# Patient Record
Sex: FEMALE | Race: BLACK OR AFRICAN AMERICAN | HISPANIC OR LATINO | Employment: OTHER | URBAN - METROPOLITAN AREA
[De-identification: names, ages, dates, MRNs, and addresses within clinical notes are randomized per-mention and may not be internally consistent; named-entity substitution may affect disease eponyms.]

---

## 2017-02-06 ENCOUNTER — ALLSCRIPTS OFFICE VISIT (OUTPATIENT)
Dept: OTHER | Facility: OTHER | Age: 67
End: 2017-02-06

## 2017-02-06 DIAGNOSIS — E78.5 HYPERLIPIDEMIA: ICD-10-CM

## 2017-02-06 DIAGNOSIS — R73.9 HYPERGLYCEMIA: ICD-10-CM

## 2017-02-06 DIAGNOSIS — I10 ESSENTIAL (PRIMARY) HYPERTENSION: ICD-10-CM

## 2017-02-06 DIAGNOSIS — E55.9 VITAMIN D DEFICIENCY: ICD-10-CM

## 2017-02-13 ENCOUNTER — TRANSCRIBE ORDERS (OUTPATIENT)
Dept: LAB | Facility: CLINIC | Age: 67
End: 2017-02-13

## 2017-02-13 ENCOUNTER — APPOINTMENT (OUTPATIENT)
Dept: LAB | Facility: CLINIC | Age: 67
End: 2017-02-13
Payer: MEDICARE

## 2017-02-13 DIAGNOSIS — I10 ESSENTIAL (PRIMARY) HYPERTENSION: ICD-10-CM

## 2017-02-13 DIAGNOSIS — E55.9 VITAMIN D DEFICIENCY: ICD-10-CM

## 2017-02-13 DIAGNOSIS — E78.5 HYPERLIPIDEMIA: ICD-10-CM

## 2017-02-13 DIAGNOSIS — R73.9 HYPERGLYCEMIA: ICD-10-CM

## 2017-02-13 LAB
25(OH)D3 SERPL-MCNC: 30 NG/ML (ref 30–100)
ALBUMIN SERPL BCP-MCNC: 3.8 G/DL (ref 3.5–5)
ALP SERPL-CCNC: 72 U/L (ref 46–116)
ALT SERPL W P-5'-P-CCNC: 46 U/L (ref 12–78)
ANION GAP SERPL CALCULATED.3IONS-SCNC: 8 MMOL/L (ref 4–13)
AST SERPL W P-5'-P-CCNC: 27 U/L (ref 5–45)
BASOPHILS # BLD AUTO: 0.07 THOUSANDS/ΜL (ref 0–0.1)
BASOPHILS NFR BLD AUTO: 1 % (ref 0–1)
BILIRUB SERPL-MCNC: 0.42 MG/DL (ref 0.2–1)
BUN SERPL-MCNC: 14 MG/DL (ref 5–25)
CALCIUM SERPL-MCNC: 9.9 MG/DL (ref 8.3–10.1)
CHLORIDE SERPL-SCNC: 105 MMOL/L (ref 100–108)
CHOLEST SERPL-MCNC: 189 MG/DL (ref 50–200)
CO2 SERPL-SCNC: 31 MMOL/L (ref 21–32)
CREAT SERPL-MCNC: 0.9 MG/DL (ref 0.6–1.3)
CREAT UR-MCNC: 174 MG/DL
EOSINOPHIL # BLD AUTO: 0.13 THOUSAND/ΜL (ref 0–0.61)
EOSINOPHIL NFR BLD AUTO: 2 % (ref 0–6)
ERYTHROCYTE [DISTWIDTH] IN BLOOD BY AUTOMATED COUNT: 13.1 % (ref 11.6–15.1)
EST. AVERAGE GLUCOSE BLD GHB EST-MCNC: 128 MG/DL
GFR SERPL CREATININE-BSD FRML MDRD: >60 ML/MIN/1.73SQ M
GLUCOSE SERPL-MCNC: 98 MG/DL (ref 65–140)
HBA1C MFR BLD: 6.1 % (ref 4.2–6.3)
HCT VFR BLD AUTO: 41.8 % (ref 34.8–46.1)
HDLC SERPL-MCNC: 66 MG/DL (ref 40–60)
HGB BLD-MCNC: 14.3 G/DL (ref 11.5–15.4)
LDLC SERPL CALC-MCNC: 99 MG/DL (ref 0–100)
LYMPHOCYTES # BLD AUTO: 3.67 THOUSANDS/ΜL (ref 0.6–4.47)
LYMPHOCYTES NFR BLD AUTO: 43 % (ref 14–44)
MCH RBC QN AUTO: 31.3 PG (ref 26.8–34.3)
MCHC RBC AUTO-ENTMCNC: 34.2 G/DL (ref 31.4–37.4)
MCV RBC AUTO: 92 FL (ref 82–98)
MICROALBUMIN UR-MCNC: 7.6 MG/L (ref 0–20)
MICROALBUMIN/CREAT 24H UR: 4 MG/G CREATININE (ref 0–30)
MONOCYTES # BLD AUTO: 0.48 THOUSAND/ΜL (ref 0.17–1.22)
MONOCYTES NFR BLD AUTO: 6 % (ref 4–12)
NEUTROPHILS # BLD AUTO: 4.25 THOUSANDS/ΜL (ref 1.85–7.62)
NEUTS SEG NFR BLD AUTO: 48 % (ref 43–75)
NRBC BLD AUTO-RTO: 0 /100 WBCS
PLATELET # BLD AUTO: 265 THOUSANDS/UL (ref 149–390)
PMV BLD AUTO: 10.9 FL (ref 8.9–12.7)
POTASSIUM SERPL-SCNC: 4.2 MMOL/L (ref 3.5–5.3)
PROT SERPL-MCNC: 8.2 G/DL (ref 6.4–8.2)
RBC # BLD AUTO: 4.57 MILLION/UL (ref 3.81–5.12)
SODIUM SERPL-SCNC: 144 MMOL/L (ref 136–145)
TRIGL SERPL-MCNC: 122 MG/DL
TSH SERPL DL<=0.05 MIU/L-ACNC: 2.21 UIU/ML (ref 0.36–3.74)
WBC # BLD AUTO: 8.62 THOUSAND/UL (ref 4.31–10.16)

## 2017-02-13 PROCEDURE — 82306 VITAMIN D 25 HYDROXY: CPT

## 2017-02-13 PROCEDURE — 82570 ASSAY OF URINE CREATININE: CPT

## 2017-02-13 PROCEDURE — 85025 COMPLETE CBC W/AUTO DIFF WBC: CPT

## 2017-02-13 PROCEDURE — 84443 ASSAY THYROID STIM HORMONE: CPT

## 2017-02-13 PROCEDURE — 80053 COMPREHEN METABOLIC PANEL: CPT

## 2017-02-13 PROCEDURE — 83036 HEMOGLOBIN GLYCOSYLATED A1C: CPT

## 2017-02-13 PROCEDURE — 82043 UR ALBUMIN QUANTITATIVE: CPT

## 2017-02-13 PROCEDURE — 36415 COLL VENOUS BLD VENIPUNCTURE: CPT

## 2017-02-13 PROCEDURE — 80061 LIPID PANEL: CPT

## 2017-04-04 ENCOUNTER — ALLSCRIPTS OFFICE VISIT (OUTPATIENT)
Dept: OTHER | Facility: OTHER | Age: 67
End: 2017-04-04

## 2017-05-31 ENCOUNTER — ALLSCRIPTS OFFICE VISIT (OUTPATIENT)
Dept: OTHER | Facility: OTHER | Age: 67
End: 2017-05-31

## 2017-05-31 DIAGNOSIS — M54.50 LOW BACK PAIN: ICD-10-CM

## 2017-06-07 ENCOUNTER — HOSPITAL ENCOUNTER (OUTPATIENT)
Dept: RADIOLOGY | Facility: HOSPITAL | Age: 67
Discharge: HOME | End: 2017-06-07
Payer: MEDICARE

## 2017-06-07 DIAGNOSIS — M54.50 LOW BACK PAIN: ICD-10-CM

## 2017-06-07 PROCEDURE — 72110 X-RAY EXAM L-2 SPINE 4/>VWS: CPT

## 2017-06-15 ENCOUNTER — APPOINTMENT (OUTPATIENT)
Dept: PHYSICAL THERAPY | Facility: CLINIC | Age: 67
End: 2017-06-15
Payer: MEDICARE

## 2017-06-15 PROCEDURE — G8990 OTHER PT/OT CURRENT STATUS: HCPCS

## 2017-06-15 PROCEDURE — 97110 THERAPEUTIC EXERCISES: CPT

## 2017-06-15 PROCEDURE — G8991 OTHER PT/OT GOAL STATUS: HCPCS

## 2017-06-15 PROCEDURE — 97162 PT EVAL MOD COMPLEX 30 MIN: CPT

## 2017-06-15 PROCEDURE — 97010 HOT OR COLD PACKS THERAPY: CPT

## 2017-06-20 ENCOUNTER — APPOINTMENT (OUTPATIENT)
Dept: PHYSICAL THERAPY | Facility: CLINIC | Age: 67
End: 2017-06-20
Payer: MEDICARE

## 2017-06-20 PROCEDURE — 97112 NEUROMUSCULAR REEDUCATION: CPT

## 2017-06-21 ENCOUNTER — APPOINTMENT (OUTPATIENT)
Dept: PHYSICAL THERAPY | Facility: CLINIC | Age: 67
End: 2017-06-21
Payer: MEDICARE

## 2017-06-21 PROCEDURE — 97110 THERAPEUTIC EXERCISES: CPT

## 2017-06-26 ENCOUNTER — APPOINTMENT (OUTPATIENT)
Dept: PHYSICAL THERAPY | Facility: CLINIC | Age: 67
End: 2017-06-26
Payer: MEDICARE

## 2017-06-26 PROCEDURE — 97110 THERAPEUTIC EXERCISES: CPT

## 2017-06-27 ENCOUNTER — APPOINTMENT (OUTPATIENT)
Dept: PHYSICAL THERAPY | Facility: CLINIC | Age: 67
End: 2017-06-27
Payer: MEDICARE

## 2017-06-27 PROCEDURE — 97110 THERAPEUTIC EXERCISES: CPT

## 2017-07-03 ENCOUNTER — APPOINTMENT (OUTPATIENT)
Dept: PHYSICAL THERAPY | Facility: CLINIC | Age: 67
End: 2017-07-03
Payer: MEDICARE

## 2017-07-03 PROCEDURE — 97110 THERAPEUTIC EXERCISES: CPT

## 2017-07-05 ENCOUNTER — APPOINTMENT (OUTPATIENT)
Dept: PHYSICAL THERAPY | Facility: CLINIC | Age: 67
End: 2017-07-05
Payer: MEDICARE

## 2017-07-05 PROCEDURE — 97110 THERAPEUTIC EXERCISES: CPT

## 2017-07-11 ENCOUNTER — APPOINTMENT (OUTPATIENT)
Dept: PHYSICAL THERAPY | Facility: CLINIC | Age: 67
End: 2017-07-11
Payer: MEDICARE

## 2017-07-11 PROCEDURE — 97110 THERAPEUTIC EXERCISES: CPT

## 2017-07-12 ENCOUNTER — APPOINTMENT (OUTPATIENT)
Dept: PHYSICAL THERAPY | Facility: CLINIC | Age: 67
End: 2017-07-12
Payer: MEDICARE

## 2017-07-13 ENCOUNTER — APPOINTMENT (OUTPATIENT)
Dept: PHYSICAL THERAPY | Facility: CLINIC | Age: 67
End: 2017-07-13
Payer: MEDICARE

## 2017-07-13 ENCOUNTER — GENERIC CONVERSION - ENCOUNTER (OUTPATIENT)
Dept: OTHER | Facility: OTHER | Age: 67
End: 2017-07-13

## 2017-07-13 PROCEDURE — 97140 MANUAL THERAPY 1/> REGIONS: CPT

## 2017-07-13 PROCEDURE — G8991 OTHER PT/OT GOAL STATUS: HCPCS

## 2017-07-13 PROCEDURE — 97110 THERAPEUTIC EXERCISES: CPT

## 2017-07-13 PROCEDURE — G8990 OTHER PT/OT CURRENT STATUS: HCPCS

## 2017-07-24 ENCOUNTER — APPOINTMENT (OUTPATIENT)
Dept: PHYSICAL THERAPY | Facility: CLINIC | Age: 67
End: 2017-07-24
Payer: MEDICARE

## 2017-07-24 PROCEDURE — 97110 THERAPEUTIC EXERCISES: CPT

## 2017-07-25 ENCOUNTER — APPOINTMENT (OUTPATIENT)
Dept: PHYSICAL THERAPY | Facility: CLINIC | Age: 67
End: 2017-07-25
Payer: MEDICARE

## 2017-07-25 PROCEDURE — 97140 MANUAL THERAPY 1/> REGIONS: CPT

## 2017-07-25 PROCEDURE — 97110 THERAPEUTIC EXERCISES: CPT

## 2017-08-01 ENCOUNTER — APPOINTMENT (OUTPATIENT)
Dept: PHYSICAL THERAPY | Facility: CLINIC | Age: 67
End: 2017-08-01
Payer: MEDICARE

## 2017-08-01 PROCEDURE — G8991 OTHER PT/OT GOAL STATUS: HCPCS

## 2017-08-01 PROCEDURE — 97140 MANUAL THERAPY 1/> REGIONS: CPT

## 2017-08-01 PROCEDURE — 97110 THERAPEUTIC EXERCISES: CPT

## 2017-08-01 PROCEDURE — G8990 OTHER PT/OT CURRENT STATUS: HCPCS

## 2017-08-02 ENCOUNTER — APPOINTMENT (OUTPATIENT)
Dept: PHYSICAL THERAPY | Facility: CLINIC | Age: 67
End: 2017-08-02
Payer: MEDICARE

## 2017-08-02 PROCEDURE — 97110 THERAPEUTIC EXERCISES: CPT

## 2017-08-08 ENCOUNTER — APPOINTMENT (OUTPATIENT)
Dept: PHYSICAL THERAPY | Facility: CLINIC | Age: 67
End: 2017-08-08
Payer: MEDICARE

## 2017-08-09 ENCOUNTER — APPOINTMENT (OUTPATIENT)
Dept: PHYSICAL THERAPY | Facility: CLINIC | Age: 67
End: 2017-08-09
Payer: MEDICARE

## 2017-08-09 PROCEDURE — 97110 THERAPEUTIC EXERCISES: CPT

## 2017-08-10 ENCOUNTER — APPOINTMENT (OUTPATIENT)
Dept: PHYSICAL THERAPY | Facility: CLINIC | Age: 67
End: 2017-08-10
Payer: MEDICARE

## 2017-08-10 PROCEDURE — 97110 THERAPEUTIC EXERCISES: CPT

## 2017-09-11 ENCOUNTER — ALLSCRIPTS OFFICE VISIT (OUTPATIENT)
Dept: OTHER | Facility: OTHER | Age: 67
End: 2017-09-11

## 2017-09-11 DIAGNOSIS — I10 ESSENTIAL (PRIMARY) HYPERTENSION: ICD-10-CM

## 2017-09-11 DIAGNOSIS — E55.9 VITAMIN D DEFICIENCY: ICD-10-CM

## 2017-09-11 DIAGNOSIS — E78.5 HYPERLIPIDEMIA: ICD-10-CM

## 2017-09-11 DIAGNOSIS — R79.89 OTHER SPECIFIED ABNORMAL FINDINGS OF BLOOD CHEMISTRY: ICD-10-CM

## 2017-09-11 LAB — HBA1C MFR BLD HPLC: 5.5 %

## 2017-10-02 ENCOUNTER — APPOINTMENT (OUTPATIENT)
Dept: LAB | Facility: CLINIC | Age: 67
End: 2017-10-02
Payer: MEDICARE

## 2017-10-02 ENCOUNTER — TRANSCRIBE ORDERS (OUTPATIENT)
Dept: LAB | Facility: CLINIC | Age: 67
End: 2017-10-02

## 2017-10-02 DIAGNOSIS — E78.5 HYPERLIPIDEMIA: ICD-10-CM

## 2017-10-02 DIAGNOSIS — I10 ESSENTIAL (PRIMARY) HYPERTENSION: ICD-10-CM

## 2017-10-02 DIAGNOSIS — E55.9 VITAMIN D DEFICIENCY: ICD-10-CM

## 2017-10-02 DIAGNOSIS — R79.89 OTHER SPECIFIED ABNORMAL FINDINGS OF BLOOD CHEMISTRY: ICD-10-CM

## 2017-10-02 LAB
25(OH)D3 SERPL-MCNC: 36.9 NG/ML (ref 30–100)
ALBUMIN SERPL BCP-MCNC: 3.7 G/DL (ref 3.5–5)
ALP SERPL-CCNC: 67 U/L (ref 46–116)
ALT SERPL W P-5'-P-CCNC: 28 U/L (ref 12–78)
ANION GAP SERPL CALCULATED.3IONS-SCNC: 7 MMOL/L (ref 4–13)
AST SERPL W P-5'-P-CCNC: 22 U/L (ref 5–45)
BASOPHILS # BLD AUTO: 0.06 THOUSANDS/ΜL (ref 0–0.1)
BASOPHILS NFR BLD AUTO: 1 % (ref 0–1)
BILIRUB SERPL-MCNC: 0.36 MG/DL (ref 0.2–1)
BUN SERPL-MCNC: 17 MG/DL (ref 5–25)
CALCIUM SERPL-MCNC: 9.2 MG/DL (ref 8.3–10.1)
CHLORIDE SERPL-SCNC: 103 MMOL/L (ref 100–108)
CHOLEST SERPL-MCNC: 140 MG/DL (ref 50–200)
CO2 SERPL-SCNC: 28 MMOL/L (ref 21–32)
CREAT SERPL-MCNC: 0.75 MG/DL (ref 0.6–1.3)
CREAT UR-MCNC: 17.7 MG/DL
EOSINOPHIL # BLD AUTO: 0.12 THOUSAND/ΜL (ref 0–0.61)
EOSINOPHIL NFR BLD AUTO: 1 % (ref 0–6)
ERYTHROCYTE [DISTWIDTH] IN BLOOD BY AUTOMATED COUNT: 13.2 % (ref 11.6–15.1)
GFR SERPL CREATININE-BSD FRML MDRD: 83 ML/MIN/1.73SQ M
GLUCOSE P FAST SERPL-MCNC: 92 MG/DL (ref 65–99)
HCT VFR BLD AUTO: 41.3 % (ref 34.8–46.1)
HDLC SERPL-MCNC: 58 MG/DL (ref 40–60)
HGB BLD-MCNC: 13.4 G/DL (ref 11.5–15.4)
LDLC SERPL CALC-MCNC: 67 MG/DL (ref 0–100)
LYMPHOCYTES # BLD AUTO: 2.9 THOUSANDS/ΜL (ref 0.6–4.47)
LYMPHOCYTES NFR BLD AUTO: 29 % (ref 14–44)
MCH RBC QN AUTO: 30.4 PG (ref 26.8–34.3)
MCHC RBC AUTO-ENTMCNC: 32.4 G/DL (ref 31.4–37.4)
MCV RBC AUTO: 94 FL (ref 82–98)
MICROALBUMIN UR-MCNC: <5 MG/L (ref 0–20)
MICROALBUMIN/CREAT 24H UR: <28 MG/G CREATININE (ref 0–30)
MONOCYTES # BLD AUTO: 0.61 THOUSAND/ΜL (ref 0.17–1.22)
MONOCYTES NFR BLD AUTO: 6 % (ref 4–12)
NEUTROPHILS # BLD AUTO: 6.48 THOUSANDS/ΜL (ref 1.85–7.62)
NEUTS SEG NFR BLD AUTO: 63 % (ref 43–75)
NRBC BLD AUTO-RTO: 0 /100 WBCS
PLATELET # BLD AUTO: 244 THOUSANDS/UL (ref 149–390)
PMV BLD AUTO: 11 FL (ref 8.9–12.7)
POTASSIUM SERPL-SCNC: 3.8 MMOL/L (ref 3.5–5.3)
PROT SERPL-MCNC: 7.9 G/DL (ref 6.4–8.2)
RBC # BLD AUTO: 4.41 MILLION/UL (ref 3.81–5.12)
SODIUM SERPL-SCNC: 138 MMOL/L (ref 136–145)
TRIGL SERPL-MCNC: 75 MG/DL
TSH SERPL DL<=0.05 MIU/L-ACNC: 0.78 UIU/ML (ref 0.36–3.74)
WBC # BLD AUTO: 10.19 THOUSAND/UL (ref 4.31–10.16)

## 2017-10-02 PROCEDURE — 80061 LIPID PANEL: CPT

## 2017-10-02 PROCEDURE — 82570 ASSAY OF URINE CREATININE: CPT

## 2017-10-02 PROCEDURE — 82043 UR ALBUMIN QUANTITATIVE: CPT

## 2017-10-02 PROCEDURE — 36415 COLL VENOUS BLD VENIPUNCTURE: CPT

## 2017-10-02 PROCEDURE — 85025 COMPLETE CBC W/AUTO DIFF WBC: CPT

## 2017-10-02 PROCEDURE — 84443 ASSAY THYROID STIM HORMONE: CPT

## 2017-10-02 PROCEDURE — 80053 COMPREHEN METABOLIC PANEL: CPT

## 2017-10-02 PROCEDURE — 82306 VITAMIN D 25 HYDROXY: CPT

## 2017-10-18 DIAGNOSIS — Z12.31 ENCOUNTER FOR SCREENING MAMMOGRAM FOR MALIGNANT NEOPLASM OF BREAST: ICD-10-CM

## 2017-10-24 ENCOUNTER — HOSPITAL ENCOUNTER (OUTPATIENT)
Dept: MAMMOGRAPHY | Facility: HOSPITAL | Age: 67
Discharge: HOME | End: 2017-10-24
Payer: MEDICARE

## 2017-10-24 DIAGNOSIS — Z12.31 ENCOUNTER FOR SCREENING MAMMOGRAM FOR MALIGNANT NEOPLASM OF BREAST: ICD-10-CM

## 2017-10-24 PROCEDURE — G0202 SCR MAMMO BI INCL CAD: HCPCS

## 2017-12-04 ENCOUNTER — GENERIC CONVERSION - ENCOUNTER (OUTPATIENT)
Dept: OTHER | Facility: OTHER | Age: 67
End: 2017-12-04

## 2018-01-11 NOTE — PROGRESS NOTES
Assessment    1  Encounter for Medicare annual wellness exam (V70 0) (Z00 00)    Plan  Health Maintenance    · Aspir-Low 81 MG Oral Tablet Delayed Release; take one tablet by mouth daily   · *VB - Fall Risk Assessment  (Dx Z13 89 Screen for Neurologic Disorder);  Status:Complete - Retrospective By Protocol Authorization;   Done: 68GMT2599 09:59AM   · *VB - Urinary Incontinence Screen (Dx Z13 89 Screen for UI); Status:Complete -  Retrospective By Protocol Authorization;   Done: 24JJP1795 10:00AM    Discussion/Summary  Impression: Subsequent Annual Wellness Visit  Cardiovascular screening and counseling: screening is current  Diabetes screening and counseling: screening is current  Colorectal cancer screening and counseling: screening is current  Breast cancer screening and counseling: the risks and benefits of screening were discussed  Possible side effects of new medications were reviewed with the patient/guardian today  The treatment plan was reviewed with the patient/guardian  The patient/guardian understands and agrees with the treatment plan     Self Referrals: No      History of Present Illness  HPI: 79 yo  female with HTN, Dyslipidemia, and Pemphigus vulgaris here today for AWV  Currently has complain of leg pain when she gets up in the morning, improves as she gets going  States is very active, has been going to the gym with her daughter 3 to 4 times a week  Has been out planting flowers, likes to go out for daily walks as well  Has been eating more vegetables and cutting down on her carbohydrates to help control her BG  Welcome to [employer] and Wellness Visits: The patient is being seen for the subsequent annual wellness visit  Medicare Screening and Risk Factors   Hospitalizations: no previous hospitalizations and she has been hospitalized 0 times  Once per lifetime medicare screening tests: ECG has not been done    Medicare Screening Tests Risk Questions   Abdominal aortic aneurysm risk assessment: none indicated  Osteoporosis risk assessment: none indicated  HIV risk assessment: none indicated  Drug and Alcohol Use: The patient has never smoked cigarettes  The patient reports occasional alcohol use  Alcohol concern:   The patient has no concerns about alcohol abuse  She has never used illicit drugs  Diet and Physical Activity: Current diet includes well balanced meals  She exercises 4 times per week  Exercise: walking 1 hour minutes per day  Mood Disorder and Cognitive Impairment Screening: Anxiety screening was done using and score was 0  Depression screening score was   negative for symptoms  She denies feeling down, depressed, or hopeless over the past two weeks  She denies feeling little interest or pleasure in doing things over the past two weeks  Cognitive impairment screening: denies difficulty learning/retaining new information, denies difficulty handling complex tasks, denies difficulty with reasoning, denies difficulty with spatial ability and orientation, denies difficulty with language and denies difficulty with behavior  Functional Ability/Level of Safety: Hearing is normal bilaterally, normal in the right ear, normal in the left ear and a hearing aid is not used  The patient is currently unable to drive, but able to do activities of daily living without limitations  Activities of daily living details: transportation help needed, but does not need help using the phone, does not need help shopping, no meal preparation help needed, does not need help doing housework, does not need help doing laundry, does not need help managing medications and does not need help managing money  Fall risk factors: The patient fell 0 times in the past 12 months  Home safety risk factors:  no unfamiliar surroundings, no loose rugs, no poor household lighting, no uneven floors, no household clutter, grab bars in the bathroom and handrails on the stairs     Advance Directives: Advance directives: no living will and no durable power of  for health care directives  Co-Managers and Medical Equipment/Suppliers: See Patient Care Team   Preventive Quality Program 65 and Older: Falls Risk: The patient fell 0 times in the past 12 months  The patient currently has no urinary incontinence symptoms  Patient Care Team    Care Team Member Role Specialty Office Number   Nel HERNANDEZ  Family Medicine [phone]     Review of Systems    Constitutional: negative  Head and Face: negative  Eyes: negative  ENT: negative  Cardiovascular: negative  Respiratory: negative  Gastrointestinal: negative  Genitourinary: negative  Musculoskeletal: back pain and joint stiffness, but as noted in HPI  Integumentary and Breasts: negative  Neurological: negative  Active Problems    1  Arthralgia of left hip (719 45) (M25 552)   2  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)   3  Bursitis (727 3) (M71 9)   4  Chronic right shoulder pain (719 41,338 29) (M25 511,G89 29)   5  Colon cancer screening (V76 51) (Z12 11)   6  Creatinine elevation (790 99) (R79 89)   7  Dry Skin (782 9)   8  Dyslipidemia (272 4) (E78 5)   9  Elevated blood sugar (790 29) (R73 9)   10  Encounter for routine gynecological examination (V72 31) (Z01 419)   11  Encounter for screening mammogram for malignant neoplasm of breast (V76 12)    (Z12 31)   12  Hypertension (401 9) (I10)   13  Impaired fasting glucose (790 21) (R73 01)   14  Low back pain (724 2) (M54 5)   15  Mammogram abnormal (793 80) (R92 8)   16  Multiple joint pain (719 49) (M25 50)   17  Neck pain (723 1) (M54 2)   18  Need for influenza vaccination (V04 81) (Z23)   19  Need for pneumococcal vaccination (V03 82) (Z23)   20  Need for prophylactic vaccination and inoculation against influenza (V04 81) (Z23)   21  Osteoarthrosis (715 90) (M19 90)   22  Pemphigus Vulgaris (694 4)   23  Screening for neurological condition (V80 09) (Z13 89)   24   Slow transit constipation (564 01) (K59 01)   25  Trigger Finger Of The Right Ring Finger (727 03)   26  Visit for screening mammogram (V76 12) (Z12 31)   27  Visit For: Preoperative ENT Exam (V72 83)   28  Vitamin D deficiency (268 9) (E55 9)    Past Medical History    · Denied: History of drug abuse   · Denied: History of mental disorder    Family History  Mother    · Denied: Family history of Mental health problem  Father    · Denied: Family history of Mental health problem    Social History    · Advance directive declined by patient (V49 89) (Z78 9)   · Lives with adult children   · Never A Smoker   · No illicit drug use   · Non drinker / no alcohol use   · Primary language is Romanian    Current Meds   1  Advil 200 MG Oral Tablet; TAKE 1 TABLET 3-4 TIMES DAILY AS NEEDED; Therapy: 65UXT2400 to (Evaluate:26Nov2013); Last Rx:18Nov2013 Ordered   2  Ammonium Lactate 12 % External Lotion; APPLY  AND RUB  IN A THIN FILM TO   AFFECTED AREAS TWICE DAILY  (AM AND PM); Therapy: 15GKG0887 to (Evaluate:06Jun2017)  Requested for: 64CAT7762; Last   Rx:06Feb2017 Ordered   3  Aspir-Low 81 MG Oral Tablet Delayed Release; take one tablet by mouth daily; Therapy: 77FKH0874 to (Last Rx:06Feb2017)  Requested for: 15QPS3729 Ordered   4  Lovastatin 20 MG Oral Tablet; TAKE 1 TABLET AT BEDTIME; Therapy: 81WGS3485 to (Evaluate:01Feb2018)  Requested for: 83RCF3012; Last   Rx:06Feb2017 Ordered   5  Metoprolol Tartrate 100 MG Oral Tablet; TAKE 1 TABLET TWICE DAILY; Therapy: 73PSL7692 to (Evaluate:01Feb2018)  Requested for: 56POO2934; Last   Rx:06Feb2017 Ordered   6  Naproxen 500 MG Oral Tablet; TAKE 1 TABLET EVERY 12 HOURS AS NEEDED FOR   PAIN;   Therapy: 00AIJ4940 to (Evaluate:34Ump8903)  Requested for: 79OJT2129; Last   Rx:19Nov2014 Ordered   7  Triamterene-HCTZ 37 5-25 MG Oral Tablet; take 1 tablet by mouth once daily;    Therapy: 10KSN4026 to (Evaluate:46Rie5406)  Requested for: 51AUL0074; Last   Rx:74Fpg2328 Ordered    Allergies 1  No Known Drug Allergies    Immunizations   1 2    Influenza  19-Nov-2014 06-Feb-2017    PPSV  10-Oct-2016      Vitals  Signs    Temperature: 97 3 F, Tympanic  Heart Rate: 86  Respiration: 18  Systolic: 755  Diastolic: 80  Height: 4 ft 11 in  Weight: 146 lb 9 oz  BMI Calculated: 29 6  BSA Calculated: 1 62  O2 Saturation: 97    Physical Exam    Constitutional   General appearance: No acute distress, well appearing and well nourished  Head and Face   Head and face: Normal     Palpation of the face and sinuses: No sinus tenderness  Eyes   Conjunctiva and lids: No swelling, erythema or discharge  Pupils and irises: Equal, round, reactive to light  Ophthalmoscopic examination: Normal fundi and optic discs  Ears, Nose, Mouth, and Throat   External inspection of ears and nose: Normal     Otoscopic examination: Tympanic membranes translucent with normal light reflex  Canals patent without erythema  Hearing: Normal     Nasal mucosa, septum, and turbinates: Normal without edema or erythema  Lips, teeth, and gums: Normal, good dentition  Oropharynx: Normal with no erythema, edema, exudate or lesions  Neck   Neck: Supple, symmetric, trachea midline, no masses  Thyroid: Normal, no thyromegaly  Pulmonary   Respiratory effort: No increased work of breathing or signs of respiratory distress  Auscultation of lungs: Clear to auscultation  Cardiovascular   Auscultation of heart: Normal rate and rhythm, normal S1 and S2, no murmurs  Examination of extremities for edema and/or varicosities: Normal     Chest   Chest: Normal     Abdomen   Abdomen: Non-tender, no masses  Liver and spleen: No hepatomegaly or splenomegaly  Examination for hernias: No hernia appreciated  Musculoskeletal   Gait and station: Normal     Skin   Palpation of skin and subcutaneous tissue: Normal turgor      Psychiatric   Judgment and insight: Normal     Orientation to person, place, and time: Normal     Recent and remote memory: Intact  Mood and affect: Normal        Results/Data  *VB - Urinary Incontinence Screen (Dx Z13 89 Screen for UI) 04Apr2017 10:00AM Donna Mess     Test Name Result Flag Reference   Urinary Incontinence Assessment 04Apr2017       *VB - Fall Risk Assessment  (Dx Z13 89 Screen for Neurologic Disorder) 19PPD1490 09:59AM Donna Mess     Test Name Result Flag Reference   Falls Risk      No falls in the past year       Health Management  Colon cancer screening   COLONOSCOPY; every 10 years; Last 68XBZ8571; Next Due: 14LHY8035;  Active    Signatures   Electronically signed by : POOJA Graham ; Apr 4 2017 11:29AM EST                       (Author)

## 2018-01-12 VITALS
TEMPERATURE: 98.2 F | WEIGHT: 149 LBS | SYSTOLIC BLOOD PRESSURE: 126 MMHG | RESPIRATION RATE: 18 BRPM | BODY MASS INDEX: 30.04 KG/M2 | HEIGHT: 59 IN | DIASTOLIC BLOOD PRESSURE: 74 MMHG | HEART RATE: 72 BPM

## 2018-01-13 VITALS
HEIGHT: 59 IN | HEART RATE: 86 BPM | BODY MASS INDEX: 29.55 KG/M2 | OXYGEN SATURATION: 97 % | WEIGHT: 146.56 LBS | RESPIRATION RATE: 18 BRPM | SYSTOLIC BLOOD PRESSURE: 114 MMHG | TEMPERATURE: 97.3 F | DIASTOLIC BLOOD PRESSURE: 80 MMHG

## 2018-01-14 VITALS
TEMPERATURE: 98.3 F | SYSTOLIC BLOOD PRESSURE: 118 MMHG | HEART RATE: 62 BPM | WEIGHT: 138 LBS | HEIGHT: 59 IN | OXYGEN SATURATION: 98 % | RESPIRATION RATE: 18 BRPM | DIASTOLIC BLOOD PRESSURE: 74 MMHG | BODY MASS INDEX: 27.82 KG/M2

## 2018-01-14 VITALS
HEART RATE: 60 BPM | RESPIRATION RATE: 18 BRPM | HEIGHT: 59 IN | OXYGEN SATURATION: 97 % | SYSTOLIC BLOOD PRESSURE: 126 MMHG | BODY MASS INDEX: 29.23 KG/M2 | TEMPERATURE: 97 F | WEIGHT: 145 LBS | DIASTOLIC BLOOD PRESSURE: 70 MMHG

## 2018-01-17 NOTE — RESULT NOTES
Verified Results  (1) CBC/PLT/DIFF 22Mar2016 09:29AM Juanita Costa   TW Order Number: [id number]    TW Order Number: [id number]     Test Name Result Flag Reference   WBC COUNT 8 53 Thousand/uL  4 31-10 16   RBC COUNT 4 80 Million/uL  3 81-5 12   HEMOGLOBIN 14 7 g/dL  11 5-15 4   HEMATOCRIT 43 2 %  34 8-46  1   MCV 90 fL  82-98   MCH 30 6 pg  26 8-34 3   MCHC 34 0 g/dL  31 4-37 4   RDW 12 8 %  11 6-15 1   MPV 10 4 fL  8 9-12 7   PLATELET COUNT 967 Thousands/uL  149-390   NEUTROPHILS RELATIVE PERCENT 44 %  43-75   LYMPHOCYTES RELATIVE PERCENT 48 % H 14-44   MONOCYTES RELATIVE PERCENT 6 %  4-12   EOSINOPHILS RELATIVE PERCENT 1 %  0-6   BASOPHILS RELATIVE PERCENT 1 %  0-1   NEUTROPHILS ABSOLUTE COUNT 3 79 Thousands/µL  1 85-7 62   LYMPHOCYTES ABSOLUTE COUNT 4 07 Thousands/µL  0 60-4 47   MONOCYTES ABSOLUTE COUNT 0 53 Thousand/µL  0 17-1 22   EOSINOPHILS ABSOLUTE COUNT 0 09 Thousand/µL  0 00-0 61   BASOPHILS ABSOLUTE COUNT 0 05 Thousands/µL  0 00-0 10     (1) COMPREHENSIVE METABOLIC PANEL 95AZJ2847 29:93JK Juanita Costa    Order Number: [id number]      National Kidney Disease Education Program recommendations are as follows:  GFR calculation is accurate only with a steady state creatinine  Chronic Kidney disease less than 60 ml/min/1 73 sq  meters  Kidney failure less than 15 ml/min/1 73 sq  meters  Test Name Result Flag Reference   GLUCOSE,RANDM 110 mg/dL     If the patient is fasting, the ADA then defines impaired fasting glucose as > 100 mg/dL and diabetes as > or equal to 123 mg/dL     SODIUM 143 mmol/L  136-145   POTASSIUM 4 5 mmol/L  3 5-5 3   CHLORIDE 104 mmol/L  100-108   CARBON DIOXIDE 32 mmol/L  21-32   ANION GAP (CALC) 7 mmol/L  4-13   BLOOD UREA NITROGEN 11 mg/dL  5-25   CREATININE 0 84 mg/dL  0 60-1 30   Standardized to IDMS reference method   CALCIUM 9 6 mg/dL  8 3-10 1   BILI, TOTAL 0 41 mg/dL  0 20-1 00   ALK PHOSPHATAS 73 U/L     ALT (SGPT) 45 U/L  12-78   AST(SGOT) 29 U/L  5-45 ALBUMIN 4 0 g/dL  3 5-5 0   TOTAL PROTEIN 8 4 g/dL H 6 4-8 2   eGFR Non-African American      >60 0 ml/min/1 73sq m     (1) LIPID PANEL FASTING W DIRECT LDL REFLEX 22Mar2016 09:29AM [facility] Order Number: [id number]      Triglyceride:         Normal              <150 mg/dl       Borderline High    150-199 mg/dl       High               200-499 mg/dl       Very High          >499 mg/dl  Cholesterol:         Desirable        <200 mg/dl      Borderline High  200-239 mg/dl      High             >239 mg/dl  HDL Cholesterol:        High    >59 mg/dL      Low     <41 mg/dL  LDL Cholesterol:        Optimal          <100 mg/dl         Near Optimal     100-129 mg/dl        Above Optimal          Borderline High   130-159 mg/dl          High              160-189 mg/dl          Very High        >189 mg/dl  LDL CALCULATED:    This screening LDL is a calculated result  It does not have the accuracy of the Direct Measured LDL in the monitoring of patients with hyperlipidemia and/or statin therapy  Direct Measure LDL (NKS073) must be ordered separately in these patients  Test Name Result Flag Reference   CHOLESTEROL 172 mg/dL     LDL CHOLESTEROL CALCULATED 77 mg/dL  0-100   TRIGLYCERIDES 153 mg/dL H <=150   HDL,DIRECT 64 mg/dL H 40-60     (1) TSH WITH FT4 REFLEX 22Mar2016 09:29AM [facility]: [id number]    Patients undergoing fluorescein dye angiography may retain small amounts of fluorescein in the body for 48-72 hours post procedure  Samples containing fluorescein can produce falsely depressed TSH values  If the patient had this procedure,a specimen should be resubmitted post fluorescein clearance          The recommended reference ranges for TSH during pregnancy are as follows:  First trimester 0 1 to 2 5 uIU/mL  Second trimester  0 2 to 3 0 uIU/mL  Third trimester 0 3 to 3 0 uIU/m     Test Name Result Flag Reference   TSH 1 469 uIU/mL  0 358-3 740     (1) MICROALBUMIN CREATININE RATIO, RANDOM URINE 22Mar2016 09:29AM Vijaya Fowler   TW Order Number: [id number]     Test Name Result Flag Reference   MICROALBUMIN/ CREAT R 5 mg/g creatinine  0-30   MICROALBUMIN,URINE 7 0 mg/L  0 0-20 0   CREATININE URINE 151 0 mg/dL       (1) HEMOGLOBIN A1C 22Mar2016 09:29AM Vijaya Fowler   TW Order Number: [id number]      5 7-6 4% impaired fasting glucose  >=6 5% diagnosis of diabetes    Falsely low levels are seen in conditions linked to short RBC life span-  hemolytic anemia, and splenomegaly  Falsely elevated levels are seen in situations where there is an increased production of RBC- receipt of erythropoietin or blood transfusions  Adopted from ADA-Clinical Practice Recommendations     Test Name Result Flag Reference   HEMOGLOBIN A1C 6 4 % H 4 0-5 6   EST  AVG  GLUCOSE 137 mg/dl       * XR SHOULDER 2+ VIEW RIGHT 22Mar2016 09:05AM Vijaya Fowler   TW Order Number: [id number]     Test Name Result Flag Reference   XR SHOULDER 2+ VW RIGHT (Report)     RIGHT SHOULDER     INDICATION: Chronic pain  COMPARISON: 2/13/2012     VIEWS: 3; 4 images     FINDINGS:     There is no acute fracture or dislocation  No degenerative changes  No lytic or blastic lesions are seen  Soft tissues are unremarkable  IMPRESSION:     No acute osseous abnormality  Workstation performed: [id number]     Signed by:   Merlin Cornwall, DO   3/22/16       Discussion/Summary   let pt know her BW shows her itamin D is slightly low, take Vitamin D 1000 IU OTC daily, and her BG is slightly high  Her HgA1c is 6 4, to be perfect it should be 6 or less  Watch the diet  No sugars, decrease amount of white rice, bread, pasta, bananas, plaintain  Rest of her BW and her shoulder Xray look good        Signatures   Electronically signed by : POOJA Keith ; Mar 29 2016 10:18AM EST                       (Author)

## 2018-01-24 VITALS
HEIGHT: 59 IN | WEIGHT: 136.5 LBS | TEMPERATURE: 96 F | BODY MASS INDEX: 27.52 KG/M2 | DIASTOLIC BLOOD PRESSURE: 80 MMHG | SYSTOLIC BLOOD PRESSURE: 122 MMHG | OXYGEN SATURATION: 98 % | RESPIRATION RATE: 18 BRPM | HEART RATE: 64 BPM

## 2018-02-08 ENCOUNTER — CONSULT (OUTPATIENT)
Dept: FAMILY MEDICINE CLINIC | Facility: CLINIC | Age: 68
End: 2018-02-08
Payer: MEDICARE

## 2018-02-08 VITALS
SYSTOLIC BLOOD PRESSURE: 128 MMHG | HEART RATE: 58 BPM | RESPIRATION RATE: 18 BRPM | TEMPERATURE: 97.9 F | WEIGHT: 133 LBS | OXYGEN SATURATION: 98 % | DIASTOLIC BLOOD PRESSURE: 76 MMHG | BODY MASS INDEX: 26.81 KG/M2 | HEIGHT: 59 IN

## 2018-02-08 DIAGNOSIS — M25.552 LEFT HIP PAIN: ICD-10-CM

## 2018-02-08 DIAGNOSIS — Z01.818 PRE-OP EVALUATION: ICD-10-CM

## 2018-02-08 DIAGNOSIS — Z12.31 ENCOUNTER FOR SCREENING MAMMOGRAM FOR BREAST CANCER: Primary | ICD-10-CM

## 2018-02-08 PROCEDURE — 93000 ELECTROCARDIOGRAM COMPLETE: CPT | Performed by: FAMILY MEDICINE

## 2018-02-08 PROCEDURE — 99214 OFFICE O/P EST MOD 30 MIN: CPT | Performed by: FAMILY MEDICINE

## 2018-02-08 RX ORDER — MELOXICAM 7.5 MG/1
7.5 TABLET ORAL DAILY
Qty: 30 TABLET | Refills: 0 | Status: SHIPPED | OUTPATIENT
Start: 2018-02-08 | End: 2018-08-23 | Stop reason: SDUPTHER

## 2018-02-08 NOTE — PROGRESS NOTES
Pre-Op Visit (Brief): The patient is being seen for a preoperative visit  The procedure is Right cataract surgery with Dr Kalie García  The indication for surgery is cataract      Surgical Risk Assessment:   Prior Anesthesia: Yes   Prior adverse reaction to general anesthesia:No      Pertinent Past Medical History  Neck osteoarthrosis: No  Seizure disorder:No  Asthma:No  Angina:No  Arrhythmia:No  CAD:No  CAD without prior MI:No  CAD without recent PCI:No  CHF:No  Chronic liver disease:No  Acute hepatitis::No  Coagulation delay:No  Primary hypercoagulable state:No  Secondary hypercoagulable state:No  pulmonary embolism:No  DVT:No  Use anticoagulants:No  Diabetes:No  Insulin use:No  Thyroid disease:No  TMJ osteoarthrosis:No  Wear dentures:Yes   CVA:No  COPD:No  KELBY:No  Renal disease:No  Low serum albumin:No  Obesity:No    Exercise Capacity  Are you able to walk two flights of stairs::Yes   Are you able to walk four blocks without symptoms:Yes     Lifestyle Factors  Alcohol use:No  Tobacco use:No  Illegal drug use:No    Symptoms  Easy bleeding:No  Easy bruising:No  Frequent nosebleeds:No  Chest pain:No  Cough:No  Dyspnea:No  Edema:No  Palpitations:No  Wheezing:No    Pertinent Family History:  Any family members with the following problems:No  Rx to anesthesia:No  Aneurysm:No  Bleeding problems:No  Sudden early deaths:No  Ischemic heart disease:Yes   Stroke:No

## 2018-02-08 NOTE — PROGRESS NOTES
Assessment/Plan:    No problem-specific Assessment & Plan notes found for this encounter  Problem List Items Addressed This Visit        Other    Pre-op evaluation    Relevant Orders    POCT ECG (Completed)    APTT    CBC and differential    Comprehensive metabolic panel    Protime-INR    Pulse oximetry, spot      Other Visit Diagnoses     Encounter for screening mammogram for breast cancer    -  Primary    Relevant Orders    Mammo screening bilateral w cad            Vitals:    02/08/18 1333   BP: 128/76   Pulse: 58   Resp: 18   Temp: 97 9 °F (36 6 °C)   SpO2: 98%       Subjective:      Patient ID: Magan Moreno is a 75 y o  female  Subjective:    Magan Moreno is a 75 y o  female who presents to the office today for a preoperative consultation at the request of surgeon Dr Micaela Bee who plans on performing R cataract surgery on March 1  This consultation is requested for the specific conditions prompting preoperative evaluation (i e  because of potential affect on operative risk): Rudy Dickens Planned anesthesia: general  The patient has the following known anesthesia issues: none  Patients bleeding risk: no recent abnormal bleeding and no remote history of abnormal bleeding  Patient does not have objections to receiving blood products if needed  The following portions of the patient's history were reviewed and updated as appropriate: She  has a past medical history of Dyslipidemia; Hypertension; Neck pain; and Shoulder pain  She  has a past surgical history that includes No past surgeries and pr colonoscopy flx dx w/collj spec when pfrmd (N/A, 11/10/2016)  Her family history is not on file  She  reports that she has never smoked  She has never used smokeless tobacco  She reports that she does not drink alcohol or use drugs    Current Outpatient Prescriptions:  ammonium lactate (LAC-HYDRIN) 12 % lotion, Apply 1 application topically as needed for dry skin, Disp: , Rfl:   ibuprofen (MOTRIN) 200 mg tablet, Take 200 mg by mouth every 6 (six) hours as needed for mild pain, Disp: , Rfl:   losartan (COZAAR) 50 mg tablet, Take 50 mg by mouth daily, Disp: , Rfl:   LOVASTATIN PO, Take 1 tablet by mouth Medrol Dose Pack scheduling ONLY, Disp: , Rfl:   metoprolol tartrate (LOPRESSOR) 100 mg tablet, Take 100 mg by mouth 2 (two) times a day, Disp: , Rfl:   naproxen (NAPROSYN) 500 mg tablet, Take 500 mg by mouth 2 (two) times a day with meals, Disp: , Rfl:   triamterene-hydrochlorothiazide (MAXZIDE-25) 37 5-25 mg per tablet, Take 1 tablet by mouth daily, Disp: , Rfl:     No current facility-administered medications for this visit          Review of Systems  A comprehensive review of systems was negative except for: L hip pain    Objective:    /76 (BP Location: Left arm, Patient Position: Sitting, Cuff Size: Standard)   Pulse 58   Temp 97 9 °F (36 6 °C) (Tympanic)   Resp 18   Ht 4' 10 5" (1 486 m)   Wt 60 3 kg (133 lb)   SpO2 98%   BMI 27 32 kg/m²     General Appearance:    Alert, cooperative, no distress, appears stated age  Head:    Normocephalic, without obvious abnormality, atraumatic  Eyes:    PERRL, conjunctiva/corneas clear, EOM's intact, fundi     benign, both eyes  Ears:    Normal TM's and external ear canals, both ears  Nose:   Nares normal, septum midline, mucosa normal, no drainage    or sinus tenderness  Throat:   Lips, mucosa, and tongue normal; teeth and gums normal  Neck:   Supple, symmetrical, trachea midline, no adenopathy;     thyroid:  no enlargement/tenderness/nodules; no carotid    bruit or JVD  Back:     Symmetric, no curvature, ROM normal, no CVA tenderness  Lungs:     Clear to auscultation bilaterally, respirations unlabored  Chest Wall:    No tenderness or deformity   Heart:    Regular rate and rhythm, S1 and S2 normal, no murmur, rub   or gallop  Breast Exam:    No tenderness, masses, or nipple abnormality  Abdomen:     Soft, non-tender, bowel sounds active all four quadrants,     no masses, no organomegaly  Genitalia:    Normal female without lesion, discharge or tenderness  Rectal:    Normal tone, no masses or tenderness; guaiac negative stool  Extremities:   Extremities normal, atraumatic, no cyanosis or edema  Pulses:   2+ and symmetric all extremities  Skin:   Skin color, texture, turgor normal, no rashes or lesions  Lymph nodes:   Cervical, supraclavicular, and axillary nodes normal  Neurologic:   CNII-XII intact, normal strength, sensation and reflexes     throughoutno    Predictors of intubation difficulty:   Morbid obesity? Anatomically abnormal facies? no   Prominent incisors? no   Receding mandible? no   Short, thick neck? no   Neck range of motion: normal   Mallampati score: II (hard and soft palate, upper portion of tonsils anduvula visible)   Thyromental distance: < 6cm   Mouth openincm   Dentition: No chipped, loose, or missing teeth  Cardiographics  ECG: normal sinus rhythm, no blocks or conduction defects, no ischemic changes  Echocardiogram: not done    Imaging  Chest x-ray: not done     Lab Review   not applicable  Pending ordered today    Assessment:    75 y o  female with planned surgery as above  Known risk factors for perioperative complications: None  hypertension    Difficulty with intubation is not anticipated  Cardiac Risk Estimation:     Current medications which may produce withdrawal symptoms if withheld perioperatively: Aspirin     Plan:    1  Preoperative workup as follows hemoglobin, hematocrit, creatinine, liver function studies, coagulation studies  2  Change in medication regimen before surgery: none, continue medication regimen including morning of surgery, with sip of water  3  Prophylaxis for cardiac events with perioperative beta-blockers: not indicated  4  Invasive hemodynamic monitoring perioperatively: not indicated    5  Deep vein thrombosis prophylaxis postoperatively:regimen to be chosen by surgical team   6  Surveillance for postoperative MI with ECG immediately postoperatively and on postoperative days 1 and 2 AND troponin levels 24 hours postoperatively and on day 4 or hospital discharge (whichever comes first): not indicated  7  Other measures: as per surgeon           The following portions of the patient's history were reviewed and updated as appropriate:   She  does not have any pertinent problems on file  She  has a past surgical history that includes No past surgeries and pr colonoscopy flx dx w/collj spec when pfrmd (N/A, 11/10/2016)  Her family history is not on file  She  reports that she has never smoked  She has never used smokeless tobacco  She reports that she does not drink alcohol or use drugs  Current Outpatient Prescriptions on File Prior to Visit   Medication Sig    ammonium lactate (LAC-HYDRIN) 12 % lotion Apply 1 application topically as needed for dry skin    ibuprofen (MOTRIN) 200 mg tablet Take 200 mg by mouth every 6 (six) hours as needed for mild pain    losartan (COZAAR) 50 mg tablet Take 50 mg by mouth daily    LOVASTATIN PO Take 1 tablet by mouth Medrol Dose Pack scheduling ONLY    metoprolol tartrate (LOPRESSOR) 100 mg tablet Take 100 mg by mouth 2 (two) times a day    naproxen (NAPROSYN) 500 mg tablet Take 500 mg by mouth 2 (two) times a day with meals    triamterene-hydrochlorothiazide (MAXZIDE-25) 37 5-25 mg per tablet Take 1 tablet by mouth daily     No current facility-administered medications on file prior to visit       Review of Systems   All other systems reviewed and are negative  Objective:     Physical Exam   Constitutional: She is oriented to person, place, and time  She appears well-developed  HENT:   Head: Normocephalic  Right Ear: External ear normal    Left Ear: External ear normal    Nose: Nose normal    Mouth/Throat: Oropharynx is clear and moist    Eyes: Conjunctivae and EOM are normal  Pupils are equal, round, and reactive to light     Neck: Normal range of motion  Neck supple  No thyromegaly present  Cardiovascular: Normal rate, regular rhythm and normal heart sounds  Pulmonary/Chest: Effort normal and breath sounds normal    Abdominal: Soft  There is no tenderness  There is no rebound and no guarding  Musculoskeletal: Normal range of motion  Neurological: She is alert and oriented to person, place, and time  She has normal reflexes  Skin: Skin is dry  Psychiatric: She has a normal mood and affect  Nursing note and vitals reviewed

## 2018-02-12 ENCOUNTER — TRANSCRIBE ORDERS (OUTPATIENT)
Dept: LAB | Facility: CLINIC | Age: 68
End: 2018-02-12

## 2018-02-12 ENCOUNTER — APPOINTMENT (OUTPATIENT)
Dept: LAB | Facility: CLINIC | Age: 68
End: 2018-02-12
Payer: MEDICARE

## 2018-02-12 DIAGNOSIS — Z01.818 PRE-OP EVALUATION: ICD-10-CM

## 2018-02-12 LAB
ALBUMIN SERPL BCP-MCNC: 3.8 G/DL (ref 3.5–5)
ALP SERPL-CCNC: 74 U/L (ref 46–116)
ALT SERPL W P-5'-P-CCNC: 32 U/L (ref 12–78)
ANION GAP SERPL CALCULATED.3IONS-SCNC: 4 MMOL/L (ref 4–13)
AST SERPL W P-5'-P-CCNC: 23 U/L (ref 5–45)
BASOPHILS # BLD AUTO: 0.04 THOUSANDS/ΜL (ref 0–0.1)
BASOPHILS NFR BLD AUTO: 1 % (ref 0–1)
BILIRUB SERPL-MCNC: 0.58 MG/DL (ref 0.2–1)
BUN SERPL-MCNC: 17 MG/DL (ref 5–25)
CALCIUM SERPL-MCNC: 9.5 MG/DL (ref 8.3–10.1)
CHLORIDE SERPL-SCNC: 104 MMOL/L (ref 100–108)
CO2 SERPL-SCNC: 32 MMOL/L (ref 21–32)
CREAT SERPL-MCNC: 0.79 MG/DL (ref 0.6–1.3)
EOSINOPHIL # BLD AUTO: 0.16 THOUSAND/ΜL (ref 0–0.61)
EOSINOPHIL NFR BLD AUTO: 2 % (ref 0–6)
ERYTHROCYTE [DISTWIDTH] IN BLOOD BY AUTOMATED COUNT: 13.3 % (ref 11.6–15.1)
GFR SERPL CREATININE-BSD FRML MDRD: 77 ML/MIN/1.73SQ M
GLUCOSE P FAST SERPL-MCNC: 97 MG/DL (ref 65–99)
HCT VFR BLD AUTO: 41.6 % (ref 34.8–46.1)
HGB BLD-MCNC: 14.1 G/DL (ref 11.5–15.4)
INR PPP: 0.92 (ref 0.86–1.16)
LYMPHOCYTES # BLD AUTO: 3.54 THOUSANDS/ΜL (ref 0.6–4.47)
LYMPHOCYTES NFR BLD AUTO: 42 % (ref 14–44)
MCH RBC QN AUTO: 31.1 PG (ref 26.8–34.3)
MCHC RBC AUTO-ENTMCNC: 33.9 G/DL (ref 31.4–37.4)
MCV RBC AUTO: 92 FL (ref 82–98)
MONOCYTES # BLD AUTO: 0.66 THOUSAND/ΜL (ref 0.17–1.22)
MONOCYTES NFR BLD AUTO: 8 % (ref 4–12)
NEUTROPHILS # BLD AUTO: 3.98 THOUSANDS/ΜL (ref 1.85–7.62)
NEUTS SEG NFR BLD AUTO: 47 % (ref 43–75)
NRBC BLD AUTO-RTO: 0 /100 WBCS
PLATELET # BLD AUTO: 249 THOUSANDS/UL (ref 149–390)
PMV BLD AUTO: 10.6 FL (ref 8.9–12.7)
POTASSIUM SERPL-SCNC: 4 MMOL/L (ref 3.5–5.3)
PROT SERPL-MCNC: 7.9 G/DL (ref 6.4–8.2)
PROTHROMBIN TIME: 12.4 SECONDS (ref 12.1–14.4)
RBC # BLD AUTO: 4.53 MILLION/UL (ref 3.81–5.12)
SODIUM SERPL-SCNC: 140 MMOL/L (ref 136–145)
WBC # BLD AUTO: 8.41 THOUSAND/UL (ref 4.31–10.16)

## 2018-02-12 PROCEDURE — 80053 COMPREHEN METABOLIC PANEL: CPT

## 2018-02-12 PROCEDURE — 36415 COLL VENOUS BLD VENIPUNCTURE: CPT

## 2018-02-12 PROCEDURE — 85025 COMPLETE CBC W/AUTO DIFF WBC: CPT

## 2018-02-12 PROCEDURE — 85610 PROTHROMBIN TIME: CPT

## 2018-05-31 DIAGNOSIS — I10 ESSENTIAL HYPERTENSION: Primary | ICD-10-CM

## 2018-06-01 RX ORDER — ACETAMINOPHEN/DIPHENHYDRAMINE 500MG-25MG
TABLET ORAL
Qty: 30 TABLET | Refills: 5 | Status: SHIPPED | OUTPATIENT
Start: 2018-06-01 | End: 2018-08-23 | Stop reason: SDUPTHER

## 2018-06-25 DIAGNOSIS — I10 ESSENTIAL HYPERTENSION: Primary | ICD-10-CM

## 2018-06-26 RX ORDER — METOPROLOL TARTRATE 100 MG/1
TABLET ORAL
Qty: 180 TABLET | Refills: 3 | Status: SHIPPED | OUTPATIENT
Start: 2018-06-26 | End: 2018-08-23 | Stop reason: SDUPTHER

## 2018-08-23 ENCOUNTER — OFFICE VISIT (OUTPATIENT)
Dept: FAMILY MEDICINE CLINIC | Facility: CLINIC | Age: 68
End: 2018-08-23
Payer: COMMERCIAL

## 2018-08-23 VITALS
OXYGEN SATURATION: 99 % | SYSTOLIC BLOOD PRESSURE: 130 MMHG | BODY MASS INDEX: 27.68 KG/M2 | RESPIRATION RATE: 18 BRPM | DIASTOLIC BLOOD PRESSURE: 88 MMHG | WEIGHT: 141 LBS | HEART RATE: 60 BPM | TEMPERATURE: 97.8 F | HEIGHT: 60 IN

## 2018-08-23 DIAGNOSIS — L10.9 PEMPHIGUS: ICD-10-CM

## 2018-08-23 DIAGNOSIS — M25.552 ARTHRALGIA OF LEFT HIP: ICD-10-CM

## 2018-08-23 DIAGNOSIS — I10 ESSENTIAL HYPERTENSION: ICD-10-CM

## 2018-08-23 DIAGNOSIS — M54.50 CHRONIC BILATERAL LOW BACK PAIN WITHOUT SCIATICA: Primary | ICD-10-CM

## 2018-08-23 DIAGNOSIS — M25.512 ACUTE PAIN OF LEFT SHOULDER: ICD-10-CM

## 2018-08-23 DIAGNOSIS — G89.29 CHRONIC BILATERAL LOW BACK PAIN WITHOUT SCIATICA: Primary | ICD-10-CM

## 2018-08-23 DIAGNOSIS — M25.552 LEFT HIP PAIN: ICD-10-CM

## 2018-08-23 PROCEDURE — 1101F PT FALLS ASSESS-DOCD LE1/YR: CPT | Performed by: FAMILY MEDICINE

## 2018-08-23 PROCEDURE — 3008F BODY MASS INDEX DOCD: CPT | Performed by: FAMILY MEDICINE

## 2018-08-23 PROCEDURE — 99214 OFFICE O/P EST MOD 30 MIN: CPT | Performed by: FAMILY MEDICINE

## 2018-08-23 PROCEDURE — 3075F SYST BP GE 130 - 139MM HG: CPT | Performed by: FAMILY MEDICINE

## 2018-08-23 PROCEDURE — 3079F DIAST BP 80-89 MM HG: CPT | Performed by: FAMILY MEDICINE

## 2018-08-23 PROCEDURE — 3725F SCREEN DEPRESSION PERFORMED: CPT | Performed by: FAMILY MEDICINE

## 2018-08-23 PROCEDURE — 1160F RVW MEDS BY RX/DR IN RCRD: CPT | Performed by: FAMILY MEDICINE

## 2018-08-23 RX ORDER — TRIAMTERENE AND HYDROCHLOROTHIAZIDE 37.5; 25 MG/1; MG/1
1 TABLET ORAL DAILY
Qty: 90 TABLET | Refills: 1 | Status: SHIPPED | OUTPATIENT
Start: 2018-08-23 | End: 2018-12-20 | Stop reason: SDUPTHER

## 2018-08-23 RX ORDER — METOPROLOL TARTRATE 100 MG/1
100 TABLET ORAL 2 TIMES DAILY
Qty: 180 TABLET | Refills: 1 | Status: SHIPPED | OUTPATIENT
Start: 2018-08-23 | End: 2019-03-28 | Stop reason: SDUPTHER

## 2018-08-23 RX ORDER — LOVASTATIN 10 MG/1
10 TABLET ORAL
Qty: 90 TABLET | Refills: 1 | Status: SHIPPED | OUTPATIENT
Start: 2018-08-23 | End: 2018-09-26

## 2018-08-23 RX ORDER — ASPIRIN 81 MG/1
81 TABLET ORAL DAILY
Qty: 90 TABLET | Refills: 1 | Status: SHIPPED | OUTPATIENT
Start: 2018-08-23 | End: 2019-06-26 | Stop reason: SDUPTHER

## 2018-08-23 RX ORDER — LIDOCAINE 50 MG/G
OINTMENT TOPICAL AS NEEDED
Qty: 35.44 G | Refills: 0 | Status: SHIPPED | OUTPATIENT
Start: 2018-08-23 | End: 2018-09-04 | Stop reason: SDUPTHER

## 2018-08-23 RX ORDER — AMMONIUM LACTATE 12 G/100G
1 LOTION TOPICAL 2 TIMES DAILY
Qty: 400 G | Refills: 5 | Status: SHIPPED | OUTPATIENT
Start: 2018-08-23 | End: 2019-06-26

## 2018-08-23 RX ORDER — MELOXICAM 7.5 MG/1
7.5 TABLET ORAL DAILY
Qty: 30 TABLET | Refills: 0 | Status: SHIPPED | OUTPATIENT
Start: 2018-08-23 | End: 2018-12-20 | Stop reason: SDUPTHER

## 2018-08-23 NOTE — PROGRESS NOTES
Assessment/Plan:    Hypertension  Refllid medication  Continue Metoprolol 100 mg BID, Losartan 50 and tiamterene-HCTZ  Blood work including renal function ordered today     Low back pain  No improvement with PT or Meloxicam  Has increasing pain and newly started tingling of legs  MRI ordered   Moist heat BID  Lidocaine ointment 5% ordered     Acute pain of left shoulder  Referred to Ortho          Problem List Items Addressed This Visit        Cardiovascular and Mediastinum    Hypertension     Refllid medication  Continue Metoprolol 100 mg BID, Losartan 50 and tiamterene-HCTZ  Blood work including renal function ordered today          Relevant Medications    metoprolol tartrate (LOPRESSOR) 100 mg tablet    aspirin (RA ASPIRIN EC) 81 mg EC tablet    triamterene-hydrochlorothiazide (MAXZIDE-25) 37 5-25 mg per tablet    lovastatin (MEVACOR) 10 MG tablet    Other Relevant Orders    CBC and differential    Comprehensive metabolic panel    Hemoglobin A1C    Lipid panel    Microalbumin / creatinine urine ratio    TSH, 3rd generation with Free T4 reflex       Musculoskeletal and Integument    Pemphigus    Relevant Medications    ammonium lactate (LAC-HYDRIN) 12 % lotion       Other    Arthralgia of left hip    Relevant Orders    Ambulatory referral to Orthopedic Surgery    Low back pain - Primary     No improvement with PT or Meloxicam  Has increasing pain and newly started tingling of legs  MRI ordered   Moist heat BID  Lidocaine ointment 5% ordered          Relevant Orders    MRI lumbar spine wo contrast    Ambulatory referral to Orthopedic Surgery    Acute pain of left shoulder     Referred to Ortho          Relevant Orders    Ambulatory referral to Orthopedic Surgery      Other Visit Diagnoses     Left hip pain        Relevant Medications    meloxicam (MOBIC) 7 5 mg tablet    Other Relevant Orders    MRI lumbar spine wo contrast            Subjective:      Patient ID: Susan Ibarra is a 75 y o  female      76 yo  female with controlled HTN here today complaining of:  1- Increasing LBP radiated to both hips and posterior thighs with numbness and tingling and decreased strength of L hip and thigh  Pain can reach 8/10 at its worse  Worse with movement, improves slightly with Meloxicam  She completed a 6 weeks course of PT with minimal improvement   2- L shoulder pain  States started about 4 weeks ago after digging with a shovel into hard ground  [place]  Has had pain and tingling sensation since  Pain is not radiated  Worse with lifting arm  Has difficulty closing her bra due to decreased ROM  3- Dry skin on both feet   4- Patient planning of L cataract surgery in the near future         The following portions of the patient's history were reviewed and updated as appropriate:   She  has a past medical history of Dyslipidemia; Hypertension; Neck pain; and Shoulder pain  She   Patient Active Problem List    Diagnosis Date Noted    Acute pain of left shoulder 08/23/2018    Pre-op evaluation 02/08/2018    Arthralgia of left hip 07/19/2016    Slow transit constipation 07/19/2016    Creatinine elevation 02/16/2015    Low back pain 08/06/2014    Vitamin D deficiency 07/29/2013    Pemphigus 04/08/2013    Dyslipidemia 06/19/2012    Hypertension 06/19/2012     She  has a past surgical history that includes No past surgeries and pr colonoscopy flx dx w/collj spec when pfrmd (N/A, 11/10/2016)  Her family history is not on file  She  reports that she has never smoked  She has never used smokeless tobacco  She reports that she does not drink alcohol or use drugs    Current Outpatient Prescriptions   Medication Sig Dispense Refill    ammonium lactate (LAC-HYDRIN) 12 % lotion Apply 1 application topically 2 (two) times a day 400 g 5    aspirin (RA ASPIRIN EC) 81 mg EC tablet Take 1 tablet (81 mg total) by mouth daily 90 tablet 1    ibuprofen (MOTRIN) 200 mg tablet Take 200 mg by mouth every 6 (six) hours as needed for mild pain      losartan (COZAAR) 50 mg tablet Take 50 mg by mouth daily      lovastatin (MEVACOR) 10 MG tablet Take 1 tablet (10 mg total) by mouth once at bedtime for 1 dose 90 tablet 1    meloxicam (MOBIC) 7 5 mg tablet Take 1 tablet (7 5 mg total) by mouth daily 30 tablet 0    metoprolol tartrate (LOPRESSOR) 100 mg tablet Take 1 tablet (100 mg total) by mouth 2 (two) times a day 180 tablet 1    naproxen (NAPROSYN) 500 mg tablet Take 500 mg by mouth 2 (two) times a day with meals      triamterene-hydrochlorothiazide (MAXZIDE-25) 37 5-25 mg per tablet Take 1 tablet by mouth daily 90 tablet 1     No current facility-administered medications for this visit  Current Outpatient Prescriptions on File Prior to Visit   Medication Sig    ibuprofen (MOTRIN) 200 mg tablet Take 200 mg by mouth every 6 (six) hours as needed for mild pain    losartan (COZAAR) 50 mg tablet Take 50 mg by mouth daily    naproxen (NAPROSYN) 500 mg tablet Take 500 mg by mouth 2 (two) times a day with meals    [DISCONTINUED] ammonium lactate (LAC-HYDRIN) 12 % lotion Apply 1 application topically as needed for dry skin    [DISCONTINUED] LOVASTATIN PO Take 1 tablet by mouth Medrol Dose Pack scheduling ONLY    [DISCONTINUED] meloxicam (MOBIC) 7 5 mg tablet Take 1 tablet (7 5 mg total) by mouth daily    [DISCONTINUED] metoprolol tartrate (LOPRESSOR) 100 mg tablet take 1 tablet by mouth twice a day    [DISCONTINUED] RA ASPIRIN EC 81 MG EC tablet take 1 tablet by mouth once daily    [DISCONTINUED] triamterene-hydrochlorothiazide (MAXZIDE-25) 37 5-25 mg per tablet Take 1 tablet by mouth daily     No current facility-administered medications on file prior to visit       Review of Systems   Musculoskeletal: Positive for arthralgias and back pain  Skin:        As per HPI   All other systems reviewed and are negative          Objective:      /88   Pulse 60   Temp 97 8 °F (36 6 °C) (Tympanic)   Resp 18   Ht 5' (1 524 m) Wt 64 kg (141 lb)   SpO2 99%   BMI 27 54 kg/m²          Physical Exam   Constitutional: She is oriented to person, place, and time  She appears well-developed  HENT:   Head: Normocephalic  Right Ear: External ear normal    Left Ear: External ear normal    Nose: Nose normal    Mouth/Throat: Oropharynx is clear and moist    Eyes: Conjunctivae and EOM are normal  Pupils are equal, round, and reactive to light  Neck: Normal range of motion  Neck supple  No thyromegaly present  Cardiovascular: Normal rate, regular rhythm and normal heart sounds  Pulmonary/Chest: Effort normal and breath sounds normal    Abdominal: Soft  There is no tenderness  There is no rebound and no guarding  Musculoskeletal: She exhibits tenderness  Left shoulder: She exhibits decreased range of motion, tenderness and spasm  Lumbar back: She exhibits tenderness and spasm  Neurological: She is alert and oriented to person, place, and time  She has normal reflexes  Skin: Skin is dry  Psychiatric: She has a normal mood and affect  Nursing note and vitals reviewed

## 2018-08-23 NOTE — ASSESSMENT & PLAN NOTE
No improvement with PT or Meloxicam  Has increasing pain and newly started tingling of legs  MRI ordered   Moist heat BID  Lidocaine ointment 5% ordered

## 2018-08-23 NOTE — ASSESSMENT & PLAN NOTE
Refllid medication  Continue Metoprolol 100 mg BID, Losartan 50 and tiamterene-HCTZ  Blood work including renal function ordered today

## 2018-08-27 ENCOUNTER — TRANSCRIBE ORDERS (OUTPATIENT)
Dept: LAB | Facility: CLINIC | Age: 68
End: 2018-08-27

## 2018-08-27 ENCOUNTER — APPOINTMENT (OUTPATIENT)
Dept: LAB | Facility: CLINIC | Age: 68
End: 2018-08-27
Payer: COMMERCIAL

## 2018-08-27 LAB
ALBUMIN SERPL BCP-MCNC: 3.6 G/DL (ref 3.5–5)
ALP SERPL-CCNC: 59 U/L (ref 46–116)
ALT SERPL W P-5'-P-CCNC: 24 U/L (ref 12–78)
ANION GAP SERPL CALCULATED.3IONS-SCNC: 7 MMOL/L (ref 4–13)
AST SERPL W P-5'-P-CCNC: 18 U/L (ref 5–45)
BASOPHILS # BLD AUTO: 0.1 THOUSANDS/ΜL (ref 0–0.1)
BASOPHILS NFR BLD AUTO: 1 % (ref 0–1)
BILIRUB SERPL-MCNC: 0.56 MG/DL (ref 0.2–1)
BUN SERPL-MCNC: 22 MG/DL (ref 5–25)
CALCIUM SERPL-MCNC: 9.4 MG/DL (ref 8.3–10.1)
CHLORIDE SERPL-SCNC: 101 MMOL/L (ref 100–108)
CHOLEST SERPL-MCNC: 195 MG/DL (ref 50–200)
CO2 SERPL-SCNC: 28 MMOL/L (ref 21–32)
CREAT SERPL-MCNC: 0.85 MG/DL (ref 0.6–1.3)
CREAT UR-MCNC: 184 MG/DL
EOSINOPHIL # BLD AUTO: 0.23 THOUSAND/ΜL (ref 0–0.61)
EOSINOPHIL NFR BLD AUTO: 2 % (ref 0–6)
ERYTHROCYTE [DISTWIDTH] IN BLOOD BY AUTOMATED COUNT: 12.9 % (ref 11.6–15.1)
EST. AVERAGE GLUCOSE BLD GHB EST-MCNC: 117 MG/DL
GFR SERPL CREATININE-BSD FRML MDRD: 71 ML/MIN/1.73SQ M
GLUCOSE P FAST SERPL-MCNC: 101 MG/DL (ref 65–99)
HBA1C MFR BLD: 5.7 % (ref 4.2–6.3)
HCT VFR BLD AUTO: 41.6 % (ref 34.8–46.1)
HDLC SERPL-MCNC: 61 MG/DL (ref 40–60)
HGB BLD-MCNC: 13.3 G/DL (ref 11.5–15.4)
IMM GRANULOCYTES # BLD AUTO: 0.05 THOUSAND/UL (ref 0–0.2)
IMM GRANULOCYTES NFR BLD AUTO: 1 % (ref 0–2)
LDLC SERPL CALC-MCNC: 115 MG/DL (ref 0–100)
LYMPHOCYTES # BLD AUTO: 3.98 THOUSANDS/ΜL (ref 0.6–4.47)
LYMPHOCYTES NFR BLD AUTO: 37 % (ref 14–44)
MCH RBC QN AUTO: 30.1 PG (ref 26.8–34.3)
MCHC RBC AUTO-ENTMCNC: 32 G/DL (ref 31.4–37.4)
MCV RBC AUTO: 94 FL (ref 82–98)
MICROALBUMIN UR-MCNC: 27.3 MG/L (ref 0–20)
MICROALBUMIN/CREAT 24H UR: 15 MG/G CREATININE (ref 0–30)
MONOCYTES # BLD AUTO: 0.79 THOUSAND/ΜL (ref 0.17–1.22)
MONOCYTES NFR BLD AUTO: 7 % (ref 4–12)
NEUTROPHILS # BLD AUTO: 5.73 THOUSANDS/ΜL (ref 1.85–7.62)
NEUTS SEG NFR BLD AUTO: 52 % (ref 43–75)
NONHDLC SERPL-MCNC: 134 MG/DL
NRBC BLD AUTO-RTO: 0 /100 WBCS
PLATELET # BLD AUTO: 246 THOUSANDS/UL (ref 149–390)
PMV BLD AUTO: 10.6 FL (ref 8.9–12.7)
POTASSIUM SERPL-SCNC: 3.9 MMOL/L (ref 3.5–5.3)
PROT SERPL-MCNC: 7.7 G/DL (ref 6.4–8.2)
RBC # BLD AUTO: 4.42 MILLION/UL (ref 3.81–5.12)
SODIUM SERPL-SCNC: 136 MMOL/L (ref 136–145)
TRIGL SERPL-MCNC: 94 MG/DL
TSH SERPL DL<=0.05 MIU/L-ACNC: 1.41 UIU/ML (ref 0.36–3.74)
WBC # BLD AUTO: 10.88 THOUSAND/UL (ref 4.31–10.16)

## 2018-08-27 PROCEDURE — 82043 UR ALBUMIN QUANTITATIVE: CPT | Performed by: FAMILY MEDICINE

## 2018-08-27 PROCEDURE — 82570 ASSAY OF URINE CREATININE: CPT | Performed by: FAMILY MEDICINE

## 2018-08-27 PROCEDURE — 84443 ASSAY THYROID STIM HORMONE: CPT | Performed by: FAMILY MEDICINE

## 2018-08-27 PROCEDURE — 80053 COMPREHEN METABOLIC PANEL: CPT | Performed by: FAMILY MEDICINE

## 2018-08-27 PROCEDURE — 80061 LIPID PANEL: CPT | Performed by: FAMILY MEDICINE

## 2018-08-27 PROCEDURE — 36415 COLL VENOUS BLD VENIPUNCTURE: CPT | Performed by: FAMILY MEDICINE

## 2018-08-27 PROCEDURE — 83036 HEMOGLOBIN GLYCOSYLATED A1C: CPT | Performed by: FAMILY MEDICINE

## 2018-08-27 PROCEDURE — 85025 COMPLETE CBC W/AUTO DIFF WBC: CPT | Performed by: FAMILY MEDICINE

## 2018-09-04 ENCOUNTER — CONSULT (OUTPATIENT)
Dept: FAMILY MEDICINE CLINIC | Facility: CLINIC | Age: 68
End: 2018-09-04
Payer: COMMERCIAL

## 2018-09-04 VITALS
BODY MASS INDEX: 27.88 KG/M2 | SYSTOLIC BLOOD PRESSURE: 118 MMHG | RESPIRATION RATE: 18 BRPM | OXYGEN SATURATION: 99 % | HEIGHT: 60 IN | WEIGHT: 142 LBS | HEART RATE: 68 BPM | DIASTOLIC BLOOD PRESSURE: 72 MMHG | TEMPERATURE: 97.5 F

## 2018-09-04 DIAGNOSIS — Z01.818 PRE-OP EVALUATION: ICD-10-CM

## 2018-09-04 DIAGNOSIS — Z01.818 PRE-OPERATIVE CLEARANCE: Primary | ICD-10-CM

## 2018-09-04 DIAGNOSIS — G89.29 CHRONIC BILATERAL LOW BACK PAIN WITHOUT SCIATICA: ICD-10-CM

## 2018-09-04 DIAGNOSIS — M25.552 ARTHRALGIA OF LEFT HIP: ICD-10-CM

## 2018-09-04 DIAGNOSIS — M54.50 CHRONIC BILATERAL LOW BACK PAIN WITHOUT SCIATICA: ICD-10-CM

## 2018-09-04 PROCEDURE — 93000 ELECTROCARDIOGRAM COMPLETE: CPT | Performed by: FAMILY MEDICINE

## 2018-09-04 PROCEDURE — 99215 OFFICE O/P EST HI 40 MIN: CPT | Performed by: FAMILY MEDICINE

## 2018-09-04 RX ORDER — LIDOCAINE 50 MG/G
OINTMENT TOPICAL AS NEEDED
Qty: 35.44 G | Refills: 2 | Status: SHIPPED | OUTPATIENT
Start: 2018-09-04 | End: 2019-06-26 | Stop reason: SDUPTHER

## 2018-09-04 NOTE — PROGRESS NOTES
Subjective:     Monica Rodriguez is a 75 y o  female who presents to the office today for a preoperative consultation at the request of surgeon Eduard Marsh who plans on performing left eye cataract surgery on September 27  This consultation is requested for the specific conditions prompting preoperative evaluation (i e  because of potential affect on operative risk): age, HTN  Planned anesthesia: not specified on preop request  The patient has the following known anesthesia issues: none  Patients bleeding risk: no recent abnormal bleeding  Patient does not have objections to receiving blood products if needed  The following portions of the patient's history were reviewed and updated as appropriate:   She  has a past medical history of Dyslipidemia; Hypertension; Neck pain; and Shoulder pain  She   Patient Active Problem List    Diagnosis Date Noted    Acute pain of left shoulder 08/23/2018    Pre-op evaluation 02/08/2018    Arthralgia of left hip 07/19/2016    Slow transit constipation 07/19/2016    Creatinine elevation 02/16/2015    Low back pain 08/06/2014    Vitamin D deficiency 07/29/2013    Pemphigus 04/08/2013    Dyslipidemia 06/19/2012    Hypertension 06/19/2012     She  has a past surgical history that includes No past surgeries and pr colonoscopy flx dx w/collj spec when pfrmd (N/A, 11/10/2016)  Her family history is not on file  She  reports that she has never smoked  She has never used smokeless tobacco  She reports that she does not drink alcohol or use drugs    Current Outpatient Prescriptions   Medication Sig Dispense Refill    ammonium lactate (LAC-HYDRIN) 12 % lotion Apply 1 application topically 2 (two) times a day 400 g 5    aspirin (RA ASPIRIN EC) 81 mg EC tablet Take 1 tablet (81 mg total) by mouth daily 90 tablet 1    ibuprofen (MOTRIN) 200 mg tablet Take 200 mg by mouth every 6 (six) hours as needed for mild pain      lidocaine (XYLOCAINE) 5 % ointment Apply topically as needed for mild pain 35 44 g 0    losartan (COZAAR) 50 mg tablet Take 50 mg by mouth daily      lovastatin (MEVACOR) 10 MG tablet Take 1 tablet (10 mg total) by mouth once at bedtime for 1 dose 90 tablet 1    meloxicam (MOBIC) 7 5 mg tablet Take 1 tablet (7 5 mg total) by mouth daily 30 tablet 0    metoprolol tartrate (LOPRESSOR) 100 mg tablet Take 1 tablet (100 mg total) by mouth 2 (two) times a day 180 tablet 1    naproxen (NAPROSYN) 500 mg tablet Take 500 mg by mouth 2 (two) times a day with meals      triamterene-hydrochlorothiazide (MAXZIDE-25) 37 5-25 mg per tablet Take 1 tablet by mouth daily 90 tablet 1     No current facility-administered medications for this visit  Current Outpatient Prescriptions on File Prior to Visit   Medication Sig    ammonium lactate (LAC-HYDRIN) 12 % lotion Apply 1 application topically 2 (two) times a day    aspirin (RA ASPIRIN EC) 81 mg EC tablet Take 1 tablet (81 mg total) by mouth daily    ibuprofen (MOTRIN) 200 mg tablet Take 200 mg by mouth every 6 (six) hours as needed for mild pain    lidocaine (XYLOCAINE) 5 % ointment Apply topically as needed for mild pain    losartan (COZAAR) 50 mg tablet Take 50 mg by mouth daily    lovastatin (MEVACOR) 10 MG tablet Take 1 tablet (10 mg total) by mouth once at bedtime for 1 dose    meloxicam (MOBIC) 7 5 mg tablet Take 1 tablet (7 5 mg total) by mouth daily    metoprolol tartrate (LOPRESSOR) 100 mg tablet Take 1 tablet (100 mg total) by mouth 2 (two) times a day    naproxen (NAPROSYN) 500 mg tablet Take 500 mg by mouth 2 (two) times a day with meals    triamterene-hydrochlorothiazide (MAXZIDE-25) 37 5-25 mg per tablet Take 1 tablet by mouth daily     No current facility-administered medications on file prior to visit       Review of Systems  Pertinent items are noted in HPI       Objective:     /72 (BP Location: Right arm, Patient Position: Sitting, Cuff Size: Standard)   Pulse 68   Temp 97 5 °F (36 4 °C) (Tympanic)   Resp 18   Ht 5' (1 524 m)   Wt 64 4 kg (142 lb)   SpO2 99%   BMI 27 73 kg/m²     General Appearance:    Alert, cooperative, no distress, appears stated age   Head:    Normocephalic, without obvious abnormality, atraumatic   Eyes:    PERRL, conjunctiva/corneas clear, EOM's intact, fundi     benign, both eyes   Ears:    Normal TM's and external ear canals, both ears   Nose:   Nares normal, septum midline, mucosa normal, no drainage    or sinus tenderness   Throat:   Lips, mucosa, and tongue normal; teeth and gums normal   Neck:   Supple, symmetrical, trachea midline, no adenopathy;     thyroid:  no enlargement/tenderness/nodules; no carotid    bruit or JVD   Back:     Symmetric, no curvature, ROM normal, no CVA tenderness   Lungs:     Clear to auscultation bilaterally, respirations unlabored   Chest Wall:    No tenderness or deformity    Heart:    Regular rate and rhythm, S1 and S2 normal, no murmur, rub   or gallop   Breast Exam:    No tenderness, masses, or nipple abnormality   Abdomen:     Soft, non-tender, bowel sounds active all four quadrants,     no masses, no organomegaly   Genitalia:    Normal female without lesion, discharge or tenderness   Rectal:    Normal tone, no masses or tenderness; guaiac negative stool   Extremities:   Extremities normal, atraumatic, no cyanosis or edema   Pulses:   2+ and symmetric all extremities   Skin:   Skin color, texture, turgor normal, no rashes or lesions   Lymph nodes:   Cervical, supraclavicular, and axillary nodes normal   Neurologic:   CNII-XII intact, normal strength, sensation and reflexes     throughout       Predictors of intubation difficulty:   Morbid obesity? no   Anatomically abnormal facies?  no   Prominent incisors? no   Receding mandible? no   Short, thick neck? no   Neck range of motion: normal   Mallampati score: II (hard and soft palate, upper portion of tonsils anduvula visible)   Thyromental distance: < 6cm   Dentition: No chipped, loose, or missing teeth  Cardiographics  ECG: normal sinus rhythm, no blocks or conduction defects, no ischemic changes  Echocardiogram: not done    Imaging  Chest x-ray: not indicated     Lab Review   Office Visit on 08/23/2018   Component Date Value    WBC 08/27/2018 10 88*    RBC 08/27/2018 4 42     Hemoglobin 08/27/2018 13 3     Hematocrit 08/27/2018 41 6     MCV 08/27/2018 94     MCH 08/27/2018 30 1     MCHC 08/27/2018 32 0     RDW 08/27/2018 12 9     MPV 08/27/2018 10 6     Platelets 97/49/8950 246     nRBC 08/27/2018 0     Neutrophils Relative 08/27/2018 52     Immat GRANS % 08/27/2018 1     Lymphocytes Relative 08/27/2018 37     Monocytes Relative 08/27/2018 7     Eosinophils Relative 08/27/2018 2     Basophils Relative 08/27/2018 1     Neutrophils Absolute 08/27/2018 5 73     Immature Grans Absolute 08/27/2018 0 05     Lymphocytes Absolute 08/27/2018 3 98     Monocytes Absolute 08/27/2018 0 79     Eosinophils Absolute 08/27/2018 0 23     Basophils Absolute 08/27/2018 0 10     Sodium 08/27/2018 136     Potassium 08/27/2018 3 9     Chloride 08/27/2018 101     CO2 08/27/2018 28     ANION GAP 08/27/2018 7     BUN 08/27/2018 22     Creatinine 08/27/2018 0 85     Glucose, Fasting 08/27/2018 101*    Calcium 08/27/2018 9 4     AST 08/27/2018 18     ALT 08/27/2018 24     Alkaline Phosphatase 08/27/2018 59     Total Protein 08/27/2018 7 7     Albumin 08/27/2018 3 6     Total Bilirubin 08/27/2018 0 56     eGFR 08/27/2018 71     Hemoglobin A1C 08/27/2018 5 7     EAG 08/27/2018 117     Cholesterol 08/27/2018 195     Triglycerides 08/27/2018 94     HDL, Direct 08/27/2018 61*    LDL Calculated 08/27/2018 115*    Non-HDL-Chol (CHOL-HDL) 08/27/2018 134     Creatinine, Ur 08/27/2018 184 0     Microalbum  ,U,Random 08/27/2018 27 3*    Microalb Creat Ratio 08/27/2018 15     TSH 3RD GENERATON 08/27/2018 1 410         Assessment:     75 y o  female with planned surgery as above  Known risk factors for perioperative complications: None    Difficulty with intubation is not anticipated  Plan:     1  Preoperative workup as follows none  2  Change in medication regimen before surgery: discontinue ASA 14 days before surgery  3  Prophylaxis for cardiac events with perioperative beta-blockers: should be considered, specific regimen per anesthesia  4  Invasive hemodynamic monitoring perioperatively: not indicated  5  Deep vein thrombosis prophylaxis postoperatively:regimen to be chosen by surgical team   6  Surveillance for postoperative MI with ECG immediately postoperatively and on postoperative days 1 and 2 AND troponin levels 24 hours postoperatively and on day 4 or hospital discharge (whichever comes first): not indicated

## 2018-09-06 ENCOUNTER — HOSPITAL ENCOUNTER (OUTPATIENT)
Dept: MRI IMAGING | Facility: HOSPITAL | Age: 68
Discharge: HOME | End: 2018-09-06
Payer: COMMERCIAL

## 2018-09-06 DIAGNOSIS — M25.552 LEFT HIP PAIN: ICD-10-CM

## 2018-09-06 DIAGNOSIS — M54.50 CHRONIC BILATERAL LOW BACK PAIN WITHOUT SCIATICA: ICD-10-CM

## 2018-09-06 DIAGNOSIS — G89.29 CHRONIC BILATERAL LOW BACK PAIN WITHOUT SCIATICA: ICD-10-CM

## 2018-09-06 PROCEDURE — 72148 MRI LUMBAR SPINE W/O DYE: CPT

## 2018-09-07 DIAGNOSIS — M54.50 CHRONIC BILATERAL LOW BACK PAIN WITHOUT SCIATICA: Primary | ICD-10-CM

## 2018-09-07 DIAGNOSIS — G89.29 CHRONIC BILATERAL LOW BACK PAIN WITHOUT SCIATICA: Primary | ICD-10-CM

## 2018-09-07 NOTE — PROGRESS NOTES
Please let pt know MRI shows she has narrowing of her spinal canal and 2 hernias   Would recommend she see pain management   Order is in

## 2018-09-26 ENCOUNTER — ANESTHESIA EVENT (OUTPATIENT)
Dept: PERIOP | Facility: HOSPITAL | Age: 68
End: 2018-09-26
Payer: COMMERCIAL

## 2018-09-26 NOTE — PRE-PROCEDURE INSTRUCTIONS
Pre-Surgery Instructions:   Medication Instructions    losartan (COZAAR) 50 mg tablet Instructed patient per Anesthesia Guidelines   metoprolol tartrate (LOPRESSOR) 100 mg tablet Instructed patient per Anesthesia Guidelines   triamterene-hydrochlorothiazide (MAXZIDE-25) 37 5-25 mg per tablet Instructed patient per Anesthesia Guidelines

## 2018-09-27 ENCOUNTER — ANESTHESIA (OUTPATIENT)
Dept: PERIOP | Facility: HOSPITAL | Age: 68
End: 2018-09-27
Payer: COMMERCIAL

## 2018-09-27 ENCOUNTER — HOSPITAL ENCOUNTER (OUTPATIENT)
Facility: HOSPITAL | Age: 68
Setting detail: OUTPATIENT SURGERY
Discharge: HOME | End: 2018-09-27
Attending: OPHTHALMOLOGY | Admitting: OPHTHALMOLOGY
Payer: COMMERCIAL

## 2018-09-27 VITALS
HEIGHT: 62 IN | RESPIRATION RATE: 12 BRPM | TEMPERATURE: 98.2 F | DIASTOLIC BLOOD PRESSURE: 65 MMHG | HEART RATE: 47 BPM | BODY MASS INDEX: 26.13 KG/M2 | WEIGHT: 142 LBS | SYSTOLIC BLOOD PRESSURE: 113 MMHG | OXYGEN SATURATION: 98 %

## 2018-09-27 PROCEDURE — V2632 POST CHMBR INTRAOCULAR LENS: HCPCS | Performed by: OPHTHALMOLOGY

## 2018-09-27 DEVICE — IOL SN60WF 22.5: Type: IMPLANTABLE DEVICE | Site: POSTERIOR CHAMBER | Status: FUNCTIONAL

## 2018-09-27 RX ORDER — MIDAZOLAM HYDROCHLORIDE 1 MG/ML
INJECTION INTRAMUSCULAR; INTRAVENOUS AS NEEDED
Status: DISCONTINUED | OUTPATIENT
Start: 2018-09-27 | End: 2018-09-27 | Stop reason: SURG

## 2018-09-27 RX ORDER — NEOMYCIN SULFATE, POLYMYXIN B SULFATE, AND DEXAMETHASONE 3.5; 10000; 1 MG/G; [USP'U]/G; MG/G
OINTMENT OPHTHALMIC AS NEEDED
Status: DISCONTINUED | OUTPATIENT
Start: 2018-09-27 | End: 2018-09-27 | Stop reason: HOSPADM

## 2018-09-27 RX ORDER — PROPARACAINE HYDROCHLORIDE 5 MG/ML
1 SOLUTION/ DROPS OPHTHALMIC ONCE
Status: COMPLETED | OUTPATIENT
Start: 2018-09-27 | End: 2018-09-27

## 2018-09-27 RX ORDER — LIDOCAINE HYDROCHLORIDE 10 MG/ML
INJECTION, SOLUTION EPIDURAL; INFILTRATION; INTRACAUDAL; PERINEURAL AS NEEDED
Status: DISCONTINUED | OUTPATIENT
Start: 2018-09-27 | End: 2018-09-27 | Stop reason: HOSPADM

## 2018-09-27 RX ORDER — PROPARACAINE HYDROCHLORIDE 5 MG/ML
1 SOLUTION/ DROPS OPHTHALMIC
Status: ACTIVE | OUTPATIENT
Start: 2018-09-27 | End: 2018-09-27

## 2018-09-27 RX ORDER — TROPICAMIDE 10 MG/ML
1 SOLUTION/ DROPS OPHTHALMIC
Status: ACTIVE | OUTPATIENT
Start: 2018-09-27 | End: 2018-09-27

## 2018-09-27 RX ORDER — PROPARACAINE HYDROCHLORIDE 5 MG/ML
SOLUTION/ DROPS OPHTHALMIC
Status: COMPLETED
Start: 2018-09-27 | End: 2018-09-27

## 2018-09-27 RX ORDER — ACETAMINOPHEN 325 MG/1
650 TABLET ORAL EVERY 4 HOURS PRN
Status: DISCONTINUED | OUTPATIENT
Start: 2018-09-27 | End: 2018-09-27 | Stop reason: HOSPADM

## 2018-09-27 RX ORDER — PHENYLEPHRINE HCL 2.5 %
1 DROPS OPHTHALMIC (EYE)
Status: COMPLETED | OUTPATIENT
Start: 2018-09-27 | End: 2018-09-27

## 2018-09-27 RX ORDER — SODIUM CHLORIDE 9 MG/ML
50 INJECTION, SOLUTION INTRAVENOUS CONTINUOUS
Status: DISCONTINUED | OUTPATIENT
Start: 2018-09-28 | End: 2018-09-27 | Stop reason: HOSPADM

## 2018-09-27 RX ORDER — TROPICAMIDE 10 MG/ML
SOLUTION/ DROPS OPHTHALMIC
Status: COMPLETED
Start: 2018-09-27 | End: 2018-09-27

## 2018-09-27 RX ORDER — MAGNESIUM HYDROXIDE 1200 MG/15ML
LIQUID ORAL AS NEEDED
Status: DISCONTINUED | OUTPATIENT
Start: 2018-09-27 | End: 2018-09-27 | Stop reason: HOSPADM

## 2018-09-27 RX ORDER — PHENYLEPHRINE HCL 2.5 %
DROPS OPHTHALMIC (EYE)
Status: COMPLETED
Start: 2018-09-27 | End: 2018-09-27

## 2018-09-27 RX ORDER — TROPICAMIDE 10 MG/ML
1 SOLUTION/ DROPS OPHTHALMIC
Status: COMPLETED | OUTPATIENT
Start: 2018-09-27 | End: 2018-09-27

## 2018-09-27 RX ORDER — LIDOCAINE HYDROCHLORIDE 20 MG/ML
JELLY TOPICAL AS NEEDED
Status: DISCONTINUED | OUTPATIENT
Start: 2018-09-27 | End: 2018-09-27 | Stop reason: HOSPADM

## 2018-09-27 RX ORDER — PHENYLEPHRINE HCL 2.5 %
1 DROPS OPHTHALMIC (EYE)
Status: ACTIVE | OUTPATIENT
Start: 2018-09-27 | End: 2018-09-27

## 2018-09-27 RX ORDER — PROPARACAINE HYDROCHLORIDE 5 MG/ML
SOLUTION/ DROPS OPHTHALMIC AS NEEDED
Status: DISCONTINUED | OUTPATIENT
Start: 2018-09-27 | End: 2018-09-27 | Stop reason: HOSPADM

## 2018-09-27 RX ORDER — FENTANYL CITRATE 50 UG/ML
INJECTION, SOLUTION INTRAMUSCULAR; INTRAVENOUS AS NEEDED
Status: DISCONTINUED | OUTPATIENT
Start: 2018-09-27 | End: 2018-09-27 | Stop reason: SURG

## 2018-09-27 RX ADMIN — MIDAZOLAM HYDROCHLORIDE 2 MG: 1 INJECTION, SOLUTION INTRAMUSCULAR; INTRAVENOUS at 08:09

## 2018-09-27 RX ADMIN — TROPICAMIDE 1 DROP: 10 SOLUTION/ DROPS OPHTHALMIC at 06:21

## 2018-09-27 RX ADMIN — PROPARACAINE HYDROCHLORIDE 1 DROP: 5 SOLUTION/ DROPS OPHTHALMIC at 06:21

## 2018-09-27 RX ADMIN — SODIUM CHLORIDE: 9 INJECTION, SOLUTION INTRAVENOUS at 08:05

## 2018-09-27 RX ADMIN — SODIUM CHLORIDE 50 ML/HR: 9 INJECTION, SOLUTION INTRAVENOUS at 07:45

## 2018-09-27 RX ADMIN — TROPICAMIDE 1 DROP: 10 SOLUTION/ DROPS OPHTHALMIC at 06:29

## 2018-09-27 RX ADMIN — FENTANYL CITRATE 50 MCG: 50 INJECTION INTRAMUSCULAR; INTRAVENOUS at 08:09

## 2018-09-27 RX ADMIN — PHENYLEPHRINE HYDROCHLORIDE 1 DROP: 25 SOLUTION/ DROPS OPHTHALMIC at 06:20

## 2018-09-27 RX ADMIN — Medication 1 DROP: at 06:20

## 2018-09-27 RX ADMIN — Medication 1 DROP: at 06:28

## 2018-09-27 RX ADMIN — Medication 1 DROP: at 06:34

## 2018-09-27 RX ADMIN — TROPICAMIDE 1 DROP: 10 SOLUTION/ DROPS OPHTHALMIC at 06:34

## 2018-09-27 NOTE — DISCHARGE INSTRUCTIONS
Extracción de cataratas   LO QUE NECESITA SABER:   La extracción de cataratas es un procedimiento para remover el cristalino opaco de garcia jayleen  Un lente artificial determinado lente intraocular artificial (RODY) se coloca en vez de wanda  [place] va a mejorar la agudeza visual    INSTRUCCIONES SOBRE EL BONITA HOSPITALARIA:   Medicamentos:   · Las gotas oftálmicas  contienen medicamentos para prevenir to infección y disminuir la inflamación  Lávese las melissa antes de Christian Segovia  No toque la punta del aplicador con garcia jayleen  Solicite a garcia médico que le proporcione más información sobre cómo aplicarse las gotas en los ojos  · [place] lidia medicamentos ryan se le haya indicado  Consulte con garcia médico si usted evangelista que garcia medicamento no le está ayudando o si presenta efectos secundarios  Infórmele si es alérgico a cualquier medicamento  Mantenga to lista actualizada de los [employer], las vitaminas y los productos herbales que ashley  Incluya los siguientes datos de los medicamentos: cantidad, frecuencia y motivo de administración  Traiga con usted la lista o los envases de la píldoras a lidia citas de seguimiento  Lleve la lista de los medicamentos con usted en lamont de to emergencia  Acuda dentro de las siguientes 24 horas a garcia fidel de control con garcia oftalmólogo:  Usted necesitará que le revisen lidia ojos  Anote lidia preguntas para que se acuerde de hacerlas minor lidia visitas  Cuidados del jayleen:   · Utilice un parche para proteger y evitar daños a garcia jayleen mientras duerme  · No se frote el jayleen  · Evite el polvo, la suciedad y el agua para prevenir to infección en el jayleen  · No se agache ni levante objetos pesados  Ambas acciones pueden aumentar la presión en el jayleen  Consulte con garcia médico cuánto peso puede levantar sin correr riesgo  Es posible que le indiquen que no levante nada que pese más de 25 libras por 3 semanas después de garcia procedimiento       · Lleve gafas del sol con protección contra los delvis UVB y un sombrero de ala ancha al aire belem para ayudar a evitar daños a lidia ojos por el sol  · Si usted fuma, nunca es demasiado tarde para dejar de hacerlo  El tabaquismo puede perjudicar garcia jayleen y prevenir garcia recuperación después de garcia procedimiento  No fume o permita que nadie fume a garcia alrededor  Solicite información a garcia médico si usted necesita ayuda para dejar de fumar  Comuníquese con garcia médico u oftalmólogo si:   · Usted presenta cambios en garcia visión  · El dolor en el jayleen Tate  · Garcia jayleen está matta, inflamado o supurando líquido o pus  · Usted tiene dolor de allie o náuseas, o está vomitando  · Usted tiene preguntas o inquietudes acerca de garcia condición o cuidado  Busque atención médica de inmediato o llame al 911 si:   · Usted de repente ve destellos de nirmala o puntos oscuros (objetos flotantes), seguido de to pérdida parcial de la visión, ryan si un telón alexandria le cubriera el jayleen  · Garcia jayleen de repente presenta ceguera  · Usted tiene un intenso dolor de jayleen con enrojecimiento, inflamación, puntos en la visión y aumento de visión markrosrodriguez  © 2017 [facility] Information is for End User's use only and may not be sold, redistributed or otherwise used for commercial purposes  All illustrations and images included in CareNotes® are the copyrighted property of A D A M , Inc  or Arnel Bourgeois  Esta información es sólo para uso en educación  Garcia intención no es darle un consejo médico sobre enfermedades o tratamientos  Colsulte con garcia Dewain Racer farmacéutico antes de seguir cualquier régimen médico para saber si es seguro y efectivo para usted

## 2018-09-27 NOTE — NURSING NOTE
Returned from PACU at 7112  Alert  Denies pain  HOB elevated  Shield intact left eye  No drainage  Respirations easy and non labored  Family in room  Call bell in reach

## 2018-09-27 NOTE — ANESTHESIA PREPROCEDURE EVALUATION
Review of Systems/Medical History      No history of anesthetic complications     Cardiovascular  Exercise tolerance (METS): >4,  Hyperlipidemia, Hypertension controlled,    Pulmonary  Negative pulmonary ROS        GI/Hepatic            Endo/Other     GYN       Hematology   Musculoskeletal  Back pain , lumbar pain,   Arthritis     Neurology   Psychology     Chronic pain,            Physical Exam    Airway    Mallampati score: II  TM Distance: >3 FB  Neck ROM: full     Dental   upper dentures,     Cardiovascular  Cardiovascular exam normal    Pulmonary  Pulmonary exam normal     Other Findings        Anesthesia Plan  ASA Score- 2     Anesthesia Type- IV sedation with anesthesia with ASA Monitors  Additional Monitors:   Airway Plan:         Plan Factors-Patient not instructed to abstain from smoking on day of procedure  Patient did not smoke on day of surgery  Induction- intravenous  Postoperative Plan-     Informed Consent- Anesthetic plan and risks discussed with patient                Lab Results   Component Value Date    HGBA1C 5 7 08/27/2018       Lab Results   Component Value Date     08/27/2018    K 3 9 08/27/2018     08/27/2018    CO2 28 08/27/2018    ANIONGAP 6 06/29/2015    BUN 22 08/27/2018    CREATININE 0 85 08/27/2018    GLUCOSE 106 06/29/2015    GLUF 101 (H) 08/27/2018    CALCIUM 9 4 08/27/2018    AST 18 08/27/2018    ALT 24 08/27/2018    ALKPHOS 59 08/27/2018    PROT 8 0 06/29/2015    BILITOT 0 43 06/29/2015    EGFR 71 08/27/2018       Lab Results   Component Value Date    WBC 10 88 (H) 08/27/2018    HGB 13 3 08/27/2018    HCT 41 6 08/27/2018    MCV 94 08/27/2018     08/27/2018   EKG NSR AS PER PCP

## 2018-09-27 NOTE — ANESTHESIA POSTPROCEDURE EVALUATION
Post-Op Assessment Note      CV Status:  Stable    Mental Status:  Alert    Hydration Status:  Stable    PONV Controlled:  Controlled    Airway Patency:  Patent    Post Op Vitals Reviewed: Yes          Staff: CRNA           BP   135/77   Temp   98 2   Pulse  48   Resp   20   SpO2  97

## 2018-11-13 ENCOUNTER — CONSULT (OUTPATIENT)
Dept: PAIN MEDICINE | Facility: MEDICAL CENTER | Age: 68
End: 2018-11-13
Payer: COMMERCIAL

## 2018-11-13 VITALS
RESPIRATION RATE: 16 BRPM | WEIGHT: 143.8 LBS | DIASTOLIC BLOOD PRESSURE: 83 MMHG | HEIGHT: 60 IN | HEART RATE: 58 BPM | BODY MASS INDEX: 28.23 KG/M2 | SYSTOLIC BLOOD PRESSURE: 158 MMHG

## 2018-11-13 DIAGNOSIS — M48.062 SPINAL STENOSIS, LUMBAR REGION WITH NEUROGENIC CLAUDICATION: ICD-10-CM

## 2018-11-13 PROCEDURE — 99204 OFFICE O/P NEW MOD 45 MIN: CPT | Performed by: PHYSICAL MEDICINE & REHABILITATION

## 2018-11-13 PROCEDURE — 4040F PNEUMOC VAC/ADMIN/RCVD: CPT | Performed by: PHYSICAL MEDICINE & REHABILITATION

## 2018-11-13 NOTE — LETTER
November 13, 2018     Rafael Muñoz MD  [address] [facility]  [address]    Patient: Micky Gilford   YOB: 1950   Date of Visit: 11/13/2018       Dear Dr Jomar Keene:    Thank you for referring Micky Gilford to me for evaluation  Below are my notes for this consultation  If you have questions, please do not hesitate to call me  I look forward to following your patient along with you  Sincerely,        Yung Nolan DO        CC: No Recipients  Yung Nolan DO  11/13/2018  3:42 PM  Sign at close encounter  Assessment:  1  Spinal stenosis, lumbar region with neurogenic claudication        Plan:  1  At this time we will schedule the patient for a lumbar epidural steroid injection for her back and radiating leg pain  This is likely result of spinal canal stenosis secondary to multiple disc herniations  This will require holding of her aspirin  We will obtain clearance prior to scheduling  2   If no relief would recommend surgical consultation  My impressions and treatment recommendations were discussed in detail with the patient who verbalized understanding and had no further questions  Discharge instructions were provided  I personally saw and examined the patient and I agree with the above discussed plan of care  Orders Placed This Encounter   Procedures    FL spine and pain procedure     Standing Status:   Future     Standing Expiration Date:   11/13/2022     Order Specific Question:   Reason for Exam:     Answer:   LESI     Order Specific Question:   Anticoagulant hold needed? Answer:   yes, ASA hold needed     No orders of the defined types were placed in this encounter  History of Present Illness:    Micky Gilford is a 75 y o  female   Sent from Dr Jomar Keene  For further evaluation and management of her back and radiating leg pain  A  was utilized for this visit        The patient is describing back and radiating leg pain which has been going on for number of years and is described as moderate to severe in intensity rated as a 7/10 which is constant and worse in the morning characterized as a pins and needle sensation in her back and down both legs  Aggravating factors include standing and bending  She is unable to determine any significant alleviating factors  Diagnostic studies include MRI of the lumbar spine  This does reveal multiple lumbar disc herniation centrally with central canal stenosis  She has noted some moderate an excellent relief with physical therapy  Currently using meloxicam and lidocaine topically and these do provide some relief as well  I have personally reviewed and/or updated the patient's past medical history, past surgical history, family history, social history, current medications, allergies, and vital signs today  Review of Systems:    Review of Systems   Constitutional: Negative for fever and unexpected weight change  HENT: Negative for trouble swallowing  Eyes: Negative for visual disturbance  Respiratory: Negative for shortness of breath and wheezing  Cardiovascular: Negative for chest pain and palpitations  Gastrointestinal: Negative for constipation, diarrhea, nausea and vomiting  Endocrine: Negative for cold intolerance, heat intolerance and polydipsia  Genitourinary: Negative for difficulty urinating and frequency  Musculoskeletal: Negative for arthralgias, gait problem, joint swelling and myalgias  Skin: Negative for rash  Neurological: Negative for dizziness, seizures, syncope, weakness and headaches  Hematological: Does not bruise/bleed easily  Psychiatric/Behavioral: Negative for dysphoric mood  All other systems reviewed and are negative        Patient Active Problem List   Diagnosis    Pre-op evaluation    Arthralgia of left hip    Creatinine elevation    Dyslipidemia    Hypertension    Low back pain    Pemphigus    Slow transit constipation    Vitamin D deficiency    Acute pain of left shoulder       Past Medical History:   Diagnosis Date    Arthralgia of hip     left    Arthritis     Chronic pain disorder     low back and shoulder    Dyslipidemia     Hyperlipidemia     Hypertension     Neck pain     Pemphigus     Shoulder pain     Vitamin D deficiency        Past Surgical History:   Procedure Laterality Date    CATARACT EXTRACTION Right     COLONOSCOPY      [place] COLONOSCOPY FLX DX W/COLLJ SPEC WHEN PFRMD N/A 11/10/2016    Procedure: COLONOSCOPY;  Surgeon: Carlos Dan MD;  Location[facility];   Service: Gastroenterology     [address] CATARACT EXTRACAP,INSERT LENS Left 9/27/2018    Procedure: EXTRACAPSULAR CATARACT REMOVAL/INSERTION OF INTRAOCULAR LENS;  Surgeon: Matthew Cervantes MD;  Location: [address] [place] [address] OR;  Service: Ophthalmology       Family History   Problem Relation Age of Onset    Hyperlipidemia Mother     Hypertension Mother     Heart disease Father        Social History     Occupational History    homemaker      Social History Main Topics    Smoking status: Never Smoker    Smokeless tobacco: Never Used    Alcohol use No    Drug use: No    Sexual activity: Not Currently       Current Outpatient Prescriptions on File Prior to Visit   Medication Sig    ammonium lactate (LAC-HYDRIN) 12 % lotion Apply 1 application topically 2 (two) times a day    aspirin (RA ASPIRIN EC) 81 mg EC tablet Take 1 tablet (81 mg total) by mouth daily    ibuprofen (MOTRIN) 200 mg tablet Take 200 mg by mouth every 8 (eight) hours as needed for mild pain      lidocaine (XYLOCAINE) 5 % ointment Apply topically as needed for mild pain    meloxicam (MOBIC) 7 5 mg tablet Take 1 tablet (7 5 mg total) by mouth daily    metoprolol tartrate (LOPRESSOR) 100 mg tablet Take 1 tablet (100 mg total) by mouth 2 (two) times a day    naproxen (NAPROSYN) 500 mg tablet Take 500 mg by mouth 2 (two) times a day with meals    triamterene-hydrochlorothiazide (MAXZIDE-25) 37 5-25 mg per tablet Take 1 tablet by mouth daily     No current facility-administered medications on file prior to visit  No Known Allergies    Physical Exam:    /83   Pulse 58   Resp 16   Ht 5' (1 524 m)   Wt 65 2 kg (143 lb 12 8 oz)   BMI 28 08 kg/m²        LUMBAR  General: Well-developed, well-nourished individual in no acute distress  Mental: Appropriate mood and affect  Grossly oriented with coherent speech and thought processing   Neuro:  Cranial nerves: Cranial nerve function is grossly intact bilaterally   Strength: Bilateral lower extremity strength is normal and symmetric   No atrophy or tone abnormalities noted   Reflexes: Bilateral lower extremity muscle stretch reflexes are physiologic and symmetric   No ankle clonus is noted   Sensation: No loss of sensation is noted   SLR/Foraminal Compression Maneuvers: Straight leg raising in the sitting position is negative for radicular pain   Gait:  Gait/gross motor: Gait is normal  Station is normal  Toe walking, heel walking  are not tested  Musculoskeletal:  Palpation: Inspection and palpation of the spine and extremities are unremarkable   Spine: Normal pain-free range of motion   No gross axial skeletal deformities   Skin: Skin inspection grossly negative for erythema, breakdown, or concerning lesions in affected area   Lymph: No lymphadenopathy is appreciated in the involved extremity   Vessels: No lower extremity edema   Lungs: Breathing is comfortable and regular  No dyspnea noted during examination   Eyes: Visual field grossly intact to confrontation  No redness appreciated  ENT: No craniofacial deformities or asymmetry  No neck masses appreciated  Imaging  Low back pain, >6wks conservative tx, persistent-progressive sx, surgical candidate   Dx: Left hip pain [M25 552 (ICD-10-CM)];  Chronic bilateral low back pain without sciatica [M54 5, G89 29 (ICD-10-CM)]   Study Result     MRI LUMBAR SPINE WITHOUT CONTRAST     INDICATION: M25 552: Pain in left hip  M54 5: Low back pain  G89 29: Other chronic pain low back pain for greater than 6 weeks following conservative treatment  Patient has persistent progressive symptoms and is a surgical candidate  Additional comments provided by the patient at the time of imaging: bilateral   leg and foot pain for 4 months      COMPARISON:  Radiographs of the lumbar spine from June 7, 2017      TECHNIQUE:  Sagittal T1, sagittal T2, sagittal inversion recovery, axial T1 and axial T2, coronal T2        IMAGE QUALITY:  Diagnostic     FINDINGS:     ALIGNMENT:  Redemonstrated mild levoscoliosis of lumbar spine centered at the L4 level  There is no significant lateral subluxation  There is no evidence of spondylolisthesis  The vertebral bodies are maintained in stature      MARROW SIGNAL:  There is type I and II Modic degenerative signal, most notably at L4-L5 on the right more than left side  There is additional Modic degenerative signal about the Schmorl's node deformities at L3 and L4  Type II Modic degenerative signal   is noted at L5-S1  There is no suspicious focal marrow edema or signal abnormality      DISTAL CORD AND CONUS:  Normal size and signal within the distal cord and conus  The conus ends at the T12 level      PARASPINAL SOFT TISSUES:  Paraspinal soft tissues are unremarkable      SACRUM:  Normal signal within the sacrum  No evidence of insufficiency or stress fracture      LOWER THORACIC DISC SPACES: There is anterior osteophytosis and discogenic disease with disc space narrowing in the lower thoracic spine  Mild disc bulging in the lower thoracic spine resulting in ventral indentation on the thecal sac  There is no   signal abnormality in the visualized distal thoracic cord and conus      LUMBAR DISC SPACES:  There is multilevel disc desiccation and disc height loss from L3-L4 to L5-S1    This is most pronounced at the L4-L5 level  Minimal intravertebral vacuum gas is also noted at L4-L5 and L5-S1      L1-L2:  Normal      L2-L3:  Disc bulge and osteophytic ridging, slightly eccentric to the left, mild spinal stenosis  Mild right and moderate left foraminal stenosis  The eccentric left disc bulge abuts the exiting left L2 nerve root, correlate for ipsilateral   radiculopathy      L3-L4:  Circumferential disc bulge with right greater than left facet arthropathy  Moderate spinal and bilateral foraminal encroachment      L4-L5:  Circumferential disc bulge with mild to moderate spinal stenosis  The disc bulges eccentric to the right and results in moderate to severe right and moderate left foraminal stenosis  There is minimal fluid signal inferior to the right L4-5   facet joint  This may represent a small synovial cyst   This is best appreciated on image 12 of series 601 and axial series 801, image 6      L5-S1:  Posterior disc osteophyte complex with a central to right central annular fissure and disc protrusion encroaching on the bilateral traversing S1 nerve roots, right more than left  There is moderate to severe spinal and bilateral foraminal   stenosis      IMPRESSION:  1  Mild scoliosis and multilevel spondylosis, worst in the lower lumbar spine where there is moderate spinal stenosis at L3-L4 and L4-L5  2  Variable degrees of foraminal stenosis, worst at L4-L5 and L5-S1 as described    3   Additional finding of left eccentric disc bulge at L1-L2 abutting the exiting left L2 nerve root, correlate for ipsilateral radiculopathy      IMPRESSION:     Normal MRI of the lumbar spine         Workstation performed: [id number]

## 2018-11-13 NOTE — PROGRESS NOTES
Assessment:  1  Spinal stenosis, lumbar region with neurogenic claudication        Plan:  1  At this time we will schedule the patient for a lumbar epidural steroid injection for her back and radiating leg pain  This is likely result of spinal canal stenosis secondary to multiple disc herniations  This will require holding of her aspirin  We will obtain clearance prior to scheduling  2   If no relief would recommend surgical consultation  My impressions and treatment recommendations were discussed in detail with the patient who verbalized understanding and had no further questions  Discharge instructions were provided  I personally saw and examined the patient and I agree with the above discussed plan of care  Orders Placed This Encounter   Procedures    FL spine and pain procedure     Standing Status:   Future     Standing Expiration Date:   11/13/2022     Order Specific Question:   Reason for Exam:     Answer:   LESI     Order Specific Question:   Anticoagulant hold needed? Answer:   yes, ASA hold needed     No orders of the defined types were placed in this encounter  History of Present Illness:    Stefano Mon is a 75 y o  female   Sent from Dr Román Johnson  For further evaluation and management of her back and radiating leg pain  A  was utilized for this visit  The patient is describing back and radiating leg pain which has been going on for number of years and is described as moderate to severe in intensity rated as a 7/10 which is constant and worse in the morning characterized as a pins and needle sensation in her back and down both legs  Aggravating factors include standing and bending  She is unable to determine any significant alleviating factors  Diagnostic studies include MRI of the lumbar spine  This does reveal multiple lumbar disc herniation centrally with central canal stenosis        She has noted some moderate an excellent relief with physical therapy  Currently using meloxicam and lidocaine topically and these do provide some relief as well  I have personally reviewed and/or updated the patient's past medical history, past surgical history, family history, social history, current medications, allergies, and vital signs today  Review of Systems:    Review of Systems   Constitutional: Negative for fever and unexpected weight change  HENT: Negative for trouble swallowing  Eyes: Negative for visual disturbance  Respiratory: Negative for shortness of breath and wheezing  Cardiovascular: Negative for chest pain and palpitations  Gastrointestinal: Negative for constipation, diarrhea, nausea and vomiting  Endocrine: Negative for cold intolerance, heat intolerance and polydipsia  Genitourinary: Negative for difficulty urinating and frequency  Musculoskeletal: Negative for arthralgias, gait problem, joint swelling and myalgias  Skin: Negative for rash  Neurological: Negative for dizziness, seizures, syncope, weakness and headaches  Hematological: Does not bruise/bleed easily  Psychiatric/Behavioral: Negative for dysphoric mood  All other systems reviewed and are negative        Patient Active Problem List   Diagnosis    Pre-op evaluation    Arthralgia of left hip    Creatinine elevation    Dyslipidemia    Hypertension    Low back pain    Pemphigus    Slow transit constipation    Vitamin D deficiency    Acute pain of left shoulder       Past Medical History:   Diagnosis Date    Arthralgia of hip     left    Arthritis     Chronic pain disorder     low back and shoulder    Dyslipidemia     Hyperlipidemia     Hypertension     Neck pain     Pemphigus     Shoulder pain     Vitamin D deficiency        Past Surgical History:   Procedure Laterality Date    CATARACT EXTRACTION Right     COLONOSCOPY      WI COLONOSCOPY FLX DX W/COLLJ SPEC WHEN PFRMD N/A 11/10/2016    Procedure: COLONOSCOPY;  Surgeon: Hoa Nicholson Glenna Chavira MD;  Location: [facility]; Service: Gastroenterology     [address] CATARACT EXTRACAP,INSERT LENS Left 9/27/2018    Procedure: EXTRACAPSULAR CATARACT REMOVAL/INSERTION OF INTRAOCULAR LENS;  Surgeon: Kurt Michelle MD;  Location: [facility];  Service: Ophthalmology       Family History   Problem Relation Age of Onset    Hyperlipidemia Mother     Hypertension Mother     Heart disease Father        Social History     Occupational History    homemaker      Social History Main Topics    Smoking status: Never Smoker    Smokeless tobacco: Never Used    Alcohol use No    Drug use: No    Sexual activity: Not Currently       Current Outpatient Prescriptions on File Prior to Visit   Medication Sig    ammonium lactate (LAC-HYDRIN) 12 % lotion Apply 1 application topically 2 (two) times a day    aspirin (RA ASPIRIN EC) 81 mg EC tablet Take 1 tablet (81 mg total) by mouth daily    ibuprofen (MOTRIN) 200 mg tablet Take 200 mg by mouth every 8 (eight) hours as needed for mild pain      lidocaine (XYLOCAINE) 5 % ointment Apply topically as needed for mild pain    meloxicam (MOBIC) 7 5 mg tablet Take 1 tablet (7 5 mg total) by mouth daily    metoprolol tartrate (LOPRESSOR) 100 mg tablet Take 1 tablet (100 mg total) by mouth 2 (two) times a day    naproxen (NAPROSYN) 500 mg tablet Take 500 mg by mouth 2 (two) times a day with meals    triamterene-hydrochlorothiazide (MAXZIDE-25) 37 5-25 mg per tablet Take 1 tablet by mouth daily     No current facility-administered medications on file prior to visit  No Known Allergies    Physical Exam:    /83   Pulse 58   Resp 16   Ht 5' (1 524 m)   Wt 65 2 kg (143 lb 12 8 oz)   BMI 28 08 kg/m²       LUMBAR  General: Well-developed, well-nourished individual in no acute distress  Mental: Appropriate mood and affect  Grossly oriented with coherent speech and thought processing       Neuro:  Cranial nerves: Cranial nerve function is grossly intact bilaterally    Strength: Bilateral lower extremity strength is normal and symmetric   No atrophy or tone abnormalities noted   Reflexes: Bilateral lower extremity muscle stretch reflexes are physiologic and symmetric   No ankle clonus is noted   Sensation: No loss of sensation is noted   SLR/Foraminal Compression Maneuvers: Straight leg raising in the sitting position is negative for radicular pain   Gait:  Gait/gross motor: Gait is normal  Station is normal  Toe walking, heel walking  are not tested  Musculoskeletal:  Palpation: Inspection and palpation of the spine and extremities are unremarkable   Spine: Normal pain-free range of motion   No gross axial skeletal deformities   Skin: Skin inspection grossly negative for erythema, breakdown, or concerning lesions in affected area   Lymph: No lymphadenopathy is appreciated in the involved extremity   Vessels: No lower extremity edema   Lungs: Breathing is comfortable and regular  No dyspnea noted during examination   Eyes: Visual field grossly intact to confrontation  No redness appreciated  ENT: No craniofacial deformities or asymmetry  No neck masses appreciated  Imaging  Low back pain, >6wks conservative tx, persistent-progressive sx, surgical candidate   Dx: Left hip pain [M25 552 (ICD-10-CM)]; Chronic bilateral low back pain without sciatica [M54 5, G89 29 (ICD-10-CM)]   Study Result     MRI LUMBAR SPINE WITHOUT CONTRAST     INDICATION: M25 552: Pain in left hip  M54 5: Low back pain  G89 29: Other chronic pain low back pain for greater than 6 weeks following conservative treatment  Patient has persistent progressive symptoms and is a surgical candidate    Additional comments provided by the patient at the time of imaging: bilateral   leg and foot pain for 4 months      COMPARISON:  Radiographs of the lumbar spine from June 7, 2017      TECHNIQUE:  Sagittal T1, sagittal T2, sagittal inversion recovery, axial T1 and axial T2, coronal T2        IMAGE QUALITY:  Diagnostic     FINDINGS:     ALIGNMENT:  Redemonstrated mild levoscoliosis of lumbar spine centered at the L4 level  There is no significant lateral subluxation  There is no evidence of spondylolisthesis  The vertebral bodies are maintained in stature      MARROW SIGNAL:  There is type I and II Modic degenerative signal, most notably at L4-L5 on the right more than left side  There is additional Modic degenerative signal about the Schmorl's node deformities at L3 and L4  Type II Modic degenerative signal   is noted at L5-S1  There is no suspicious focal marrow edema or signal abnormality      DISTAL CORD AND CONUS:  Normal size and signal within the distal cord and conus  The conus ends at the T12 level      PARASPINAL SOFT TISSUES:  Paraspinal soft tissues are unremarkable      SACRUM:  Normal signal within the sacrum  No evidence of insufficiency or stress fracture      LOWER THORACIC DISC SPACES: There is anterior osteophytosis and discogenic disease with disc space narrowing in the lower thoracic spine  Mild disc bulging in the lower thoracic spine resulting in ventral indentation on the thecal sac  There is no   signal abnormality in the visualized distal thoracic cord and conus      LUMBAR DISC SPACES:  There is multilevel disc desiccation and disc height loss from L3-L4 to L5-S1  This is most pronounced at the L4-L5 level  Minimal intravertebral vacuum gas is also noted at L4-L5 and L5-S1      L1-L2:  Normal      L2-L3:  Disc bulge and osteophytic ridging, slightly eccentric to the left, mild spinal stenosis  Mild right and moderate left foraminal stenosis  The eccentric left disc bulge abuts the exiting left L2 nerve root, correlate for ipsilateral   radiculopathy      L3-L4:  Circumferential disc bulge with right greater than left facet arthropathy    Moderate spinal and bilateral foraminal encroachment      L4-L5:  Circumferential disc bulge with mild to moderate spinal stenosis  The disc bulges eccentric to the right and results in moderate to severe right and moderate left foraminal stenosis  There is minimal fluid signal inferior to the right L4-5   facet joint  This may represent a small synovial cyst   This is best appreciated on image 12 of series 601 and axial series 801, image 6      L5-S1:  Posterior disc osteophyte complex with a central to right central annular fissure and disc protrusion encroaching on the bilateral traversing S1 nerve roots, right more than left  There is moderate to severe spinal and bilateral foraminal   stenosis      IMPRESSION:  1  Mild scoliosis and multilevel spondylosis, worst in the lower lumbar spine where there is moderate spinal stenosis at L3-L4 and L4-L5  2  Variable degrees of foraminal stenosis, worst at L4-L5 and L5-S1 as described    3   Additional finding of left eccentric disc bulge at L1-L2 abutting the exiting left L2 nerve root, correlate for ipsilateral radiculopathy      IMPRESSION:     Normal MRI of the lumbar spine         Workstation performed: [id number]

## 2018-11-14 ENCOUNTER — TELEPHONE (OUTPATIENT)
Dept: RADIOLOGY | Facility: MEDICAL CENTER | Age: 68
End: 2018-11-14

## 2018-11-21 NOTE — TELEPHONE ENCOUNTER
RN called pt with the help of Azael Regalado # [id number], [place]   RN s/w pt's daughter Manjinder Holbrook listed as emergency contact in chart  Pt scheduled for 12/12 at 13:30 arrival time for an LESI  RN attempted to have pt take last dose of ASA on 12/5, however Tee Santos with pt heard in background, stated that the pt does not take the ASA any longer  Through translation RN learned that the doctor did not prescribe for pt for any heart or health reason, she had her take it only after the pt stated she wanted to take it  RN confirmed that pt does not take the ASA any longer  Pt does take meloxicam as per med chart and denied naproxyn and motrin  Tee Santos is aware that the pt must take her last dose of meloxicam on 12/7 but may take tylenol if needed for pain up to and including the day of procedure  Tee Santos aware that the pt does need a , wear loose comfortable elastic waist pants, may eat a light breakfast and lunch to be npo one hour prior to procedure, call to reschedule if become ill, have a fever or start on antibiotics  Pt then scheduled for a 4 week f/u with AO on 1/9 at 10:15 arrival time      Appreciative of call and aware of ph# if has to cx     [phone] [facility] surgery scheduler to place appt in EPIC-

## 2018-12-12 ENCOUNTER — HOSPITAL ENCOUNTER (OUTPATIENT)
Dept: RADIOLOGY | Facility: MEDICAL CENTER | Age: 68
Discharge: HOME | End: 2018-12-12
Admitting: PHYSICAL MEDICINE & REHABILITATION
Payer: COMMERCIAL

## 2018-12-12 VITALS
RESPIRATION RATE: 20 BRPM | DIASTOLIC BLOOD PRESSURE: 93 MMHG | HEART RATE: 56 BPM | TEMPERATURE: 98.3 F | SYSTOLIC BLOOD PRESSURE: 156 MMHG | OXYGEN SATURATION: 98 %

## 2018-12-12 DIAGNOSIS — M48.062 SPINAL STENOSIS, LUMBAR REGION WITH NEUROGENIC CLAUDICATION: ICD-10-CM

## 2018-12-12 PROCEDURE — 62323 NJX INTERLAMINAR LMBR/SAC: CPT | Performed by: PHYSICAL MEDICINE & REHABILITATION

## 2018-12-12 RX ORDER — METHYLPREDNISOLONE ACETATE 80 MG/ML
80 INJECTION, SUSPENSION INTRA-ARTICULAR; INTRALESIONAL; INTRAMUSCULAR; PARENTERAL; SOFT TISSUE ONCE
Status: COMPLETED | OUTPATIENT
Start: 2018-12-12 | End: 2018-12-12

## 2018-12-12 RX ORDER — LIDOCAINE HYDROCHLORIDE 10 MG/ML
5 INJECTION, SOLUTION EPIDURAL; INFILTRATION; INTRACAUDAL; PERINEURAL ONCE
Status: COMPLETED | OUTPATIENT
Start: 2018-12-12 | End: 2018-12-12

## 2018-12-12 RX ADMIN — METHYLPREDNISOLONE ACETATE 80 MG: 80 INJECTION, SUSPENSION INTRA-ARTICULAR; INTRALESIONAL; INTRAMUSCULAR; PARENTERAL; SOFT TISSUE at 14:00

## 2018-12-12 RX ADMIN — IOHEXOL 1 ML: 300 INJECTION, SOLUTION INTRAVENOUS at 14:00

## 2018-12-12 RX ADMIN — LIDOCAINE HYDROCHLORIDE 1.5 ML: 10 INJECTION, SOLUTION EPIDURAL; INFILTRATION; INTRACAUDAL; PERINEURAL at 13:58

## 2018-12-12 NOTE — DISCHARGE INSTRUCTIONS
Epidural Steroid Injection   WHAT YOU NEED TO KNOW:   An epidural steroid injection (MISHEL) is a procedure to inject steroid medicine into the epidural space  The epidural space is between your spinal cord and vertebrae  Steroids reduce inflammation and fluid buildup in your spine that may be causing pain  You may be given pain medicine along with the steroids  ACTIVITY  · Do not drive or operate machinery today  · No strenuous activity today - bending, lifting, etc   · You may resume normal activites starting tomorrow - start slowly and as tolerated  · You may shower today, but no tub baths or hot tubs  · You may have numbness for several hours from the local anesthetic  Please use caution and common sense, especially with weight-bearing activities  CARE OF THE INJECTION SITE  · If you have soreness or pain, apply ice to the area today (20 minutes on/20 minutes off)  · Starting tomorrow, you may use warm, moist heat or ice if needed  · You may have an increase or change in your discomfort for 36-48 hours after your treatment  · Apply ice and continue with any pain medication you have been prescribed  · Notify [facility] if you have any of the following: redness, drainage, swelling, headache, stiff neck or fever above 100°F     SPECIAL INSTRUCTIONS  · Our office will contact you in approximately 7 days for a progress report  MEDICATIONS  · Continue to take all routine medications  start meloxicam aspirin or ibuprofen tomorrow, may take tylenol today  · Our office may have instructed you to hold some medications  If you have a problem specifically related to your procedure, please call our office at [phone]  Problems not related to your procedure should be directed to your primary care physician

## 2018-12-12 NOTE — H&P
History of Present Illness: The patient is a 70 y o  female who presents with complaints of   Back and radiating leg pain    Patient Active Problem List   Diagnosis    Pre-op evaluation    Arthralgia of left hip    Creatinine elevation    Dyslipidemia    Hypertension    Low back pain    Pemphigus    Slow transit constipation    Vitamin D deficiency    Acute pain of left shoulder       Past Medical History:   Diagnosis Date    Arthralgia of hip     left    Arthritis     Chronic pain disorder     low back and shoulder    Dyslipidemia     Hyperlipidemia     Hypertension     Neck pain     Pemphigus     Shoulder pain     Vitamin D deficiency        Past Surgical History:   Procedure Laterality Date    CATARACT EXTRACTION Right     COLONOSCOPY      [place] COLONOSCOPY FLX DX W/COLLJ SPEC WHEN PFRMD N/A 11/10/2016    Procedure: COLONOSCOPY;  Surgeon: Ilda Delgado MD;  Location: [facility];   Service: Gastroenterology     [facility] CATARACT EXTRACAP,INSERT LENS Left 9/27/2018    Procedure: EXTRACAPSULAR CATARACT REMOVAL/INSERTION OF INTRAOCULAR LENS;  Surgeon: Harleen Ellington MD;  Location: [address] [place] [address];  Service: Ophthalmology         Current Outpatient Prescriptions:     ammonium lactate (LAC-HYDRIN) 12 % lotion, Apply 1 application topically 2 (two) times a day, Disp: 400 g, Rfl: 5    aspirin (RA ASPIRIN EC) 81 mg EC tablet, Take 1 tablet (81 mg total) by mouth daily, Disp: 90 tablet, Rfl: 1    ibuprofen (MOTRIN) 200 mg tablet, Take 200 mg by mouth every 8 (eight) hours as needed for mild pain  , Disp: , Rfl:     lidocaine (XYLOCAINE) 5 % ointment, Apply topically as needed for mild pain, Disp: 35 44 g, Rfl: 2    meloxicam (MOBIC) 7 5 mg tablet, Take 1 tablet (7 5 mg total) by mouth daily, Disp: 30 tablet, Rfl: 0    metoprolol tartrate (LOPRESSOR) 100 mg tablet, Take 1 tablet (100 mg total) by mouth 2 (two) times a day, Disp: 180 tablet, Rfl: 1    naproxen (NAPROSYN) 500 mg tablet, Take 500 mg by mouth 2 (two) times a day with meals, Disp: , Rfl:     triamterene-hydrochlorothiazide (MAXZIDE-25) 37 5-25 mg per tablet, Take 1 tablet by mouth daily, Disp: 90 tablet, Rfl: 1    Current Facility-Administered Medications:     iohexol (OMNIPAQUE) 300 mg/mL injection 50 mL, 50 mL, Epidural, Once, Tempie Kalia, DO    lidocaine (PF) (XYLOCAINE-MPF) 1 % injection 5 mL, 5 mL, Infiltration, Once, Tempie Kalia, DO    methylPREDNISolone acetate (DEPO-MEDROL) injection 80 mg, 80 mg, Epidural, Once, Tempie Kalia, DO    No Known Allergies    Physical Exam:   Vitals:    12/12/18 1337   BP: 170/81   Pulse: 55   Resp: 20   Temp: 98 3 °F (36 8 °C)   SpO2: 100%     General: Awake, Alert, Oriented x 3, Mood and affect appropriate  Respiratory: Respirations even and unlabored  Cardiovascular: Peripheral pulses intact; no edema  Musculoskeletal Exam:  Back and radiating leg pain    ASA Score:  3  Patient/Chart Verification  Patient ID Verified: Verbal  ID Band Applied: No  Consents Confirmed: Procedural, To be obtained in the Pre-Procedure area  H&P( within 30 days) Verified: To be obtained in the Pre-Procedure area  Interval H&P(within 24 hr) Complete (required for Outpatients and Surgery Admit only): To be obtained in the Pre-Procedure area  Allergies Reviewed: Yes (denies use of aspirin for more than 6 days )  Anticoag/NSAID held?: Yes (12/7 held meloxicam and held ASA for more than 6 days )  Currently on antibiotics?: No    Assessment:   1   Spinal stenosis, lumbar region with neurogenic claudication        Plan: SURYA

## 2018-12-19 ENCOUNTER — TELEPHONE (OUTPATIENT)
Dept: PAIN MEDICINE | Facility: CLINIC | Age: 68
End: 2018-12-19

## 2018-12-20 ENCOUNTER — OFFICE VISIT (OUTPATIENT)
Dept: FAMILY MEDICINE CLINIC | Facility: CLINIC | Age: 68
End: 2018-12-20
Payer: COMMERCIAL

## 2018-12-20 VITALS
SYSTOLIC BLOOD PRESSURE: 118 MMHG | OXYGEN SATURATION: 98 % | TEMPERATURE: 96.2 F | HEART RATE: 52 BPM | HEIGHT: 62 IN | BODY MASS INDEX: 25.58 KG/M2 | WEIGHT: 139 LBS | DIASTOLIC BLOOD PRESSURE: 62 MMHG | RESPIRATION RATE: 18 BRPM

## 2018-12-20 DIAGNOSIS — M25.552 LEFT HIP PAIN: ICD-10-CM

## 2018-12-20 DIAGNOSIS — Z12.39 SCREENING BREAST EXAMINATION: ICD-10-CM

## 2018-12-20 DIAGNOSIS — Z23 NEED FOR INFLUENZA VACCINATION: Primary | ICD-10-CM

## 2018-12-20 DIAGNOSIS — E55.9 VITAMIN D DEFICIENCY: ICD-10-CM

## 2018-12-20 DIAGNOSIS — I10 ESSENTIAL HYPERTENSION: ICD-10-CM

## 2018-12-20 DIAGNOSIS — E78.5 DYSLIPIDEMIA: ICD-10-CM

## 2018-12-20 PROCEDURE — 90662 IIV NO PRSV INCREASED AG IM: CPT

## 2018-12-20 PROCEDURE — 1036F TOBACCO NON-USER: CPT | Performed by: FAMILY MEDICINE

## 2018-12-20 PROCEDURE — G0008 ADMIN INFLUENZA VIRUS VAC: HCPCS

## 2018-12-20 PROCEDURE — 3008F BODY MASS INDEX DOCD: CPT | Performed by: FAMILY MEDICINE

## 2018-12-20 PROCEDURE — 3078F DIAST BP <80 MM HG: CPT | Performed by: FAMILY MEDICINE

## 2018-12-20 PROCEDURE — 1160F RVW MEDS BY RX/DR IN RCRD: CPT | Performed by: FAMILY MEDICINE

## 2018-12-20 PROCEDURE — 3074F SYST BP LT 130 MM HG: CPT | Performed by: FAMILY MEDICINE

## 2018-12-20 PROCEDURE — 1160F RVW MEDS BY RX/DR IN RCRD: CPT

## 2018-12-20 PROCEDURE — 99214 OFFICE O/P EST MOD 30 MIN: CPT | Performed by: FAMILY MEDICINE

## 2018-12-20 RX ORDER — TRIAMTERENE AND HYDROCHLOROTHIAZIDE 37.5; 25 MG/1; MG/1
1 TABLET ORAL DAILY
Qty: 90 TABLET | Refills: 0 | Status: SHIPPED | OUTPATIENT
Start: 2018-12-20 | End: 2019-03-28 | Stop reason: SDUPTHER

## 2018-12-20 RX ORDER — MELOXICAM 7.5 MG/1
7.5 TABLET ORAL DAILY
Qty: 30 TABLET | Refills: 0 | Status: SHIPPED | OUTPATIENT
Start: 2018-12-20 | End: 2019-03-28

## 2018-12-20 NOTE — ASSESSMENT & PLAN NOTE
Currently well controlled  Continue current medications  Has low heart rate but no symptoms will continue to monitor HR and adjust beta blocker as needed

## 2018-12-20 NOTE — PROGRESS NOTES
Assessment/Plan:    Hypertension  Currently well controlled  Continue current medications  Has low heart rate but no symptoms will continue to monitor HR and adjust beta blocker as needed          Problem List Items Addressed This Visit        Cardiovascular and Mediastinum    Hypertension     Currently well controlled  Continue current medications  Has low heart rate but no symptoms will continue to monitor HR and adjust beta blocker as needed          Relevant Medications    triamterene-hydrochlorothiazide (MAXZIDE-25) 37 5-25 mg per tablet    Other Relevant Orders    CBC and differential    Comprehensive metabolic panel    Lipid panel    Vitamin D 25 hydroxy    Microalbumin / creatinine urine ratio    TSH, 3rd generation with Free T4 reflex       Other    Dyslipidemia    Relevant Orders    CBC and differential    Comprehensive metabolic panel    Lipid panel    Vitamin D 25 hydroxy    Microalbumin / creatinine urine ratio    TSH, 3rd generation with Free T4 reflex    Vitamin D deficiency    Relevant Orders    Vitamin D 25 hydroxy      Other Visit Diagnoses     Need for influenza vaccination    -  Primary    Relevant Orders    PREFERRED: influenza vaccine, [id number], high-dose, PF 0 5 mL, for patients 65 yr+ (FLUZONE HIGH-DOSE) (Completed)    Left hip pain        Relevant Medications    meloxicam (MOBIC) 7 5 mg tablet    Screening breast examination        Relevant Orders    Mammo screening bilateral w cad            Subjective:      Patient ID: Mercy Pollard is a 70 y o  female  71 yo  female with HTN and chronic LBP recently had spinal injection with noticeable improvement of symptoms, here today for follow up of chronic conditions        Hypertension   This is a chronic problem  The current episode started more than 1 year ago  The problem has been resolved since onset  The problem is controlled  There are no associated agents to hypertension   Risk factors for coronary artery disease include dyslipidemia, sedentary lifestyle and post-menopausal state  Past treatments include ACE inhibitors, beta blockers, diuretics and lifestyle changes  The current treatment provides significant improvement  There are no compliance problems  The following portions of the patient's history were reviewed and updated as appropriate:   She  has a past medical history of Arthralgia of hip; Arthritis; Chronic pain disorder; Dyslipidemia; Hyperlipidemia; Hypertension; Neck pain; Pemphigus; Shoulder pain; and Vitamin D deficiency  She   Patient Active Problem List    Diagnosis Date Noted    Spinal stenosis, lumbar region with neurogenic claudication     Acute pain of left shoulder 08/23/2018    Pre-op evaluation 02/08/2018    Arthralgia of left hip 07/19/2016    Slow transit constipation 07/19/2016    Creatinine elevation 02/16/2015    Low back pain 08/06/2014    Vitamin D deficiency 07/29/2013    Pemphigus 04/08/2013    Dyslipidemia 06/19/2012    Hypertension 06/19/2012     She  has a past surgical history that includes pr colonoscopy flx dx w/collj spec when pfrmd (N/A, 11/10/2016); Colonoscopy; Cataract extraction (Right); and pr remv cataract extracap,insert lens (Left, 9/27/2018)  Her family history includes Heart disease in her father; Hyperlipidemia in her mother; Hypertension in her mother  She  reports that she has never smoked  She has never used smokeless tobacco  She reports that she does not drink alcohol or use drugs    Current Outpatient Prescriptions   Medication Sig Dispense Refill    ammonium lactate (LAC-HYDRIN) 12 % lotion Apply 1 application topically 2 (two) times a day 400 g 5    aspirin (RA ASPIRIN EC) 81 mg EC tablet Take 1 tablet (81 mg total) by mouth daily 90 tablet 1    ibuprofen (MOTRIN) 200 mg tablet Take 200 mg by mouth every 8 (eight) hours as needed for mild pain        lidocaine (XYLOCAINE) 5 % ointment Apply topically as needed for mild pain 35 44 g 2    meloxicam (MOBIC) 7 5 mg tablet Take 1 tablet (7 5 mg total) by mouth daily 30 tablet 0    metoprolol tartrate (LOPRESSOR) 100 mg tablet Take 1 tablet (100 mg total) by mouth 2 (two) times a day 180 tablet 1    naproxen (NAPROSYN) 500 mg tablet Take 500 mg by mouth 2 (two) times a day with meals      triamterene-hydrochlorothiazide (MAXZIDE-25) 37 5-25 mg per tablet Take 1 tablet by mouth daily 90 tablet 0     No current facility-administered medications for this visit  Current Outpatient Prescriptions on File Prior to Visit   Medication Sig    ammonium lactate (LAC-HYDRIN) 12 % lotion Apply 1 application topically 2 (two) times a day    aspirin (RA ASPIRIN EC) 81 mg EC tablet Take 1 tablet (81 mg total) by mouth daily    ibuprofen (MOTRIN) 200 mg tablet Take 200 mg by mouth every 8 (eight) hours as needed for mild pain      lidocaine (XYLOCAINE) 5 % ointment Apply topically as needed for mild pain    metoprolol tartrate (LOPRESSOR) 100 mg tablet Take 1 tablet (100 mg total) by mouth 2 (two) times a day    naproxen (NAPROSYN) 500 mg tablet Take 500 mg by mouth 2 (two) times a day with meals    [DISCONTINUED] meloxicam (MOBIC) 7 5 mg tablet Take 1 tablet (7 5 mg total) by mouth daily    [DISCONTINUED] triamterene-hydrochlorothiazide (MAXZIDE-25) 37 5-25 mg per tablet Take 1 tablet by mouth daily     No current facility-administered medications on file prior to visit       Review of Systems   All other systems reviewed and are negative  Objective:      /62 (BP Location: Right arm, Patient Position: Sitting, Cuff Size: Standard)   Pulse (!) 52   Temp (!) 96 2 °F (35 7 °C) (Tympanic)   Resp 18   Ht 5' 2" (1 575 m)   Wt 63 kg (139 lb)   SpO2 98%   BMI 25 42 kg/m²          Physical Exam   Constitutional: She is oriented to person, place, and time  She appears well-developed  HENT:   Head: Normocephalic     Right Ear: External ear normal    Left Ear: External ear normal    Nose: Nose normal  Mouth/Throat: Oropharynx is clear and moist    Eyes: Pupils are equal, round, and reactive to light  Conjunctivae and EOM are normal    Neck: Normal range of motion  Neck supple  No thyromegaly present  Cardiovascular: Normal rate, regular rhythm and normal heart sounds  Pulmonary/Chest: Effort normal and breath sounds normal    Abdominal: Soft  There is no tenderness  There is no rebound and no guarding  Musculoskeletal: Normal range of motion  Neurological: She is alert and oriented to person, place, and time  She has normal reflexes  Skin: Skin is dry  Psychiatric: She has a normal mood and affect  Nursing note and vitals reviewed

## 2019-01-02 NOTE — TELEPHONE ENCOUNTER
Patient's daughter called, I had to use the interpretor due to only speaking Sinhala  She states the patient is doing well and has no pain

## 2019-01-09 ENCOUNTER — OFFICE VISIT (OUTPATIENT)
Dept: PAIN MEDICINE | Facility: MEDICAL CENTER | Age: 69
End: 2019-01-09
Payer: COMMERCIAL

## 2019-01-09 VITALS
HEIGHT: 60 IN | WEIGHT: 141.8 LBS | DIASTOLIC BLOOD PRESSURE: 70 MMHG | HEART RATE: 56 BPM | SYSTOLIC BLOOD PRESSURE: 138 MMHG | RESPIRATION RATE: 16 BRPM | BODY MASS INDEX: 27.84 KG/M2

## 2019-01-09 DIAGNOSIS — M48.062 SPINAL STENOSIS, LUMBAR REGION WITH NEUROGENIC CLAUDICATION: Primary | ICD-10-CM

## 2019-01-09 PROCEDURE — 99213 OFFICE O/P EST LOW 20 MIN: CPT | Performed by: NURSE PRACTITIONER

## 2019-01-09 NOTE — PROGRESS NOTES
Assessment:  1  Spinal stenosis, lumbar region with neurogenic claudication        Plan:  I discussed with the patient that since there has been significant improvement in the pain symptoms that we will hold off on any repeat injections at this point in time  However, I reviewed with the patient that if her symptoms should return, worsen, and/or experience new pain symptoms, she should call our office to schedule an office visit to discuss repeating the injection  Follow up on an as needed basis        [place] Prescription Drug Monitoring Program report was reviewed and was appropriate         My impressions and treatment recommendations were discussed in detail with the patient who verbalized understanding and had no further questions  Discharge instructions were provided  I personally saw and examined the patient and I agree with the above discussed plan of care  No orders of the defined types were placed in this encounter  No orders of the defined types were placed in this encounter  History of Present Illness:    Marina Hagen is a 70 y o  female presents for a followup for back and radiating right leg pain  Patient is status post a right L5-S1 LESI with Dr Mark Chen on 12-12-18  Patient reports complete relief from the procedure  She is no longer experiencing any pain symptoms  Patient is not taking any medication for pain  I have personally reviewed and/or updated the patient's past medical history, past surgical history, family history, social history, current medications, allergies, and vital signs today  Review of Systems:    Review of Systems   Respiratory: Negative for shortness of breath  Cardiovascular: Negative for chest pain  Gastrointestinal: Negative for constipation, diarrhea, nausea and vomiting  Musculoskeletal: Negative for arthralgias, gait problem, joint swelling and myalgias  Skin: Negative for rash     Neurological: Negative for dizziness, seizures and weakness  All other systems reviewed and are negative  Patient Active Problem List   Diagnosis    Pre-op evaluation    Arthralgia of left hip    Creatinine elevation    Dyslipidemia    Hypertension    Low back pain    Pemphigus    Slow transit constipation    Vitamin D deficiency    Acute pain of left shoulder    Spinal stenosis, lumbar region with neurogenic claudication       Past Medical History:   Diagnosis Date    Arthralgia of hip     left    Arthritis     Chronic pain disorder     low back and shoulder    Dyslipidemia     Hyperlipidemia     Hypertension     Neck pain     Pemphigus     Shoulder pain     Vitamin D deficiency        Past Surgical History:   Procedure Laterality Date    CATARACT EXTRACTION Right     COLONOSCOPY      [place] COLONOSCOPY FLX DX W/COLLJ SPEC WHEN PFRMD N/A 11/10/2016    Procedure: COLONOSCOPY;  Surgeon: Nisha Bolivar MD;  Location: [facility];   Service: Gastroenterology     [facility] CATARACT EXTRACAP,INSERT LENS Left 9/27/2018    Procedure: EXTRACAPSULAR CATARACT REMOVAL/INSERTION OF INTRAOCULAR LENS;  Surgeon: Arti Parry MD;  Location: [address] [place] [address];  Service: Ophthalmology       Family History   Problem Relation Age of Onset    Hyperlipidemia Mother     Hypertension Mother     Heart disease Father        Social History     Occupational History    homemaker      Social History Main Topics    Smoking status: Never Smoker    Smokeless tobacco: Never Used    Alcohol use No    Drug use: No    Sexual activity: Not Currently       Current Outpatient Prescriptions on File Prior to Visit   Medication Sig    ammonium lactate (LAC-HYDRIN) 12 % lotion Apply 1 application topically 2 (two) times a day    aspirin (RA ASPIRIN EC) 81 mg EC tablet Take 1 tablet (81 mg total) by mouth daily    ibuprofen (MOTRIN) 200 mg tablet Take 200 mg by mouth every 8 (eight) hours as needed for mild pain      lidocaine (XYLOCAINE) 5 % ointment Apply topically as needed for mild pain    metoprolol tartrate (LOPRESSOR) 100 mg tablet Take 1 tablet (100 mg total) by mouth 2 (two) times a day    triamterene-hydrochlorothiazide (MAXZIDE-25) 37 5-25 mg per tablet Take 1 tablet by mouth daily    meloxicam (MOBIC) 7 5 mg tablet Take 1 tablet (7 5 mg total) by mouth daily (Patient not taking: Reported on 1/9/2019 )    naproxen (NAPROSYN) 500 mg tablet Take 500 mg by mouth 2 (two) times a day with meals     No current facility-administered medications on file prior to visit  No Known Allergies    Physical Exam:    /70   Pulse 56   Resp 16   Ht 5' (1 524 m)   Wt 64 3 kg (141 lb 12 8 oz)   BMI 27 69 kg/m²     Constitutional: normal, well developed, well nourished, alert, in no distress and non-toxic and no overt pain behavior  Eyes: anicteric  HEENT: grossly intact  Neck: supple, symmetric, trachea midline and no masses   Pulmonary:even and unlabored  Cardiovascular:No edema or pitting edema present  Skin:Normal without rashes or lesions and well hydrated  Psychiatric:Mood and affect appropriate  Neurologic:Cranial Nerves II-XII grossly intact  Musculoskeletal:normal   Lumbar Spine Exam    Appearance:  Normal lordosis  Palpation/Tenderness:  no tenderness or spasm      Range of Motion:  Full range of motion with no pain or limitations in flexion, extension, lateral flexion and rotation  Motor Strength:  Left hip flexion:  5/5  Left hip extension:  5/5  Right hip flexion:  5/5  Right hip extension:  5/5  Left knee flexion:  5/5  Left knee extension:  5/5  Right knee flexion:  5/5  Right knee extension:  5/5  Left foot dorsiflexion:  5/5  Left foot plantar flexion:  5/5  Right foot dorsiflexion:  5/5  Right foot plantar flexion:  5/5    Special Tests:  Negative slump test bilaterally      Imaging

## 2019-01-10 ENCOUNTER — HOSPITAL ENCOUNTER (OUTPATIENT)
Dept: MAMMOGRAPHY | Facility: HOSPITAL | Age: 69
Discharge: HOME | End: 2019-01-10
Payer: COMMERCIAL

## 2019-01-10 VITALS — WEIGHT: 141 LBS | HEIGHT: 60 IN | BODY MASS INDEX: 27.68 KG/M2

## 2019-01-10 DIAGNOSIS — Z12.39 SCREENING BREAST EXAMINATION: ICD-10-CM

## 2019-01-10 PROCEDURE — 77067 SCR MAMMO BI INCL CAD: CPT

## 2019-02-04 ENCOUNTER — APPOINTMENT (OUTPATIENT)
Dept: LAB | Age: 69
End: 2019-02-04
Payer: COMMERCIAL

## 2019-02-04 LAB
25(OH)D3 SERPL-MCNC: 32.3 NG/ML (ref 30–100)
ALBUMIN SERPL BCP-MCNC: 3.5 G/DL (ref 3.5–5)
ALP SERPL-CCNC: 64 U/L (ref 46–116)
ALT SERPL W P-5'-P-CCNC: 25 U/L (ref 12–78)
ANION GAP SERPL CALCULATED.3IONS-SCNC: 5 MMOL/L (ref 4–13)
AST SERPL W P-5'-P-CCNC: 19 U/L (ref 5–45)
BASOPHILS # BLD AUTO: 0.08 THOUSANDS/ΜL (ref 0–0.1)
BASOPHILS NFR BLD AUTO: 1 % (ref 0–1)
BILIRUB SERPL-MCNC: 0.43 MG/DL (ref 0.2–1)
BUN SERPL-MCNC: 13 MG/DL (ref 5–25)
CALCIUM SERPL-MCNC: 9.1 MG/DL (ref 8.3–10.1)
CHLORIDE SERPL-SCNC: 107 MMOL/L (ref 100–108)
CHOLEST SERPL-MCNC: 182 MG/DL (ref 50–200)
CO2 SERPL-SCNC: 30 MMOL/L (ref 21–32)
CREAT SERPL-MCNC: 0.83 MG/DL (ref 0.6–1.3)
CREAT UR-MCNC: 173 MG/DL
EOSINOPHIL # BLD AUTO: 0.15 THOUSAND/ΜL (ref 0–0.61)
EOSINOPHIL NFR BLD AUTO: 2 % (ref 0–6)
ERYTHROCYTE [DISTWIDTH] IN BLOOD BY AUTOMATED COUNT: 13 % (ref 11.6–15.1)
GFR SERPL CREATININE-BSD FRML MDRD: 72 ML/MIN/1.73SQ M
GLUCOSE P FAST SERPL-MCNC: 106 MG/DL (ref 65–99)
HCT VFR BLD AUTO: 40.2 % (ref 34.8–46.1)
HDLC SERPL-MCNC: 54 MG/DL (ref 40–60)
HGB BLD-MCNC: 13.1 G/DL (ref 11.5–15.4)
IMM GRANULOCYTES # BLD AUTO: 0.03 THOUSAND/UL (ref 0–0.2)
IMM GRANULOCYTES NFR BLD AUTO: 0 % (ref 0–2)
LDLC SERPL CALC-MCNC: 92 MG/DL (ref 0–100)
LYMPHOCYTES # BLD AUTO: 3.2 THOUSANDS/ΜL (ref 0.6–4.47)
LYMPHOCYTES NFR BLD AUTO: 39 % (ref 14–44)
MCH RBC QN AUTO: 30.9 PG (ref 26.8–34.3)
MCHC RBC AUTO-ENTMCNC: 32.6 G/DL (ref 31.4–37.4)
MCV RBC AUTO: 95 FL (ref 82–98)
MICROALBUMIN UR-MCNC: 8.5 MG/L (ref 0–20)
MICROALBUMIN/CREAT 24H UR: 5 MG/G CREATININE (ref 0–30)
MONOCYTES # BLD AUTO: 0.68 THOUSAND/ΜL (ref 0.17–1.22)
MONOCYTES NFR BLD AUTO: 8 % (ref 4–12)
NEUTROPHILS # BLD AUTO: 3.99 THOUSANDS/ΜL (ref 1.85–7.62)
NEUTS SEG NFR BLD AUTO: 50 % (ref 43–75)
NONHDLC SERPL-MCNC: 128 MG/DL
NRBC BLD AUTO-RTO: 0 /100 WBCS
PLATELET # BLD AUTO: 243 THOUSANDS/UL (ref 149–390)
PMV BLD AUTO: 10.8 FL (ref 8.9–12.7)
POTASSIUM SERPL-SCNC: 4.3 MMOL/L (ref 3.5–5.3)
PROT SERPL-MCNC: 7.4 G/DL (ref 6.4–8.2)
RBC # BLD AUTO: 4.24 MILLION/UL (ref 3.81–5.12)
SODIUM SERPL-SCNC: 142 MMOL/L (ref 136–145)
TRIGL SERPL-MCNC: 178 MG/DL
TSH SERPL DL<=0.05 MIU/L-ACNC: 2.8 UIU/ML (ref 0.36–3.74)
WBC # BLD AUTO: 8.13 THOUSAND/UL (ref 4.31–10.16)

## 2019-02-04 PROCEDURE — 82570 ASSAY OF URINE CREATININE: CPT | Performed by: FAMILY MEDICINE

## 2019-02-04 PROCEDURE — 84443 ASSAY THYROID STIM HORMONE: CPT | Performed by: FAMILY MEDICINE

## 2019-02-04 PROCEDURE — 36415 COLL VENOUS BLD VENIPUNCTURE: CPT | Performed by: FAMILY MEDICINE

## 2019-02-04 PROCEDURE — 80061 LIPID PANEL: CPT | Performed by: FAMILY MEDICINE

## 2019-02-04 PROCEDURE — 85025 COMPLETE CBC W/AUTO DIFF WBC: CPT | Performed by: FAMILY MEDICINE

## 2019-02-04 PROCEDURE — 80053 COMPREHEN METABOLIC PANEL: CPT | Performed by: FAMILY MEDICINE

## 2019-02-04 PROCEDURE — 82306 VITAMIN D 25 HYDROXY: CPT | Performed by: FAMILY MEDICINE

## 2019-02-04 PROCEDURE — 82043 UR ALBUMIN QUANTITATIVE: CPT | Performed by: FAMILY MEDICINE

## 2019-03-13 ENCOUNTER — OFFICE VISIT (OUTPATIENT)
Dept: PAIN MEDICINE | Facility: MEDICAL CENTER | Age: 69
End: 2019-03-13
Payer: COMMERCIAL

## 2019-03-13 ENCOUNTER — TELEPHONE (OUTPATIENT)
Dept: RADIOLOGY | Facility: MEDICAL CENTER | Age: 69
End: 2019-03-13

## 2019-03-13 VITALS
HEIGHT: 60 IN | DIASTOLIC BLOOD PRESSURE: 80 MMHG | SYSTOLIC BLOOD PRESSURE: 112 MMHG | WEIGHT: 142.2 LBS | HEART RATE: 72 BPM | BODY MASS INDEX: 27.92 KG/M2 | RESPIRATION RATE: 12 BRPM

## 2019-03-13 DIAGNOSIS — M48.062 SPINAL STENOSIS, LUMBAR REGION WITH NEUROGENIC CLAUDICATION: Primary | ICD-10-CM

## 2019-03-13 PROCEDURE — 99214 OFFICE O/P EST MOD 30 MIN: CPT | Performed by: NURSE PRACTITIONER

## 2019-03-13 NOTE — PROGRESS NOTES
Assessment  1  Spinal stenosis, lumbar region with neurogenic claudication        Plan  We will schedule the patient for repeat right L5-S1 lumbar epidural steroid injection since her same pain symptoms have returned  Follow up after procedure        [place] Prescription Drug Monitoring Program report was reviewed and was appropriate     Complete risks and benefits including bleeding, infection, tissue reaction, nerve injury and allergic reaction were discussed  The approach was demonstrated using models and literature was provided  Verbal and written consent was obtained  My impressions and treatment recommendations were discussed in detail with the patient who verbalized understanding and had no further questions  Discharge instructions were provided  I personally saw and examined the patient and I agree with the above discussed plan of care  Orders Placed This Encounter   Procedures    FL spine and pain procedure     Standing Status:   Future     Standing Expiration Date:   3/13/2023     Order Specific Question:   Reason for Exam:     Answer:   right L5-S1 LESI     Order Specific Question:   Anticoagulant hold needed? Answer:   meloxicam     No orders of the defined types were placed in this encounter  History of Present Illness    Tim Emory Dennie Boron is a 70 y o  female  Presents to the office for follow-up regarding her right-sided low back pain  Today she rates her pain 8/10 this is intermittent in nature most bothersome in the morning   And at night  Her pain is described as cramping  Patient is here to discuss scheduling a no other injection  She last had a right L5-S1 lumbar epidural steroid injection on December 12, 2018 and this provided her 3 months relief  The patient tells me that her same pain symptoms began to return during the last week of February 2019      I have personally reviewed and/or updated the patient's past medical history, past surgical history, family history, social history, current medications, allergies, and vital signs today  Review of Systems   Respiratory: Negative for shortness of breath  Cardiovascular: Negative for chest pain  Gastrointestinal: Negative for constipation, diarrhea, nausea and vomiting  Musculoskeletal: Positive for back pain (low back radiating to thighs)  Negative for arthralgias, gait problem, joint swelling and myalgias  Skin: Negative for rash  Neurological: Positive for dizziness  Negative for seizures and weakness  All other systems reviewed and are negative  Patient Active Problem List   Diagnosis    Pre-op evaluation    Arthralgia of left hip    Creatinine elevation    Dyslipidemia    Hypertension    Low back pain    Pemphigus    Slow transit constipation    Vitamin D deficiency    Acute pain of left shoulder    Spinal stenosis, lumbar region with neurogenic claudication       Past Medical History:   Diagnosis Date    Arthralgia of hip     left    Arthritis     Chronic pain disorder     low back and shoulder    Dyslipidemia     Hyperlipidemia     Hypertension     Neck pain     Pemphigus     Shoulder pain     Vitamin D deficiency        Past Surgical History:   Procedure Laterality Date    CATARACT EXTRACTION Right     COLONOSCOPY      [place] COLONOSCOPY FLX DX W/COLLJ SPEC WHEN PFRMD N/A 11/10/2016    Procedure: COLONOSCOPY;  Surgeon: Herminia Mcleod MD;  Location: [facility];   Service: Gastroenterology     [facility] CATARACT EXTRACAP,INSERT LENS Left 9/27/2018    Procedure: EXTRACAPSULAR CATARACT REMOVAL/INSERTION OF INTRAOCULAR LENS;  Surgeon: Missy Romano MD;  Location: [address] [place] [address];  Service: Ophthalmology       Family History   Problem Relation Age of Onset    Hyperlipidemia Mother     Hypertension Mother     Heart disease Father        Social History     Occupational History    Occupation: homemaker   Tobacco Use    Smoking status: Never Smoker    Smokeless tobacco: Never Used Substance and Sexual Activity    Alcohol use: No    Drug use: No    Sexual activity: Not Currently       Current Outpatient Medications on File Prior to Visit   Medication Sig    ammonium lactate (LAC-HYDRIN) 12 % lotion Apply 1 application topically 2 (two) times a day    aspirin (RA ASPIRIN EC) 81 mg EC tablet Take 1 tablet (81 mg total) by mouth daily    meloxicam (MOBIC) 7 5 mg tablet Take 1 tablet (7 5 mg total) by mouth daily    metoprolol tartrate (LOPRESSOR) 100 mg tablet Take 1 tablet (100 mg total) by mouth 2 (two) times a day    triamterene-hydrochlorothiazide (MAXZIDE-25) 37 5-25 mg per tablet Take 1 tablet by mouth daily    lidocaine (XYLOCAINE) 5 % ointment Apply topically as needed for mild pain    [DISCONTINUED] ibuprofen (MOTRIN) 200 mg tablet Take 200 mg by mouth every 8 (eight) hours as needed for mild pain      [DISCONTINUED] naproxen (NAPROSYN) 500 mg tablet Take 500 mg by mouth 2 (two) times a day with meals     No current facility-administered medications on file prior to visit  No Known Allergies    Physical Exam    /80   Pulse 72   Resp 12   Ht 5' (1 524 m)   Wt 64 5 kg (142 lb 3 2 oz)   BMI 27 77 kg/m²     Constitutional: normal, well developed, well nourished, alert, in no distress and non-toxic and no overt pain behavior    Eyes: anicteric  HEENT: grossly intact  Neck: supple, symmetric, trachea midline and no masses   Pulmonary:even and unlabored  Cardiovascular:No edema or pitting edema present  Skin:Normal without rashes or lesions and well hydrated  Psychiatric:Mood and affect appropriate  Neurologic:Cranial Nerves II-XII grossly intact  Musculoskeletal:normal   Lumbar Spine Exam    Appearance:  Normal lordosis  Palpation/Tenderness:  right lumbar paraspinal tenderness    Range of Motion:  Flexion:  No limitation  with pain  Extension:  No limitation  with pain  Lateral Flexion - Left:  No limitation  with pain  Lateral Flexion - Right:  No limitation with pain  Rotation - Left:  No limitation  with pain  Rotation - Right:  No limitation  with pain  Motor Strength:  Left hip flexion:  5/5  Left hip extension:  5/5  Right hip flexion:  5/5  Right hip extension:  5/5  Left knee flexion:  5/5  Left knee extension:  5/5  Right knee flexion:  5/5  Right knee extension:  5/5  Left foot dorsiflexion:  5/5  Left foot plantar flexion:  5/5  Right foot dorsiflexion:  5/5  Right foot plantar flexion:  5/5      Imaging

## 2019-03-13 NOTE — TELEPHONE ENCOUNTER
Aspirin hold faxed to Dr Guero Salgado, [phone]       Right L5-S1 LESI  4w f/u with NP    Also takes meloxicam

## 2019-03-15 NOTE — TELEPHONE ENCOUNTER
Asprin hold approval rec'd; signed 3/15/19 (exp 5/14/19)      Right L5-S1 LESI  4w f/u with NP    Also takes meloxicam

## 2019-03-18 NOTE — TELEPHONE ENCOUNTER
SW patient via [address] [facility]  #[id number] Karan Francis)  Patient is scheduled for Right L5-S1 LESI on 4/8/19 to arrive at 8:05 for 8:20 appointment  Patient instructed she needs to hold her ASA for 6 days  Her last dose with be on 4/1/19  Patient instructed she needs to hold her Meloxicam 4 days prior to the procedure  Her last dose with be on 4/3/19  Patient given pre-op instructions, patient will need a , npo for one hour before the procedure, wear loose fitting clothing, if sick or on antibiotics patient needs to call our office and reschedule the appointment  Patient is scheduled with AO on 5/8/19 to arrive at 8:45 for 9:00 appt

## 2019-03-18 NOTE — TELEPHONE ENCOUNTER
Patient's daughter Zeinab Keene called returning your call to schedule procedure   Please advise, clary    Call back# [phone]

## 2019-03-28 ENCOUNTER — OFFICE VISIT (OUTPATIENT)
Dept: FAMILY MEDICINE CLINIC | Facility: CLINIC | Age: 69
End: 2019-03-28

## 2019-03-28 VITALS
TEMPERATURE: 98.3 F | HEIGHT: 60 IN | DIASTOLIC BLOOD PRESSURE: 86 MMHG | RESPIRATION RATE: 18 BRPM | SYSTOLIC BLOOD PRESSURE: 144 MMHG | BODY MASS INDEX: 28.66 KG/M2 | WEIGHT: 146 LBS | HEART RATE: 76 BPM

## 2019-03-28 DIAGNOSIS — I10 ESSENTIAL HYPERTENSION: ICD-10-CM

## 2019-03-28 DIAGNOSIS — E78.5 DYSLIPIDEMIA: ICD-10-CM

## 2019-03-28 DIAGNOSIS — M48.062 SPINAL STENOSIS, LUMBAR REGION WITH NEUROGENIC CLAUDICATION: ICD-10-CM

## 2019-03-28 DIAGNOSIS — E55.9 VITAMIN D DEFICIENCY: Primary | ICD-10-CM

## 2019-03-28 PROCEDURE — 99215 OFFICE O/P EST HI 40 MIN: CPT | Performed by: FAMILY MEDICINE

## 2019-03-28 RX ORDER — OMEGA-3-ACID ETHYL ESTERS 1 G/1
2 CAPSULE, LIQUID FILLED ORAL 2 TIMES DAILY
Qty: 180 CAPSULE | Refills: 1 | Status: SHIPPED | OUTPATIENT
Start: 2019-03-28 | End: 2019-06-26 | Stop reason: SDUPTHER

## 2019-03-28 RX ORDER — TRIAMTERENE AND HYDROCHLOROTHIAZIDE 37.5; 25 MG/1; MG/1
1 TABLET ORAL DAILY
Qty: 90 TABLET | Refills: 0 | Status: SHIPPED | OUTPATIENT
Start: 2019-03-28 | End: 2019-06-26 | Stop reason: SDUPTHER

## 2019-03-28 RX ORDER — DICLOFENAC POTASSIUM 50 MG/1
50 TABLET, FILM COATED ORAL DAILY
Qty: 90 TABLET | Refills: 0 | Status: SHIPPED | OUTPATIENT
Start: 2019-03-28 | End: 2019-06-26

## 2019-03-28 RX ORDER — METOPROLOL TARTRATE 100 MG/1
100 TABLET ORAL 2 TIMES DAILY
Qty: 180 TABLET | Refills: 1 | Status: SHIPPED | OUTPATIENT
Start: 2019-03-28 | End: 2019-06-26 | Stop reason: SDUPTHER

## 2019-03-28 NOTE — PROGRESS NOTES
Assessment/Plan:    Dyslipidemia  Reviewed BW results with patient  Counseled on low fat diet  Avoid fried foods, decrease red meats, increase whole grains  Start Omega III fish oil    Spinal stenosis, lumbar region with neurogenic claudication  Patient is schedule for a repeat injection on 4/8/19  Discontinue Meloxicam  May start Diclofenac if needed after injection  Counseled Diclofenac should not be long term given her history of elevated creatinine and her high blood pressure  Do NOT take Diclofenac 1 week prior to lumbar injection  Will need to monitor kidney function      Hypertension  Counseled on importance of taking medications on a regular basis  Counseled on low salt diet   OK to hold ASA for 1 week prior to lumbar injection     Greater than 50% of 40 minute encounter was spent counseling/coordinating care of the patient regarding the diagnosis and understanding pathogenesis of disease process, discussion of differential diagnoses, review of laboratory data and imaging, discussion of medications to include side effect profile, precautions, and plan of care           Problem List Items Addressed This Visit        Cardiovascular and Mediastinum    Hypertension     Counseled on importance of taking medications on a regular basis  Counseled on low salt diet          Relevant Medications    triamterene-hydrochlorothiazide (MAXZIDE-25) 37 5-25 mg per tablet    metoprolol tartrate (LOPRESSOR) 100 mg tablet       Other    Vitamin D deficiency - Primary    Dyslipidemia     Reviewed BW results with patient  Counseled on low fat diet  Avoid fried foods, decrease red meats, increase whole grains  Start Omega III fish oil         Relevant Medications    omega-3-acid ethyl esters (LOVAZA) 1 g capsule    Spinal stenosis, lumbar region with neurogenic claudication     Patient is schedule for a repeat injection on 4/8/19  Discontinue Meloxicam  May start Diclofenac if needed after injection  Counseled Diclofenac should not be long term given her history of elevated creatinine and her high blood pressure  Do NOT take Diclofenac 1 week prior to lumbar injection  Will need to monitor kidney function           Relevant Medications    diclofenac potassium (CATAFLAM) 50 mg tablet            Subjective:      Patient ID: Shashank Burnette is a 70 y o  female  70 y o   female with HTN, history of elevated creatinine and right-sided low back pain  Seen by Pain Management, last had a right L5-S1 lumbar epidural steroid injection on December 12, 2018 and this provided her 3 months relief  She currently complains of pain 8/10, intermittent in nature most bothersome in the morning  When she first gets up, and at night while lying in bed to sleep  Patient states her pain improves when she is active such as walking  She describes her pain  as cramping  Patient also complains of dizziness  Dizziness is described as feeling off balance, like everything is moving around her  This happens if she gets up quickly, or after she has been squatting down or bend down  Sensation lasts for several minutes and usually subsides on its own  Patient did not take BP medications this am        The following portions of the patient's history were reviewed and updated as appropriate: She  has a past medical history of Arthralgia of hip, Arthritis, Chronic pain disorder, Dyslipidemia, Hyperlipidemia, Hypertension, Neck pain, Pemphigus, Shoulder pain, and Vitamin D deficiency    She   Patient Active Problem List    Diagnosis Date Noted    Spinal stenosis, lumbar region with neurogenic claudication     Acute pain of left shoulder 08/23/2018    Pre-op evaluation 02/08/2018    Arthralgia of left hip 07/19/2016    Slow transit constipation 07/19/2016    Creatinine elevation 02/16/2015    Low back pain 08/06/2014    Vitamin D deficiency 07/29/2013    Pemphigus 04/08/2013    Dyslipidemia 06/19/2012    Hypertension 06/19/2012     She  has a past surgical history that includes pr colonoscopy flx dx w/collj spec when pfrmd (N/A, 11/10/2016); Colonoscopy; Cataract extraction (Right); and pr remv cataract extracap,insert lens (Left, 9/27/2018)  Her family history includes Heart disease in her father; Hyperlipidemia in her mother; Hypertension in her mother  She  reports that she has never smoked  She has never used smokeless tobacco  She reports that she does not drink alcohol or use drugs  Current Outpatient Medications   Medication Sig Dispense Refill    ammonium lactate (LAC-HYDRIN) 12 % lotion Apply 1 application topically 2 (two) times a day 400 g 5    aspirin (RA ASPIRIN EC) 81 mg EC tablet Take 1 tablet (81 mg total) by mouth daily 90 tablet 1    diclofenac potassium (CATAFLAM) 50 mg tablet Take 1 tablet (50 mg total) by mouth daily 90 tablet 0    lidocaine (XYLOCAINE) 5 % ointment Apply topically as needed for mild pain 35 44 g 2    metoprolol tartrate (LOPRESSOR) 100 mg tablet Take 1 tablet (100 mg total) by mouth 2 (two) times a day 180 tablet 1    omega-3-acid ethyl esters (LOVAZA) 1 g capsule Take 2 capsules (2 g total) by mouth 2 (two) times a day 180 capsule 1    triamterene-hydrochlorothiazide (MAXZIDE-25) 37 5-25 mg per tablet Take 1 tablet by mouth daily 90 tablet 0     No current facility-administered medications for this visit        Current Outpatient Medications on File Prior to Visit   Medication Sig    ammonium lactate (LAC-HYDRIN) 12 % lotion Apply 1 application topically 2 (two) times a day    aspirin (RA ASPIRIN EC) 81 mg EC tablet Take 1 tablet (81 mg total) by mouth daily    lidocaine (XYLOCAINE) 5 % ointment Apply topically as needed for mild pain    [DISCONTINUED] meloxicam (MOBIC) 7 5 mg tablet Take 1 tablet (7 5 mg total) by mouth daily    [DISCONTINUED] metoprolol tartrate (LOPRESSOR) 100 mg tablet Take 1 tablet (100 mg total) by mouth 2 (two) times a day    [DISCONTINUED] triamterene-hydrochlorothiazide (MAXZIDE-25) 37 5-25 mg per tablet Take 1 tablet by mouth daily     No current facility-administered medications on file prior to visit       Review of Systems   Musculoskeletal: Positive for back pain  Neurological: Positive for dizziness  All other systems reviewed and are negative  Objective:      /86 (BP Location: Left arm, Patient Position: Sitting, Cuff Size: Standard)   Pulse 76   Temp 98 3 °F (36 8 °C) (Temporal)   Resp 18   Ht 5' (1 524 m)   Wt 66 2 kg (146 lb)   BMI 28 51 kg/m²          Physical Exam   Constitutional: She is oriented to person, place, and time  She appears well-developed  HENT:   Head: Normocephalic  Right Ear: External ear normal    Left Ear: External ear normal    Nose: Nose normal    Mouth/Throat: Oropharynx is clear and moist    Eyes: Pupils are equal, round, and reactive to light  Conjunctivae and EOM are normal    Neck: Normal range of motion  Neck supple  No thyromegaly present  Cardiovascular: Normal rate, regular rhythm and normal heart sounds  Pulmonary/Chest: Effort normal and breath sounds normal    Abdominal: Soft  There is no tenderness  There is no rebound and no guarding  Musculoskeletal: Normal range of motion  She exhibits tenderness  Lumbar back: She exhibits tenderness  Neurological: She is alert and oriented to person, place, and time  She has normal reflexes  Skin: Skin is dry  Psychiatric: She has a normal mood and affect  Nursing note and vitals reviewed

## 2019-03-28 NOTE — ASSESSMENT & PLAN NOTE
Reviewed BW results with patient  Counseled on low fat diet  Avoid fried foods, decrease red meats, increase whole grains  Start Omega III fish oil

## 2019-03-28 NOTE — ASSESSMENT & PLAN NOTE
Patient is schedule for a repeat injection on 4/8/19  Discontinue Meloxicam  May start Diclofenac if needed after injection  Counseled Diclofenac should not be long term given her history of elevated creatinine and her high blood pressure  Do NOT take Diclofenac 1 week prior to lumbar injection  Will need to monitor kidney function

## 2019-04-08 ENCOUNTER — HOSPITAL ENCOUNTER (OUTPATIENT)
Dept: RADIOLOGY | Facility: MEDICAL CENTER | Age: 69
Discharge: HOME | End: 2019-04-08
Attending: PHYSICAL MEDICINE & REHABILITATION | Admitting: PHYSICAL MEDICINE & REHABILITATION
Payer: COMMERCIAL

## 2019-04-08 VITALS
DIASTOLIC BLOOD PRESSURE: 81 MMHG | OXYGEN SATURATION: 100 % | RESPIRATION RATE: 20 BRPM | SYSTOLIC BLOOD PRESSURE: 168 MMHG | HEART RATE: 56 BPM | TEMPERATURE: 97.7 F

## 2019-04-08 DIAGNOSIS — M48.062 SPINAL STENOSIS, LUMBAR REGION WITH NEUROGENIC CLAUDICATION: ICD-10-CM

## 2019-04-08 PROCEDURE — 62323 NJX INTERLAMINAR LMBR/SAC: CPT | Performed by: PHYSICAL MEDICINE & REHABILITATION

## 2019-04-08 RX ORDER — METHYLPREDNISOLONE ACETATE 80 MG/ML
80 INJECTION, SUSPENSION INTRA-ARTICULAR; INTRALESIONAL; INTRAMUSCULAR; PARENTERAL; SOFT TISSUE ONCE
Status: COMPLETED | OUTPATIENT
Start: 2019-04-08 | End: 2019-04-08

## 2019-04-08 RX ORDER — LIDOCAINE HYDROCHLORIDE 10 MG/ML
5 INJECTION, SOLUTION EPIDURAL; INFILTRATION; INTRACAUDAL; PERINEURAL ONCE
Status: COMPLETED | OUTPATIENT
Start: 2019-04-08 | End: 2019-04-08

## 2019-04-08 RX ADMIN — METHYLPREDNISOLONE ACETATE 80 MG: 80 INJECTION, SUSPENSION INTRA-ARTICULAR; INTRALESIONAL; INTRAMUSCULAR; PARENTERAL; SOFT TISSUE at 08:31

## 2019-04-08 RX ADMIN — LIDOCAINE HYDROCHLORIDE 2 ML: 10 INJECTION, SOLUTION EPIDURAL; INFILTRATION; INTRACAUDAL; PERINEURAL at 08:30

## 2019-04-08 RX ADMIN — IOHEXOL 1 ML: 300 INJECTION, SOLUTION INTRAVENOUS at 08:31

## 2019-04-09 ENCOUNTER — TELEPHONE (OUTPATIENT)
Dept: FAMILY MEDICINE CLINIC | Facility: CLINIC | Age: 69
End: 2019-04-09

## 2019-05-08 ENCOUNTER — OFFICE VISIT (OUTPATIENT)
Dept: PAIN MEDICINE | Facility: MEDICAL CENTER | Age: 69
End: 2019-05-08
Payer: COMMERCIAL

## 2019-05-08 VITALS
BODY MASS INDEX: 28.78 KG/M2 | HEIGHT: 60 IN | DIASTOLIC BLOOD PRESSURE: 74 MMHG | WEIGHT: 146.6 LBS | SYSTOLIC BLOOD PRESSURE: 126 MMHG | HEART RATE: 68 BPM | RESPIRATION RATE: 14 BRPM

## 2019-05-08 DIAGNOSIS — M48.062 SPINAL STENOSIS, LUMBAR REGION WITH NEUROGENIC CLAUDICATION: Primary | ICD-10-CM

## 2019-05-08 PROCEDURE — 99213 OFFICE O/P EST LOW 20 MIN: CPT | Performed by: NURSE PRACTITIONER

## 2019-06-24 ENCOUNTER — TELEPHONE (OUTPATIENT)
Dept: FAMILY MEDICINE CLINIC | Facility: CLINIC | Age: 69
End: 2019-06-24

## 2019-06-25 DIAGNOSIS — E78.5 DYSLIPIDEMIA: ICD-10-CM

## 2019-06-25 DIAGNOSIS — I10 ESSENTIAL HYPERTENSION: Primary | ICD-10-CM

## 2019-06-25 DIAGNOSIS — E55.9 VITAMIN D DEFICIENCY: ICD-10-CM

## 2019-06-26 ENCOUNTER — OFFICE VISIT (OUTPATIENT)
Dept: FAMILY MEDICINE CLINIC | Facility: CLINIC | Age: 69
End: 2019-06-26

## 2019-06-26 VITALS
RESPIRATION RATE: 18 BRPM | HEART RATE: 55 BPM | SYSTOLIC BLOOD PRESSURE: 120 MMHG | TEMPERATURE: 97.5 F | OXYGEN SATURATION: 98 % | WEIGHT: 145.56 LBS | DIASTOLIC BLOOD PRESSURE: 70 MMHG | HEIGHT: 60 IN | BODY MASS INDEX: 28.58 KG/M2

## 2019-06-26 DIAGNOSIS — M54.50 CHRONIC BILATERAL LOW BACK PAIN WITHOUT SCIATICA: ICD-10-CM

## 2019-06-26 DIAGNOSIS — I10 ESSENTIAL HYPERTENSION: ICD-10-CM

## 2019-06-26 DIAGNOSIS — E78.5 DYSLIPIDEMIA: ICD-10-CM

## 2019-06-26 DIAGNOSIS — M48.062 SPINAL STENOSIS, LUMBAR REGION WITH NEUROGENIC CLAUDICATION: Primary | ICD-10-CM

## 2019-06-26 DIAGNOSIS — M25.552 ARTHRALGIA OF LEFT HIP: ICD-10-CM

## 2019-06-26 DIAGNOSIS — G89.29 CHRONIC BILATERAL LOW BACK PAIN WITHOUT SCIATICA: ICD-10-CM

## 2019-06-26 PROCEDURE — 99214 OFFICE O/P EST MOD 30 MIN: CPT | Performed by: FAMILY MEDICINE

## 2019-06-26 RX ORDER — LIDOCAINE 50 MG/G
OINTMENT TOPICAL AS NEEDED
Qty: 35.44 G | Refills: 2 | Status: SHIPPED | OUTPATIENT
Start: 2019-06-26 | End: 2019-12-09 | Stop reason: HOSPADM

## 2019-06-26 RX ORDER — TRIAMTERENE AND HYDROCHLOROTHIAZIDE 37.5; 25 MG/1; MG/1
1 TABLET ORAL DAILY
Qty: 90 TABLET | Refills: 1 | Status: SHIPPED | OUTPATIENT
Start: 2019-06-26 | End: 2019-09-26 | Stop reason: SDUPTHER

## 2019-06-26 RX ORDER — ASPIRIN 81 MG/1
81 TABLET ORAL DAILY
Qty: 90 TABLET | Refills: 1 | Status: SHIPPED | OUTPATIENT
Start: 2019-06-26 | End: 2019-09-24 | Stop reason: SDUPTHER

## 2019-06-26 RX ORDER — GABAPENTIN 100 MG/1
CAPSULE ORAL
Qty: 90 CAPSULE | Refills: 0 | Status: SHIPPED | OUTPATIENT
Start: 2019-06-26 | End: 2019-07-30 | Stop reason: SDUPTHER

## 2019-06-26 RX ORDER — CYCLOBENZAPRINE HCL 5 MG
5 TABLET ORAL 2 TIMES DAILY
Qty: 60 TABLET | Refills: 2 | Status: SHIPPED | OUTPATIENT
Start: 2019-06-26 | End: 2019-12-09 | Stop reason: HOSPADM

## 2019-06-26 RX ORDER — MELOXICAM 7.5 MG/1
7.5 TABLET ORAL DAILY
Qty: 90 TABLET | Refills: 1 | Status: SHIPPED | OUTPATIENT
Start: 2019-06-26 | End: 2019-12-09 | Stop reason: HOSPADM

## 2019-06-26 RX ORDER — OMEGA-3-ACID ETHYL ESTERS 1 G/1
2 CAPSULE, LIQUID FILLED ORAL 2 TIMES DAILY
Qty: 180 CAPSULE | Refills: 1 | Status: SHIPPED | OUTPATIENT
Start: 2019-06-26 | End: 2019-09-26 | Stop reason: SDUPTHER

## 2019-06-26 RX ORDER — METOPROLOL TARTRATE 100 MG/1
100 TABLET ORAL 2 TIMES DAILY
Qty: 180 TABLET | Refills: 1 | Status: SHIPPED | OUTPATIENT
Start: 2019-06-26 | End: 2019-09-26 | Stop reason: SDUPTHER

## 2019-07-29 ENCOUNTER — TELEPHONE (OUTPATIENT)
Dept: PAIN MEDICINE | Facility: MEDICAL CENTER | Age: 69
End: 2019-07-29

## 2019-07-29 NOTE — TELEPHONE ENCOUNTER
Our guidelines are for any [facility] provider [facility] requires referral  Thank you

## 2019-07-29 NOTE — TELEPHONE ENCOUNTER
That is true with the [address] [place] [address] it is no longer [facility] to [employer], but [facility] has never needed one even when we were [employer]  Is this something that pain mangement is requesting only?

## 2019-07-29 NOTE — TELEPHONE ENCOUNTER
Please place insurance referral and fax to [phone], patient is being seen [employer] provider: [id number]    Thank you

## 2019-07-30 DIAGNOSIS — M48.062 SPINAL STENOSIS, LUMBAR REGION WITH NEUROGENIC CLAUDICATION: ICD-10-CM

## 2019-07-30 RX ORDER — GABAPENTIN 100 MG/1
CAPSULE ORAL
Qty: 90 CAPSULE | Refills: 0 | Status: SHIPPED | OUTPATIENT
Start: 2019-07-30 | End: 2019-09-26

## 2019-08-08 ENCOUNTER — APPOINTMENT (OUTPATIENT)
Dept: LAB | Age: 69
End: 2019-08-08
Payer: COMMERCIAL

## 2019-08-08 DIAGNOSIS — E78.5 DYSLIPIDEMIA: ICD-10-CM

## 2019-08-08 DIAGNOSIS — M48.062 SPINAL STENOSIS, LUMBAR REGION WITH NEUROGENIC CLAUDICATION: ICD-10-CM

## 2019-08-08 DIAGNOSIS — M25.552 ARTHRALGIA OF LEFT HIP: ICD-10-CM

## 2019-08-08 DIAGNOSIS — E55.9 VITAMIN D DEFICIENCY: ICD-10-CM

## 2019-08-08 DIAGNOSIS — I10 ESSENTIAL HYPERTENSION: ICD-10-CM

## 2019-08-08 LAB
25(OH)D3 SERPL-MCNC: 30.8 NG/ML (ref 30–100)
ALBUMIN SERPL BCP-MCNC: 3.7 G/DL (ref 3.5–5)
ALP SERPL-CCNC: 61 U/L (ref 46–116)
ALT SERPL W P-5'-P-CCNC: 30 U/L (ref 12–78)
ANION GAP SERPL CALCULATED.3IONS-SCNC: 5 MMOL/L (ref 4–13)
AST SERPL W P-5'-P-CCNC: 20 U/L (ref 5–45)
BASOPHILS # BLD AUTO: 0.09 THOUSANDS/ΜL (ref 0–0.1)
BASOPHILS NFR BLD AUTO: 1 % (ref 0–1)
BILIRUB SERPL-MCNC: 0.24 MG/DL (ref 0.2–1)
BUN SERPL-MCNC: 24 MG/DL (ref 5–25)
CALCIUM SERPL-MCNC: 9.3 MG/DL (ref 8.3–10.1)
CHLORIDE SERPL-SCNC: 106 MMOL/L (ref 100–108)
CHOLEST SERPL-MCNC: 213 MG/DL (ref 50–200)
CO2 SERPL-SCNC: 28 MMOL/L (ref 21–32)
CREAT SERPL-MCNC: 0.85 MG/DL (ref 0.6–1.3)
CREAT UR-MCNC: 81.2 MG/DL
CRP SERPL QL: <3 MG/L
EOSINOPHIL # BLD AUTO: 0.22 THOUSAND/ΜL (ref 0–0.61)
EOSINOPHIL NFR BLD AUTO: 2 % (ref 0–6)
ERYTHROCYTE [DISTWIDTH] IN BLOOD BY AUTOMATED COUNT: 12.8 % (ref 11.6–15.1)
GFR SERPL CREATININE-BSD FRML MDRD: 70 ML/MIN/1.73SQ M
GLUCOSE P FAST SERPL-MCNC: 112 MG/DL (ref 65–99)
HCT VFR BLD AUTO: 41.3 % (ref 34.8–46.1)
HDLC SERPL-MCNC: 63 MG/DL (ref 40–60)
HGB BLD-MCNC: 13.3 G/DL (ref 11.5–15.4)
IMM GRANULOCYTES # BLD AUTO: 0.04 THOUSAND/UL (ref 0–0.2)
IMM GRANULOCYTES NFR BLD AUTO: 0 % (ref 0–2)
LDLC SERPL CALC-MCNC: 131 MG/DL (ref 0–100)
LYMPHOCYTES # BLD AUTO: 4.31 THOUSANDS/ΜL (ref 0.6–4.47)
LYMPHOCYTES NFR BLD AUTO: 44 % (ref 14–44)
MCH RBC QN AUTO: 30.8 PG (ref 26.8–34.3)
MCHC RBC AUTO-ENTMCNC: 32.2 G/DL (ref 31.4–37.4)
MCV RBC AUTO: 96 FL (ref 82–98)
MICROALBUMIN UR-MCNC: 5.9 MG/L (ref 0–20)
MICROALBUMIN/CREAT 24H UR: 7 MG/G CREATININE (ref 0–30)
MONOCYTES # BLD AUTO: 0.62 THOUSAND/ΜL (ref 0.17–1.22)
MONOCYTES NFR BLD AUTO: 6 % (ref 4–12)
NEUTROPHILS # BLD AUTO: 4.62 THOUSANDS/ΜL (ref 1.85–7.62)
NEUTS SEG NFR BLD AUTO: 47 % (ref 43–75)
NONHDLC SERPL-MCNC: 150 MG/DL
NRBC BLD AUTO-RTO: 0 /100 WBCS
PLATELET # BLD AUTO: 239 THOUSANDS/UL (ref 149–390)
PMV BLD AUTO: 10.9 FL (ref 8.9–12.7)
POTASSIUM SERPL-SCNC: 4.3 MMOL/L (ref 3.5–5.3)
PROT SERPL-MCNC: 7.6 G/DL (ref 6.4–8.2)
RBC # BLD AUTO: 4.32 MILLION/UL (ref 3.81–5.12)
SODIUM SERPL-SCNC: 139 MMOL/L (ref 136–145)
TRIGL SERPL-MCNC: 96 MG/DL
TSH SERPL DL<=0.05 MIU/L-ACNC: 1.88 UIU/ML (ref 0.36–3.74)
WBC # BLD AUTO: 9.9 THOUSAND/UL (ref 4.31–10.16)

## 2019-08-08 PROCEDURE — 86430 RHEUMATOID FACTOR TEST QUAL: CPT

## 2019-08-08 PROCEDURE — 36415 COLL VENOUS BLD VENIPUNCTURE: CPT

## 2019-08-08 PROCEDURE — 80053 COMPREHEN METABOLIC PANEL: CPT

## 2019-08-08 PROCEDURE — 82306 VITAMIN D 25 HYDROXY: CPT

## 2019-08-08 PROCEDURE — 82043 UR ALBUMIN QUANTITATIVE: CPT

## 2019-08-08 PROCEDURE — 82570 ASSAY OF URINE CREATININE: CPT

## 2019-08-08 PROCEDURE — 84443 ASSAY THYROID STIM HORMONE: CPT

## 2019-08-08 PROCEDURE — 86140 C-REACTIVE PROTEIN: CPT

## 2019-08-08 PROCEDURE — 80061 LIPID PANEL: CPT

## 2019-08-08 PROCEDURE — 85025 COMPLETE CBC W/AUTO DIFF WBC: CPT

## 2019-08-09 LAB — RHEUMATOID FACT SER QL LA: NEGATIVE

## 2019-09-24 DIAGNOSIS — I10 ESSENTIAL HYPERTENSION: ICD-10-CM

## 2019-09-24 RX ORDER — ACETAMINOPHEN/DIPHENHYDRAMINE 500MG-25MG
TABLET ORAL
Qty: 90 TABLET | Refills: 1 | Status: SHIPPED | OUTPATIENT
Start: 2019-09-24 | End: 2020-01-02

## 2019-09-26 ENCOUNTER — OFFICE VISIT (OUTPATIENT)
Dept: FAMILY MEDICINE CLINIC | Facility: CLINIC | Age: 69
End: 2019-09-26

## 2019-09-26 VITALS
TEMPERATURE: 96.7 F | WEIGHT: 141 LBS | DIASTOLIC BLOOD PRESSURE: 80 MMHG | BODY MASS INDEX: 27.68 KG/M2 | RESPIRATION RATE: 20 BRPM | OXYGEN SATURATION: 95 % | HEIGHT: 60 IN | HEART RATE: 88 BPM | SYSTOLIC BLOOD PRESSURE: 110 MMHG

## 2019-09-26 DIAGNOSIS — I10 ESSENTIAL HYPERTENSION: ICD-10-CM

## 2019-09-26 DIAGNOSIS — M48.062 SPINAL STENOSIS, LUMBAR REGION WITH NEUROGENIC CLAUDICATION: Primary | ICD-10-CM

## 2019-09-26 DIAGNOSIS — K59.01 SLOW TRANSIT CONSTIPATION: ICD-10-CM

## 2019-09-26 DIAGNOSIS — Z23 NEED FOR IMMUNIZATION AGAINST INFLUENZA: ICD-10-CM

## 2019-09-26 DIAGNOSIS — E78.5 DYSLIPIDEMIA: ICD-10-CM

## 2019-09-26 PROCEDURE — G0008 ADMIN INFLUENZA VIRUS VAC: HCPCS | Performed by: FAMILY MEDICINE

## 2019-09-26 PROCEDURE — 3079F DIAST BP 80-89 MM HG: CPT | Performed by: FAMILY MEDICINE

## 2019-09-26 PROCEDURE — 3074F SYST BP LT 130 MM HG: CPT | Performed by: FAMILY MEDICINE

## 2019-09-26 PROCEDURE — 99214 OFFICE O/P EST MOD 30 MIN: CPT | Performed by: FAMILY MEDICINE

## 2019-09-26 PROCEDURE — 90662 IIV NO PRSV INCREASED AG IM: CPT | Performed by: FAMILY MEDICINE

## 2019-09-26 PROCEDURE — 3008F BODY MASS INDEX DOCD: CPT | Performed by: FAMILY MEDICINE

## 2019-09-26 RX ORDER — OMEGA-3-ACID ETHYL ESTERS 1 G/1
2 CAPSULE, LIQUID FILLED ORAL 2 TIMES DAILY
Qty: 180 CAPSULE | Refills: 1 | Status: SHIPPED | OUTPATIENT
Start: 2019-09-26 | End: 2019-12-16 | Stop reason: SDUPTHER

## 2019-09-26 RX ORDER — METOPROLOL TARTRATE 100 MG/1
100 TABLET ORAL 2 TIMES DAILY
Qty: 180 TABLET | Refills: 1 | Status: SHIPPED | OUTPATIENT
Start: 2019-09-26 | End: 2019-12-09 | Stop reason: HOSPADM

## 2019-09-26 RX ORDER — DULOXETIN HYDROCHLORIDE 30 MG/1
30 CAPSULE, DELAYED RELEASE ORAL DAILY
Qty: 90 CAPSULE | Refills: 1 | Status: SHIPPED | OUTPATIENT
Start: 2019-09-26 | End: 2019-12-09 | Stop reason: HOSPADM

## 2019-09-26 RX ORDER — TRIAMTERENE AND HYDROCHLOROTHIAZIDE 37.5; 25 MG/1; MG/1
1 TABLET ORAL DAILY
Qty: 90 TABLET | Refills: 1 | Status: SHIPPED | OUTPATIENT
Start: 2019-09-26 | End: 2019-12-09 | Stop reason: HOSPADM

## 2019-09-26 RX ORDER — POLYETHYLENE GLYCOL 3350 17 G/17G
17 POWDER, FOR SOLUTION ORAL DAILY
Qty: 578 G | Refills: 1 | Status: SHIPPED | OUTPATIENT
Start: 2019-09-26 | End: 2019-12-09 | Stop reason: HOSPADM

## 2019-09-26 NOTE — ASSESSMENT & PLAN NOTE
Restart Meloxicam  Physical therapy as she had great improvement with it in the past  Discontinue Gabapentin  Start Duloxetine

## 2019-09-26 NOTE — PROGRESS NOTES
Assessment/Plan:    Spinal stenosis, lumbar region with neurogenic claudication  Restart Meloxicam  Physical therapy as she had great improvement with it in the past  Discontinue Gabapentin  Start Duloxetine    Slow transit constipation  Increase fiber in the diet  Increase fluids in your diet  Miralax once a day     Hypertension  Controlled  Continue Metoprolol 100 mg BID and triamterene-HCTZ 37 5-25    Reviewed BW done in August and counseled on low carb diet  Problem List Items Addressed This Visit        Digestive    Slow transit constipation     Increase fiber in the diet  Increase fluids in your diet  Miralax once a day          Relevant Medications    polyethylene glycol (GLYCOLAX) powder       Cardiovascular and Mediastinum    Hypertension     Controlled  Continue Metoprolol 100 mg BID and triamterene-HCTZ 37 5-25         Relevant Medications    triamterene-hydrochlorothiazide (MAXZIDE-25) 37 5-25 mg per tablet    metoprolol tartrate (LOPRESSOR) 100 mg tablet       Other    Dyslipidemia    Relevant Medications    omega-3-acid ethyl esters (LOVAZA) 1 g capsule    Spinal stenosis, lumbar region with neurogenic claudication - Primary     Restart Meloxicam  Physical therapy as she had great improvement with it in the past  Discontinue Gabapentin  Start Duloxetine         Relevant Medications    DULoxetine (CYMBALTA) 30 mg delayed release capsule    Other Relevant Orders    Ambulatory referral to Physical Therapy      Other Visit Diagnoses     Need for immunization against influenza        Relevant Orders    influenza vaccine, [id number], high-dose, PF 0 5 mL (FLUZONE HIGH-DOSE)            Subjective:      Patient ID: Karen Quiñonez is a 70 y o  female  71 yo  female with chronic LBP   She was seen and evaluated by Pain Management, had a lumbar injection in April and states pain only improved for a few days and than had worse pain, she reached out to Pain Management to discuss further treatment options  Patient states she ws instructed by Pain Management to have another lumbar injection, she does not feel comfortable repeating the injection as she feels her pain worsened with the first one  She is here today to discuss medications to help with the pain  She stopped taking the Meloxicam and Gabapentin because she did not feel it was helping either  LBP is worse with bending or lying flat at night  Radiated down her L leg down to her foot  Accompanied by numbness  Fernandez weakness, incontinence, weight loss  The following portions of the patient's history were reviewed and updated as appropriate: She  has a past medical history of Arthralgia of hip, Arthritis, Chronic pain disorder, Dyslipidemia, Hyperlipidemia, Hypertension, Neck pain, Pemphigus, Shoulder pain, and Vitamin D deficiency  She   Patient Active Problem List    Diagnosis Date Noted    Spinal stenosis, lumbar region with neurogenic claudication     Acute pain of left shoulder 08/23/2018    Pre-op evaluation 02/08/2018    Arthralgia of left hip 07/19/2016    Slow transit constipation 07/19/2016    Creatinine elevation 02/16/2015    Low back pain 08/06/2014    Vitamin D deficiency 07/29/2013    Pemphigus 04/08/2013    Dyslipidemia 06/19/2012    Hypertension 06/19/2012     She  has a past surgical history that includes pr colonoscopy flx dx w/collj spec when pfrmd (N/A, 11/10/2016); Colonoscopy; Cataract extraction (Right); and pr remv cataract extracap,insert lens (Left, 9/27/2018)  Her family history includes Heart disease in her father; Hyperlipidemia in her mother; Hypertension in her mother  She  reports that she has never smoked  She has never used smokeless tobacco  She reports that she does not drink alcohol or use drugs    Current Outpatient Medications   Medication Sig Dispense Refill    metoprolol tartrate (LOPRESSOR) 100 mg tablet Take 1 tablet (100 mg total) by mouth 2 (two) times a day 180 tablet 1    omega-3-acid ethyl esters (LOVAZA) 1 g capsule Take 2 capsules (2 g total) by mouth 2 (two) times a day 180 capsule 1    RA ASPIRIN EC 81 MG EC tablet take 1 tablet by mouth once daily 90 tablet 1    triamterene-hydrochlorothiazide (MAXZIDE-25) 37 5-25 mg per tablet Take 1 tablet by mouth daily 90 tablet 1    cyclobenzaprine (FLEXERIL) 5 mg tablet Take 1 tablet (5 mg total) by mouth 2 (two) times a day 60 tablet 2    DULoxetine (CYMBALTA) 30 mg delayed release capsule Take 1 capsule (30 mg total) by mouth daily 90 capsule 1    lidocaine (XYLOCAINE) 5 % ointment Apply topically as needed for mild pain (Patient not taking: Reported on 9/26/2019) 35 44 g 2    meloxicam (MOBIC) 7 5 mg tablet Take 1 tablet (7 5 mg total) by mouth daily (Patient not taking: Reported on 9/26/2019) 90 tablet 1    polyethylene glycol (GLYCOLAX) powder Take 17 g by mouth daily 578 g 1     No current facility-administered medications for this visit        Current Outpatient Medications on File Prior to Visit   Medication Sig    RA ASPIRIN EC 81 MG EC tablet take 1 tablet by mouth once daily    [DISCONTINUED] metoprolol tartrate (LOPRESSOR) 100 mg tablet Take 1 tablet (100 mg total) by mouth 2 (two) times a day    [DISCONTINUED] omega-3-acid ethyl esters (LOVAZA) 1 g capsule Take 2 capsules (2 g total) by mouth 2 (two) times a day    [DISCONTINUED] triamterene-hydrochlorothiazide (MAXZIDE-25) 37 5-25 mg per tablet Take 1 tablet by mouth daily    cyclobenzaprine (FLEXERIL) 5 mg tablet Take 1 tablet (5 mg total) by mouth 2 (two) times a day    lidocaine (XYLOCAINE) 5 % ointment Apply topically as needed for mild pain (Patient not taking: Reported on 9/26/2019)    meloxicam (MOBIC) 7 5 mg tablet Take 1 tablet (7 5 mg total) by mouth daily (Patient not taking: Reported on 9/26/2019)    [DISCONTINUED] gabapentin (NEURONTIN) 100 mg capsule Take one capsule at night for 1 week, than increase to 2 capsules qHS for 1 week than 3 capsules qHs (Patient not taking: Reported on 9/26/2019)     No current facility-administered medications on file prior to visit       Review of Systems   Gastrointestinal: Positive for constipation  Musculoskeletal: Positive for back pain  All other systems reviewed and are negative  Objective:      /80   Pulse 88   Temp (!) 96 7 °F (35 9 °C) (Skin)   Resp 20   Ht 5' (1 524 m)   Wt 64 kg (141 lb)   SpO2 95%   BMI 27 54 kg/m²          Physical Exam   Constitutional: She is oriented to person, place, and time  She appears well-developed  HENT:   Head: Normocephalic  Right Ear: External ear normal    Left Ear: External ear normal    Nose: Nose normal    Mouth/Throat: Oropharynx is clear and moist    Eyes: Pupils are equal, round, and reactive to light  Conjunctivae and EOM are normal    Neck: Normal range of motion  Neck supple  No thyromegaly present  Cardiovascular: Normal rate, regular rhythm and normal heart sounds  Pulmonary/Chest: Effort normal and breath sounds normal    Abdominal: Soft  There is no tenderness  There is no rebound and no guarding  Musculoskeletal: Normal range of motion  She exhibits tenderness  Left foot: There is tenderness  Neurological: She is alert and oriented to person, place, and time  She has normal reflexes  Skin: Skin is dry  Psychiatric: She has a normal mood and affect  Nursing note and vitals reviewed

## 2019-10-08 ENCOUNTER — EVALUATION (OUTPATIENT)
Dept: PHYSICAL THERAPY | Facility: CLINIC | Age: 69
End: 2019-10-08
Payer: COMMERCIAL

## 2019-10-08 DIAGNOSIS — M48.062 SPINAL STENOSIS, LUMBAR REGION WITH NEUROGENIC CLAUDICATION: ICD-10-CM

## 2019-10-08 PROCEDURE — 97162 PT EVAL MOD COMPLEX 30 MIN: CPT | Performed by: PHYSICAL MEDICINE & REHABILITATION

## 2019-10-08 NOTE — PROGRESS NOTES
PT Evaluation     Today's date: 10/8/2019  Patient name: Maria Teresa Taylor  :   MRN: [id number]  Referring provider: Amaury Martin MD  Dx:   Encounter Diagnosis     ICD-10-CM    1  Spinal stenosis, lumbar region with neurogenic claudication M48 062 Ambulatory referral to Physical Therapy       Start Time: 1520  Stop Time: 1600  Total time in clinic (min): 40 minutes    Assessment  Assessment details: Pt is a 70 y o  female presenting to outpatient physical therapy with Spinal stenosis, lumbar region with neurogenic claudication   Pt presents with pain, decreased range of motion, decreased strength, and decreased tolerance to activity  Pt would benefit from skilled physical therapy to address limitations and to achieve goals  Thank you for this referral    Impairments: abnormal gait, abnormal or restricted ROM, impaired balance, impaired physical strength, lacks appropriate home exercise program and poor posture     Symptom irritability: highBarriers to therapy: Language barrier  Understanding of Dx/Px/POC: fair   Prognosis: fair    Goals  ST  Patient will report 25% decrease in pain in 4 weeks  2  Patient will demonstrate 25% improvement in ROM in 4 weeks  3  Patient will demonstrate 1/2 grade improvement in strength in 4 weeks  LT  Patient will be able to perform IADLS without restriction or pain by discharge  2  Patient will be independent in HEP by discharge  3  Patient will be able to return to recreational/work duties without restriction or pain by discharge        Plan  Patient would benefit from: PT eval and skilled physical therapy  Planned modality interventions: biofeedback, TENS, thermotherapy: hydrocollator packs and cryotherapy  Planned therapy interventions: joint mobilization, abdominal trunk stabilization, manual therapy, neuromuscular re-education, patient education, postural training, strengthening, stretching, therapeutic activities, therapeutic exercise, balance, home exercise program and gait training  Frequency: 2x week  Duration in weeks: 4  Treatment plan discussed with: patient        Subjective Evaluation    History of Present Illness  Mechanism of injury: Pt reports experiencing increased sxs typically in the morning  The first steps out of bed are difficult, and she begins to feel better with movement  She has previously been treated in [facility] with significant improvement  Sxs reported in the gluteal region B, L>R   Chief complaint is posterior calf pain and achilles pain       MRI (18)    IMPRESSION:  1  Mild scoliosis and multilevel spondylosis, worst in the lower lumbar spine where there is moderate spinal stenosis at L3-L4 and L4-L5  2  Variable degrees of foraminal stenosis, worst at L4-L5 and L5-S1 as described  3   Additional finding of left eccentric disc bulge at L1-L2 abutting the exiting left L2 nerve root, correlate for ipsilateral radiculopathy      Pain  Current pain ratin  At best pain ratin  At worst pain ratin  Progression: worsening      Diagnostic Tests  MRI studies: abnormal  Patient Goals  Patient goals for therapy: decreased pain          Objective     Passive Range of Motion   Left Hip   Flexion: 95 degrees     Right Hip   Flexion: 95 degrees with pain    Tests     Lumbar     Left   Negative crossed SLR and passive SLR  Right   Negative passive SLR  Left Hip   Positive piriformis  Negative KEL  Right Hip   Negative KEL and piriformis                Precautions: HTN, OA      Manual                                                                                   Exercise Diary              Bike             Hamstring stretch B             Piriformis stretch B             Gastroc stretch B             SKTC             Sciatic nerve glides B             Pball rollouts             LTR             HR/TR             Standing hip 3 way             NBOS on foam Modalities

## 2019-10-14 ENCOUNTER — OFFICE VISIT (OUTPATIENT)
Dept: PHYSICAL THERAPY | Facility: CLINIC | Age: 69
End: 2019-10-14
Payer: COMMERCIAL

## 2019-10-14 DIAGNOSIS — M48.062 SPINAL STENOSIS, LUMBAR REGION WITH NEUROGENIC CLAUDICATION: Primary | ICD-10-CM

## 2019-10-14 PROCEDURE — 97110 THERAPEUTIC EXERCISES: CPT

## 2019-10-14 NOTE — PROGRESS NOTES
Daily Note     Today's date: 10/14/2019  Patient name: Laine Beal  :   MRN: [id number]  Referring provider: Lionel Amaya MD  Dx:   Encounter Diagnosis     ICD-10-CM    1  Spinal stenosis, lumbar region with neurogenic claudication M48 062      Start Time: 1445  Stop Time: 1530  Total time in clinic (min): 45 minutes  Subjective: Pt arrives to today's Tx /c c/o of pain along posterior (L) thigh and leg  Pt reports compliance /c HEP and reports it helps with pain reduction (L)LE  Objective: See treatment diary below  Pt's daughter was present for translation assistance  Assessment: Pt had poor-fair tolerance to today's treatment, specifically with LE stretching  Pt has fear of pain which prevents effective self-stretch - progress as tolerated  Slight upper trunk sway present intermittently during NBOS on foam - able to self correct  Pt would benefit from continued PT to improve tolerance to activity, mobility, and decrease (L)LE pain Sx  Plan: Cont /c PT POC  Progress as tolerated       Precautions: HTN, OA  Manual                                                                                   Exercise Diary  10/14            Bike L1 7'              Hamstring stretch B strap  30"x3ea            Piriformis stretch B /c towel Pain             Gastroc stretch B Pro 30"x3ea            SKTC 30"x3ea            Sciatic nerve glides B             Pball rollouts             LTR 10"x10ea            HR/TR 20xea            Standing hip 3 way             NBOS on foam 2x30"                                                                                                                                     Modalities                                                         Ct Hyatt PTA

## 2019-10-17 ENCOUNTER — OFFICE VISIT (OUTPATIENT)
Dept: PHYSICAL THERAPY | Facility: CLINIC | Age: 69
End: 2019-10-17
Payer: COMMERCIAL

## 2019-10-17 DIAGNOSIS — M48.062 SPINAL STENOSIS, LUMBAR REGION WITH NEUROGENIC CLAUDICATION: Primary | ICD-10-CM

## 2019-10-17 PROCEDURE — 97112 NEUROMUSCULAR REEDUCATION: CPT

## 2019-10-17 PROCEDURE — 97110 THERAPEUTIC EXERCISES: CPT

## 2019-10-17 NOTE — PROGRESS NOTES
Daily Note     Today's date: 10/17/2019  Patient name: Maria Teresa Taylor  :   MRN: [id number]  Referring provider: Amaury Martin MD  Dx:   Encounter Diagnosis     ICD-10-CM    1  Spinal stenosis, lumbar region with neurogenic claudication M48 062                   Subjective: Pt reports through granddaughter translating that she has been feeling better but continues to note tightness in back and BLE (L>R)  Objective: See treatment diary below     Precautions: HTN, OA    Exercise Diary  10/14 10/17           Bike L1 7' L1 7'             Hamstring stretch B strap  30"x3 ea Seated 30"x3 ea           Piriformis stretch B /c towel Pain  Seated 30"x3 ea           Gastroc stretch B Pro 30"x3 ea Pro 30"x3 ea           SKTC 30"x3 ea 30"x3 ea           Sciatic nerve glides B             Pball rollouts  10"x10           LTR 10"x10 ea 10"x10 ea           HR/TR 20xea 30x ea           Standing hip 3 way  2x10 ea           NBOS on foam 2x30"                                                                                                                                     Assessment: Tolerated treatment well  Patient demonstrated fatigue post treatment, exhibited good technique with therapeutic exercises and would benefit from continued PT to address remaining mobility and strength deficits  Plan: Continue per plan of care  Progress treatment as tolerated      Kirk Nation PTA

## 2019-10-21 ENCOUNTER — APPOINTMENT (OUTPATIENT)
Dept: PHYSICAL THERAPY | Facility: CLINIC | Age: 69
End: 2019-10-21
Payer: COMMERCIAL

## 2019-10-22 ENCOUNTER — OFFICE VISIT (OUTPATIENT)
Dept: PHYSICAL THERAPY | Facility: CLINIC | Age: 69
End: 2019-10-22
Payer: COMMERCIAL

## 2019-10-22 DIAGNOSIS — M48.062 SPINAL STENOSIS, LUMBAR REGION WITH NEUROGENIC CLAUDICATION: Primary | ICD-10-CM

## 2019-10-22 PROCEDURE — 97112 NEUROMUSCULAR REEDUCATION: CPT

## 2019-10-22 PROCEDURE — 97110 THERAPEUTIC EXERCISES: CPT

## 2019-10-22 NOTE — PROGRESS NOTES
Daily Note     Today's date: 10/22/2019  Patient name: Veena Hoyos  :   MRN: [id number]  Referring provider: Aria Dominguez MD  Dx:   Encounter Diagnosis     ICD-10-CM    1  Spinal stenosis, lumbar region with neurogenic claudication M48 062               Subjective: Pt reports only "a little" pain in LBP upon arrival to today's treatment  Objective: See treatment diary below  Time borrowed from, LEILA,  1389 [id number]  Precautions: HTN, OA  Exercise Diary  10/14 10/17 10/22          Bike L1 7' L1 7' L1 8'            Hamstring stretch B strap  30"x3 ea Seated 30"x3 ea Seated 30"x3ea          Piriformis stretch B /c towel Pain  Seated 30"x3 ea Seated 30"x3ea          Gastroc stretch B Pro 30"x3 ea Pro 30"x3 ea Pro 30"x3 ea          SKTC 30"x3 ea 30"x3 ea 30"x3ea          Sciatic nerve glides B   20xea          Pball rollouts  10"x10 10x10"          LTR 10"x10 ea 10"x10 ea 10"x10ea          HR/TR 20xea 30x ea           Standing hip 3 way  2x10 ea           NBOS on foam 2x30"                                                                                                                                     Assessment: Increased time required for translation of treatment - used [facility] ben; pt demonstrates fair understanding of instructions and requires prompting for correct technique and duration of exercises  Pt demonstrates improved tolerance to self  and would benefit from continued Pt to address remaining strength, mobility, and functional deficits re: LBP Sx     Plan: Continue per plan of care  Progress treatment as tolerated      Melanie Santana PTA

## 2019-10-24 ENCOUNTER — OFFICE VISIT (OUTPATIENT)
Dept: PHYSICAL THERAPY | Facility: CLINIC | Age: 69
End: 2019-10-24
Payer: COMMERCIAL

## 2019-10-24 DIAGNOSIS — M48.062 SPINAL STENOSIS, LUMBAR REGION WITH NEUROGENIC CLAUDICATION: Primary | ICD-10-CM

## 2019-10-24 PROCEDURE — 97112 NEUROMUSCULAR REEDUCATION: CPT

## 2019-10-24 PROCEDURE — 97110 THERAPEUTIC EXERCISES: CPT

## 2019-10-28 ENCOUNTER — APPOINTMENT (OUTPATIENT)
Dept: PHYSICAL THERAPY | Facility: CLINIC | Age: 69
End: 2019-10-28
Payer: COMMERCIAL

## 2019-10-29 ENCOUNTER — OFFICE VISIT (OUTPATIENT)
Dept: PHYSICAL THERAPY | Facility: CLINIC | Age: 69
End: 2019-10-29
Payer: COMMERCIAL

## 2019-10-29 DIAGNOSIS — M48.062 SPINAL STENOSIS, LUMBAR REGION WITH NEUROGENIC CLAUDICATION: Primary | ICD-10-CM

## 2019-10-29 PROCEDURE — 97110 THERAPEUTIC EXERCISES: CPT

## 2019-10-29 PROCEDURE — 97112 NEUROMUSCULAR REEDUCATION: CPT

## 2019-10-29 NOTE — PROGRESS NOTES
Daily Note     Today's date: 10/29/2019  Patient name: Jignesh Hill  : [birth year]  MRN: [id number]  Referring provider: Estevan Tellez MD  Dx:   Encounter Diagnosis     ICD-10-CM    1  Spinal stenosis, lumbar region with neurogenic claudication M48 062                   Subjective: Pt reports minimal L lumbar pain upon arrival but notes overall improvement in mobility and pain via translation through her granddaughter  Throughout session, she reports pain during SLR's and then walking to the railing that is reduced post-session following pball rollouts  Objective: See treatment diary below     Precautions: HTN, OA  Exercise Diary  10/14 10/17 10/22 10/24 10/29        Bike L1 7' L1 7' L1 8' L2 8'  L2 8'          Hamstring stretch B strap  30"x3 ea Seated 30"x3 ea Seated 30"x3ea -- -        Piriformis stretch B /c towel Pain  Seated 30"x3 ea Seated 30"x3ea -- -        Gastroc stretch B Pro 30"x3 ea Pro 30"x3 ea Pro 30"x3 ea -- -        SKTC 30"x3 ea 30"x3 ea 30"x3ea 30"x3ea 30"x3 ea        Sciatic nerve glides B   20xea 2x20ea 20 pumps 3x ea        Pball rollouts  10"x10 10x10" 10x10" 10"x10        LTR 10"x10 ea 10"x10 ea 10"x10 ea 10"x10 ea 10"x10 ea        HR/TR 20xea 30x ea  30xea 2x20 ea        Standing hip 3 way  2x10 ea  pain -        NBOS on foam 2x30"    -        Multifidus press    [place]  5"x5ea         Bridges    3x5 3x5        SLR    [id number] 4x5 ea                                                                                          Assessment: Tolerated treatment well  Patient demonstrated fatigue post treatment, exhibited good technique with therapeutic exercises and would benefit from continued PT to address remaining pain  Plan: Continue per plan of care  Progress treatment as tolerated      Marval Fillers, PTA

## 2019-10-31 ENCOUNTER — EVALUATION (OUTPATIENT)
Dept: PHYSICAL THERAPY | Facility: CLINIC | Age: 69
End: 2019-10-31
Payer: COMMERCIAL

## 2019-10-31 DIAGNOSIS — M48.062 SPINAL STENOSIS, LUMBAR REGION WITH NEUROGENIC CLAUDICATION: Primary | ICD-10-CM

## 2019-10-31 PROCEDURE — 97110 THERAPEUTIC EXERCISES: CPT | Performed by: PHYSICAL THERAPIST

## 2019-10-31 PROCEDURE — 97112 NEUROMUSCULAR REEDUCATION: CPT | Performed by: PHYSICAL THERAPIST

## 2019-10-31 NOTE — PROGRESS NOTES
PT Re-Evaluation  and PT Discharge    Today's date: 10/31/2019  Patient name: Erik Mcnulty  :   MRN: [id number]  Referring provider: Donna Davis MD  Dx:   Encounter Diagnosis     ICD-10-CM    1  Spinal stenosis, lumbar region with neurogenic claudication M48 062                   Assessment  Assessment details: Erik Mcnulty has been treated in outpatient physical therapy over the past 3 5 weeks for diagnosis/complaints of Spinal stenosis, lumbar region with neurogenic claudication  (primary encounter diagnosis)  Pt demonstrates increased range of motion, improved strength, decreased pain, and increased activity tolerance  Pt has achieved goals and maximal benefit from skilled physical therapy care at present time  Pt appropriate to be discharged at present time to [facility]  Thank you for the opportunity to share in this patient's care  Impairments: abnormal gait, abnormal or restricted ROM, impaired balance, impaired physical strength, lacks appropriate home exercise program and poor posture     Symptom irritability: highBarriers to therapy: Language barrier  Understanding of Dx/Px/POC: fair   Prognosis: good    Goals  ST  Patient will report 25% decrease in pain in 4 weeks  2  Patient will demonstrate 25% improvement in ROM in 4 weeks  3  Patient will demonstrate 1/2 grade improvement in strength in 4 weeks  LT  Patient will be able to perform IADLS without restriction or pain by discharge  2  Patient will be independent in HEP by discharge  3  Patient will be able to return to recreational/work duties without restriction or pain by discharge  Plan  Plan details: Discharge to [facility]    Patient would benefit from: PT eval and skilled physical therapy  Planned modality interventions: biofeedback, TENS, thermotherapy: hydrocollator packs and cryotherapy  Planned therapy interventions: joint mobilization, abdominal trunk stabilization, manual therapy, neuromuscular re-education, patient education, postural training, strengthening, stretching, therapeutic activities, therapeutic exercise, balance, home exercise program and gait training  Treatment plan discussed with: patient        Subjective Evaluation    History of Present Illness  Mechanism of injury: Pt reports experiencing increased sxs typically in the morning  The first steps out of bed are difficult, and she begins to feel better with movement  She has previously been treated in [facility] with significant improvement  Sxs reported in the gluteal region B, L>R   Chief complaint is posterior calf pain and achilles pain       MRI (18)    IMPRESSION:  1  Mild scoliosis and multilevel spondylosis, worst in the lower lumbar spine where there is moderate spinal stenosis at L3-L4 and L4-L5  2  Variable degrees of foraminal stenosis, worst at L4-L5 and L5-S1 as described  3   Additional finding of left eccentric disc bulge at L1-L2 abutting the exiting left L2 nerve root, correlate for ipsilateral radiculopathy      10/31: Pt reports she continues to have pain in LLE, however, notices improvements since starting thearpy  Reports she has pain in morning upon waking up, however, denies much pain during the day and denies pain with sitting, standing, or walking  Pain  Current pain ratin  At best pain ratin  At worst pain ratin  Progression: worsening      Diagnostic Tests  MRI studies: abnormal  Patient Goals  Patient goals for therapy: decreased pain          Objective     Passive Range of Motion   Left Hip   Flexion: 95 degrees     Right Hip   Flexion: 95 degrees with pain    Tests     Lumbar     Left   Negative crossed SLR and passive SLR  Right   Negative passive SLR  Left Hip   Positive piriformis  Negative KEL  Right Hip   Negative KEL and piriformis                Precautions: HTN, OA  Exercise Diary  10/14 10/17 10/22 10/24 10/29 10/31       Bike L1 7' L1 7' L1 8' L2 8'  L2 8' L2 6' Hamstring stretch B strap  30"x3 ea Seated 30"x3 ea Seated 30"x3ea -- -        Piriformis stretch B /c towel Pain  Seated 30"x3 ea Seated 30"x3ea -- -        Gastroc stretch B Pro 30"x3 ea Pro 30"x3 ea Pro 30"x3 ea -- -        SKTC 30"x3 ea 30"x3 ea 30"x3ea 30"x3ea 30"x3 ea 15"x5 ea       Sciatic nerve glides B   20xea 2x20ea 20 pumps 3x ea 20 pumps 3x ea       Pball rollouts  10"x10 10x10" 10x10" 10"x10 10"x10       LTR 10"x10 ea 10"x10 ea 10"x10 ea 10"x10 ea 10"x10 ea 15"x5 ea       HR/TR 20xea 30x ea  30xea 2x20 ea 30x ea         Standing hip 3 way  2x10 ea  pain -        NBOS on foam 2x30"    -        Multifidus press    [place]ach  5"x5ea         Bridges    3x5 3x5 pain       SLR    [id number] 4x5 ea x10 ea

## 2019-12-08 ENCOUNTER — APPOINTMENT (EMERGENCY)
Dept: CT IMAGING | Facility: HOSPITAL | Age: 69
DRG: 305 | End: 2019-12-08
Payer: COMMERCIAL

## 2019-12-08 ENCOUNTER — HOSPITAL ENCOUNTER (INPATIENT)
Facility: HOSPITAL | Age: 69
LOS: 1 days | Discharge: HOME | DRG: 305 | End: 2019-12-09
Attending: EMERGENCY MEDICINE | Admitting: FAMILY MEDICINE
Payer: COMMERCIAL

## 2019-12-08 ENCOUNTER — APPOINTMENT (EMERGENCY)
Dept: RADIOLOGY | Facility: HOSPITAL | Age: 69
DRG: 305 | End: 2019-12-08
Payer: COMMERCIAL

## 2019-12-08 ENCOUNTER — OFFICE VISIT (OUTPATIENT)
Dept: URGENT CARE | Age: 69
End: 2019-12-08
Payer: COMMERCIAL

## 2019-12-08 VITALS
BODY MASS INDEX: 25.21 KG/M2 | HEIGHT: 62 IN | SYSTOLIC BLOOD PRESSURE: 197 MMHG | HEART RATE: 57 BPM | DIASTOLIC BLOOD PRESSURE: 87 MMHG | OXYGEN SATURATION: 97 % | RESPIRATION RATE: 18 BRPM | WEIGHT: 137 LBS | TEMPERATURE: 98.1 F

## 2019-12-08 DIAGNOSIS — R42 DIZZINESS: Primary | ICD-10-CM

## 2019-12-08 DIAGNOSIS — I63.9 CVA (CEREBRAL VASCULAR ACCIDENT) (HCC): ICD-10-CM

## 2019-12-08 DIAGNOSIS — I10 HYPERTENSION: ICD-10-CM

## 2019-12-08 DIAGNOSIS — I10 HYPERTENSION, UNSPECIFIED TYPE: ICD-10-CM

## 2019-12-08 DIAGNOSIS — I63.9 CEREBELLAR INFARCT (HCC): ICD-10-CM

## 2019-12-08 DIAGNOSIS — E78.5 DYSLIPIDEMIA: ICD-10-CM

## 2019-12-08 PROBLEM — D72.829 LEUKOCYTOSIS: Status: ACTIVE | Noted: 2019-12-08

## 2019-12-08 PROBLEM — M62.838 MUSCLE SPASMS OF NECK: Status: ACTIVE | Noted: 2017-12-04

## 2019-12-08 LAB
ALBUMIN SERPL BCP-MCNC: 4.2 G/DL (ref 3–5.2)
ALP SERPL-CCNC: 55 U/L (ref 43–122)
ALT SERPL W P-5'-P-CCNC: 25 U/L (ref 9–52)
ANION GAP SERPL CALCULATED.3IONS-SCNC: 8 MMOL/L (ref 5–14)
AST SERPL W P-5'-P-CCNC: 34 U/L (ref 14–36)
BACTERIA UR QL AUTO: ABNORMAL /HPF
BASOPHILS # BLD AUTO: 0.16 THOUSAND/UL (ref 0–0.1)
BASOPHILS NFR MAR MANUAL: 1 % (ref 0–1)
BILIRUB SERPL-MCNC: 0.6 MG/DL
BILIRUB UR QL STRIP: NEGATIVE
BUN SERPL-MCNC: 19 MG/DL (ref 5–25)
CALCIUM SERPL-MCNC: 9.7 MG/DL (ref 8.4–10.2)
CHLORIDE SERPL-SCNC: 102 MMOL/L (ref 97–108)
CLARITY UR: CLEAR
CO2 SERPL-SCNC: 28 MMOL/L (ref 22–30)
COLOR UR: ABNORMAL
CREAT SERPL-MCNC: 0.74 MG/DL (ref 0.6–1.2)
EOSINOPHIL # BLD AUTO: 0.62 THOUSAND/UL (ref 0–0.4)
EOSINOPHIL NFR BLD MANUAL: 4 % (ref 0–6)
ERYTHROCYTE [DISTWIDTH] IN BLOOD BY AUTOMATED COUNT: 13.9 %
GFR SERPL CREATININE-BSD FRML MDRD: 82 ML/MIN/1.73SQ M
GLUCOSE SERPL-MCNC: 115 MG/DL (ref 70–99)
GLUCOSE UR STRIP-MCNC: NEGATIVE MG/DL
HCT VFR BLD AUTO: 44.9 % (ref 36–46)
HGB BLD-MCNC: 15.2 G/DL (ref 12–16)
HGB UR QL STRIP.AUTO: NEGATIVE
KETONES UR STRIP-MCNC: NEGATIVE MG/DL
LEUKOCYTE ESTERASE UR QL STRIP: 25
LYMPHOCYTES # BLD AUTO: 29 % (ref 25–45)
LYMPHOCYTES # BLD AUTO: 4.5 THOUSAND/UL (ref 0.5–4)
MCH RBC QN AUTO: 30.8 PG (ref 26–34)
MCHC RBC AUTO-ENTMCNC: 33.9 G/DL (ref 31–36)
MCV RBC AUTO: 91 FL (ref 80–100)
MONOCYTES # BLD AUTO: 0.47 THOUSAND/UL (ref 0.2–0.9)
MONOCYTES NFR BLD AUTO: 3 % (ref 1–10)
NEUTS SEG # BLD: 7.75 THOUSAND/UL (ref 1.8–7.8)
NEUTS SEG NFR BLD AUTO: 50 %
NITRITE UR QL STRIP: NEGATIVE
NON-SQ EPI CELLS URNS QL MICRO: ABNORMAL /HPF
PH UR STRIP.AUTO: 7 [PH]
PLATELET # BLD AUTO: 304 THOUSANDS/UL (ref 150–450)
PLATELET BLD QL SMEAR: ADEQUATE
PMV BLD AUTO: 8.8 FL (ref 8.9–12.7)
POTASSIUM SERPL-SCNC: 3.9 MMOL/L (ref 3.6–5)
PROT SERPL-MCNC: 7.7 G/DL (ref 5.9–8.4)
PROT UR STRIP-MCNC: NEGATIVE MG/DL
RBC # BLD AUTO: 4.94 MILLION/UL (ref 4–5.2)
RBC #/AREA URNS AUTO: ABNORMAL /HPF
RBC MORPH BLD: NORMAL
SODIUM SERPL-SCNC: 138 MMOL/L (ref 137–147)
SP GR UR STRIP.AUTO: 1.01 (ref 1–1.04)
TOTAL CELLS COUNTED SPEC: 100
TROPONIN I SERPL-MCNC: <0.01 NG/ML (ref 0–0.03)
UROBILINOGEN UA: NEGATIVE MG/DL
VARIANT LYMPHS # BLD AUTO: 13 % (ref 0–0)
WBC # BLD AUTO: 15.5 THOUSAND/UL (ref 4.5–11)
WBC #/AREA URNS AUTO: ABNORMAL /HPF

## 2019-12-08 PROCEDURE — 36415 COLL VENOUS BLD VENIPUNCTURE: CPT | Performed by: EMERGENCY MEDICINE

## 2019-12-08 PROCEDURE — 85007 BL SMEAR W/DIFF WBC COUNT: CPT | Performed by: EMERGENCY MEDICINE

## 2019-12-08 PROCEDURE — 71046 X-RAY EXAM CHEST 2 VIEWS: CPT

## 2019-12-08 PROCEDURE — 70496 CT ANGIOGRAPHY HEAD: CPT

## 2019-12-08 PROCEDURE — 93005 ELECTROCARDIOGRAM TRACING: CPT

## 2019-12-08 PROCEDURE — 70498 CT ANGIOGRAPHY NECK: CPT

## 2019-12-08 PROCEDURE — 70450 CT HEAD/BRAIN W/O DYE: CPT

## 2019-12-08 PROCEDURE — 99285 EMERGENCY DEPT VISIT HI MDM: CPT

## 2019-12-08 PROCEDURE — 99285 EMERGENCY DEPT VISIT HI MDM: CPT | Performed by: EMERGENCY MEDICINE

## 2019-12-08 PROCEDURE — 80053 COMPREHEN METABOLIC PANEL: CPT | Performed by: EMERGENCY MEDICINE

## 2019-12-08 PROCEDURE — 99213 OFFICE O/P EST LOW 20 MIN: CPT | Performed by: PHYSICIAN ASSISTANT

## 2019-12-08 PROCEDURE — 84484 ASSAY OF TROPONIN QUANT: CPT | Performed by: EMERGENCY MEDICINE

## 2019-12-08 PROCEDURE — 85027 COMPLETE CBC AUTOMATED: CPT | Performed by: EMERGENCY MEDICINE

## 2019-12-08 PROCEDURE — 81001 URINALYSIS AUTO W/SCOPE: CPT | Performed by: EMERGENCY MEDICINE

## 2019-12-08 PROCEDURE — NC001 PR NO CHARGE: Performed by: FAMILY MEDICINE

## 2019-12-08 RX ORDER — CHLORAL HYDRATE 500 MG
1000 CAPSULE ORAL 2 TIMES DAILY
Status: DISCONTINUED | OUTPATIENT
Start: 2019-12-09 | End: 2019-12-09 | Stop reason: HOSPADM

## 2019-12-08 RX ORDER — ATORVASTATIN CALCIUM 40 MG/1
40 TABLET, FILM COATED ORAL EVERY EVENING
Status: DISCONTINUED | OUTPATIENT
Start: 2019-12-09 | End: 2019-12-09 | Stop reason: HOSPADM

## 2019-12-08 RX ORDER — ASPIRIN 81 MG/1
81 TABLET ORAL DAILY
Status: DISCONTINUED | OUTPATIENT
Start: 2019-12-09 | End: 2019-12-09 | Stop reason: HOSPADM

## 2019-12-08 RX ORDER — METOPROLOL TARTRATE 5 MG/5ML
5 INJECTION INTRAVENOUS EVERY 8 HOURS PRN
Status: DISCONTINUED | OUTPATIENT
Start: 2019-12-08 | End: 2019-12-09

## 2019-12-08 RX ADMIN — IOHEXOL 100 ML: 350 INJECTION, SOLUTION INTRAVENOUS at 21:05

## 2019-12-08 NOTE — ED PROVIDER NOTES
History  Chief Complaint   Patient presents with    Dizziness     staets she was dizzy with a headache this morning but does not have either at this time  also denies any numbness, tingling, or weakness     59-year-old female presents for evaluation  She reports that this morning she woke and had a mild headache  Throughout the course the afternoon she developed mild dizziness described as feeling off balance like the room was moving  She reported the symptoms to her daughter whenever she returned home from work and now presents for evaluation  At this point time she denies any acute symptoms but is noted to have elevated blood pressure  Patient has a history of hypertension reports taking her medication this morning  At no time throughout the course the day has she experience symptoms such as chest pain, shortness of breath, GI symptoms, or focal neurological deficits  Dizziness   Quality:  Imbalance and room spinning  Severity:  Mild  Onset quality:  Gradual  Timing:  Constant  Progression:  Resolved  Chronicity:  New  Relieved by:  Being still  Worsened by: Movement, standing up, turning head and sitting upright  Ineffective treatments:  None tried  Associated symptoms: headaches (Resolved)    Associated symptoms: no blood in stool, no chest pain, no diarrhea, no hearing loss, no nausea, no palpitations, no shortness of breath, no syncope, no tinnitus, no vision changes, no vomiting and no weakness        Prior to Admission Medications   Prescriptions Last Dose Informant Patient Reported? Taking?    DULoxetine (CYMBALTA) 30 mg delayed release capsule Not Taking at Unknown time  No No   Sig: Take 1 capsule (30 mg total) by mouth daily   Patient not taking: Reported on 12/8/2019   RA ASPIRIN EC 81 MG EC tablet 12/8/2019 at 10:00  No Yes   Sig: take 1 tablet by mouth once daily   cyclobenzaprine (FLEXERIL) 5 mg tablet Not Taking at Unknown time  No No   Sig: Take 1 tablet (5 mg total) by mouth 2 (two) times a day   Patient not taking: Reported on 12/8/2019   lidocaine (XYLOCAINE) 5 % ointment Not Taking at Unknown time  No No   Sig: Apply topically as needed for mild pain   Patient not taking: Reported on 9/26/2019   meloxicam (MOBIC) 7 5 mg tablet 12/8/2019 at 10:00  No Yes   Sig: Take 1 tablet (7 5 mg total) by mouth daily   metoprolol tartrate (LOPRESSOR) 100 mg tablet 12/8/2019 at 10:00  No Yes   Sig: Take 1 tablet (100 mg total) by mouth 2 (two) times a day   omega-3-acid ethyl esters (LOVAZA) 1 g capsule 12/8/2019 at 10:00  No Yes   Sig: Take 2 capsules (2 g total) by mouth 2 (two) times a day   polyethylene glycol (GLYCOLAX) powder Not Taking at Unknown time  No No   Sig: Take 17 g by mouth daily   Patient not taking: Reported on 12/8/2019   triamterene-hydrochlorothiazide (MAXZIDE-25) 37 5-25 mg per tablet 12/8/2019 at 10:00  No Yes   Sig: Take 1 tablet by mouth daily      Facility-Administered Medications: None       Past Medical History:   Diagnosis Date    Arthralgia of hip     left    Arthritis     Chronic pain disorder     low back and shoulder    Dyslipidemia     Hyperlipidemia     Hypertension     Neck pain     Pemphigus     Pre-diabetes     Shoulder pain     Vitamin D deficiency        Past Surgical History:   Procedure Laterality Date    CATARACT EXTRACTION Right     COLONOSCOPY      AK COLONOSCOPY FLX DX W/COLLJ SPEC WHEN PFRMD N/A 11/10/2016    Procedure: COLONOSCOPY;  Surgeon: Silvia Delong MD;  Location: [facility]; Service: Gastroenterology     [facility] CATARACT EXTRACAP,INSERT LENS Left 9/27/2018    Procedure: EXTRACAPSULAR CATARACT REMOVAL/INSERTION OF INTRAOCULAR LENS;  Surgeon: Reji Bryan MD;  Location: [address] [place] [address];  Service: Ophthalmology       Family History   Problem Relation Age of Onset    Hyperlipidemia Mother     Hypertension Mother     Heart disease Father      I have reviewed and agree with the history as documented      Social History     Tobacco Use    Smoking status: Never Smoker    Smokeless tobacco: Never Used   Substance Use Topics    Alcohol use: No    Drug use: No        Review of Systems   Constitutional: Negative for appetite change, chills, fatigue and fever  HENT: Negative for hearing loss, postnasal drip, sinus pain, tinnitus and trouble swallowing  Eyes: Negative for redness and itching  Respiratory: Negative for chest tightness, shortness of breath and wheezing  Cardiovascular: Negative for chest pain, palpitations, leg swelling and syncope  Gastrointestinal: Negative for abdominal pain, blood in stool, constipation, diarrhea, nausea and vomiting  Endocrine: Negative  Genitourinary: Negative for difficulty urinating and dysuria  Musculoskeletal: Negative for back pain and myalgias  Skin: Negative for rash  Allergic/Immunologic: Negative  Neurological: Positive for dizziness and headaches (Resolved)  Negative for weakness and numbness  Hematological: Negative  Psychiatric/Behavioral: Negative  Physical Exam  Physical Exam   Constitutional: She is oriented to person, place, and time  She appears well-developed and well-nourished  HENT:   Head: Normocephalic and atraumatic  Nose: Nose normal    Mouth/Throat: Oropharynx is clear and moist  No oropharyngeal exudate  Eyes: Pupils are equal, round, and reactive to light  Conjunctivae and EOM are normal    Neck: Normal range of motion  Neck supple  Cardiovascular: Normal rate, regular rhythm and normal heart sounds  Pulmonary/Chest: Effort normal and breath sounds normal  No respiratory distress  She has no wheezes  Abdominal: Soft  Bowel sounds are normal  There is no tenderness  There is no guarding  Musculoskeletal: She exhibits no edema, tenderness or deformity  Neurological: She is alert and oriented to person, place, and time  No cranial nerve deficit or sensory deficit  She exhibits normal muscle tone  Coordination normal    Skin: Skin is warm and dry  Capillary refill takes less than 2 seconds  No rash noted  Psychiatric: She has a normal mood and affect  Her behavior is normal    Nursing note and vitals reviewed        Vital Signs  ED Triage Vitals [12/08/19 1840]   Temperature Pulse Respirations Blood Pressure SpO2   97 9 °F (36 6 °C) 67 18 (!) 198/108 99 %      Temp Source Heart Rate Source Patient Position - Orthostatic VS BP Location FiO2 (%)   Tympanic Monitor Sitting Left arm --      Pain Score       No Pain           Vitals:    12/08/19 1840 12/08/19 1955 12/08/19 2149   BP: (!) 198/108 152/80 (!) 196/101   Pulse: 67 80 61   Patient Position - Orthostatic VS: Sitting  Lying         Visual Acuity      ED Medications  Medications   iohexol (OMNIPAQUE) 350 MG/ML injection (MULTI-DOSE) 100 mL (100 mL Intravenous Given 12/8/19 2105)       Diagnostic Studies  Results Reviewed     Procedure Component Value Units Date/Time    Comprehensive metabolic panel [[id number]]  (Abnormal) Collected:  12/08/19 2010    Lab Status:  Final result Specimen:  Blood from Arm, Right Updated:  12/08/19 2026     Sodium 138 mmol/L      Potassium 3 9 mmol/L      Chloride 102 mmol/L      CO2 28 mmol/L      ANION GAP 8 mmol/L      BUN 19 mg/dL      Creatinine 0 74 mg/dL      Glucose 115 mg/dL      Calcium 9 7 mg/dL      AST 34 U/L      ALT 25 U/L      Alkaline Phosphatase 55 U/L      Total Protein 7 7 g/dL      Albumin 4 2 g/dL      Total Bilirubin 0 60 mg/dL      eGFR 82 ml/min/1 73sq m     Narrative:       [place] guidelines for Chronic Kidney Disease (CKD):     Stage 1 with normal or high GFR (GFR > 90 mL/min/1 73 square meters)    Stage 2 Mild CKD (GFR = 60-89 mL/min/1 73 square meters)    Stage 3A Moderate CKD (GFR = 45-59 mL/min/1 73 square meters)    Stage 3B Moderate CKD (GFR = 30-44 mL/min/1 73 square meters)    Stage 4 Severe CKD (GFR = 15-29 mL/min/1 73 square meters)    Stage 5 End Stage CKD (GFR <15 mL/min/1 73 square meters)  Note: GFR calculation is accurate only with a steady state creatinine    Troponin I [[id number]]  (Normal) Collected:  12/08/19 1857    Lab Status:  Final result Specimen:  Blood from Arm, Right Updated:  12/08/19 1935     Troponin I <0 01 ng/mL     Narrative:       Hemolysis    Urine Microscopic [[id number]]  (Abnormal) Collected:  12/08/19 1902    Lab Status:  Final result Specimen:  Urine, Clean Catch Updated:  12/08/19 1926     RBC, UA 0-1 /hpf      WBC, UA 0-1 /hpf      Epithelial Cells Moderate /hpf      Bacteria, UA None Seen /hpf     UA (URINE) with reflex to Scope [[id number]]  (Abnormal) Collected:  12/08/19 1902    Lab Status:  Final result Specimen:  Urine, Clean Catch Updated:  12/08/19 1917     Color, UA Straw     Clarity, UA Clear     Specific Gravity, UA 1 010     pH, UA 7 0     Leukocytes, UA 25 0     Nitrite, UA Negative     Protein, UA Negative mg/dl      Glucose, UA Negative mg/dl      Ketones, UA Negative mg/dl      Bilirubin, UA Negative     Blood, UA Negative     UROBILINOGEN UA Negative mg/dL     CBC and differential [[id number]]  (Abnormal) Collected:  12/08/19 1857    Lab Status:  Final result Specimen:  Blood from Arm, Right Updated:  12/08/19 1911     WBC 15 50 Thousand/uL      RBC 4 94 Million/uL      Hemoglobin 15 2 g/dL      Hematocrit 44 9 %      MCV 91 fL      MCH 30 8 pg      MCHC 33 9 g/dL      RDW 13 9 %      MPV 8 8 fL      Platelets 530 Thousands/uL                  CTA head and neck with and without contrast   Final Result by Timo Jacques MD (12/08 2139)      No evidence of basilar thrombus  No evidence of hemodynamic significant stenosis, aneurysm or dissection  Faintly visualized posterior inferior cerebral arteries  Left maxillary sinusitis  Workstation performed: [id number]         CT head without contrast   Final Result by Timo Jaqcues MD (12/08 1946)      Bilateral cerebellar age-indeterminate infarcts/ischemic events        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  Bilateral basal ganglia calcifications  No evidence of acute intracranial hemorrhage  No midline shift  No mass effect  Left maxillary sinusitis  Mildly hyperdense basilar artery could relate to thrombus or dehydration, I have no priors for comparison  Consider follow-up CTA head  Workstation performed: [id number]         XR chest 2 views    (Results Pending)              Procedures  ECG 12 Lead Documentation Only  Date/Time: 12/8/2019 9:17 PM  Performed by: Delores Mccloud DO  Authorized by: Delores Mccloud DO     ECG reviewed by me, the ED Provider: yes    Patient location:  ED  Rate:     ECG rate:  62    ECG rate assessment: normal    Rhythm:     Rhythm: sinus rhythm    Ectopy:     Ectopy: none    QRS:     QRS axis:  Left  Conduction:     Conduction: normal    ST segments:     ST segments:  Normal  T waves:     T waves: non-specific               ED Course                               MDM  Number of Diagnoses or Management Options  CVA (cerebral vascular accident) [facility]):   Dizziness:   Hypertension:   Diagnosis management comments: 8year-old female presents with fluctuating symptoms of headache and dizziness  On arrival to the emergency department, her symptoms had fully resolved although she still was hypertensive  In the ER she had an NIH stroke scale of 0 and had a completely nonfocal exam   CT showed findings of an age indeterminate CVA with recommendation to follow up with a CT a  This was performed  I discussed the case with the radiologist who reports that this could still indicate a subacute infarction in the cerebellum  As there is no prior history of any known strokes, we will admit for further workup and evaluation         Amount and/or Complexity of Data Reviewed  Clinical lab tests: ordered and reviewed  Tests in the radiology section of CPT®: ordered and reviewed  Tests in the medicine section of CPT®: ordered and reviewed  Decide to obtain previous medical records or to obtain history from someone other than the patient: yes  Obtain history from someone other than the patient: yes  Discuss the patient with other providers: yes  Independent visualization of images, tracings, or specimens: yes          Disposition  Final diagnoses:   Dizziness   Hypertension   CVA (cerebral vascular accident) ([facility] [address] )     Time reflects when diagnosis was documented in both MDM as applicable and the Disposition within this note     Time User Action Codes Description Comment    12/8/2019  9:49 PM Osman Breech Add [R42] Dizziness     12/8/2019  9:49 PM Osman Breech Add [I10] Hypertension     12/8/2019  9:49 PM Osman Breech Add [I63 9] CVA (cerebral vascular accident) [facility])       ED Disposition     ED Disposition Condition Date/Time Comment    Admit Stable Sun Dec 8, 2019  9:49 PM Case was discussed with Dr Mariana Shelton and the patient's admission status was agreed to be Admission Status: inpatient status to the service of Dr Linda Spears   Follow-up Information    None         Patient's Medications   Discharge Prescriptions    No medications on file     No discharge procedures on file      ED Provider  Electronically Signed by           Ajith Desai DO  12/08/19 3863

## 2019-12-08 NOTE — PROGRESS NOTES
[address]        NAME: Wong Borja is a 78 y o  female  : 1950    MRN: [id number]  DATE: 2019  TIME: 6:19 PM    Assessment and Plan   Dizziness [R42]  1  Dizziness  Transfer to other facility   2  Hypertension, unspecified type  Transfer to other facility       50-year-old female with 3 hours of new onset dizziness  In exam room, found to be acutely hypertensive  Manual exam 192/98  Patient sent to [facility] for further evaluation  Adult daughter present in exam room and agrees to take her immediately  I explained to them that we do not have ability to evaluate brain or do blood test to determine cause of dizziness, they need to go there to rule out any potentially life-threatening causes  They state they understand and agree  Patient Instructions       Go immediately to ER    Chief Complaint     Chief Complaint   Patient presents with    Dizziness     pt reports dizziness that started this sfternoon  Pt denies all other symptoms  +headache earlier today          History of Present Illness       Dizziness   This is a new problem  Episode onset: today >3 hours ago  The problem occurs constantly  The problem has been waxing and waning  Associated symptoms include headaches  Pertinent negatives include no abdominal pain, chest pain, chills, congestion, coughing, diaphoresis, fatigue, fever, myalgias, nausea, neck pain, rash, vomiting or weakness  Associated symptoms comments: Pt had 3 week hx URI sx which have resolved  No chest pain, SOB, weakness, blurred vision, change of mental status  She has tried nothing for the symptoms  The treatment provided no relief  Review of Systems   Review of Systems   Constitutional: Negative for chills, diaphoresis, fatigue and fever  HENT: Positive for sinus pressure  Negative for congestion, ear pain, rhinorrhea and voice change  Eyes: Negative  Respiratory: Positive for wheezing   Negative for cough, chest tightness and shortness of breath  Cardiovascular: Negative for chest pain and palpitations  Gastrointestinal: Negative for abdominal pain, constipation, diarrhea, nausea and vomiting  Endocrine: Negative  Genitourinary: Negative for dysuria  Musculoskeletal: Negative for back pain, myalgias and neck pain  Skin: Negative for pallor and rash  Allergic/Immunologic: Negative  Neurological: Positive for dizziness, light-headedness and headaches  Negative for syncope and weakness  Hematological: Negative  Psychiatric/Behavioral: Negative            Current Medications       Current Outpatient Medications:     meloxicam (MOBIC) 7 5 mg tablet, Take 1 tablet (7 5 mg total) by mouth daily, Disp: 90 tablet, Rfl: 1    metoprolol tartrate (LOPRESSOR) 100 mg tablet, Take 1 tablet (100 mg total) by mouth 2 (two) times a day, Disp: 180 tablet, Rfl: 1    omega-3-acid ethyl esters (LOVAZA) 1 g capsule, Take 2 capsules (2 g total) by mouth 2 (two) times a day, Disp: 180 capsule, Rfl: 1    RA ASPIRIN EC 81 MG EC tablet, take 1 tablet by mouth once daily, Disp: 90 tablet, Rfl: 1    triamterene-hydrochlorothiazide (MAXZIDE-25) 37 5-25 mg per tablet, Take 1 tablet by mouth daily, Disp: 90 tablet, Rfl: 1    cyclobenzaprine (FLEXERIL) 5 mg tablet, Take 1 tablet (5 mg total) by mouth 2 (two) times a day (Patient not taking: Reported on 12/8/2019), Disp: 60 tablet, Rfl: 2    DULoxetine (CYMBALTA) 30 mg delayed release capsule, Take 1 capsule (30 mg total) by mouth daily (Patient not taking: Reported on 12/8/2019), Disp: 90 capsule, Rfl: 1    lidocaine (XYLOCAINE) 5 % ointment, Apply topically as needed for mild pain (Patient not taking: Reported on 9/26/2019), Disp: 35 44 g, Rfl: 2    polyethylene glycol (GLYCOLAX) powder, Take 17 g by mouth daily (Patient not taking: Reported on 12/8/2019), Disp: 578 g, Rfl: 1    Current Allergies     Allergies as of 12/08/2019    (No Known Allergies)            The following portions of the patient's history were reviewed and updated as appropriate: allergies, current medications, past family history, past medical history, past social history, past surgical history and problem list      Past Medical History:   Diagnosis Date    Arthralgia of hip     left    Arthritis     Chronic pain disorder     low back and shoulder    Dyslipidemia     Hyperlipidemia     Hypertension     Neck pain     Pemphigus     Shoulder pain     Vitamin D deficiency        Past Surgical History:   Procedure Laterality Date    CATARACT EXTRACTION Right     COLONOSCOPY      [place] COLONOSCOPY FLX DX W/COLLJ SPEC WHEN PFRMD N/A 11/10/2016    Procedure: COLONOSCOPY;  Surgeon: Suma Salguero MD;  Location: [facility]; Service: Gastroenterology     [facility],INSERT LENS Left 9/27/2018    Procedure: EXTRACAPSULAR CATARACT REMOVAL/INSERTION OF INTRAOCULAR LENS;  Surgeon: Denise Hurtado MD;  Location: [address] [place] [address];  Service: Ophthalmology       Family History   Problem Relation Age of Onset    Hyperlipidemia Mother     Hypertension Mother     Heart disease Father          Medications have been verified  Objective   BP (!) 197/87   Pulse 57   Temp 98 1 °F (36 7 °C)   Resp 18   Ht 5' 2" (1 575 m)   Wt 62 1 kg (137 lb)   SpO2 97%   BMI 25 06 kg/m²        Physical Exam     Physical Exam   Constitutional: She is oriented to person, place, and time  She appears well-developed and well-nourished  No distress  HENT:   Head: Normocephalic and atraumatic  Eyes: Pupils are equal, round, and reactive to light  Conjunctivae and EOM are normal  Right eye exhibits no discharge  Left eye exhibits no discharge  Neck: Normal range of motion  Neck supple  Cardiovascular: Normal rate, regular rhythm, normal heart sounds and intact distal pulses  Pulmonary/Chest: Effort normal and breath sounds normal  No respiratory distress  She has no wheezes  She has no rales     Lymphadenopathy: She has no cervical adenopathy  Neurological: She is alert and oriented to person, place, and time  She has normal strength  She is not disoriented  She displays no atrophy and no tremor  No cranial nerve deficit or sensory deficit  She exhibits normal muscle tone  She displays a negative Romberg sign  She displays no seizure activity  Coordination and gait normal  GCS eye subscore is 4  GCS verbal subscore is 5  GCS motor subscore is 6  Skin: Skin is warm  Capillary refill takes less than 2 seconds  No rash noted  She is not diaphoretic  Nursing note and vitals reviewed

## 2019-12-09 ENCOUNTER — APPOINTMENT (INPATIENT)
Dept: MRI IMAGING | Facility: HOSPITAL | Age: 69
DRG: 305 | End: 2019-12-09
Payer: COMMERCIAL

## 2019-12-09 ENCOUNTER — APPOINTMENT (INPATIENT)
Dept: NON INVASIVE DIAGNOSTICS | Facility: HOSPITAL | Age: 69
DRG: 305 | End: 2019-12-09
Payer: COMMERCIAL

## 2019-12-09 VITALS
OXYGEN SATURATION: 96 % | TEMPERATURE: 98.3 F | RESPIRATION RATE: 18 BRPM | WEIGHT: 136.91 LBS | HEART RATE: 80 BPM | SYSTOLIC BLOOD PRESSURE: 120 MMHG | BODY MASS INDEX: 25.19 KG/M2 | HEIGHT: 62 IN | DIASTOLIC BLOOD PRESSURE: 71 MMHG

## 2019-12-09 PROBLEM — D72.829 LEUKOCYTOSIS: Status: RESOLVED | Noted: 2019-12-08 | Resolved: 2019-12-09

## 2019-12-09 LAB
ANION GAP SERPL CALCULATED.3IONS-SCNC: 8 MMOL/L (ref 5–14)
BASOPHILS # BLD AUTO: 0.1 THOUSANDS/ΜL (ref 0–0.1)
BASOPHILS NFR BLD AUTO: 1 % (ref 0–1)
BUN SERPL-MCNC: 17 MG/DL (ref 5–25)
CALCIUM SERPL-MCNC: 10 MG/DL (ref 8.4–10.2)
CHLORIDE SERPL-SCNC: 102 MMOL/L (ref 97–108)
CHOLEST SERPL-MCNC: 195 MG/DL
CO2 SERPL-SCNC: 29 MMOL/L (ref 22–30)
CREAT SERPL-MCNC: 0.78 MG/DL (ref 0.6–1.2)
EOSINOPHIL # BLD AUTO: 0.2 THOUSAND/ΜL (ref 0–0.4)
EOSINOPHIL NFR BLD AUTO: 2 % (ref 0–6)
ERYTHROCYTE [DISTWIDTH] IN BLOOD BY AUTOMATED COUNT: 13.3 %
EST. AVERAGE GLUCOSE BLD GHB EST-MCNC: 126 MG/DL
GFR SERPL CREATININE-BSD FRML MDRD: 77 ML/MIN/1.73SQ M
GLUCOSE SERPL-MCNC: 89 MG/DL (ref 70–99)
HBA1C MFR BLD: 6 % (ref 4.2–6.3)
HCT VFR BLD AUTO: 40.8 % (ref 36–46)
HDLC SERPL-MCNC: 48 MG/DL
HGB BLD-MCNC: 13.8 G/DL (ref 12–16)
LDLC SERPL CALC-MCNC: 122 MG/DL
LYMPHOCYTES # BLD AUTO: 4.7 THOUSANDS/ΜL (ref 0.5–4)
LYMPHOCYTES NFR BLD AUTO: 45 % (ref 25–45)
MCH RBC QN AUTO: 31.2 PG (ref 26–34)
MCHC RBC AUTO-ENTMCNC: 33.8 G/DL (ref 31–36)
MCV RBC AUTO: 92 FL (ref 80–100)
MONOCYTES # BLD AUTO: 0.8 THOUSAND/ΜL (ref 0.2–0.9)
MONOCYTES NFR BLD AUTO: 8 % (ref 1–10)
NEUTROPHILS # BLD AUTO: 4.7 THOUSANDS/ΜL (ref 1.8–7.8)
NEUTS SEG NFR BLD AUTO: 45 % (ref 45–65)
PLATELET # BLD AUTO: 258 THOUSANDS/UL (ref 150–450)
PMV BLD AUTO: 8.6 FL (ref 8.9–12.7)
POTASSIUM SERPL-SCNC: 3.6 MMOL/L (ref 3.6–5)
RBC # BLD AUTO: 4.41 MILLION/UL (ref 4–5.2)
SODIUM SERPL-SCNC: 139 MMOL/L (ref 137–147)
TRIGL SERPL-MCNC: 126 MG/DL
WBC # BLD AUTO: 10.5 THOUSAND/UL (ref 4.5–11)

## 2019-12-09 PROCEDURE — 93306 TTE W/DOPPLER COMPLETE: CPT | Performed by: INTERNAL MEDICINE

## 2019-12-09 PROCEDURE — 97166 OT EVAL MOD COMPLEX 45 MIN: CPT

## 2019-12-09 PROCEDURE — G9163 LANG EXPRESS GOAL STATUS: HCPCS

## 2019-12-09 PROCEDURE — 97161 PT EVAL LOW COMPLEX 20 MIN: CPT

## 2019-12-09 PROCEDURE — 80061 LIPID PANEL: CPT | Performed by: FAMILY MEDICINE

## 2019-12-09 PROCEDURE — G9162 LANG EXPRESS CURRENT STATUS: HCPCS

## 2019-12-09 PROCEDURE — 80048 BASIC METABOLIC PNL TOTAL CA: CPT | Performed by: FAMILY MEDICINE

## 2019-12-09 PROCEDURE — G8978 MOBILITY CURRENT STATUS: HCPCS

## 2019-12-09 PROCEDURE — G8980 MOBILITY D/C STATUS: HCPCS

## 2019-12-09 PROCEDURE — 93799 UNLISTED CV SVC/PROCEDURE: CPT

## 2019-12-09 PROCEDURE — 83036 HEMOGLOBIN GLYCOSYLATED A1C: CPT | Performed by: FAMILY MEDICINE

## 2019-12-09 PROCEDURE — 92523 SPEECH SOUND LANG COMPREHEN: CPT

## 2019-12-09 PROCEDURE — 99222 1ST HOSP IP/OBS MODERATE 55: CPT | Performed by: PSYCHIATRY & NEUROLOGY

## 2019-12-09 PROCEDURE — 85025 COMPLETE CBC W/AUTO DIFF WBC: CPT | Performed by: FAMILY MEDICINE

## 2019-12-09 PROCEDURE — G9164 LANG EXPRESS D/C STATUS: HCPCS

## 2019-12-09 PROCEDURE — 93306 TTE W/DOPPLER COMPLETE: CPT

## 2019-12-09 PROCEDURE — 93005 ELECTROCARDIOGRAM TRACING: CPT

## 2019-12-09 PROCEDURE — 99223 1ST HOSP IP/OBS HIGH 75: CPT | Performed by: FAMILY MEDICINE

## 2019-12-09 PROCEDURE — 70551 MRI BRAIN STEM W/O DYE: CPT

## 2019-12-09 PROCEDURE — NC001 PR NO CHARGE: Performed by: PSYCHIATRY & NEUROLOGY

## 2019-12-09 PROCEDURE — G8979 MOBILITY GOAL STATUS: HCPCS

## 2019-12-09 RX ORDER — ATORVASTATIN CALCIUM 40 MG/1
40 TABLET, FILM COATED ORAL EVERY EVENING
Qty: 30 TABLET | Refills: 0 | Status: SHIPPED | OUTPATIENT
Start: 2019-12-09 | End: 2019-12-16 | Stop reason: SDUPTHER

## 2019-12-09 RX ORDER — HYDRALAZINE HYDROCHLORIDE 20 MG/ML
5 INJECTION INTRAMUSCULAR; INTRAVENOUS EVERY 6 HOURS PRN
Status: DISCONTINUED | OUTPATIENT
Start: 2019-12-09 | End: 2019-12-09 | Stop reason: HOSPADM

## 2019-12-09 RX ORDER — LISINOPRIL 2.5 MG/1
5 TABLET ORAL DAILY
Qty: 30 TABLET | Refills: 0 | Status: SHIPPED | OUTPATIENT
Start: 2019-12-09 | End: 2019-12-16

## 2019-12-09 RX ADMIN — ASPIRIN 81 MG: 81 TABLET ORAL at 09:22

## 2019-12-09 RX ADMIN — Medication 1000 MG: at 09:22

## 2019-12-09 RX ADMIN — ATORVASTATIN CALCIUM 40 MG: 40 TABLET, FILM COATED ORAL at 17:52

## 2019-12-09 RX ADMIN — ENOXAPARIN SODIUM 40 MG: 40 INJECTION SUBCUTANEOUS at 09:22

## 2019-12-09 RX ADMIN — Medication 1000 MG: at 17:52

## 2019-12-09 NOTE — PROGRESS NOTES
12/09/19 1200   Spiritual Beliefs/Perceptions   Support Systems Children   Plan of Care   Comments Pt  visited daughter presence, provided pastoral presence

## 2019-12-09 NOTE — ASSESSMENT & PLAN NOTE
WBC 15 50  Asymptomatic, afebrile, no signs of infection   Likely reactive 2/2 to HTN or TIA    - Repeat CBC in AM

## 2019-12-09 NOTE — UTILIZATION REVIEW
Initial Clinical Review    Admission: Date/Time/Statement: Inpatient Admission Orders (From admission, onward)     Ordered        12/08/19 2150  Inpatient Admission (expected length of stay for this patient Order details is greater than two midnights)  Once                   Orders Placed This Encounter   Procedures    Inpatient Admission (expected length of stay for this patient Order details is greater than two midnights)     Standing Status:   Standing     Number of Occurrences:   1     Order Specific Question:   Admitting Physician     Answer:   Lorena Aguilera [[id number]]     Order Specific Question:   Level of Care     Answer:   Med Surg [16]     Order Specific Question:   Estimated length of stay     Answer:   More than 2 Midnights     Order Specific Question:   Certification     Answer:   I certify that inpatient services are medically necessary for this patient for a duration of greater than two midnights  See H&P and MD Progress Notes for additional information about the patient's course of treatment  ED Arrival Information     Expected Arrival Acuity Means of Arrival Escorted By Service Admission Type    12/8/2019 12/8/2019 18:30 Emergent Walk-In Family Member General Medicine Emergency    Arrival Complaint    dizziness        Chief Complaint   Patient presents with    Dizziness     staets she was dizzy with a headache this morning but does not have either at this time  also denies any numbness, tingling, or weakness     Assessment/Plan:  77 yo female presents to ED  for eval - woke with mild HA this am; this afternoon developed  mild dizziness described as feeling off balance like the room was moving  Was referred to ED from [address] due to elevated BP  BP elevated on arrival to ED  Symptoms resolved  CT showed findings of an age indeterminate CVA with recommendation to follow up with a CT a  Admitted to IP for ischemic stroke/TIA r/o  NIH 1 on admission   Consult Neurology, Neuro checks, MRI/MRA, Permissive HTN, prn Lopressor, Continue asa, start statin    12/9 Neuro Eval pending    ECHO  pending  GCS = 15    ED Triage Vitals [12/08/19 1840]   Temperature Pulse Respirations Blood Pressure SpO2   97 9 °F (36 6 °C) 67 18 (!) 198/108 99 %      Temp Source Heart Rate Source Patient Position - Orthostatic VS BP Location FiO2 (%)   Tympanic Monitor Sitting Left arm --      Pain Score       No Pain        Wt Readings from Last 1 Encounters:   12/08/19 62 1 kg (136 lb 14 5 oz)     Additional Vital Signs:   12/09/19 0934  97 1 °F (36 2 °C) 67 18 141/88 96 % None (Room air) Sitting   12/09/19 0734  97 °F (36 1 °C) 62 18 120/79 98 % None (Room air) Sitting   12/09/19 0545  97 7 °F (36 5 °C) 59 18 138/76   Lying   12/09/19 0345  97 5 °F (36 4 °C) 58 19 134/78 96 % None (Room air) Lying   12/09/19 0135  97 4 °F (36 3 °C) 56 19 152/78 98 % None (Room air) Lying   12/09/19 0035  97 8 °F (36 6 °C) 58 18 168/90 99 % None (Room air) Lying   12/08/19 2335  97 6 °F (36 4 °C) 57 18 164/82 98 % None (Room air) Lying   12/08/19 2238  97 4 °F (36 3 °C)  59 20 197/98Abnormal  100 % None (Room air) Sitting   12/08/19 2149   61 16 196/101Abnormal  99 % None (Room air) Lying   12/08/19 1955   80 16 152/80 98 %           Pertinent Labs/Diagnostic Test Results:   Results from last 7 days   Lab Units 12/09/19  0439 12/08/19  1857   WBC Thousand/uL 10 50 15 50*   HEMOGLOBIN g/dL 13 8 15 2   HEMATOCRIT % 40 8 44 9   PLATELETS Thousands/uL 258 304   NEUTROS ABS Thousands/µL 4 70  --    TOTAL NEUT ABS Thousand/uL  --  7 75     Results from last 7 days   Lab Units 12/09/19  0439 12/08/19 2010   SODIUM mmol/L 139 138   POTASSIUM mmol/L 3 6 3 9   CHLORIDE mmol/L 102 102   CO2 mmol/L 29 28   ANION GAP mmol/L 8 8   BUN mg/dL 17 19   CREATININE mg/dL 0 78 0 74   EGFR ml/min/1 73sq m 77 82   CALCIUM mg/dL 10 0 9 7     Results from last 7 days   Lab Units 12/08/19 2010   AST U/L 34   ALT U/L 25   ALK PHOS U/L 55   TOTAL PROTEIN g/dL 7 7 ALBUMIN g/dL 4 2   TOTAL BILIRUBIN mg/dL 0 60     Results from last 7 days   Lab Units 12/09/19  0439 12/08/19 2010   GLUCOSE RANDOM mg/dL 89 115*     Results from last 7 days   Lab Units 12/08/19  1857   TROPONIN I ng/mL <0 01     Results from last 7 days   Lab Units 12/08/19  1902   CLARITY UA  Clear   COLOR UA  Straw   SPEC GRAV UA  1 010   PH UA  7 0   GLUCOSE UA mg/dl Negative   KETONES UA mg/dl Negative   BLOOD UA  Negative   PROTEIN UA mg/dl Negative   NITRITE UA  Negative   BILIRUBIN UA  Negative   UROBILINOGEN UA mg/dL Negative   LEUKOCYTES UA  25 0*   WBC UA /hpf 0-1*   RBC UA /hpf 0-1*   BACTERIA UA /hpf None Seen   EPITHELIAL CELLS WET PREP /hpf Moderate*     12/8 CT Head: Bilateral cerebellar age-indeterminate infarcts/ischemic events  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  Bilateral basal ganglia calcifications  No evidence of acute intracranial hemorrhage  No midline shift  No mass effect  Left maxillary sinusitis  Mildly hyperdense basilar artery could relate to thrombus or dehydration, I have no priors for comparison  Consider follow-up CTA head  12/8 CTA Head & Neck: No evidence of basilar thrombus  No evidence of hemodynamic significant stenosis, aneurysm or dissection  Faintly visualized posterior inferior cerebral arteries  Left maxillary sinusitis  12/8 CXR: No acute cardiopulmonary disease  23/9 MRI: Bilaterally symmetric bilateral cerebellar encephalomalacia  There is primarily white matter volume loss with cortical thinning  Resulting ex vacuo dilatation of the 4th ventricle  No signs of previous hemorrhage  Findings suggest sequela of previous ischemia  Mild white matter change within the cerebral hemispheres suggestive of chronic microangiopathy        ED Treatment:   Medication Administration from 12/08/2019 1821 to 12/08/2019 2233       Date/Time Order Dose Route Action Action by Comments     12/08/2019 2105 iohexol (OMNIPAQUE) 350 MG/ML injection (MULTI-DOSE) 100 mL 100 mL Intravenous Given Martha Munoz         Past Medical History:   Diagnosis Date    Arthralgia of hip     left    Arthritis     Chronic pain disorder     low back and shoulder    Dyslipidemia     Hyperlipidemia     Hypertension     Neck pain     Pemphigus     Pre-diabetes     Shoulder pain     Vitamin D deficiency      Present on Admission:   Cerebellar infarct ([facility] )   Hypertension   Dyslipidemia      Admitting Diagnosis: Dizziness [R42]  Hypertension [I10]  CVA (cerebral vascular accident) ([facility] ) [I63 9]  Age/Sex: 78 y o  female  Admission Orders:  Scheduled Medications:    Medications:  aspirin 81 mg Oral Daily   atorvastatin 40 mg Oral QPM   enoxaparin 40 mg Subcutaneous Daily   fish oil 1,000 mg Oral BID     Continuous IV Infusions:     PRN Meds:  hydrALAZINE 5 mg Intravenous Q6H PRN     TELE  Neuro checks q1h x 4, q2h x 8, q4h  IP CONSULT TO NEUROLOGY  IP CONSULT TO CASE MANAGEMENT  IP CONSULT TO NUTRITION SERVICES  SCD's  ECHO    Network Utilization Review Department  [email or web address]  ATTENTION: Please call with any questions or concerns to [phone] and carefully listen to the prompts so that you are directed to the right person  All voicemails are confidential   Jessica Luong all requests for admission clinical reviews, approved or denied determinations and any other requests to dedicated fax number below belonging to the campus where the patient is receiving treatment   List of dedicated fax numbers for the Facilities:  FACILITY NAME UR FAX NUMBER   ADMISSION DENIALS (Administrative/Medical Necessity) [phone]   [address] (Maternity/NICU/Pediatrics) [phone]   [place] Nava [phone]   Joana [facility] [phone]   [address] [facility] [phone]   [facility] [place] [address] [facility] [phone]   [address] [place] [address] [phone]

## 2019-12-09 NOTE — H&P
History and Physical - Edwina Phillips    Patient Information: Donnetta Schaumann 78 y o  female MRN: [id number]  Unit/Bed#: 7T [facility] 703-02 Encounter: [id number]  Admitting Physician: MARY Small O   PCP: Latesha Issa MD  Date of Admission:  12/08/19    Assessment and Plan    * Cerebellar infarct [facility])  Assessment & Plan  C/o dizziness and headache since mid morning, went to urgent care late afternoon, was found to have a BP of 197/87 and told to go to ER  CT in ER revealed bilateral cerebellar age-indeterminate infarcts/ischemic events, radiologist recommended CTA  No intracranial hemorrhage  CTA: both posterior cerebral arteries arises from the basilar tip  Both arteries demonstrate normal enhancement  Normal posterior communicating arteries  No thrombus seen  Patient states symptoms resolved in the late afternoon, NIH stroke scale 1 on admission, most likely TIA, cannot rule out ischemic stroke  Admit for ischemic stroke/TIA r/o  Consult neurology  MRI/MRA in am  Permissive hypertension, will only treat for SBP > 220   Lopressor 5 mg q8 prn  Permissive hyperglycemia, will only treat for glucose > 180  Head of bed to 30 degrees  Bedside swallow eval, then low cholesterol/sodium diet  Hold BP medications  Continue ASA and Omega 3s  Will start atorvastatin 40 mg daily     Hypertension  Assessment & Plan  BP Readings from Last 1 Encounters:   12/08/19 (!) 197/98     Currently admitted for r/o ischemic stroke  Will hold BP medications for permissive HTN  Will treat with Lopressor 5 mg q8 prn for SBP > 220  Will restart BP medications in 24-48 hours if BP remains > 140/90    Leukocytosis  Assessment & Plan  WBC 15 50  Asymptomatic, afebrile, no signs of infection   Likely reactive 2/2 to HTN or TIA    - Repeat CBC in AM     Dyslipidemia  Assessment & Plan  TChol elevated to 213 as of August  Patient not on statin, no documented history of statin allergy or adverse reaction  Will start Lipitor 40 mg  Will continue Omega 3s      VTE Prophylaxis: Enoxaparin (Lovenox)  Code Status: Level 1 - Full Code  Anticipated Length of Stay:  Patient will be admitted on an Inpatient basis with an anticipated length of stay of  less than 2 midnights  Justification for Hospital Stay: Stroke protocol  Total Time for Visit, including Counseling / Coordination of Care: 45 mins  Greater than 50% of this total time spent on direct patient counseling and coordination of care  Chief Complaint:     Chief Complaint   Patient presents with    Dizziness     staets she was dizzy with a headache this morning but does not have either at this time  also denies any numbness, tingling, or weakness     History of Present Illness:  Patient is Greek speaking only, daughter and MA in the ED were present to facilitate interpretation  Aneta Sosa is a 78 y o  female with past medical history of HTN and dyslipidemia presents with acute onset of dizziness this AM  She also reported a headache this morning  Her daughter brought her to urgent care around 5 pm where she had an elevated BP and told to go to the ED  Reported a recent URTI for the past 3 weeks that has improved  Patient denied any fever, chills, chest pain, palpitations, light headedness, headaches, vision change, abdominal pain, N/V/D, urinary symptoms  In the ED patient still had an elevated BP of 198/108 however all symptoms resolved  NIH scale was 1 for not remembering the month  CT was significant for bilateral cerebellar age-indeterminate infarcts/ischemic event and CTA ruled out any hemorrhage, aneursym or embolus  Neurology on call was contacted via [employer] and agreed with admission plan for MRI/MRA in am and stated it is most likely chronic with no additional recommendations at this time  Patient will be admitted on telemetry overnight and seen by neurology tomorrow       Review of Systems:  Review of Systems   Constitutional: Negative for activity change, appetite change, chills and fever  HENT: Negative for congestion, dental problem, hearing loss, rhinorrhea, sinus pain and sore throat  Eyes: Negative for photophobia, pain and visual disturbance  Respiratory: Negative for cough, chest tightness, shortness of breath and wheezing  Cardiovascular: Negative for chest pain, palpitations and leg swelling  Gastrointestinal: Negative for abdominal pain, blood in stool, constipation, diarrhea, nausea and vomiting  Genitourinary: Negative for difficulty urinating, dysuria, frequency, hematuria and urgency  Musculoskeletal: Negative for arthralgias, back pain, myalgias and neck pain  Skin: Negative for rash  Neurological: Negative for dizziness, weakness, light-headedness, numbness and headaches  Hematological: Negative for adenopathy  Psychiatric/Behavioral: Negative for agitation, behavioral problems, sleep disturbance and suicidal ideas  Past Medical and Surgical History:   Past Medical History:   Diagnosis Date    Arthralgia of hip     left    Arthritis     Chronic pain disorder     low back and shoulder    Dyslipidemia     Hyperlipidemia     Hypertension     Neck pain     Pemphigus     Pre-diabetes     Shoulder pain     Vitamin D deficiency      Past Surgical History:   Procedure Laterality Date    CATARACT EXTRACTION Right     COLONOSCOPY      AR COLONOSCOPY FLX DX W/COLLJ SPEC WHEN PFRMD N/A 11/10/2016    Procedure: COLONOSCOPY;  Surgeon: Eboni Macario MD;  Location: [facility]; Service: Gastroenterology     [facility] CATARACT EXTRACAP,INSERT LENS Left 9/27/2018    Procedure: EXTRACAPSULAR CATARACT REMOVAL/INSERTION OF INTRAOCULAR LENS;  Surgeon: Jeffrey Christianson MD;  Location: [address] [place] [address];  Service: Ophthalmology     Meds/Allergies: Allergies: No Known Allergies  Prior to Admission Medications   Prescriptions Last Dose Informant Patient Reported? Taking?    DULoxetine (CYMBALTA) 30 mg delayed release capsule Not Taking at Unknown time  No No   Sig: Take 1 capsule (30 mg total) by mouth daily   Patient not taking: Reported on 12/8/2019   RA ASPIRIN EC 81 MG EC tablet 12/8/2019 at 10:00  No Yes   Sig: take 1 tablet by mouth once daily   cyclobenzaprine (FLEXERIL) 5 mg tablet Not Taking at Unknown time  No No   Sig: Take 1 tablet (5 mg total) by mouth 2 (two) times a day   Patient not taking: Reported on 12/8/2019   lidocaine (XYLOCAINE) 5 % ointment Not Taking at Unknown time  No No   Sig: Apply topically as needed for mild pain   Patient not taking: Reported on 9/26/2019   meloxicam (MOBIC) 7 5 mg tablet 12/8/2019 at 10:00  No Yes   Sig: Take 1 tablet (7 5 mg total) by mouth daily   metoprolol tartrate (LOPRESSOR) 100 mg tablet 12/8/2019 at 10:00  No Yes   Sig: Take 1 tablet (100 mg total) by mouth 2 (two) times a day   omega-3-acid ethyl esters (LOVAZA) 1 g capsule 12/8/2019 at 10:00  No Yes   Sig: Take 2 capsules (2 g total) by mouth 2 (two) times a day   polyethylene glycol (GLYCOLAX) powder Not Taking at Unknown time  No No   Sig: Take 17 g by mouth daily   Patient not taking: Reported on 12/8/2019   triamterene-hydrochlorothiazide (MAXZIDE-25) 37 5-25 mg per tablet 12/8/2019 at 10:00  No Yes   Sig: Take 1 tablet by mouth daily      Facility-Administered Medications: None     Social History:     Social History     Socioeconomic History    Marital status: Single     Spouse name: Not on file    Number of children: 1    Years of education: Not on file    Highest education level: Not on file   Occupational History    Occupation: homemaker   Social Needs    Financial resource strain: Not on file    Food insecurity:     Worry: Not on file     Inability: Not on file    Transportation needs:     Medical: Not on file     Non-medical: Not on file   Tobacco Use    Smoking status: Never Smoker    Smokeless tobacco: Never Used   Substance and Sexual Activity    Alcohol use: No    Drug use: No    Sexual activity: Not Currently   Lifestyle    Physical activity:     Days per week: Not on file     Minutes per session: Not on file    Stress: Not on file   Relationships    Social connections:     Talks on phone: Not on file     Gets together: Not on file     Attends [employer] service: Not on file     Active member of club or organization: Not on file     Attends meetings of clubs or organizations: Not on file     Relationship status: Not on file    Intimate partner violence:     Fear of current or ex partner: Not on file     Emotionally abused: Not on file     Physically abused: Not on file     Forced sexual activity: Not on file   Other Topics Concern    Not on file   Social History Narrative    Advance directive declined by patient     Lives with adult children     Patient Pre-hospital Living Situation: Independent  Patient Pre-hospital Level of Mobility: Independent  Patient Pre-hospital Diet Restrictions: None    Family History:  Family History   Problem Relation Age of Onset    Hyperlipidemia Mother     Hypertension Mother     Heart disease Father      Physical Exam:   Vitals:   Blood Pressure: (!) 197/98 (12/08/19 2238)  Pulse: 59 (12/08/19 2238)  Temperature: (!) 97 4 °F (36 3 °C) (12/08/19 2238)  Temp Source: Temporal (12/08/19 2238)  Respirations: 20 (12/08/19 2238)  Height: 5' 2 4" (158 5 cm) (12/08/19 2238)  Weight - Scale: 62 1 kg (136 lb 14 5 oz) (12/08/19 2238)  SpO2: 100 % (12/08/19 2238)    Physical Exam   Constitutional: She appears well-developed and well-nourished  No distress  HENT:   Head: Normocephalic and atraumatic  Eyes: Pupils are equal, round, and reactive to light  EOM are normal    Neck: Normal range of motion  Neck supple  Cardiovascular: Normal rate, regular rhythm, normal heart sounds and intact distal pulses  No murmur heard  Pulmonary/Chest: Effort normal and breath sounds normal  No respiratory distress  She has no wheezes  She exhibits no tenderness  Abdominal: Soft   Bowel sounds are normal  She exhibits no distension  There is no tenderness  Musculoskeletal: Normal range of motion  She exhibits no edema or tenderness  Neurological: She is alert  She has normal strength  No sensory deficit  Coordination normal    Oriented to person and place, not time     Skin: Skin is warm and dry  She is not diaphoretic  No erythema  Psychiatric: She has a normal mood and affect  Her behavior is normal    Nursing note and vitals reviewed  NIH Stroke Scale      Interval: Baseline  Time: 11:40 PM  Person Administering Scale: Jomar Remy MD    Administer stroke scale items in the order listed  Record performance in each category after each subscale exam  Do not go back and change scores  Follow directions provided for each exam technique  Scores should reflect what the patient does, not what the clinician thinks the patient can do  The clinician should record answers while administering the exam and work quickly  Except where indicated, the patient should not be coached (i e , repeated requests to patient to make a special effort)  1a  Level of consciousness: 0=alert; keenly responsive   1b  LOC questions:  1=Performs one task correctly   1c  LOC commands: 0=Performs both task correctly   2  Best Gaze: 0=normal   3  Visual: 0=No visual loss   4  Facial Palsy: 0=Normal symmetric movement   5a  Motor left arm: 0=No drift, limb holds 90 (or 45) degrees for full 10 seconds   5b  Motor right arm: 0=No drift, limb holds 90 (or 45) degrees for full 10 seconds   6a  motor left le=No drift, limb holds 90 (or 45) degrees for full 10 seconds   6b  Motor right le=No drift, limb holds 90 (or 45) degrees for full 10 seconds   7  Limb Ataxia: 0=Absent   8  Sensory: 0=Normal; no sensory loss   9  Best Language:  0=No aphasia, normal   10  Dysarthria: 0=Normal articulation   11  Extinction and Inattention: 0=No abnormality   12  Distal motor function: 0=Normal    Total:   1     Lab Results:  I have personally reviewed pertinent reports  Results from last 7 days   Lab Units 12/08/19  1857   WBC Thousand/uL 15 50*   HEMOGLOBIN g/dL 15 2   HEMATOCRIT % 44 9   PLATELETS Thousands/uL 304   LYMPHO PCT % 29   MONO PCT % 3   EOS PCT % 4     Results from last 7 days   Lab Units 12/08/19 2010   POTASSIUM mmol/L 3 9   CHLORIDE mmol/L 102   CO2 mmol/L 28   BUN mg/dL 19   CREATININE mg/dL 0 74   CALCIUM mg/dL 9 7   ALK PHOS U/L 55   ALT U/L 25   AST U/L 34   EGFR ml/min/1 73sq m 82         Results from last 7 days   Lab Units 12/08/19  1857   TROPONIN I ng/mL <0 01                  Results from last 7 days   Lab Units 12/08/19  1902   COLOR UA  Straw   CLARITY UA  Clear   SPEC GRAV UA  1 010   PH UA  7 0   LEUKOCYTES UA  25 0*   NITRITE UA  Negative   GLUCOSE UA mg/dl Negative   KETONES UA mg/dl Negative   BILIRUBIN UA  Negative   BLOOD UA  Negative      Results from last 7 days   Lab Units 12/08/19  1902   RBC UA /hpf 0-1*   WBC UA /hpf 0-1*   EPITHELIAL CELLS WET PREP /hpf Moderate*   BACTERIA UA /hpf None Seen        Imaging: I have personally reviewed pertinent reports  Cta Head And Neck With And Without Contrast    Result Date: 12/8/2019  Narrative: CTA NECK AND BRAIN WITH AND WITHOUT CONTRAST INDICATION: Headache COMPARISON:   None  TECHNIQUE:  Routine CT imaging of the Brain without contrast   Post contrast imaging was performed after administration of iodinated contrast through the neck and brain  Post contrast axial 0 625 mm images timed to opacify the arterial system  3D rendering was performed on an independent workstation  MIP reconstructions performed  Coronal reconstructions were performed of the noncontrast portion of the brain  Radiation dose length product (DLP) for this visit:  353 mGy-cm     This examination, like all CT scans performed in the [facility], was performed utilizing techniques to minimize radiation dose exposure, including the use of iterative reconstruction and automated exposure control  IV Contrast:  100 mL of iohexol (OMNIPAQUE)  IMAGE QUALITY:   Diagnostic FINDINGS: NONCONTRAST BRAIN PARENCHYMA:  Bilateral cerebellar age-indeterminate infarcts/ischemic events  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  Bilateral basal ganglia calcifications  No evidence of acute intracranial hemorrhage  No midline shift  No mass effect  VENTRICLES AND EXTRA-AXIAL SPACES:  Normal for the patient's age  VISUALIZED ORBITS AND PARANASAL SINUSES:  Left maxillary sinus air-fluid level with foci of air suggesting left maxillary sinusitis  CERVICAL VASCULATURE AORTIC ARCH AND GREAT VESSELS:  Normal aortic arch and great vessel origins  Normal visualized subclavian vessels  RIGHT VERTEBRAL ARTERY CERVICAL SEGMENT:  Normal origin  The vessel is normal in caliber throughout the neck  LEFT VERTEBRAL ARTERY CERVICAL SEGMENT:  Normal origin  The vessel is normal in caliber throughout the neck  RIGHT EXTRACRANIAL CAROTID SEGMENT:  Normal caliber common carotid artery  Normal bifurcation and cervical internal carotid artery  No stenosis or dissection  LEFT EXTRACRANIAL CAROTID SEGMENT:  Mild atherosclerotic disease of the distal common carotid artery and proximal cervical internal carotid artery without significant stenosis compared to the more distal ICA  NASCET criteria was used to determine the degree of internal carotid artery diameter stenosis  INTRACRANIAL VASCULATURE INTERNAL CAROTID ARTERIES:  Normal enhancement of the intracranial portions of the internal carotid arteries  Normal ophthalmic artery origins  Normal ICA terminus  ANTERIOR CIRCULATION:  Symmetric A1 segments and anterior cerebral arteries with normal enhancement  Normal anterior communicating artery  MIDDLE CEREBRAL ARTERY CIRCULATION:  M1 segment and middle cerebral artery branches demonstrate normal enhancement bilaterally   DISTAL VERTEBRAL ARTERIES:  Normal distal vertebral arteries  Posterior inferior cerebellar arteries are limited in evaluation/faintly visualized  Normal vertebral basilar junction  BASILAR ARTERY:  Basilar artery is normal in caliber  Normal superior cerebellar arteries  POSTERIOR CEREBRAL ARTERIES: Both posterior cerebral arteries arises from the basilar tip  Both arteries demonstrate normal enhancement  Normal posterior communicating arteries  DURAL VENOUS SINUSES:  Normal  NON VASCULAR ANATOMY BONY STRUCTURES:  No acute osseous abnormality  SOFT TISSUES OF THE NECK:  Normal  THORACIC INLET:  Unremarkable  Impression: No evidence of basilar thrombus  No evidence of hemodynamic significant stenosis, aneurysm or dissection  Faintly visualized posterior inferior cerebral arteries  Left maxillary sinusitis  Workstation performed: [id number]     Ct Head Without Contrast    Result Date: 12/8/2019  Narrative: CT BRAIN - WITHOUT CONTRAST INDICATION:   Dizziness  COMPARISON:  None  TECHNIQUE:  CT examination of the brain was performed  In addition to axial images, coronal 2D reformatted images were created and submitted for interpretation  Radiation dose length product (DLP) for this visit:  702 mGy-cm   This examination, like all CT scans performed in the [facility], was performed utilizing techniques to minimize radiation dose exposure, including the use of iterative reconstruction and automated exposure control  IMAGE QUALITY:  Diagnostic  FINDINGS: PARENCHYMA:  Bilateral cerebellar age-indeterminate infarcts/ischemic events  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  Bilateral basal ganglia calcifications  No evidence of acute intracranial hemorrhage  No midline shift  No mass effect  Mildly hyperdense basilar artery could relate to thrombus  Consider follow-up CTA head  VENTRICLES AND EXTRA-AXIAL SPACES:  Normal for the patient's age   VISUALIZED ORBITS AND PARANASAL SINUSES:  Air-fluid level in the left maxillary sinus suggesting left maxillary sinusitis  CALVARIUM AND EXTRACRANIAL SOFT TISSUES:  Normal      Impression: Bilateral cerebellar age-indeterminate infarcts/ischemic events  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  Bilateral basal ganglia calcifications  No evidence of acute intracranial hemorrhage  No midline shift  No mass effect  Left maxillary sinusitis  Mildly hyperdense basilar artery could relate to thrombus or dehydration, I have no priors for comparison  Consider follow-up CTA head  Workstation performed: [id number]       EKG, Pathology, and Other Studies Reviewed on Admission:   EKG  Result Date: 12/08/19  Impression:  Normal sinus rhythm, 62 bpm  Normal axis, normal MS interval and QRS duration  QTc WNL  No obvious ST segment changes  Non specific T wave abnormalities  When compared to previous EKG on 2/08/18 bradycardia improved and T wave abnormalities are the same  [employer] Records Reviewed: Yes    Entire H&P was discussed with MARY Handley  who agreed to what is noted above    Vane Luevano MD  12/08/19  11:24 PM

## 2019-12-09 NOTE — CONSULTS
Consultation - Neurology   Anna Gordon 78 y o  female MRN: [id number]  Unit/Bed#: 7T [facility] 703-02 Encounter: [id number]    Assessment/Plan   76-year-old female who presented to [facility] on 12/08 with acute onset of dizziness as well as headache (as described below), it was reported at the time of admission all symptoms resolved  CT of the head was completed - showed bilateral cerebral age-indeterminate infarcts/ischemic events, microangiopathy most prominent around the left periventricular frontal parietal lobe, bilateral basal ganglia calcifications, no evidence of acute intracranial hemorrhage  CTA of the head and neck - showed no evidence of basilar thrombus,, no evidence of hemodynamic significant stenosis, aneurysm or dissection, faintly visualized posterior inferior cerebral arteries  MRI of the brain - was completed which showed bilateral symmetric cerebellar encephalomalacia, there is primary white matter volume loss and cortical thinning, resulting in ex vacuo dilatation of the 4th ventricle, no previous hemorrhage, findings suggestive of sequela of previous ischemia  DDX  - TIA - no evidence of new ischemic stroke seen on MRI of the brain, however - evidence of bilateral cerebellar encephalomalacia, HTN encephalopathy, toxic metabolic - no evidence at this time, no evidence to suspect seizure or CNS infectious / inflammatory process  -  Stroke protocol, initiated  -  MRI of brain with out contrast - completed as above  -  CTA of head and neck with and with out contrast - no lvo, as above  -  Echocardiogram - completed please see report    -  Telemetry  -  Check Lipid panel and hemoglobin A1c  -  Secondary stroke / TIA  risk factor modification  -- High dose statin medication - lipitor  -  Continue Antiplatelet - will review with attending plan of care  -  Blood pressure control  -  Keep euvolemic, normothermic  -  Therapies to include PT/OT/ST  -  PMR consultation  -  DVT prevention  -  Frequent neurological check and notify neurology with any changes in neurological condition  -  Continue to monitor for infectious and metabolic derangements and treat as arises  -  No need for EEG at this time    History of Present Illness     Reason for Consult / Principal Problem:  Stroke    HPI: Ashley Mota is a 78 y  o female with past medical history as listed below, patient has a history of arthritis, dyslipidemia, hypertension, pre-diabetes, neck pain, vitamin-D deficiency whom presented to the ED at [facility] - with acute onset of dizziness on 12/08 - day of presentation, as well as headache  His reported the daughter brought her to urgent care, where she had an elevated blood pressure and was told to go to the ED  Blood pressures were elevated in the /87 mmhg, it was reported that all symptoms resolved except for the patient scored an NIH stroke scale for not remembering the month  The patient was admitted on the stroke pathway, she was placed on aspirin and statin  Testing completed as below  CT of the head - showed bilateral cerebral age-indeterminate infarcts/ischemic events, microangiopathy most prominent around the left periventricular frontal parietal lobe, bilateral basal ganglia calcifications, no evidence of acute intracranial hemorrhage  CTA of the head and neck - showed no evidence of basilar thrombus,, no evidence of hemodynamic significant stenosis, aneurysm or dissection, faintly visualized posterior inferior cerebral arteries  MRI of the brain - was completed which showed bilateral symmetric cerebellar encephalomalacia, there is primary white matter volume loss and cortical thinning, resulting in ex vacuo dilatation of the 4th ventricle, no previous hemorrhage, findings suggestive of sequela of previous ischemia      Echo was completed - which revealed a EF 69%,, there is no evidence of PFO/ASD, left atrium size was normal, right atrial size was normal (see report for detail)  Lipid panel showed LDL of 122  Hemogobin A1c pending  Per record review - CRP protein was completed on 8/8, which was less than 3 0  Interpreting the room at time examination - as well as family members  They report no prior neurological history, they deny history of prior stroke, denies history of meningitis or encephalitis, seizure or demyelinating disease process  They report a she is taking aspirin at home  They report yesterday she developed on acute onset of dizziness as if the room was spinning, as well as headache  It was noted in the ED that her blood pressure was elevated, her symptoms improved since admission  They report that she awoke in with a headache - in the morning yesterday, and in the afternoon she developed dizziness  Which resolved upon arrival to the ED  They deny any other neurological symptoms associated with this - in particular, dysphagia, dysarthria, change in mental status, problems with vision, one-sided weakness or ataxia, one-sided paresthesias  Denies any chest pain palpitations shortness of breath nausea or vomiting  Currently she feels fines and is back to her baseline no headache noted  She did have a leukocytosis on arrival, which has resolved  No fever reported, reviewed admission labs in detail as below       Consults    Review of Systems   12 point ros are negative, except for HPI    Historical Information   Past Medical History:   Diagnosis Date    Arthralgia of hip     left    Arthritis     Chronic pain disorder     low back and shoulder    Dyslipidemia     Hyperlipidemia     Hypertension     Neck pain     Pemphigus     Pre-diabetes     Shoulder pain     Vitamin D deficiency      Past Surgical History:   Procedure Laterality Date    CATARACT EXTRACTION Right     COLONOSCOPY      NC COLONOSCOPY FLX DX W/COLLJ SPEC WHEN PFRMD N/A 11/10/2016    Procedure: COLONOSCOPY;  Surgeon: Erin Benítez MD; Location: [facility]; Service: Gastroenterology     [address] CATARACT EXTRACAP,INSERT LENS Left 9/27/2018    Procedure: EXTRACAPSULAR CATARACT REMOVAL/INSERTION OF INTRAOCULAR LENS;  Surgeon: Jimy Pollock MD;  Location: [facility] MAIN OR;  Service: Ophthalmology     Social History   Social History     Substance and Sexual Activity   Alcohol Use Never    Frequency: Never    Binge frequency: Never     Social History     Substance and Sexual Activity   Drug Use No     Social History     Tobacco Use   Smoking Status Never Smoker   Smokeless Tobacco Never Used     Family History:   Family History   Problem Relation Age of Onset    Hyperlipidemia Mother     Hypertension Mother     Heart disease Father      Review of previous medical records was completed - no prior neurological hx noted  Meds/Allergies   all current active meds have been reviewed, current meds:   Current Facility-Administered Medications   Medication Dose Route Frequency    aspirin (ECOTRIN LOW STRENGTH) EC tablet 81 mg  81 mg Oral Daily    atorvastatin (LIPITOR) tablet 40 mg  40 mg Oral QPM    enoxaparin (LOVENOX) subcutaneous injection 40 mg  40 mg Subcutaneous Daily    fish oil capsule 1,000 mg  1,000 mg Oral BID    hydrALAZINE (APRESOLINE) injection 5 mg  5 mg Intravenous Q6H PRN    and PTA meds:   Prior to Admission Medications   Prescriptions Last Dose Informant Patient Reported? Taking?    DULoxetine (CYMBALTA) 30 mg delayed release capsule Not Taking at Unknown time  No No   Sig: Take 1 capsule (30 mg total) by mouth daily   Patient not taking: Reported on 12/8/2019   RA ASPIRIN EC 81 MG EC tablet 12/8/2019 at 10:00  No Yes   Sig: take 1 tablet by mouth once daily   cyclobenzaprine (FLEXERIL) 5 mg tablet Not Taking at Unknown time  No No   Sig: Take 1 tablet (5 mg total) by mouth 2 (two) times a day   Patient not taking: Reported on 12/8/2019   lidocaine (XYLOCAINE) 5 % ointment Not Taking at Unknown time  No No   Sig: Apply topically as needed for mild pain   Patient not taking: Reported on 9/26/2019   meloxicam (MOBIC) 7 5 mg tablet 12/8/2019 at 10:00  No Yes   Sig: Take 1 tablet (7 5 mg total) by mouth daily   metoprolol tartrate (LOPRESSOR) 100 mg tablet 12/8/2019 at 10:00  No Yes   Sig: Take 1 tablet (100 mg total) by mouth 2 (two) times a day   omega-3-acid ethyl esters (LOVAZA) 1 g capsule 12/8/2019 at 10:00  No Yes   Sig: Take 2 capsules (2 g total) by mouth 2 (two) times a day   polyethylene glycol (GLYCOLAX) powder Not Taking at Unknown time  No No   Sig: Take 17 g by mouth daily   Patient not taking: Reported on 12/8/2019   triamterene-hydrochlorothiazide (MAXZIDE-25) 37 5-25 mg per tablet 12/8/2019 at 10:00  No Yes   Sig: Take 1 tablet by mouth daily      Facility-Administered Medications: None     No Known Allergies    Objective   Vitals:Blood pressure 141/88, pulse 67, temperature (!) 97 1 °F (36 2 °C), temperature source Temporal, resp  rate 18, height 5' 2 4" (1 585 m), weight 62 1 kg (136 lb 14 5 oz), SpO2 96 %  ,Body mass index is 24 72 kg/m²  Intake/Output Summary (Last 24 hours) at 12/9/2019 1304  Last data filed at 12/9/2019 0934  Gross per 24 hour   Intake 420 ml   Output    Net 420 ml       Invasive Devices: Invasive Devices     Peripheral Intravenous Line            Peripheral IV 12/08/19 Left Antecubital less than 1 day    Peripheral IV 12/08/19 Right Wrist less than 1 day              Physical Exam  Neurologic Exam    (French interpretor at bedside)-    Vital signs intact  Constitutional - in NAD, cooperative and pleasant, non ill appearing  HEENT - NC/AT, no tongue bite  Cardiac - No murmur noted, rate regular  Lungs - Clear to auscultation bilaterally, no wheezing noted    Abdomen - Soft and non tender, non distended  Extremities - No edema noted  Skin - no rashes noted, no ecchymosis    Neurological    Mental status - the patient is awake alert oriented x 3, with no evidence of aphasia or dysarthria, patient is able to follow simple commands, is able to follow complex commands  No paraphasic errors noted  She is able to recognize objects, she is able to repeat, no reported evidence of dysarthria and aphasia, the patient had nl attn and concentration -    Cranial nerves 2 through 12 are intact - pupils are equal reactive extra movements intact without any evidence of nystagmus gaze preference or palsy, V1 through V3 intact to touch, no obvious facial droop, tongue was midline, visual fields were intact to confrontation, sternocleidomastoid intact bilaterally    Motor - 5/5 upper extremities and lower extremities, without drift, normal tone and bulk    No dense lateralizing motor weakness noted, or sensory, no drift with outstretched hands  No tremor or fixed deficit noted - noted to testing  Rapid movements are equal bilaterally    Sensation - nonfocal to touch, temperature, and vibratory sense, no dense focal neglect noted to testing  Coordination -  no ataxia noted on finger-to-nose or heel-to-shin    Reflexes are equal - 1-2 in uppers and lowers    Toes are equivocal bilaterally  No evidence of tremor  No evidence of seizure activity - observed  No aphasia or dysarthria, no dense lateralizing motor or sensory symptoms, no evidence of ataxia  Lab Results:     Per radiology interpretation -    "  Procedure: Cta Head And Neck With And Without Contrast    Result Date: 12/8/2019  Narrative: CTA NECK AND BRAIN WITH AND WITHOUT CONTRAST INDICATION: Headache COMPARISON:   None  TECHNIQUE:  Routine CT imaging of the Brain without contrast   Post contrast imaging was performed after administration of iodinated contrast through the neck and brain  Post contrast axial 0 625 mm images timed to opacify the arterial system  3D rendering was performed on an independent workstation  MIP reconstructions performed  Coronal reconstructions were performed of the noncontrast portion of the brain    Radiation dose length product (DLP) for this visit:  353 mGy-cm   This examination, like all CT scans performed in the [facility], was performed utilizing techniques to minimize radiation dose exposure, including the use of iterative reconstruction and automated exposure control  IV Contrast:  100 mL of iohexol (OMNIPAQUE)  IMAGE QUALITY:   Diagnostic FINDINGS: NONCONTRAST BRAIN PARENCHYMA:  Bilateral cerebellar age-indeterminate infarcts/ischemic events  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  Bilateral basal ganglia calcifications  No evidence of acute intracranial hemorrhage  No midline shift  No mass effect  VENTRICLES AND EXTRA-AXIAL SPACES:  Normal for the patient's age  VISUALIZED ORBITS AND PARANASAL SINUSES:  Left maxillary sinus air-fluid level with foci of air suggesting left maxillary sinusitis  CERVICAL VASCULATURE AORTIC ARCH AND GREAT VESSELS:  Normal aortic arch and great vessel origins  Normal visualized subclavian vessels  RIGHT VERTEBRAL ARTERY CERVICAL SEGMENT:  Normal origin  The vessel is normal in caliber throughout the neck  LEFT VERTEBRAL ARTERY CERVICAL SEGMENT:  Normal origin  The vessel is normal in caliber throughout the neck  RIGHT EXTRACRANIAL CAROTID SEGMENT:  Normal caliber common carotid artery  Normal bifurcation and cervical internal carotid artery  No stenosis or dissection  LEFT EXTRACRANIAL CAROTID SEGMENT:  Mild atherosclerotic disease of the distal common carotid artery and proximal cervical internal carotid artery without significant stenosis compared to the more distal ICA  NASCET criteria was used to determine the degree of internal carotid artery diameter stenosis  INTRACRANIAL VASCULATURE INTERNAL CAROTID ARTERIES:  Normal enhancement of the intracranial portions of the internal carotid arteries  Normal ophthalmic artery origins  Normal ICA terminus   ANTERIOR CIRCULATION:  Symmetric A1 segments and anterior cerebral arteries with normal enhancement  Normal anterior communicating artery  MIDDLE CEREBRAL ARTERY CIRCULATION:  M1 segment and middle cerebral artery branches demonstrate normal enhancement bilaterally  DISTAL VERTEBRAL ARTERIES:  Normal distal vertebral arteries  Posterior inferior cerebellar arteries are limited in evaluation/faintly visualized  Normal vertebral basilar junction  BASILAR ARTERY:  Basilar artery is normal in caliber  Normal superior cerebellar arteries  POSTERIOR CEREBRAL ARTERIES: Both posterior cerebral arteries arises from the basilar tip  Both arteries demonstrate normal enhancement  Normal posterior communicating arteries  DURAL VENOUS SINUSES:  Normal  NON VASCULAR ANATOMY BONY STRUCTURES:  No acute osseous abnormality  SOFT TISSUES OF THE NECK:  Normal  THORACIC INLET:  Unremarkable  Impression: No evidence of basilar thrombus  No evidence of hemodynamic significant stenosis, aneurysm or dissection  Faintly visualized posterior inferior cerebral arteries  Left maxillary sinusitis  Workstation performed: [id number]     Procedure: Xr Chest 2 Views    Result Date: 12/9/2019  Narrative: CHEST INDICATION:   dizzy  COMPARISON:  None EXAM PERFORMED/VIEWS:  XR CHEST PA & LATERAL FINDINGS: Cardiomediastinal silhouette appears unremarkable  The lungs are clear  No pneumothorax or pleural effusion  Osseous structures appear within normal limits for patient age  Impression: No acute cardiopulmonary disease  Workstation performed: [id number]     Procedure: Ct Head Without Contrast    Result Date: 12/8/2019  Narrative: CT BRAIN - WITHOUT CONTRAST INDICATION:   Dizziness  COMPARISON:  None  TECHNIQUE:  CT examination of the brain was performed  In addition to axial images, coronal 2D reformatted images were created and submitted for interpretation  Radiation dose length product (DLP) for this visit:  702 mGy-cm     This examination, like all CT scans performed in the [facility], was performed utilizing techniques to minimize radiation dose exposure, including the use of iterative reconstruction and automated exposure control  IMAGE QUALITY:  Diagnostic  FINDINGS: PARENCHYMA:  Bilateral cerebellar age-indeterminate infarcts/ischemic events  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  Bilateral basal ganglia calcifications  No evidence of acute intracranial hemorrhage  No midline shift  No mass effect  Mildly hyperdense basilar artery could relate to thrombus  Consider follow-up CTA head  VENTRICLES AND EXTRA-AXIAL SPACES:  Normal for the patient's age  VISUALIZED ORBITS AND PARANASAL SINUSES:  Air-fluid level in the left maxillary sinus suggesting left maxillary sinusitis  CALVARIUM AND EXTRACRANIAL SOFT TISSUES:  Normal      Impression: Bilateral cerebellar age-indeterminate infarcts/ischemic events  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  Bilateral basal ganglia calcifications  No evidence of acute intracranial hemorrhage  No midline shift  No mass effect  Left maxillary sinusitis  Mildly hyperdense basilar artery could relate to thrombus or dehydration, I have no priors for comparison  Consider follow-up CTA head  Workstation performed: [id number]     Procedure: Mri Brain Wo Contrast    Result Date: 12/9/2019  Narrative: MRI BRAIN WITHOUT CONTRAST INDICATION: CVA  COMPARISON:   CT and CT angiography dated 12/8/2019  TECHNIQUE:  Sagittal T1, axial T2, axial FLAIR, axial T1, axial Gradient and axial diffusion imaging  IMAGE QUALITY:  Diagnostic  FINDINGS: BRAIN PARENCHYMA:  There is no discrete mass, mass effect or midline shift  There is no intracranial hemorrhage  There is no evidence of acute infarction and diffusion imaging is unremarkable   Small scattered hyperintensities on T2/FLAIR imaging are noted in the periventricular and subcortical white matter demonstrating an appearance that is statistically most likely to represent mild to moderate microangiopathic change  Normal signal within the brainstem  Cerebellum demonstrates rather extensive encephalomalacia within both cerebellar hemispheres, sparing the vermis  Findings are nonspecific and may represent sequela of previous cerebellar infarcts  VENTRICLES:  No obstructive hydrocephalus  Moderate ex vacuo dilatation of the 4th ventricle  SELLA AND PITUITARY GLAND:  Normal  ORBITS:  Unremarkable orbits  Mucosal thickening of the paranasal sinuses  PARANASAL SINUSES:  Normal  VASCULATURE:  Evaluation of the major intracranial vasculature demonstrates appropriate flow voids  CALVARIUM AND SKULL BASE:  Normal  EXTRACRANIAL SOFT TISSUES:  Normal      Impression: Bilaterally symmetric bilateral cerebellar encephalomalacia  There is primarily white matter volume loss with cortical thinning  Resulting ex vacuo dilatation of the 4th ventricle  No signs of previous hemorrhage  Findings suggest sequela of previous  ischemia  Mild white matter change within the cerebral hemispheres suggestive of chronic microangiopathy  Workstation performed: [id number]   "  Reviewed with attending in detail       Recent Results (from the past 24 hour(s))   CBC and differential    Collection Time: 12/08/19  6:57 PM   Result Value Ref Range    WBC 15 50 (H) 4 50 - 11 00 Thousand/uL    RBC 4 94 4 00 - 5 20 Million/uL    Hemoglobin 15 2 12 0 - 16 0 g/dL    Hematocrit 44 9 36 0 - 46 0 %    MCV 91 80 - 100 fL    MCH 30 8 26 0 - 34 0 pg    MCHC 33 9 31 0 - 36 0 g/dL    RDW 13 9 <15 3 %    MPV 8 8 (L) 8 9 - 12 7 fL    Platelets 998 936 - 211 Thousands/uL   Troponin I    Collection Time: 12/08/19  6:57 PM   Result Value Ref Range    Troponin I <0 01 0 00 - 0 03 ng/mL   Manual Differential (Non Wam)    Collection Time: 12/08/19  6:57 PM   Result Value Ref Range    Segmented % 50 45 - 65 %    Lymphocytes % 29 25 - 45 %    Monocytes % 3 1 - 10 %    Eosinophils, % 4 0 - 6 %    Basophils % 1 0 - 1 %    Atypical Lymphocytes % 13 (H) 0 - 0 %    Neutrophils Absolute 7 75 1 80 - 7 80 Thousand/uL    Lymphocytes Absolute 4 50 (H) 0 50 - 4 00 Thousand/uL    Monocytes Absolute 0 47 0 20 - 0 90 Thousand/uL    Eosinophils Absolute 0 62 (H) 0 00 - 0 40 Thousand/uL    Basophils Absolute 0 16 (H) 0 00 - 0 10 Thousand/uL    Total Counted 100     RBC Morphology Normal     Platelet Estimate Adequate Adequate   UA (URINE) with reflex to Scope    Collection Time: 12/08/19  7:02 PM   Result Value Ref Range    Color, UA Straw Straw, Yellow, Pale Yellow    Clarity, UA Clear Clear, Other    Specific [place], UA 1 010 1 003 - 1 040    pH, UA 7 0 4 5, 5 0, 5 5, 6 0, 6 5, 7 0, 7 5, 8 0    Leukocytes, UA 25 0 (A) Negative    Nitrite, UA Negative Negative    Protein, UA Negative Negative mg/dl    Glucose, UA Negative Negative mg/dl    Ketones, UA Negative Negative mg/dl    Bilirubin, UA Negative Negative    Blood, UA Negative Negative    UROBILINOGEN UA Negative 1 0, Negative mg/dL   Urine Microscopic    Collection Time: 12/08/19  7:02 PM   Result Value Ref Range    RBC, UA 0-1 (A) None Seen, 0-5 /hpf    WBC, UA 0-1 (A) None Seen, 0-5, 5-55, 5-65 /hpf    Epithelial Cells Moderate (A) None Seen, Occasional /hpf    Bacteria, UA None Seen None Seen, Occasional /hpf   Comprehensive metabolic panel    Collection Time: 12/08/19  8:10 PM   Result Value Ref Range    Sodium 138 137 - 147 mmol/L    Potassium 3 9 3 6 - 5 0 mmol/L    Chloride 102 97 - 108 mmol/L    CO2 28 22 - 30 mmol/L    ANION GAP 8 5 - 14 mmol/L    BUN 19 5 - 25 mg/dL    Creatinine 0 74 0 60 - 1 20 mg/dL    Glucose 115 (H) 70 - 99 mg/dL    Calcium 9 7 8 4 - 10 2 mg/dL    AST 34 14 - 36 U/L    ALT 25 9 - 52 U/L    Alkaline Phosphatase 55 43 - 122 U/L    Total Protein 7 7 5 9 - 8 4 g/dL    Albumin 4 2 3 0 - 5 2 g/dL    Total Bilirubin 0 60 <1 30 mg/dL    eGFR 82 >60 ml/min/1 73sq m   Lipid Panel with Direct LDL reflex    Collection Time: 12/09/19  4:39 AM Result Value Ref Range    Cholesterol 195 <200 mg/dL    Triglycerides 126 <150 mg/dL    HDL, Direct 48 >=40 mg/dL    LDL Calculated 122 <130 mg/dL   CBC and differential    Collection Time: 12/09/19  4:39 AM   Result Value Ref Range    WBC 10 50 4 50 - 11 00 Thousand/uL    RBC 4 41 4 00 - 5 20 Million/uL    Hemoglobin 13 8 12 0 - 16 0 g/dL    Hematocrit 40 8 36 0 - 46 0 %    MCV 92 80 - 100 fL    MCH 31 2 26 0 - 34 0 pg    MCHC 33 8 31 0 - 36 0 g/dL    RDW 13 3 <15 3 %    MPV 8 6 (L) 8 9 - 12 7 fL    Platelets 296 796 - 175 Thousands/uL    Neutrophils Relative 45 45 - 65 %    Lymphocytes Relative 45 25 - 45 %    Monocytes Relative 8 1 - 10 %    Eosinophils Relative 2 0 - 6 %    Basophils Relative 1 0 - 1 %    Neutrophils Absolute 4 70 1 80 - 7 80 Thousands/µL    Lymphocytes Absolute 4 70 (H) 0 50 - 4 00 Thousands/µL    Monocytes Absolute 0 80 0 20 - 0 90 Thousand/µL    Eosinophils Absolute 0 20 0 00 - 0 40 Thousand/µL    Basophils Absolute 0 10 0 00 - 0 10 Thousands/µL   Basic metabolic panel    Collection Time: 12/09/19  4:39 AM   Result Value Ref Range    Sodium 139 137 - 147 mmol/L    Potassium 3 6 3 6 - 5 0 mmol/L    Chloride 102 97 - 108 mmol/L    CO2 29 22 - 30 mmol/L    ANION GAP 8 5 - 14 mmol/L    BUN 17 5 - 25 mg/dL    Creatinine 0 78 0 60 - 1 20 mg/dL    Glucose 89 70 - 99 mg/dL    Calcium 10 0 8 4 - 10 2 mg/dL    eGFR 77 >60 ml/min/1 73sq m       Imaging Studies: I have personally reviewed pertinent reports  EKG, Pathology, and Other Studies: I have personally reviewed pertinent reports        Code Status: Level 1 - Full Code

## 2019-12-09 NOTE — PHYSICAL THERAPY NOTE
Physical Therapy Evaluation      Patient's Name: Mason Haro    Admitting Diagnosis  Dizziness [R42]  Hypertension [I10]  CVA (cerebral vascular accident) [facility]) [I63 9]    Problem List  Patient Active Problem List   Diagnosis    Pre-op evaluation    Arthralgia of left hip    Creatinine elevation    Dyslipidemia    Hypertension    Low back pain    Pemphigus    Slow transit constipation    Vitamin D deficiency    Acute pain of left shoulder    Spinal stenosis, lumbar region with neurogenic claudication    Acquired trigger finger    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    Bursitis    Chronic right shoulder pain    Hyperlipidemia    Impaired fasting glucose    Mammogram abnormal    Multiple joint pain    Muscle spasms of neck    Neck pain    Osteoarthrosis    Other symptoms involving skin and integumentary tissues    Cerebellar infarct (HCC)    Leukocytosis       Past Medical History  Past Medical History:   Diagnosis Date    Arthralgia of hip     left    Arthritis     Chronic pain disorder     low back and shoulder    Dyslipidemia     Hyperlipidemia     Hypertension     Neck pain     Pemphigus     Pre-diabetes     Shoulder pain     Vitamin D deficiency        Past Surgical History  Past Surgical History:   Procedure Laterality Date    CATARACT EXTRACTION Right     COLONOSCOPY      VT COLONOSCOPY FLX DX W/COLLJ SPEC WHEN PFRMD N/A 11/10/2016    Procedure: COLONOSCOPY;  Surgeon: Mik Mckay MD;  Location: [facility];   Service: Gastroenterology     [facility] CATARACT EXTRACAP,INSERT LENS Left 9/27/2018    Procedure: EXTRACAPSULAR CATARACT REMOVAL/INSERTION OF INTRAOCULAR LENS;  Surgeon: Leonardo Palmer MD;  Location: [address] [place] [address];  Service: Ophthalmology        12/09/19 1015   Note Type   Note type Eval only   Pain Assessment   Pain Assessment No/denies pain   Home Living   Type of Home House  (5 TONNY with rail)   Home Layout Two level;Bed/bath upstairs   Bathroom Shower/Tub Tub/shower unit  (stands to shower)   Home Equipment   (none)   Additional Comments Dtr present and assists with some translation  Prior Function   Level of [place] Independent with ADLs and functional mobility   Lives With Daughter  (3 grandchildren)   Yessi Leahy Help From   [facility] [place] Johanna  in the last 6 months 0   Comments Dtr works Friday, Saturday, SUnday and sometimes picks of extra work Monday through Thursday  Restrictions/Precautions   Weight Bearing Precautions Per Order No   Braces or Orthoses   (none per patient)   Other Precautions Telemetry; Fall Risk  (language barrier)   General   Family/Caregiver Present Yes  (dtr)   Cognition   Overall Cognitive Status   (pt unable to read or write)   Arousal/Participation Cooperative   Orientation Level Oriented to person;Oriented to place   Following Commands   (language barrier-iinterpretor used)   RUE Assessment   RUE Assessment WFL   LUE Assessment   LUE Assessment WFL   RLE Assessment   RLE Assessment WFL   LLE Assessment   LLE Assessment WFL   Light Touch   RLE Light Touch Grossly intact   LLE Light Touch Grossly intact   Bed Mobility   Rolling R 7  Independent   Rolling L 7  Independent   Supine to Sit 7  Independent   Sit to Supine 7  Independent   Additional Comments Pt did report some dizziness (?lightheadedness) with supine to sitting), no postural sway or LOB noted   Transfers   Sit to Stand 5  Supervision   Additional items Assist x 1   Stand to Sit 5  Supervision   Additional items Assist x 1   Stand pivot 5  Supervision   Additional items Assist x 1   Ambulation/Elevation   Gait pattern   (decreased shari)   Gait Assistance 5  Supervision   Additional items Assist x 1   Assistive Device None   Distance 150 feet   Stair Management Assistance 5  Supervision   Additional items Assist x 1   Stair Management Technique One rail R;Reciprocal   Number of Stairs 12   Balance   Static Sitting Normal   Dynamic Sitting Good   Static Standing Good   Dynamic Standing Good   Ambulatory Good   Higher level balance   (360* turn <4 sec, retrieve obj from floor easily)   Endurance Deficit   Endurance Deficit No   Activity Tolerance   Nurse Made Aware RN cleared patient for evaluation  Assessment   Assessment Pt is 78 y o  female seen for PT evaluation s/p admit to [facility] on 12/8/2019 w/ Cerebellar infarct ([address] )  Per medical chart patient was admitted with new onset dizziness and was found to be acutely hypertensive at urgent care and advised to take her to [facility]  PT consulted to assess pt's functional mobility and d/c needs  Order placed for PT eval and tx, w/ up as tolerated order  Comorbidities affecting pt's physical performance at time of assessment include: HTN, LBP, OA, Spinal stenosis with neurogenic claudication  MRI reveals: Bilaterally symmetric bilateral cerebellar encephalomalacia  There is primarily white matter volume loss with cortical thinning  Resulting ex vacuo dilatation of the 4th ventricle  No signs of previous hemorrhage  Findings suggest sequela of previous ischemia  Mild white matter change within the cerebral hemispheres suggestive of chronic microangiopathy  CT brain reveals Bilateral cerebellar age-indeterminate infarcts/ischemic evenT  On evaluation [employer] interpretor Julieta # [id number] was used to assist with translation  Pt presents with stable BP with position change  She demonstrate stable static and dynamic sitting and standing balance  Her gait was stable without LOB for straight path ambulation, 90, 180 degree turns, and when performing steps with HR  She was able to perform head turns without LOB during ambulation, turn 360 degrees without LOB or c/o dizziness and retrieve object from floor without UE support without difficulty using good body mechanics  No skilled PT appears needed at this time         Barriers to Discharge None   Goals   Patient Goals to go home   Recommendation   Recommendation D/C PT   PT - OK to Discharge Yes   Barthel Index   Feeding 10   Bathing 5   Grooming Score 5   Dressing Score 10   Bladder Score 10   Bowels Score 10   Toilet Use Score 5   Transfers (Bed/Chair) Score 10   Mobility (Level Surface) Score 10   Stairs Score 5   Barthel Index Score 80       Melanie Sahu, PT

## 2019-12-09 NOTE — NURSING NOTE
Patient received into room 503 brought by transportation staff via wheelchair  Admission assessment completed and documented, see flow sheet for details  Pt's daughter at bedside and helped with translation  Patient currently denies CP, Pain, or SOB when asked, no dizziness  Patient oriented to room  Instructed on use of call bell, return demonstration completed, stated understanding  Patient placed on monitor and is currently in sinus rhythm  Patient left with all needs within reach, bed in lowest position and locked, side rails adjusted as appropriate  Will continue to monitor

## 2019-12-09 NOTE — PROGRESS NOTES
12/09/19 1200   Clinical Encounter Type   Visited With Patient and family together   Routine Visit Introduction

## 2019-12-09 NOTE — PLAN OF CARE
Problem: PAIN - ADULT  Goal: Verbalizes/displays adequate comfort level or baseline comfort level  Description  Interventions:  - Encourage patient to monitor pain and request assistance  - Assess pain using appropriate pain scale  - Administer analgesics based on type and severity of pain and evaluate response  - Implement non-pharmacological measures as appropriate and evaluate response  - Consider cultural and social influences on pain and pain management  - Notify physician/advanced practitioner if interventions unsuccessful or patient reports new pain  Outcome: Progressing     Problem: INFECTION - ADULT  Goal: Absence or prevention of progression during hospitalization  Description  INTERVENTIONS:  - Assess and monitor for signs and symptoms of infection  - Monitor lab/diagnostic results  - Monitor all insertion sites, i e  indwelling lines, tubes, and drains  - Monitor endotracheal if appropriate and nasal secretions for changes in amount and color  - [place] appropriate cooling/warming therapies per order  - Administer medications as ordered  - Instruct and encourage patient and family to use good hand hygiene technique  - Identify and instruct in appropriate isolation precautions for identified infection/condition  Outcome: Progressing  Goal: Absence of fever/infection during neutropenic period  Description  INTERVENTIONS:  - Monitor WBC    Outcome: Progressing     Problem: SAFETY ADULT  Goal: Patient will remain free of falls  Description  INTERVENTIONS:  - Assess patient frequently for physical needs  -  Identify cognitive and physical deficits and behaviors that affect risk of falls    -  [place] fall precautions as indicated by assessment   - Educate patient/family on patient safety including physical limitations  - Instruct patient to call for assistance with activity based on assessment  - Modify environment to reduce risk of injury  - Consider OT/PT consult to assist with strengthening/mobility  Outcome: Progressing  Goal: Maintain or return to baseline ADL function  Description  INTERVENTIONS:  -  Assess patient's ability to carry out ADLs; assess patient's baseline for ADL function and identify physical deficits which impact ability to perform ADLs (bathing, care of mouth/teeth, toileting, grooming, dressing, etc )  - Assess/evaluate cause of self-care deficits   - Assess range of motion  - Assess patient's mobility; develop plan if impaired  - Assess patient's need for assistive devices and provide as appropriate  - Encourage maximum independence but intervene and supervise when necessary  - Involve family in performance of ADLs  - Assess for home care needs following discharge   - Consider OT consult to assist with ADL evaluation and planning for discharge  - Provide patient education as appropriate  Outcome: Progressing  Goal: Maintain or return mobility status to optimal level  Description  INTERVENTIONS:  - Assess patient's baseline mobility status (ambulation, transfers, stairs, etc )    - Identify cognitive and physical deficits and behaviors that affect mobility  - Identify mobility aids required to assist with transfers and/or ambulation (gait belt, sit-to-stand, lift, walker, cane, etc )  - [place] fall precautions as indicated by assessment  - Record patient progress and toleration of activity level on Mobility SBAR; progress patient to next Phase/Stage  - Instruct patient to call for assistance with activity based on assessment  - Consider rehabilitation consult to assist with strengthening/weightbearing, etc   Outcome: Progressing     Problem: DISCHARGE PLANNING  Goal: Discharge to home or other facility with appropriate resources  Description  INTERVENTIONS:  - Identify barriers to discharge w/patient and caregiver  - Arrange for needed discharge resources and transportation as appropriate  - Identify discharge learning needs (meds, wound care, etc )  - Arrange for interpretive services to assist at discharge as needed  - Refer to Case Management Department for coordinating discharge planning if the patient needs post-hospital services based on physician/advanced practitioner order or complex needs related to functional status, cognitive ability, or social support system  Outcome: Progressing     Problem: Knowledge Deficit  Goal: Patient/family/caregiver demonstrates understanding of disease process, treatment plan, medications, and discharge instructions  Description  Complete learning assessment and assess knowledge base    Interventions:  - Provide teaching at level of understanding  - Provide teaching via preferred learning methods  Outcome: Progressing

## 2019-12-09 NOTE — DISCHARGE INSTRUCTIONS
Lisinopril (Por la boca)   Trata la hipertensión y la insuficiencia cardíaca  También se administra para reducir el riesgo de muerte después de un ataque cardíaco  Ibis medicamento es un inhibidor de la enzima convertidora de la angiotensina (ECA)  Jorge Luis(s) : Prinivil, Qbrelis, Zestril   Existen muchas otras marcas de [place]  Ibis medicamento no debe ser usado cuando:   Ibis medicamento no es adecuado para todas las personas  No lo use si usted ha tenido to reacción alérgica al lisinopril o a otro medicamento inhibidor ECA, o si usted Jalaine Cosier  Forma de usar ibis medicamento:   Líquido, Tableta  · Amidon lidia medicamentos ryan se le haya indicado  Es probable que sea necesario cambiar garcia dosis varias veces hasta encontrar la que funciona mejor para usted  · Suspensión oral: Mida el líquido oral con Kelsey Kitchen, [place] para uso oral o taza especialmente marcadas para medir medicamentos  · Si olvida to dosis: Si olvida to dosis de garcia medicamento, tómelo lo más pronto posible  Si es david la hora para garcia próxima dosis, espere hasta entonces para soham garcia dosis regular  No use medicamento adicional para reponer la dosis olvidada  · Guarde el medicamento en un recipiente cerrado a temperatura ambiente y alejado del calor, la humedad y la nirmala directa  Medicamentos y [employer] que debe evitar:   Consulte con garcia médico o farmacéutico antes de usar cualquier medicamento, incluyendo los que compra sin receta médica, las vitaminas y los productos herbales  · No use ibis medicamento junto con aliskiren si usted tiene diabetes  · Algunos alimentos y medicamentos pueden afectar el funcionamiento de lisinopril   Informe a garcia médico si está usando cualquiera de los siguientes:   ¨ [place]iskiren, everolimus, litio, sirolimus, temsirolimus  ¨ Otro medicamento para la presión arterial, incluyendo un bloqueador del receptor de la angiotensina (BRA)  ¨ Diurético (incluyendo amilorida, espironolactona, triamtereno)  ¨ Insulina o un medicamento para la diabetes  ¨ Medicamentos analgésicos antiinflamatorios no esteroides, o HAIM, para el dolor o la artritis (incluye aspirina, celecoxib, diclofenac, ibuprofeno, naproxeno)  · Consulte con garcia médico antes de usar cualquier medicamento, suplemento o sustituto de la sal que contenga potasio  Precauciones minor el uso de ibis medicamento:   · No es seguro soham ibis medicamento minor el [facility]  Podría dañar al feto  Dígale a garcia médico de inmediato si usted [place]  · Infórmele a garcia médico si usted está dando de lactar, tiene problemas renales o hepáticos, diabetes, o enfermedad del corazón o de los vasos sanguíneos  · Iibs medicamento puede provocarle los siguientes problemas:  ¨ Angioedema (inflamación severa)  ¨ Problemas renales  ¨ Problemas graves del hígado  · Ibis medicamento puede bajarle demasiado garcia presión arterial, especialmente cuando lo use por primera vez o si usted sufre to deshidratación  Si se siente desvanecer o mareado póngase de pie o siéntese lentamente  · Aunque se sienta mejor, no suspenda el uso de ibis medicamento sin antes consultar con garcia médico  Ibis medicamento no curará garcia presión arterial melly, reddy sí le ayudará a mantenerla en un rango normal  Es probable que necesite soham medicamento para la presión arterial por el prince de garcia carrie  · Dígale a todo médico o dentista encargado de atenderle que usted está usando [employer]  · El médico solicitará exámenes de laboratorio minor las citas de rutina para revisar los efectos de [employer]  Asista a todas lidia citas  · Guarde todos los medicamentos fuera del alcance de los niños  Nunca comparta lidia medicamentos con [employer]    Efectos secundarios que pueden presentarse minor el uso de ibis medicamento:   Consulte inmediatamente con el médico si nota cualquiera de estos efectos secundarios:  · Reacción alérgica: Comezón o ronchas, hinchazón del prema o las melissa, hinchazón u hormigueo en la boca o garganta, opresión en el pecho, dificultad para respirar  · Ampollas, despelleje, o sarpullido matta en la piel  · Cambio en la cantidad y frecuencia con la que orina  · Confusión, debilidad, ritmo cardíaco irregular, dificultad para respirar, entumecimiento u hormigueo en lidia melissa, pies o labios  · Orina oscura o heces pálidas, náuseas, vómitos, falta de apetito, dolor estomacal, coloración amarillenta en la piel u ojos  · Ranjit, escalofríos, dolor de garganta, mariely de cuerpo  · Desvanecimientos, mareos, desmayos  · Dolor de estómago intenso (con o sin náuseas o vómito)  Consulte con el médico si nota los siguientes efectos secundarios menos graves:   · Tos seca  Consulte con el médico si nota otros efectos secundarios que evangelista son causados por wanda medicamento  Llame a wade médico para consultarle [place] puede notificar lidia efectos secundarios al FDA al [phone]  © 2017 [address] [employer] Information is for End User's use only and may not be sold, redistributed or otherwise used for commercial purposes  Esta información es sólo para uso en educación  Wade intención no es darle un consejo médico sobre enfermedades o tratamientos  Colsulte con wade [place] farmacéutico antes de seguir cualquier régimen médico para saber si es seguro y efectivo para usted  Atorvastatina (Por la boca)   Trata los niveles altos de colesterol y triglicéridos en la melissa  Disminuye el riesgo de angina, derrame cerebral, ataque cardíaco o ciertas enfermedades de corazón y problemas con los vasos sanguíneos  Wanda medicamento es to estatina  Jorge Luis(s) : Lipitor   Existen muchas otras marcas de wanda medicamento  Wanda medicamento no debe ser usado cuando:   Wanda medicamento no es adecuado para todas las personas   No lo utilice si usted tiene to reacción alérgica a la atorvastatina, si tiene to enfermedad [place] de lactar  Forma de usar wanda medicamento:   Tableta  · Ponder lidia medicamentos ryan se le haya indicado  Es probable que sea necesario cambiar garcia dosis varias veces hasta encontrar la que funciona mejor para usted  · [place] medicamento a la misma hora todos los días  · [place]ague la tableta entera  No la [place]  · Si olvida to dosis: Ponder la dosis tan pronto ryan lo recuerde  Si faltan menos de 12 horas hasta garcia próxima dosis, omita la dosis [place] y tome la siguiente dosis a la hora habitual  No tome 2 dosis de [employer] de 12 horas  · Priyanka y siga las instrucciones para el paciente que vienen con el medicamento  Hable con garcia médico o farmacéutico si tiene alguna pregunta  · Guarde el medicamento en un recipiente cerrado a temperatura ambiente y alejado del calor, la humedad y la nirmala directa  Medicamentos y [employer] que debe evitar:   Consulte con garcia médico o farmacéutico antes de usar cualquier medicamento, incluyendo los que compra sin receta médica, las vitaminas y los productos herbales  · Algunos medicamentos pueden afectar la eficacia de la atorvastatina  Informe a garcia médico si también está usando píldoras anticonceptivas, boceprevir, cimetidina, colchicina, ciclosporina, digoxina, niacina, rifampicina, espironolactona, telaprevir, medicamentos para tratar Rik Rice, o medicamentos para tratar Alyssa Fujisawa Courtney Hidden  Precauciones minor el uso de wanda medicamento:   · No es seguro soham wanda medicamento minor el [facility]  Podría dañar al feto  Dígale a garcia médico de inmediato si usted [place]  · Informe a garcia médico si usted tiene enfermedad en los riñones, diabetes, dolor o debilidad muscular, problemas con la tiroides, si recientemente ha tenido un derrame cerebral o ha sufrido un ATI (ataque transitorio de isquemia), o si tiene un historial de enfermedad hepática  Informe a garcia médico si usted emi jugo de toronja o si ashley alcohol regularmente    · René Palomino medicamento puede causar problemas musculares lo cual podría llevar a problemas en los riñones  · Dígales a todos los médicos o dentistas que lo atienden que usted está usando wanda medicamento  Es posible que necesite dejar de usarlo si tiene [facility], to [place] problemas médicos graves  · El médico solicitará exámenes de laboratorio minor las citas de rutina para revisar los efectos de Ce[place]  Asista a todas lidia citas  · Guarde todos los medicamentos fuera del alcance de los niños  Nunca comparta lidia medicamentos con [facility]  Efectos secundarios que pueden presentarse minor el uso de wanda medicamento:   Consulte inmediatamente con el médico si nota cualquiera de estos efectos secundarios:  · Reacción alérgica: Comezón o ronchas, hinchazón del prema o las melissa, hinchazón u hormigueo en la boca o garganta, opresión en el pecho, dificultad para respirar  · Ampollas, despellejamiento, sarpullido matta en la piel  · Cambio en la cantidad y frecuencia con la que [facility]  · [place] oscura o heces pálidas, náuseas, vómitos, falta de apetito, dolor estomacal, coloración amarillenta en la piel u ojos  · Ranjit o escalofríos  · Dolor, sensibilidad o debilidad muscular  · Cansancio inusual  Consulte con el médico si nota los siguientes efectos secundarios menos graves:   · Diarrea  · Dolor en las articulaciones  Consulte con el médico si nota otros efectos secundarios que evangelista son causados por wanda medicamento  Llame a wade médico para consultarle [place] puede notificar lidia efectos secundarios al [facility] al [phone]  © 2017 [employer] Information is for End User's use only and may not be sold, redistributed or otherwise used for commercial purposes  Esta información es sólo para uso en educación  Wade intención no es darle un consejo médico sobre enfermedades o tratamientos   Colsulte con wade Vania Teixeira farmacéutico antes de seguir cualquier régimen médico para saber si es seguro y efectivo para usted

## 2019-12-09 NOTE — SPEECH THERAPY NOTE
Speech-Language Evaluation    Impression:  Patient presents with adequate speech and language skills  Daughter assists with translation  She reports that memory appears to be declining at home  She is also concerned re: mothers hearing and is advised to see an audiologist as an OP  Patient passed RN dysphagia screen and has no concerns re: swallow function  Recommendation:  No ST warranted  Eval only, No f/u tx indicated  From H&P: Patient is Kazakh speaking only, daughter and MA in the ED were present to facilitate interpretation     Isabelle Dowd is a 78 y o  female with past medical history of HTN and dyslipidemia presents with acute onset of dizziness this AM  She also reported a headache this morning  Her daughter brought her to urgent care around 5 pm where she had an elevated BP and told to go to the ED  Reported a recent URTI for the past 3 weeks that has improved  Patient denied any fever, chills, chest pain, palpitations, light headedness, headaches, vision change, abdominal pain, N/V/D, urinary symptoms  In the ED patient still had an elevated BP of 198/108 however all symptoms resolved  NIH scale was 1 for not remembering the month  CT was significant for bilateral cerebellar age-indeterminate infarcts/ischemic event and CTA ruled out any hemorrhage, aneursym or embolus  Neurology on call was contacted via [employer] and agreed with admission plan for MRI/MRA in am and stated it is most likely chronic with no additional recommendations at this time  Patient will be admitted on telemetry overnight and seen by neurology tomorrow  Social:  Lives with daugther  Orientation:  [facility]    Auditory Comprehension:  R/L discrim: [facility]  Body part ID: [facility]  One step commands: [facility]  Two step commands: [facility]  Personal y/n ?'s: [facility]    Reading Comprehension:  Not assessed    Oral Expression:  Auto Sequences:   Counting to 10:  [facility]  Conversation: [facility]  Able to make basic needs known: Yes    Written Expression:  Not assessed     Math:  Not assessed     Motor Speech:  No dysarthria or apraxia noted    Cognitive -linguistic skills:  Grossly intact    Short term memory: Daughter with concerns at baseline

## 2019-12-09 NOTE — NURSING NOTE
On initial rounds patient in no apparent distress  Skin warm and dry to touch  Pleasant and cooperative  Denied pain but verbalized understanding of pain scale 0-10 daughter in room translated  Tolerating diet  A &OX4  Neuro assessment within normal limits  All safety maintained  Will continue to monitor

## 2019-12-09 NOTE — PROGRESS NOTES
History and Physical - Edwina Phillips    Patient Information: Dada Bernstein 78 y o  female MRN: [id number]  Unit/Bed#: 7T [facility] 703-02 Encounter: [id number]  Admitting Physician: Mira Velazquez MD  PCP: Toshia Novoa MD  Date of Admission:  12/08/19    Assessment and Plan    * Cerebellar infarct [facility])  Assessment & Plan  C/o dizziness and headache since mid morning, went to urgent care late afternoon, was found to have a BP of 197/87 and told to go to ER  CT in ER revealed bilateral cerebellar age-indeterminate infarcts/ischemic events, radiologist recommended CTA  No intracranial hemorrhage  CTA: both posterior cerebral arteries arises from the basilar tip  Both arteries demonstrate normal enhancement  Normal posterior communicating arteries  No thrombus seen  Patient states symptoms resolved in the late afternoon, NIH stroke scale 1 on admission, most likely TIA, cannot rule out ischemic stroke      Admit for ischemic stroke/TIA r/o  Consult neurology  MRI/MRA in am  Permissive hypertension, will only treat for SBP > 220  Permissive hyperglycemia, will only treat for glucose > 180  Head of bed to 30 degrees  Bedside swallow eval, then low cholesterol diet  Hold BP medications  Continue ASA and Omega 3s  Will start atorvastatin 40 mg daily     Hypertension  Assessment & Plan  BP Readings from Last 1 Encounters:   12/08/19 (!) 197/98     Currently admitted for r/o ischemic stroke  Will hold BP medications for permissive HTN  Will treat with Lopressor 5 mg for SBP > 220  Will restart BP medications in 24-48 hours if BP remains > 140/90    Dyslipidemia  Assessment & Plan  TChol elevated to 213 as of August  Patient not on statin, no documented history of statin allergy or adverse reaction  Will start Lipitor 40 mg  Will continue Omega 3s          VTE Prophylaxis: {VTE Prophylaxis Meds:07838}  Code Status: Level 1 - Full Code  Anticipated Length of Stay:  Patient will be admitted on an Inpatient basis with an anticipated length of stay of  *** than 2 midnights  Justification for Hospital Stay: ***  Total Time for Visit, including Counseling / Coordination of Care: *** mins  Greater than 50% of this total time spent on direct patient counseling and coordination of care  Chief Complaint:     Chief Complaint   Patient presents with    Dizziness     staets she was dizzy with a headache this morning but does not have either at this time  also denies any numbness, tingling, or weakness     History of Present Illness:    Laine Beal is a 78 y o  female who presents with ***    Review of Systems:  Review of Systems    Past Medical and Surgical History:   Past Medical History:   Diagnosis Date    Arthralgia of hip     left    Arthritis     Chronic pain disorder     low back and shoulder    Dyslipidemia     Hyperlipidemia     Hypertension     Neck pain     Pemphigus     Pre-diabetes     Shoulder pain     Vitamin D deficiency      Past Surgical History:   Procedure Laterality Date    CATARACT EXTRACTION Right     COLONOSCOPY      CT COLONOSCOPY FLX DX W/COLLJ SPEC WHEN PFRMD N/A 11/10/2016    Procedure: COLONOSCOPY;  Surgeon: Yang Ybarra MD;  Location: [facility]; Service: Gastroenterology     [facility] CATARACT EXTRACAP,INSERT LENS Left 9/27/2018    Procedure: EXTRACAPSULAR CATARACT REMOVAL/INSERTION OF INTRAOCULAR LENS;  Surgeon: Alberta Harden MD;  Location: [address] [place] [address];  Service: Ophthalmology     Meds/Allergies: Allergies: No Known Allergies  Prior to Admission Medications   Prescriptions Last Dose Informant Patient Reported? Taking?    DULoxetine (CYMBALTA) 30 mg delayed release capsule Not Taking at Unknown time  No No   Sig: Take 1 capsule (30 mg total) by mouth daily   Patient not taking: Reported on 12/8/2019   RA ASPIRIN EC 81 MG EC tablet 12/8/2019 at 10:00  No Yes   Sig: take 1 tablet by mouth once daily   cyclobenzaprine (FLEXERIL) 5 mg tablet Not Taking at Unknown time  No No   Sig: Take 1 tablet (5 mg total) by mouth 2 (two) times a day   Patient not taking: Reported on 12/8/2019   lidocaine (XYLOCAINE) 5 % ointment Not Taking at Unknown time  No No   Sig: Apply topically as needed for mild pain   Patient not taking: Reported on 9/26/2019   meloxicam (MOBIC) 7 5 mg tablet 12/8/2019 at 10:00  No Yes   Sig: Take 1 tablet (7 5 mg total) by mouth daily   metoprolol tartrate (LOPRESSOR) 100 mg tablet 12/8/2019 at 10:00  No Yes   Sig: Take 1 tablet (100 mg total) by mouth 2 (two) times a day   omega-3-acid ethyl esters (LOVAZA) 1 g capsule 12/8/2019 at 10:00  No Yes   Sig: Take 2 capsules (2 g total) by mouth 2 (two) times a day   polyethylene glycol (GLYCOLAX) powder Not Taking at Unknown time  No No   Sig: Take 17 g by mouth daily   Patient not taking: Reported on 12/8/2019   triamterene-hydrochlorothiazide (MAXZIDE-25) 37 5-25 mg per tablet 12/8/2019 at 10:00  No Yes   Sig: Take 1 tablet by mouth daily      Facility-Administered Medications: None     Social History:     Social History     Socioeconomic History    Marital status: Single     Spouse name: Not on file    Number of children: 1    Years of education: Not on file    Highest education level: Not on file   Occupational History    Occupation: homemaker   Social Needs    Financial resource strain: Not on file    Food insecurity:     Worry: Not on file     Inability: Not on file    Transportation needs:     Medical: Not on file     Non-medical: Not on file   Tobacco Use    Smoking status: Never Smoker    Smokeless tobacco: Never Used   Substance and Sexual Activity    Alcohol use: No    Drug use: No    Sexual activity: Not Currently   Lifestyle    Physical activity:     Days per week: Not on file     Minutes per session: Not on file    Stress: Not on file   Relationships    Social connections:     Talks on phone: Not on file     Gets together: Not on file     Attends Yazidi service: Not on file Active member of club or organization: Not on file     Attends meetings of clubs or organizations: Not on file     Relationship status: Not on file    Intimate partner violence:     Fear of current or ex partner: Not on file     Emotionally abused: Not on file     Physically abused: Not on file     Forced sexual activity: Not on file   Other Topics Concern    Not on file   Social History Narrative    Advance directive declined by patient     Lives with adult children     Patient Pre-hospital Living Situation: ***  Patient Pre-hospital Level of Mobility: ***  Patient Pre-hospital Diet Restrictions: ***    Family History:  Family History   Problem Relation Age of Onset    Hyperlipidemia Mother     Hypertension Mother     Heart disease Father      Physical Exam:   Vitals:   Blood Pressure: (!) 197/98 (12/08/19 2238)  Pulse: 59 (12/08/19 2238)  Temperature: (!) 97 4 °F (36 3 °C) (12/08/19 2238)  Temp Source: Temporal (12/08/19 2238)  Respirations: 20 (12/08/19 2238)  Height: 5' 2 4" (158 5 cm) (12/08/19 2238)  Weight - Scale: 62 1 kg (136 lb 14 5 oz) (12/08/19 2238)  SpO2: 100 % (12/08/19 2238)    Physical Exam    Lab Results: {Results Review Statement:57788}  Results from last 7 days   Lab Units 12/08/19  1857   WBC Thousand/uL 15 50*   HEMOGLOBIN g/dL 15 2   HEMATOCRIT % 44 9   PLATELETS Thousands/uL 304   LYMPHO PCT % 29   MONO PCT % 3   EOS PCT % 4     Results from last 7 days   Lab Units 12/08/19 2010   POTASSIUM mmol/L 3 9   CHLORIDE mmol/L 102   CO2 mmol/L 28   BUN mg/dL 19   CREATININE mg/dL 0 74   CALCIUM mg/dL 9 7   ALK PHOS U/L 55   ALT U/L 25   AST U/L 34   EGFR ml/min/1 73sq m 82         Results from last 7 days   Lab Units 12/08/19  1857   TROPONIN I ng/mL <0 01                  Results from last 7 days   Lab Units 12/08/19  1902   COLOR UA  Straw   CLARITY UA  Clear   SPEC GRAV UA  1 010   PH UA  7 0   LEUKOCYTES UA  25 0*   NITRITE UA  Negative   GLUCOSE UA mg/dl Negative   KETONES UA mg/dl Negative   BILIRUBIN UA  Negative   BLOOD UA  Negative      Results from last 7 days   Lab Units 12/08/19  1902   RBC UA /hpf 0-1*   WBC UA /hpf 0-1*   EPITHELIAL CELLS WET PREP /hpf Moderate*   BACTERIA UA /hpf None Seen        Imaging: {Results Review Statement:70443}  Cta Head And Neck With And Without Contrast    Result Date: 12/8/2019  Narrative: CTA NECK AND BRAIN WITH AND WITHOUT CONTRAST INDICATION: Headache COMPARISON:   None  TECHNIQUE:  Routine CT imaging of the Brain without contrast   Post contrast imaging was performed after administration of iodinated contrast through the neck and brain  Post contrast axial 0 625 mm images timed to opacify the arterial system  3D rendering was performed on an independent workstation  MIP reconstructions performed  Coronal reconstructions were performed of the noncontrast portion of the brain  Radiation dose length product (DLP) for this visit:  353 mGy-cm   This examination, like all CT scans performed in the [facility], was performed utilizing techniques to minimize radiation dose exposure, including the use of iterative reconstruction and automated exposure control  IV Contrast:  100 mL of iohexol (OMNIPAQUE)  IMAGE QUALITY:   Diagnostic FINDINGS: NONCONTRAST BRAIN PARENCHYMA:  Bilateral cerebellar age-indeterminate infarcts/ischemic events  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  Bilateral basal ganglia calcifications  No evidence of acute intracranial hemorrhage  No midline shift  No mass effect  VENTRICLES AND EXTRA-AXIAL SPACES:  Normal for the patient's age  VISUALIZED ORBITS AND PARANASAL SINUSES:  Left maxillary sinus air-fluid level with foci of air suggesting left maxillary sinusitis  CERVICAL VASCULATURE AORTIC ARCH AND GREAT VESSELS:  Normal aortic arch and great vessel origins  Normal visualized subclavian vessels  RIGHT VERTEBRAL ARTERY CERVICAL SEGMENT:  Normal origin   The vessel is normal in caliber throughout the neck  LEFT VERTEBRAL ARTERY CERVICAL SEGMENT:  Normal origin  The vessel is normal in caliber throughout the neck  RIGHT EXTRACRANIAL CAROTID SEGMENT:  Normal caliber common carotid artery  Normal bifurcation and cervical internal carotid artery  No stenosis or dissection  LEFT EXTRACRANIAL CAROTID SEGMENT:  Mild atherosclerotic disease of the distal common carotid artery and proximal cervical internal carotid artery without significant stenosis compared to the more distal ICA  NASCET criteria was used to determine the degree of internal carotid artery diameter stenosis  INTRACRANIAL VASCULATURE INTERNAL CAROTID ARTERIES:  Normal enhancement of the intracranial portions of the internal carotid arteries  Normal ophthalmic artery origins  Normal ICA terminus  ANTERIOR CIRCULATION:  Symmetric A1 segments and anterior cerebral arteries with normal enhancement  Normal anterior communicating artery  MIDDLE CEREBRAL ARTERY CIRCULATION:  M1 segment and middle cerebral artery branches demonstrate normal enhancement bilaterally  DISTAL VERTEBRAL ARTERIES:  Normal distal vertebral arteries  Posterior inferior cerebellar arteries are limited in evaluation/faintly visualized  Normal vertebral basilar junction  BASILAR ARTERY:  Basilar artery is normal in caliber  Normal superior cerebellar arteries  POSTERIOR CEREBRAL ARTERIES: Both posterior cerebral arteries arises from the basilar tip  Both arteries demonstrate normal enhancement  Normal posterior communicating arteries  DURAL VENOUS SINUSES:  Normal  NON VASCULAR ANATOMY BONY STRUCTURES:  No acute osseous abnormality  SOFT TISSUES OF THE NECK:  Normal  THORACIC INLET:  Unremarkable  Impression: No evidence of basilar thrombus  No evidence of hemodynamic significant stenosis, aneurysm or dissection  Faintly visualized posterior inferior cerebral arteries  Left maxillary sinusitis   Workstation performed: [id number]     Ct Head Without Contrast    Result Date: 12/8/2019  Narrative: CT BRAIN - WITHOUT CONTRAST INDICATION:   Dizziness  COMPARISON:  None  TECHNIQUE:  CT examination of the brain was performed  In addition to axial images, coronal 2D reformatted images were created and submitted for interpretation  Radiation dose length product (DLP) for this visit:  702 mGy-cm   This examination, like all CT scans performed in the [facility], was performed utilizing techniques to minimize radiation dose exposure, including the use of iterative reconstruction and automated exposure control  IMAGE QUALITY:  Diagnostic  FINDINGS: PARENCHYMA:  Bilateral cerebellar age-indeterminate infarcts/ischemic events  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  Bilateral basal ganglia calcifications  No evidence of acute intracranial hemorrhage  No midline shift  No mass effect  Mildly hyperdense basilar artery could relate to thrombus  Consider follow-up CTA head  VENTRICLES AND EXTRA-AXIAL SPACES:  Normal for the patient's age  VISUALIZED ORBITS AND PARANASAL SINUSES:  Air-fluid level in the left maxillary sinus suggesting left maxillary sinusitis  CALVARIUM AND EXTRACRANIAL SOFT TISSUES:  Normal      Impression: Bilateral cerebellar age-indeterminate infarcts/ischemic events  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  Bilateral basal ganglia calcifications  No evidence of acute intracranial hemorrhage  No midline shift  No mass effect  Left maxillary sinusitis  Mildly hyperdense basilar artery could relate to thrombus or dehydration, I have no priors for comparison  Consider follow-up CTA head   Workstation performed: [id number]       EKG, Pathology, and Other Studies Reviewed on Admission:   EKG  Result Date: 12/08/19  Impression:  ***    Allscripts Records Reviewed: Yes    Entire H&P was discussed with Dr Aarti Willingham who agreed to what is noted above    Alfred Leos Sherif Cruz MD  12/08/19  11:13 PM

## 2019-12-09 NOTE — CONSULTS
Consulted for stroke  PT reported good appetite, no issues with chewing or swallowing, no wt  change concerns  PT tolerating current diet, completing meals with no concerns  Used , reviewed heart  healthy diet information  Reviewed foods high in saturated fat and sodium to avoid, discussed healthy fats, provided tips for flavoring food vs salt  Overall PT reported eats healthy, avoids, grease and salt  Written material provided in Belgian  Continue current diet of low cholesterol, 2gm Na  Will continue to monitor po intake and for any diet questions

## 2019-12-09 NOTE — DISCHARGE SUMMARY
Discharge Summary - Edwina Phillips    Patient Information: Lilliam Razo 78 y o  female MRN: [id number]  Unit/Bed#: 7T [facility] 703-02 Encounter: [id number]    Discharging Physician / Practitioner: Aby Walsh  PCP: Merary Gunderson MD  Admission Date:   Admission Orders (From admission, onward)     Ordered        12/08/19 2150  Inpatient Admission (expected length of stay for this patient Order details is greater than two midnights)  Once                   Discharge Date: 12/09/19    Reason for Admission: CVA Stroke Protocol    Discharge Diagnoses:     Principal Problem:    Cerebellar infarct [facility])  Active Problems:    Dyslipidemia    Hypertension  Resolved Problems:    Leukocytosis      Consultations During Hospital Stay:  · Neurology  · Physical therapy-occupational therapy-speech therapy    Procedures Performed:   · None    Significant Findings / Test Results:   MRI : Bilaterally symmetric bilateral cerebellar encephalomalacia  There is primarily white matter volume loss with cortical thinning  Resulting ex vacuo dilatation of the 4th ventricle  No signs of previous hemorrhage  Findings suggest sequela of previous   ischemia  Mild white matter change within the cerebral hemispheres suggestive of chronic microangiopathy  Incidental Findings:   · Sinusitis    Test Results Pending at Discharge (will require follow up): · None     Outpatient Tests Requested:  · None    Outpatient follow-up Requested:   F/U with PCP   F/U with Neurology     Complications:  None    Hospital Course: Lilliam Razo is a 78 y o  female patient with past medical history of HTN and dyslipidemia who originally presented to the hospital on 12/8/2019 due to acute onset of dizziness and headache    Her daughter brought her to urgent care around 5 pm where she had an elevated BP and told to go to the ED  Reported a recent URTI for the past 3 weeks that has improved   Patient denied any fever, chills, chest pain, palpitations, light headedness, headaches, vision change, abdominal pain, N/V/D, urinary symptoms  In the ED patient still had an elevated BP of 198/108 however all symptoms resolved  NIH scale was 1 for not remembering the month  CT was significant for bilateral cerebellar age-indeterminate infarcts/ischemic event and CTA ruled out any hemorrhage, aneursym or embolus  MRI was completed  Patient was evaluated by Neurology who recommended blood pressure management, statin therapy, aspirin therapy  Patient was also evaluated by PT/OT/ST who recommended patient to be discharged home  Echo was done and was unremarkable  Patient's blood pressure was within normal limits off all of her blood pressure medication, will discontinue beta-blocker as patient is borderline bradycardic  Will start patient on low-dose lisinopril and titrate patient up as needed on outpatient setting  Condition at Discharge: good     Discharge Day Visit / Exam:     Vitals: Blood Pressure: 135/73 (12/09/19 1508)  Pulse: 67 (12/09/19 1508)  Temperature: 97 7 °F (36 5 °C) (12/09/19 1508)  Temp Source: Temporal (12/09/19 1508)  Respirations: 18 (12/09/19 1508)  Height: 5' 2 4" (158 5 cm) (12/08/19 2238)  Weight - Scale: 62 1 kg (136 lb 14 5 oz) (12/08/19 2238)  SpO2: 97 % (12/09/19 1508)     Exam:   Physical Exam   Constitutional: She is oriented to person, place, and time  She appears well-developed and well-nourished  No distress  HENT:   Head: Normocephalic and atraumatic  Right Ear: External ear normal    Left Ear: External ear normal    Nose: Nose normal    Mouth/Throat: Oropharynx is clear and moist    Eyes: Pupils are equal, round, and reactive to light  Conjunctivae and EOM are normal  Right eye exhibits no discharge  Left eye exhibits no discharge  Neck: Normal range of motion  Neck supple  No JVD present  No tracheal deviation present  No thyromegaly present     Cardiovascular: Normal rate, regular rhythm, normal heart sounds and intact distal pulses  Exam reveals no gallop and no friction rub  No murmur heard  Pulmonary/Chest: Effort normal and breath sounds normal  No respiratory distress  She has no wheezes  She exhibits no tenderness  Abdominal: Soft  Bowel sounds are normal  She exhibits no distension  There is no tenderness  There is no rebound  Musculoskeletal: Normal range of motion  She exhibits no edema or tenderness  Neurological: She is alert and oriented to person, place, and time  She has normal reflexes  Coordination normal    Bilateral upper and lower extremity normal strength and sensory  CN 2-12 grossly intact  Skin: Skin is warm and dry  No rash noted  She is not diaphoretic  No erythema  Psychiatric: She has a normal mood and affect  Her behavior is normal  Thought content normal    Nursing note and vitals reviewed  Discussion with Family: Spoke to patient and her daughter    Discharge instructions/Information to patient and family:   See after visit summary for information provided to patient and family  Discharge Medications:  Atorvastatin 40 mg q h s  Asprin 81 mg daily  Lisinopril 5 mg daily  Fish oil 1000 mg b i d  Omega 3 2 capsules b i d  Provisions for Follow-Up Care:  See after visit summary for information related to follow-up care and any pertinent home health orders  Disposition:     Home    For Discharges to [facility]:   · Not Applicable to this Patient - Not Applicable to this Patient    Planned Readmission:  No planned readmission     Discharge Statement:  I spent 45 minutes discharging the patient  This time was spent on the day of discharge  I had direct contact with the patient on the day of discharge  Greater than 50% of the total time was spent examining patient, answering all patient questions, arranging and discussing plan of care with patient as well as directly providing post-discharge instructions    Additional time then spent on discharge activities      ** Please Note: This note has been constructed using a voice recognition system **    Caden Adams MD  12/09/19  4:49 PM

## 2019-12-09 NOTE — OCCUPATIONAL THERAPY NOTE
Occupational Therapy Evaluation  10:18-10:40     Patient Name: Lilliam Razo  ANMCS'Y Date: 12/9/2019  Problem List  Principal Problem:    Cerebellar infarct [facility])  Active Problems:    Dyslipidemia    Hypertension    Leukocytosis    Past Medical History  Past Medical History:   Diagnosis Date    Arthralgia of hip     left    Arthritis     Chronic pain disorder     low back and shoulder    Dyslipidemia     Hyperlipidemia     Hypertension     Neck pain     Pemphigus     Pre-diabetes     Shoulder pain     Vitamin D deficiency      Past Surgical History  Past Surgical History:   Procedure Laterality Date    CATARACT EXTRACTION Right     COLONOSCOPY      [place] COLONOSCOPY FLX DX W/COLLJ SPEC WHEN PFRMD N/A 11/10/2016    Procedure: COLONOSCOPY;  Surgeon: Yandy Strong MD;  [facility]; Service: Gastroenterology     [facility] CAT[facility]TRAC[facility],INSERT LENS Left 9/27/2018    Procedure: EXTRACAPSULAR CATARACT REMOVAL/INSERTION OF INTRAOCULAR LENS;  Surgeon: Vane Maurer MD;  Location: [address] [place] [address];  Service: Ophthalmology           12/09/19 1018   Note Type   Note type Eval only   Restrictions/Precautions   Weight Bearing Precautions Per Order No   Other Precautions Telemetry; Fall Risk   Pain Assessment   Pain Assessment No/denies pain   Pain Score No Pain   Home Living   Type of [address] [place] [address] Two level;Stairs to enter with rails;Bed/bath upstairs  (5 TONNY)   Bathroom Shower/Tub Tub/shower unit   Bathroom Toilet Standard   Prior Function   Level of [place] Independent with ADLs and functional mobility   Lives With Daughter  (and grandchildren (3))   Receives Help From Family   ADL Assistance Independent   IADLs Needs assistance   Falls in the last 6 months 0   Comments Daughter works part-time (Friday-Sunday), otherwise is primarily home  Pt is very independent, does not use AD      Psychosocial   Psychosocial (WDL) WDL   Subjective   Subjective "the movement of the bed made me dizzy" ADL   Eating Assistance 7  Independent   Grooming Assistance 5  Supervision/Setup   Grooming Deficit Wash/dry hands; Wash/dry face  (while standing )   UB Bathing Assistance 7  Independent   LB [address] 6  Modified independent   LB Dressing Deficit Don/doff R sock; Don/doff L sock   Toileting Assistance  6  Modified independent   Bed Mobility   Rolling R 7  Independent   Rolling L 7  Independent   Supine to Sit 7  Independent   Sit to Supine 7  Independent   Transfers   Sit to Stand 5  Supervision   Stand to Sit 5  Supervision   Stand pivot 5  Supervision   Functional Mobility   Functional Mobility 5  Supervision   Additional Comments household distances    Additional items   (no AD)   Balance   Static Sitting Normal   Dynamic Sitting Good   Static Standing Good   Dynamic Standing Good   Ambulatory Good   Activity Tolerance   Activity Tolerance Patient tolerated treatment well   RUE Assessment   RUE Assessment WFL   LUE Assessment   LUE Assessment WFL   Hand Function   Gross Motor Coordination Functional   Fine Motor Coordination Functional   Sensation   Light Touch No apparent deficits   Sharp/Dull No apparent deficits   Stereognosis No apparent deficits   Proprioception   Proprioception No apparent deficits   Vision-Basic Assessment   Visual History Cataracts   Perception   Inattention/Neglect Appears intact   Cognition   Arousal/Participation Alert; Cooperative   Attention Within functional limits   Orientation Level Oriented to person;Oriented to situation;Oriented to place   Memory Within functional limits   Following Commands Follows one step commands without difficulty   Comments Pt primarily Slovenian-speaking,  used however limiting at times  Assessment   Assessment   Pt is a 78 y o  female seen for OT evaluation s/p admit to [facility] on 12/8/2019 w/ Cerebellar infarct ([facility] [address] )    Comorbidities affecting Pt's functional performance at time of assessment include: leukocytosis, hypertension, chronic back pain    Personal factors affecting Pt at time of IE include:steps to enter environment, difficulty performing IADLS , health management  and language barrier  At time of evaluation, Pt able to complete all mobility-related tasks and ADLs at [place], supervision for dynamic standing tasks in bathroom secondary to intermittent dizziness (vitals stable throughout)  Pt demonstrated BUE strength, proprioception, and sensation WNL  Pt reports having recent cataract surgery and still awaiting new glasses, not here today thus with baseline visual impairment  At this time,  Pt appears to be at or near her functional baseline, no further acute OT needs at this time  Educated Pt and daughter re: safety while in the hospital   Recommend discharge home with family support once medically stable          Plan   OT Frequency Eval only   Recommendation   OT Discharge Recommendation Home with family support   Barthel Index   Feeding 10   Bathing 5   Grooming Score 5   Dressing Score 10   Bladder Score 10   Bowels Score 10   Toilet Use Score 5   Transfers (Bed/Chair) Score 10   Mobility (Level Surface) Score 10   Stairs Score 5   Barthel Index Score 80

## 2019-12-09 NOTE — ASSESSMENT & PLAN NOTE
C/o dizziness and headache since mid morning, went to urgent care late afternoon, was found to have a BP of 197/87 and told to go to ER  CT in ER revealed bilateral cerebellar age-indeterminate infarcts/ischemic events, radiologist recommended CTA  No intracranial hemorrhage  CTA: both posterior cerebral arteries arises from the basilar tip  Both arteries demonstrate normal enhancement  Normal posterior communicating arteries  No thrombus seen  Patient states symptoms resolved in the late afternoon, NIH stroke scale 1 on admission, most likely TIA, cannot rule out ischemic stroke  Admit for ischemic stroke/TIA r/o  Consult neurology  MRI/MRA in am  Permissive hypertension, will only treat for SBP > 220   Lopressor 5 mg q8 prn  Permissive hyperglycemia, will only treat for glucose > 180  Head of bed to 30 degrees  Bedside swallow eval, then low cholesterol/sodium diet  Hold BP medications  Continue ASA and Omega 3s  Will start atorvastatin 40 mg daily

## 2019-12-09 NOTE — ASSESSMENT & PLAN NOTE
TChol elevated to 213 as of August  Patient not on statin, no documented history of statin allergy or adverse reaction  Will start Lipitor 40 mg  Will continue Omega 3s

## 2019-12-09 NOTE — NURSING NOTE
Discharge Note  Patient leaves for home in stable condition, afebrile with all personal items after patient and daughter verbalized understanding of discharge instructions  Is accompanied off unit by staff

## 2019-12-09 NOTE — ASSESSMENT & PLAN NOTE
BP Readings from Last 1 Encounters:   12/08/19 (!) 197/98     Currently admitted for r/o ischemic stroke  Will hold BP medications for permissive HTN  Will treat with Lopressor 5 mg q8 prn for SBP > 220  Will restart BP medications in 24-48 hours if BP remains > 140/90

## 2019-12-10 ENCOUNTER — TELEPHONE (OUTPATIENT)
Dept: FAMILY MEDICINE CLINIC | Facility: CLINIC | Age: 69
End: 2019-12-10

## 2019-12-10 ENCOUNTER — TRANSITIONAL CARE MANAGEMENT (OUTPATIENT)
Dept: FAMILY MEDICINE CLINIC | Facility: CLINIC | Age: 69
End: 2019-12-10

## 2019-12-10 LAB
ATRIAL RATE: 55 BPM
ATRIAL RATE: 62 BPM
P AXIS: 50 DEGREES
P AXIS: 58 DEGREES
PR INTERVAL: 162 MS
PR INTERVAL: 170 MS
QRS AXIS: -14 DEGREES
QRS AXIS: -6 DEGREES
QRSD INTERVAL: 72 MS
QRSD INTERVAL: 74 MS
QT INTERVAL: 406 MS
QT INTERVAL: 452 MS
QTC INTERVAL: 412 MS
QTC INTERVAL: 432 MS
T WAVE AXIS: 0 DEGREES
T WAVE AXIS: 11 DEGREES
VENTRICULAR RATE: 55 BPM
VENTRICULAR RATE: 62 BPM

## 2019-12-10 PROCEDURE — 93010 ELECTROCARDIOGRAM REPORT: CPT | Performed by: INTERNAL MEDICINE

## 2019-12-10 NOTE — TELEPHONE ENCOUNTER
As per her discharge instruction she needs to take 5 mg of Lisinopril, so take 2 of the 2 5 mg tablets

## 2019-12-10 NOTE — UTILIZATION REVIEW
Notification of Discharge  This is a Notification of Discharge from our facility [address]  Please be advised that this patient has been discharge from our facility  Below you will find the admission and discharge date and time including the patients disposition  PRESENTATION DATE: 12/8/2019  6:34 PM  OBS ADMISSION DATE:   IP ADMISSION DATE: 12/8/19 2150   DISCHARGE DATE: 12/9/2019  6:43 PM  DISPOSITION: Home/Self Care Home/Self Care   Admission Orders listed below:  Admission Orders (From admission, onward)     Ordered        12/08/19 2150  Inpatient Admission (expected length of stay for this patient Order details is greater than two midnights)  Once                   Please contact the UR Department if additional information is required to close this patient's authorization/case  [address] [facility] Utilization Review Department  Main: [phone] x carefully listen to the prompts  All voicemails are confidential   [email or web address]  Send all requests for admission clinical reviews, approved or denied determinations and any other requests to dedicated fax number below belonging to the campus where the patient is receiving treatment   List of dedicated fax numbers:  [address] DENIALS (Administrative/Medical Necessity) [phone]   [address]  (Maternity/NICU/Pediatrics) [phone]   [facility] [phone]   Francheska Peek [phone]   [facility] [phone]   145 [facility] [phone]   Martha Butter [phone]   [facility] [phone]   Jayjay Cooley [address] 1000 [facility] [phone]

## 2019-12-11 NOTE — UTILIZATION REVIEW
Notification of Discharge  This is a Notification of Discharge from our facility [address]  Please be advised that this patient has been discharge from our facility  Below you will find the admission and discharge date and time including the patients disposition  PRESENTATION DATE: 12/8/2019  6:34 PM    IP ADMISSION DATE: 12/8/19 2150   DISCHARGE DATE: 12/9/2019  6:43 PM  DISPOSITION: Home/Self Care Home/Self Care   Admission Orders listed below:  Admission Orders (From admission, onward)     Ordered        12/08/19 2150  Inpatient Admission (expected length of stay for this patient Order details is greater than two midnights)  Once                   Please contact the UR Department if additional information is required to close this patient's authorization/case  Cori Bee  Network Utilization Review Department  Main: [phone] x carefully listen to the prompts  All voicemails are confidential   [email or web address]  Send all requests for admission clinical reviews, approved or denied determinations and any other requests to dedicated fax number below belonging to the campus where the patient is receiving treatment   List of dedicated fax numbers:  [address] DENIALS (Administrative/Medical Necessity) [phone]   [address]  (Maternity/NICU/Pediatrics) [phone]   [facility] [phone]   Faisal Nicole [phone]   Edil Ingram [phone]   [address] [place] [address] [phone]   [facility] [phone]   Jamee White [phone]   Leonor Tavera [address] [phone]

## 2019-12-16 ENCOUNTER — OFFICE VISIT (OUTPATIENT)
Dept: FAMILY MEDICINE CLINIC | Facility: CLINIC | Age: 69
End: 2019-12-16

## 2019-12-16 VITALS
TEMPERATURE: 98.4 F | BODY MASS INDEX: 25.4 KG/M2 | SYSTOLIC BLOOD PRESSURE: 138 MMHG | OXYGEN SATURATION: 96 % | DIASTOLIC BLOOD PRESSURE: 80 MMHG | HEIGHT: 62 IN | HEART RATE: 76 BPM | RESPIRATION RATE: 18 BRPM | WEIGHT: 138 LBS

## 2019-12-16 DIAGNOSIS — E78.5 DYSLIPIDEMIA: ICD-10-CM

## 2019-12-16 DIAGNOSIS — R60.0 LOWER EXTREMITY EDEMA: ICD-10-CM

## 2019-12-16 DIAGNOSIS — I63.9 CVA (CEREBRAL VASCULAR ACCIDENT) (HCC): ICD-10-CM

## 2019-12-16 DIAGNOSIS — I63.9 CEREBELLAR INFARCT (HCC): ICD-10-CM

## 2019-12-16 DIAGNOSIS — I10 ESSENTIAL HYPERTENSION: ICD-10-CM

## 2019-12-16 DIAGNOSIS — R73.01 IMPAIRED FASTING GLUCOSE: Primary | ICD-10-CM

## 2019-12-16 PROCEDURE — 99496 TRANSJ CARE MGMT HIGH F2F 7D: CPT | Performed by: FAMILY MEDICINE

## 2019-12-16 PROCEDURE — 1160F RVW MEDS BY RX/DR IN RCRD: CPT | Performed by: FAMILY MEDICINE

## 2019-12-16 RX ORDER — LISINOPRIL 5 MG/1
5 TABLET ORAL DAILY
Qty: 90 TABLET | Refills: 1 | Status: SHIPPED | OUTPATIENT
Start: 2019-12-16 | End: 2020-04-16 | Stop reason: SDUPTHER

## 2019-12-16 RX ORDER — FUROSEMIDE 20 MG/1
10 TABLET ORAL DAILY
Qty: 5 TABLET | Refills: 0 | Status: SHIPPED | OUTPATIENT
Start: 2019-12-16

## 2019-12-16 RX ORDER — OMEGA-3-ACID ETHYL ESTERS 1 G/1
2 CAPSULE, LIQUID FILLED ORAL 2 TIMES DAILY
Qty: 180 CAPSULE | Refills: 1 | Status: SHIPPED | OUTPATIENT
Start: 2019-12-16 | End: 2020-06-16 | Stop reason: SDUPTHER

## 2019-12-16 RX ORDER — ATORVASTATIN CALCIUM 40 MG/1
40 TABLET, FILM COATED ORAL EVERY EVENING
Qty: 90 TABLET | Refills: 1 | Status: SHIPPED | OUTPATIENT
Start: 2019-12-16 | End: 2020-04-16 | Stop reason: SDUPTHER

## 2019-12-16 NOTE — ASSESSMENT & PLAN NOTE
Given BP's at home, will not increase Lisinopril at this time  Continue to keep track of BP   Given mild LE edema Furosemide 10 mg daily for 5 days

## 2019-12-16 NOTE — ASSESSMENT & PLAN NOTE
Patient's daughter reports home BP in the 120 to 130 over 70 range  Continue Lisinopril 5 mg daily, Atorvastatin 40 mg Lovaza and Aspirin

## 2019-12-19 ENCOUNTER — OFFICE VISIT (OUTPATIENT)
Dept: FAMILY MEDICINE CLINIC | Facility: CLINIC | Age: 69
End: 2019-12-19

## 2019-12-19 VITALS
BODY MASS INDEX: 24.58 KG/M2 | DIASTOLIC BLOOD PRESSURE: 70 MMHG | WEIGHT: 133.56 LBS | RESPIRATION RATE: 18 BRPM | TEMPERATURE: 97.8 F | OXYGEN SATURATION: 97 % | SYSTOLIC BLOOD PRESSURE: 140 MMHG | HEART RATE: 87 BPM | HEIGHT: 62 IN

## 2019-12-19 DIAGNOSIS — L85.3 DRY SKIN: ICD-10-CM

## 2019-12-19 DIAGNOSIS — Z00.00 ENCOUNTER FOR MEDICARE ANNUAL WELLNESS EXAM: Primary | ICD-10-CM

## 2019-12-19 DIAGNOSIS — Z11.59 ENCOUNTER FOR HEPATITIS C SCREENING TEST FOR LOW RISK PATIENT: ICD-10-CM

## 2019-12-19 PROCEDURE — G0439 PPPS, SUBSEQ VISIT: HCPCS | Performed by: FAMILY MEDICINE

## 2019-12-19 PROCEDURE — 1170F FXNL STATUS ASSESSED: CPT | Performed by: FAMILY MEDICINE

## 2019-12-19 PROCEDURE — 3725F SCREEN DEPRESSION PERFORMED: CPT | Performed by: FAMILY MEDICINE

## 2019-12-19 PROCEDURE — 1125F AMNT PAIN NOTED PAIN PRSNT: CPT | Performed by: FAMILY MEDICINE

## 2019-12-19 PROCEDURE — 1036F TOBACCO NON-USER: CPT | Performed by: FAMILY MEDICINE

## 2019-12-19 RX ORDER — AMMONIUM LACTATE 12 G/100G
LOTION TOPICAL 2 TIMES DAILY PRN
Qty: 400 G | Refills: 0 | Status: SHIPPED | OUTPATIENT
Start: 2019-12-19 | End: 2020-03-16

## 2019-12-19 NOTE — PROGRESS NOTES
Assessment and Plan:     Problem List Items Addressed This Visit     None           Preventive health issues were discussed with patient, and age appropriate screening tests were ordered as noted in patient's After Visit Summary  Personalized health advice and appropriate referrals for health education or preventive services given if needed, as noted in patient's After Visit Summary  History of Present Illness:     Patient presents for Welcome to Medicare visit  Patient Care Team:  Liang Anderosn MD as PCP - MD Suma Deng MD as Endoscopist     Review of Systems:     Review of Systems   Skin:        Dry skin   All other systems reviewed and are negative       Problem List:     Patient Active Problem List   Diagnosis    Pre-op evaluation    Arthralgia of left hip    Creatinine elevation    Dyslipidemia    Hypertension    Low back pain    Pemphigus    Slow transit constipation    Vitamin D deficiency    Acute pain of left shoulder    Spinal stenosis, lumbar region with neurogenic claudication    Acquired trigger finger    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    Bursitis    Chronic right shoulder pain    Hyperlipidemia    Impaired fasting glucose    Mammogram abnormal    Multiple joint pain    Muscle spasms of neck    Neck pain    Osteoarthrosis    Other symptoms involving skin and integumentary tissues    Cerebellar infarct [facility])      Past Medical and Surgical History:     Past Medical History:   Diagnosis Date    Arthralgia of hip     left    Arthritis     Chronic pain disorder     low back and shoulder    Dyslipidemia     Hyperlipidemia     Hypertension     Neck pain     Pemphigus     Pre-diabetes     Shoulder pain     Vitamin D deficiency      Past Surgical History:   Procedure Laterality Date    CATARACT EXTRACTION Right     COLONOSCOPY      [place] COLONOSCOPY FLX DX W/COLLJ SPEC WHEN PFRMD N/A 11/10/2016    Procedure: COLONOSCOPY;  Surgeon: Shine Harp MD;  [facility];   Service: Gastroenterology     [address] CATARACT EXTRACAP,INSERT LENS Left 9/27/2018    Procedure: EXTRACAPSULAR CATARACT REMOVAL/INSERTION OF INTRAOCULAR LENS;  Surgeon: Jimy Pollock MD;  [facility] MA[facility];  Service: Ophthalmology      Family History:     Family History   Problem Relation Age of Onset    Hyperlipidemia Mother     Hypertension Mother     Heart disease Father       Social History:     Social History     Socioeconomic History    Marital status: Single     Spouse name: None    Number of children: 1    Years of education: None    Highest education level: None   Occupational History    Occupation: homemaker   Social Needs    Financial resource strain: None    Food insecurity:     Worry: None     Inability: None    Transportation needs:     Medical: None     Non-medical: None   Tobacco Use    Smoking status: Never Smoker    Smokeless tobacco: Never Used   Substance and Sexual Activity    Alcohol use: Never     Frequency: Never     Binge frequency: Never    Drug use: No    Sexual activity: Not Currently   Lifestyle    Physical activity:     Days per week: None     Minutes per session: None    Stress: None   Relationships    Social connections:     Talks on phone: None     Gets together: None     Attends Islam service: None     Active member of club or organization: None     Attends meetings of clubs or organizations: None     Relationship status: None    Intimate partner violence:     Fear of current or ex partner: None     Emotionally abused: None     Physically abused: None     Forced sexual activity: None   Other Topics Concern    None   Social History Narrative    Advance directive declined by patient     Lives with adult children      Medications and Allergies:     Current Outpatient Medications   Medication Sig Dispense Refill    atorvastatin (LIPITOR) 40 mg tablet Take 1 tablet (40 mg total) by mouth every evening 90 tablet 1    furosemide (LASIX) 20 mg tablet Take 0 5 tablets (10 mg total) by mouth daily 5 tablet 0    lisinopril (ZESTRIL) 5 mg tablet Take 1 tablet (5 mg total) by mouth daily 90 tablet 1    metFORMIN (GLUCOPHAGE) 500 mg tablet Take 1 tablet (500 mg total) by mouth daily with breakfast 90 tablet 1    omega-3-acid ethyl esters (LOVAZA) 1 g capsule Take 2 capsules (2 g total) by mouth 2 (two) times a day 180 capsule 1    RA ASPIRIN EC 81 MG EC tablet take 1 tablet by mouth once daily 90 tablet 1     No current facility-administered medications for this visit  No Known Allergies   Immunizations:     Immunization History   Administered Date(s) Administered    INFLUENZA 10/17/2006    Influenza Split High Dose Preservative Free IM 02/06/2017, 12/04/2017    Influenza TIV (IM) 11/19/2014    Influenza, high dose seasonal 0 5 mL 12/20/2018, 09/26/2019    Pneumococcal Polysaccharide PPV23 10/10/2016      Health Maintenance:         Topic Date Due    Hepatitis C Screening  1950    MAMMOGRAM  01/10/2021    CRC Screening: Colonoscopy  11/10/2026         Topic Date Due    DTaP,Tdap,and Td Vaccines (1 - Tdap) 11/18/1960    Pneumococcal Vaccine: 65+ Years (2 of 2 - PCV13) 10/10/2017      Medicare Screening Tests and Risk Assessments:         Health Risk Assessment:   Patient rates overall health as fair  Patient feels that their physical health rating is slightly better  Eyesight was rated as same  Hearing was rated as same  Patient feels that their emotional and mental health rating is same  Pain experienced in the last 7 days has been some  Patient's pain rating has been 5/10  Patient states that she has experienced no weight loss or gain in last 6 months  Depression Screening:   PHQ-2 Score: 0      Fall Risk Screening:    In the past year, patient has experienced: no history of falling in past year      Urinary Incontinence Screening:   Patient has not leaked urine accidently in the last six months  Home Safety:  Patient has trouble with stairs inside or outside of their home  Patient has working smoke alarms and has working carbon monoxide detector  Home safety hazards include: none  Nutrition:   Current diet is Regular  Medications:   Patient is not currently taking any over-the-counter supplements  Patient is able to manage medications  Activities of Daily Living (ADLs)/Instrumental Activities of Daily Living (IADLs):   Walk and transfer into and out of bed and chair?: Yes  Dress and groom yourself?: Yes    Bathe or shower yourself?: Yes    Feed yourself?  Yes  Do your laundry/housekeeping?: Yes  Manage your money, pay your bills and track your expenses?: Yes  Make your own meals?: Yes    Do your own shopping?: Yes    Previous Hospitalizations:   Any hospitalizations or ED visits within the last 12 months?: Yes    How many hospitalizations have you had in the last year?: 1-2    Advance Care Planning:   Living will: Yes    Durable POA for healthcare: No    Advanced directive: Yes    Advanced directive counseling given: No    Five wishes given: No    Patient declined ACP directive: No    End of Life Decisions reviewed with patient: No    Provider agrees with end of life decisions: Yes      PREVENTIVE SCREENINGS      Cardiovascular Screening:    General: Screening Not Indicated and History Lipid Disorder      Diabetes Screening:     General: Screening Current      Colorectal Cancer Screening:     General: Screening Current      Breast Cancer Screening:     General: Screening Current      Cervical Cancer Screening:    General: Screening Not Indicated      Abdominal Aortic Aneurysm (AAA) Screening:        General: Screening Not Indicated      Lung Cancer Screening:     General: Screening Not Indicated      Hepatitis C Screening:    General: Risks and Benefits Discussed    Hep C Screening Accepted: Yes      Other Counseling Topics:   Skin self-exam, sunscreen and calcium and vitamin D intake and regular weightbearing exercise  No exam data present     Physical Exam:     /70 (BP Location: Left arm, Patient Position: Sitting, Cuff Size: Standard)   Pulse 87   Temp 97 8 °F (36 6 °C) (Temporal)   Resp 18   Ht 5' 2" (1 575 m)   Wt 60 6 kg (133 lb 9 oz)   SpO2 97%   BMI 24 43 kg/m²     Physical Exam   Constitutional: She is oriented to person, place, and time  She appears well-developed  HENT:   Head: Normocephalic  Right Ear: External ear normal    Left Ear: External ear normal    Nose: Nose normal    Mouth/Throat: Oropharynx is clear and moist    Eyes: Pupils are equal, round, and reactive to light  Conjunctivae and EOM are normal    Neck: Normal range of motion  Neck supple  No thyromegaly present  Cardiovascular: Normal rate, regular rhythm and normal heart sounds  Pulmonary/Chest: Effort normal and breath sounds normal    Abdominal: Soft  There is no tenderness  There is no rebound and no guarding  Musculoskeletal: Normal range of motion  Neurological: She is alert and oriented to person, place, and time  She has normal reflexes  Skin: Skin is dry  Psychiatric: She has a normal mood and affect  Nursing note and vitals reviewed        Sean Griffin MD

## 2019-12-19 NOTE — PATIENT INSTRUCTIONS
Medicare Preventive Visit Patient Instructions  Thank you for completing your Welcome to Medicare Visit or Medicare Annual Wellness Visit today  Your next wellness visit will be due in one year (12/19/2020)  The screening/preventive services that you may require over the next 5-10 years are detailed below  Some tests may not apply to you based off risk factors and/or age  Screening tests ordered at today's visit but not completed yet may show as past due  Also, please note that scanned in results may not display below  Preventive Screenings:  Service Recommendations Previous Testing/Comments   Colorectal Cancer Screening  * Colonoscopy    * Fecal Occult Blood Test (FOBT)/Fecal Immunochemical Test (FIT)  * Fecal DNA/Cologuard Test  * Flexible Sigmoidoscopy Age: 54-65 years old   Colonoscopy: every 10 years (may be performed more frequently if at higher risk)  OR  FOBT/FIT: every 1 year  OR  Cologuard: every 3 years  OR  Sigmoidoscopy: every 5 years  Screening may be recommended earlier than age 48 if at higher risk for colorectal cancer  Also, an individualized decision between you and your healthcare provider will decide whether screening between the ages of 74-80 would be appropriate  Colonoscopy: 11/10/2016  FOBT/FIT: Not on file  Cologuard: Not on file  Sigmoidoscopy: Not on file    Screening Current     Breast Cancer Screening Age: 36 years old  Frequency: every 1-2 years  Not required if history of left and right mastectomy Mammogram: 01/10/2019    Screening Current   Cervical Cancer Screening Between the ages of 21-29, pap smear recommended once every 3 years  Between the ages of 33-67, can perform pap smear with HPV co-testing every 5 years     Recommendations may differ for women with a history of total hysterectomy, cervical cancer, or abnormal pap smears in past  Pap Smear: Not on file    Screening Not Indicated   Hepatitis C Screening Once for adults born between 1945 and 1965  More frequently in patients at high risk for Hepatitis C Hep C Antibody: Not on file       Diabetes Screening 1-2 times per year if you're at risk for diabetes or have pre-diabetes Fasting glucose: 112 mg/dL   A1C: 6 0 %    Screening Current   Cholesterol Screening Once every 5 years if you don't have a lipid disorder  May order more often based on risk factors  Lipid panel: 12/09/2019    Screening Not Indicated  History Lipid Disorder     Other Preventive Screenings Covered by Medicare:  1  Abdominal Aortic Aneurysm (AAA) Screening: covered once if your at risk  You're considered to be at risk if you have a family history of AAA  2  Lung Cancer Screening: covers low dose CT scan once per year if you meet all of the following conditions: (1) Age 50-69; (2) No signs or symptoms of lung cancer; (3) Current smoker or have quit smoking within the last 15 years; (4) You have a tobacco smoking history of at least 30 pack years (packs per day multiplied by number of years you smoked); (5) You get a written order from a healthcare provider  3  Glaucoma Screening: covered annually if you're considered high risk: (1) You have diabetes OR (2) Family history of glaucoma OR (3)  aged 48 and older OR (3)  American aged 72 and older  3  Osteoporosis Screening: covered every 2 years if you meet one of the following conditions: (1) You're estrogen deficient and at risk for osteoporosis based off medical history and other findings; (2) Have a vertebral abnormality; (3) On glucocorticoid therapy for more than 3 months; (4) Have primary hyperparathyroidism; (5) On osteoporosis medications and need to assess response to drug therapy  · Last bone density test (DXA Scan): Not on file  5  HIV Screening: covered annually if you're between the age of 12-76  Also covered annually if you are younger than 13 and older than 72 with risk factors for HIV infection   For pregnant patients, it is covered up to 3 times per pregnancy  Immunizations:  Immunization Recommendations   Influenza Vaccine Annual influenza vaccination during flu season is recommended for all persons aged >= 6 months who do not have contraindications   Pneumococcal Vaccine (Prevnar and Pneumovax)  * Prevnar = PCV13  * Pneumovax = PPSV23   Adults 25-60 years old: 1-3 doses may be recommended based on certain risk factors  Adults 72 years old: Prevnar (PCV13) vaccine recommended followed by Pneumovax (PPSV23) vaccine  If already received PPSV23 since turning 65, then PCV13 recommended at least one year after PPSV23 dose  Hepatitis B Vaccine 3 dose series if at intermediate or high risk (ex: diabetes, end stage renal disease, liver disease)   Tetanus (Td) Vaccine - COST NOT COVERED BY MEDICARE PART B Following completion of primary series, a booster dose should be given every 10 years to maintain immunity against tetanus  Td may also be given as tetanus wound prophylaxis  Tdap Vaccine - COST NOT COVERED BY MEDICARE PART B Recommended at least once for all adults  For pregnant patients, recommended with each pregnancy  Shingles Vaccine (Shingrix) - COST NOT COVERED BY MEDICARE PART B  2 shot series recommended in those aged 48 and above     Health Maintenance Due:      Topic Date Due    Hepatitis C Screening  1950    MAMMOGRAM  01/10/2021    CRC Screening: Colonoscopy  11/10/2026     Immunizations Due:      Topic Date Due    DTaP,Tdap,and Td Vaccines (1 - Tdap) 11/18/1960    Pneumococcal Vaccine: 65+ Years (2 of 2 - PCV13) 10/10/2017     Advance Directives   What are advance directives? Advance directives are legal documents that state your wishes and plans for medical care  These plans are made ahead of time in case you lose your ability to make decisions for yourself  Advance directives can apply to any medical decision, such as the treatments you want, and if you want to donate organs  What are the types of advance directives?   There are many types of advance directives, and each state has rules about how to use them  You may choose a combination of any of the following:  · Living will: This is a written record of the treatment you want  You can also choose which treatments you do not want, which to limit, and which to stop at a certain time  This includes surgery, medicine, IV fluid, and tube feedings  · Durable power of  for healthcare [facility]): This is a written record that states who you want to make healthcare choices for you when you are unable to make them for yourself  This person, called a proxy, is usually a family member or a friend  You may choose more than 1 proxy  · Do not resuscitate (DNR) order:  A DNR order is used in case your heart stops beating or you stop breathing  It is a request not to have certain forms of treatment, such as CPR  A DNR order may be included in other types of advance directives  · Medical directive: This covers the care that you want if you are in a coma, near death, or unable to make decisions for yourself  You can list the treatments you want for each condition  Treatment may include pain medicine, surgery, blood transfusions, dialysis, IV or tube feedings, and a ventilator (breathing machine)  · Values history: This document has questions about your views, beliefs, and how you feel and think about life  This information can help others choose the care that you would choose  Why are advance directives important? An advance directive helps you control your care  Although spoken wishes may be used, it is better to have your wishes written down  Spoken wishes can be misunderstood, or not followed  Treatments may be given even if you do not want them  An advance directive may make it easier for your family to make difficult choices about your care        © Copyright [employer] 2018 Information is for End User's use only and may not be sold, redistributed or otherwise used for commercial purposes   All illustrations and images included in CareNotes® are the copyrighted property of A D A M , Inc  or Leta Foss

## 2020-01-02 ENCOUNTER — TELEPHONE (OUTPATIENT)
Dept: FAMILY MEDICINE CLINIC | Facility: CLINIC | Age: 70
End: 2020-01-02

## 2020-01-02 DIAGNOSIS — I10 ESSENTIAL HYPERTENSION: ICD-10-CM

## 2020-01-02 RX ORDER — ACETAMINOPHEN/DIPHENHYDRAMINE 500MG-25MG
TABLET ORAL
Qty: 90 TABLET | Refills: 0 | Status: SHIPPED | OUTPATIENT
Start: 2020-01-02 | End: 2020-03-16

## 2020-01-02 NOTE — TELEPHONE ENCOUNTER
Form was dropped by William Bo pts daughter, will be forwarded to provider at assigned delivery time  Pt daughter needs FMLA forms filled out for her employer   (front staff)  please fax to [phone]  when forms are faxed please notify patients daughter @[phone]

## 2020-01-06 NOTE — TELEPHONE ENCOUNTER
Patient daughter is aware FMLA forms have been faxed with ok delivery to   fax: [phone]    She is also aware copy is here form   Forms are placed in  accordion file under patients daughters name

## 2020-03-16 DIAGNOSIS — I10 ESSENTIAL HYPERTENSION: ICD-10-CM

## 2020-03-16 DIAGNOSIS — L85.3 DRY SKIN: ICD-10-CM

## 2020-03-16 RX ORDER — ACETAMINOPHEN/DIPHENHYDRAMINE 500MG-25MG
TABLET ORAL
Qty: 90 TABLET | Refills: 0 | Status: SHIPPED | OUTPATIENT
Start: 2020-03-16 | End: 2020-06-16 | Stop reason: SDUPTHER

## 2020-03-16 RX ORDER — AMMONIUM LACTATE 12 G/100G
LOTION TOPICAL
Qty: 400 G | Refills: 0 | Status: SHIPPED | OUTPATIENT
Start: 2020-03-16 | End: 2020-04-16 | Stop reason: SDUPTHER

## 2020-03-17 ENCOUNTER — TELEPHONE (OUTPATIENT)
Dept: FAMILY MEDICINE CLINIC | Facility: CLINIC | Age: 70
End: 2020-03-17

## 2020-03-17 NOTE — TELEPHONE ENCOUNTER
Lm on pt's daughters cell phone to let her know we re cancelling appointment for tomorrow due to Judy  Please r/s appt for may

## 2020-04-02 ENCOUNTER — TELEPHONE (OUTPATIENT)
Dept: FAMILY MEDICINE CLINIC | Facility: CLINIC | Age: 70
End: 2020-04-02

## 2020-04-16 ENCOUNTER — TELEMEDICINE (OUTPATIENT)
Dept: FAMILY MEDICINE CLINIC | Facility: CLINIC | Age: 70
End: 2020-04-16

## 2020-04-16 VITALS
DIASTOLIC BLOOD PRESSURE: 76 MMHG | WEIGHT: 125 LBS | SYSTOLIC BLOOD PRESSURE: 132 MMHG | HEART RATE: 61 BPM | BODY MASS INDEX: 22.86 KG/M2

## 2020-04-16 DIAGNOSIS — L85.3 DRY SKIN: ICD-10-CM

## 2020-04-16 DIAGNOSIS — E78.5 DYSLIPIDEMIA: ICD-10-CM

## 2020-04-16 DIAGNOSIS — I63.9 CVA (CEREBRAL VASCULAR ACCIDENT) (HCC): ICD-10-CM

## 2020-04-16 DIAGNOSIS — R73.01 IMPAIRED FASTING GLUCOSE: ICD-10-CM

## 2020-04-16 DIAGNOSIS — I10 ESSENTIAL HYPERTENSION: ICD-10-CM

## 2020-04-16 PROCEDURE — G2012 BRIEF CHECK IN BY MD/QHP: HCPCS | Performed by: FAMILY MEDICINE

## 2020-04-16 RX ORDER — LISINOPRIL 5 MG/1
5 TABLET ORAL DAILY
Qty: 90 TABLET | Refills: 1 | Status: SHIPPED | OUTPATIENT
Start: 2020-04-16 | End: 2020-06-16 | Stop reason: SDUPTHER

## 2020-04-16 RX ORDER — ATORVASTATIN CALCIUM 40 MG/1
40 TABLET, FILM COATED ORAL EVERY EVENING
Qty: 90 TABLET | Refills: 1 | Status: SHIPPED | OUTPATIENT
Start: 2020-04-16 | End: 2020-06-16 | Stop reason: SDUPTHER

## 2020-04-16 RX ORDER — AMMONIUM LACTATE 12 G/100G
LOTION TOPICAL DAILY
Qty: 400 G | Refills: 0 | Status: SHIPPED | OUTPATIENT
Start: 2020-04-16 | End: 2020-08-31

## 2020-04-20 ENCOUNTER — TELEPHONE (OUTPATIENT)
Dept: FAMILY MEDICINE CLINIC | Facility: CLINIC | Age: 70
End: 2020-04-20

## 2020-04-20 ENCOUNTER — APPOINTMENT (OUTPATIENT)
Dept: LAB | Age: 70
End: 2020-04-20
Payer: COMMERCIAL

## 2020-04-20 DIAGNOSIS — R73.01 IMPAIRED FASTING GLUCOSE: ICD-10-CM

## 2020-04-20 DIAGNOSIS — E78.5 DYSLIPIDEMIA: ICD-10-CM

## 2020-04-20 DIAGNOSIS — I10 ESSENTIAL HYPERTENSION: ICD-10-CM

## 2020-04-20 DIAGNOSIS — Z11.59 ENCOUNTER FOR HEPATITIS C SCREENING TEST FOR LOW RISK PATIENT: ICD-10-CM

## 2020-04-20 LAB
ALBUMIN SERPL BCP-MCNC: 3.9 G/DL (ref 3.5–5)
ALP SERPL-CCNC: 101 U/L (ref 46–116)
ALT SERPL W P-5'-P-CCNC: 58 U/L (ref 12–78)
ANION GAP SERPL CALCULATED.3IONS-SCNC: 4 MMOL/L (ref 4–13)
AST SERPL W P-5'-P-CCNC: 30 U/L (ref 5–45)
BASOPHILS # BLD AUTO: 0.06 THOUSANDS/ΜL (ref 0–0.1)
BASOPHILS NFR BLD AUTO: 1 % (ref 0–1)
BILIRUB SERPL-MCNC: 0.52 MG/DL (ref 0.2–1)
BUN SERPL-MCNC: 15 MG/DL (ref 5–25)
CALCIUM SERPL-MCNC: 9.8 MG/DL (ref 8.3–10.1)
CHLORIDE SERPL-SCNC: 110 MMOL/L (ref 100–108)
CHOLEST SERPL-MCNC: 127 MG/DL (ref 50–200)
CO2 SERPL-SCNC: 30 MMOL/L (ref 21–32)
CREAT SERPL-MCNC: 0.71 MG/DL (ref 0.6–1.3)
CREAT UR-MCNC: 102 MG/DL
EOSINOPHIL # BLD AUTO: 0.26 THOUSAND/ΜL (ref 0–0.61)
EOSINOPHIL NFR BLD AUTO: 3 % (ref 0–6)
ERYTHROCYTE [DISTWIDTH] IN BLOOD BY AUTOMATED COUNT: 12.9 % (ref 11.6–15.1)
EST. AVERAGE GLUCOSE BLD GHB EST-MCNC: 117 MG/DL
GFR SERPL CREATININE-BSD FRML MDRD: 87 ML/MIN/1.73SQ M
GLUCOSE P FAST SERPL-MCNC: 107 MG/DL (ref 65–99)
HBA1C MFR BLD: 5.7 %
HCT VFR BLD AUTO: 42.3 % (ref 34.8–46.1)
HCV AB SER QL: NORMAL
HDLC SERPL-MCNC: 69 MG/DL
HGB BLD-MCNC: 13.8 G/DL (ref 11.5–15.4)
IMM GRANULOCYTES # BLD AUTO: 0.02 THOUSAND/UL (ref 0–0.2)
IMM GRANULOCYTES NFR BLD AUTO: 0 % (ref 0–2)
LDLC SERPL CALC-MCNC: 45 MG/DL (ref 0–100)
LYMPHOCYTES # BLD AUTO: 4.37 THOUSANDS/ΜL (ref 0.6–4.47)
LYMPHOCYTES NFR BLD AUTO: 48 % (ref 14–44)
MCH RBC QN AUTO: 30.6 PG (ref 26.8–34.3)
MCHC RBC AUTO-ENTMCNC: 32.6 G/DL (ref 31.4–37.4)
MCV RBC AUTO: 94 FL (ref 82–98)
MICROALBUMIN UR-MCNC: <5 MG/L (ref 0–20)
MICROALBUMIN/CREAT 24H UR: <5 MG/G CREATININE (ref 0–30)
MONOCYTES # BLD AUTO: 0.57 THOUSAND/ΜL (ref 0.17–1.22)
MONOCYTES NFR BLD AUTO: 6 % (ref 4–12)
NEUTROPHILS # BLD AUTO: 3.88 THOUSANDS/ΜL (ref 1.85–7.62)
NEUTS SEG NFR BLD AUTO: 42 % (ref 43–75)
NONHDLC SERPL-MCNC: 58 MG/DL
NRBC BLD AUTO-RTO: 0 /100 WBCS
PLATELET # BLD AUTO: 264 THOUSANDS/UL (ref 149–390)
PMV BLD AUTO: 10.2 FL (ref 8.9–12.7)
POTASSIUM SERPL-SCNC: 4.5 MMOL/L (ref 3.5–5.3)
PROT SERPL-MCNC: 8 G/DL (ref 6.4–8.2)
RBC # BLD AUTO: 4.51 MILLION/UL (ref 3.81–5.12)
SODIUM SERPL-SCNC: 144 MMOL/L (ref 136–145)
TRIGL SERPL-MCNC: 65 MG/DL
TSH SERPL DL<=0.05 MIU/L-ACNC: 1.74 UIU/ML (ref 0.36–3.74)
WBC # BLD AUTO: 9.16 THOUSAND/UL (ref 4.31–10.16)

## 2020-04-20 PROCEDURE — 84443 ASSAY THYROID STIM HORMONE: CPT

## 2020-04-20 PROCEDURE — 82043 UR ALBUMIN QUANTITATIVE: CPT

## 2020-04-20 PROCEDURE — 83036 HEMOGLOBIN GLYCOSYLATED A1C: CPT

## 2020-04-20 PROCEDURE — 80053 COMPREHEN METABOLIC PANEL: CPT

## 2020-04-20 PROCEDURE — 85025 COMPLETE CBC W/AUTO DIFF WBC: CPT

## 2020-04-20 PROCEDURE — 82570 ASSAY OF URINE CREATININE: CPT

## 2020-04-20 PROCEDURE — 86803 HEPATITIS C AB TEST: CPT

## 2020-04-20 PROCEDURE — 80061 LIPID PANEL: CPT

## 2020-04-20 PROCEDURE — 36415 COLL VENOUS BLD VENIPUNCTURE: CPT

## 2020-06-16 ENCOUNTER — TELEMEDICINE (OUTPATIENT)
Dept: FAMILY MEDICINE CLINIC | Facility: CLINIC | Age: 70
End: 2020-06-16

## 2020-06-16 VITALS
HEART RATE: 71 BPM | DIASTOLIC BLOOD PRESSURE: 79 MMHG | HEIGHT: 62 IN | BODY MASS INDEX: 22.86 KG/M2 | SYSTOLIC BLOOD PRESSURE: 138 MMHG

## 2020-06-16 DIAGNOSIS — I63.9 CVA (CEREBRAL VASCULAR ACCIDENT) (HCC): ICD-10-CM

## 2020-06-16 DIAGNOSIS — R42 VERTIGO: Primary | ICD-10-CM

## 2020-06-16 DIAGNOSIS — E78.5 DYSLIPIDEMIA: ICD-10-CM

## 2020-06-16 DIAGNOSIS — I10 ESSENTIAL HYPERTENSION: ICD-10-CM

## 2020-06-16 DIAGNOSIS — R73.01 IMPAIRED FASTING GLUCOSE: ICD-10-CM

## 2020-06-16 PROCEDURE — G2012 BRIEF CHECK IN BY MD/QHP: HCPCS | Performed by: FAMILY MEDICINE

## 2020-06-16 RX ORDER — ASPIRIN 81 MG/1
81 TABLET ORAL DAILY
Qty: 90 TABLET | Refills: 1 | Status: SHIPPED | OUTPATIENT
Start: 2020-06-16 | End: 2021-03-22

## 2020-06-16 RX ORDER — LISINOPRIL 5 MG/1
5 TABLET ORAL DAILY
Qty: 90 TABLET | Refills: 1 | Status: SHIPPED | OUTPATIENT
Start: 2020-06-16 | End: 2020-10-05 | Stop reason: SDUPTHER

## 2020-06-16 RX ORDER — MECLIZINE HYDROCHLORIDE 25 MG/1
25 TABLET ORAL EVERY 8 HOURS SCHEDULED
Qty: 30 TABLET | Refills: 0 | Status: SHIPPED | OUTPATIENT
Start: 2020-06-16

## 2020-06-16 RX ORDER — ATORVASTATIN CALCIUM 40 MG/1
40 TABLET, FILM COATED ORAL EVERY EVENING
Qty: 90 TABLET | Refills: 1 | Status: SHIPPED | OUTPATIENT
Start: 2020-06-16 | End: 2021-06-28

## 2020-06-16 RX ORDER — OMEGA-3-ACID ETHYL ESTERS 1 G/1
2 CAPSULE, LIQUID FILLED ORAL 2 TIMES DAILY
Qty: 180 CAPSULE | Refills: 1 | Status: SHIPPED | OUTPATIENT
Start: 2020-06-16 | End: 2020-10-05 | Stop reason: SDUPTHER

## 2020-07-20 ENCOUNTER — OFFICE VISIT (OUTPATIENT)
Dept: FAMILY MEDICINE CLINIC | Facility: CLINIC | Age: 70
End: 2020-07-20

## 2020-07-20 VITALS
TEMPERATURE: 97.2 F | RESPIRATION RATE: 18 BRPM | OXYGEN SATURATION: 96 % | WEIGHT: 124.56 LBS | HEART RATE: 88 BPM | SYSTOLIC BLOOD PRESSURE: 140 MMHG | BODY MASS INDEX: 22.92 KG/M2 | HEIGHT: 62 IN | DIASTOLIC BLOOD PRESSURE: 75 MMHG

## 2020-07-20 DIAGNOSIS — H69.83 DYSFUNCTION OF BOTH EUSTACHIAN TUBES: ICD-10-CM

## 2020-07-20 DIAGNOSIS — R42 VERTIGO: ICD-10-CM

## 2020-07-20 DIAGNOSIS — Z12.31 ENCOUNTER FOR SCREENING MAMMOGRAM FOR MALIGNANT NEOPLASM OF BREAST: Primary | ICD-10-CM

## 2020-07-20 DIAGNOSIS — I10 ESSENTIAL HYPERTENSION: Primary | ICD-10-CM

## 2020-07-20 PROCEDURE — 1036F TOBACCO NON-USER: CPT | Performed by: FAMILY MEDICINE

## 2020-07-20 PROCEDURE — 3077F SYST BP >= 140 MM HG: CPT | Performed by: FAMILY MEDICINE

## 2020-07-20 PROCEDURE — 1160F RVW MEDS BY RX/DR IN RCRD: CPT | Performed by: FAMILY MEDICINE

## 2020-07-20 PROCEDURE — 99213 OFFICE O/P EST LOW 20 MIN: CPT | Performed by: FAMILY MEDICINE

## 2020-07-20 PROCEDURE — 3008F BODY MASS INDEX DOCD: CPT | Performed by: FAMILY MEDICINE

## 2020-07-20 PROCEDURE — 4040F PNEUMOC VAC/ADMIN/RCVD: CPT | Performed by: FAMILY MEDICINE

## 2020-07-20 PROCEDURE — 3078F DIAST BP <80 MM HG: CPT | Performed by: FAMILY MEDICINE

## 2020-07-20 RX ORDER — TRIAMTERENE AND HYDROCHLOROTHIAZIDE 37.5; 25 MG/1; MG/1
TABLET ORAL
Qty: 90 TABLET | Refills: 1 | Status: SHIPPED | OUTPATIENT
Start: 2020-07-20 | End: 2020-10-05 | Stop reason: SDUPTHER

## 2020-07-20 RX ORDER — METOPROLOL TARTRATE 100 MG/1
TABLET ORAL
Qty: 180 TABLET | Refills: 1 | Status: SHIPPED | OUTPATIENT
Start: 2020-07-20 | End: 2020-10-05 | Stop reason: SDUPTHER

## 2020-07-20 RX ORDER — FLUTICASONE PROPIONATE 50 MCG
1 SPRAY, SUSPENSION (ML) NASAL DAILY
Qty: 16 G | Refills: 1 | Status: SHIPPED | OUTPATIENT
Start: 2020-07-20 | End: 2021-01-26

## 2020-07-20 NOTE — PROGRESS NOTES
Assessment/Plan:    Cerebellar infarct [facility])  Reviewed with patient and her daughter the importance of taking medications regularly and checking blood pressure at home and keeping record        Diagnoses and all orders for this visit:    Encounter for screening mammogram for malignant neoplasm of breast  -     Mammo screening bilateral w cad; Future    Vertigo  -     VAS carotid complete study; Future    Dysfunction of both eustachian tubes  -     fluticasone (FLONASE) 50 mcg/act nasal spray; 1 spray into each nostril daily          Subjective:      Patient ID: Jordon Gaytan is a 78 y o  female  77 yo  female with glucose intolerance on Metformin, hyperlipidemia, HTN, has not take BP medications this am, s/p CVA 12/19 reports dizzy spells have improved when compared to last visit on 6/16/2020  Still has ocasional dizzy spells when she bends down or turns her head fast  Currently reports buzzing in her ears which is worse at night  The following portions of the patient's history were reviewed and updated as appropriate: She  has a past medical history of Arthralgia of hip, Arthritis, Chronic pain disorder, Dyslipidemia, Hyperlipidemia, Hypertension, Neck pain, Pemphigus, Pre-diabetes, Shoulder pain, and Vitamin D deficiency    She   Patient Active Problem List    Diagnosis Date Noted    Cerebellar infarct ([address] ) 12/08/2019    Spinal stenosis, lumbar region with neurogenic claudication     Acute pain of left shoulder 08/23/2018    Pre-op evaluation 02/08/2018    Muscle spasms of neck 12/04/2017    Arthralgia of left hip 07/19/2016    Slow transit constipation 07/19/2016    Impaired fasting glucose 07/19/2016    Chronic right shoulder pain 03/15/2016    Creatinine elevation 02/16/2015    Neck pain 11/19/2014    Low back pain 08/06/2014    Acquired trigger finger 10/09/2013    Vitamin D deficiency 07/29/2013    Mammogram abnormal 07/29/2013    Multiple joint pain 07/29/2013    Other symptoms involving skin and integumentary tissues 06/03/2013    Pemphigus 04/08/2013    Bursitis 12/03/2012    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/2012    Osteoarthrosis 09/24/2012    Dyslipidemia 06/19/2012    Hypertension 06/19/2012    Hyperlipidemia 06/03/2008     She  has a past surgical history that includes pr colonoscopy flx dx w/collj spec when pfrmd (N/A, 11/10/2016); Colonoscopy; Cataract extraction (Right); and pr xcapsl ctrc rmvl insj io lens prosth w/o ecp (Left, 9/27/2018)  Her family history includes Heart disease in her father; Hyperlipidemia in her mother; Hypertension in her mother  She  reports that she has never smoked  She has never used smokeless tobacco  She reports that she does not drink alcohol or use drugs    Current Outpatient Medications   Medication Sig Dispense Refill    ammonium lactate (LAC-HYDRIN) 12 % lotion Apply topically daily 400 g 0    aspirin (RA Aspirin EC) 81 mg EC tablet Take 1 tablet (81 mg total) by mouth daily 90 tablet 1    atorvastatin (LIPITOR) 40 mg tablet Take 1 tablet (40 mg total) by mouth every evening 90 tablet 1    lisinopril (ZESTRIL) 5 mg tablet Take 1 tablet (5 mg total) by mouth daily 90 tablet 1    meclizine (ANTIVERT) 25 mg tablet Take 1 tablet (25 mg total) by mouth every 8 (eight) hours (Patient taking differently: Take 25 mg by mouth as needed ) 30 tablet 0    metFORMIN (GLUCOPHAGE) 500 mg tablet Take 1 tablet (500 mg total) by mouth daily with breakfast 90 tablet 1    omega-3-acid ethyl esters (LOVAZA) 1 g capsule Take 2 capsules (2 g total) by mouth 2 (two) times a day 180 capsule 1    fluticasone (FLONASE) 50 mcg/act nasal spray 1 spray into each nostril daily 16 g 1    furosemide (LASIX) 20 mg tablet Take 0 5 tablets (10 mg total) by mouth daily (Patient not taking: Reported on 4/16/2020) 5 tablet 0    metoprolol tartrate (LOPRESSOR) 100 mg tablet take 1 tablet by mouth twice a day 180 tablet 1    triamterene-hydrochlorothiazide (MAXZIDE-25) 37 5-25 mg per tablet take 1 tablet by mouth once daily 90 tablet 1     No current facility-administered medications for this visit  Current Outpatient Medications on File Prior to Visit   Medication Sig    ammonium lactate (LAC-HYDRIN) 12 % lotion Apply topically daily    aspirin (RA Aspirin EC) 81 mg EC tablet Take 1 tablet (81 mg total) by mouth daily    atorvastatin (LIPITOR) 40 mg tablet Take 1 tablet (40 mg total) by mouth every evening    lisinopril (ZESTRIL) 5 mg tablet Take 1 tablet (5 mg total) by mouth daily    meclizine (ANTIVERT) 25 mg tablet Take 1 tablet (25 mg total) by mouth every 8 (eight) hours (Patient taking differently: Take 25 mg by mouth as needed )    metFORMIN (GLUCOPHAGE) 500 mg tablet Take 1 tablet (500 mg total) by mouth daily with breakfast    omega-3-acid ethyl esters (LOVAZA) 1 g capsule Take 2 capsules (2 g total) by mouth 2 (two) times a day    furosemide (LASIX) 20 mg tablet Take 0 5 tablets (10 mg total) by mouth daily (Patient not taking: Reported on 4/16/2020)     No current facility-administered medications on file prior to visit       Review of Systems   HENT: Positive for tinnitus  Musculoskeletal: Positive for arthralgias  Neurological: Positive for dizziness  All other systems reviewed and are negative  Objective:      /75   Pulse 88   Temp (!) 97 2 °F (36 2 °C) (Temporal)   Resp 18   Ht 5' 2" (1 575 m)   Wt 56 5 kg (124 lb 9 oz)   SpO2 96%   BMI 22 78 kg/m²          Physical Exam   Constitutional: She is oriented to person, place, and time  She appears well-developed  HENT:   Head: Normocephalic  Right Ear: External ear normal    Left Ear: External ear normal    Nose: Nose normal    Mouth/Throat: Oropharynx is clear and moist    Eyes: Pupils are equal, round, and reactive to light  Conjunctivae and EOM are normal    Neck: Normal range of motion  Neck supple  No thyromegaly present  Cardiovascular: Normal rate, regular rhythm and normal heart sounds  Pulmonary/Chest: Effort normal and breath sounds normal    Abdominal: Soft  There is no tenderness  There is no rebound and no guarding  Musculoskeletal: Normal range of motion  Neurological: She is alert and oriented to person, place, and time  She has normal reflexes  Skin: Skin is dry  Psychiatric: She has a normal mood and affect  Nursing note and vitals reviewed

## 2020-07-22 NOTE — ASSESSMENT & PLAN NOTE
Reviewed with patient and her daughter the importance of taking medications regularly and checking blood pressure at home and keeping record

## 2020-08-17 ENCOUNTER — HOSPITAL ENCOUNTER (OUTPATIENT)
Dept: NON INVASIVE DIAGNOSTICS | Facility: HOSPITAL | Age: 70
Discharge: HOME | End: 2020-08-17
Payer: COMMERCIAL

## 2020-08-17 DIAGNOSIS — R42 VERTIGO: ICD-10-CM

## 2020-08-17 PROCEDURE — 93880 EXTRACRANIAL BILAT STUDY: CPT | Performed by: SURGERY

## 2020-08-17 PROCEDURE — 93880 EXTRACRANIAL BILAT STUDY: CPT

## 2020-08-30 DIAGNOSIS — L85.3 DRY SKIN: ICD-10-CM

## 2020-08-31 RX ORDER — AMMONIUM LACTATE 12 G/100G
LOTION TOPICAL
Qty: 400 G | Refills: 0 | Status: SHIPPED | OUTPATIENT
Start: 2020-08-31 | End: 2021-01-26

## 2020-09-14 ENCOUNTER — HOSPITAL ENCOUNTER (OUTPATIENT)
Dept: MAMMOGRAPHY | Facility: CLINIC | Age: 70
Discharge: HOME | End: 2020-09-14
Payer: COMMERCIAL

## 2020-09-14 VITALS — HEIGHT: 62 IN | WEIGHT: 124 LBS | BODY MASS INDEX: 22.82 KG/M2

## 2020-09-14 DIAGNOSIS — Z12.31 ENCOUNTER FOR SCREENING MAMMOGRAM FOR MALIGNANT NEOPLASM OF BREAST: ICD-10-CM

## 2020-09-14 PROCEDURE — 77067 SCR MAMMO BI INCL CAD: CPT

## 2020-09-14 PROCEDURE — 77063 BREAST TOMOSYNTHESIS BI: CPT

## 2020-10-05 ENCOUNTER — TELEPHONE (OUTPATIENT)
Dept: FAMILY MEDICINE CLINIC | Facility: CLINIC | Age: 70
End: 2020-10-05

## 2020-10-05 ENCOUNTER — OFFICE VISIT (OUTPATIENT)
Dept: FAMILY MEDICINE CLINIC | Facility: CLINIC | Age: 70
End: 2020-10-05

## 2020-10-05 VITALS
RESPIRATION RATE: 18 BRPM | DIASTOLIC BLOOD PRESSURE: 90 MMHG | BODY MASS INDEX: 23 KG/M2 | HEIGHT: 62 IN | TEMPERATURE: 98 F | HEART RATE: 78 BPM | SYSTOLIC BLOOD PRESSURE: 150 MMHG | OXYGEN SATURATION: 98 % | WEIGHT: 125 LBS

## 2020-10-05 DIAGNOSIS — M65.4 TENOSYNOVITIS, DE QUERVAIN: Primary | ICD-10-CM

## 2020-10-05 DIAGNOSIS — R41.89 COGNITIVE DECLINE: ICD-10-CM

## 2020-10-05 DIAGNOSIS — Z23 FLU VACCINE NEED: ICD-10-CM

## 2020-10-05 DIAGNOSIS — I10 ESSENTIAL HYPERTENSION: ICD-10-CM

## 2020-10-05 DIAGNOSIS — E78.5 DYSLIPIDEMIA: ICD-10-CM

## 2020-10-05 PROCEDURE — 1160F RVW MEDS BY RX/DR IN RCRD: CPT | Performed by: FAMILY MEDICINE

## 2020-10-05 PROCEDURE — 90662 IIV NO PRSV INCREASED AG IM: CPT

## 2020-10-05 PROCEDURE — G0008 ADMIN INFLUENZA VIRUS VAC: HCPCS

## 2020-10-05 PROCEDURE — 99214 OFFICE O/P EST MOD 30 MIN: CPT | Performed by: FAMILY MEDICINE

## 2020-10-05 RX ORDER — OMEGA-3-ACID ETHYL ESTERS 1 G/1
2 CAPSULE, LIQUID FILLED ORAL 2 TIMES DAILY
Qty: 180 CAPSULE | Refills: 1 | Status: SHIPPED | OUTPATIENT
Start: 2020-10-05 | End: 2021-06-21

## 2020-10-05 RX ORDER — TRIAMTERENE AND HYDROCHLOROTHIAZIDE 37.5; 25 MG/1; MG/1
1 TABLET ORAL DAILY
Qty: 90 TABLET | Refills: 1 | Status: SHIPPED | OUTPATIENT
Start: 2020-10-05 | End: 2021-01-26 | Stop reason: SDUPTHER

## 2020-10-05 RX ORDER — METOPROLOL TARTRATE 100 MG/1
100 TABLET ORAL 2 TIMES DAILY
Qty: 180 TABLET | Refills: 1 | Status: SHIPPED | OUTPATIENT
Start: 2020-10-05 | End: 2020-10-06

## 2020-10-05 RX ORDER — MELOXICAM 7.5 MG/1
7.5 TABLET ORAL DAILY
Qty: 90 TABLET | Refills: 0 | Status: SHIPPED | OUTPATIENT
Start: 2020-10-05 | End: 2022-06-14

## 2020-10-05 RX ORDER — MEMANTINE HYDROCHLORIDE 5 MG/1
5 TABLET ORAL DAILY
Qty: 90 TABLET | Refills: 0 | Status: SHIPPED | OUTPATIENT
Start: 2020-10-05 | End: 2021-01-26 | Stop reason: SDUPTHER

## 2020-10-05 RX ORDER — LISINOPRIL 5 MG/1
5 TABLET ORAL DAILY
Qty: 90 TABLET | Refills: 1 | Status: SHIPPED | OUTPATIENT
Start: 2020-10-05 | End: 2020-10-06

## 2020-10-06 DIAGNOSIS — I10 ESSENTIAL HYPERTENSION: Primary | ICD-10-CM

## 2020-10-06 RX ORDER — LISINOPRIL 10 MG/1
10 TABLET ORAL DAILY
Qty: 90 TABLET | Refills: 3 | Status: SHIPPED | OUTPATIENT
Start: 2020-10-06 | End: 2021-03-09

## 2020-10-14 ENCOUNTER — TELEPHONE (OUTPATIENT)
Dept: NEUROLOGY | Facility: CLINIC | Age: 70
End: 2020-10-14

## 2020-10-19 ENCOUNTER — OFFICE VISIT (OUTPATIENT)
Dept: FAMILY MEDICINE CLINIC | Facility: CLINIC | Age: 70
End: 2020-10-19

## 2020-10-19 VITALS
TEMPERATURE: 98 F | SYSTOLIC BLOOD PRESSURE: 150 MMHG | OXYGEN SATURATION: 98 % | WEIGHT: 124 LBS | DIASTOLIC BLOOD PRESSURE: 90 MMHG | BODY MASS INDEX: 22.82 KG/M2 | HEIGHT: 62 IN | RESPIRATION RATE: 16 BRPM | HEART RATE: 98 BPM

## 2020-10-19 DIAGNOSIS — L02.91 ABSCESS: Primary | ICD-10-CM

## 2020-10-19 PROCEDURE — 99213 OFFICE O/P EST LOW 20 MIN: CPT | Performed by: FAMILY MEDICINE

## 2020-10-19 PROCEDURE — 1160F RVW MEDS BY RX/DR IN RCRD: CPT | Performed by: FAMILY MEDICINE

## 2020-10-19 RX ORDER — SULFAMETHOXAZOLE AND TRIMETHOPRIM 800; 160 MG/1; MG/1
1 TABLET ORAL EVERY 12 HOURS SCHEDULED
Qty: 14 TABLET | Refills: 0 | Status: SHIPPED | OUTPATIENT
Start: 2020-10-19 | End: 2020-10-26

## 2020-10-21 ENCOUNTER — OFFICE VISIT (OUTPATIENT)
Dept: FAMILY MEDICINE CLINIC | Facility: CLINIC | Age: 70
End: 2020-10-21

## 2020-10-21 VITALS
BODY MASS INDEX: 24.32 KG/M2 | TEMPERATURE: 97.6 F | OXYGEN SATURATION: 97 % | HEIGHT: 60 IN | RESPIRATION RATE: 18 BRPM | SYSTOLIC BLOOD PRESSURE: 142 MMHG | DIASTOLIC BLOOD PRESSURE: 78 MMHG | HEART RATE: 103 BPM | WEIGHT: 123.9 LBS

## 2020-10-21 DIAGNOSIS — L02.91 ABSCESS: Primary | ICD-10-CM

## 2020-10-21 PROCEDURE — 10060 I&D ABSCESS SIMPLE/SINGLE: CPT | Performed by: FAMILY MEDICINE

## 2020-10-21 PROCEDURE — 1036F TOBACCO NON-USER: CPT | Performed by: FAMILY MEDICINE

## 2020-10-21 PROCEDURE — 99213 OFFICE O/P EST LOW 20 MIN: CPT | Performed by: FAMILY MEDICINE

## 2020-10-21 PROCEDURE — 1160F RVW MEDS BY RX/DR IN RCRD: CPT | Performed by: FAMILY MEDICINE

## 2020-10-21 PROCEDURE — 87147 CULTURE TYPE IMMUNOLOGIC: CPT | Performed by: FAMILY MEDICINE

## 2020-10-21 PROCEDURE — 87070 CULTURE OTHR SPECIMN AEROBIC: CPT | Performed by: FAMILY MEDICINE

## 2020-10-21 PROCEDURE — 87205 SMEAR GRAM STAIN: CPT | Performed by: FAMILY MEDICINE

## 2020-10-23 ENCOUNTER — OFFICE VISIT (OUTPATIENT)
Dept: FAMILY MEDICINE CLINIC | Facility: CLINIC | Age: 70
End: 2020-10-23

## 2020-10-23 VITALS
BODY MASS INDEX: 24.35 KG/M2 | SYSTOLIC BLOOD PRESSURE: 138 MMHG | HEART RATE: 78 BPM | TEMPERATURE: 97.8 F | WEIGHT: 124 LBS | HEIGHT: 60 IN | OXYGEN SATURATION: 97 % | DIASTOLIC BLOOD PRESSURE: 82 MMHG | RESPIRATION RATE: 18 BRPM

## 2020-10-23 DIAGNOSIS — L02.91 ABSCESS: Primary | ICD-10-CM

## 2020-10-23 LAB
BACTERIA WND AEROBE CULT: ABNORMAL
GRAM STN SPEC: ABNORMAL
GRAM STN SPEC: ABNORMAL

## 2020-10-23 PROCEDURE — 99024 POSTOP FOLLOW-UP VISIT: CPT | Performed by: FAMILY MEDICINE

## 2020-10-23 PROCEDURE — 3008F BODY MASS INDEX DOCD: CPT | Performed by: FAMILY MEDICINE

## 2020-11-03 ENCOUNTER — OFFICE VISIT (OUTPATIENT)
Dept: OBGYN CLINIC | Facility: MEDICAL CENTER | Age: 70
End: 2020-11-03
Payer: COMMERCIAL

## 2020-11-03 VITALS
WEIGHT: 130 LBS | SYSTOLIC BLOOD PRESSURE: 130 MMHG | HEART RATE: 74 BPM | HEIGHT: 62 IN | BODY MASS INDEX: 23.92 KG/M2 | DIASTOLIC BLOOD PRESSURE: 82 MMHG | TEMPERATURE: 98.1 F

## 2020-11-03 DIAGNOSIS — M65.4 TENOSYNOVITIS, DE QUERVAIN: ICD-10-CM

## 2020-11-03 PROCEDURE — 99204 OFFICE O/P NEW MOD 45 MIN: CPT | Performed by: ORTHOPAEDIC SURGERY

## 2020-11-03 PROCEDURE — 3075F SYST BP GE 130 - 139MM HG: CPT | Performed by: ORTHOPAEDIC SURGERY

## 2020-11-03 PROCEDURE — 3008F BODY MASS INDEX DOCD: CPT | Performed by: FAMILY MEDICINE

## 2020-11-03 PROCEDURE — 1160F RVW MEDS BY RX/DR IN RCRD: CPT | Performed by: ORTHOPAEDIC SURGERY

## 2020-11-03 PROCEDURE — 3008F BODY MASS INDEX DOCD: CPT | Performed by: ORTHOPAEDIC SURGERY

## 2020-11-03 PROCEDURE — 3079F DIAST BP 80-89 MM HG: CPT | Performed by: ORTHOPAEDIC SURGERY

## 2020-11-03 PROCEDURE — 1036F TOBACCO NON-USER: CPT | Performed by: ORTHOPAEDIC SURGERY

## 2020-11-03 PROCEDURE — 20550 NJX 1 TENDON SHEATH/LIGAMENT: CPT | Performed by: ORTHOPAEDIC SURGERY

## 2020-11-03 RX ORDER — LIDOCAINE HYDROCHLORIDE 5 MG/ML
0.5 INJECTION, SOLUTION INFILTRATION; PERINEURAL
Status: COMPLETED | OUTPATIENT
Start: 2020-11-03 | End: 2020-11-03

## 2020-11-03 RX ORDER — BETAMETHASONE SODIUM PHOSPHATE AND BETAMETHASONE ACETATE 3; 3 MG/ML; MG/ML
3 INJECTION, SUSPENSION INTRA-ARTICULAR; INTRALESIONAL; INTRAMUSCULAR; SOFT TISSUE
Status: COMPLETED | OUTPATIENT
Start: 2020-11-03 | End: 2020-11-03

## 2020-11-03 RX ADMIN — BETAMETHASONE SODIUM PHOSPHATE AND BETAMETHASONE ACETATE 3 MG: 3; 3 INJECTION, SUSPENSION INTRA-ARTICULAR; INTRALESIONAL; INTRAMUSCULAR; SOFT TISSUE at 09:57

## 2020-11-03 RX ADMIN — LIDOCAINE HYDROCHLORIDE 0.5 ML: 5 INJECTION, SOLUTION INFILTRATION; PERINEURAL at 09:57

## 2020-12-01 ENCOUNTER — OFFICE VISIT (OUTPATIENT)
Dept: OBGYN CLINIC | Facility: MEDICAL CENTER | Age: 70
End: 2020-12-01
Payer: COMMERCIAL

## 2020-12-01 VITALS
BODY MASS INDEX: 25.52 KG/M2 | HEIGHT: 60 IN | HEART RATE: 93 BPM | TEMPERATURE: 99.7 F | SYSTOLIC BLOOD PRESSURE: 171 MMHG | DIASTOLIC BLOOD PRESSURE: 82 MMHG | WEIGHT: 130 LBS

## 2020-12-01 DIAGNOSIS — M65.4 TENOSYNOVITIS, DE QUERVAIN: Primary | ICD-10-CM

## 2020-12-01 PROCEDURE — 3079F DIAST BP 80-89 MM HG: CPT | Performed by: ORTHOPAEDIC SURGERY

## 2020-12-01 PROCEDURE — 1036F TOBACCO NON-USER: CPT | Performed by: ORTHOPAEDIC SURGERY

## 2020-12-01 PROCEDURE — 99213 OFFICE O/P EST LOW 20 MIN: CPT | Performed by: ORTHOPAEDIC SURGERY

## 2020-12-01 PROCEDURE — 3077F SYST BP >= 140 MM HG: CPT | Performed by: ORTHOPAEDIC SURGERY

## 2020-12-01 PROCEDURE — 3008F BODY MASS INDEX DOCD: CPT | Performed by: ORTHOPAEDIC SURGERY

## 2020-12-01 PROCEDURE — 1160F RVW MEDS BY RX/DR IN RCRD: CPT | Performed by: ORTHOPAEDIC SURGERY

## 2020-12-01 PROCEDURE — 3008F BODY MASS INDEX DOCD: CPT | Performed by: FAMILY MEDICINE

## 2020-12-01 PROCEDURE — 20550 NJX 1 TENDON SHEATH/LIGAMENT: CPT | Performed by: ORTHOPAEDIC SURGERY

## 2020-12-01 RX ORDER — LIDOCAINE HYDROCHLORIDE 20 MG/ML
0.5 INJECTION, SOLUTION EPIDURAL; INFILTRATION; INTRACAUDAL; PERINEURAL
Status: COMPLETED | OUTPATIENT
Start: 2020-12-01 | End: 2020-12-01

## 2020-12-01 RX ORDER — BETAMETHASONE SODIUM PHOSPHATE AND BETAMETHASONE ACETATE 3; 3 MG/ML; MG/ML
3 INJECTION, SUSPENSION INTRA-ARTICULAR; INTRALESIONAL; INTRAMUSCULAR; SOFT TISSUE
Status: COMPLETED | OUTPATIENT
Start: 2020-12-01 | End: 2020-12-01

## 2020-12-01 RX ADMIN — BETAMETHASONE SODIUM PHOSPHATE AND BETAMETHASONE ACETATE 3 MG: 3; 3 INJECTION, SUSPENSION INTRA-ARTICULAR; INTRALESIONAL; INTRAMUSCULAR; SOFT TISSUE at 10:07

## 2020-12-01 RX ADMIN — LIDOCAINE HYDROCHLORIDE 0.5 ML: 20 INJECTION, SOLUTION EPIDURAL; INFILTRATION; INTRACAUDAL; PERINEURAL at 10:07

## 2021-01-05 ENCOUNTER — OFFICE VISIT (OUTPATIENT)
Dept: OBGYN CLINIC | Facility: MEDICAL CENTER | Age: 71
End: 2021-01-05
Payer: COMMERCIAL

## 2021-01-05 VITALS
SYSTOLIC BLOOD PRESSURE: 169 MMHG | DIASTOLIC BLOOD PRESSURE: 80 MMHG | HEART RATE: 80 BPM | HEIGHT: 60 IN | WEIGHT: 130 LBS | BODY MASS INDEX: 25.52 KG/M2

## 2021-01-05 DIAGNOSIS — M65.4 DE QUERVAIN'S TENOSYNOVITIS, RIGHT: Primary | ICD-10-CM

## 2021-01-05 PROCEDURE — 99213 OFFICE O/P EST LOW 20 MIN: CPT | Performed by: ORTHOPAEDIC SURGERY

## 2021-01-05 PROCEDURE — 1160F RVW MEDS BY RX/DR IN RCRD: CPT | Performed by: ORTHOPAEDIC SURGERY

## 2021-01-05 PROCEDURE — 20550 NJX 1 TENDON SHEATH/LIGAMENT: CPT | Performed by: ORTHOPAEDIC SURGERY

## 2021-01-05 PROCEDURE — 1036F TOBACCO NON-USER: CPT | Performed by: ORTHOPAEDIC SURGERY

## 2021-01-05 RX ORDER — LIDOCAINE HYDROCHLORIDE 10 MG/ML
0.5 INJECTION, SOLUTION INFILTRATION; PERINEURAL
Status: COMPLETED | OUTPATIENT
Start: 2021-01-05 | End: 2021-01-05

## 2021-01-05 RX ORDER — BETAMETHASONE SODIUM PHOSPHATE AND BETAMETHASONE ACETATE 3; 3 MG/ML; MG/ML
3 INJECTION, SUSPENSION INTRA-ARTICULAR; INTRALESIONAL; INTRAMUSCULAR; SOFT TISSUE
Status: COMPLETED | OUTPATIENT
Start: 2021-01-05 | End: 2021-01-05

## 2021-01-05 RX ADMIN — LIDOCAINE HYDROCHLORIDE 0.5 ML: 10 INJECTION, SOLUTION INFILTRATION; PERINEURAL at 10:35

## 2021-01-05 RX ADMIN — BETAMETHASONE SODIUM PHOSPHATE AND BETAMETHASONE ACETATE 3 MG: 3; 3 INJECTION, SUSPENSION INTRA-ARTICULAR; INTRALESIONAL; INTRAMUSCULAR; SOFT TISSUE at 10:35

## 2021-01-05 NOTE — PROGRESS NOTES
Chief Complaint     Right wrist pain     History of Present Illness     Aniya Parra is a 69 y o  female who presents to the office today for a follow up evalaution of her bilateral de Quervain tenosynovitis  At the last visit she received her 2nd corticosteroid injection to the left wrist in the 1st dorsal compartment  Patient states that that fully relieved her symptoms on the left side and she has no pain today about the left wrist   In the past she has had 1 corticosteroid injection to the right 1st dorsal compartment which did give her relief up until a few weeks ago  Today she does report some soreness and pain in the right 1st dorsal compartment  This is worse when she is tying her shoes or with increased activity  She denies any new incident of injury or trauma  She denies any numbness and tingling today  She does note 1 episode numbness at night about 2 days ago  This did resolve with use of her thumb spica brace  Past Medical History:   Diagnosis Date    Arthralgia of hip     left    Arthritis     Chronic pain disorder     low back and shoulder    Dyslipidemia     Hyperlipidemia     Hypertension     Neck pain     Pemphigus     Pre-diabetes     Shoulder pain     Vitamin D deficiency        Past Surgical History:   Procedure Laterality Date    CATARACT EXTRACTION Right     COLONOSCOPY      NE COLONOSCOPY FLX DX W/COLLJ SPEC WHEN PFRMD N/A 11/10/2016    Procedure: COLONOSCOPY;  Surgeon: Chelsi Black MD;  Location: [facility];   Service: Gastroenterology    NE XCAPSL CTRC RMVL INSJ IO LENS PROSTH W/O ECP Left 9/27/2018    Procedure: EXTRACAPSULAR CATARACT REMOVAL/INSERTION OF INTRAOCULAR LENS;  Surgeon: Isaac Thibodeaux MD;  [facility];  Service: Ophthalmology       No Known Allergies    Current Outpatient Medications on File Prior to Visit   Medication Sig Dispense Refill    ammonium lactate (LAC-HYDRIN) 12 % lotion apply to affected area twice a day if needed for DRY SKIN 400 g 0    aspirin (RA Aspirin EC) 81 mg EC tablet Take 1 tablet (81 mg total) by mouth daily 90 tablet 1    atorvastatin (LIPITOR) 40 mg tablet Take 1 tablet (40 mg total) by mouth every evening 90 tablet 1    Elastic Bandages & Supports (Thumb Splint/Neoprene Medium) MISC by Does not apply route daily 2 each 0    fluticasone (FLONASE) 50 mcg/act nasal spray 1 spray into each nostril daily 16 g 1    lisinopril (ZESTRIL) 10 mg tablet Take 1 tablet (10 mg total) by mouth daily 90 tablet 3    meclizine (ANTIVERT) 25 mg tablet Take 1 tablet (25 mg total) by mouth every 8 (eight) hours (Patient taking differently: Take 25 mg by mouth as needed ) 30 tablet 0    meloxicam (MOBIC) 7 5 mg tablet Take 1 tablet (7 5 mg total) by mouth daily 90 tablet 0    memantine (NAMENDA) 5 mg tablet Take 1 tablet (5 mg total) by mouth daily 90 tablet 0    metFORMIN (GLUCOPHAGE) 500 mg tablet Take 1 tablet (500 mg total) by mouth daily with breakfast 90 tablet 1    omega-3-acid ethyl esters (LOVAZA) 1 g capsule Take 2 capsules (2 g total) by mouth 2 (two) times a day 180 capsule 1    triamterene-hydrochlorothiazide (MAXZIDE-25) 37 5-25 mg per tablet Take 1 tablet by mouth daily 90 tablet 1    furosemide (LASIX) 20 mg tablet Take 0 5 tablets (10 mg total) by mouth daily (Patient not taking: Reported on 4/16/2020) 5 tablet 0     No current facility-administered medications on file prior to visit          Social History     Tobacco Use    Smoking status: Never Smoker    Smokeless tobacco: Never Used   Substance Use Topics    Alcohol use: Never     Frequency: Never     Binge frequency: Never    Drug use: No       Family History   Problem Relation Age of Onset    Hyperlipidemia Mother     Hypertension Mother     Heart disease Father     No Known Problems Sister     No Known Problems Sister     No Known Problems Sister     No Known Problems Maternal Aunt     No Known Problems Paternal Aunt     No Known Problems Paternal Aunt        Review of Systems     As stated in the HPI  All other systems were reviewed and are negative  Physical Exam     /80   Pulse 80   Ht 5' (1 524 m)   Wt 59 kg (130 lb)   BMI 25 39 kg/m²     GENERAL: This is a well-developed, well-nourished, age-appropriate patient in no acute distress  The patient is alert and oriented x3  Pleasant and cooperative  Eyes: Anicteric sclerae  Extraocular movements appear intact  HENT: Nares are patent with no drainage  Lungs: There is equal chest rise on inspection  Breathing is non-labored with no audible wheezing  Cardiovascular: No cyanosis  No upper extremity lymphadema  Skin: Skin is warm to touch  No obvious skin lesions or rashes other than described below  Neurologic: No ataxia  Psychiatric: Mood and affect are appropriate  Right wrist  Skin intact  No erythema or ecchymosis noted  No swelling noted  Full range of motion of wrist flexion and extension  DPC 0  Nontender at thumb CMC  Tender to palpate 1st dorsal compartment  Positive Finkelstein  5/5 Motor to the APB, FDI, FDP2, FDP5, EDC  Sensation intact to light touch in the median, radial, and ulnar nerve distribution  Brisk capillary refill noted to all digits      Data Review     Results Reviewed     None             Imaging:  None today     Assessment and Plan      Diagnoses and all orders for this visit:    De Quervain's tenosynovitis, right    Other orders  -     Hand/upper extremity injection: R extensor compartment 1           74-year-old female with resolved left de Quervain tenosynovitis and symptomatic right de Quervain tenosynovitis  Patient and I discussed nonsurgical management corticosteroid injection along with bracing like a cast for 2 weeks of her thumb spica brace  Patient was amenable to this plan   Risks of the injection including pain, infection, tendon rupture, progression of arthritis, fat atrophy, depigmentation, and worsening of symptoms were all discussed with the patient  There was good understanding by the patient and they wish to proceed  Injection was tolerated well  This was her 2nd injection to the right 1st dorsal compartment  We discussed if she continues to have symptoms refractory to nonsurgical management that surgical intervention would be warranted  She may continue activities as tolerated without her brace in 2 weeks time  I will see her back on an as-needed basis  Follow Up:  P r n  To Do Next Visit:     PROCEDURES PERFORMED:  Hand/upper extremity injection: R extensor compartment 1  Universal Protocol:  Consent: Verbal consent obtained    Consent given by: patient  Timeout called at: 1/5/2021 10:34 AM   Procedure Details  Condition:de Quervain's tenosynovitis Site: R extensor compartment 1   Needle size: 25 G  Ultrasound guidance: no  Medications administered: 0 5 mL lidocaine 1 %; 3 mg betamethasone acetate-betamethasone sodium phosphate 6 (3-3) mg/mL    Patient tolerance: patient tolerated the procedure well with no immediate complications  Dressing:  Sterile dressing applied              Scribe Attestation    I,:  Lucia Luong MA am acting as a scribe while in the presence of the attending physician :       I,:  Carroll Milligan MD personally performed the services described in this documentation    as scribed in my presence :

## 2021-01-26 ENCOUNTER — OFFICE VISIT (OUTPATIENT)
Dept: FAMILY MEDICINE CLINIC | Facility: CLINIC | Age: 71
End: 2021-01-26

## 2021-01-26 VITALS
OXYGEN SATURATION: 99 % | TEMPERATURE: 97.3 F | RESPIRATION RATE: 20 BRPM | HEART RATE: 85 BPM | DIASTOLIC BLOOD PRESSURE: 60 MMHG | SYSTOLIC BLOOD PRESSURE: 120 MMHG | HEIGHT: 60 IN | BODY MASS INDEX: 23.64 KG/M2 | WEIGHT: 120.4 LBS

## 2021-01-26 DIAGNOSIS — R41.89 COGNITIVE DECLINE: ICD-10-CM

## 2021-01-26 DIAGNOSIS — R73.01 IMPAIRED FASTING GLUCOSE: ICD-10-CM

## 2021-01-26 DIAGNOSIS — H69.83 DYSFUNCTION OF BOTH EUSTACHIAN TUBES: ICD-10-CM

## 2021-01-26 DIAGNOSIS — L85.3 DRY SKIN: ICD-10-CM

## 2021-01-26 DIAGNOSIS — I10 ESSENTIAL HYPERTENSION: ICD-10-CM

## 2021-01-26 PROCEDURE — 3725F SCREEN DEPRESSION PERFORMED: CPT | Performed by: FAMILY MEDICINE

## 2021-01-26 PROCEDURE — 99214 OFFICE O/P EST MOD 30 MIN: CPT | Performed by: FAMILY MEDICINE

## 2021-01-26 PROCEDURE — 3288F FALL RISK ASSESSMENT DOCD: CPT | Performed by: FAMILY MEDICINE

## 2021-01-26 PROCEDURE — 3074F SYST BP LT 130 MM HG: CPT | Performed by: FAMILY MEDICINE

## 2021-01-26 PROCEDURE — 1100F PTFALLS ASSESS-DOCD GE2>/YR: CPT | Performed by: FAMILY MEDICINE

## 2021-01-26 PROCEDURE — 3008F BODY MASS INDEX DOCD: CPT | Performed by: FAMILY MEDICINE

## 2021-01-26 PROCEDURE — 3008F BODY MASS INDEX DOCD: CPT | Performed by: ORTHOPAEDIC SURGERY

## 2021-01-26 PROCEDURE — 1036F TOBACCO NON-USER: CPT | Performed by: FAMILY MEDICINE

## 2021-01-26 PROCEDURE — 1160F RVW MEDS BY RX/DR IN RCRD: CPT | Performed by: FAMILY MEDICINE

## 2021-01-26 PROCEDURE — 3078F DIAST BP <80 MM HG: CPT | Performed by: FAMILY MEDICINE

## 2021-01-26 RX ORDER — MEMANTINE HYDROCHLORIDE 5 MG/1
5 TABLET ORAL DAILY
Qty: 90 TABLET | Refills: 3 | Status: SHIPPED | OUTPATIENT
Start: 2021-01-26 | End: 2022-01-03 | Stop reason: SDUPTHER

## 2021-01-26 RX ORDER — FLUTICASONE PROPIONATE 50 MCG
SPRAY, SUSPENSION (ML) NASAL
Qty: 16 G | Refills: 1 | Status: SHIPPED | OUTPATIENT
Start: 2021-01-26 | End: 2021-09-07 | Stop reason: SDUPTHER

## 2021-01-26 RX ORDER — AMMONIUM LACTATE 12 G/100G
LOTION TOPICAL
Qty: 400 G | Refills: 0 | Status: SHIPPED | OUTPATIENT
Start: 2021-01-26 | End: 2021-03-08

## 2021-01-26 RX ORDER — TRIAMTERENE AND HYDROCHLOROTHIAZIDE 37.5; 25 MG/1; MG/1
1 TABLET ORAL DAILY
Qty: 90 TABLET | Refills: 1 | Status: SHIPPED | OUTPATIENT
Start: 2021-01-26 | End: 2021-07-20 | Stop reason: SDUPTHER

## 2021-01-26 NOTE — ASSESSMENT & PLAN NOTE
Discontinue Lisinopril  Keep track of BP at home if BP at home over 140/90 on more than 4 days a week please call   Follow up in 6 weeks to assess BP

## 2021-01-26 NOTE — PROGRESS NOTES
Assessment/Plan:    Hypertension  Discontinue Lisinopril  Keep track of BP at home if BP at home over 140/90 on more than 4 days a week please call   Follow up in 6 weeks to assess BP       Diagnoses and all orders for this visit:    Cognitive decline  -     memantine (NAMENDA) 5 mg tablet; Take 1 tablet (5 mg total) by mouth daily    Essential hypertension  -     triamterene-hydrochlorothiazide (MAXZIDE-25) 37 5-25 mg per tablet; Take 1 tablet by mouth daily    Impaired fasting glucose  -     metFORMIN (GLUCOPHAGE) 500 mg tablet; Take 1 tablet (500 mg total) by mouth daily with breakfast          Subjective:      Patient ID: Silvia Andrade is a 69 y o  female  79 yo female with known history of HTN, hyperlipidemia, s/p CVA here today for follow up of chronic conditions  At the last visit in October 2020, patient's daughter ws concerned about patient's attitude, her forgetfulness her labile mood  Patient was started on Namenda 5 mg  Currently patient states patient seems less aloof, is more involved in activities of daily living as well as shows more interest in her surroundings  Patient states is feels like her mood depends on what is going on around her  Patient states she does not have any social life and often sees her medical appointments as part of her social; life, if a provider does not mention she looks nice or gives her a compliment she feels sad and upset  Wrist pain has greatly improved  No episodes of dizziness over the last 3 months       The following portions of the patient's history were reviewed and updated as appropriate: She  has a past medical history of Arthralgia of hip, Arthritis, Chronic pain disorder, Dyslipidemia, Hyperlipidemia, Hypertension, Neck pain, Pemphigus, Pre-diabetes, Shoulder pain, and Vitamin D deficiency    She   Patient Active Problem List    Diagnosis Date Noted    Cerebellar infarct ([facility] [address] ) 12/08/2019    Spinal stenosis, lumbar region with neurogenic claudication  Acute pain of left shoulder 08/23/2018    Pre-op evaluation 02/08/2018    Muscle spasms of neck 12/04/2017    Arthralgia of left hip 07/19/2016    Slow transit constipation 07/19/2016    Impaired fasting glucose 07/19/2016    Chronic right shoulder pain 03/15/2016    Creatinine elevation 02/16/2015    Neck pain 11/19/2014    Low back pain 08/06/2014    Acquired trigger finger 10/09/2013    Vitamin D deficiency 07/29/2013    Mammogram abnormal 07/29/2013    Multiple joint pain 07/29/2013    Other symptoms involving skin and integumentary tissues 06/03/2013    Pemphigus 04/08/2013    Bursitis 12/03/2012    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/2012    Osteoarthrosis 09/24/2012    Dyslipidemia 06/19/2012    Hypertension 06/19/2012    Hyperlipidemia 06/03/2008     She  has a past surgical history that includes pr colonoscopy flx dx w/collj spec when pfrmd (N/A, 11/10/2016); Colonoscopy; Cataract extraction (Right); and pr xcapsl ctrc rmvl insj io lens prosth w/o ecp (Left, 9/27/2018)  Her family history includes Heart disease in her father; Hyperlipidemia in her mother; Hypertension in her mother; No Known Problems in her maternal aunt, paternal aunt, paternal aunt, sister, sister, and sister  She  reports that she has never smoked  She has never used smokeless tobacco  She reports that she does not drink alcohol or use drugs    Current Outpatient Medications   Medication Sig Dispense Refill    aspirin (RA Aspirin EC) 81 mg EC tablet Take 1 tablet (81 mg total) by mouth daily 90 tablet 1    atorvastatin (LIPITOR) 40 mg tablet Take 1 tablet (40 mg total) by mouth every evening 90 tablet 1    lisinopril (ZESTRIL) 10 mg tablet Take 1 tablet (10 mg total) by mouth daily 90 tablet 3    meloxicam (MOBIC) 7 5 mg tablet Take 1 tablet (7 5 mg total) by mouth daily 90 tablet 0    memantine (NAMENDA) 5 mg tablet Take 1 tablet (5 mg total) by mouth daily 90 tablet 3    metFORMIN (GLUCOPHAGE) 500 mg tablet Take 1 tablet (500 mg total) by mouth daily with breakfast 90 tablet 1    omega-3-acid ethyl esters (LOVAZA) 1 g capsule Take 2 capsules (2 g total) by mouth 2 (two) times a day 180 capsule 1    ammonium lactate (LAC-HYDRIN) 12 % lotion apply to affected area twice a day if needed for DRY SKIN 400 g 0    Elastic Bandages & Supports (Thumb Splint/Neoprene Medium) MISC by Does not apply route daily 2 each 0    fluticasone (FLONASE) 50 mcg/act nasal spray instill 1 spray into each nostril once daily 16 g 1    furosemide (LASIX) 20 mg tablet Take 0 5 tablets (10 mg total) by mouth daily (Patient not taking: Reported on 4/16/2020) 5 tablet 0    meclizine (ANTIVERT) 25 mg tablet Take 1 tablet (25 mg total) by mouth every 8 (eight) hours (Patient not taking: Reported on 1/26/2021) 30 tablet 0    triamterene-hydrochlorothiazide (MAXZIDE-25) 37 5-25 mg per tablet Take 1 tablet by mouth daily 90 tablet 1     No current facility-administered medications for this visit        Current Outpatient Medications on File Prior to Visit   Medication Sig    aspirin (RA Aspirin EC) 81 mg EC tablet Take 1 tablet (81 mg total) by mouth daily    atorvastatin (LIPITOR) 40 mg tablet Take 1 tablet (40 mg total) by mouth every evening    lisinopril (ZESTRIL) 10 mg tablet Take 1 tablet (10 mg total) by mouth daily    meloxicam (MOBIC) 7 5 mg tablet Take 1 tablet (7 5 mg total) by mouth daily    omega-3-acid ethyl esters (LOVAZA) 1 g capsule Take 2 capsules (2 g total) by mouth 2 (two) times a day    [DISCONTINUED] ammonium lactate (LAC-HYDRIN) 12 % lotion apply to affected area twice a day if needed for DRY SKIN    [DISCONTINUED] fluticasone (FLONASE) 50 mcg/act nasal spray 1 spray into each nostril daily    [DISCONTINUED] memantine (NAMENDA) 5 mg tablet Take 1 tablet (5 mg total) by mouth daily    [DISCONTINUED] metFORMIN (GLUCOPHAGE) 500 mg tablet Take 1 tablet (500 mg total) by mouth daily with breakfast    Elastic Bandages & Supports (Thumb Splint/Neoprene Medium) MISC by Does not apply route daily    furosemide (LASIX) 20 mg tablet Take 0 5 tablets (10 mg total) by mouth daily (Patient not taking: Reported on 4/16/2020)    meclizine (ANTIVERT) 25 mg tablet Take 1 tablet (25 mg total) by mouth every 8 (eight) hours (Patient not taking: Reported on 1/26/2021)    [DISCONTINUED] triamterene-hydrochlorothiazide (MAXZIDE-25) 37 5-25 mg per tablet Take 1 tablet by mouth daily     No current facility-administered medications on file prior to visit       Review of Systems   All other systems reviewed and are negative  Objective:      /60 (BP Location: Right arm, Patient Position: Sitting, Cuff Size: Standard)   Pulse 85   Temp (!) 97 3 °F (36 3 °C) (Temporal)   Resp 20   Ht 5' (1 524 m)   Wt 54 6 kg (120 lb 6 4 oz)   SpO2 99%   BMI 23 51 kg/m²          Physical Exam  Vitals signs and nursing note reviewed  Constitutional:       Appearance: She is well-developed  HENT:      Head: Normocephalic  Right Ear: External ear normal       Left Ear: External ear normal       Nose: Nose normal    Eyes:      Conjunctiva/sclera: Conjunctivae normal       Pupils: Pupils are equal, round, and reactive to light  Neck:      Musculoskeletal: Normal range of motion and neck supple  Thyroid: No thyromegaly  Cardiovascular:      Rate and Rhythm: Normal rate and regular rhythm  Heart sounds: Normal heart sounds  Pulmonary:      Effort: Pulmonary effort is normal       Breath sounds: Normal breath sounds  Abdominal:      Palpations: Abdomen is soft  Tenderness: There is no abdominal tenderness  There is no guarding or rebound  Musculoskeletal: Normal range of motion  Skin:     General: Skin is dry  Neurological:      Mental Status: She is alert and oriented to person, place, and time  Deep Tendon Reflexes: Reflexes are normal and symmetric

## 2021-02-08 ENCOUNTER — TELEPHONE (OUTPATIENT)
Dept: FAMILY MEDICINE CLINIC | Facility: CLINIC | Age: 71
End: 2021-02-08

## 2021-02-08 ENCOUNTER — LAB (OUTPATIENT)
Dept: LAB | Age: 71
End: 2021-02-08
Payer: COMMERCIAL

## 2021-02-08 DIAGNOSIS — E78.5 DYSLIPIDEMIA: ICD-10-CM

## 2021-02-08 DIAGNOSIS — I10 ESSENTIAL HYPERTENSION: ICD-10-CM

## 2021-02-08 DIAGNOSIS — R41.89 COGNITIVE DECLINE: ICD-10-CM

## 2021-02-08 LAB
ALBUMIN SERPL BCP-MCNC: 3.7 G/DL (ref 3.5–5)
ALP SERPL-CCNC: 75 U/L (ref 46–116)
ALT SERPL W P-5'-P-CCNC: 59 U/L (ref 12–78)
ANION GAP SERPL CALCULATED.3IONS-SCNC: 5 MMOL/L (ref 4–13)
AST SERPL W P-5'-P-CCNC: 51 U/L (ref 5–45)
BASOPHILS # BLD AUTO: 0.06 THOUSANDS/ΜL (ref 0–0.1)
BASOPHILS NFR BLD AUTO: 1 % (ref 0–1)
BILIRUB SERPL-MCNC: 0.26 MG/DL (ref 0.2–1)
BUN SERPL-MCNC: 23 MG/DL (ref 5–25)
CALCIUM SERPL-MCNC: 9.4 MG/DL (ref 8.3–10.1)
CHLORIDE SERPL-SCNC: 111 MMOL/L (ref 100–108)
CHOLEST SERPL-MCNC: 125 MG/DL (ref 50–200)
CO2 SERPL-SCNC: 30 MMOL/L (ref 21–32)
CREAT SERPL-MCNC: 0.76 MG/DL (ref 0.6–1.3)
CREAT UR-MCNC: 101 MG/DL
EOSINOPHIL # BLD AUTO: 0.15 THOUSAND/ΜL (ref 0–0.61)
EOSINOPHIL NFR BLD AUTO: 2 % (ref 0–6)
ERYTHROCYTE [DISTWIDTH] IN BLOOD BY AUTOMATED COUNT: 12.7 % (ref 11.6–15.1)
FOLATE SERPL-MCNC: >20 NG/ML (ref 3.1–17.5)
GFR SERPL CREATININE-BSD FRML MDRD: 79 ML/MIN/1.73SQ M
GLUCOSE P FAST SERPL-MCNC: 95 MG/DL (ref 65–99)
HCT VFR BLD AUTO: 40.3 % (ref 34.8–46.1)
HDLC SERPL-MCNC: 69 MG/DL
HGB BLD-MCNC: 12.9 G/DL (ref 11.5–15.4)
IMM GRANULOCYTES # BLD AUTO: 0.02 THOUSAND/UL (ref 0–0.2)
IMM GRANULOCYTES NFR BLD AUTO: 0 % (ref 0–2)
LDLC SERPL CALC-MCNC: 48 MG/DL (ref 0–100)
LYMPHOCYTES # BLD AUTO: 4.04 THOUSANDS/ΜL (ref 0.6–4.47)
LYMPHOCYTES NFR BLD AUTO: 46 % (ref 14–44)
MCH RBC QN AUTO: 30.7 PG (ref 26.8–34.3)
MCHC RBC AUTO-ENTMCNC: 32 G/DL (ref 31.4–37.4)
MCV RBC AUTO: 96 FL (ref 82–98)
MICROALBUMIN UR-MCNC: 5.2 MG/L (ref 0–20)
MICROALBUMIN/CREAT 24H UR: 5 MG/G CREATININE (ref 0–30)
MONOCYTES # BLD AUTO: 0.65 THOUSAND/ΜL (ref 0.17–1.22)
MONOCYTES NFR BLD AUTO: 8 % (ref 4–12)
NEUTROPHILS # BLD AUTO: 3.77 THOUSANDS/ΜL (ref 1.85–7.62)
NEUTS SEG NFR BLD AUTO: 43 % (ref 43–75)
NONHDLC SERPL-MCNC: 56 MG/DL
NRBC BLD AUTO-RTO: 0 /100 WBCS
PLATELET # BLD AUTO: 218 THOUSANDS/UL (ref 149–390)
PMV BLD AUTO: 10.3 FL (ref 8.9–12.7)
POTASSIUM SERPL-SCNC: 3.9 MMOL/L (ref 3.5–5.3)
PROT SERPL-MCNC: 7.3 G/DL (ref 6.4–8.2)
RBC # BLD AUTO: 4.2 MILLION/UL (ref 3.81–5.12)
RPR SER QL: NORMAL
SODIUM SERPL-SCNC: 146 MMOL/L (ref 136–145)
TRIGL SERPL-MCNC: 39 MG/DL
TSH SERPL DL<=0.05 MIU/L-ACNC: 0.86 UIU/ML (ref 0.36–3.74)
VIT B12 SERPL-MCNC: 706 PG/ML (ref 100–900)
WBC # BLD AUTO: 8.69 THOUSAND/UL (ref 4.31–10.16)

## 2021-02-08 PROCEDURE — 82607 VITAMIN B-12: CPT

## 2021-02-08 PROCEDURE — 82570 ASSAY OF URINE CREATININE: CPT

## 2021-02-08 PROCEDURE — 84443 ASSAY THYROID STIM HORMONE: CPT

## 2021-02-08 PROCEDURE — 80053 COMPREHEN METABOLIC PANEL: CPT

## 2021-02-08 PROCEDURE — 82746 ASSAY OF FOLIC ACID SERUM: CPT

## 2021-02-08 PROCEDURE — 85025 COMPLETE CBC W/AUTO DIFF WBC: CPT

## 2021-02-08 PROCEDURE — 80061 LIPID PANEL: CPT

## 2021-02-08 PROCEDURE — 36415 COLL VENOUS BLD VENIPUNCTURE: CPT

## 2021-02-08 PROCEDURE — 86592 SYPHILIS TEST NON-TREP QUAL: CPT

## 2021-02-08 PROCEDURE — 86618 LYME DISEASE ANTIBODY: CPT

## 2021-02-08 PROCEDURE — 82043 UR ALBUMIN QUANTITATIVE: CPT

## 2021-02-08 NOTE — TELEPHONE ENCOUNTER
Pt's daughter Amanda Guajardo) stated she wants PCP to call her in regards to her insurance benefits  They mailed her a letter stating they might get taken away  Pt's daughter states her mom is elderly and has underlying conditions

## 2021-02-08 NOTE — TELEPHONE ENCOUNTER
Spoke to pt daughter who states pt may loose her social security benefits and also her health insurance  Daughter is worry about pt not having insurance  I advise to the daughter that pt still can be seen in office without insurance that pt will just have to talk to financial counselor to see how they can help

## 2021-02-09 LAB — B BURGDOR IGG+IGM SER-ACNC: 15

## 2021-03-08 DIAGNOSIS — L85.3 DRY SKIN: ICD-10-CM

## 2021-03-08 RX ORDER — AMMONIUM LACTATE 12 G/100G
LOTION TOPICAL
Qty: 400 G | Refills: 0 | Status: SHIPPED | OUTPATIENT
Start: 2021-03-08 | End: 2021-06-21

## 2021-03-09 ENCOUNTER — OFFICE VISIT (OUTPATIENT)
Dept: FAMILY MEDICINE CLINIC | Facility: CLINIC | Age: 71
End: 2021-03-09

## 2021-03-09 VITALS
TEMPERATURE: 97.6 F | WEIGHT: 121.7 LBS | RESPIRATION RATE: 18 BRPM | BODY MASS INDEX: 23.89 KG/M2 | DIASTOLIC BLOOD PRESSURE: 72 MMHG | HEIGHT: 60 IN | HEART RATE: 65 BPM | SYSTOLIC BLOOD PRESSURE: 128 MMHG | OXYGEN SATURATION: 98 %

## 2021-03-09 DIAGNOSIS — R73.01 IMPAIRED FASTING GLUCOSE: ICD-10-CM

## 2021-03-09 DIAGNOSIS — Z01.818 PRE-OP EVALUATION: ICD-10-CM

## 2021-03-09 DIAGNOSIS — E78.2 MIXED HYPERLIPIDEMIA: ICD-10-CM

## 2021-03-09 DIAGNOSIS — I10 ESSENTIAL HYPERTENSION: Primary | ICD-10-CM

## 2021-03-09 DIAGNOSIS — Z01.818 PREOP EXAMINATION: ICD-10-CM

## 2021-03-09 PROCEDURE — 1036F TOBACCO NON-USER: CPT | Performed by: FAMILY MEDICINE

## 2021-03-09 PROCEDURE — 3078F DIAST BP <80 MM HG: CPT | Performed by: FAMILY MEDICINE

## 2021-03-09 PROCEDURE — 1160F RVW MEDS BY RX/DR IN RCRD: CPT | Performed by: FAMILY MEDICINE

## 2021-03-09 PROCEDURE — 99214 OFFICE O/P EST MOD 30 MIN: CPT | Performed by: FAMILY MEDICINE

## 2021-03-09 PROCEDURE — 3074F SYST BP LT 130 MM HG: CPT | Performed by: FAMILY MEDICINE

## 2021-03-09 PROCEDURE — 3008F BODY MASS INDEX DOCD: CPT | Performed by: FAMILY MEDICINE

## 2021-03-09 PROCEDURE — 3008F BODY MASS INDEX DOCD: CPT | Performed by: ORTHOPAEDIC SURGERY

## 2021-03-09 NOTE — ASSESSMENT & PLAN NOTE
FLP completed 2/8/2021 reviewed with patient and her radha  Lipids at goal  Total cholestrol at goal  Triglycerides well with goal   Cholesterol 50 - 200 mg/dL 125    Comment: Cholesterol:       Desirable         <200 mg/dl       Borderline         200-239 mg/dl       High              >239          Triglycerides <=150 mg/dL 39    Comment: Triglyceride:      Normal          <150 mg/dl      Borderline High 150-199 mg/dl      High            200-499 mg/dl        Very High       >499 mg/dl     Specimen collection should occur prior to N-Acetylcysteine or Metamizole administration due to the potential for falsely depressed results     HDL, Direct >=40 mg/dL 69

## 2021-03-09 NOTE — LETTER
Re: Erik Mcnulty         1950    To whom it may concern,     Erik Mcnulty is a long standing patient of mine, she has multiple medical co morbid conditions, including Hypertension, Hyperlipidemia, Chronic Kidney disease Stage II, s/p cerebellar infarct  Due to patient's medical conditions she requires regular medical follow up and daily life sustaining medication and thus needs medical insurance      Please let me know if I can be of any further assistance    Sincerely,     Darell Liriano MD

## 2021-03-09 NOTE — PROGRESS NOTES
Assessment/Plan:    Hypertension  At goal  Continue Maxzide  BP journal shows BP at home in the 110 to 122 over 70 range      Impaired fasting glucose  Glucose, Fasting 65 - 99 mg/dL 95      BW from     Hyperlipidemia  FLP completed 2/8/2021 reviewed with patient and her kasieer  Lipids at goal  Total cholestrol at goal  Triglycerides well with goal   Cholesterol 50 - 200 mg/dL 125    Comment: Cholesterol:       Desirable         <200 mg/dl       Borderline         200-239 mg/dl       High              >239          Triglycerides <=150 mg/dL 39    Comment: Triglyceride:      Normal          <150 mg/dl      Borderline High 150-199 mg/dl      High            200-499 mg/dl        Very High       >499 mg/dl     Specimen collection should occur prior to N-Acetylcysteine or Metamizole administration due to the potential for falsely depressed results  HDL, Direct >=40 mg/dL 69          Pre-op evaluation  OK for dental procedure on 3/29/2021  Stop Aspirin 6 days prior  Take Maxzide with a sip of water       Diagnoses and all orders for this visit:    Essential hypertension    Preop examination    Impaired fasting glucose    Pre-op evaluation    Mixed hyperlipidemia          Subjective:      Patient ID: Kori Eisenberg is a 69 y o  female  Hypertension  This is a chronic problem  The current episode started more than 1 month ago  The problem is unchanged  The problem is controlled  There are no associated agents to hypertension  Risk factors for coronary artery disease include dyslipidemia and sedentary lifestyle  The current treatment provides significant improvement  Compliance problems include diet  Hypertensive end-organ damage includes kidney disease (mild CKD)     Medication Refill        The following portions of the patient's history were reviewed and updated as appropriate:   She  has a past medical history of Arthralgia of hip, Arthritis, Chronic pain disorder, Dyslipidemia, Hyperlipidemia, Hypertension, Neck pain, Pemphigus, Pre-diabetes, Shoulder pain, and Vitamin D deficiency  She   Patient Active Problem List    Diagnosis Date Noted    Cerebellar infarct ([facility] [address] ) 12/08/2019    Spinal stenosis, lumbar region with neurogenic claudication     Acute pain of left shoulder 08/23/2018    Pre-op evaluation 02/08/2018    Muscle spasms of neck 12/04/2017    Arthralgia of left hip 07/19/2016    Slow transit constipation 07/19/2016    Impaired fasting glucose 07/19/2016    Chronic right shoulder pain 03/15/2016    Creatinine elevation 02/16/2015    Neck pain 11/19/2014    Low back pain 08/06/2014    Acquired trigger finger 10/09/2013    Vitamin D deficiency 07/29/2013    Mammogram abnormal 07/29/2013    Multiple joint pain 07/29/2013    Other symptoms involving skin and integumentary tissues 06/03/2013    Pemphigus 04/08/2013    Bursitis 12/03/2012    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/2012    Osteoarthrosis 09/24/2012    Dyslipidemia 06/19/2012    Hypertension 06/19/2012    Hyperlipidemia 06/03/2008     She  has a past surgical history that includes pr colonoscopy flx dx w/collj spec when pfrmd (N/A, 11/10/2016); Colonoscopy; Cataract extraction (Right); and pr xcapsl ctrc rmvl insj io lens prosth w/o ecp (Left, 9/27/2018)  Her family history includes Heart disease in her father; Hyperlipidemia in her mother; Hypertension in her mother; No Known Problems in her maternal aunt, paternal aunt, paternal aunt, sister, sister, and sister  She  reports that she has never smoked  She has never used smokeless tobacco  She reports that she does not drink alcohol or use drugs    Current Outpatient Medications   Medication Sig Dispense Refill    ammonium lactate (LAC-HYDRIN) 12 % lotion apply to affected area twice a day if needed for DRY SKIN 400 g 0    aspirin (RA Aspirin EC) 81 mg EC tablet Take 1 tablet (81 mg total) by mouth daily 90 tablet 1    atorvastatin (LIPITOR) 40 mg tablet Take 1 tablet (40 mg total) by mouth every evening 90 tablet 1    fluticasone (FLONASE) 50 mcg/act nasal spray instill 1 spray into each nostril once daily 16 g 1    memantine (NAMENDA) 5 mg tablet Take 1 tablet (5 mg total) by mouth daily 90 tablet 3    metFORMIN (GLUCOPHAGE) 500 mg tablet Take 1 tablet (500 mg total) by mouth daily with breakfast 90 tablet 1    omega-3-acid ethyl esters (LOVAZA) 1 g capsule Take 2 capsules (2 g total) by mouth 2 (two) times a day 180 capsule 1    triamterene-hydrochlorothiazide (MAXZIDE-25) 37 5-25 mg per tablet Take 1 tablet by mouth daily 90 tablet 1    Elastic Bandages & Supports (Thumb Splint/Neoprene Medium) MISC by Does not apply route daily (Patient not taking: Reported on 3/9/2021) 2 each 0    furosemide (LASIX) 20 mg tablet Take 0 5 tablets (10 mg total) by mouth daily (Patient not taking: Reported on 4/16/2020) 5 tablet 0    meclizine (ANTIVERT) 25 mg tablet Take 1 tablet (25 mg total) by mouth every 8 (eight) hours (Patient not taking: Reported on 1/26/2021) 30 tablet 0    meloxicam (MOBIC) 7 5 mg tablet Take 1 tablet (7 5 mg total) by mouth daily (Patient not taking: Reported on 3/9/2021) 90 tablet 0     No current facility-administered medications for this visit        Current Outpatient Medications on File Prior to Visit   Medication Sig    ammonium lactate (LAC-HYDRIN) 12 % lotion apply to affected area twice a day if needed for DRY SKIN    aspirin (RA Aspirin EC) 81 mg EC tablet Take 1 tablet (81 mg total) by mouth daily    atorvastatin (LIPITOR) 40 mg tablet Take 1 tablet (40 mg total) by mouth every evening    fluticasone (FLONASE) 50 mcg/act nasal spray instill 1 spray into each nostril once daily    memantine (NAMENDA) 5 mg tablet Take 1 tablet (5 mg total) by mouth daily    metFORMIN (GLUCOPHAGE) 500 mg tablet Take 1 tablet (500 mg total) by mouth daily with breakfast    omega-3-acid ethyl esters (LOVAZA) 1 g capsule Take 2 capsules (2 g total) by mouth 2 (two) times a day    triamterene-hydrochlorothiazide (MAXZIDE-25) 37 5-25 mg per tablet Take 1 tablet by mouth daily    Elastic Bandages & Supports (Thumb Splint/Neoprene Medium) MISC by Does not apply route daily (Patient not taking: Reported on 3/9/2021)    furosemide (LASIX) 20 mg tablet Take 0 5 tablets (10 mg total) by mouth daily (Patient not taking: Reported on 4/16/2020)    meclizine (ANTIVERT) 25 mg tablet Take 1 tablet (25 mg total) by mouth every 8 (eight) hours (Patient not taking: Reported on 1/26/2021)    meloxicam (MOBIC) 7 5 mg tablet Take 1 tablet (7 5 mg total) by mouth daily (Patient not taking: Reported on 3/9/2021)    [DISCONTINUED] lisinopril (ZESTRIL) 10 mg tablet Take 1 tablet (10 mg total) by mouth daily (Patient not taking: Reported on 3/9/2021)     No current facility-administered medications on file prior to visit       Review of Systems      Objective:      /72   Pulse 65   Temp 97 6 °F (36 4 °C) (Temporal)   Resp 18   Ht 5' (1 524 m)   Wt 55 2 kg (121 lb 11 2 oz)   SpO2 98%   BMI 23 77 kg/m²          Physical Exam  Vitals signs and nursing note reviewed  Constitutional:       Appearance: She is well-developed  HENT:      Head: Normocephalic  Right Ear: External ear normal       Left Ear: External ear normal       Nose: Nose normal    Eyes:      Conjunctiva/sclera: Conjunctivae normal       Pupils: Pupils are equal, round, and reactive to light  Neck:      Musculoskeletal: Normal range of motion and neck supple  Thyroid: No thyromegaly  Cardiovascular:      Rate and Rhythm: Normal rate and regular rhythm  Heart sounds: Normal heart sounds  Pulmonary:      Effort: Pulmonary effort is normal       Breath sounds: Normal breath sounds  Abdominal:      Palpations: Abdomen is soft  Tenderness: There is no abdominal tenderness  There is no guarding or rebound  Musculoskeletal: Normal range of motion     Skin:     General: Skin is dry  Neurological:      Mental Status: She is alert and oriented to person, place, and time  Deep Tendon Reflexes: Reflexes are normal and symmetric

## 2021-03-10 DIAGNOSIS — Z23 ENCOUNTER FOR IMMUNIZATION: ICD-10-CM

## 2021-03-11 ENCOUNTER — TELEPHONE (OUTPATIENT)
Dept: NEUROLOGY | Facility: CLINIC | Age: 71
End: 2021-03-11

## 2021-03-11 NOTE — TELEPHONE ENCOUNTER
LMOM to r/s  Monday 5/17 [address] moving away  Pt coming in for memory - looking to r/s karly Rahman on Thursday morning

## 2021-03-16 ENCOUNTER — IMMUNIZATIONS (OUTPATIENT)
Dept: FAMILY MEDICINE CLINIC | Facility: HOSPITAL | Age: 71
End: 2021-03-16

## 2021-03-16 DIAGNOSIS — Z23 ENCOUNTER FOR IMMUNIZATION: Primary | ICD-10-CM

## 2021-03-16 PROCEDURE — 91300 SARS-COV-2 / COVID-19 MRNA VACCINE (PFIZER-BIONTECH) 30 MCG: CPT

## 2021-03-16 PROCEDURE — 0001A SARS-COV-2 / COVID-19 MRNA VACCINE (PFIZER-BIONTECH) 30 MCG: CPT

## 2021-03-18 ENCOUNTER — TELEPHONE (OUTPATIENT)
Dept: FAMILY MEDICINE CLINIC | Facility: CLINIC | Age: 71
End: 2021-03-18

## 2021-03-21 DIAGNOSIS — I10 ESSENTIAL HYPERTENSION: ICD-10-CM

## 2021-03-22 RX ORDER — ACETAMINOPHEN/DIPHENHYDRAMINE 500MG-25MG
TABLET ORAL
Qty: 90 TABLET | Refills: 1 | Status: SHIPPED | OUTPATIENT
Start: 2021-03-22 | End: 2021-07-13

## 2021-04-06 ENCOUNTER — IMMUNIZATIONS (OUTPATIENT)
Dept: FAMILY MEDICINE CLINIC | Facility: HOSPITAL | Age: 71
End: 2021-04-06

## 2021-04-06 DIAGNOSIS — Z23 ENCOUNTER FOR IMMUNIZATION: Primary | ICD-10-CM

## 2021-04-06 PROCEDURE — 0002A SARS-COV-2 / COVID-19 MRNA VACCINE (PFIZER-BIONTECH) 30 MCG: CPT

## 2021-04-06 PROCEDURE — 91300 SARS-COV-2 / COVID-19 MRNA VACCINE (PFIZER-BIONTECH) 30 MCG: CPT

## 2021-04-20 ENCOUNTER — OFFICE VISIT (OUTPATIENT)
Dept: OBGYN CLINIC | Facility: MEDICAL CENTER | Age: 71
End: 2021-04-20
Payer: COMMERCIAL

## 2021-04-20 VITALS
WEIGHT: 121 LBS | BODY MASS INDEX: 23.75 KG/M2 | HEIGHT: 60 IN | HEART RATE: 73 BPM | SYSTOLIC BLOOD PRESSURE: 175 MMHG | DIASTOLIC BLOOD PRESSURE: 82 MMHG

## 2021-04-20 DIAGNOSIS — M65.4 TENOSYNOVITIS, DE QUERVAIN: Primary | ICD-10-CM

## 2021-04-20 PROCEDURE — 99213 OFFICE O/P EST LOW 20 MIN: CPT | Performed by: ORTHOPAEDIC SURGERY

## 2021-04-20 PROCEDURE — 3008F BODY MASS INDEX DOCD: CPT | Performed by: FAMILY MEDICINE

## 2021-04-20 NOTE — PROGRESS NOTES
Chief Complaint     Bilateral wrist pain     History of Present Illness     Cuong Longoria is a 69 y o   female who presents to the office today for a follow-up evaluation of her bilateral de Quervain tenosynovitis  Patient has received 2 corticosteroid injections to the bilateral wrists for her de Quervain tenosynovitis  She states she did get about 3 months of relief these injections  She continues to have significant pain today about the radial side of her wrist   She notes this is worse at night  She does not report any numbness and tingling and states that she does not have numbness and tingling that wakes sleep  She has not had any new injuries  She did not have any clicking or popping of the wrist   She has been wearing her braces at night only  Past Medical History:   Diagnosis Date    Arthralgia of hip     left    Arthritis     Chronic pain disorder     low back and shoulder    Dyslipidemia     Hyperlipidemia     Hypertension     Neck pain     Pemphigus     Pre-diabetes     Shoulder pain     Vitamin D deficiency        Past Surgical History:   Procedure Laterality Date    CATARACT EXTRACTION Right     COLONOSCOPY      IA COLONOSCOPY FLX DX W/COLLJ SPEC WHEN PFRMD N/A 11/10/2016    Procedure: COLONOSCOPY;  Surgeon: Rhina Spear MD;  [facility];   Service: Gastroenterology    IA XCAPSL CTRC RMVL INSJ IO LENS PROSTH W/O ECP Left 9/27/2018    Procedure: EXTRACAPSULAR CATARACT REMOVAL/INSERTION OF INTRAOCULAR LENS;  Surgeon: Johnna Ganser, MD;  Location: [address] [place] [address];  Service: Ophthalmology       No Known Allergies    Current Outpatient Medications on File Prior to Visit   Medication Sig Dispense Refill    ammonium lactate (LAC-HYDRIN) 12 % lotion apply to affected area twice a day if needed for DRY SKIN 400 g 0    atorvastatin (LIPITOR) 40 mg tablet Take 1 tablet (40 mg total) by mouth every evening 90 tablet 1    Elastic Bandages & Supports (Thumb Splint/Neoprene Medium) MISC by Does not apply route daily 2 each 0    fluticasone (FLONASE) 50 mcg/act nasal spray instill 1 spray into each nostril once daily 16 g 1    furosemide (LASIX) 20 mg tablet Take 0 5 tablets (10 mg total) by mouth daily 5 tablet 0    meclizine (ANTIVERT) 25 mg tablet Take 1 tablet (25 mg total) by mouth every 8 (eight) hours 30 tablet 0    meloxicam (MOBIC) 7 5 mg tablet Take 1 tablet (7 5 mg total) by mouth daily 90 tablet 0    memantine (NAMENDA) 5 mg tablet Take 1 tablet (5 mg total) by mouth daily 90 tablet 3    metFORMIN (GLUCOPHAGE) 500 mg tablet Take 1 tablet (500 mg total) by mouth daily with breakfast 90 tablet 1    omega-3-acid ethyl esters (LOVAZA) 1 g capsule Take 2 capsules (2 g total) by mouth 2 (two) times a day 180 capsule 1    RA Aspirin EC Adult Low St 81 MG EC tablet take 1 tablet by mouth once daily 90 tablet 1    triamterene-hydrochlorothiazide (MAXZIDE-25) 37 5-25 mg per tablet Take 1 tablet by mouth daily 90 tablet 1     No current facility-administered medications on file prior to visit  Social History     Tobacco Use    Smoking status: Never Smoker    Smokeless tobacco: Never Used   Substance Use Topics    Alcohol use: Never     Frequency: Never     Binge frequency: Never    Drug use: No       Family History   Problem Relation Age of Onset    Hyperlipidemia Mother     Hypertension Mother     Heart disease Father     No Known Problems Sister     No Known Problems Sister     No Known Problems Sister     No Known Problems Maternal Aunt     No Known Problems Paternal Aunt     No Known Problems Paternal Aunt        Review of Systems     As stated in the HPI  All other systems were reviewed and are negative  Physical Exam     BP (!) 175/82   Pulse 73   Ht 5' (1 524 m)   Wt 54 9 kg (121 lb)   BMI 23 63 kg/m²     GENERAL: This is a well-developed, well-nourished, age-appropriate patient in no acute distress  The patient is alert and oriented x3  Pleasant and cooperative  Eyes: Anicteric sclerae  Extraocular movements appear intact  HENT: Nares are patent with no drainage  Lungs: There is equal chest rise on inspection  Breathing is non-labored with no audible wheezing  Cardiovascular: No cyanosis  No upper extremity lymphadema  Skin: Skin is warm to touch  No obvious skin lesions or rashes other than described below  Neurologic: No ataxia  Psychiatric: Mood and affect are appropriate  Right wrist   Skin intact   No erythema or ecchymosis noted   Swelling 1st dorsal compartment  Full wrist motion   DPC 0   Minimal tenderness at thumb CMC  Tender at 1st dorsal compartment   Positive Finkelstein test   5/5 Motor to the APB, FDI, FDP2, FDP5, EDC  Sensation intact to light touch in the median, radial, and ulnar nerve distribution  Brisk capillary refill noted to all digits     Left wrist   Skin intact   No erythema or ecchymosis noted   Swelling 1st dorsal compartment, with palpable nodule   Full wrist motion   DPC 0   Minimal tenderness at thumb CMC  Tender at 1st dorsal compartment   Positive Finkelstein test   5/5 Motor to the APB, FDI, FDP2, FDP5, EDC  Sensation intact to light touch in the median, radial, and ulnar nerve distribution  Brisk capillary refill noted to all digits     Data Review     Labs:  None     Electrodiagnostic Testing:  None     Imaging:  None     Assessment and Plan      Diagnoses and all orders for this visit:    Tenosynovitis, de Quervain-Bilateral             59-year-old female with bilateral de Quervain tenosynovitis  Patient and I discussed because she continues to have symptoms refractory to nonsurgical management of bracing and injections that I  do believe she would benefit from surgical intervention of a 1st dorsal compartment releases, bilaterally  Patient, her daughter, and I discussed that this is not an emergent situation and that surgery can be performed at any time    We did discuss that about 95% of people are very happy right after surgery and that people generally do well with this surgery  I do believe that she would have improvement with her symptoms from surgical intervention  Patient wishes to think about this further prior to committing to any surgical intervention  She may continue to wear her braces with activities  I will see her back on an as-needed basis which she would like to further discuss surgical intervention  Follow Up:   P r n      To Do Next Visit:     PROCEDURES PERFORMED:  Procedures  No Procedures performed today      Scribe Attestation    I,:  Hernandez Ballesteros MA am acting as a scribe while in the presence of the attending physician :       I,:  Ry Moctezuma MD personally performed the services described in this documentation    as scribed in my presence :

## 2021-05-07 ENCOUNTER — TELEPHONE (OUTPATIENT)
Dept: NEUROLOGY | Facility: CLINIC | Age: 71
End: 2021-05-07

## 2021-05-10 ENCOUNTER — TELEPHONE (OUTPATIENT)
Dept: NEUROLOGY | Facility: CLINIC | Age: 71
End: 2021-05-10

## 2021-05-13 ENCOUNTER — CONSULT (OUTPATIENT)
Dept: NEUROLOGY | Facility: CLINIC | Age: 71
End: 2021-05-13
Payer: COMMERCIAL

## 2021-05-13 ENCOUNTER — TELEPHONE (OUTPATIENT)
Dept: NEUROLOGY | Facility: CLINIC | Age: 71
End: 2021-05-13

## 2021-05-13 VITALS
DIASTOLIC BLOOD PRESSURE: 80 MMHG | BODY MASS INDEX: 24.35 KG/M2 | SYSTOLIC BLOOD PRESSURE: 120 MMHG | HEART RATE: 76 BPM | WEIGHT: 124 LBS | HEIGHT: 60 IN

## 2021-05-13 DIAGNOSIS — R41.89 COGNITIVE DECLINE: ICD-10-CM

## 2021-05-13 PROCEDURE — 99214 OFFICE O/P EST MOD 30 MIN: CPT | Performed by: PSYCHIATRY & NEUROLOGY

## 2021-05-13 NOTE — TELEPHONE ENCOUNTER
MSW was asked by Dr Noa Kearns to look into Uzbek-speaking adult day programs for this patient with vascular/cognitive decline   Dr Noa Kearns stated that the best contact would be patient's daughter as she is bilingual

## 2021-05-13 NOTE — PROGRESS NOTES
Patient ID: Chloe Myrick is a 69 y o  female  Assessment/Plan:    Mild cognitive impairment, very episodic compounded by sadness and depression  By the current tempo and description of changes by the patient's daughter and principal caretaker in addition to her prior MRI I strongly feel that this may represent a vascular process and/or dementia rather than Alzheimer's disease although continued observation over time will be most needed in differentiation  Recommendations at this time are to try to urged the patient through the family to become more involved in socialization, appropriate Mediterranean type diet was discussed, physical activity and sleep hygiene were all closely discussed as both distinct treatment for potential Alzheimer's disease or more likely vascular dementia  In addition very close future blood pressure monitoring and control is necessary  I discussed with the patient's daughter that there is no treatment for Alzheimer's disease and that drug such as Namenda can be nonspecific and are symptomatic only  Therefore I urged lifestyle modification principally  We are going to ask social Work to contact family to aid in out of house activities for [place] speaking  The  for this visit was a medical assistant registered   They are really no other neurologic interventions at this time and the most stress I could place is hypertension control blood sugar control and lifestyle adjustment    Time with patient 45 minutes more than 50% of this was spent in counseling, education and care coordination    Subjective:    HPI     the patient is accompanied by her daughter  The history is of several years may be dating back as long as 4 years of episodic and I stress episodic forgetfulness  This is compounded by frustration, sadness, probable depression and frustration in day-to-day living    There have been no distinct harmful events, getting lost, speech or language derangement  The daughter presents a history of outbursts in for stressful circumstances but not occurring spontaneously  Most day-to-day activities are well recalled by the patient however there are episodic lapses particularly around some events and names  No autonomic dysfunction, hallucinations psychosis noted  There is a family history of cognitive decline in the patient's relatives however none have been firmly diagnosed as to their etiology  Nine months ago the patient was started on Namenda and the patient's daughter notes that some of her emotional outbursts have modified  Patient has had prior posterior circulation strokes and has a substantive burden of subcortical white matter changes consistent with chronic hypertension     Objective:    Blood pressure 120/80, pulse 76, height 5' (1 524 m), weight 56 2 kg (124 lb), not currently breastfeeding  Physical Exam    Neurological Exam  Patient does not have overly productive speech but through  was of normal content and normal awareness  Motor strength was full station and gait normal no unsteadiness reflexes were equal      ROS:    Review of Systems   Constitutional: Negative  Negative for appetite change and fever  HENT: Negative  Negative for hearing loss, tinnitus, trouble swallowing and voice change  Eyes: Negative  Negative for photophobia and pain  Respiratory: Negative  Negative for shortness of breath  Cardiovascular: Negative  Negative for palpitations  Gastrointestinal: Negative  Negative for nausea and vomiting  Endocrine: Negative  Negative for cold intolerance  Genitourinary: Negative  Negative for dysuria, frequency and urgency  Musculoskeletal: Negative  Negative for myalgias and neck pain  Skin: Negative  Negative for rash  Neurological: Negative    Negative for dizziness, tremors, seizures, syncope, facial asymmetry, speech difficulty, weakness, light-headedness, numbness and headaches  Hematological: Negative  Does not bruise/bleed easily  Psychiatric/Behavioral: Positive for confusion  Negative for hallucinations and sleep disturbance          Memory problem

## 2021-05-13 NOTE — TELEPHONE ENCOUNTER
MSW located the following [facility] which offer programming for the Romansh-speaking population:    [address] [facility] @ the [address]    MSW will contact this agencies to see if they are operating  MSW will also contact the Adult Day Programs to see if they offer programing for the Romansh-speaking population

## 2021-05-14 NOTE — TELEPHONE ENCOUNTER
MSW attempted to reach patient's emergency contact, Marcy Cornell, to determine if patient requires a senior center or adult day program  No answer at [phone]  MSW left message requesting callback  Awaiting same  In the meantime, MSW phoned the following [facility] centers/adult day programs with the following results:    [address]  [phone], ext [phone]  *MSW left message for Berna Bai  Awaiting callback  [facility]  [phone], ext [phone]  *Voicemail full, unable to leave a message  Attempted to reach  but the phone just rang and rang  [facility]  [address]  *MSW spoke with Juventino Mendoza     - most of their clients are Lao speaking   - minimum of 4 hours per day, 2 days per week  - open Monday-Friday 6AM-5PM, Saturday 6AM-130PM  - Accept payment via private pay $75-85 per day or waiver coverage  - provide breakfast, lunch, 2 snacks  - RN on site can give medications, but patient needs to provide meds  - PT/OT on site, if needed  - Transportation can be arranged for an additional cost (varies by distance from center)  - patients/families can call to request tour  - immediate availability for new clients  - patients must have at least one COVID vaccine, TB Test, and physical within 3 months of start of program    [address]  [address]  [address]  *spoke with Severa Muff - they do not have Lao-speaking clients, nor do they have accommodations for Lao-speaking patients    [address]  [phone]  *MSW left message for Tracee Reyes, requesting callback  Awaiting same

## 2021-05-17 NOTE — TELEPHONE ENCOUNTER
MSW had yet to receive a callback from Narda Jain at the [facility], so MSW placed call again to [phone], ext [phone]  No answer  MSW left another message requesting callback  Awaiting same  MSW had yet to receive a callback from the [address] [place] [address] [address] at [facility], so MSW placed call there again at [phone]  MSW could not connect with Derrick Duran as there was no answer and the voicemail box was still full  MSW was able to speak with the  who stated that the [facility] center is operating and are accepting new clients  The  stated that they have the group split up into 2 sessions, but that she did not know the times  The  took down this writer's name and number, and will have the Jaylen Cordova, call this writer back  MSW will await callback  MSW had yet to receive a callback from Savannah at [facility], so MSW placed another call to her at [phone]  No answer  MSW left message requesting callback  Awaiting same

## 2021-05-18 NOTE — TELEPHONE ENCOUNTER
MSW phoned patient's daughter, Carlos Horner, to review adult day care versus senior center options  Carlos Horner requested to be called back later this afternoon with a   MSW later called patient's daughter, Carlos Horner, via the JASWANT Pedraza,  # [phone] to explain adult day care versus senior centers  MSW explained that Adult Day Cares are Non-residential facilities that specialize in providing activities for older adults with cognitive/physical disabilities in a safe, supervised setting  These centers are typically open weekdays from 7AM to 5PM and provide snacks, lunch, medication administration, socialization, therapeutic activities, and respite services  Payment for these types of facilities can be through private pay/long term care insurance policies  Supplemental funding may be available through your local [facility] Agency on Aging, if financial criteria are met  MSW explained that [facility] are Non-residential facilities that offer a hot lunch (nominal donation requested), socialization, and activities to people age 61 and older  Centers are typically open Monday through Friday from 10AM to 2PM  MSW explained that senior center attendees must be able to handle their own eating, ambulation, and toileting  Patient's daughter was surprised to hear that a cost was involved and stated that she thought that programs like this would be free  Patient's daughter stated that patient can handle her own eating, walking, and getting to the bathroom, so she feels that a [employer] would be appropriate  Patient's daughter stated that she had previously discussed these services with her mother and the response was "I am not that old"  Patient's daughter stated that she is considering these services 1-3 times per week but wants to ensure that there are others there who can speak Malay   MSW advised that this writer did locate 2 senior center that have Malay-speaking clientele, but that this writer has been trying to reach them to get specifics about their programs  MSW will continue to try to get this information and will update patient's daughter as able  While on the phone patient's daughter did inquire about getting in home aide services  MSW explained that process of applying for the Waiver - that patient would need to meet criteria based on her level of care needed and her finances  MSW offered to make a referral to the [address] [place] [address] for Waiver services, but patient's daughter declined at this time, stated that she will just take info on [facility] centers at this time  MSW sent to the following email to [employer] [place] to get info about the [facility]:    "Vivian Michael,     My name is Sandra and I am a  with [facility]  I left a message or two at the [facility] looking to get information about the [facility] center and confirm that it is operational/accepting new patients (given COVID)  My co-worker mentioned that you would likely have this type of info  If so, can you provide the address, hours of operation, explain what is offered at the program (meal/activities, etc), and what the cost would be? Also, is the center requiring that attendees be vaccinated? If easier to discuss via phone, I can be reached at [phone] Monday-Friday from 8AM-430PM      Thanks and Best Regards,    Kelly Seth, MSW   [facility]  [address] [facility] [address] [facility]  [phone] - phone  [phone] - fax   [email or web address]  [email or web address]"    MSW tried to call the [facility] again but they were closed

## 2021-05-18 NOTE — TELEPHONE ENCOUNTER
MSW received callback from Savannah at [facility] - she advised that at this time, they do not have bilingual staff or Kuwaiti-speaking clients  Savannah stated that they hope to hire bilingual staff within the next few months  MSW is still awaiting calls back from the [facility] and [facility]

## 2021-05-19 NOTE — TELEPHONE ENCOUNTER
JELANI did not hear back from [employer] [place] yet, so MSW attempted to reach the [employer] again at [phone], ext [phone]  No answer  MSW left a message again requesting callback  Awaiting same  MSW also attempted to reach the [address] at [facility] at [phone], option 3, but the voicemail box was still full  MSW pressed 0 for the   JELANI was told that the [address] was not in this date, but that her assistant was  JELANI briefly spoke with the assistant coordinator who stated that they have two sessions - one from 830-1130 and then one from 1130-230 each day  The assistant coordinator stated that for any other details this writer would need to speak with the Center Coordinator, Jo Gil, who will be back in the office on Thursday 5/20  MSW will call back then

## 2021-05-20 ENCOUNTER — VBI (OUTPATIENT)
Dept: ADMINISTRATIVE | Facility: OTHER | Age: 71
End: 2021-05-20

## 2021-05-20 NOTE — TELEPHONE ENCOUNTER
MSW received a callback from PEDRO at the [facility] on [address] - she advised that [facility] is open and that it is the only [facility] that offers services in [place]  PEDRO provided the email addresses for her two contacts at [facility] as:   [email or web address]  And   [email or web address]    MSW sent the following email to both of the above email addresses:    "Hello,     My name is Deep Ramírez and I am a  with [facility]  I have been trying to get a hold of the coordinator for the [facility], but the voicemail box is full, so I reached out to PEDRO at [facility] on Aging who provided me with your email addresses  I have a patient (who is Maltese speaking)/daughter who are interested in getting more information about the [facility] such as:    - Is the center operational given COVID?  - Are you accepting new attendees? - Days of the week, hours that you are open  - Is there a cost to attend the program?  - What activities are offered? - Is transportation available? - Do you require attendees to be vaccinated against COVID?  - Do attendees need to wear masks? - This patient does have dementia, but can feed/toilet herself  Do you have any other requirements for a dementia patient? Please advise at your earliest convenience  Thanks & Best Regards,    Elbert Conway MSW   [facility]  [address] [facility], [address] [facility]  [phone] - phone  [phone] - fax   [facility]"    MSW will await response

## 2021-05-20 NOTE — TELEPHONE ENCOUNTER
MSW did not hear back from [employer] [place] [employer] yet, so MSW attempted to reach the [employer] again at [phone], ext [phone]  No answer  MSW left a message again for [facility] requesting callback  MSW also left a message on the general voicemail box line for the [place] [address] where the [employer] is located  Awaiting calls back  MSW also attempted to reach the [facility] at [phone], option 3, but the voicemail box was still full so this writer was not able to leave a message  MSW pressed 0 for the , but the phone just rang and rang x three tries

## 2021-05-20 NOTE — TELEPHONE ENCOUNTER
MSMARY received a message back from Aracely at [facility]  Aracely stated that they currently have two sessions - a  morning and an afternoon session  Aracely stated that there only are a few people attending in the afternoon so in the near future they will be going back to their previous model of having only one session that runs from 9 AM until 12 or 12:30 PM      Aracely stated that they provide breakfast and then offer games/speakers/activities/socialization  Aracely stated that they end with a lunch  Aracely stated that the center is open Monday through Friday  Aracely stated that they advise that patients and families come in to tour the center and complete an application  Aracely stated it would be best if patient will come in prior to 9 AM to complete the application and that shes welcome to stay for the remainder of the day, if she would like  Aracely stated that if patient's daughter would rather have an application emailed to her that she can call [phone] to request one  Aracely stated that there is no charge for their services as many of the patients are low income  Aracely stated that transportation is available via Melanie Hero, if needed  In regard to Jostu Crewhitnye stated that all participants in the staff are currently vaccinated  Aracely stated for that reason [facility] some attendees do not wear masks but that the staff always do  Address for [facility] is [address] [place] [address]  MSW will reach out to patients daughter with an  on 5/21 to review above information about senior services available at [facility]

## 2021-05-20 NOTE — TELEPHONE ENCOUNTER
MSW received a callback from Teri Wick at the [facility] - she advised that the [address] is currently closed (since 3/16/20), but they are hoping that they get the approval from the [facility] on Aging to reopen in July  Teri Wick stated that there are already 48 people registered and 30 people on a waiting list  It was then realized that patient would not be able to attend this program as they only accept [facility] residents and patient lives in [place]  MSW then phoned [address] and Referral to inquire if they were aware of any operating senior centers that can provide services in [address] [place] [address] within [place]  MSW's call was transferred to [facility], Aging Care Manager Supervisor's line  No answer  MSW left a message requesting callback  Awaiting same

## 2021-05-21 NOTE — TELEPHONE ENCOUNTER
MSW attempted patient to reach patient's daughter to relay information about the [facility]  No answer at [phone]  MSW left a message via the E  I  roseann Duran,  # [phone]

## 2021-05-24 NOTE — TELEPHONE ENCOUNTER
MSW phoned patient's daughter via the Savannah Quintana,  # [phone], to make her aware of details of [facility]  All information relayed and patient's daughter stated that she does not have any additional questions  MSW did provide the address to the center as well as the phone number  MSW encouraged patient/daughter to tour the facility to ensure that it would be a good fit for patient  MSW will be available to patient/daughter as needed

## 2021-06-08 ENCOUNTER — OFFICE VISIT (OUTPATIENT)
Dept: FAMILY MEDICINE CLINIC | Facility: CLINIC | Age: 71
End: 2021-06-08

## 2021-06-08 ENCOUNTER — OFFICE VISIT (OUTPATIENT)
Dept: OBGYN CLINIC | Facility: MEDICAL CENTER | Age: 71
End: 2021-06-08
Payer: COMMERCIAL

## 2021-06-08 VITALS
HEART RATE: 73 BPM | WEIGHT: 126 LBS | HEIGHT: 60 IN | SYSTOLIC BLOOD PRESSURE: 155 MMHG | DIASTOLIC BLOOD PRESSURE: 87 MMHG | BODY MASS INDEX: 24.74 KG/M2

## 2021-06-08 VITALS
TEMPERATURE: 97.1 F | RESPIRATION RATE: 18 BRPM | HEART RATE: 70 BPM | BODY MASS INDEX: 24.61 KG/M2 | WEIGHT: 126 LBS | OXYGEN SATURATION: 97 % | SYSTOLIC BLOOD PRESSURE: 138 MMHG | DIASTOLIC BLOOD PRESSURE: 82 MMHG

## 2021-06-08 DIAGNOSIS — M65.4 TENOSYNOVITIS, DE QUERVAIN: Primary | ICD-10-CM

## 2021-06-08 DIAGNOSIS — Z01.818 PREOP EXAMINATION: Primary | ICD-10-CM

## 2021-06-08 DIAGNOSIS — E78.2 MIXED HYPERLIPIDEMIA: ICD-10-CM

## 2021-06-08 PROCEDURE — 3008F BODY MASS INDEX DOCD: CPT | Performed by: ORTHOPAEDIC SURGERY

## 2021-06-08 PROCEDURE — 3077F SYST BP >= 140 MM HG: CPT | Performed by: ORTHOPAEDIC SURGERY

## 2021-06-08 PROCEDURE — 99214 OFFICE O/P EST MOD 30 MIN: CPT | Performed by: FAMILY MEDICINE

## 2021-06-08 PROCEDURE — 1036F TOBACCO NON-USER: CPT | Performed by: FAMILY MEDICINE

## 2021-06-08 PROCEDURE — 3008F BODY MASS INDEX DOCD: CPT | Performed by: FAMILY MEDICINE

## 2021-06-08 PROCEDURE — 1160F RVW MEDS BY RX/DR IN RCRD: CPT | Performed by: FAMILY MEDICINE

## 2021-06-08 PROCEDURE — 99214 OFFICE O/P EST MOD 30 MIN: CPT | Performed by: ORTHOPAEDIC SURGERY

## 2021-06-08 PROCEDURE — 3079F DIAST BP 80-89 MM HG: CPT | Performed by: ORTHOPAEDIC SURGERY

## 2021-06-08 PROCEDURE — 1160F RVW MEDS BY RX/DR IN RCRD: CPT | Performed by: ORTHOPAEDIC SURGERY

## 2021-06-08 PROCEDURE — 1036F TOBACCO NON-USER: CPT | Performed by: ORTHOPAEDIC SURGERY

## 2021-06-08 NOTE — PROGRESS NOTES
Assessment/Plan:    No problem-specific Assessment & Plan notes found for this encounter  Diagnoses and all orders for this visit:    Preop examination  -     CBC and differential; Future  -     Comprehensive metabolic panel; Future    Mixed hyperlipidemia        EKG done today in office: NSR no ST-T changes   Subjective:      Patient ID: Matias Olivera is a 69 y o  female  70 yo  female with known history of HTN, hyperlipidemia, s/p CVA here today for follow up of chronic conditions  Patient's daughter ws concerned with cognitive decline so patient was seen and evaluted by Neurology  Patient was encouraged to be more active and have more social interaction as appropriate  Patient's daughter was given the information for [facility]  Patient states she has not yet looked into it, but has started going back to the gym and takes a daily walk weather permitting  Currently main concern is bilateral wrist pain, diagnosed with DeQuervain, has had to steroid injections, is scheduled with Orhtopedics later today to discuss possible surgical intervention  The following portions of the patient's history were reviewed and updated as appropriate:   She  has a past medical history of Arthralgia of hip, Arthritis, Chronic pain disorder, Dyslipidemia, Hyperlipidemia, Hypertension, Neck pain, Pemphigus, Pre-diabetes, Shoulder pain, and Vitamin D deficiency    She   Patient Active Problem List    Diagnosis Date Noted    Cerebellar infarct ([facility] [address] ) 12/08/2019    Spinal stenosis, lumbar region with neurogenic claudication     Acute pain of left shoulder 08/23/2018    Pre-op evaluation 02/08/2018    Muscle spasms of neck 12/04/2017    Arthralgia of left hip 07/19/2016    Slow transit constipation 07/19/2016    Impaired fasting glucose 07/19/2016    Chronic right shoulder pain 03/15/2016    Creatinine elevation 02/16/2015    Neck pain 11/19/2014    Low back pain 08/06/2014    Acquired trigger finger 10/09/2013    Vitamin D deficiency 07/29/2013    Mammogram abnormal 07/29/2013    Multiple joint pain 07/29/2013    Other symptoms involving skin and integumentary tissues 06/03/2013    Pemphigus 04/08/2013    Bursitis 12/03/2012    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/2012    Osteoarthrosis 09/24/2012    Dyslipidemia 06/19/2012    Hypertension 06/19/2012    Hyperlipidemia 06/03/2008     She  has a past surgical history that includes pr colonoscopy flx dx w/collj spec when pfrmd (N/A, 11/10/2016); Colonoscopy; Cataract extraction (Right); and pr xcapsl ctrc rmvl insj io lens prosth w/o ecp (Left, 9/27/2018)  Her family history includes Heart disease in her father; Hyperlipidemia in her mother; Hypertension in her mother; No Known Problems in her maternal aunt, paternal aunt, paternal aunt, sister, sister, and sister  She  reports that she has never smoked  She has never used smokeless tobacco  She reports that she does not drink alcohol or use drugs    Current Outpatient Medications   Medication Sig Dispense Refill    ammonium lactate (LAC-HYDRIN) 12 % lotion apply to affected area twice a day if needed for DRY SKIN 400 g 0    atorvastatin (LIPITOR) 40 mg tablet Take 1 tablet (40 mg total) by mouth every evening 90 tablet 1    Elastic Bandages & Supports (Thumb Splint/Neoprene Medium) MISC by Does not apply route daily 2 each 0    fluticasone (FLONASE) 50 mcg/act nasal spray instill 1 spray into each nostril once daily 16 g 1    memantine (NAMENDA) 5 mg tablet Take 1 tablet (5 mg total) by mouth daily 90 tablet 3    metFORMIN (GLUCOPHAGE) 500 mg tablet Take 1 tablet (500 mg total) by mouth daily with breakfast 90 tablet 1    omega-3-acid ethyl esters (LOVAZA) 1 g capsule Take 2 capsules (2 g total) by mouth 2 (two) times a day 180 capsule 1    RA Aspirin EC Adult Low St 81 MG EC tablet take 1 tablet by mouth once daily 90 tablet 1    VITAMIN D PO Take by mouth daily      furosemide (LASIX) 20 mg tablet Take 0 5 tablets (10 mg total) by mouth daily (Patient not taking: Reported on 5/13/2021) 5 tablet 0    meclizine (ANTIVERT) 25 mg tablet Take 1 tablet (25 mg total) by mouth every 8 (eight) hours (Patient not taking: Reported on 5/13/2021) 30 tablet 0    meloxicam (MOBIC) 7 5 mg tablet Take 1 tablet (7 5 mg total) by mouth daily (Patient not taking: Reported on 5/13/2021) 90 tablet 0    triamterene-hydrochlorothiazide (MAXZIDE-25) 37 5-25 mg per tablet Take 1 tablet by mouth daily (Patient not taking: Reported on 5/13/2021) 90 tablet 1     No current facility-administered medications for this visit        Current Outpatient Medications on File Prior to Visit   Medication Sig    ammonium lactate (LAC-HYDRIN) 12 % lotion apply to affected area twice a day if needed for DRY SKIN    atorvastatin (LIPITOR) 40 mg tablet Take 1 tablet (40 mg total) by mouth every evening    Elastic Bandages & Supports (Thumb Splint/Neoprene Medium) MISC by Does not apply route daily    fluticasone (FLONASE) 50 mcg/act nasal spray instill 1 spray into each nostril once daily    memantine (NAMENDA) 5 mg tablet Take 1 tablet (5 mg total) by mouth daily    metFORMIN (GLUCOPHAGE) 500 mg tablet Take 1 tablet (500 mg total) by mouth daily with breakfast    omega-3-acid ethyl esters (LOVAZA) 1 g capsule Take 2 capsules (2 g total) by mouth 2 (two) times a day    RA Aspirin EC Adult Low St 81 MG EC tablet take 1 tablet by mouth once daily    VITAMIN D PO Take by mouth daily    furosemide (LASIX) 20 mg tablet Take 0 5 tablets (10 mg total) by mouth daily (Patient not taking: Reported on 5/13/2021)    meclizine (ANTIVERT) 25 mg tablet Take 1 tablet (25 mg total) by mouth every 8 (eight) hours (Patient not taking: Reported on 5/13/2021)    meloxicam (MOBIC) 7 5 mg tablet Take 1 tablet (7 5 mg total) by mouth daily (Patient not taking: Reported on 5/13/2021)    triamterene-hydrochlorothiazide (MAXZIDE-25) 37 5-25 mg per tablet Take 1 tablet by mouth daily (Patient not taking: Reported on 5/13/2021)     No current facility-administered medications on file prior to visit       Review of Systems   Musculoskeletal: Positive for arthralgias  All other systems reviewed and are negative  Objective:      /82   Pulse 70   Temp (!) 97 1 °F (36 2 °C) (Temporal)   Resp 18   Wt 57 2 kg (126 lb)   SpO2 97%   BMI 24 61 kg/m²          Physical Exam  Vitals signs and nursing note reviewed  Constitutional:       Appearance: She is well-developed  HENT:      Head: Normocephalic  Right Ear: External ear normal       Left Ear: External ear normal       Nose: Nose normal    Eyes:      Conjunctiva/sclera: Conjunctivae normal       Pupils: Pupils are equal, round, and reactive to light  Neck:      Musculoskeletal: Normal range of motion and neck supple  Thyroid: No thyromegaly  Cardiovascular:      Rate and Rhythm: Normal rate and regular rhythm  Heart sounds: Normal heart sounds  Pulmonary:      Effort: Pulmonary effort is normal       Breath sounds: Normal breath sounds  Abdominal:      Palpations: Abdomen is soft  Tenderness: There is no abdominal tenderness  There is no guarding or rebound  Musculoskeletal:         General: Swelling and tenderness present  Right wrist: She exhibits decreased range of motion and tenderness  Left wrist: She exhibits decreased range of motion and tenderness  Skin:     General: Skin is dry  Neurological:      Mental Status: She is alert and oriented to person, place, and time  Deep Tendon Reflexes: Reflexes are normal and symmetric

## 2021-06-08 NOTE — PROGRESS NOTES
Chief Complaint     Bilateral wrist pain      History of Present Illness     Lamarr Emory Dennie Boron is a 69 y o  female who presents to the office today for follow up evaluation of bilateral DeQuervain's tenosynovitis  Patient has received 2 sets of injections for both sides in the past  Unfortunately, both times the relief was only temporary  She continues to wear her thumb spica splints, which do help, but she continues to have pain any time she's not wearing them  She denies numbness/tingling  States the pain is worst when she tries to extend her thumbs  Past Medical History:   Diagnosis Date    Arthralgia of hip     left    Arthritis     Chronic pain disorder     low back and shoulder    Dyslipidemia     Hyperlipidemia     Hypertension     Neck pain     Pemphigus     Pre-diabetes     Shoulder pain     Vitamin D deficiency        Past Surgical History:   Procedure Laterality Date    CATARACT EXTRACTION Right     COLONOSCOPY      [place] COLONOSCOPY FLX DX W/COLLJ SPEC WHEN PFRMD N/A 11/10/2016    Procedure: COLONOSCOPY;  Surgeon: Amos Ann MD;  Location: [facility];   Service: Gastroenterology    [place] XCAPSL CTRC RMVL INSJ IO LENS PROSTH W/O ECP Left 9/27/2018    Procedure: EXTRACAPSULAR CATARACT REMOVAL/INSERTION OF INTRAOCULAR LENS;  Surgeon: Gabo Valverde MD;  Location: [address] [place] [address];  Service: Ophthalmology       No Known Allergies    Current Outpatient Medications on File Prior to Visit   Medication Sig Dispense Refill    ammonium lactate (LAC-HYDRIN) 12 % lotion apply to affected area twice a day if needed for DRY SKIN 400 g 0    atorvastatin (LIPITOR) 40 mg tablet Take 1 tablet (40 mg total) by mouth every evening 90 tablet 1    Elastic Bandages & Supports (Thumb Splint/Neoprene Medium) MISC by Does not apply route daily 2 each 0    fluticasone (FLONASE) 50 mcg/act nasal spray instill 1 spray into each nostril once daily 16 g 1    memantine (NAMENDA) 5 mg tablet Take 1 tablet (5 mg total) by mouth daily 90 tablet 3    metFORMIN (GLUCOPHAGE) 500 mg tablet Take 1 tablet (500 mg total) by mouth daily with breakfast 90 tablet 1    omega-3-acid ethyl esters (LOVAZA) 1 g capsule Take 2 capsules (2 g total) by mouth 2 (two) times a day 180 capsule 1    RA Aspirin EC Adult Low St 81 MG EC tablet take 1 tablet by mouth once daily 90 tablet 1    VITAMIN D PO Take by mouth daily      furosemide (LASIX) 20 mg tablet Take 0 5 tablets (10 mg total) by mouth daily (Patient not taking: Reported on 5/13/2021) 5 tablet 0    meclizine (ANTIVERT) 25 mg tablet Take 1 tablet (25 mg total) by mouth every 8 (eight) hours (Patient not taking: Reported on 5/13/2021) 30 tablet 0    meloxicam (MOBIC) 7 5 mg tablet Take 1 tablet (7 5 mg total) by mouth daily (Patient not taking: Reported on 5/13/2021) 90 tablet 0    triamterene-hydrochlorothiazide (MAXZIDE-25) 37 5-25 mg per tablet Take 1 tablet by mouth daily (Patient not taking: Reported on 5/13/2021) 90 tablet 1     No current facility-administered medications on file prior to visit  Social History     Tobacco Use    Smoking status: Never Smoker    Smokeless tobacco: Never Used   Substance Use Topics    Alcohol use: Never     Frequency: Never     Binge frequency: Never    Drug use: No       Family History   Problem Relation Age of Onset    Hyperlipidemia Mother     Hypertension Mother     Heart disease Father     No Known Problems Sister     No Known Problems Sister     No Known Problems Sister     No Known Problems Maternal Aunt     No Known Problems Paternal Aunt     No Known Problems Paternal Aunt        Review of Systems     As stated in the HPI  All other systems were reviewed and are negative  Physical Exam     /87   Pulse 73   Ht 5' (1 524 m)   Wt 57 2 kg (126 lb)   BMI 24 61 kg/m²     GENERAL: This is a well-developed, well-nourished, age-appropriate patient in no acute distress  The patient is alert and oriented x3   Pleasant and cooperative  Eyes: Anicteric sclerae  Extraocular movements appear intact  HENT: Nares are patent with no drainage  Lungs: There is equal chest rise on inspection  Breathing is non-labored with no audible wheezing  Cardiovascular: No cyanosis  No upper extremity lymphadema  Skin: Skin is warm to touch  No obvious skin lesions or rashes other than described below  Neurologic: No ataxia  Psychiatric: Mood and affect are appropriate  Right Upper Extremity:  Patient with edema of the 1st dorsal compartment  She has severe tenderness to palpation to the 1st dorsal compartment  Mild discomfort of the thumb CMC joint  Negative thumb adduction test    Positive Eichoff's test   5/5 Motor to the APB, FDI, FDP2, FDP5, EDC  Sensation intact to light touch in the median, radial, and ulnar nerve distribution, including the superficial sensory branch of the radial nerve  Brisk capillary refill  Left Upper Extremity:  Patient with edema of the 1st dorsal compartment  She has severe tenderness to palpation to the 1st dorsal compartment  No tenderness of the thumb CMC joint  Negative thumb adduction test    Positive Eichoff's test   5/5 Motor to the APB, FDI, FDP2, FDP5, EDC  Sensation intact to light touch in the median, radial, and ulnar nerve distribution, including the superficial sensory branch of the radial nerve  Brisk capillary refill  Data Review     Labs:  None    Electrodiagnostic Testing:  None    Imaging:  None    Assessment and Plan      Diagnoses and all orders for this visit:    Tenosynovitis, de Quervain-Bilateral       69year old female with bilateral DeQuervain's tenosynovitis that is persistent despite nonoperative management with use of steroid injections as well as bracing  I discussed that at this time, I would not recommend any further steroid injections  The next step would be surgical release   Risks, benefits and alternatives of the surgery were discussed with the patient today  Details of postoperative recovery and protocol were also discussed  Patient states understanding and agrees to proceed with bilateral DeQuervain's release surgeries to be performed in a staged fashion under IV sedation  I did discuss that patient will be placed into a splint after surgery that she must keep clean and dry at all times until her postoperative appointment  Follow Up:  After Surgery    To Do Next Visit: Splint off, sutures out    PROCEDURES PERFORMED:  Procedures  No Procedures performed today

## 2021-06-21 DIAGNOSIS — L85.3 DRY SKIN: ICD-10-CM

## 2021-06-21 DIAGNOSIS — E78.5 DYSLIPIDEMIA: ICD-10-CM

## 2021-06-21 RX ORDER — AMMONIUM LACTATE 12 G/100G
LOTION TOPICAL
Qty: 400 G | Refills: 0 | Status: SHIPPED | OUTPATIENT
Start: 2021-06-21 | End: 2021-09-07 | Stop reason: SDUPTHER

## 2021-06-21 RX ORDER — OMEGA-3-ACID ETHYL ESTERS 1 G/1
CAPSULE, LIQUID FILLED ORAL
Qty: 180 CAPSULE | Refills: 1 | Status: SHIPPED | OUTPATIENT
Start: 2021-06-21 | End: 2022-01-03 | Stop reason: SDUPTHER

## 2021-06-26 DIAGNOSIS — I63.9 CVA (CEREBRAL VASCULAR ACCIDENT) (HCC): ICD-10-CM

## 2021-06-26 DIAGNOSIS — E78.5 DYSLIPIDEMIA: ICD-10-CM

## 2021-06-28 ENCOUNTER — TELEPHONE (OUTPATIENT)
Dept: OBGYN CLINIC | Facility: HOSPITAL | Age: 71
End: 2021-06-28

## 2021-06-28 RX ORDER — ATORVASTATIN CALCIUM 40 MG/1
TABLET, FILM COATED ORAL
Qty: 90 TABLET | Refills: 1 | Status: SHIPPED | OUTPATIENT
Start: 2021-06-28 | End: 2021-09-07 | Stop reason: SDUPTHER

## 2021-06-28 NOTE — TELEPHONE ENCOUNTER
Dr Rebekah Peterson will update any H&Ps that are necessary day of surgery  We also typically update the H&P for the second surgery at her postop appointment for the 1st one as well

## 2021-06-28 NOTE — TELEPHONE ENCOUNTER
Patient sees Maryana Lopez @ [facility] pre-admit called and wants to know if the H/P will be ok for both surgeries?     CB - Ext  [phone]

## 2021-06-29 ENCOUNTER — APPOINTMENT (OUTPATIENT)
Dept: LAB | Age: 71
End: 2021-06-29
Payer: COMMERCIAL

## 2021-06-29 DIAGNOSIS — Z01.818 PREOP EXAMINATION: ICD-10-CM

## 2021-06-29 LAB
ALBUMIN SERPL BCP-MCNC: 3.7 G/DL (ref 3.5–5)
ALP SERPL-CCNC: 73 U/L (ref 46–116)
ALT SERPL W P-5'-P-CCNC: 34 U/L (ref 12–78)
ANION GAP SERPL CALCULATED.3IONS-SCNC: 7 MMOL/L (ref 4–13)
AST SERPL W P-5'-P-CCNC: 27 U/L (ref 5–45)
BASOPHILS # BLD AUTO: 0.07 THOUSANDS/ΜL (ref 0–0.1)
BASOPHILS NFR BLD AUTO: 1 % (ref 0–1)
BILIRUB SERPL-MCNC: 0.72 MG/DL (ref 0.2–1)
BUN SERPL-MCNC: 19 MG/DL (ref 5–25)
CALCIUM SERPL-MCNC: 9.8 MG/DL (ref 8.3–10.1)
CHLORIDE SERPL-SCNC: 107 MMOL/L (ref 100–108)
CO2 SERPL-SCNC: 25 MMOL/L (ref 21–32)
CREAT SERPL-MCNC: 0.69 MG/DL (ref 0.6–1.3)
EOSINOPHIL # BLD AUTO: 0.14 THOUSAND/ΜL (ref 0–0.61)
EOSINOPHIL NFR BLD AUTO: 2 % (ref 0–6)
ERYTHROCYTE [DISTWIDTH] IN BLOOD BY AUTOMATED COUNT: 12.6 % (ref 11.6–15.1)
GFR SERPL CREATININE-BSD FRML MDRD: 88 ML/MIN/1.73SQ M
GLUCOSE SERPL-MCNC: 95 MG/DL (ref 65–140)
HCT VFR BLD AUTO: 41.3 % (ref 34.8–46.1)
HGB BLD-MCNC: 13.6 G/DL (ref 11.5–15.4)
IMM GRANULOCYTES # BLD AUTO: 0.02 THOUSAND/UL (ref 0–0.2)
IMM GRANULOCYTES NFR BLD AUTO: 0 % (ref 0–2)
LYMPHOCYTES # BLD AUTO: 3.36 THOUSANDS/ΜL (ref 0.6–4.47)
LYMPHOCYTES NFR BLD AUTO: 40 % (ref 14–44)
MCH RBC QN AUTO: 30.9 PG (ref 26.8–34.3)
MCHC RBC AUTO-ENTMCNC: 32.9 G/DL (ref 31.4–37.4)
MCV RBC AUTO: 94 FL (ref 82–98)
MONOCYTES # BLD AUTO: 0.54 THOUSAND/ΜL (ref 0.17–1.22)
MONOCYTES NFR BLD AUTO: 6 % (ref 4–12)
NEUTROPHILS # BLD AUTO: 4.34 THOUSANDS/ΜL (ref 1.85–7.62)
NEUTS SEG NFR BLD AUTO: 51 % (ref 43–75)
NRBC BLD AUTO-RTO: 0 /100 WBCS
PLATELET # BLD AUTO: 227 THOUSANDS/UL (ref 149–390)
PMV BLD AUTO: 10 FL (ref 8.9–12.7)
POTASSIUM SERPL-SCNC: 3.7 MMOL/L (ref 3.5–5.3)
PROT SERPL-MCNC: 7.8 G/DL (ref 6.4–8.2)
RBC # BLD AUTO: 4.4 MILLION/UL (ref 3.81–5.12)
SODIUM SERPL-SCNC: 139 MMOL/L (ref 136–145)
WBC # BLD AUTO: 8.47 THOUSAND/UL (ref 4.31–10.16)

## 2021-06-29 PROCEDURE — 85025 COMPLETE CBC W/AUTO DIFF WBC: CPT

## 2021-06-29 PROCEDURE — 80053 COMPREHEN METABOLIC PANEL: CPT

## 2021-06-29 PROCEDURE — 36415 COLL VENOUS BLD VENIPUNCTURE: CPT

## 2021-06-30 ENCOUNTER — TELEPHONE (OUTPATIENT)
Dept: FAMILY MEDICINE CLINIC | Facility: CLINIC | Age: 71
End: 2021-06-30

## 2021-07-08 ENCOUNTER — TELEPHONE (OUTPATIENT)
Dept: FAMILY MEDICINE CLINIC | Facility: CLINIC | Age: 71
End: 2021-07-08

## 2021-07-08 ENCOUNTER — TELEPHONE (OUTPATIENT)
Dept: OBGYN CLINIC | Facility: MEDICAL CENTER | Age: 71
End: 2021-07-08

## 2021-07-08 NOTE — TELEPHONE ENCOUNTER
lvm for pt to return phone call  Pt needs updated pre op clerance before 7/12, day of surgery  If pt returns phone call please pass phone call to me since I have a space on hold  Thank you

## 2021-07-08 NOTE — TELEPHONE ENCOUNTER
PT WAS SUPPOSED TO BE SCHEDULED WITH YOU ON 7/6 FOR UPDATED CLEARANCE  NOT SURE WHAT HAPPENED, BUT I LVM FOR PT TO RETURN MY PHONE CALL AND HAVE HER SEE ONE OF THE FIRST YEAR RESIDENTS FOR TOMORROW 7/9

## 2021-07-08 NOTE — TELEPHONE ENCOUNTER
Patient is scheduled for surgery on 7/12 and does not require medical clearance from an ortho perspective, Dr Prosper Campbell has reviewed the chart  He will update the HP at time of surgery

## 2021-07-08 NOTE — TELEPHONE ENCOUNTER
I spoke with the orthopedic surgeon Dr Franchesca Loera and we agreed patient does not need any further preop as she does not require medical optimization

## 2021-07-09 ENCOUNTER — ANESTHESIA EVENT (OUTPATIENT)
Dept: PERIOP | Facility: HOSPITAL | Age: 71
End: 2021-07-09
Payer: COMMERCIAL

## 2021-07-09 RX ORDER — SODIUM CHLORIDE, SODIUM LACTATE, POTASSIUM CHLORIDE, CALCIUM CHLORIDE 600; 310; 30; 20 MG/100ML; MG/100ML; MG/100ML; MG/100ML
75 INJECTION, SOLUTION INTRAVENOUS CONTINUOUS
Status: CANCELLED | OUTPATIENT
Start: 2021-07-12

## 2021-07-12 ENCOUNTER — ANESTHESIA (OUTPATIENT)
Dept: PERIOP | Facility: HOSPITAL | Age: 71
End: 2021-07-12
Payer: COMMERCIAL

## 2021-07-12 ENCOUNTER — HOSPITAL ENCOUNTER (OUTPATIENT)
Facility: HOSPITAL | Age: 71
Setting detail: OUTPATIENT SURGERY
Discharge: HOME | End: 2021-07-12
Attending: ORTHOPAEDIC SURGERY | Admitting: ORTHOPAEDIC SURGERY
Payer: COMMERCIAL

## 2021-07-12 VITALS
SYSTOLIC BLOOD PRESSURE: 141 MMHG | TEMPERATURE: 98 F | OXYGEN SATURATION: 100 % | HEART RATE: 65 BPM | DIASTOLIC BLOOD PRESSURE: 76 MMHG | WEIGHT: 126 LBS | HEIGHT: 62 IN | RESPIRATION RATE: 16 BRPM | BODY MASS INDEX: 23.19 KG/M2

## 2021-07-12 DIAGNOSIS — I10 ESSENTIAL HYPERTENSION: ICD-10-CM

## 2021-07-12 DIAGNOSIS — M65.4 DE QUERVAIN'S TENOSYNOVITIS: Primary | ICD-10-CM

## 2021-07-12 LAB — GLUCOSE SERPL-MCNC: 114 MG/DL (ref 65–140)

## 2021-07-12 PROCEDURE — 25000 INCISION OF TENDON SHEATH: CPT | Performed by: PHYSICIAN ASSISTANT

## 2021-07-12 PROCEDURE — NC001 PR NO CHARGE: Performed by: ORTHOPAEDIC SURGERY

## 2021-07-12 PROCEDURE — 82948 REAGENT STRIP/BLOOD GLUCOSE: CPT

## 2021-07-12 PROCEDURE — 25000 INCISION OF TENDON SHEATH: CPT | Performed by: ORTHOPAEDIC SURGERY

## 2021-07-12 RX ORDER — DIPHENHYDRAMINE HYDROCHLORIDE 50 MG/ML
12.5 INJECTION INTRAMUSCULAR; INTRAVENOUS ONCE AS NEEDED
Status: DISCONTINUED | OUTPATIENT
Start: 2021-07-12 | End: 2021-07-12 | Stop reason: HOSPADM

## 2021-07-12 RX ORDER — SODIUM CHLORIDE, SODIUM LACTATE, POTASSIUM CHLORIDE, CALCIUM CHLORIDE 600; 310; 30; 20 MG/100ML; MG/100ML; MG/100ML; MG/100ML
INJECTION, SOLUTION INTRAVENOUS CONTINUOUS PRN
Status: DISCONTINUED | OUTPATIENT
Start: 2021-07-12 | End: 2021-07-12

## 2021-07-12 RX ORDER — FENTANYL CITRATE 50 UG/ML
INJECTION, SOLUTION INTRAMUSCULAR; INTRAVENOUS AS NEEDED
Status: DISCONTINUED | OUTPATIENT
Start: 2021-07-12 | End: 2021-07-12

## 2021-07-12 RX ORDER — PROMETHAZINE HYDROCHLORIDE 25 MG/ML
12.5 INJECTION, SOLUTION INTRAMUSCULAR; INTRAVENOUS ONCE AS NEEDED
Status: DISCONTINUED | OUTPATIENT
Start: 2021-07-12 | End: 2021-07-12 | Stop reason: HOSPADM

## 2021-07-12 RX ORDER — PROPOFOL 10 MG/ML
INJECTION, EMULSION INTRAVENOUS CONTINUOUS PRN
Status: DISCONTINUED | OUTPATIENT
Start: 2021-07-12 | End: 2021-07-12

## 2021-07-12 RX ORDER — FENTANYL CITRATE/PF 50 MCG/ML
12.5 SYRINGE (ML) INJECTION
Status: DISCONTINUED | OUTPATIENT
Start: 2021-07-12 | End: 2021-07-12 | Stop reason: HOSPADM

## 2021-07-12 RX ORDER — MAGNESIUM HYDROXIDE 1200 MG/15ML
LIQUID ORAL AS NEEDED
Status: DISCONTINUED | OUTPATIENT
Start: 2021-07-12 | End: 2021-07-12 | Stop reason: HOSPADM

## 2021-07-12 RX ORDER — MIDAZOLAM HYDROCHLORIDE 2 MG/2ML
INJECTION, SOLUTION INTRAMUSCULAR; INTRAVENOUS AS NEEDED
Status: DISCONTINUED | OUTPATIENT
Start: 2021-07-12 | End: 2021-07-12

## 2021-07-12 RX ORDER — DEXAMETHASONE SODIUM PHOSPHATE 4 MG/ML
4 INJECTION, SOLUTION INTRA-ARTICULAR; INTRALESIONAL; INTRAMUSCULAR; INTRAVENOUS; SOFT TISSUE ONCE AS NEEDED
Status: DISCONTINUED | OUTPATIENT
Start: 2021-07-12 | End: 2021-07-12 | Stop reason: HOSPADM

## 2021-07-12 RX ORDER — MEPERIDINE HYDROCHLORIDE 25 MG/ML
12.5 INJECTION INTRAMUSCULAR; INTRAVENOUS; SUBCUTANEOUS
Status: DISCONTINUED | OUTPATIENT
Start: 2021-07-12 | End: 2021-07-12 | Stop reason: HOSPADM

## 2021-07-12 RX ORDER — FENTANYL CITRATE/PF 50 MCG/ML
25 SYRINGE (ML) INJECTION
Status: DISCONTINUED | OUTPATIENT
Start: 2021-07-12 | End: 2021-07-12 | Stop reason: HOSPADM

## 2021-07-12 RX ORDER — OXYCODONE HYDROCHLORIDE 5 MG/1
5 TABLET ORAL EVERY 6 HOURS PRN
Qty: 5 TABLET | Refills: 0 | Status: ON HOLD | OUTPATIENT
Start: 2021-07-12 | End: 2021-07-26 | Stop reason: SDUPTHER

## 2021-07-12 RX ORDER — OXYCODONE HYDROCHLORIDE 5 MG/1
5 TABLET ORAL EVERY 6 HOURS PRN
Status: DISCONTINUED | OUTPATIENT
Start: 2021-07-12 | End: 2021-07-12 | Stop reason: HOSPADM

## 2021-07-12 RX ADMIN — MIDAZOLAM 1 MG: 1 INJECTION INTRAMUSCULAR; INTRAVENOUS at 08:06

## 2021-07-12 RX ADMIN — MIDAZOLAM 1 MG: 1 INJECTION INTRAMUSCULAR; INTRAVENOUS at 08:19

## 2021-07-12 RX ADMIN — FENTANYL CITRATE 50 MCG: 50 INJECTION, SOLUTION INTRAMUSCULAR; INTRAVENOUS at 08:19

## 2021-07-12 RX ADMIN — FENTANYL CITRATE 50 MCG: 50 INJECTION, SOLUTION INTRAMUSCULAR; INTRAVENOUS at 08:10

## 2021-07-12 RX ADMIN — SODIUM CHLORIDE, SODIUM LACTATE, POTASSIUM CHLORIDE, AND CALCIUM CHLORIDE: .6; .31; .03; .02 INJECTION, SOLUTION INTRAVENOUS at 08:06

## 2021-07-12 RX ADMIN — PROPOFOL 100 MCG/KG/MIN: 10 INJECTION, EMULSION INTRAVENOUS at 08:10

## 2021-07-12 NOTE — H&P
Chief Complaint      Bilateral wrist pain        History of Present Illness      Demetrice Soler is a 69 y o  female who presents to the office today for follow up evaluation of bilateral DeQuervain's tenosynovitis  Patient has received 2 sets of injections for both sides in the past  Unfortunately, both times the relief was only temporary  She continues to wear her thumb spica splints, which do help, but she continues to have pain any time she's not wearing them  She denies numbness/tingling  States the pain is worst when she tries to extend her thumbs          Medical History        Past Medical History:   Diagnosis Date    Arthralgia of hip       left    Arthritis      Chronic pain disorder       low back and shoulder    Dyslipidemia      Hyperlipidemia      Hypertension      Neck pain      Pemphigus      Pre-diabetes      Shoulder pain      Vitamin D deficiency              Surgical History         Past Surgical History:   Procedure Laterality Date    CATARACT EXTRACTION Right      COLONOSCOPY        [place] COLONOSCOPY FLX DX W/COLLJ SPEC WHEN PFRMD N/A 11/10/2016     Procedure: COLONOSCOPY;  Surgeon: Екатерина Mendes MD;  Location: [facility];   Service: Gastroenterology    [place] XCAPSL CTRC RMVL INSJ IO LENS PROSTH W/O ECP Left 9/27/2018     Procedure: EXTRACAPSULAR CATARACT REMOVAL/INSERTION OF INTRAOCULAR LENS;  Surgeon: Juan Olivo MD;  Location: [facility];  Service: Ophthalmology            No Known Allergies     Current Outpatient Medications on File Prior to Visit   Medication Sig Dispense Refill    ammonium lactate (LAC-HYDRIN) 12 % lotion apply to affected area twice a day if needed for DRY SKIN 400 g 0    atorvastatin (LIPITOR) 40 mg tablet Take 1 tablet (40 mg total) by mouth every evening 90 tablet 1    Elastic Bandages & Supports (Thumb Splint/Neoprene Medium) MISC by Does not apply route daily 2 each 0    fluticasone (FLONASE) 50 mcg/act nasal spray instill 1 spray into each nostril once daily 16 g 1    memantine (NAMENDA) 5 mg tablet Take 1 tablet (5 mg total) by mouth daily 90 tablet 3    metFORMIN (GLUCOPHAGE) 500 mg tablet Take 1 tablet (500 mg total) by mouth daily with breakfast 90 tablet 1    omega-3-acid ethyl esters (LOVAZA) 1 g capsule Take 2 capsules (2 g total) by mouth 2 (two) times a day 180 capsule 1    RA Aspirin EC Adult Low St 81 MG EC tablet take 1 tablet by mouth once daily 90 tablet 1    VITAMIN D PO Take by mouth daily        furosemide (LASIX) 20 mg tablet Take 0 5 tablets (10 mg total) by mouth daily (Patient not taking: Reported on 5/13/2021) 5 tablet 0    meclizine (ANTIVERT) 25 mg tablet Take 1 tablet (25 mg total) by mouth every 8 (eight) hours (Patient not taking: Reported on 5/13/2021) 30 tablet 0    meloxicam (MOBIC) 7 5 mg tablet Take 1 tablet (7 5 mg total) by mouth daily (Patient not taking: Reported on 5/13/2021) 90 tablet 0    triamterene-hydrochlorothiazide (MAXZIDE-25) 37 5-25 mg per tablet Take 1 tablet by mouth daily (Patient not taking: Reported on 5/13/2021) 90 tablet 1      No current facility-administered medications on file prior to visit           Social History            Tobacco Use    Smoking status: Never Smoker    Smokeless tobacco: Never Used   Substance Use Topics    Alcohol use: Never       Frequency: Never       Binge frequency: Never    Drug use: No               Family History   Problem Relation Age of Onset    Hyperlipidemia Mother      Hypertension Mother      Heart disease Father      No Known Problems Sister      No Known Problems Sister      No Known Problems Sister      No Known Problems Maternal Aunt      No Known Problems Paternal Aunt      No Known Problems Paternal Aunt           Review of Systems      As stated in the HPI   All other systems were reviewed and are negative         Physical Exam      /87   Pulse 73   Ht 5' (1 524 m)   Wt 57 2 kg (126 lb)   BMI 24 61 kg/m²      GENERAL: This is a well-developed, well-nourished, age-appropriate patient in no acute distress  The patient is alert and oriented x3  Pleasant and cooperative  Eyes: Anicteric sclerae  Extraocular movements appear intact  HENT: Nares are patent with no drainage  Lungs: There is equal chest rise on inspection  Breathing is non-labored with no audible wheezing  Cardiovascular: No cyanosis  No upper extremity lymphadema  Skin: Skin is warm to touch  No obvious skin lesions or rashes other than described below  Neurologic: No ataxia  Psychiatric: Mood and affect are appropriate      Right Upper Extremity:  Patient with edema of the 1st dorsal compartment  She has severe tenderness to palpation to the 1st dorsal compartment  Mild discomfort of the thumb CMC joint  Negative thumb adduction test    Positive Eichoff's test   5/5 Motor to the APB, FDI, FDP2, FDP5, EDC  Sensation intact to light touch in the median, radial, and ulnar nerve distribution, including the superficial sensory branch of the radial nerve  Brisk capillary refill      Left Upper Extremity:  Patient with edema of the 1st dorsal compartment  She has severe tenderness to palpation to the 1st dorsal compartment  No tenderness of the thumb CMC joint  Negative thumb adduction test    Positive Eichoff's test   5/5 Motor to the APB, FDI, FDP2, FDP5, EDC  Sensation intact to light touch in the median, radial, and ulnar nerve distribution, including the superficial sensory branch of the radial nerve  Brisk capillary refill         Data Review      Labs:  None     Electrodiagnostic Testing:  None     Imaging:  None     Assessment and Plan   Diagnoses and all orders for this visit:     Tenosynovitis, de Quervain-Bilateral     69year old female with bilateral DeQuervain's tenosynovitis that is persistent despite nonoperative management with use of steroid injections as well as bracing   I discussed that at this time, I would not recommend any further steroid injections  The next step would be surgical release  Risks, benefits and alternatives of the surgery were discussed with the patient today  Details of postoperative recovery and protocol were also discussed  Patient states understanding and agrees to proceed with bilateral DeQuervain's release surgeries to be performed in a staged fashion under IV sedation  I did discuss that patient will be placed into a splint after surgery that she must keep clean and dry at all times until her postoperative appointment       Follow Up:  After Surgery     To Do Next Visit: Splint off, sutures out     PROCEDURES PERFORMED:  Procedures  No Procedures performed today

## 2021-07-12 NOTE — DISCHARGE INSTRUCTIONS
[facility] - Dr Cee Ritter (Orthopedic Surgery)    Follow-up Appointments   Please call to set up/confirm your first postoperative visit with Dr Rashid Willingham in 10-14 days  Dressing and Wound Care   A dressing has been placed on your hand/arm to keep the incisions clean  Keep your dressing clean and dry  o Do not remove the surgical dressing/splint  It will be removed at your first postoperative office visit with the doctor or therapist     Jamison Johnson a plastic bag over your dressing/splint whenever you take a shower or bath until you are allowed to remove it   Swelling is normal after surgery  Elevate your hand/arm so the surgical site is above your heart to decrease the swelling  Swelling is like water, it runs downhill  This is especially important for the first 72 hours after surgery  o The best way to elevate your hand/arm is with your fingers pointing towards the ceiling and your hand/arm above the level of the heart   o You can use pillows to help prop your hand/arm up when sitting or lying down   If you are experiencing pain, be sure you are elevating your hand/arm as often as possible   Apply an ice pack over your dressing/splint for 20 minutes of every hour for the first 3 days when you are awake  This can help to reduce swelling and inflammation  Be sure the ice pack is waterproof so it does not leak on the dressing/splint  A simple ice pack can be made by adding ten cubes and a small amount of water in a small zip-lock bag  Seal this small bag tightly  Place this small bag in a larger zip lock bag  Apply to the area in pain   If the dressing feels too tight in spite of elevation, loosen the outer wrap but do not remove the entire dressing     If you have exposed pins/wires, take clean gauze or a cotton tip applicator (like a Q-tip), get it wet in clean warm water mixed with non-scented hand soap (like [place]ill, [place], Dial, etc ), and gently wipe around the base of the pin where it comes through the skin once a day  Do not use water from a well to clean as this has bacteria in it and can contribute to infections  ACTIVITIES:  [facility] and straighten the parts of your hand, wrist, elbow, and shoulder that are not included in your surgical dressing or splint  Do this at least 6 times a day, as this will help decrease swelling and speed up your recovery  This includes your fingers  See the instructions listed below for finger motion  THIS IS VERY IMPORTANT FOR YOUR RECOVERY! POSTOPERATIVE CARE/CONCERNS:  [facility] You may experience some temporary numbness in your fingers   You should have very little to no bleeding on your dressing   Notify the office (see contact info at bottom of page) for any of the following:  o Excessive pain not relieved by rest, elevation, and pain medications  o Feeling that the dressing is too tight in spite of adequately elevating hand/arm  o Active bleeding through the dressing  o Drainage from the wound site or pin sites  o Foul odor from the dressing/wound  o Temperature greater that 101? F or chills  o Blue or excessively cold fingertips  o Numbness of the fingertips that does not improve in spite of adequately elevating hand/arm    PAIN MEDICATION:   Pain is a normal part of the recovery after surgery  The pain medication provided to you will help to decrease the discomfort but will not completely eliminate the pain   A prescription for a narcotic pain medicine (oxycodone or hydrocodone) and anti-inflammatory (naproxen) were called in to your pharmacy  Please take the anti-inflammatory medication AND over-the-counter acetaminophen (Tylenol) regularly  The instructions will be listed on the bottles  The narcotic pain medicine should be used for pain that is not controlled by these other medications and only for the first few days after surgery   The narcotic is HIGHLY ADDICTIVE and has many side effects such as causing constipation, dizziness, confusion, decreased breathing and more  It is safe to take for a short period of time after surgery  It is almost never prescribed for longer than a few weeks and never after one month for elective surgeries   Do not take narcotic or anti-inflammatories on an empty stomach   It is illegal to drive while taking narcotic pain medication   Your pain should decrease over the first few days after surgery which will allow you to take less pain medicine, increase the time between doses of medication, or stop taking all pain medicine        OFFICE CONTACT NUMBERS   Please call [phone] with any questions about appointments or any medical concerns

## 2021-07-12 NOTE — ANESTHESIA POSTPROCEDURE EVALUATION
Post-Op Assessment Note    CV Status:  Stable  Pain Score: 0    Pain management: adequate     Mental Status:  Alert and awake   Hydration Status:  Euvolemic   PONV Controlled:  Controlled   Airway Patency:  Patent      Post Op Vitals Reviewed: Yes      Staff: CRNA         No complications documented      BP (P) 118/68 (07/12/21 0850)    Temp (P) 98 °F (36 7 °C) (07/12/21 0850)    Pulse     Resp (P) 20 (07/12/21 0850)    SpO2

## 2021-07-12 NOTE — OP NOTE
DATE OF SURGERY: 7/12/2021    SURGEON: Toni Allen MD    PREOPERATIVE DIAGNOSIS: Left de Quervain tenosynovitis    POSTOPERATIVE DIAGNOSIS:  Same    PROCEDURE:  Release left 1st dorsal compartment    IMPLANTS: * No implants in log *     ASSISTANTS:      * Rober Montoya PA-C - Assisting       ANESTHESIA: IV Sedation with Anesthesia    ESTIMATED BLOOD LOSS: Minimal     INTRAVENOUS FLUIDS: Per anesthesia    URINE OUTPUT:  No Hernandez    TOURNIQUET TIME:  About 20 minutes      COMPLICATIONS:  None    ANTIBIOTICS:  None    SPECIMENS: * No specimens in log *         INDICATIONS: Ravi Boss is a 69y o  year old female who sustained the above conditions  The indications for operative intervention were de Quervain tenosynovitis refractory to nonoperative modalities  The alternatives to operative intervention included nonoperative care  The risks of the operative procedure were discussed in detail with the patient including but not limited to the risks of anesthesia, infection, injury to blood vessel/nerve, pain, stiffness, changes in sensation, and the need for repeat surgery  The patient understood these risks and alternatives and elected to proceed with surgery  DESCRIPTION OF PROCEDURE: The patient was seen in the preoperative holding area and identification was performed as per the institution's protocol  The patient was then brought to the operating room, a briefing was held and prophylactic antibiotics were given  Anesthesia was induced  A tourniquet cuff was applied to the operative extremity, set at 250 mmHg The site was  prepped with ChloraPrep and draped in the usual sterile fashion  Following a timeout procedure, a 3 cm incision was made directly over the radial aspect of radial styloid    Dissection was carried down through skin and subcutaneous tissue sharply and then once the subcutaneous fat was identified, gentle spreading and retraction of branches of the superficial radial nerve, if identified, was performed  Ready identification of the 1st dorsal compartment was made  An incision was made on the dorsal aspect of the 1st dorsal compartment  The extensor pollicis brevis was identified in a separate compartment and this was completely released  We then entered through the intracompartmental septa into the compartment with the abductor pollicis longus more volarly  The septa was excised  There were multiple slips of the abductor pollicis longus and there was significant tenosynovium and inflammatory tissue covering all slips  This was sharply excised  Forearm was taken through pro supination and there was no volar dislocation of the tendons    The wounds were copiously irrigated and closed in layers including 4-0 nylon  Sterile dressing was applied as well as a thumb spica splint    All sharps and sponge counts were correct and there were no complications  I attest that Natalia MARTE, was present and scrubbed for the entirety of the procedure  No qualified resident was available to assist with this case  and A physician assistant was required during the procedure for retraction, tissue handling, dissection and suturing  The patient was awoken from anesthesia in good condition and taken to the PACU         PLAN: The patient will follow up in 10-14 days

## 2021-07-12 NOTE — ANESTHESIA POSTPROCEDURE EVALUATION
Post-Op Assessment Note    CV Status:  Stable  Pain Score: 1    Pain management: adequate     Mental Status:  Alert and awake   Hydration Status:  Euvolemic   PONV Controlled:  Controlled   Airway Patency:  Patent      Post Op Vitals Reviewed: Yes      Staff: Anesthesiologist, CRNA         No complications documented      BP (P) 118/68 (07/12/21 0850)    Temp (P) 98 °F (36 7 °C) (07/12/21 0850)    Pulse     Resp (P) 20 (07/12/21 0850)    SpO2

## 2021-07-13 RX ORDER — ACETAMINOPHEN/DIPHENHYDRAMINE 500MG-25MG
TABLET ORAL
Qty: 90 TABLET | Refills: 1 | Status: SHIPPED | OUTPATIENT
Start: 2021-07-13 | End: 2021-12-06

## 2021-07-20 ENCOUNTER — OFFICE VISIT (OUTPATIENT)
Dept: OBGYN CLINIC | Facility: MEDICAL CENTER | Age: 71
End: 2021-07-20

## 2021-07-20 ENCOUNTER — OFFICE VISIT (OUTPATIENT)
Dept: FAMILY MEDICINE CLINIC | Facility: CLINIC | Age: 71
End: 2021-07-20

## 2021-07-20 VITALS
WEIGHT: 124 LBS | RESPIRATION RATE: 15 BRPM | SYSTOLIC BLOOD PRESSURE: 188 MMHG | BODY MASS INDEX: 22.82 KG/M2 | DIASTOLIC BLOOD PRESSURE: 79 MMHG | HEIGHT: 62 IN | HEART RATE: 75 BPM

## 2021-07-20 VITALS
SYSTOLIC BLOOD PRESSURE: 152 MMHG | BODY MASS INDEX: 23.19 KG/M2 | DIASTOLIC BLOOD PRESSURE: 102 MMHG | HEIGHT: 62 IN | OXYGEN SATURATION: 98 % | RESPIRATION RATE: 16 BRPM | HEART RATE: 87 BPM | WEIGHT: 126 LBS | TEMPERATURE: 98 F

## 2021-07-20 DIAGNOSIS — I10 ESSENTIAL HYPERTENSION: ICD-10-CM

## 2021-07-20 DIAGNOSIS — M65.4 TENOSYNOVITIS, DE QUERVAIN: Primary | ICD-10-CM

## 2021-07-20 PROCEDURE — 3080F DIAST BP >= 90 MM HG: CPT | Performed by: PHYSICIAN ASSISTANT

## 2021-07-20 PROCEDURE — 1036F TOBACCO NON-USER: CPT | Performed by: PHYSICIAN ASSISTANT

## 2021-07-20 PROCEDURE — 99213 OFFICE O/P EST LOW 20 MIN: CPT | Performed by: PHYSICIAN ASSISTANT

## 2021-07-20 PROCEDURE — 99024 POSTOP FOLLOW-UP VISIT: CPT | Performed by: ORTHOPAEDIC SURGERY

## 2021-07-20 PROCEDURE — 3725F SCREEN DEPRESSION PERFORMED: CPT | Performed by: PHYSICIAN ASSISTANT

## 2021-07-20 PROCEDURE — 3077F SYST BP >= 140 MM HG: CPT | Performed by: PHYSICIAN ASSISTANT

## 2021-07-20 PROCEDURE — 1160F RVW MEDS BY RX/DR IN RCRD: CPT | Performed by: PHYSICIAN ASSISTANT

## 2021-07-20 RX ORDER — TRIAMTERENE AND HYDROCHLOROTHIAZIDE 37.5; 25 MG/1; MG/1
1 TABLET ORAL DAILY
Qty: 90 TABLET | Refills: 0 | Status: SHIPPED | OUTPATIENT
Start: 2021-07-20 | End: 2021-09-07 | Stop reason: SDUPTHER

## 2021-07-20 NOTE — PROGRESS NOTES
Assessment/Plan:    Hypertension  Restart triamterene-hctz  She is scheduled 7/26 for surgery and will have BP check at that time         Problem List Items Addressed This Visit        Cardiovascular and Mediastinum    Hypertension     Restart triamterene-hctz  She is scheduled 7/26 for surgery and will have BP check at that time         Relevant Medications    triamterene-hydrochlorothiazide (MAXZIDE-25) 37 5-25 mg per tablet            Subjective:      Patient ID: Flor De Souza is a 69 y o  female  HPI  69year old F here for elevated BP  She had been on lisinopril and then it was changed to triamterene-hctz  Family thought since May she was supposed to d/c triamterene-hctz  She has not been on anything for BP  She was at ortho today and told BP was high  She has no headache, dizziness, chest pain, SOB  leg swelling  It was 148/83 before and often systolic BP of 796J at home  The following portions of the patient's history were reviewed and updated as appropriate:   She  has a past medical history of Arthralgia of hip, Arthritis, Chronic pain disorder, Dyslipidemia, Hyperlipidemia, Hypertension, Neck pain, Pemphigus, Pre-diabetes, Shoulder pain, Stroke ([address] ), and Vitamin D deficiency    She   Patient Active Problem List    Diagnosis Date Noted    CVA (cerebral vascular accident) ([facility] [address] ) 12/08/2019    Spinal stenosis, lumbar region with neurogenic claudication     Acute pain of left shoulder 08/23/2018    Pre-op evaluation 02/08/2018    Muscle spasms of neck 12/04/2017    Arthralgia of left hip 07/19/2016    Slow transit constipation 07/19/2016    Impaired fasting glucose 07/19/2016    Chronic right shoulder pain 03/15/2016    Creatinine elevation 02/16/2015    Neck pain 11/19/2014    Low back pain 08/06/2014    Acquired trigger finger 10/09/2013    Vitamin D deficiency 07/29/2013    Mammogram abnormal 07/29/2013    Multiple joint pain 07/29/2013    Other symptoms involving skin and integumentary tissues 06/03/2013    Pemphigus 04/08/2013    Bursitis 12/03/2012    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/2012    Osteoarthrosis 09/24/2012    Dyslipidemia 06/19/2012    Hypertension 06/19/2012    Hyperlipidemia 06/03/2008     She  has a past surgical history that includes pr colonoscopy flx dx w/collj spec when pfrmd (N/A, 11/10/2016); Colonoscopy; pr xcapsl ctrc rmvl insj io lens prosth w/o ecp (Left, 9/27/2018); Cataract extraction (Bilateral); and pr incis tendon sheath,radial styloid (Left, 7/12/2021)  Her family history includes Heart disease in her father; Hyperlipidemia in her mother; Hypertension in her mother; No Known Problems in her maternal aunt, paternal aunt, paternal aunt, sister, sister, and sister  She  reports that she has never smoked  She has never used smokeless tobacco  She reports that she does not drink alcohol and does not use drugs    Current Outpatient Medications   Medication Sig Dispense Refill    ammonium lactate (LAC-HYDRIN) 12 % lotion apply to affected area twice a day if needed for DRY SKIN 400 g 0    atorvastatin (LIPITOR) 40 mg tablet take 1 tablet by mouth every evening 90 tablet 1    Elastic Bandages & Supports (Thumb Splint/Neoprene Medium) MISC by Does not apply route daily 2 each 0    fluticasone (FLONASE) 50 mcg/act nasal spray instill 1 spray into each nostril once daily 16 g 1    memantine (NAMENDA) 5 mg tablet Take 1 tablet (5 mg total) by mouth daily 90 tablet 3    metFORMIN (GLUCOPHAGE) 500 mg tablet Take 1 tablet (500 mg total) by mouth daily with breakfast 90 tablet 1    omega-3-acid ethyl esters (LOVAZA) 1 g capsule take 2 capsules by mouth twice a day 180 capsule 1    RA Aspirin EC 81 MG EC tablet take 1 tablet by mouth once daily 90 tablet 1    VITAMIN D PO Take by mouth daily      furosemide (LASIX) 20 mg tablet Take 0 5 tablets (10 mg total) by mouth daily (Patient not taking: Reported on 5/13/2021) 5 tablet 0    meclizine (ANTIVERT) 25 mg tablet Take 1 tablet (25 mg total) by mouth every 8 (eight) hours (Patient not taking: Reported on 5/13/2021) 30 tablet 0    meloxicam (MOBIC) 7 5 mg tablet Take 1 tablet (7 5 mg total) by mouth daily (Patient not taking: Reported on 5/13/2021) 90 tablet 0    oxyCODONE (ROXICODONE) 5 mg immediate release tablet Take 1 tablet (5 mg total) by mouth every 6 (six) hours as needed for severe pain for up to 5 dosesMax Daily Amount: 20 mg (Patient not taking: Reported on 7/20/2021) 5 tablet 0    triamterene-hydrochlorothiazide (MAXZIDE-25) 37 5-25 mg per tablet Take 1 tablet by mouth daily 90 tablet 0     No current facility-administered medications for this visit       Current Outpatient Medications on File Prior to Visit   Medication Sig    ammonium lactate (LAC-HYDRIN) 12 % lotion apply to affected area twice a day if needed for DRY SKIN    atorvastatin (LIPITOR) 40 mg tablet take 1 tablet by mouth every evening    Elastic Bandages & Supports (Thumb Splint/Neoprene Medium) MISC by Does not apply route daily    fluticasone (FLONASE) 50 mcg/act nasal spray instill 1 spray into each nostril once daily    memantine (NAMENDA) 5 mg tablet Take 1 tablet (5 mg total) by mouth daily    metFORMIN (GLUCOPHAGE) 500 mg tablet Take 1 tablet (500 mg total) by mouth daily with breakfast    omega-3-acid ethyl esters (LOVAZA) 1 g capsule take 2 capsules by mouth twice a day    RA Aspirin EC 81 MG EC tablet take 1 tablet by mouth once daily    VITAMIN D PO Take by mouth daily    furosemide (LASIX) 20 mg tablet Take 0 5 tablets (10 mg total) by mouth daily (Patient not taking: Reported on 5/13/2021)    meclizine (ANTIVERT) 25 mg tablet Take 1 tablet (25 mg total) by mouth every 8 (eight) hours (Patient not taking: Reported on 5/13/2021)    meloxicam (MOBIC) 7 5 mg tablet Take 1 tablet (7 5 mg total) by mouth daily (Patient not taking: Reported on 5/13/2021)    oxyCODONE (ROXICODONE) 5 mg immediate release tablet Take 1 tablet (5 mg total) by mouth every 6 (six) hours as needed for severe pain for up to 5 dosesMax Daily Amount: 20 mg (Patient not taking: Reported on 7/20/2021)    [DISCONTINUED] triamterene-hydrochlorothiazide (MAXZIDE-25) 37 5-25 mg per tablet Take 1 tablet by mouth daily (Patient not taking: Reported on 5/13/2021)     No current facility-administered medications on file prior to visit  She has No Known Allergies       Review of Systems   Constitutional: Negative for chills and fever  Respiratory: Negative for shortness of breath  Cardiovascular: Negative for chest pain and leg swelling  Musculoskeletal:        Wrist pain     Neurological: Negative for dizziness and headaches  Objective:      BP (!) 152/102 (BP Location: Right arm, Patient Position: Standing, Cuff Size: Standard)   Pulse 87   Temp 98 °F (36 7 °C) (Temporal)   Resp 16   Ht 5' 2" (1 575 m)   Wt 57 2 kg (126 lb)   SpO2 98%   BMI 23 05 kg/m²          Physical Exam  Vitals and nursing note reviewed  Constitutional:       General: She is not in acute distress  Appearance: She is well-developed  HENT:      Head: Normocephalic and atraumatic  Right Ear: External ear normal       Left Ear: External ear normal    Eyes:      Conjunctiva/sclera: Conjunctivae normal    Neck:      Thyroid: No thyromegaly  Cardiovascular:      Rate and Rhythm: Normal rate and regular rhythm  Heart sounds: Normal heart sounds  No murmur heard  Pulmonary:      Effort: Pulmonary effort is normal  No respiratory distress  Breath sounds: Normal breath sounds  No wheezing  Musculoskeletal:      Cervical back: Normal range of motion and neck supple  Lymphadenopathy:      Cervical: No cervical adenopathy  Neurological:      Mental Status: She is alert     Psychiatric:         Behavior: Behavior normal

## 2021-07-20 NOTE — PATIENT INSTRUCTIONS
Hipertensión crónica   LO QUE NECESITA SABER:   La hipertensión es la presión arterial bonita  La presión arterial es la fuerza que ejerce la melissa al [employer] contra las royal de las arterias  La hipertensión causa que wade presión arterial se eleve tanto que wade corazón se ve forzado a trabajar mucho más lenin que lo normal  [place] puede dañar wade corazón  Incluso si tiene hipertensión [address], los cambios de [facility] de carrie, medicamentos o ambos pueden ayudar a bajar la presión arterial a niveles normales  INSTRUCCIONES SOBRE EL BONITA HOSPITALARIA:   Llame al [employer] de emergencias ([phone] en [phone]) o pídale a alguien que llame si:  · Usted tiene dolor en el pecho  · Tiene alguno de los siguientes signos de un ataque cardíaco:      ? Estrujamiento, presión o tensión en wade pecho    ? Usted también podría presentar alguno de los siguientes:     § Malestar o dolor en wade espalda, carrie, mandíbula, abdomen, o brazo    § Falta de [employer] o vómitos    § Desvanecimiento o sudor frío repentino    · Usted se siente confundido o tiene dificultad para hablar  · Repentinamente se siente aturdido o con dificultad para respirar  Regrese a la franco de emergencias si:  · Usted tiene un lenin dolor de allie o pérdida de la visión  · Usted tiene debilidad en un brazo o en to pierna  Llame a wade médico si:  · Usted se siente mareado, confundido, somnoliento o ryan si se fuera a desmayar  · Usted ha estado tomando medicamento para la presión arterial reddy todavía está en un nivel más elevado del que wade médico le indicó que debería estar  · Usted tiene preguntas o inquietudes acerca de wade condición o cuidado  Medicamentos: Es posible que usted necesite alguno de los siguientes:  · Los antihipertensivos podrían usarse para ayudar a disminuir la presión arterial  Varios tipos de medicamentos están disponibles  Wade médico podría modificar los Hector-Miryam ashley   [place] [address] ser necesario si garcia presión arterial suele ser melly cuando usted la controla en garcia casa o tiene otros problemas con el control de la presión arterial     · Los diuréticos ayudan a eliminar el exceso de líquido que se acumula en el organismo  [place] contribuirá a bajar garcia presión arterial  Es posible que orine más seguido mientras ashley wanda medicamento  · Los medicamentos para el colesterol ayudan a bajar los niveles de [place]  Un nivel bajo de colesterol ayuda a prevenir enfermedades cardíacas y facilita el control de la presión arterial     · [place] lidia medicamentos ryan se le haya indicado  Consulte con garcia médico si usted evangelista que garcia medicamento no le está ayudando o si presenta efectos secundarios  Infórmele si es alérgico a cualquier medicamento  Mantenga to lista actualizada de los [place], las vitaminas y los productos herbales que ashley  Incluya los siguientes datos de los medicamentos: cantidad, frecuencia y motivo de administración  Traiga con usted la lista o los envases de las píldoras a lidia citas de seguimiento  Lleve la lista de los medicamentos con usted en [place] de to emergencia  Acuda a la consulta de control con garcia médico según las indicaciones: Usted necesitará regresar para medir garcia presión arterial y realizar otros exámenes de laboratorio  Anote lidia preguntas para que se acuerde de hacerlas minor lidia visitas  [place] de la hipertensión:      · [place] presión arterial normal es 119/79 o inferior   Garcia médico podría comprobar garcia presión arterial solamente cada año si se mantiene en un nivel normal     · To presión arterial elevada es de 120/79 a 129/79   A veces esto se llama prehipertensión  Garcia médico puede sugerir cambios de estilo de carrie para ayudar a bajar la presión arterial a un nivel normal  Entonces él podría comprobar garcia presión otra vez en 3 a 6 meses  · La etapa 1 de la hipertensión es de 130/80  a 139/89    Garcia médico podría recomendar cambios de estilo de carrie, medicamentos y controles cada 3 a 6 meses hasta que garcia presión arterial esté controlada  · La etapa 2 de la hipertensión es de 140/90 o mayor   Garcia médico le recomendará cambios en el estilo de carrie y le indicará 2 clases de medicamentos para la hipertensión  También necesitará controlar garcia presión arterial cada mes hasta que esté controlada  Controle la hipertensión crónica:  · Controle garcia presión arterial en casa  Evite fumar, consumir cafeína y hacer ejercicio al menos 30 minutos antes de controlar garcia presión arterial  Siéntese y descanse por 5 minutos antes de tomarse la presión arterial  Extienda garcia brazo y apóyelo en to superficie plana  Garcia brazo debe estar a la misma altura que garcia corazón  Siga las instrucciones que vienen con el monitor para la presión arterial  Controle garcia presión arterial 2 veces, con diferencia de 1 minuto, antes de soham garcia medicamento por la mañana  También controle garcia presión arterial antes de la dasia  Mantenga un registro de garcia peso y llévelo con usted a las citas de control  Pregúntele a garcia médico cuál debería ser garcia presión arterial          · Controle cualquier otra condición médica que usted tenga  Algunas condiciones médicas ryan la diabetes pueden aumentar garcia riesgo de hipertensión  [address] garcia médico y tómese lidia medicamentos según dichas instrucciones  Hable con garcia médico sobre cualquier nueva afección médica que haya desarrollado recientemente  · Pregunte [employer]  Ciertos medicamentos pueden aumentar garcia presión arterial  Los ejemplos incluyen las píldoras anticonceptivas orales, los descongestivos, los suplementos herbales y los [place], ryan el ibuprofeno  Garcia médico puede indicarle qué medicamentos son seguros para usted  Estos medicamentos incluyen los recetados y de [place]      Cambios de estilo de carrie que usted puede hacer para reducir garcia presión arterial: Garcia médico yoel vez quiera que jennifer más cambios de estilo de carrie si usted tiene problemas para controlar garcia presión arterial  [place] puede parecer difícil con [employer], especialmente si usted piensa que usted está haciendo buenos cambios reddy garcia presión sigue siendo melly  Andrea vez lo ayude centrarse en un nuevo cambio a la vez  Por ejemplo, intente agregar 1 día más de ejercicio o ejercite minor 10 minutos adicionales en 2 días  Pequeños cambios pueden hacer to gran diferencia  Garcia médico también puede derivarlo a los especialistas, ryan un dietista que pueda ayudarlo a hacer pequeños cambios  Los [employer] de garcia chantal pueden ayudarlo a aprender a crear cambios en el estilo de carrie, ryan los siguientes:  · Limite el sodio (la sal) ryan se le haya indicado  Demasiado sodio puede afectar el equilibrio de líquidos  Revise las etiquetas para buscar alimentos bajos en sodio o sin sal agregada  Algunos alimentos bajos en sodio utilizan sales de potasio para añadir sabor  Demasiado potasio también puede causar problemas de [place]  Garcia médico le dirá qué cantidad de sodio y potasio es monzon para el consumo en un día  Él puede recomendarle que limite el sodio a 2,300 mg al día  · Siga el plan de comidas recomendado por garcia médico  Un dietista o médico puede darle más información sobre planes de bajo contenido de sodio o el plan de alimentación DASH (enfoques dietéticos para detener la hipertensión)  El plan DASH es bajo en sodio, azúcar procesada, grasas dañinas y grasas totales  Es alto en potasio, calcio y [place]  Estos se encuentran en las verduras, las frutas y los alimentos integrales  · Manténgase físicamente activo minor todo el día  La actividad física, ryan el ejercicio, puede ayudar a controlar garcia presión arterial y garcia peso  Tia actividad física por lo menos 30 minutos al día, la mayoría de los días de la [place]  Incluya to actividad aeróbica, ryan caminar o montar en bicicleta  Incluya también entrenamiento de fuerza al menos 2 veces por semana   Sonia médicos pueden ayudarle a crear un plan de Brendalyn Ogren  · [address] [facility] estr[address]  Es posible que esto contribuya a bajar garcia presión arterial  Aprenda sobre formas de [place], ryan respiración profunda o escuchar música  · Limite el consumo de alcohol según le indicaron  El alcohol puede aumentar la presión arterial  Un trago equivale a 12 onzas de cerveza, 5 onzas de vino o 1 onza y ½ de licor  · No fume  La nicotina y otros químicos en los cigarrillos y cigarros pueden aumentar garcia presión arterial y también provocar daño al pulmón  Pida información a garcia médico si usted actualmente fuma y necesita ayuda para dejar de fumar  Los cigarrillos electrónicos o el tabaco sin humo igualmente contienen nicotina  Consulte con garcia médico antes de [employer]  © Copyright [employer]21 2021 Information is for End User's use only and may not be sold, redistributed or otherwise used for commercial purposes  All illustrations and images included in CareNotes® are the copyrighted property of [employer] "Shahab P. Tabatabai, Broker"  or [address] [place] [address] es sólo para uso en educación  Garcia intención no es darle un consejo médico sobre enfermedades o tratamientos  Colsulte con garcia Karen Cabot farmacéutico antes de seguir cualquier régimen médico para saber si es seguro y efectivo para usted

## 2021-07-20 NOTE — PROGRESS NOTES
Chief Complaint     Bilateral wrist pain      History of Present Illness     Jamee Dennis Primer is a 69 y o  who presents with continued right wrist pain and improved left wrist pain since surgery  She notes that she has had really no wrist pain in her left wrist since the surgery  Maybe 1 day  Denies any numbness or tingling  Has maintained her splint  Has also tried to use our splint on the right side  No catching clicking locking popping there  Past Medical History:   Diagnosis Date    Arthralgia of hip     left    Arthritis     Chronic pain disorder     low back and shoulder    Dyslipidemia     Hyperlipidemia     Hypertension     Neck pain     Pemphigus     Pre-diabetes     Shoulder pain     Stroke ([facility] [address] )     Vitamin D deficiency        Past Surgical History:   Procedure Laterality Date    CATARACT EXTRACTION Bilateral     COLONOSCOPY      AR COLONOSCOPY FLX DX W/COLLJ SPEC WHEN PFRMD N/A 11/10/2016    Procedure: COLONOSCOPY;  Surgeon: Emily Bullock MD;  [facility]; Service: Gastroenterology    AR INCIS TENDON SHEATH,RADIAL STYLOID Left 7/12/2021    Procedure: RELEASE LEFT [facility];  Surgeon:  Penelope Parra MD;  Location: [facility] MAIN OR;  Service: Orthopedics    AR XCAPSL CTRC RMVL INSJ IO LENS PROSTH W/O ECP Left 9/27/2018    Procedure: EXTRACAPSULAR CATARACT REMOVAL/INSERTION OF INTRAOCULAR LENS;  Surgeon: Eelna Seth MD;  Location: [facility] MAIN OR;  Service: Ophthalmology       No Known Allergies    Current Outpatient Medications on File Prior to Visit   Medication Sig Dispense Refill    ammonium lactate (LAC-HYDRIN) 12 % lotion apply to affected area twice a day if needed for DRY SKIN 400 g 0    atorvastatin (LIPITOR) 40 mg tablet take 1 tablet by mouth every evening 90 tablet 1    Elastic Bandages & Supports (Thumb Splint/Neoprene Medium) MISC by Does not apply route daily 2 each 0    fluticasone (FLONASE) 50 mcg/act nasal spray instill 1 spray into each nostril once daily 16 g 1    memantine (NAMENDA) 5 mg tablet Take 1 tablet (5 mg total) by mouth daily 90 tablet 3    metFORMIN (GLUCOPHAGE) 500 mg tablet Take 1 tablet (500 mg total) by mouth daily with breakfast 90 tablet 1    omega-3-acid ethyl esters (LOVAZA) 1 g capsule take 2 capsules by mouth twice a day 180 capsule 1    RA Aspirin EC 81 MG EC tablet take 1 tablet by mouth once daily 90 tablet 1    VITAMIN D PO Take by mouth daily      furosemide (LASIX) 20 mg tablet Take 0 5 tablets (10 mg total) by mouth daily (Patient not taking: Reported on 5/13/2021) 5 tablet 0    meclizine (ANTIVERT) 25 mg tablet Take 1 tablet (25 mg total) by mouth every 8 (eight) hours (Patient not taking: Reported on 5/13/2021) 30 tablet 0    meloxicam (MOBIC) 7 5 mg tablet Take 1 tablet (7 5 mg total) by mouth daily (Patient not taking: Reported on 5/13/2021) 90 tablet 0    oxyCODONE (ROXICODONE) 5 mg immediate release tablet Take 1 tablet (5 mg total) by mouth every 6 (six) hours as needed for severe pain for up to 5 dosesMax Daily Amount: 20 mg (Patient not taking: Reported on 7/20/2021) 5 tablet 0    triamterene-hydrochlorothiazide (MAXZIDE-25) 37 5-25 mg per tablet Take 1 tablet by mouth daily (Patient not taking: Reported on 5/13/2021) 90 tablet 1     No current facility-administered medications on file prior to visit  Social History     Tobacco Use    Smoking status: Never Smoker    Smokeless tobacco: Never Used   Vaping Use    Vaping Use: Never used   Substance Use Topics    Alcohol use: Never    Drug use: No       Family History   Problem Relation Age of Onset    Hyperlipidemia Mother     Hypertension Mother     Heart disease Father     No Known Problems Sister     No Known Problems Sister     No Known Problems Sister     No Known Problems Maternal Aunt     No Known Problems Paternal Aunt     No Known Problems Paternal Aunt        Review of Systems     As stated in the HPI   All other systems were reviewed and are negative  Physical Exam     BP (!) 188/79   Pulse 75   Resp 15   Ht 5' 2" (1 575 m)   Wt 56 2 kg (124 lb)   BMI 22 68 kg/m²     GENERAL: This is a well-developed, well-nourished, age-appropriate patient in no acute distress  The patient is alert and oriented x3  Pleasant and cooperative  Eyes: Anicteric sclerae  Extraocular movements appear intact  HENT: Nares are patent with no drainage  Lungs: There is equal chest rise on inspection  Breathing is non-labored with no audible wheezing  Cardiovascular: No cyanosis  No upper extremity lymphadema  Skin: Skin is warm to touch  No obvious skin lesions or rashes other than described below  Neurologic: No ataxia  Psychiatric: Mood and affect are appropriate  Examination left upper extremity reveals incision is clean dry and intact  She has minimal tenderness  No sensation deficit in the superficial radial nerve distribution  Full wrist range of motion    Examination the right upper extremity reveals moderate swelling about the 1st dorsal compartment  Tenderness 1st dorsal compartment  Positive Finkelstein test   Sensation intact in the radial sensory nerve distribution  5/5 motor in APB and FDI  Data Review     Labs:  None today    Electrodiagnostic Testing:  None today    Imaging:  None today    Assessment and Plan      There are no diagnoses linked to this encounter  74-year-old female status post 1st dorsal compartment release on the left with improved symptomatology  Patient would like to continue with the plan for surgical release on the right side  She thinks that she will be active enough on the left side and be able to tolerate having had 2 surgeries  Discussed the risks and benefits of surgery once again the patient is in good understanding  We will proceed next Monday      Follow Up:      To Do Next Visit:     PROCEDURES PERFORMED:  Procedures  No Procedures performed today

## 2021-07-20 NOTE — H&P (VIEW-ONLY)
Chief Complaint     Bilateral wrist pain      History of Present Illness     Bruce Burk is a 69 y o  who presents with continued right wrist pain and improved left wrist pain since surgery  She notes that she has had really no wrist pain in her left wrist since the surgery  Maybe 1 day  Denies any numbness or tingling  Has maintained her splint  Has also tried to use our splint on the right side  No catching clicking locking popping there  Past Medical History:   Diagnosis Date    Arthralgia of hip     left    Arthritis     Chronic pain disorder     low back and shoulder    Dyslipidemia     Hyperlipidemia     Hypertension     Neck pain     Pemphigus     Pre-diabetes     Shoulder pain     Stroke ([facility] [address] )     Vitamin D deficiency        Past Surgical History:   Procedure Laterality Date    CATARACT EXTRACTION Bilateral     COLONOSCOPY      OR COLONOSCOPY FLX DX W/COLLJ SPEC WHEN PFRMD N/A 11/10/2016    Procedure: COLONOSCOPY;  Surgeon: Patricio Ferrera MD;  [facility]; Service: Gastroenterology    OR INCIS TENDON SHEATH,RADIAL STYLOID Left 7/12/2021    Procedure: RELEASE LEFT Arna Mar[facility];  Surgeon:  Vita Cline MD;  Location: [address] [place] [address];  Service: Orthopedics    OR XCAPSL CTRC RMVL INSJ IO LENS PROSTH W/O ECP Left 9/27/2018    Procedure: EXTRACAPSULAR CATARACT REMOVAL/INSERTION OF INTRAOCULAR LENS;  Surgeon: Elva Schuster MD;  Location: [address] [place] [address];  Service: Ophthalmology       No Known Allergies    Current Outpatient Medications on File Prior to Visit   Medication Sig Dispense Refill    ammonium lactate (LAC-HYDRIN) 12 % lotion apply to affected area twice a day if needed for DRY SKIN 400 g 0    atorvastatin (LIPITOR) 40 mg tablet take 1 tablet by mouth every evening 90 tablet 1    Elastic Bandages & Supports (Thumb Splint/Neoprene Medium) MISC by Does not apply route daily 2 each 0    fluticasone (FLONASE) 50 mcg/act nasal spray instill 1 spray into each nostril once daily 16 g 1    memantine (NAMENDA) 5 mg tablet Take 1 tablet (5 mg total) by mouth daily 90 tablet 3    metFORMIN (GLUCOPHAGE) 500 mg tablet Take 1 tablet (500 mg total) by mouth daily with breakfast 90 tablet 1    omega-3-acid ethyl esters (LOVAZA) 1 g capsule take 2 capsules by mouth twice a day 180 capsule 1    RA Aspirin EC 81 MG EC tablet take 1 tablet by mouth once daily 90 tablet 1    VITAMIN D PO Take by mouth daily      furosemide (LASIX) 20 mg tablet Take 0 5 tablets (10 mg total) by mouth daily (Patient not taking: Reported on 5/13/2021) 5 tablet 0    meclizine (ANTIVERT) 25 mg tablet Take 1 tablet (25 mg total) by mouth every 8 (eight) hours (Patient not taking: Reported on 5/13/2021) 30 tablet 0    meloxicam (MOBIC) 7 5 mg tablet Take 1 tablet (7 5 mg total) by mouth daily (Patient not taking: Reported on 5/13/2021) 90 tablet 0    oxyCODONE (ROXICODONE) 5 mg immediate release tablet Take 1 tablet (5 mg total) by mouth every 6 (six) hours as needed for severe pain for up to 5 dosesMax Daily Amount: 20 mg (Patient not taking: Reported on 7/20/2021) 5 tablet 0    triamterene-hydrochlorothiazide (MAXZIDE-25) 37 5-25 mg per tablet Take 1 tablet by mouth daily (Patient not taking: Reported on 5/13/2021) 90 tablet 1     No current facility-administered medications on file prior to visit  Social History     Tobacco Use    Smoking status: Never Smoker    Smokeless tobacco: Never Used   Vaping Use    Vaping Use: Never used   Substance Use Topics    Alcohol use: Never    Drug use: No       Family History   Problem Relation Age of Onset    Hyperlipidemia Mother     Hypertension Mother     Heart disease Father     No Known Problems Sister     No Known Problems Sister     No Known Problems Sister     No Known Problems Maternal Aunt     No Known Problems Paternal Aunt     No Known Problems Paternal Aunt        Review of Systems     As stated in the HPI   All other systems were reviewed and are negative  Physical Exam     BP (!) 188/79   Pulse 75   Resp 15   Ht 5' 2" (1 575 m)   Wt 56 2 kg (124 lb)   BMI 22 68 kg/m²     GENERAL: This is a well-developed, well-nourished, age-appropriate patient in no acute distress  The patient is alert and oriented x3  Pleasant and cooperative  Eyes: Anicteric sclerae  Extraocular movements appear intact  HENT: Nares are patent with no drainage  Lungs: There is equal chest rise on inspection  Breathing is non-labored with no audible wheezing  Cardiovascular: No cyanosis  No upper extremity lymphadema  Skin: Skin is warm to touch  No obvious skin lesions or rashes other than described below  Neurologic: No ataxia  Psychiatric: Mood and affect are appropriate  Examination left upper extremity reveals incision is clean dry and intact  She has minimal tenderness  No sensation deficit in the superficial radial nerve distribution  Full wrist range of motion    Examination the right upper extremity reveals moderate swelling about the 1st dorsal compartment  Tenderness 1st dorsal compartment  Positive Finkelstein test   Sensation intact in the radial sensory nerve distribution  5/5 motor in APB and FDI  Data Review     Labs:  None today    Electrodiagnostic Testing:  None today    Imaging:  None today    Assessment and Plan      There are no diagnoses linked to this encounter  65-year-old female status post 1st dorsal compartment release on the left with improved symptomatology  Patient would like to continue with the plan for surgical release on the right side  She thinks that she will be active enough on the left side and be able to tolerate having had 2 surgeries  Discussed the risks and benefits of surgery once again the patient is in good understanding  We will proceed next Monday      Follow Up:      To Do Next Visit:     PROCEDURES PERFORMED:  Procedures  No Procedures performed today

## 2021-07-25 ENCOUNTER — ANESTHESIA EVENT (OUTPATIENT)
Dept: PERIOP | Facility: HOSPITAL | Age: 71
End: 2021-07-25
Payer: COMMERCIAL

## 2021-07-26 ENCOUNTER — ANESTHESIA (OUTPATIENT)
Dept: PERIOP | Facility: HOSPITAL | Age: 71
End: 2021-07-26
Payer: COMMERCIAL

## 2021-07-26 ENCOUNTER — HOSPITAL ENCOUNTER (OUTPATIENT)
Facility: HOSPITAL | Age: 71
Setting detail: OUTPATIENT SURGERY
Discharge: HOME | End: 2021-07-26
Attending: ORTHOPAEDIC SURGERY | Admitting: ORTHOPAEDIC SURGERY
Payer: COMMERCIAL

## 2021-07-26 VITALS
BODY MASS INDEX: 23.19 KG/M2 | SYSTOLIC BLOOD PRESSURE: 132 MMHG | OXYGEN SATURATION: 100 % | HEART RATE: 64 BPM | RESPIRATION RATE: 18 BRPM | TEMPERATURE: 97.4 F | WEIGHT: 126 LBS | DIASTOLIC BLOOD PRESSURE: 80 MMHG | HEIGHT: 62 IN

## 2021-07-26 DIAGNOSIS — M65.4 DE QUERVAIN'S TENOSYNOVITIS: ICD-10-CM

## 2021-07-26 LAB — GLUCOSE SERPL-MCNC: 105 MG/DL (ref 65–140)

## 2021-07-26 PROCEDURE — 82948 REAGENT STRIP/BLOOD GLUCOSE: CPT

## 2021-07-26 PROCEDURE — 25000 INCISION OF TENDON SHEATH: CPT | Performed by: ORTHOPAEDIC SURGERY

## 2021-07-26 RX ORDER — METOCLOPRAMIDE HYDROCHLORIDE 5 MG/ML
10 INJECTION INTRAMUSCULAR; INTRAVENOUS ONCE AS NEEDED
Status: DISCONTINUED | OUTPATIENT
Start: 2021-07-26 | End: 2021-07-26 | Stop reason: HOSPADM

## 2021-07-26 RX ORDER — OXYCODONE HYDROCHLORIDE 5 MG/1
5 TABLET ORAL EVERY 6 HOURS PRN
Status: DISCONTINUED | OUTPATIENT
Start: 2021-07-26 | End: 2021-07-26 | Stop reason: HOSPADM

## 2021-07-26 RX ORDER — ACETAMINOPHEN 500 MG
500 TABLET ORAL EVERY 6 HOURS PRN
COMMUNITY

## 2021-07-26 RX ORDER — HYDROMORPHONE HCL/PF 1 MG/ML
0.25 SYRINGE (ML) INJECTION
Status: DISCONTINUED | OUTPATIENT
Start: 2021-07-26 | End: 2021-07-26 | Stop reason: HOSPADM

## 2021-07-26 RX ORDER — PROPOFOL 10 MG/ML
INJECTION, EMULSION INTRAVENOUS CONTINUOUS PRN
Status: DISCONTINUED | OUTPATIENT
Start: 2021-07-26 | End: 2021-07-26

## 2021-07-26 RX ORDER — OXYCODONE HYDROCHLORIDE 5 MG/1
5 TABLET ORAL EVERY 6 HOURS PRN
Qty: 5 TABLET | Refills: 0 | Status: CANCELLED | OUTPATIENT
Start: 2021-07-26

## 2021-07-26 RX ORDER — FENTANYL CITRATE/PF 50 MCG/ML
25 SYRINGE (ML) INJECTION
Status: DISCONTINUED | OUTPATIENT
Start: 2021-07-26 | End: 2021-07-26 | Stop reason: HOSPADM

## 2021-07-26 RX ORDER — FENTANYL CITRATE 50 UG/ML
INJECTION, SOLUTION INTRAMUSCULAR; INTRAVENOUS AS NEEDED
Status: DISCONTINUED | OUTPATIENT
Start: 2021-07-26 | End: 2021-07-26

## 2021-07-26 RX ORDER — SODIUM CHLORIDE, SODIUM LACTATE, POTASSIUM CHLORIDE, CALCIUM CHLORIDE 600; 310; 30; 20 MG/100ML; MG/100ML; MG/100ML; MG/100ML
100 INJECTION, SOLUTION INTRAVENOUS CONTINUOUS
Status: DISCONTINUED | OUTPATIENT
Start: 2021-07-26 | End: 2021-07-26 | Stop reason: HOSPADM

## 2021-07-26 RX ORDER — ONDANSETRON 2 MG/ML
4 INJECTION INTRAMUSCULAR; INTRAVENOUS ONCE AS NEEDED
Status: DISCONTINUED | OUTPATIENT
Start: 2021-07-26 | End: 2021-07-26 | Stop reason: HOSPADM

## 2021-07-26 RX ORDER — LIDOCAINE HYDROCHLORIDE 10 MG/ML
INJECTION, SOLUTION EPIDURAL; INFILTRATION; INTRACAUDAL; PERINEURAL AS NEEDED
Status: DISCONTINUED | OUTPATIENT
Start: 2021-07-26 | End: 2021-07-26

## 2021-07-26 RX ORDER — OXYCODONE HYDROCHLORIDE 5 MG/1
5 TABLET ORAL EVERY 6 HOURS PRN
Qty: 5 TABLET | Refills: 0 | Status: SHIPPED | OUTPATIENT
Start: 2021-07-26

## 2021-07-26 RX ORDER — ONDANSETRON 2 MG/ML
INJECTION INTRAMUSCULAR; INTRAVENOUS AS NEEDED
Status: DISCONTINUED | OUTPATIENT
Start: 2021-07-26 | End: 2021-07-26

## 2021-07-26 RX ORDER — PROPOFOL 10 MG/ML
INJECTION, EMULSION INTRAVENOUS AS NEEDED
Status: DISCONTINUED | OUTPATIENT
Start: 2021-07-26 | End: 2021-07-26

## 2021-07-26 RX ORDER — MAGNESIUM HYDROXIDE 1200 MG/15ML
LIQUID ORAL AS NEEDED
Status: DISCONTINUED | OUTPATIENT
Start: 2021-07-26 | End: 2021-07-26 | Stop reason: HOSPADM

## 2021-07-26 RX ORDER — DEXAMETHASONE SODIUM PHOSPHATE 4 MG/ML
INJECTION, SOLUTION INTRA-ARTICULAR; INTRALESIONAL; INTRAMUSCULAR; INTRAVENOUS; SOFT TISSUE AS NEEDED
Status: DISCONTINUED | OUTPATIENT
Start: 2021-07-26 | End: 2021-07-26

## 2021-07-26 RX ADMIN — ONDANSETRON 4 MG: 2 INJECTION INTRAMUSCULAR; INTRAVENOUS at 09:28

## 2021-07-26 RX ADMIN — SODIUM CHLORIDE, SODIUM LACTATE, POTASSIUM CHLORIDE, AND CALCIUM CHLORIDE: .6; .31; .03; .02 INJECTION, SOLUTION INTRAVENOUS at 09:04

## 2021-07-26 RX ADMIN — FENTANYL CITRATE 50 MCG: 50 INJECTION, SOLUTION INTRAMUSCULAR; INTRAVENOUS at 09:20

## 2021-07-26 RX ADMIN — OXYCODONE HYDROCHLORIDE 5 MG: 5 TABLET ORAL at 11:06

## 2021-07-26 RX ADMIN — DEXAMETHASONE SODIUM PHOSPHATE 4 MG: 4 INJECTION, SOLUTION INTRAMUSCULAR; INTRAVENOUS at 09:28

## 2021-07-26 RX ADMIN — LIDOCAINE HYDROCHLORIDE 50 MG: 10 INJECTION, SOLUTION EPIDURAL; INFILTRATION; INTRACAUDAL; PERINEURAL at 09:20

## 2021-07-26 RX ADMIN — PROPOFOL 80 MG: 10 INJECTION, EMULSION INTRAVENOUS at 09:20

## 2021-07-26 RX ADMIN — PROPOFOL 20 MCG/KG/MIN: 10 INJECTION, EMULSION INTRAVENOUS at 09:24

## 2021-07-26 NOTE — DISCHARGE INSTRUCTIONS
Tavia Ruiz - Dr Eugene Lyle (Orthopedic Surgery)    Follow-up Appointments   Please call to set up/confirm your first postoperative visit with Dr Merna Sullivan in 10-14 days  Dressing and Wound Care   A dressing has been placed on your hand/arm to keep the incisions clean  Keep your dressing clean and dry  o Do not remove the surgical dressing/splint  It will be removed at your first postoperative office visit with the doctor or therapist     Janusz Zabala a plastic bag over your dressing/splint whenever you take a shower or bath until you are allowed to remove it   Swelling is normal after surgery  Elevate your hand/arm so the surgical site is above your heart to decrease the swelling  Swelling is like water, it runs downhill  This is especially important for the first 72 hours after surgery  o The best way to elevate your hand/arm is with your fingers pointing towards the ceiling and your hand/arm above the level of the heart   o You can use pillows to help prop your hand/arm up when sitting or lying down   If you are experiencing pain, be sure you are elevating your hand/arm as often as possible   Apply an ice pack over your dressing/splint for 20 minutes of every hour for the first 3 days when you are awake  This can help to reduce swelling and inflammation  Be sure the ice pack is waterproof so it does not leak on the dressing/splint  A simple ice pack can be made by adding ten cubes and a small amount of water in a small zip-lock bag  Seal this small bag tightly  Place this small bag in a larger zip lock bag  Apply to the area in pain   If the dressing feels too tight in spite of elevation, loosen the outer wrap but do not remove the entire dressing     If you have exposed pins/wires, take clean gauze or a cotton tip applicator (like a Q-tip), get it wet in clean warm water mixed with non-scented hand soap (like Kirill, [place], Dial, etc ), and gently wipe around the base of the pin where it comes through the skin once a day  Do not use water from a well to clean as this has bacteria in it and can contribute to infections  ACTIVITIES:  [facility] and straighten the parts of your hand, wrist, elbow, and shoulder that are not included in your surgical dressing or splint  Do this at least 6 times a day, as this will help decrease swelling and speed up your recovery  This includes your fingers  See the instructions listed below for finger motion  THIS IS VERY IMPORTANT FOR YOUR RECOVERY! POSTOPERATIVE CARE/CONCERNS:  Ye Leonardo You may experience some temporary numbness in your fingers   You should have very little to no bleeding on your dressing   Notify the office (see contact info at bottom of page) for any of the following:  o Excessive pain not relieved by rest, elevation, and pain medications  o Feeling that the dressing is too tight in spite of adequately elevating hand/arm  o Active bleeding through the dressing  o Drainage from the wound site or pin sites  o Foul odor from the dressing/wound  o Temperature greater that 101? F or chills  o Blue or excessively cold fingertips  o Numbness of the fingertips that does not improve in spite of adequately elevating hand/arm    PAIN MEDICATION:   Pain is a normal part of the recovery after surgery  The pain medication provided to you will help to decrease the discomfort but will not completely eliminate the pain   A prescription for a narcotic pain medicine (oxycodone or hydrocodone) and anti-inflammatory (naproxen) were called in to your pharmacy  Please take the anti-inflammatory medication AND over-the-counter acetaminophen (Tylenol) regularly  The instructions will be listed on the bottles  The narcotic pain medicine should be used for pain that is not controlled by these other medications and only for the first few days after surgery   The narcotic is HIGHLY ADDICTIVE and has many side effects such as causing constipation, dizziness, confusion, decreased breathing and more  It is safe to take for a short period of time after surgery  It is almost never prescribed for longer than a few weeks and never after one month for elective surgeries   Do not take narcotic or anti-inflammatories on an empty stomach   It is illegal to drive while taking narcotic pain medication   Your pain should decrease over the first few days after surgery which will allow you to take less pain medicine, increase the time between doses of medication, or stop taking all pain medicine        OFFICE CONTACT NUMBERS   Please call [phone] with any questions about appointments or any medical concerns

## 2021-07-26 NOTE — NURSING NOTE
Patient returned to [facility] via litter  Awake/alert  Skin warm/dry  Respirations easy/unlabored  Right arm/hand dressing/splint clean/dry/intact  Right fingers:  (+) movement, (+) sensation, warm to touch with brisk capillary refill  Patient states pain 2/10, requesting pain medication

## 2021-07-26 NOTE — OP NOTE
DATE OF SURGERY: 7/26/2021    SURGEON: Keren Posadas MD    PREOPERATIVE DIAGNOSIS:  Right de Quervain tenosynovitis    POSTOPERATIVE DIAGNOSIS:  Same    PROCEDURE:  Right 1st dorsal compartment release    IMPLANTS: * No implants in log *     ASSISTANTS:      * Laura Harrell PA-C - Assisting     * Rosalia Martin MD - Assisting       ANESTHESIA: IV Sedation with Anesthesia    ESTIMATED BLOOD LOSS: 0 mL     INTRAVENOUS FLUIDS: Per anesthesia    URINE OUTPUT:  No Hernandez    TOURNIQUET TIME:  Less than 20 minutes    COMPLICATIONS:  None    ANTIBIOTICS:  None    SPECIMENS: * No specimens in log *         INDICATIONS: Ollie Ann is a 69y o  year old female who sustained the above conditions  The indications for operative intervention were painful tenosynovitis of the 1st dorsal compartment refractory to nonoperative modalities  The alternatives to operative intervention included continue nonoperative care  The risks of the operative procedure were discussed in detail with the patient and daughter including but not limited to the risks of anesthesia, infection, injury to blood vessel/nerve, pain, stiffness, changes in sensation, and the need for repeat surgery  The patient understood these risks and alternatives and elected to proceed with surgery  DESCRIPTION OF PROCEDURE: The patient was seen in the preoperative holding area and identification was performed as per the institution's protocol  The patient was then brought to the operating room, a briefing was held and prophylactic antibiotics were given  Anesthesia was induced  A tourniquet cuff was applied to the operative extremity, set at 250 mmHg The site was pre-scrubbed with chlorhexidine and prepped with ChloraPrep and draped in the usual sterile fashion  Following a timeout procedure, a 3 cm incision was made directly over the radial aspect of radial styloid    Dissection was carried down through skin and subcutaneous tissue sharply and then once the subcutaneous fat was identified, gentle spreading and retraction of branches of the superficial radial nerve, was performed  Ready identification of the 1st dorsal compartment was made  An incision was made on the dorsal aspect of the 1st dorsal compartment  The extensor pollicis brevis was identified in the same compartment as the abductor pollicis longus  There was significant tenosynovium and inflammatory tissue covering all slips  This was sharply excised  Full release was performed both proximally and distally  The forearm was then taken through pro supination with wrist flexion and extension there was no dislocation of the tendons in the compartment  The wounds were copiously irrigated and closed in layers including 4-0 nylon  Sterile dressing and a thumb spica splint was applied  All sharps and sponge counts were correct and there were no complications  I attest that I, Toni Allen, was present and scrubbed for the entirety of the procedure  A physician assistant was required during the procedure for retraction, tissue handling, dissection and suturing  The patient was awoken from anesthesia in good condition and taken to the PACU         PLAN: The patient will follow up in 10-14 days

## 2021-07-26 NOTE — ANESTHESIA PREPROCEDURE EVALUATION
Medical History  History Comments   Hypertension    Shoulder pain    Dyslipidemia    Neck pain    Arthralgia of hip left   Chronic pain disorder low back and shoulder   Vitamin D deficiency    Pemphigus    Hyperlipidemia    Arthritis    Pre-diabetes    Stroke ([facility] [address] )      Procedure:  RELEASE RIGHT DEQUERVAINS (Right Hand)    Relevant Problems   ANESTHESIA (within normal limits)      CARDIO   (+) Hyperlipidemia   (+) Hypertension      MUSCULOSKELETAL   (+) Low back pain   (+) Muscle spasms of neck   (+) Osteoarthrosis      NEURO/PSYCH   (+) CVA (cerebral vascular accident) ([facility] [address] )   (+) Chronic right shoulder pain        Physical Exam    Airway    Mallampati score: III  TM Distance: >3 FB  Neck ROM: full     Dental   upper dentures,     Cardiovascular  Rate: normal,     Pulmonary  Pulmonary exam normal     Other Findings  Multiple missing teeth, per pt denies anything loose or removeable        Anesthesia Plan  ASA Score- 3     Anesthesia Type- IV sedation with anesthesia with ASA Monitors  Additional Monitors:   Airway Plan:     Comment: Per patient, appropriately NPO, denies active CP/SOB/wheezing/symptoms related to heartburn/nausea/vomiting  Plan Factors-Exercise tolerance (METS): <4 METS  Chart reviewed  Patient summary reviewed  Patient is not a current smoker  Induction- intravenous  Postoperative Plan- Plan for postoperative opioid use  Informed Consent- Anesthetic plan and risks discussed with patient  I personally reviewed this patient with the CRNA  Discussed and agreed on the Anesthesia Plan with the CRNA  Latanya Prather

## 2021-07-26 NOTE — ANESTHESIA POSTPROCEDURE EVALUATION
Post-Op Assessment Note    CV Status:  Stable  Pain Score: 0    Pain management: adequate     Mental Status:  Alert and awake   Hydration Status:  Euvolemic   PONV Controlled:  Controlled   Airway Patency:  Patent      Post Op Vitals Reviewed: Yes      Staff: CRNA         No complications documented      /80 (07/26/21 1003)    Temp 98 9 °F (37 2 °C) (07/26/21 1003)    Pulse 70 (07/26/21 1003)   Resp 20 (07/26/21 1003)    SpO2 95 % (07/26/21 1003)

## 2021-08-10 ENCOUNTER — OFFICE VISIT (OUTPATIENT)
Dept: OBGYN CLINIC | Facility: MEDICAL CENTER | Age: 71
End: 2021-08-10

## 2021-08-10 VITALS
HEIGHT: 62 IN | HEART RATE: 92 BPM | WEIGHT: 126 LBS | DIASTOLIC BLOOD PRESSURE: 83 MMHG | RESPIRATION RATE: 18 BRPM | SYSTOLIC BLOOD PRESSURE: 166 MMHG | BODY MASS INDEX: 23.19 KG/M2

## 2021-08-10 DIAGNOSIS — Z98.890 STATUS POST DE QUERVAIN'S RELEASE SURGERY: Primary | ICD-10-CM

## 2021-08-10 PROCEDURE — 99024 POSTOP FOLLOW-UP VISIT: CPT | Performed by: ORTHOPAEDIC SURGERY

## 2021-08-10 PROCEDURE — 3008F BODY MASS INDEX DOCD: CPT | Performed by: ORTHOPAEDIC SURGERY

## 2021-08-10 PROCEDURE — 3008F BODY MASS INDEX DOCD: CPT | Performed by: PHYSICIAN ASSISTANT

## 2021-08-10 NOTE — PROGRESS NOTES
History of the Present Illness     Elmira Lora is a 69 y o  female 2 weeks s/p right 1st dorsal compartment release, DOS 7/26/21  She also had a left 1st dorsal compartment release on 7/12/21  Elmira Gallagher notes pain to her right wrist with suture removal  She notes mild pain with wrist ROM  Elmira Gallagher has been wearing a wrist brace on the left  Physical Exam     /83   Pulse 92   Resp 18   Ht 5' 2" (1 575 m)   Wt 57 2 kg (126 lb)   BMI 23 05 kg/m²     Bilateral wrist:    No erythema or ecchymosis   Mild edema 1st dorsal compartment on the right, no swelling on the left   Well healed 1st dorsal compartment release incision on the left   Right 1st dorsal compartment incision is clean, dry with sutures intact  Full wrist ROM on the left, mildly restricted on the right secondary to postoperative pain  DPC 0   5/5 Motor to the APB, FDI, FDP2, FDP5, EDC  Sensation intact to light touch in the median, radial, and ulnar nerve distribution  Data Review       Imaging:  None    Assessment and Plan       69 y o  female s/p left than right 1st dorsal compartment releases     Sutures were removed today  She was advised to perform scar massage  Pain and swelling should continue to improve with time  Advised to d/c the use of the wrist brace on the right as this will cause stiffness  She may use the brace on the right for the next 2 weeks, then may d/c this brace as well  Activities to tolerance, avoid heavy lifting and strenuous activities on the right side for the next 2 weeks or so          Follow Up: PRN    To Do Next Visit:     PROCEDURES PERFORMED:  Procedures  No Procedures performed today      Scribe Attestation    I,:  Katherine Rodriguez am acting as a scribe while in the presence of the attending physician :       I,:  Jeromy Mcdermott MD personally performed the services described in this documentation    as scribed in my presence :

## 2021-08-26 ENCOUNTER — VBI (OUTPATIENT)
Dept: ADMINISTRATIVE | Facility: OTHER | Age: 71
End: 2021-08-26

## 2021-09-07 ENCOUNTER — OFFICE VISIT (OUTPATIENT)
Dept: FAMILY MEDICINE CLINIC | Facility: CLINIC | Age: 71
End: 2021-09-07

## 2021-09-07 VITALS
TEMPERATURE: 97.2 F | WEIGHT: 123.7 LBS | SYSTOLIC BLOOD PRESSURE: 138 MMHG | HEART RATE: 68 BPM | HEIGHT: 62 IN | OXYGEN SATURATION: 99 % | DIASTOLIC BLOOD PRESSURE: 76 MMHG | BODY MASS INDEX: 22.76 KG/M2 | RESPIRATION RATE: 18 BRPM

## 2021-09-07 DIAGNOSIS — L85.3 DRY SKIN: ICD-10-CM

## 2021-09-07 DIAGNOSIS — I63.9 CVA (CEREBRAL VASCULAR ACCIDENT) (HCC): ICD-10-CM

## 2021-09-07 DIAGNOSIS — H69.83 DYSFUNCTION OF BOTH EUSTACHIAN TUBES: ICD-10-CM

## 2021-09-07 DIAGNOSIS — I10 ESSENTIAL HYPERTENSION: ICD-10-CM

## 2021-09-07 DIAGNOSIS — Z12.31 ENCOUNTER FOR SCREENING MAMMOGRAM FOR MALIGNANT NEOPLASM OF BREAST: ICD-10-CM

## 2021-09-07 DIAGNOSIS — R73.01 IMPAIRED FASTING GLUCOSE: Primary | ICD-10-CM

## 2021-09-07 DIAGNOSIS — E78.5 DYSLIPIDEMIA: ICD-10-CM

## 2021-09-07 DIAGNOSIS — Z23 ENCOUNTER FOR IMMUNIZATION: ICD-10-CM

## 2021-09-07 LAB — SL AMB POCT HEMOGLOBIN AIC: 5.4 (ref ?–6.5)

## 2021-09-07 PROCEDURE — 1036F TOBACCO NON-USER: CPT | Performed by: FAMILY MEDICINE

## 2021-09-07 PROCEDURE — 99213 OFFICE O/P EST LOW 20 MIN: CPT | Performed by: FAMILY MEDICINE

## 2021-09-07 PROCEDURE — 3078F DIAST BP <80 MM HG: CPT | Performed by: FAMILY MEDICINE

## 2021-09-07 PROCEDURE — 3075F SYST BP GE 130 - 139MM HG: CPT | Performed by: FAMILY MEDICINE

## 2021-09-07 PROCEDURE — 3008F BODY MASS INDEX DOCD: CPT | Performed by: FAMILY MEDICINE

## 2021-09-07 PROCEDURE — 83036 HEMOGLOBIN GLYCOSYLATED A1C: CPT | Performed by: FAMILY MEDICINE

## 2021-09-07 RX ORDER — FLUTICASONE PROPIONATE 50 MCG
1 SPRAY, SUSPENSION (ML) NASAL DAILY
Qty: 16 G | Refills: 1 | Status: SHIPPED | OUTPATIENT
Start: 2021-09-07 | End: 2022-01-03 | Stop reason: SDUPTHER

## 2021-09-07 RX ORDER — AMMONIUM LACTATE 12 G/100G
LOTION TOPICAL 2 TIMES DAILY
Qty: 400 G | Refills: 1 | Status: SHIPPED | OUTPATIENT
Start: 2021-09-07 | End: 2022-03-08 | Stop reason: SDUPTHER

## 2021-09-07 RX ORDER — ATORVASTATIN CALCIUM 40 MG/1
40 TABLET, FILM COATED ORAL EVERY EVENING
Qty: 90 TABLET | Refills: 1 | Status: SHIPPED | OUTPATIENT
Start: 2021-09-07 | End: 2022-03-08 | Stop reason: SDUPTHER

## 2021-09-07 RX ORDER — TRIAMTERENE AND HYDROCHLOROTHIAZIDE 37.5; 25 MG/1; MG/1
1 TABLET ORAL DAILY
Qty: 90 TABLET | Refills: 0 | Status: SHIPPED | OUTPATIENT
Start: 2021-09-07 | End: 2021-12-07

## 2021-09-07 NOTE — PROGRESS NOTES
Assessment/Plan:    No problem-specific Assessment & Plan notes found for this encounter  Diagnoses and all orders for this visit:    Impaired fasting glucose  -     POCT hemoglobin A1c  -     metFORMIN (GLUCOPHAGE) 500 mg tablet; Take 1 tablet (500 mg total) by mouth daily with breakfast    CVA (cerebral vascular accident) ([facility] [address] )  -     atorvastatin (LIPITOR) 40 mg tablet; Take 1 tablet (40 mg total) by mouth every evening    Dyslipidemia  -     atorvastatin (LIPITOR) 40 mg tablet; Take 1 tablet (40 mg total) by mouth every evening    Dry skin  -     ammonium lactate (LAC-HYDRIN) 12 % lotion; Apply topically 2 (two) times a day    Dysfunction of both eustachian tubes  -     fluticasone (FLONASE) 50 mcg/act nasal spray; 1 spray into each nostril daily    Essential hypertension  -     triamterene-hydrochlorothiazide (MAXZIDE-25) 37 5-25 mg per tablet; Take 1 tablet by mouth daily    Encounter for screening mammogram for malignant neoplasm of breast  -     Mammo screening bilateral w 3d & cad; Future    Encounter for immunization          Subjective:      Patient ID: Leonardo Portillo is a 69 y o  female  68 yo  female with well controlled DM, currently without complains  Doing well, denies symptoms of hypo or hyperglycemia       The following portions of the patient's history were reviewed and updated as appropriate: She  has a past medical history of Arthralgia of hip, Arthritis, Chronic pain disorder, Dyslipidemia, Hyperlipidemia, Hypertension, Neck pain, Pemphigus, Pre-diabetes, Shoulder pain, Stroke ([facility] [address] ), and Vitamin D deficiency    She   Patient Active Problem List    Diagnosis Date Noted    CVA (cerebral vascular accident) ([facility] [address] ) 12/08/2019    Spinal stenosis, lumbar region with neurogenic claudication     Acute pain of left shoulder 08/23/2018    Pre-op evaluation 02/08/2018    Muscle spasms of neck 12/04/2017    Arthralgia of left hip 07/19/2016    Slow transit constipation 07/19/2016  Impaired fasting glucose 07/19/2016    Chronic right shoulder pain 03/15/2016    Creatinine elevation 02/16/2015    Neck pain 11/19/2014    Low back pain 08/06/2014    Acquired trigger finger 10/09/2013    Vitamin D deficiency 07/29/2013    Mammogram abnormal 07/29/2013    Multiple joint pain 07/29/2013    Other symptoms involving skin and integumentary tissues 06/03/2013    Pemphigus 04/08/2013    Bursitis 12/03/2012    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/2012    Osteoarthrosis 09/24/2012    Dyslipidemia 06/19/2012    Hypertension 06/19/2012    Hyperlipidemia 06/03/2008     She  has a past surgical history that includes pr colonoscopy flx dx w/collj spec when pfrmd (N/A, 11/10/2016); Colonoscopy; pr xcapsl ctrc rmvl insj io lens prosth w/o ecp (Left, 9/27/2018); Cataract extraction (Bilateral); pr incis tendon sheath,radial styloid (Left, 7/12/2021); and pr incis tendon sheath,radial styloid (Right, 7/26/2021)  Her family history includes Heart disease in her father; Hyperlipidemia in her mother; Hypertension in her mother; No Known Problems in her maternal aunt, paternal aunt, paternal aunt, sister, sister, and sister  She  reports that she has never smoked  She has never used smokeless tobacco  She reports that she does not drink alcohol and does not use drugs    Current Outpatient Medications   Medication Sig Dispense Refill    atorvastatin (LIPITOR) 40 mg tablet Take 1 tablet (40 mg total) by mouth every evening 90 tablet 1    fluticasone (FLONASE) 50 mcg/act nasal spray 1 spray into each nostril daily 16 g 1    memantine (NAMENDA) 5 mg tablet Take 1 tablet (5 mg total) by mouth daily 90 tablet 3    metFORMIN (GLUCOPHAGE) 500 mg tablet Take 1 tablet (500 mg total) by mouth daily with breakfast 90 tablet 1    omega-3-acid ethyl esters (LOVAZA) 1 g capsule take 2 capsules by mouth twice a day 180 capsule 1    RA Aspirin EC 81 MG EC tablet take 1 tablet by mouth once daily 90 tablet 1    triamterene-hydrochlorothiazide (MAXZIDE-25) 37 5-25 mg per tablet Take 1 tablet by mouth daily 90 tablet 0    VITAMIN D PO Take by mouth daily      acetaminophen (TYLENOL) 500 mg tablet Take 500 mg by mouth every 6 (six) hours as needed for mild pain      ammonium lactate (LAC-HYDRIN) 12 % lotion Apply topically 2 (two) times a day 400 g 1    Elastic Bandages & Supports (Thumb Splint/Neoprene Medium) MISC by Does not apply route daily 2 each 0    furosemide (LASIX) 20 mg tablet Take 0 5 tablets (10 mg total) by mouth daily (Patient not taking: Reported on 5/13/2021) 5 tablet 0    meclizine (ANTIVERT) 25 mg tablet Take 1 tablet (25 mg total) by mouth every 8 (eight) hours (Patient not taking: Reported on 5/13/2021) 30 tablet 0    meloxicam (MOBIC) 7 5 mg tablet Take 1 tablet (7 5 mg total) by mouth daily (Patient not taking: Reported on 5/13/2021) 90 tablet 0    oxyCODONE (ROXICODONE) 5 mg immediate release tablet Take 1 tablet (5 mg total) by mouth every 6 (six) hours as needed for severe pain for up to 5 dosesMax Daily Amount: 20 mg (Patient not taking: Reported on 8/10/2021) 5 tablet 0     No current facility-administered medications for this visit       Current Outpatient Medications on File Prior to Visit   Medication Sig    memantine (NAMENDA) 5 mg tablet Take 1 tablet (5 mg total) by mouth daily    omega-3-acid ethyl esters (LOVAZA) 1 g capsule take 2 capsules by mouth twice a day    RA Aspirin EC 81 MG EC tablet take 1 tablet by mouth once daily    VITAMIN D PO Take by mouth daily    [DISCONTINUED] atorvastatin (LIPITOR) 40 mg tablet take 1 tablet by mouth every evening    [DISCONTINUED] fluticasone (FLONASE) 50 mcg/act nasal spray instill 1 spray into each nostril once daily    [DISCONTINUED] metFORMIN (GLUCOPHAGE) 500 mg tablet Take 1 tablet (500 mg total) by mouth daily with breakfast    [DISCONTINUED] triamterene-hydrochlorothiazide (MAXZIDE-25) 37 5-25 mg per tablet Take 1 tablet by mouth daily    acetaminophen (TYLENOL) 500 mg tablet Take 500 mg by mouth every 6 (six) hours as needed for mild pain    Elastic Bandages & Supports (Thumb Splint/Neoprene Medium) MISC by Does not apply route daily    furosemide (LASIX) 20 mg tablet Take 0 5 tablets (10 mg total) by mouth daily (Patient not taking: Reported on 5/13/2021)    meclizine (ANTIVERT) 25 mg tablet Take 1 tablet (25 mg total) by mouth every 8 (eight) hours (Patient not taking: Reported on 5/13/2021)    meloxicam (MOBIC) 7 5 mg tablet Take 1 tablet (7 5 mg total) by mouth daily (Patient not taking: Reported on 5/13/2021)    oxyCODONE (ROXICODONE) 5 mg immediate release tablet Take 1 tablet (5 mg total) by mouth every 6 (six) hours as needed for severe pain for up to 5 dosesMax Daily Amount: 20 mg (Patient not taking: Reported on 8/10/2021)    [DISCONTINUED] ammonium lactate (LAC-HYDRIN) 12 % lotion apply to affected area twice a day if needed for DRY SKIN     No current facility-administered medications on file prior to visit       Review of Systems   All other systems reviewed and are negative  Objective:      /76 (BP Location: Right arm, Patient Position: Sitting, Cuff Size: Standard)   Pulse 68   Temp (!) 97 2 °F (36 2 °C) (Temporal)   Resp 18   Ht 5' 2" (1 575 m)   Wt 56 1 kg (123 lb 11 2 oz)   SpO2 99%   BMI 22 63 kg/m²          Physical Exam  Vitals and nursing note reviewed  Constitutional:       Appearance: She is well-developed  HENT:      Head: Normocephalic  Right Ear: External ear normal       Left Ear: External ear normal       Nose: Nose normal    Eyes:      Conjunctiva/sclera: Conjunctivae normal       Pupils: Pupils are equal, round, and reactive to light  Neck:      Thyroid: No thyromegaly  Cardiovascular:      Rate and Rhythm: Normal rate and regular rhythm  Heart sounds: Normal heart sounds     Pulmonary:      Effort: Pulmonary effort is normal  Breath sounds: Normal breath sounds  Abdominal:      Palpations: Abdomen is soft  Tenderness: There is no abdominal tenderness  There is no guarding or rebound  Musculoskeletal:         General: Normal range of motion  Cervical back: Normal range of motion and neck supple  Skin:     General: Skin is dry  Neurological:      Mental Status: She is alert and oriented to person, place, and time  Deep Tendon Reflexes: Reflexes are normal and symmetric

## 2021-11-10 ENCOUNTER — VBI (OUTPATIENT)
Dept: ADMINISTRATIVE | Facility: OTHER | Age: 71
End: 2021-11-10

## 2021-11-13 ENCOUNTER — HOSPITAL ENCOUNTER (OUTPATIENT)
Dept: MAMMOGRAPHY | Facility: CLINIC | Age: 71
Discharge: HOME | End: 2021-11-13
Payer: COMMERCIAL

## 2021-11-13 VITALS — BODY MASS INDEX: 22.63 KG/M2 | HEIGHT: 62 IN | WEIGHT: 123 LBS

## 2021-11-13 DIAGNOSIS — Z12.31 ENCOUNTER FOR SCREENING MAMMOGRAM FOR MALIGNANT NEOPLASM OF BREAST: ICD-10-CM

## 2021-11-13 PROCEDURE — 77067 SCR MAMMO BI INCL CAD: CPT

## 2021-11-13 PROCEDURE — 77063 BREAST TOMOSYNTHESIS BI: CPT

## 2021-12-03 ENCOUNTER — TELEPHONE (OUTPATIENT)
Dept: FAMILY MEDICINE CLINIC | Facility: CLINIC | Age: 71
End: 2021-12-03

## 2021-12-03 ENCOUNTER — OFFICE VISIT (OUTPATIENT)
Dept: FAMILY MEDICINE CLINIC | Facility: CLINIC | Age: 71
End: 2021-12-03

## 2021-12-03 VITALS
WEIGHT: 125 LBS | RESPIRATION RATE: 16 BRPM | HEIGHT: 62 IN | OXYGEN SATURATION: 96 % | TEMPERATURE: 98.2 F | BODY MASS INDEX: 23 KG/M2 | SYSTOLIC BLOOD PRESSURE: 130 MMHG | DIASTOLIC BLOOD PRESSURE: 70 MMHG | HEART RATE: 78 BPM

## 2021-12-03 DIAGNOSIS — R42 LIGHTHEADEDNESS: Primary | ICD-10-CM

## 2021-12-03 PROCEDURE — 99213 OFFICE O/P EST LOW 20 MIN: CPT | Performed by: FAMILY MEDICINE

## 2021-12-03 PROCEDURE — 3075F SYST BP GE 130 - 139MM HG: CPT | Performed by: FAMILY MEDICINE

## 2021-12-03 PROCEDURE — 3078F DIAST BP <80 MM HG: CPT | Performed by: FAMILY MEDICINE

## 2021-12-03 NOTE — TELEPHONE ENCOUNTER
Pts daughter left a voicemail stating her mother has been with the blood pressure low, the pts daughter is worry, the bp today was 88/55, pts daughter is asking what she can do  I called back and lvm

## 2021-12-03 NOTE — TELEPHONE ENCOUNTER
Does she have any symptoms  Is she more dizzy than usual, is she off balance, any visual symptoms such as blurry vision?   What is her heart rate  She can increase her salt intake (eat salty foods) and increase fluid intake

## 2021-12-03 NOTE — TELEPHONE ENCOUNTER
Pts daughter states, the symptoms was dizzy, but was early today  I scheduled for today with Dr Barbie Rodriguez

## 2021-12-06 DIAGNOSIS — I10 ESSENTIAL HYPERTENSION: ICD-10-CM

## 2021-12-06 RX ORDER — ACETAMINOPHEN/DIPHENHYDRAMINE 500MG-25MG
TABLET ORAL
Qty: 90 TABLET | Refills: 1 | Status: SHIPPED | OUTPATIENT
Start: 2021-12-06 | End: 2022-02-21

## 2021-12-07 ENCOUNTER — ANNUAL EXAM (OUTPATIENT)
Dept: FAMILY MEDICINE CLINIC | Facility: CLINIC | Age: 71
End: 2021-12-07

## 2021-12-07 VITALS
OXYGEN SATURATION: 96 % | BODY MASS INDEX: 23.08 KG/M2 | WEIGHT: 125.4 LBS | HEART RATE: 63 BPM | DIASTOLIC BLOOD PRESSURE: 82 MMHG | HEIGHT: 62 IN | SYSTOLIC BLOOD PRESSURE: 144 MMHG | RESPIRATION RATE: 18 BRPM | TEMPERATURE: 97.4 F

## 2021-12-07 DIAGNOSIS — Z23 ENCOUNTER FOR IMMUNIZATION: Primary | ICD-10-CM

## 2021-12-07 DIAGNOSIS — I10 ESSENTIAL HYPERTENSION: ICD-10-CM

## 2021-12-07 DIAGNOSIS — Z01.419 WELL WOMAN EXAM WITH ROUTINE GYNECOLOGICAL EXAM: ICD-10-CM

## 2021-12-07 PROCEDURE — 90662 IIV NO PRSV INCREASED AG IM: CPT | Performed by: FAMILY MEDICINE

## 2021-12-07 PROCEDURE — 3079F DIAST BP 80-89 MM HG: CPT | Performed by: FAMILY MEDICINE

## 2021-12-07 PROCEDURE — G0476 HPV COMBO ASSAY CA SCREEN: HCPCS | Performed by: FAMILY MEDICINE

## 2021-12-07 PROCEDURE — 1036F TOBACCO NON-USER: CPT | Performed by: FAMILY MEDICINE

## 2021-12-07 PROCEDURE — 1160F RVW MEDS BY RX/DR IN RCRD: CPT | Performed by: FAMILY MEDICINE

## 2021-12-07 PROCEDURE — G0008 ADMIN INFLUENZA VIRUS VAC: HCPCS | Performed by: FAMILY MEDICINE

## 2021-12-07 PROCEDURE — 99213 OFFICE O/P EST LOW 20 MIN: CPT | Performed by: FAMILY MEDICINE

## 2021-12-07 PROCEDURE — 0503F POSTPARTUM CARE VISIT: CPT | Performed by: FAMILY MEDICINE

## 2021-12-07 PROCEDURE — 3077F SYST BP >= 140 MM HG: CPT | Performed by: FAMILY MEDICINE

## 2021-12-07 PROCEDURE — G0145 SCR C/V CYTO,THINLAYER,RESCR: HCPCS | Performed by: FAMILY MEDICINE

## 2021-12-07 PROCEDURE — 3008F BODY MASS INDEX DOCD: CPT | Performed by: FAMILY MEDICINE

## 2021-12-07 RX ORDER — LISINOPRIL AND HYDROCHLOROTHIAZIDE 12.5; 1 MG/1; MG/1
1 TABLET ORAL DAILY
Qty: 90 TABLET | Refills: 3 | Status: SHIPPED | OUTPATIENT
Start: 2021-12-07

## 2021-12-09 LAB
HPV HR 12 DNA CVX QL NAA+PROBE: NEGATIVE
HPV16 DNA CVX QL NAA+PROBE: NEGATIVE
HPV18 DNA CVX QL NAA+PROBE: NEGATIVE

## 2021-12-14 LAB
LAB AP GYN PRIMARY INTERPRETATION: NORMAL
Lab: NORMAL

## 2022-01-02 DIAGNOSIS — E78.5 DYSLIPIDEMIA: ICD-10-CM

## 2022-01-03 DIAGNOSIS — E78.5 DYSLIPIDEMIA: ICD-10-CM

## 2022-01-03 DIAGNOSIS — R41.89 COGNITIVE DECLINE: ICD-10-CM

## 2022-01-03 DIAGNOSIS — H69.83 DYSFUNCTION OF BOTH EUSTACHIAN TUBES: ICD-10-CM

## 2022-01-03 RX ORDER — FLUTICASONE PROPIONATE 50 MCG
2 SPRAY, SUSPENSION (ML) NASAL DAILY
Qty: 16 G | Refills: 0 | Status: SHIPPED | OUTPATIENT
Start: 2022-01-03

## 2022-01-03 RX ORDER — OMEGA-3-ACID ETHYL ESTERS 1 G/1
CAPSULE, LIQUID FILLED ORAL
Qty: 180 CAPSULE | Refills: 1 | Status: SHIPPED | OUTPATIENT
Start: 2022-01-03

## 2022-01-03 RX ORDER — MEMANTINE HYDROCHLORIDE 5 MG/1
5 TABLET ORAL DAILY
Qty: 90 TABLET | Refills: 0 | Status: SHIPPED | OUTPATIENT
Start: 2022-01-03 | End: 2022-03-08 | Stop reason: SDUPTHER

## 2022-01-03 RX ORDER — OMEGA-3-ACID ETHYL ESTERS 1 G/1
2 CAPSULE, LIQUID FILLED ORAL 2 TIMES DAILY
Qty: 180 CAPSULE | Refills: 0 | Status: SHIPPED | OUTPATIENT
Start: 2022-01-03

## 2022-01-31 ENCOUNTER — IMMUNIZATIONS (OUTPATIENT)
Dept: FAMILY MEDICINE CLINIC | Facility: HOSPITAL | Age: 72
End: 2022-01-31

## 2022-01-31 DIAGNOSIS — Z23 ENCOUNTER FOR IMMUNIZATION: Primary | ICD-10-CM

## 2022-01-31 PROCEDURE — 0001A COVID-19 PFIZER VACC 0.3 ML: CPT

## 2022-01-31 PROCEDURE — 91300 COVID-19 PFIZER VACC 0.3 ML: CPT

## 2022-02-19 DIAGNOSIS — I10 ESSENTIAL HYPERTENSION: ICD-10-CM

## 2022-02-19 DIAGNOSIS — R73.01 IMPAIRED FASTING GLUCOSE: ICD-10-CM

## 2022-02-21 RX ORDER — ACETAMINOPHEN/DIPHENHYDRAMINE 500MG-25MG
TABLET ORAL
Qty: 90 TABLET | Refills: 1 | Status: SHIPPED | OUTPATIENT
Start: 2022-02-21 | End: 2022-03-08 | Stop reason: SDUPTHER

## 2022-03-08 ENCOUNTER — OFFICE VISIT (OUTPATIENT)
Dept: FAMILY MEDICINE CLINIC | Facility: CLINIC | Age: 72
End: 2022-03-08

## 2022-03-08 VITALS
DIASTOLIC BLOOD PRESSURE: 72 MMHG | BODY MASS INDEX: 21.71 KG/M2 | RESPIRATION RATE: 16 BRPM | HEIGHT: 62 IN | WEIGHT: 118 LBS | HEART RATE: 71 BPM | SYSTOLIC BLOOD PRESSURE: 116 MMHG | TEMPERATURE: 97.8 F | OXYGEN SATURATION: 99 %

## 2022-03-08 DIAGNOSIS — I10 ESSENTIAL HYPERTENSION: ICD-10-CM

## 2022-03-08 DIAGNOSIS — Z78.0 ENCOUNTER FOR OSTEOPOROSIS SCREENING IN ASYMPTOMATIC POSTMENOPAUSAL PATIENT: Primary | ICD-10-CM

## 2022-03-08 DIAGNOSIS — L85.3 DRY SKIN: ICD-10-CM

## 2022-03-08 DIAGNOSIS — K59.01 SLOW TRANSIT CONSTIPATION: ICD-10-CM

## 2022-03-08 DIAGNOSIS — R41.89 COGNITIVE DECLINE: ICD-10-CM

## 2022-03-08 DIAGNOSIS — E78.5 DYSLIPIDEMIA: ICD-10-CM

## 2022-03-08 DIAGNOSIS — I63.9 CVA (CEREBRAL VASCULAR ACCIDENT) (HCC): ICD-10-CM

## 2022-03-08 DIAGNOSIS — R73.01 IMPAIRED FASTING GLUCOSE: ICD-10-CM

## 2022-03-08 DIAGNOSIS — Z13.820 ENCOUNTER FOR OSTEOPOROSIS SCREENING IN ASYMPTOMATIC POSTMENOPAUSAL PATIENT: Primary | ICD-10-CM

## 2022-03-08 PROCEDURE — 3074F SYST BP LT 130 MM HG: CPT | Performed by: FAMILY MEDICINE

## 2022-03-08 PROCEDURE — 3078F DIAST BP <80 MM HG: CPT | Performed by: FAMILY MEDICINE

## 2022-03-08 PROCEDURE — G0439 PPPS, SUBSEQ VISIT: HCPCS | Performed by: FAMILY MEDICINE

## 2022-03-08 RX ORDER — AMMONIUM LACTATE 12 G/100G
LOTION TOPICAL 2 TIMES DAILY
Qty: 400 G | Refills: 1 | Status: SHIPPED | OUTPATIENT
Start: 2022-03-08 | End: 2022-06-14 | Stop reason: SDUPTHER

## 2022-03-08 RX ORDER — ASPIRIN 81 MG/1
81 TABLET ORAL DAILY
Qty: 90 TABLET | Refills: 1 | Status: SHIPPED | OUTPATIENT
Start: 2022-03-08

## 2022-03-08 RX ORDER — MEMANTINE HYDROCHLORIDE 5 MG/1
5 TABLET ORAL 2 TIMES DAILY
Qty: 180 TABLET | Refills: 1 | Status: SHIPPED | OUTPATIENT
Start: 2022-03-08

## 2022-03-08 RX ORDER — DOCUSATE SODIUM 100 MG/1
100 CAPSULE, LIQUID FILLED ORAL 2 TIMES DAILY
Qty: 180 CAPSULE | Refills: 1 | Status: SHIPPED | OUTPATIENT
Start: 2022-03-08

## 2022-03-08 RX ORDER — ATORVASTATIN CALCIUM 40 MG/1
40 TABLET, FILM COATED ORAL EVERY EVENING
Qty: 90 TABLET | Refills: 1 | Status: SHIPPED | OUTPATIENT
Start: 2022-03-08

## 2022-03-08 NOTE — PROGRESS NOTES
Assessment and Plan:     Problem List Items Addressed This Visit        Digestive    Slow transit constipation    Relevant Medications    docusate sodium (COLACE) 100 mg capsule       Endocrine    Impaired fasting glucose    Relevant Medications    metFORMIN (GLUCOPHAGE) 500 mg tablet       Cardiovascular and Mediastinum    CVA (cerebral vascular accident) ([address] )    Relevant Medications    atorvastatin (LIPITOR) 40 mg tablet    Essential hypertension    Relevant Medications    aspirin (RA Aspirin EC) 81 mg EC tablet    Other Relevant Orders    CBC and differential    Comprehensive metabolic panel    Lipid panel    Microalbumin / creatinine urine ratio    TSH, 3rd generation with Free T4 reflex       Other    Dyslipidemia    Relevant Medications    atorvastatin (LIPITOR) 40 mg tablet    Other Relevant Orders    CBC and differential    Comprehensive metabolic panel    Lipid panel    Microalbumin / creatinine urine ratio    TSH, 3rd generation with Free T4 reflex      Other Visit Diagnoses     Encounter for osteoporosis screening in asymptomatic postmenopausal patient    -  Primary    Relevant Orders    DXA bone density spine hip and pelvis    Dry skin        Relevant Medications    ammonium lactate (LAC-HYDRIN) 12 % lotion    Cognitive decline        Relevant Medications    memantine (NAMENDA) 5 mg tablet           Preventive health issues were discussed with patient, and age appropriate screening tests were ordered as noted in patient's After Visit Summary  Personalized health advice and appropriate referrals for health education or preventive services given if needed, as noted in patient's After Visit Summary  History of Present Illness:     Patient presents for Welcome to Medicare visit       Patient Care Team:  Analia Wylie MD as PCP - MD Quinton Birch MD as Endoscopist     Review of Systems:     Review of Systems   Problem List:     Patient Active Problem List   Diagnosis    Pre-op evaluation    Arthralgia of left hip    Creatinine elevation    Dyslipidemia    Essential hypertension    Low back pain    Pemphigus    Slow transit constipation    Vitamin D deficiency    Acute pain of left shoulder    Spinal stenosis, lumbar region with neurogenic claudication    Acquired trigger finger    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    Bursitis    Chronic right shoulder pain    Hyperlipidemia    Impaired fasting glucose    Mammogram abnormal    Multiple joint pain    Muscle spasms of neck    Neck pain    Osteoarthrosis    Other symptoms involving skin and integumentary tissues    CVA (cerebral vascular accident) ([address] )    Lightheadedness      Past Medical and Surgical History:     Past Medical History:   Diagnosis Date    Arthralgia of hip     left    Arthritis     Chronic pain disorder     low back and shoulder    Dyslipidemia     Hyperlipidemia     Hypertension     Neck pain     Pemphigus     Pre-diabetes     Shoulder pain     Stroke ([address] )     Vitamin D deficiency      Past Surgical History:   Procedure Laterality Date    CATARACT EXTRACTION Bilateral     COLONOSCOPY      CT COLONOSCOPY FLX DX W/COLLJ SPEC WHEN PFRMD N/A 11/10/2016    Procedure: COLONOSCOPY;  Surgeon: Amos Ann MD;  Location: [facility]; Service: Gastroenterology    CT INCIS TENDON SHEATH,RADIAL STYLOID Left 7/12/2021    Procedure: RELEASE LEFT Yumiko Carolus;  Surgeon: Katerin Luther MD;  Location: [facility] MAIN OR;  Service: Orthopedics    CT INCIS TENDON SHEATH,RADIAL STYLOID Right 7/26/2021    Procedure: RELEASE RIGHT Yumiko Carolus;  Surgeon:  Katerin Luther MD;  Location: [facility] MAIN OR;  Service: Orthopedics    CT XCAPSL CTRC RMVL INSJ IO LENS PROSTH W/O ECP Left 9/27/2018    Procedure: EXTRACAPSULAR CATARACT REMOVAL/INSERTION OF INTRAOCULAR LENS;  Surgeon: Gabo Valverde MD;  Location: [facility] MAIN OR;  Service: Ophthalmology      Family History:     Family History   Problem Relation Age of Onset    Hyperlipidemia Mother     Hypertension Mother     Heart disease Father     No Known Problems Sister     No Known Problems Sister     No Known Problems Sister     No Known Problems Maternal Aunt     No Known Problems Paternal Aunt     No Known Problems Paternal Aunt       Social History:     Social History     Socioeconomic History    Marital status: Single     Spouse name: None    Number of children: 1    Years of education: None    Highest education level: None   Occupational History    Occupation: homemaker   Tobacco Use    Smoking status: Never Smoker    Smokeless tobacco: Never Used   Vaping Use    Vaping Use: Never used   Substance and Sexual Activity    Alcohol use: Never    Drug use: No    Sexual activity: Not Currently   Other Topics Concern    None   Social History Narrative    Advance directive declined by patient     Lives with adult children     Social Determinants of Health     Financial Resource Strain: Low Risk     Difficulty of Paying Living Expenses: Not hard at all   Food Insecurity: No Food Insecurity    Worried About Running Out of Food in the Last Year: Never true    Kathy of Food in the Last Year: Never true   Transportation Needs: No Transportation Needs    Lack of Transportation (Medical): No    Lack of Transportation (Non-Medical):  No   Physical Activity: Not on file   Stress: Not on file   Social Connections: Not on file   Intimate Partner Violence: Not on file   Housing Stability: Not on file      Medications and Allergies:     Current Outpatient Medications   Medication Sig Dispense Refill    acetaminophen (TYLENOL) 500 mg tablet Take 500 mg by mouth every 6 (six) hours as needed for mild pain      ammonium lactate (LAC-HYDRIN) 12 % lotion Apply topically 2 (two) times a day 400 g 1    aspirin (RA Aspirin EC) 81 mg EC tablet Take 1 tablet (81 mg total) by mouth daily 90 tablet 1    atorvastatin (LIPITOR) 40 mg tablet Take 1 tablet (40 mg total) by mouth every evening 90 tablet 1    docusate sodium (COLACE) 100 mg capsule Take 1 capsule (100 mg total) by mouth 2 (two) times a day 180 capsule 1    Elastic Bandages & Supports (Thumb Splint/Neoprene Medium) MISC by Does not apply route daily 2 each 0    fluticasone (FLONASE) 50 mcg/act nasal spray 2 sprays into each nostril daily 16 g 0    furosemide (LASIX) 20 mg tablet Take 0 5 tablets (10 mg total) by mouth daily (Patient not taking: Reported on 5/13/2021) 5 tablet 0    lisinopril-hydrochlorothiazide (PRINZIDE,ZESTORETIC) 10-12 5 MG per tablet Take 1 tablet by mouth daily 90 tablet 3    meclizine (ANTIVERT) 25 mg tablet Take 1 tablet (25 mg total) by mouth every 8 (eight) hours (Patient not taking: Reported on 5/13/2021) 30 tablet 0    meloxicam (MOBIC) 7 5 mg tablet Take 1 tablet (7 5 mg total) by mouth daily (Patient not taking: Reported on 5/13/2021) 90 tablet 0    memantine (NAMENDA) 5 mg tablet Take 1 tablet (5 mg total) by mouth 2 (two) times a day 180 tablet 1    metFORMIN (GLUCOPHAGE) 500 mg tablet Take 1 tablet (500 mg total) by mouth daily with breakfast 90 tablet 1    omega-3-acid ethyl esters (LOVAZA) 1 g capsule take 2 capsules by mouth twice a day 180 capsule 1    omega-3-acid ethyl esters (LOVAZA) 1 g capsule Take 2 capsules (2 g total) by mouth 2 (two) times a day 180 capsule 0    oxyCODONE (ROXICODONE) 5 mg immediate release tablet Take 1 tablet (5 mg total) by mouth every 6 (six) hours as needed for severe pain for up to 5 dosesMax Daily Amount: 20 mg (Patient not taking: Reported on 8/10/2021) 5 tablet 0    VITAMIN D PO Take by mouth daily       No current facility-administered medications for this visit       No Known Allergies   Immunizations:     Immunization History   Administered Date(s) Administered    COVID-19 PFIZER VACCINE 0 3 ML IM 03/16/2021, 04/06/2021, 01/31/2022    INFLUENZA 10/17/2006    Influenza Split High Dose Preservative Free IM 02/06/2017, 12/04/2017    Influenza, high dose seasonal 0 7 mL 12/20/2018, 09/26/2019, 10/05/2020, 12/07/2021    Influenza, seasonal, injectable 11/19/2014    Pneumococcal Polysaccharide PPV23 10/10/2016      Health Maintenance:         Topic Date Due    Breast Cancer Screening: Mammogram  11/13/2023    Colorectal Cancer Screening  11/10/2026    Hepatitis C Screening  Completed     There are no preventive care reminders to display for this patient  Medicare Screening Tests and Risk Assessments:     Ana Grimes is here for her Initial Wellness visit  Health Risk Assessment:   Patient rates overall health as fair  Patient feels that their physical health rating is same  Patient is satisfied with their life  Eyesight was rated as slightly worse  Hearing was rated as same  Patient feels that their emotional and mental health rating is same  Patients states they are never, rarely angry  Patient states they are often unusually tired/fatigued  Pain experienced in the last 7 days has been some  Patient's pain rating has been 4/10  Patient states that she has experienced weight loss or gain in last 6 months  Fall Risk Screening: In the past year, patient has experienced: no history of falling in past year      Urinary Incontinence Screening:   Patient has not leaked urine accidently in the last six months  Home Safety:  Patient does not have trouble with stairs inside or outside of their home  Patient has working smoke alarms Home safety hazards include: none  Nutrition:   Current diet is Regular  Medications:   Patient is currently taking over-the-counter supplements  OTC medications include: see medication list  Patient is not able to manage medications  Activities of Daily Living (ADLs)/Instrumental Activities of Daily Living (IADLs):   Walk and transfer into and out of bed and chair?: Yes  Dress and groom yourself?: Yes    Bathe or shower yourself?: Yes    Feed yourself?  Yes  Do your laundry/housekeeping?: Yes  Manage your money, pay your bills and track your expenses?: Yes  Make your own meals?: Yes    Do your own shopping?: Yes    Previous Hospitalizations:   Any hospitalizations or ED visits within the last 12 months?: No      Advance Care Planning:   Living will: No    Durable POA for healthcare: No    Advanced directive: No    Advanced directive counseling given: No    Five wishes given: Yes    Patient declined ACP directive: Yes    End of Life Decisions reviewed with patient: No      PREVENTIVE SCREENINGS      Cardiovascular Screening:    General: Screening Not Indicated and History Lipid Disorder      Diabetes Screening:     General: Screening Current      Colorectal Cancer Screening:     General: Screening Current      Breast Cancer Screening:     General: Screening Current      Cervical Cancer Screening:    General: Screening Not Indicated      Osteoporosis Screening:    General: Risks and Benefits Discussed      Lung Cancer Screening:     General: Screening Not Indicated      Hepatitis C Screening:    General: Screening Current    Screening, Brief Intervention, and Referral to Treatment (SBIRT)    Screening  Typical number of drinks in a day: 0    Single Item Drug Screening:  How often have you used an illegal drug (including marijuana) or a prescription medication for non-medical reasons in the past year? never    Single Item Drug Screen Score: 0  Interpretation: Negative screen for possible drug use disorder    Other Counseling Topics:   Sunscreen and calcium and vitamin D intake and regular weightbearing exercise  No exam data present     Physical Exam:     /72 (BP Location: Left arm, Patient Position: Sitting, Cuff Size: Standard)   Pulse 71   Temp 97 8 °F (36 6 °C) (Temporal)   Resp 16   Ht 5' 2" (1 575 m)   Wt 53 5 kg (118 lb)   SpO2 99%   Breastfeeding No   BMI 21 58 kg/m²     Physical Exam  Vitals and nursing note reviewed     Constitutional:       General: She is not in acute distress  Appearance: She is well-developed  HENT:      Head: Normocephalic and atraumatic  Eyes:      Conjunctiva/sclera: Conjunctivae normal    Cardiovascular:      Rate and Rhythm: Normal rate and regular rhythm  Heart sounds: No murmur heard  Pulmonary:      Effort: Pulmonary effort is normal  No respiratory distress  Breath sounds: Normal breath sounds  Abdominal:      Palpations: Abdomen is soft  Tenderness: There is no abdominal tenderness  Musculoskeletal:      Cervical back: Neck supple  Skin:     General: Skin is warm and dry  Neurological:      Mental Status: She is alert            Marcos Yepez MD

## 2022-03-08 NOTE — PATIENT INSTRUCTIONS
Medicare Preventive Visit Patient Instructions  Thank you for completing your Welcome to Medicare Visit or Medicare Annual Wellness Visit today  Your next wellness visit will be due in one year (3/9/2023)  The screening/preventive services that you may require over the next 5-10 years are detailed below  Some tests may not apply to you based off risk factors and/or age  Screening tests ordered at today's visit but not completed yet may show as past due  Also, please note that scanned in results may not display below  Preventive Screenings:  Service Recommendations Previous Testing/Comments   Colorectal Cancer Screening  * Colonoscopy    * Fecal Occult Blood Test (FOBT)/Fecal Immunochemical Test (FIT)  * Fecal DNA/Cologuard Test  * Flexible Sigmoidoscopy Age: 54-65 years old   Colonoscopy: every 10 years (may be performed more frequently if at higher risk)  OR  FOBT/FIT: every 1 year  OR  Cologuard: every 3 years  OR  Sigmoidoscopy: every 5 years  Screening may be recommended earlier than age 48 if at higher risk for colorectal cancer  Also, an individualized decision between you and your healthcare provider will decide whether screening between the ages of 74-80 would be appropriate  Colonoscopy: 11/10/2016  FOBT/FIT: 09/12/2021  Cologuard: Not on file  Sigmoidoscopy: Not on file          Breast Cancer Screening Age: 36 years old  Frequency: every 1-2 years  Not required if history of left and right mastectomy Mammogram: 11/13/2021        Cervical Cancer Screening Between the ages of 21-29, pap smear recommended once every 3 years  Between the ages of 33-67, can perform pap smear with HPV co-testing every 5 years     Recommendations may differ for women with a history of total hysterectomy, cervical cancer, or abnormal pap smears in past  Pap Smear: 12/07/2021        Hepatitis C Screening Once for adults born between 1945 and 1965  More frequently in patients at high risk for Hepatitis C Hep C Antibody: 04/20/2020        Diabetes Screening 1-2 times per year if you're at risk for diabetes or have pre-diabetes Fasting glucose: 95 mg/dL   A1C: 5 4        Cholesterol Screening Once every 5 years if you don't have a lipid disorder  May order more often based on risk factors  Lipid panel: 02/08/2021          Other Preventive Screenings Covered by Medicare:  1  Abdominal Aortic Aneurysm (AAA) Screening: covered once if your at risk  You're considered to be at risk if you have a family history of AAA  2  Lung Cancer Screening: covers low dose CT scan once per year if you meet all of the following conditions: (1) Age 50-69; (2) No signs or symptoms of lung cancer; (3) Current smoker or have quit smoking within the last 15 years; (4) You have a tobacco smoking history of at least 30 pack years (packs per day multiplied by number of years you smoked); (5) You get a written order from a healthcare provider  3  Glaucoma Screening: covered annually if you're considered high risk: (1) You have diabetes OR (2) Family history of glaucoma OR (3)  aged 48 and older OR (3)  American aged 72 and older  3  Osteoporosis Screening: covered every 2 years if you meet one of the following conditions: (1) You're estrogen deficient and at risk for osteoporosis based off medical history and other findings; (2) Have a vertebral abnormality; (3) On glucocorticoid therapy for more than 3 months; (4) Have primary hyperparathyroidism; (5) On osteoporosis medications and need to assess response to drug therapy  · Last bone density test (DXA Scan): Not on file  5  HIV Screening: covered annually if you're between the age of 12-76  Also covered annually if you are younger than 13 and older than 72 with risk factors for HIV infection  For pregnant patients, it is covered up to 3 times per pregnancy      Immunizations:  Immunization Recommendations   Influenza Vaccine Annual influenza vaccination during flu season is recommended for all persons aged >= 6 months who do not have contraindications   Pneumococcal Vaccine (Prevnar and Pneumovax)  * Prevnar = PCV13  * Pneumovax = PPSV23   Adults 25-60 years old: 1-3 doses may be recommended based on certain risk factors  Adults 72 years old: Prevnar (PCV13) vaccine recommended followed by Pneumovax (PPSV23) vaccine  If already received PPSV23 since turning 65, then PCV13 recommended at least one year after PPSV23 dose  Hepatitis B Vaccine 3 dose series if at intermediate or high risk (ex: diabetes, end stage renal disease, liver disease)   Tetanus (Td) Vaccine - COST NOT COVERED BY MEDICARE PART B Following completion of primary series, a booster dose should be given every 10 years to maintain immunity against tetanus  Td may also be given as tetanus wound prophylaxis  Tdap Vaccine - COST NOT COVERED BY MEDICARE PART B Recommended at least once for all adults  For pregnant patients, recommended with each pregnancy  Shingles Vaccine (Shingrix) - COST NOT COVERED BY MEDICARE PART B  2 shot series recommended in those aged 48 and above     Health Maintenance Due:      Topic Date Due    Breast Cancer Screening: Mammogram  11/13/2023    Colorectal Cancer Screening  11/10/2026    Hepatitis C Screening  Completed     Immunizations Due:  There are no preventive care reminders to display for this patient  Advance Directives   What are advance directives? Advance directives are legal documents that state your wishes and plans for medical care  These plans are made ahead of time in case you lose your ability to make decisions for yourself  Advance directives can apply to any medical decision, such as the treatments you want, and if you want to donate organs  What are the types of advance directives? There are many types of advance directives, and each state has rules about how to use them  You may choose a combination of any of the following:  · Living will:   This is a written record of the treatment you want  You can also choose which treatments you do not want, which to limit, and which to stop at a certain time  This includes surgery, medicine, IV fluid, and tube feedings  · Durable power of  for healthcare [facility]): This is a written record that states who you want to make healthcare choices for you when you are unable to make them for yourself  This person, called a proxy, is usually a family member or a friend  You may choose more than 1 proxy  · Do not resuscitate (DNR) order:  A DNR order is used in case your heart stops beating or you stop breathing  It is a request not to have certain forms of treatment, such as CPR  A DNR order may be included in other types of advance directives  · Medical directive: This covers the care that you want if you are in a coma, near death, or unable to make decisions for yourself  You can list the treatments you want for each condition  Treatment may include pain medicine, surgery, blood transfusions, dialysis, IV or tube feedings, and a ventilator (breathing machine)  · Values history: This document has questions about your views, beliefs, and how you feel and think about life  This information can help others choose the care that you would choose  Why are advance directives important? An advance directive helps you control your care  Although spoken wishes may be used, it is better to have your wishes written down  Spoken wishes can be misunderstood, or not followed  Treatments may be given even if you do not want them  An advance directive may make it easier for your family to make difficult choices about your care  © Copyright [address] 2018 Information is for End User's use only and may not be sold, redistributed or otherwise used for commercial purposes   All illustrations and images included in CareNotes® are the copyrighted property of [employer]  or [address] [place] [address] [email or web address]

## 2022-04-11 ENCOUNTER — HOSPITAL ENCOUNTER (OUTPATIENT)
Dept: BONE DENSITY | Facility: MEDICAL CENTER | Age: 72
Discharge: HOME | End: 2022-04-11
Payer: MEDICARE

## 2022-04-11 DIAGNOSIS — Z13.820 ENCOUNTER FOR OSTEOPOROSIS SCREENING IN ASYMPTOMATIC POSTMENOPAUSAL PATIENT: ICD-10-CM

## 2022-04-11 DIAGNOSIS — Z78.0 ENCOUNTER FOR OSTEOPOROSIS SCREENING IN ASYMPTOMATIC POSTMENOPAUSAL PATIENT: ICD-10-CM

## 2022-04-11 PROCEDURE — 77080 DXA BONE DENSITY AXIAL: CPT

## 2022-06-14 ENCOUNTER — OFFICE VISIT (OUTPATIENT)
Dept: FAMILY MEDICINE CLINIC | Facility: CLINIC | Age: 72
End: 2022-06-14

## 2022-06-14 VITALS
RESPIRATION RATE: 18 BRPM | TEMPERATURE: 97.2 F | DIASTOLIC BLOOD PRESSURE: 60 MMHG | HEART RATE: 62 BPM | BODY MASS INDEX: 22.31 KG/M2 | SYSTOLIC BLOOD PRESSURE: 124 MMHG | OXYGEN SATURATION: 96 % | WEIGHT: 122 LBS

## 2022-06-14 DIAGNOSIS — I10 ESSENTIAL HYPERTENSION: ICD-10-CM

## 2022-06-14 DIAGNOSIS — M25.511 CHRONIC RIGHT SHOULDER PAIN: Primary | ICD-10-CM

## 2022-06-14 DIAGNOSIS — G89.29 CHRONIC RIGHT SHOULDER PAIN: Primary | ICD-10-CM

## 2022-06-14 DIAGNOSIS — L85.3 DRY SKIN: ICD-10-CM

## 2022-06-14 DIAGNOSIS — R73.01 IMPAIRED FASTING GLUCOSE: ICD-10-CM

## 2022-06-14 PROCEDURE — 3078F DIAST BP <80 MM HG: CPT | Performed by: FAMILY MEDICINE

## 2022-06-14 PROCEDURE — 3074F SYST BP LT 130 MM HG: CPT | Performed by: FAMILY MEDICINE

## 2022-06-14 PROCEDURE — 99214 OFFICE O/P EST MOD 30 MIN: CPT | Performed by: FAMILY MEDICINE

## 2022-06-14 RX ORDER — AMMONIUM LACTATE 12 G/100G
LOTION TOPICAL 2 TIMES DAILY
Qty: 400 G | Refills: 1 | Status: SHIPPED | OUTPATIENT
Start: 2022-06-14

## 2022-06-15 ENCOUNTER — APPOINTMENT (OUTPATIENT)
Dept: LAB | Age: 72
End: 2022-06-15
Payer: MEDICARE

## 2022-06-15 DIAGNOSIS — E78.5 DYSLIPIDEMIA: ICD-10-CM

## 2022-06-15 DIAGNOSIS — I10 ESSENTIAL HYPERTENSION: ICD-10-CM

## 2022-06-15 LAB
ALBUMIN SERPL BCP-MCNC: 3.5 G/DL (ref 3.5–5)
ALP SERPL-CCNC: 57 U/L (ref 46–116)
ALT SERPL W P-5'-P-CCNC: 29 U/L (ref 12–78)
ANION GAP SERPL CALCULATED.3IONS-SCNC: 3 MMOL/L (ref 4–13)
AST SERPL W P-5'-P-CCNC: 22 U/L (ref 5–45)
BASOPHILS # BLD AUTO: 0.06 THOUSANDS/ΜL (ref 0–0.1)
BASOPHILS NFR BLD AUTO: 1 % (ref 0–1)
BILIRUB SERPL-MCNC: 0.67 MG/DL (ref 0.2–1)
BUN SERPL-MCNC: 17 MG/DL (ref 5–25)
CALCIUM SERPL-MCNC: 9.6 MG/DL (ref 8.3–10.1)
CHLORIDE SERPL-SCNC: 110 MMOL/L (ref 100–108)
CHOLEST SERPL-MCNC: 118 MG/DL
CO2 SERPL-SCNC: 29 MMOL/L (ref 21–32)
CREAT SERPL-MCNC: 0.71 MG/DL (ref 0.6–1.3)
CREAT UR-MCNC: 52.6 MG/DL
EOSINOPHIL # BLD AUTO: 0.1 THOUSAND/ΜL (ref 0–0.61)
EOSINOPHIL NFR BLD AUTO: 1 % (ref 0–6)
ERYTHROCYTE [DISTWIDTH] IN BLOOD BY AUTOMATED COUNT: 12.4 % (ref 11.6–15.1)
GFR SERPL CREATININE-BSD FRML MDRD: 85 ML/MIN/1.73SQ M
GLUCOSE P FAST SERPL-MCNC: 107 MG/DL (ref 65–99)
HCT VFR BLD AUTO: 40.5 % (ref 34.8–46.1)
HDLC SERPL-MCNC: 71 MG/DL
HGB BLD-MCNC: 13.4 G/DL (ref 11.5–15.4)
IMM GRANULOCYTES # BLD AUTO: 0.03 THOUSAND/UL (ref 0–0.2)
IMM GRANULOCYTES NFR BLD AUTO: 0 % (ref 0–2)
LDLC SERPL CALC-MCNC: 36 MG/DL (ref 0–100)
LYMPHOCYTES # BLD AUTO: 3.39 THOUSANDS/ΜL (ref 0.6–4.47)
LYMPHOCYTES NFR BLD AUTO: 39 % (ref 14–44)
MCH RBC QN AUTO: 31.6 PG (ref 26.8–34.3)
MCHC RBC AUTO-ENTMCNC: 33.1 G/DL (ref 31.4–37.4)
MCV RBC AUTO: 96 FL (ref 82–98)
MICROALBUMIN UR-MCNC: <5 MG/L (ref 0–20)
MICROALBUMIN/CREAT 24H UR: <10 MG/G CREATININE (ref 0–30)
MONOCYTES # BLD AUTO: 0.61 THOUSAND/ΜL (ref 0.17–1.22)
MONOCYTES NFR BLD AUTO: 7 % (ref 4–12)
NEUTROPHILS # BLD AUTO: 4.48 THOUSANDS/ΜL (ref 1.85–7.62)
NEUTS SEG NFR BLD AUTO: 52 % (ref 43–75)
NONHDLC SERPL-MCNC: 47 MG/DL
NRBC BLD AUTO-RTO: 0 /100 WBCS
PLATELET # BLD AUTO: 221 THOUSANDS/UL (ref 149–390)
PMV BLD AUTO: 10.2 FL (ref 8.9–12.7)
POTASSIUM SERPL-SCNC: 4.1 MMOL/L (ref 3.5–5.3)
PROT SERPL-MCNC: 7.4 G/DL (ref 6.4–8.2)
RBC # BLD AUTO: 4.24 MILLION/UL (ref 3.81–5.12)
SODIUM SERPL-SCNC: 142 MMOL/L (ref 136–145)
TRIGL SERPL-MCNC: 55 MG/DL
TSH SERPL DL<=0.05 MIU/L-ACNC: 0.91 UIU/ML (ref 0.45–4.5)
WBC # BLD AUTO: 8.67 THOUSAND/UL (ref 4.31–10.16)

## 2022-06-15 PROCEDURE — 82570 ASSAY OF URINE CREATININE: CPT

## 2022-06-15 PROCEDURE — 82043 UR ALBUMIN QUANTITATIVE: CPT

## 2022-06-15 PROCEDURE — 80061 LIPID PANEL: CPT

## 2022-06-15 PROCEDURE — 85025 COMPLETE CBC W/AUTO DIFF WBC: CPT

## 2022-06-15 PROCEDURE — 80053 COMPREHEN METABOLIC PANEL: CPT

## 2022-06-15 PROCEDURE — 36415 COLL VENOUS BLD VENIPUNCTURE: CPT

## 2022-06-15 PROCEDURE — 84443 ASSAY THYROID STIM HORMONE: CPT

## 2022-09-13 ENCOUNTER — OFFICE VISIT (OUTPATIENT)
Dept: FAMILY MEDICINE CLINIC | Facility: CLINIC | Age: 72
End: 2022-09-13

## 2022-09-13 VITALS
TEMPERATURE: 98.7 F | DIASTOLIC BLOOD PRESSURE: 78 MMHG | HEART RATE: 76 BPM | OXYGEN SATURATION: 96 % | SYSTOLIC BLOOD PRESSURE: 136 MMHG | RESPIRATION RATE: 18 BRPM | WEIGHT: 116 LBS | BODY MASS INDEX: 21.22 KG/M2

## 2022-09-13 DIAGNOSIS — R73.01 IMPAIRED FASTING GLUCOSE: Primary | ICD-10-CM

## 2022-09-13 DIAGNOSIS — K21.9 GASTROESOPHAGEAL REFLUX DISEASE WITHOUT ESOPHAGITIS: ICD-10-CM

## 2022-09-13 DIAGNOSIS — I10 ESSENTIAL HYPERTENSION: ICD-10-CM

## 2022-09-13 DIAGNOSIS — Z23 ENCOUNTER FOR IMMUNIZATION: ICD-10-CM

## 2022-09-13 LAB — SL AMB POCT HEMOGLOBIN AIC: 5.4 (ref ?–6.5)

## 2022-09-13 PROCEDURE — 3075F SYST BP GE 130 - 139MM HG: CPT | Performed by: FAMILY MEDICINE

## 2022-09-13 PROCEDURE — 3078F DIAST BP <80 MM HG: CPT | Performed by: FAMILY MEDICINE

## 2022-09-13 PROCEDURE — 90677 PCV20 VACCINE IM: CPT | Performed by: FAMILY MEDICINE

## 2022-09-13 PROCEDURE — 99214 OFFICE O/P EST MOD 30 MIN: CPT | Performed by: FAMILY MEDICINE

## 2022-09-13 PROCEDURE — G0009 ADMIN PNEUMOCOCCAL VACCINE: HCPCS | Performed by: FAMILY MEDICINE

## 2022-09-13 PROCEDURE — 83036 HEMOGLOBIN GLYCOSYLATED A1C: CPT | Performed by: FAMILY MEDICINE

## 2022-09-13 RX ORDER — LISINOPRIL AND HYDROCHLOROTHIAZIDE 12.5; 1 MG/1; MG/1
1 TABLET ORAL DAILY
Qty: 90 TABLET | Refills: 3 | Status: SHIPPED | OUTPATIENT
Start: 2022-09-13

## 2022-09-13 RX ORDER — OMEPRAZOLE 20 MG/1
20 CAPSULE, DELAYED RELEASE ORAL
Qty: 90 CAPSULE | Refills: 1 | Status: SHIPPED | OUTPATIENT
Start: 2022-09-13

## 2022-09-13 NOTE — PROGRESS NOTES
Name: Chloe Myrick      :       MRN: [id number]  Encounter Provider: Marcos Yepez MD  Encounter Date: 2022   Encounter department: [facility]     1  Impaired fasting glucose  Assessment & Plan:  HgA1c 5 4  Will stop metformin at this time, continue low carb diet     Orders:  -     POCT hemoglobin A1c    2  Essential hypertension  -     lisinopril-hydrochlorothiazide (PRINZIDE,ZESTORETIC) 10-12 5 MG per tablet; Take 1 tablet by mouth daily    3  Gastroesophageal reflux disease without esophagitis  Assessment & Plan:  Start omeprazole  Discussed diet  Eat at least 2 hours prior to bedtime     Orders:  -     omeprazole (PriLOSEC) 20 mg delayed release capsule; Take 1 capsule (20 mg total) by mouth daily before breakfast    4  Encounter for immunization  -     Pneumococcal Conjugate Vaccine 20-valent (PCV20)      Depression Screening and Follow-up Plan: Patient was screened for depression during today's encounter  They screened negative with a PHQ-2 score of 0  Subjective      65 yo  female with h/o glucose intolerance, here today for follow up  Has significantly decreased her carb intake and states has lost significant weight while doing so  Chronic L hip pain, on and off, improves when she is more physically active   States she feels stiff when she gets up from bed in the am  Stiffness improves as the day goes by  Also complains of heartburn     Heartburn  She complains of heartburn  This is a new problem  The current episode started more than 1 month ago  The problem occurs frequently  The problem has been unchanged  The heartburn duration is several minutes  The heartburn is located in the substernum  The heartburn is of moderate intensity  The heartburn wakes her from sleep  The heartburn does not limit her activity  The heartburn doesn't change with position  The symptoms are aggravated by lying down   There are no known risk factors  She has tried nothing for the symptoms  Review of Systems   Constitutional: Positive for appetite change  Gastrointestinal: Positive for heartburn  Musculoskeletal: Positive for arthralgias  All other systems reviewed and are negative        Current Outpatient Medications on File Prior to Visit   Medication Sig    acetaminophen (TYLENOL) 500 mg tablet Take 500 mg by mouth every 6 (six) hours as needed for mild pain    ammonium lactate (LAC-HYDRIN) 12 % lotion Apply topically 2 (two) times a day    aspirin (RA Aspirin EC) 81 mg EC tablet Take 1 tablet (81 mg total) by mouth daily    atorvastatin (LIPITOR) 40 mg tablet Take 1 tablet (40 mg total) by mouth every evening    Diclofenac Sodium (VOLTAREN) 1 % Apply 2 g topically 4 (four) times a day    docusate sodium (COLACE) 100 mg capsule Take 1 capsule (100 mg total) by mouth 2 (two) times a day    Elastic Bandages & Supports (Thumb Splint/Neoprene Medium) MISC by Does not apply route daily    fluticasone (FLONASE) 50 mcg/act nasal spray 2 sprays into each nostril daily    furosemide (LASIX) 20 mg tablet Take 0 5 tablets (10 mg total) by mouth daily (Patient not taking: Reported on 5/13/2021)    meclizine (ANTIVERT) 25 mg tablet Take 1 tablet (25 mg total) by mouth every 8 (eight) hours (Patient not taking: Reported on 5/13/2021)    memantine (NAMENDA) 5 mg tablet Take 1 tablet (5 mg total) by mouth 2 (two) times a day    omega-3-acid ethyl esters (LOVAZA) 1 g capsule take 2 capsules by mouth twice a day    omega-3-acid ethyl esters (LOVAZA) 1 g capsule Take 2 capsules (2 g total) by mouth 2 (two) times a day    oxyCODONE (ROXICODONE) 5 mg immediate release tablet Take 1 tablet (5 mg total) by mouth every 6 (six) hours as needed for severe pain for up to 5 dosesMax Daily Amount: 20 mg (Patient not taking: Reported on 8/10/2021)    VITAMIN D PO Take by mouth daily    [DISCONTINUED] lisinopril-hydrochlorothiazide (PRINZIDE,ZESTORETIC) 10-12 5 MG per tablet Take 1 tablet by mouth daily    [DISCONTINUED] metFORMIN (GLUCOPHAGE) 500 mg tablet Take 1 tablet (500 mg total) by mouth daily with breakfast       Objective     /78   Pulse 76   Temp 98 7 °F (37 1 °C) (Temporal)   Resp 18   Wt 52 6 kg (116 lb)   SpO2 96%   BMI 21 22 kg/m²     Physical Exam  Vitals and nursing note reviewed  Constitutional:       Appearance: She is well-developed  HENT:      Head: Normocephalic  Right Ear: External ear normal       Left Ear: External ear normal       Nose: Nose normal    Eyes:      Conjunctiva/sclera: Conjunctivae normal       Pupils: Pupils are equal, round, and reactive to light  Neck:      Thyroid: No thyromegaly  Cardiovascular:      Rate and Rhythm: Normal rate and regular rhythm  Heart sounds: Normal heart sounds  Pulmonary:      Effort: Pulmonary effort is normal       Breath sounds: Normal breath sounds  Abdominal:      Palpations: Abdomen is soft  Tenderness: There is no abdominal tenderness  There is no guarding or rebound  Musculoskeletal:         General: Normal range of motion  Cervical back: Normal range of motion and neck supple  Skin:     General: Skin is dry  Neurological:      Mental Status: She is alert and oriented to person, place, and time  Deep Tendon Reflexes: Reflexes are normal and symmetric         Hien Torres MD

## 2022-09-27 DIAGNOSIS — E78.5 DYSLIPIDEMIA: ICD-10-CM

## 2022-09-27 DIAGNOSIS — I63.9 CVA (CEREBRAL VASCULAR ACCIDENT) (HCC): ICD-10-CM

## 2022-09-27 DIAGNOSIS — R41.89 COGNITIVE DECLINE: ICD-10-CM

## 2022-09-28 RX ORDER — ATORVASTATIN CALCIUM 40 MG/1
40 TABLET, FILM COATED ORAL EVERY EVENING
Qty: 90 TABLET | Refills: 0 | Status: SHIPPED | OUTPATIENT
Start: 2022-09-28

## 2022-09-28 RX ORDER — MEMANTINE HYDROCHLORIDE 5 MG/1
5 TABLET ORAL 2 TIMES DAILY
Qty: 180 TABLET | Refills: 0 | Status: SHIPPED | OUTPATIENT
Start: 2022-09-28

## 2022-09-28 RX ORDER — OMEGA-3-ACID ETHYL ESTERS 1 G/1
2 CAPSULE, LIQUID FILLED ORAL 2 TIMES DAILY
Qty: 180 CAPSULE | Refills: 0 | Status: SHIPPED | OUTPATIENT
Start: 2022-09-28

## 2022-10-10 ENCOUNTER — APPOINTMENT (EMERGENCY)
Dept: CT IMAGING | Facility: HOSPITAL | Age: 72
End: 2022-10-10
Payer: MEDICARE

## 2022-10-10 ENCOUNTER — HOSPITAL ENCOUNTER (EMERGENCY)
Facility: HOSPITAL | Age: 72
Discharge: HOME | End: 2022-10-10
Attending: EMERGENCY MEDICINE
Payer: MEDICARE

## 2022-10-10 VITALS
WEIGHT: 117.7 LBS | RESPIRATION RATE: 16 BRPM | TEMPERATURE: 98.2 F | DIASTOLIC BLOOD PRESSURE: 59 MMHG | SYSTOLIC BLOOD PRESSURE: 102 MMHG | HEART RATE: 67 BPM | OXYGEN SATURATION: 99 % | BODY MASS INDEX: 21.53 KG/M2

## 2022-10-10 DIAGNOSIS — R42 DIZZINESS: ICD-10-CM

## 2022-10-10 DIAGNOSIS — I10 HYPERTENSION: Primary | ICD-10-CM

## 2022-10-10 LAB
ANION GAP SERPL CALCULATED.3IONS-SCNC: 8 MMOL/L (ref 5–14)
ATRIAL RATE: 74 BPM
BASOPHILS # BLD AUTO: 0.08 THOUSANDS/ΜL (ref 0–0.1)
BASOPHILS NFR BLD AUTO: 1 % (ref 0–1)
BUN SERPL-MCNC: 19 MG/DL (ref 5–25)
CALCIUM SERPL-MCNC: 9.7 MG/DL (ref 8.4–10.2)
CARDIAC TROPONIN I PNL SERPL HS: 3 NG/L (ref 8–18)
CHLORIDE SERPL-SCNC: 100 MMOL/L (ref 96–108)
CO2 SERPL-SCNC: 29 MMOL/L (ref 21–32)
CREAT SERPL-MCNC: 0.64 MG/DL (ref 0.6–1.2)
EOSINOPHIL # BLD AUTO: 0.17 THOUSAND/ΜL (ref 0–0.61)
EOSINOPHIL NFR BLD AUTO: 2 % (ref 0–6)
ERYTHROCYTE [DISTWIDTH] IN BLOOD BY AUTOMATED COUNT: 12.4 % (ref 11.6–15.1)
GFR SERPL CREATININE-BSD FRML MDRD: 89 ML/MIN/1.73SQ M
GLUCOSE SERPL-MCNC: 103 MG/DL (ref 70–99)
HCT VFR BLD AUTO: 43.2 % (ref 34.8–46.1)
HGB BLD-MCNC: 14.1 G/DL (ref 11.5–15.4)
IMM GRANULOCYTES # BLD AUTO: 0.03 THOUSAND/UL (ref 0–0.2)
IMM GRANULOCYTES NFR BLD AUTO: 0 % (ref 0–2)
LYMPHOCYTES # BLD AUTO: 4.77 THOUSANDS/ΜL (ref 0.6–4.47)
LYMPHOCYTES NFR BLD AUTO: 42 % (ref 14–44)
MCH RBC QN AUTO: 30.5 PG (ref 26.8–34.3)
MCHC RBC AUTO-ENTMCNC: 32.6 G/DL (ref 31.4–37.4)
MCV RBC AUTO: 93 FL (ref 82–98)
MONOCYTES # BLD AUTO: 0.8 THOUSAND/ΜL (ref 0.17–1.22)
MONOCYTES NFR BLD AUTO: 7 % (ref 4–12)
NEUTROPHILS # BLD AUTO: 5.52 THOUSANDS/ΜL (ref 1.85–7.62)
NEUTS SEG NFR BLD AUTO: 48 % (ref 43–75)
NRBC BLD AUTO-RTO: 0 /100 WBCS
P AXIS: 28 DEGREES
PLATELET # BLD AUTO: 230 THOUSANDS/UL (ref 149–390)
PMV BLD AUTO: 9.3 FL (ref 8.9–12.7)
POTASSIUM SERPL-SCNC: 3.9 MMOL/L (ref 3.5–5.3)
PR INTERVAL: 142 MS
QRS AXIS: -15 DEGREES
QRSD INTERVAL: 74 MS
QT INTERVAL: 404 MS
QTC INTERVAL: 448 MS
RBC # BLD AUTO: 4.63 MILLION/UL (ref 3.81–5.12)
SODIUM SERPL-SCNC: 137 MMOL/L (ref 135–147)
T WAVE AXIS: 51 DEGREES
VENTRICULAR RATE: 74 BPM
WBC # BLD AUTO: 11.37 THOUSAND/UL (ref 4.31–10.16)

## 2022-10-10 PROCEDURE — G1004 CDSM NDSC: HCPCS

## 2022-10-10 PROCEDURE — 80048 BASIC METABOLIC PNL TOTAL CA: CPT | Performed by: EMERGENCY MEDICINE

## 2022-10-10 PROCEDURE — 93010 ELECTROCARDIOGRAM REPORT: CPT | Performed by: INTERNAL MEDICINE

## 2022-10-10 PROCEDURE — 99284 EMERGENCY DEPT VISIT MOD MDM: CPT | Performed by: EMERGENCY MEDICINE

## 2022-10-10 PROCEDURE — 85025 COMPLETE CBC W/AUTO DIFF WBC: CPT | Performed by: EMERGENCY MEDICINE

## 2022-10-10 PROCEDURE — 36415 COLL VENOUS BLD VENIPUNCTURE: CPT | Performed by: EMERGENCY MEDICINE

## 2022-10-10 PROCEDURE — 93005 ELECTROCARDIOGRAM TRACING: CPT

## 2022-10-10 PROCEDURE — 84484 ASSAY OF TROPONIN QUANT: CPT | Performed by: EMERGENCY MEDICINE

## 2022-10-10 PROCEDURE — 70450 CT HEAD/BRAIN W/O DYE: CPT

## 2022-10-10 PROCEDURE — 99284 EMERGENCY DEPT VISIT MOD MDM: CPT

## 2022-10-10 RX ORDER — CLONIDINE HYDROCHLORIDE 0.1 MG/1
0.1 TABLET ORAL ONCE
Status: COMPLETED | OUTPATIENT
Start: 2022-10-10 | End: 2022-10-10

## 2022-10-10 RX ADMIN — CLONIDINE HYDROCHLORIDE 0.1 MG: 0.1 TABLET ORAL at 15:17

## 2022-10-10 NOTE — ED PROCEDURE NOTE
PROCEDURE  ECG 12 Lead Documentation Only    Date/Time: 10/10/2022 2:23 PM  Performed by: Julia Guthrie MD  Authorized by: Julia Guthrie MD     Indications / Diagnosis:  Htn, dizziness  ECG reviewed by me, the ED Provider: yes    Patient location:  ED  Interpretation:     Interpretation: normal    Rate:     ECG rate:  74    ECG rate assessment: normal    Rhythm:     Rhythm: sinus rhythm    Ectopy:     Ectopy: none    QRS:     QRS axis:  Normal    QRS intervals:  Normal  Conduction:     Conduction: normal    ST segments:     ST segments:  Normal  T waves:     T waves: normal           Julia Guthrie MD  10/10/22 1436

## 2022-10-10 NOTE — ED PROVIDER NOTES
History  Chief Complaint   Patient presents with   • Dizziness     Reports dizziness and high blood pressure at home  Patient is a 26-year-old female who presents with daughter with an acute onset of dizziness  Started this morning  Constant  Not changed with position  No n/v/d  No cp, sob  Daughter took bp at home, 160s/80s  On meds and compliant, including today  No numbness/tingling  Similar episode in the past             Prior to Admission Medications   Prescriptions Last Dose Informant Patient Reported? Taking?    Diclofenac Sodium (VOLTAREN) 1 %   No No   Sig: Apply 2 g topically 4 (four) times a day   Elastic Bandages & Supports (Thumb Splint/Neoprene Medium) MISC   No No   Sig: by Does not apply route daily   VITAMIN D PO   Yes No   Sig: Take by mouth daily   acetaminophen (TYLENOL) 500 mg tablet   Yes No   Sig: Take 500 mg by mouth every 6 (six) hours as needed for mild pain   ammonium lactate (LAC-HYDRIN) 12 % lotion   No No   Sig: Apply topically 2 (two) times a day   aspirin (RA Aspirin EC) 81 mg EC tablet   No No   Sig: Take 1 tablet (81 mg total) by mouth daily   atorvastatin (LIPITOR) 40 mg tablet   No No   Sig: Take 1 tablet (40 mg total) by mouth every evening   docusate sodium (COLACE) 100 mg capsule   No No   Sig: Take 1 capsule (100 mg total) by mouth 2 (two) times a day   fluticasone (FLONASE) 50 mcg/act nasal spray   No No   Si sprays into each nostril daily   furosemide (LASIX) 20 mg tablet   No No   Sig: Take 0 5 tablets (10 mg total) by mouth daily   Patient not taking: Reported on 2021   lisinopril-hydrochlorothiazide (PRINZIDE,ZESTORETIC) 10-12 5 MG per tablet   No No   Sig: Take 1 tablet by mouth daily   meclizine (ANTIVERT) 25 mg tablet   No No   Sig: Take 1 tablet (25 mg total) by mouth every 8 (eight) hours   Patient not taking: Reported on 2021   memantine (NAMENDA) 5 mg tablet   No No   Sig: Take 1 tablet (5 mg total) by mouth 2 (two) times a day omega-3-acid ethyl esters (LOVAZA) 1 g capsule   No No   Sig: Take 2 capsules (2 g total) by mouth 2 (two) times a day   omeprazole (PriLOSEC) 20 mg delayed release capsule   No No   Sig: Take 1 capsule (20 mg total) by mouth daily before breakfast   oxyCODONE (ROXICODONE) 5 mg immediate release tablet   No No   Sig: Take 1 tablet (5 mg total) by mouth every 6 (six) hours as needed for severe pain for up to 5 dosesMax Daily Amount: 20 mg   Patient not taking: Reported on 8/10/2021      Facility-Administered Medications: None       Past Medical History:   Diagnosis Date   • Arthralgia of hip     left   • Arthritis    • Chronic pain disorder     low back and shoulder   • Dyslipidemia    • Hyperlipidemia    • Hypertension    • Neck pain    • Pemphigus    • Pre-diabetes    • Shoulder pain    • Stroke [facility])    • Vitamin D deficiency        Past Surgical History:   Procedure Laterality Date   • CATARACT EXTRACTION Bilateral    • COLONOSCOPY     • AK COLONOSCOPY FLX DX W/COLLJ SPEC WHEN PFRMD N/A 11/10/2016    Procedure: COLONOSCOPY;  Surgeon: Gemma Arizmendi MD;  Location: [facility]; Service: Gastroenterology   • AK INCIS TENDON SHEATH,RADIAL STYLOID Left 7/12/2021    Procedure: RELEASE LEFT Que Fab;  Surgeon: Azucena Solano MD;  Location: [address] [place] [address];  Service: Orthopedics   • AK INCIS TENDON SHEATH,RADIAL STYLOID Right 7/26/2021    Procedure: RELEASE RIGHT Que Fab;  Surgeon:  Azucena Solano MD;  Location: [address] [place] [address] OR;  Service: Orthopedics   • AK XCAPSL CTRC RMVL INSJ IO LENS PROSTH W/O ECP Left 9/27/2018    Procedure: EXTRACAPSULAR CATARACT REMOVAL/INSERTION OF INTRAOCULAR LENS;  Surgeon: Rachid Dixon MD;  Location: [address] [place] [address];  Service: Ophthalmology       Family History   Problem Relation Age of Onset   • Hyperlipidemia Mother    • Hypertension Mother    • Heart disease Father    • No Known Problems Sister    • No Known Problems Sister    • No Known Problems Sister    • No Known Problems Maternal Aunt    • No Known Problems Paternal Aunt    • No Known Problems Paternal Aunt      I have reviewed and agree with the history as documented  E-Cigarette/Vaping   • E-Cigarette Use Never User      E-Cigarette/Vaping Substances   • Nicotine No    • THC No    • CBD No    • Flavoring No    • Other No    • Unknown No      Social History     Tobacco Use   • Smoking status: Never Smoker   • Smokeless tobacco: Never Used   Vaping Use   • Vaping Use: Never used   Substance Use Topics   • Alcohol use: Never   • Drug use: No       Review of Systems   Constitutional: Negative  Negative for activity change, appetite change and fever  HENT: Negative  Eyes: Negative  Respiratory: Negative  Cardiovascular: Negative  Gastrointestinal: Negative  Negative for diarrhea, nausea and vomiting  Endocrine: Negative  Genitourinary: Negative  Musculoskeletal: Negative  Skin: Negative  Allergic/Immunologic: Negative  Neurological: Positive for dizziness  Negative for weakness and numbness  Hematological: Negative  Psychiatric/Behavioral: Negative  All other systems reviewed and are negative  Physical Exam  Physical Exam  Vitals and nursing note reviewed  Constitutional:       Appearance: Normal appearance  She is normal weight  HENT:      Head: Normocephalic and atraumatic  Nose: Nose normal       Mouth/Throat:      Mouth: Mucous membranes are moist       Pharynx: Oropharynx is clear  Eyes:      Extraocular Movements: Extraocular movements intact  Conjunctiva/sclera: Conjunctivae normal       Pupils: Pupils are equal, round, and reactive to light  Cardiovascular:      Rate and Rhythm: Normal rate and regular rhythm  Pulses: Normal pulses  Heart sounds: Normal heart sounds  Pulmonary:      Effort: Pulmonary effort is normal       Breath sounds: Normal breath sounds  Abdominal:      General: Bowel sounds are normal       Palpations: Abdomen is soft     Musculoskeletal: General: Normal range of motion  Cervical back: Normal range of motion and neck supple  Skin:     General: Skin is warm and dry  Capillary Refill: Capillary refill takes less than 2 seconds  Neurological:      General: No focal deficit present  Mental Status: She is alert and oriented to person, place, and time  Cranial Nerves: No cranial nerve deficit  Sensory: No sensory deficit  Motor: No weakness        Coordination: Coordination normal       Gait: Gait normal    Psychiatric:         Mood and Affect: Mood normal          Behavior: Behavior normal          Vital Signs  ED Triage Vitals   Temperature Pulse Respirations Blood Pressure SpO2   10/10/22 1423 10/10/22 1423 10/10/22 1423 10/10/22 1423 10/10/22 1423   98 2 °F (36 8 °C) 83 18 (!) 188/102 99 %      Temp Source Heart Rate Source Patient Position - Orthostatic VS BP Location FiO2 (%)   10/10/22 1423 10/10/22 1423 10/10/22 1423 10/10/22 1423 --   Tympanic Monitor Lying Left arm       Pain Score       10/10/22 1430       No Pain           Vitals:    10/10/22 1423 10/10/22 1515 10/10/22 1530 10/10/22 1600   BP: (!) 188/102 163/93 128/77 102/59   Pulse: 83 69 66 67   Patient Position - Orthostatic VS: Lying Lying Lying Lying         Visual Acuity      ED Medications  Medications   cloNIDine (CATAPRES) tablet 0 1 mg (0 1 mg Oral Given 10/10/22 1517)       Diagnostic Studies  Results Reviewed     Procedure Component Value Units Date/Time    High Sensitivity Troponin I Random [[id number]]  (Abnormal) Collected: 10/10/22 1432    Lab Status: Final result Specimen: Blood from Arm, Right Updated: 10/10/22 1500     HS TnI random 3 ng/L     Basic metabolic panel [[id number]]  (Abnormal) Collected: 10/10/22 1432    Lab Status: Final result Specimen: Blood from Arm, Right Updated: 10/10/22 1451     Sodium 137 mmol/L      Potassium 3 9 mmol/L      Chloride 100 mmol/L      CO2 29 mmol/L      ANION GAP 8 mmol/L      BUN 19 mg/dL      Creatinine 0 64 mg/dL      Glucose 103 mg/dL      Calcium 9 7 mg/dL      eGFR 89 ml/min/1 73sq m     Narrative:      National Kidney Disease Foundation guidelines for Chronic Kidney Disease (CKD):   •  Stage 1 with normal or high GFR (GFR > 90 mL/min/1 73 square meters)  •  Stage 2 Mild CKD (GFR = 60-89 mL/min/1 73 square meters)  •  Stage 3A Moderate CKD (GFR = 45-59 mL/min/1 73 square meters)  •  Stage 3B Moderate CKD (GFR = 30-44 mL/min/1 73 square meters)  •  Stage 4 Severe CKD (GFR = 15-29 mL/min/1 73 square meters)  •  Stage 5 End Stage CKD (GFR <15 mL/min/1 73 square meters)  Note: GFR calculation is accurate only with a steady state creatinine    CBC and differential [[id number]]  (Abnormal) Collected: 10/10/22 1432    Lab Status: Final result Specimen: Blood from Arm, Right Updated: 10/10/22 1438     WBC 11 37 Thousand/uL      RBC 4 63 Million/uL      Hemoglobin 14 1 g/dL      Hematocrit 43 2 %      MCV 93 fL      MCH 30 5 pg      MCHC 32 6 g/dL      RDW 12 4 %      MPV 9 3 fL      Platelets 772 Thousands/uL      nRBC 0 /100 WBCs      Neutrophils Relative 48 %      Immat GRANS % 0 %      Lymphocytes Relative 42 %      Monocytes Relative 7 %      Eosinophils Relative 2 %      Basophils Relative 1 %      Neutrophils Absolute 5 52 Thousands/µL      Immature Grans Absolute 0 03 Thousand/uL      Lymphocytes Absolute 4 77 Thousands/µL      Monocytes Absolute 0 80 Thousand/µL      Eosinophils Absolute 0 17 Thousand/µL      Basophils Absolute 0 08 Thousands/µL                  CT head without contrast   Final Result by Trevor Mesa DO (10/10 1536)      No acute intracranial abnormality  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 No Filed at: 10/10/2022 1427                    [facility]  Number of Diagnoses or Management Options     Amount and/or Complexity of Data Reviewed  Clinical lab tests: reviewed and ordered  Tests in the radiology section of CPT®: ordered and reviewed  Tests in the medicine section of CPT®: ordered and reviewed  Obtain history from someone other than the patient: yes  Review and summarize past medical records: yes  Independent visualization of images, tracings, or specimens: yes        Disposition  Final diagnoses:   Hypertension   Dizziness     Time reflects when diagnosis was documented in both MDM as applicable and the Disposition within this note     Time User Action Codes Description Comment    10/10/2022  4:34 PM Enrike Klineip Add [I10] Hypertension     10/10/2022  4:34 PM Enrike Seip Add [R42] Dizziness       ED Disposition     ED Disposition   Discharge    Condition   Stable    Date/Time   Mon Oct 10, 2022  4:34 PM    Comment   Scot Marcum discharge to home/self care                 Follow-up Information     Follow up With Specialties Details Why Contact Info    James Dong, [address] [place]  [address] [facility]  [address]  [address]  \[facility]\"" [address]             Discharge Medication List as of 10/10/2022  4:34 PM      CONTINUE these medications which have NOT CHANGED    Details   omega-3-acid ethyl esters (LOVAZA) 1 g capsule Take 2 capsules (2 g total) by mouth 2 (two) times a day, Starting Wed 9/28/2022, Normal      acetaminophen (TYLENOL) 500 mg tablet Take 500 mg by mouth every 6 (six) hours as needed for mild pain, Historical Med      ammonium lactate (LAC-HYDRIN) 12 % lotion Apply topically 2 (two) times a day, Starting Tue 6/14/2022, Normal      aspirin (RA Aspirin EC) 81 mg EC tablet Take 1 tablet (81 mg total) by mouth daily, Starting Tue 3/8/2022, Normal      atorvastatin (LIPITOR) 40 mg tablet Take 1 tablet (40 mg total) by mouth every evening, Starting Wed 9/28/2022, Normal      Diclofenac Sodium (VOLTAREN) 1 % Apply 2 g topically 4 (four) times a day, Starting Tue 6/14/2022, Normal      docusate sodium (COLACE) 100 mg capsule Take 1 capsule (100 mg total) by mouth 2 (two) times a day, Starting Tue 3/8/2022, Normal      Elastic Bandages & Supports (Thumb Splint/Neoprene Medium) MISC by Does not apply route daily, Starting Mon 10/5/2020, Normal      fluticasone (FLONASE) 50 mcg/act nasal spray 2 sprays into each nostril daily, Starting Mon 1/3/2022, Normal      furosemide (LASIX) 20 mg tablet Take 0 5 tablets (10 mg total) by mouth daily, Starting Mon 12/16/2019, Normal      lisinopril-hydrochlorothiazide (PRINZIDE,ZESTORETIC) 10-12 5 MG per tablet Take 1 tablet by mouth daily, Starting Tue 9/13/2022, Normal      meclizine (ANTIVERT) 25 mg tablet Take 1 tablet (25 mg total) by mouth every 8 (eight) hours, Starting Tue 6/16/2020, Normal      memantine (NAMENDA) 5 mg tablet Take 1 tablet (5 mg total) by mouth 2 (two) times a day, Starting Wed 9/28/2022, Normal      omeprazole (PriLOSEC) 20 mg delayed release capsule Take 1 capsule (20 mg total) by mouth daily before breakfast, Starting Tue 9/13/2022, Normal      oxyCODONE (ROXICODONE) 5 mg immediate release tablet Take 1 tablet (5 mg total) by mouth every 6 (six) hours as needed for severe pain for up to 5 dosesMax Daily Amount: 20 mg, Starting Mon 7/26/2021, Normal      VITAMIN D PO Take by mouth daily, Historical Med             No discharge procedures on file      PDMP Review       Value Time User    PDMP Reviewed  Yes 7/26/2021  8:56 AM Amaury Hector PA-C          ED Provider  Electronically Signed by           Frankie Dodge MD  10/10/22 2806

## 2022-10-13 ENCOUNTER — OFFICE VISIT (OUTPATIENT)
Dept: FAMILY MEDICINE CLINIC | Facility: CLINIC | Age: 72
End: 2022-10-13

## 2022-10-13 VITALS
OXYGEN SATURATION: 96 % | TEMPERATURE: 97.4 F | BODY MASS INDEX: 20.92 KG/M2 | RESPIRATION RATE: 14 BRPM | WEIGHT: 114.4 LBS | DIASTOLIC BLOOD PRESSURE: 66 MMHG | SYSTOLIC BLOOD PRESSURE: 118 MMHG | HEART RATE: 59 BPM

## 2022-10-13 DIAGNOSIS — I63.9 CEREBROVASCULAR ACCIDENT (CVA), UNSPECIFIED MECHANISM (HCC): Primary | ICD-10-CM

## 2022-10-13 DIAGNOSIS — Z23 FLU VACCINE NEED: ICD-10-CM

## 2022-10-13 DIAGNOSIS — I10 ESSENTIAL HYPERTENSION: ICD-10-CM

## 2022-10-13 DIAGNOSIS — R09.89 LABILE BLOOD PRESSURE: ICD-10-CM

## 2022-10-13 PROCEDURE — 99214 OFFICE O/P EST MOD 30 MIN: CPT | Performed by: FAMILY MEDICINE

## 2022-10-13 PROCEDURE — 3078F DIAST BP <80 MM HG: CPT | Performed by: FAMILY MEDICINE

## 2022-10-13 PROCEDURE — 3074F SYST BP LT 130 MM HG: CPT | Performed by: FAMILY MEDICINE

## 2022-10-13 PROCEDURE — G0008 ADMIN INFLUENZA VIRUS VAC: HCPCS | Performed by: FAMILY MEDICINE

## 2022-10-13 PROCEDURE — 90662 IIV NO PRSV INCREASED AG IM: CPT | Performed by: FAMILY MEDICINE

## 2022-10-13 NOTE — PROGRESS NOTES
Name: Miguelina Ambriz      :       MRN: [id number]  Encounter Provider: Tiana Kern MD  Encounter Date: 10/13/2022   Encounter department: [facility]     1  Cerebrovascular accident (CVA), unspecified mechanism ([facility] [place] [address] )  -     Ambulatory Referral to Cardiology; Future    2  Essential hypertension  -     Ambulatory Referral to Cardiology; Future    3  Labile blood pressure  -     Ambulatory Referral to Cardiology; Future    4  Flu vaccine need  -     FLUZONE HIGH-DOSE: influenza vaccine, high-dose, preservative-free 0 7 mL    Keep BP dairy  Avoid caffeine        Subjective      67 yo  female with known HTN seen in the ED for symptomatic hypertension  She developed acute onset of dizziness checked her BP and found it to be 160/90  At the time she ws seen in the ED it was 118/102  Workup in the ED was negative  She was given a dose of clonidine and BP decreased so she was discharged home  BP over the last 3 days ion the 90 to 110 over 50 to 65  Review of Systems   All other systems reviewed and are negative        Current Outpatient Medications on File Prior to Visit   Medication Sig   • omega-3-acid ethyl esters (LOVAZA) 1 g capsule Take 2 capsules (2 g total) by mouth 2 (two) times a day   • acetaminophen (TYLENOL) 500 mg tablet Take 500 mg by mouth every 6 (six) hours as needed for mild pain   • ammonium lactate (LAC-HYDRIN) 12 % lotion Apply topically 2 (two) times a day   • aspirin (RA Aspirin EC) 81 mg EC tablet Take 1 tablet (81 mg total) by mouth daily   • atorvastatin (LIPITOR) 40 mg tablet Take 1 tablet (40 mg total) by mouth every evening   • Diclofenac Sodium (VOLTAREN) 1 % Apply 2 g topically 4 (four) times a day   • docusate sodium (COLACE) 100 mg capsule Take 1 capsule (100 mg total) by mouth 2 (two) times a day   • Elastic Bandages & Supports (Thumb Splint/Neoprene Medium) MISC by Does not apply route daily   • fluticasone (FLONASE) 50 mcg/act nasal spray 2 sprays into each nostril daily   • furosemide (LASIX) 20 mg tablet Take 0 5 tablets (10 mg total) by mouth daily (Patient not taking: Reported on 5/13/2021)   • lisinopril-hydrochlorothiazide (PRINZIDE,ZESTORETIC) 10-12 5 MG per tablet Take 1 tablet by mouth daily   • meclizine (ANTIVERT) 25 mg tablet Take 1 tablet (25 mg total) by mouth every 8 (eight) hours (Patient not taking: Reported on 5/13/2021)   • memantine (NAMENDA) 5 mg tablet Take 1 tablet (5 mg total) by mouth 2 (two) times a day   • omeprazole (PriLOSEC) 20 mg delayed release capsule Take 1 capsule (20 mg total) by mouth daily before breakfast   • oxyCODONE (ROXICODONE) 5 mg immediate release tablet Take 1 tablet (5 mg total) by mouth every 6 (six) hours as needed for severe pain for up to 5 dosesMax Daily Amount: 20 mg (Patient not taking: Reported on 8/10/2021)   • VITAMIN D PO Take by mouth daily       Objective     /66 (BP Location: Right arm, Patient Position: Sitting, Cuff Size: Standard)   Pulse 59   Temp (!) 97 4 °F (36 3 °C) (Temporal)   Resp 14   Wt 51 9 kg (114 lb 6 4 oz)   SpO2 96%   BMI 20 92 kg/m²     Physical Exam  Vitals and nursing note reviewed  Constitutional:       Appearance: She is well-developed  HENT:      Head: Normocephalic  Right Ear: External ear normal       Left Ear: External ear normal       Nose: Nose normal    Eyes:      Conjunctiva/sclera: Conjunctivae normal       Pupils: Pupils are equal, round, and reactive to light  Neck:      Thyroid: No thyromegaly  Cardiovascular:      Rate and Rhythm: Normal rate and regular rhythm  Heart sounds: Normal heart sounds  Pulmonary:      Effort: Pulmonary effort is normal       Breath sounds: Normal breath sounds  Abdominal:      Palpations: Abdomen is soft  Tenderness: There is no abdominal tenderness  There is no guarding or rebound  Musculoskeletal:         General: Normal range of motion  Cervical back: Normal range of motion and neck supple  Skin:     General: Skin is dry  Neurological:      Mental Status: She is alert and oriented to person, place, and time  Deep Tendon Reflexes: Reflexes are normal and symmetric         Gianluca Sahu MD

## 2022-11-04 NOTE — PROGRESS NOTES
Outpatient Cardiology Consult Note - Peg Car 66 y o  female MRN: [id number]    @ Encounter: [id number]        Patient Active Problem List    Diagnosis Date Noted   • Gastroesophageal reflux disease without esophagitis 09/13/2022   • Lightheadedness 12/03/2021   • CVA (cerebral vascular accident) ([facility] [address] ) 12/08/2019   • Spinal stenosis, lumbar region with neurogenic claudication    • Acute pain of left shoulder 08/23/2018   • Pre-op evaluation 02/08/2018   • Muscle spasms of neck 12/04/2017   • Arthralgia of left hip 07/19/2016   • Slow transit constipation 07/19/2016   • Impaired fasting glucose 07/19/2016   • Chronic right shoulder pain 03/15/2016   • Creatinine elevation 02/16/2015   • Neck pain 11/19/2014   • Low back pain 08/06/2014   • Acquired trigger finger 10/09/2013   • Vitamin D deficiency 07/29/2013   • Mammogram abnormal 07/29/2013   • Multiple joint pain 07/29/2013   • Other symptoms involving skin and integumentary tissues 06/03/2013   • Pemphigus 04/08/2013   • Bursitis 12/03/2012   • 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/2012   • Osteoarthrosis 09/24/2012   • Dyslipidemia 06/19/2012   • Essential hypertension 06/19/2012   • Hyperlipidemia 06/03/2008       Assessment:  # HTN,   Rx: lisinopril/HCTZ 10/12 5 mg daily    Studies- personally reviewed by me  EKG- SR    Echo 12/9/19  LVEF: 70%  RV: normal  Mild AI  Negative for PFO    # GERD- omeprazole  # dyslipidemia- atorvastatin 40 mg daily  2/8/21: LDL 48, 69  # CVA hx- chronic cerebellar stroke  - aspirin daily, statin  CT head 10/10/22: old bilateral cerebellar infarcts  MRI Brain 12/9/19: Bilaterally symmetric bilateral cerebellar encephalomalacia  There is primarily white matter volume loss with cortical thinning  Resulting ex vacuo dilatation of the 4th ventricle  No signs of previous hemorrhage  Findings suggest sequela of previous   ischemia    Echo 12/9/19 negative for PFO      Today's Plan:  Continue current BP Rx  Referred for MRI showing old cerebellar infarcts, possibly HTN related  No known afib  No PFO on echo    HPI:       67 yo with hx of HTN referred I believe BP management by primary  Was in ED 10/10 with dizziness and /90 mmHg  Pt has meclizine prn  Pt has seen neurology in hospital in Dec 2019 for reason for consult of finding of old bilateral cerebral age indeterminate infarcts  BP recorded in ED was 197/87 mmHg  They state that since being on medication, blood pressure checks at home are much better  No LUND, no chest pain  No edema  When her pressure is high she gets dizzy  Does not smoke or drink alcohol  Past Medical History:   Diagnosis Date   • Arthralgia of hip     left   • Arthritis    • Chronic pain disorder     low back and shoulder   • Dyslipidemia    • Hyperlipidemia    • Hypertension    • Neck pain    • Pemphigus    • Pre-diabetes    • Shoulder pain    • Stroke [facility])    • Vitamin D deficiency        Review of Systems    No Known Allergies        Current Outpatient Medications:   •  acetaminophen (TYLENOL) 500 mg tablet, Take 500 mg by mouth every 6 (six) hours as needed for mild pain, Disp: , Rfl:   •  ammonium lactate (LAC-HYDRIN) 12 % lotion, Apply topically 2 (two) times a day, Disp: 400 g, Rfl: 1  •  aspirin (RA Aspirin EC) 81 mg EC tablet, Take 1 tablet (81 mg total) by mouth daily, Disp: 90 tablet, Rfl: 1  •  atorvastatin (LIPITOR) 40 mg tablet, Take 1 tablet (40 mg total) by mouth every evening, Disp: 90 tablet, Rfl: 0  •  Diclofenac Sodium (VOLTAREN) 1 %, Apply 2 g topically 4 (four) times a day, Disp: 350 g, Rfl: 0  •  docusate sodium (COLACE) 100 mg capsule, Take 1 capsule (100 mg total) by mouth 2 (two) times a day, Disp: 180 capsule, Rfl: 1  •  Elastic Bandages & Supports (Thumb Splint/Neoprene Medium) MISC, by Does not apply route daily, Disp: 2 each, Rfl: 0  •  lisinopril-hydrochlorothiazide (PRINZIDE,ZESTORETIC) 10-12 5 MG per tablet, Take 1 tablet by mouth daily, Disp: 90 tablet, Rfl: 3  •  memantine (NAMENDA) 5 mg tablet, Take 1 tablet (5 mg total) by mouth 2 (two) times a day, Disp: 180 tablet, Rfl: 0  •  omega-3-acid ethyl esters (LOVAZA) 1 g capsule, Take 2 capsules (2 g total) by mouth 2 (two) times a day, Disp: 180 capsule, Rfl: 0  •  oxyCODONE (ROXICODONE) 5 mg immediate release tablet, Take 1 tablet (5 mg total) by mouth every 6 (six) hours as needed for severe pain for up to 5 dosesMax Daily Amount: 20 mg, Disp: 5 tablet, Rfl: 0  •  VITAMIN D PO, Take 1 tablet by mouth daily, Disp: , Rfl:   •  fluticasone (FLONASE) 50 mcg/act nasal spray, 2 sprays into each nostril daily (Patient not taking: Reported on 11/7/2022), Disp: 16 g, Rfl: 0  •  furosemide (LASIX) 20 mg tablet, Take 0 5 tablets (10 mg total) by mouth daily (Patient not taking: No sig reported), Disp: 5 tablet, Rfl: 0  •  meclizine (ANTIVERT) 25 mg tablet, Take 1 tablet (25 mg total) by mouth every 8 (eight) hours (Patient not taking: No sig reported), Disp: 30 tablet, Rfl: 0  •  omeprazole (PriLOSEC) 20 mg delayed release capsule, Take 1 capsule (20 mg total) by mouth daily before breakfast (Patient not taking: Reported on 11/7/2022), Disp: 90 capsule, Rfl: 1    Social History     Socioeconomic History   • Marital status: Single     Spouse name: Not on file   • Number of children: 1   • Years of education: Not on file   • Highest education level: Not on file   Occupational History   • Occupation: homemaker   Tobacco Use   • Smoking status: Never Smoker   • Smokeless tobacco: Never Used   Vaping Use   • Vaping Use: Never used   Substance and Sexual Activity   • Alcohol use: Never   • Drug use: No   • Sexual activity: Not Currently   Other Topics Concern   • Not on file   Social History Narrative    Advance directive declined by patient     Lives with adult children     Social Determinants of Health     Financial Resource Strain: Low Risk    • Difficulty of Paying Living Expenses: Not hard at all   Food Insecurity: No Food Insecurity   • Worried About Running Out of Food in the Last Year: Never true   • Ran Out of Food in the Last Year: Never true   Transportation Needs: No Transportation Needs   • Lack of Transportation (Medical): No   • Lack of Transportation (Non-Medical):  No   Physical Activity: Not on file   Stress: Not on file   Social Connections: Not on file   Intimate Partner Violence: Not on file   Housing Stability: Not on file       Family History   Problem Relation Age of Onset   • Hyperlipidemia Mother    • Hypertension Mother    • Heart disease Father    • No Known Problems Sister    • No Known Problems Sister    • No Known Problems Sister    • No Known Problems Maternal Aunt    • No Known Problems Paternal Aunt    • No Known Problems Paternal Aunt        Physical Exam:    Vitals: Blood pressure 120/60, pulse 59, weight 53 1 kg (117 lb), SpO2 96 %, not currently breastfeeding , Body mass index is 21 4 kg/m² ,   Wt Readings from Last 3 Encounters:   11/07/22 53 1 kg (117 lb)   10/13/22 51 9 kg (114 lb 6 4 oz)   10/10/22 53 4 kg (117 lb 11 2 oz)       Physical Exam:  Vitals:    11/07/22 1038   BP: 120/60   BP Location: Left arm   Patient Position: Sitting   Cuff Size: Standard   Pulse: 59   SpO2: 96%   Weight: 53 1 kg (117 lb)       Physical Exam    Labs & Results:    Lab Results   Component Value Date    WBC 11 37 (H) 10/10/2022    HGB 14 1 10/10/2022    HCT 43 2 10/10/2022    MCV 93 10/10/2022     10/10/2022     Lab Results   Component Value Date    SODIUM 137 10/10/2022    K 3 9 10/10/2022     10/10/2022    CO2 29 10/10/2022    BUN 19 10/10/2022    CREATININE 0 64 10/10/2022    GLUC 103 (H) 10/10/2022    CALCIUM 9 7 10/10/2022     No results found for: BNP   Lab Results   Component Value Date    CHOLESTEROL 118 06/15/2022    CHOLESTEROL 125 02/08/2021    CHOLESTEROL 127 04/20/2020     Lab Results   Component Value Date    HDL 71 06/15/2022    HDL 69 02/08/2021    HDL 69 04/20/2020 Lab Results   Component Value Date    TRIG 55 06/15/2022    TRIG 39 02/08/2021    TRIG 65 04/20/2020     Lab Results   Component Value Date    NONHDLC 47 06/15/2022    [place] 56 02/08/2021    [place] 58 04/20/2020             EKG personally reviewed by Mateusz Villalobos  Counseling / Coordination of Care  Time spent today 40 minutes  Greater than 50% of total time was spent with the patient and / or family counseling and / or coordination of care  We discussed diagnoses, most recent studies and any changes in treatment  Thank you for the opportunity to participate in the care of this patient      [address] [facility]  MEDICAL DIRECTOR OF [place]

## 2022-11-07 ENCOUNTER — CONSULT (OUTPATIENT)
Dept: CARDIOLOGY CLINIC | Facility: CLINIC | Age: 72
End: 2022-11-07

## 2022-11-07 VITALS
DIASTOLIC BLOOD PRESSURE: 60 MMHG | HEART RATE: 59 BPM | BODY MASS INDEX: 21.4 KG/M2 | OXYGEN SATURATION: 96 % | SYSTOLIC BLOOD PRESSURE: 120 MMHG | WEIGHT: 117 LBS

## 2022-11-07 DIAGNOSIS — I10 HTN (HYPERTENSION), BENIGN: ICD-10-CM

## 2022-11-07 DIAGNOSIS — I10 ESSENTIAL HYPERTENSION: ICD-10-CM

## 2022-11-07 DIAGNOSIS — R09.89 LABILE BLOOD PRESSURE: ICD-10-CM

## 2022-11-07 DIAGNOSIS — E78.00 PURE HYPERCHOLESTEROLEMIA: Primary | ICD-10-CM

## 2022-11-07 DIAGNOSIS — I63.9 CEREBROVASCULAR ACCIDENT (CVA), UNSPECIFIED MECHANISM (HCC): ICD-10-CM

## 2022-11-07 DIAGNOSIS — Z86.73 HISTORY OF CVA (CEREBROVASCULAR ACCIDENT): ICD-10-CM

## 2022-11-16 ENCOUNTER — OFFICE VISIT (OUTPATIENT)
Dept: FAMILY MEDICINE CLINIC | Facility: CLINIC | Age: 72
End: 2022-11-16

## 2022-11-16 VITALS
HEART RATE: 64 BPM | SYSTOLIC BLOOD PRESSURE: 126 MMHG | RESPIRATION RATE: 18 BRPM | DIASTOLIC BLOOD PRESSURE: 60 MMHG | TEMPERATURE: 97.7 F | BODY MASS INDEX: 21.22 KG/M2 | WEIGHT: 116 LBS | OXYGEN SATURATION: 97 %

## 2022-11-16 DIAGNOSIS — R68.2 DRY MOUTH: ICD-10-CM

## 2022-11-16 DIAGNOSIS — H69.83 DYSFUNCTION OF BOTH EUSTACHIAN TUBES: ICD-10-CM

## 2022-11-16 DIAGNOSIS — Z12.31 ENCOUNTER FOR SCREENING MAMMOGRAM FOR MALIGNANT NEOPLASM OF BREAST: Primary | ICD-10-CM

## 2022-11-16 RX ORDER — FLUTICASONE PROPIONATE 50 MCG
2 SPRAY, SUSPENSION (ML) NASAL DAILY
Qty: 16 G | Refills: 0 | Status: SHIPPED | OUTPATIENT
Start: 2022-11-16

## 2022-11-16 NOTE — PROGRESS NOTES
Name: Azucena Starr      : 8[birth year]      MRN: [id number]  Encounter Provider: Naz Rob MD  Encounter Date: 2022   Encounter department: [address] [place] [address]     1  Encounter for screening mammogram for malignant neoplasm of breast  -     Mammo screening bilateral w 3d & cad; Future; Expected date: 2022    2  Dysfunction of both eustachian tubes  -     fluticasone (FLONASE) 50 mcg/act nasal spray; 2 sprays into each nostril daily    3  Dry mouth       Cough is worse at night, discussed could be due to postnasal drip  Restart Flonase   Suggested dry mouth mouthwash  Subjective      65 yo  female here today for follow up of HTN  Patient states she is doing well, has not had any further episodes of symptomatic elevated BP  Was seen and evalauted by Cardiology      Review of Systems   Respiratory: Positive for cough  All other systems reviewed and are negative        Current Outpatient Medications on File Prior to Visit   Medication Sig   • acetaminophen (TYLENOL) 500 mg tablet Take 500 mg by mouth every 6 (six) hours as needed for mild pain   • ammonium lactate (LAC-HYDRIN) 12 % lotion Apply topically 2 (two) times a day   • aspirin (RA Aspirin EC) 81 mg EC tablet Take 1 tablet (81 mg total) by mouth daily   • atorvastatin (LIPITOR) 40 mg tablet Take 1 tablet (40 mg total) by mouth every evening   • Diclofenac Sodium (VOLTAREN) 1 % Apply 2 g topically 4 (four) times a day   • docusate sodium (COLACE) 100 mg capsule Take 1 capsule (100 mg total) by mouth 2 (two) times a day   • Elastic Bandages & Supports (Thumb Splint/Neoprene Medium) MISC by Does not apply route daily   • furosemide (LASIX) 20 mg tablet Take 0 5 tablets (10 mg total) by mouth daily (Patient not taking: No sig reported)   • lisinopril-hydrochlorothiazide (PRINZIDE,ZESTORETIC) 10-12 5 MG per tablet Take 1 tablet by mouth daily   • meclizine (ANTIVERT) 25 mg tablet Take 1 tablet (25 mg total) by mouth every 8 (eight) hours (Patient not taking: No sig reported)   • memantine (NAMENDA) 5 mg tablet Take 1 tablet (5 mg total) by mouth 2 (two) times a day   • omega-3-acid ethyl esters (LOVAZA) 1 g capsule Take 2 capsules (2 g total) by mouth 2 (two) times a day   • omeprazole (PriLOSEC) 20 mg delayed release capsule Take 1 capsule (20 mg total) by mouth daily before breakfast (Patient not taking: Reported on 11/7/2022)   • oxyCODONE (ROXICODONE) 5 mg immediate release tablet Take 1 tablet (5 mg total) by mouth every 6 (six) hours as needed for severe pain for up to 5 dosesMax Daily Amount: 20 mg   • VITAMIN D PO Take 1 tablet by mouth daily   • [DISCONTINUED] fluticasone (FLONASE) 50 mcg/act nasal spray 2 sprays into each nostril daily (Patient not taking: Reported on 11/7/2022)       Objective     /60 (BP Location: Left arm, Patient Position: Sitting, Cuff Size: Standard)   Pulse 64   Temp 97 7 °F (36 5 °C) (Temporal)   Resp 18   Wt 52 6 kg (116 lb)   SpO2 97%   BMI 21 22 kg/m²     Physical Exam  Vitals and nursing note reviewed  Constitutional:       Appearance: She is well-developed  HENT:      Head: Normocephalic  Right Ear: External ear normal       Left Ear: External ear normal       Nose: Nose normal       Mouth/Throat:      Mouth: Oropharynx is clear and moist    Eyes:      Extraocular Movements: EOM normal       Conjunctiva/sclera: Conjunctivae normal       Pupils: Pupils are equal, round, and reactive to light  Neck:      Thyroid: No thyromegaly  Cardiovascular:      Rate and Rhythm: Normal rate and regular rhythm  Heart sounds: Normal heart sounds  Pulmonary:      Effort: Pulmonary effort is normal       Breath sounds: Normal breath sounds  Abdominal:      Palpations: Abdomen is soft  Tenderness: There is no abdominal tenderness  There is no guarding or rebound  Musculoskeletal:         General: Normal range of motion        Cervical back: Normal range of motion and neck supple  Skin:     General: Skin is dry  Neurological:      Mental Status: She is alert and oriented to person, place, and time  Deep Tendon Reflexes: Reflexes are normal and symmetric     Psychiatric:         Mood and Affect: Mood and affect normal        Gianluca Sahu MD

## 2022-11-29 DIAGNOSIS — E78.5 DYSLIPIDEMIA: ICD-10-CM

## 2022-11-29 RX ORDER — OMEGA-3-ACID ETHYL ESTERS 1 G/1
CAPSULE, LIQUID FILLED ORAL
Qty: 180 CAPSULE | Refills: 0 | Status: SHIPPED | OUTPATIENT
Start: 2022-11-29

## 2022-12-01 ENCOUNTER — HOSPITAL ENCOUNTER (OUTPATIENT)
Dept: MAMMOGRAPHY | Facility: MEDICAL CENTER | Age: 72
Discharge: HOME | End: 2022-12-01

## 2022-12-01 VITALS — WEIGHT: 115.96 LBS | BODY MASS INDEX: 21.34 KG/M2 | HEIGHT: 62 IN

## 2022-12-01 DIAGNOSIS — Z12.31 ENCOUNTER FOR SCREENING MAMMOGRAM FOR MALIGNANT NEOPLASM OF BREAST: ICD-10-CM

## 2022-12-01 NOTE — TELEPHONE ENCOUNTER
RN attempted to reach pt to schedule previous  [facility]MLOM by Ayan Corrales  # [phone] with c/b number office hours and location to call and schedule procedure  SW/CM Discharge Plan  Anticipate that patient will be medically cleared for discharge today. Patient’s discharge destination is home. Patient to be picked up by family. Patient/family aware and agreeable to discharge plan.  Discharge plan communicated to MD, RN and CM.    After Visit Summary - Transition Report Information  Receiving Agency/Facility: [facility]  Receiving Agency/Facility phone number: [phone]  Receiving Agency/Facility Type: Home Health/Hospice  Receiving Agency/Facility Comment: [facility] will call to set up home visits.

## 2022-12-27 DIAGNOSIS — R41.89 COGNITIVE DECLINE: ICD-10-CM

## 2022-12-27 RX ORDER — MEMANTINE HYDROCHLORIDE 5 MG/1
TABLET ORAL
Qty: 180 TABLET | Refills: 0 | Status: SHIPPED | OUTPATIENT
Start: 2022-12-27

## 2023-01-01 DIAGNOSIS — I63.9 CVA (CEREBRAL VASCULAR ACCIDENT) (HCC): ICD-10-CM

## 2023-01-01 DIAGNOSIS — I10 ESSENTIAL HYPERTENSION: ICD-10-CM

## 2023-01-01 DIAGNOSIS — E78.5 DYSLIPIDEMIA: ICD-10-CM

## 2023-01-01 DIAGNOSIS — L85.3 DRY SKIN: ICD-10-CM

## 2023-01-03 RX ORDER — AMMONIUM LACTATE 12 G/100G
LOTION TOPICAL 2 TIMES DAILY
Qty: 400 G | Refills: 0 | Status: SHIPPED | OUTPATIENT
Start: 2023-01-03

## 2023-01-03 RX ORDER — ATORVASTATIN CALCIUM 40 MG/1
40 TABLET, FILM COATED ORAL EVERY EVENING
Qty: 90 TABLET | Refills: 0 | Status: SHIPPED | OUTPATIENT
Start: 2023-01-03

## 2023-01-03 RX ORDER — ASPIRIN 81 MG/1
TABLET, COATED ORAL
Qty: 90 TABLET | Refills: 1 | Status: SHIPPED | OUTPATIENT
Start: 2023-01-03

## 2023-01-03 RX ORDER — OMEGA-3-ACID ETHYL ESTERS 1 G/1
2 CAPSULE, LIQUID FILLED ORAL 2 TIMES DAILY
Qty: 180 CAPSULE | Refills: 0 | Status: SHIPPED | OUTPATIENT
Start: 2023-01-03

## 2023-01-10 ENCOUNTER — OFFICE VISIT (OUTPATIENT)
Dept: FAMILY MEDICINE CLINIC | Facility: CLINIC | Age: 73
End: 2023-01-10

## 2023-01-10 VITALS
HEIGHT: 62 IN | OXYGEN SATURATION: 97 % | BODY MASS INDEX: 21.94 KG/M2 | DIASTOLIC BLOOD PRESSURE: 66 MMHG | TEMPERATURE: 97.6 F | WEIGHT: 119.2 LBS | HEART RATE: 64 BPM | RESPIRATION RATE: 14 BRPM | SYSTOLIC BLOOD PRESSURE: 138 MMHG

## 2023-01-10 DIAGNOSIS — M89.8X9 BONE PAIN: Primary | ICD-10-CM

## 2023-01-10 DIAGNOSIS — K21.9 GASTROESOPHAGEAL REFLUX DISEASE WITHOUT ESOPHAGITIS: ICD-10-CM

## 2023-01-10 RX ORDER — CELECOXIB 50 MG/1
50 CAPSULE ORAL 2 TIMES DAILY PRN
Qty: 90 CAPSULE | Refills: 0 | Status: SHIPPED | OUTPATIENT
Start: 2023-01-10

## 2023-01-10 RX ORDER — OMEPRAZOLE 20 MG/1
20 CAPSULE, DELAYED RELEASE ORAL
Qty: 90 CAPSULE | Refills: 1 | Status: SHIPPED | OUTPATIENT
Start: 2023-01-10

## 2023-01-10 NOTE — ASSESSMENT & PLAN NOTE
Take Omeprazole daily as prescribed  Avoid caffeine, greasy foods, tomato and tomato products, sodas, spicy foods and NSAIDs  Stop Ibuprofen 800 mg  Will start Celebrex 50 mg daily take with food as needed for pain, try to take only as needed

## 2023-01-10 NOTE — PROGRESS NOTES
Name: Katharine Kulkarni      :       MRN: [id number]  Encounter Provider: Nayely Jaeger MD  Encounter Date: 1/10/2023   Encounter department: [address] [place] [address]     1  Bone pain  -     celecoxib (CeleBREX) 50 MG capsule; Take 1 capsule (50 mg total) by mouth 2 (two) times a day as needed for mild pain    2  Gastroesophageal reflux disease without esophagitis  Assessment & Plan:  Take Omeprazole daily as prescribed  Avoid caffeine, greasy foods, tomato and tomato products, sodas, spicy foods and NSAIDs  Stop Ibuprofen 800 mg  Will start Celebrex 50 mg daily take with food as needed for pain, try to take only as needed     Orders:  -     omeprazole (PriLOSEC) 20 mg delayed release capsule; Take 1 capsule (20 mg total) by mouth daily before breakfast           Subjective      68 yo  female with known HTN here today for follow up  Currently with the following complains:  1- Joint pains, which have improved with Ibuprofen 800 mg  Joint pains are worse with the cold weather and as the day goes by, accompanied by stiffness  2- Heartburn  She has not been taking omeprazole daily  Has been taking Ibuprofen 800 mg one to two times per day for the last 3 to 4 weeks  Heartburn is worse if she does not eat and accompanied by nausea  Review of Systems   Gastrointestinal:        As per HPI   Musculoskeletal: Positive for arthralgias  All other systems reviewed and are negative        Current Outpatient Medications on File Prior to Visit   Medication Sig   • acetaminophen (TYLENOL) 500 mg tablet Take 500 mg by mouth every 6 (six) hours as needed for mild pain   • ammonium lactate (LAC-HYDRIN) 12 % lotion Apply topically 2 (two) times a day   • Aspirin Low Dose 81 MG EC tablet take 1 tablet by mouth once daily   • atorvastatin (LIPITOR) 40 mg tablet Take 1 tablet (40 mg total) by mouth every evening   • Diclofenac Sodium (VOLTAREN) 1 % Apply 2 g topically 4 (four) times a day   • docusate sodium (COLACE) 100 mg capsule Take 1 capsule (100 mg total) by mouth 2 (two) times a day   • Elastic Bandages & Supports (Thumb Splint/Neoprene Medium) MISC by Does not apply route daily   • fluticasone (FLONASE) 50 mcg/act nasal spray 2 sprays into each nostril daily   • furosemide (LASIX) 20 mg tablet Take 0 5 tablets (10 mg total) by mouth daily (Patient not taking: No sig reported)   • lisinopril-hydrochlorothiazide (PRINZIDE,ZESTORETIC) 10-12 5 MG per tablet Take 1 tablet by mouth daily   • meclizine (ANTIVERT) 25 mg tablet Take 1 tablet (25 mg total) by mouth every 8 (eight) hours (Patient not taking: No sig reported)   • memantine (NAMENDA) 5 mg tablet take 1 tablet by mouth twice a day   • omega-3-acid ethyl esters (LOVAZA) 1 g capsule Take 2 capsules (2 g total) by mouth 2 (two) times a day   • oxyCODONE (ROXICODONE) 5 mg immediate release tablet Take 1 tablet (5 mg total) by mouth every 6 (six) hours as needed for severe pain for up to 5 dosesMax Daily Amount: 20 mg   • VITAMIN D PO Take 1 tablet by mouth daily   • [DISCONTINUED] omeprazole (PriLOSEC) 20 mg delayed release capsule Take 1 capsule (20 mg total) by mouth daily before breakfast (Patient not taking: Reported on 11/7/2022)       Objective     /66 (BP Location: Left arm, Patient Position: Sitting, Cuff Size: Standard)   Pulse 64   Temp 97 6 °F (36 4 °C) (Temporal)   Resp 14   Ht 5' 2" (1 575 m)   Wt 54 1 kg (119 lb 3 2 oz)   SpO2 97%   BMI 21 80 kg/m²     Physical Exam  Vitals and nursing note reviewed  Constitutional:       Appearance: She is well-developed  HENT:      Head: Normocephalic  Right Ear: External ear normal       Left Ear: External ear normal       Nose: Nose normal    Eyes:      Conjunctiva/sclera: Conjunctivae normal       Pupils: Pupils are equal, round, and reactive to light  Neck:      Thyroid: No thyromegaly     Cardiovascular:      Rate and Rhythm: Normal rate and regular rhythm  Heart sounds: Normal heart sounds  Pulmonary:      Effort: Pulmonary effort is normal       Breath sounds: Normal breath sounds  Abdominal:      Palpations: Abdomen is soft  Tenderness: There is no abdominal tenderness  There is no guarding or rebound  Musculoskeletal:         General: Normal range of motion  Cervical back: Normal range of motion and neck supple  Skin:     General: Skin is dry  Neurological:      Mental Status: She is alert and oriented to person, place, and time  Deep Tendon Reflexes: Reflexes are normal and symmetric         Guero Salgado MD

## 2023-03-14 ENCOUNTER — OFFICE VISIT (OUTPATIENT)
Dept: FAMILY MEDICINE CLINIC | Facility: CLINIC | Age: 73
End: 2023-03-14

## 2023-03-14 VITALS
DIASTOLIC BLOOD PRESSURE: 72 MMHG | HEART RATE: 64 BPM | HEIGHT: 62 IN | OXYGEN SATURATION: 98 % | SYSTOLIC BLOOD PRESSURE: 128 MMHG | TEMPERATURE: 97.3 F | BODY MASS INDEX: 21.9 KG/M2 | RESPIRATION RATE: 17 BRPM | WEIGHT: 119 LBS

## 2023-03-14 DIAGNOSIS — I63.9 CVA (CEREBRAL VASCULAR ACCIDENT) (HCC): ICD-10-CM

## 2023-03-14 DIAGNOSIS — I10 ESSENTIAL HYPERTENSION: Primary | ICD-10-CM

## 2023-03-14 DIAGNOSIS — E55.9 VITAMIN D DEFICIENCY: ICD-10-CM

## 2023-03-14 DIAGNOSIS — E78.5 DYSLIPIDEMIA: ICD-10-CM

## 2023-03-14 DIAGNOSIS — M89.8X9 BONE PAIN: ICD-10-CM

## 2023-03-14 RX ORDER — CELECOXIB 50 MG/1
50 CAPSULE ORAL 2 TIMES DAILY PRN
Qty: 90 CAPSULE | Refills: 0 | Status: SHIPPED | OUTPATIENT
Start: 2023-03-14

## 2023-03-14 RX ORDER — ATORVASTATIN CALCIUM 40 MG/1
40 TABLET, FILM COATED ORAL EVERY EVENING
Qty: 90 TABLET | Refills: 0 | Status: SHIPPED | OUTPATIENT
Start: 2023-03-14

## 2023-03-14 RX ORDER — OMEGA-3-ACID ETHYL ESTERS 1 G/1
2 CAPSULE, LIQUID FILLED ORAL 2 TIMES DAILY
Qty: 180 CAPSULE | Refills: 0 | Status: SHIPPED | OUTPATIENT
Start: 2023-03-14

## 2023-03-14 NOTE — PROGRESS NOTES
Name: Elizabeth Acosta      :       MRN: [id number]  Encounter Provider: Ann-Marie Ireland MD  Encounter Date: 3/14/2023   Encounter department: [facility] CHANELL Morin     1  Essential hypertension  -     CBC and differential; Future; Expected date: 2023  -     Comprehensive metabolic panel; Future; Expected date: 2023  -     Lipid panel; Future; Expected date: 2023  -     Microalbumin / creatinine urine ratio; Future; Expected date: 2023  -     TSH, 3rd generation with Free T4 reflex; Future; Expected date: 2023  -     Vitamin D 25 hydroxy; Future; Expected date: 2023    2  Bone pain  -     celecoxib (CeleBREX) 50 MG capsule; Take 1 capsule (50 mg total) by mouth 2 (two) times a day as needed for mild pain    3  CVA (cerebral vascular accident) ([address] )  -     atorvastatin (LIPITOR) 40 mg tablet; Take 1 tablet (40 mg total) by mouth every evening  -     CBC and differential; Future; Expected date: 2023  -     Comprehensive metabolic panel; Future; Expected date: 2023  -     Lipid panel; Future; Expected date: 2023  -     Microalbumin / creatinine urine ratio; Future; Expected date: 2023  -     TSH, 3rd generation with Free T4 reflex; Future; Expected date: 2023  -     Vitamin D 25 hydroxy; Future; Expected date: 2023    4  Dyslipidemia  -     atorvastatin (LIPITOR) 40 mg tablet; Take 1 tablet (40 mg total) by mouth every evening  -     omega-3-acid ethyl esters (LOVAZA) 1 g capsule; Take 2 capsules (2 g total) by mouth 2 (two) times a day  -     CBC and differential; Future; Expected date: 2023  -     Comprehensive metabolic panel; Future; Expected date: 2023  -     Lipid panel; Future; Expected date: 2023  -     Microalbumin / creatinine urine ratio; Future; Expected date: 2023  -     TSH, 3rd generation with Free T4 reflex;  Future; Expected date: 2023  - Vitamin D 25 hydroxy; Future; Expected date: 06/05/2023    5  Vitamin D deficiency  -     Vitamin D 25 hydroxy; Future; Expected date: 06/05/2023         Subjective      68 yo  female with h/o CVA, HTN, here today for follow up  Doing well  Denies new complains  Bone pain greatly improved with Celebrex as needed, has been taking about 3 to 4 times a week  Staying active, walks 20 minutes twice a day     Review of Systems   All other systems reviewed and are negative        Current Outpatient Medications on File Prior to Visit   Medication Sig   • acetaminophen (TYLENOL) 500 mg tablet Take 500 mg by mouth every 6 (six) hours as needed for mild pain   • ammonium lactate (LAC-HYDRIN) 12 % lotion Apply topically 2 (two) times a day   • Aspirin Low Dose 81 MG EC tablet take 1 tablet by mouth once daily   • Diclofenac Sodium (VOLTAREN) 1 % Apply 2 g topically 4 (four) times a day   • docusate sodium (COLACE) 100 mg capsule Take 1 capsule (100 mg total) by mouth 2 (two) times a day   • Elastic Bandages & Supports (Thumb Splint/Neoprene Medium) MISC by Does not apply route daily   • fluticasone (FLONASE) 50 mcg/act nasal spray 2 sprays into each nostril daily   • lisinopril-hydrochlorothiazide (PRINZIDE,ZESTORETIC) 10-12 5 MG per tablet Take 1 tablet by mouth daily   • meclizine (ANTIVERT) 25 mg tablet Take 1 tablet (25 mg total) by mouth every 8 (eight) hours (Patient not taking: No sig reported)   • memantine (NAMENDA) 5 mg tablet take 1 tablet by mouth twice a day   • omeprazole (PriLOSEC) 20 mg delayed release capsule Take 1 capsule (20 mg total) by mouth daily before breakfast   • oxyCODONE (ROXICODONE) 5 mg immediate release tablet Take 1 tablet (5 mg total) by mouth every 6 (six) hours as needed for severe pain for up to 5 dosesMax Daily Amount: 20 mg   • VITAMIN D PO Take 1 tablet by mouth daily   • [DISCONTINUED] atorvastatin (LIPITOR) 40 mg tablet Take 1 tablet (40 mg total) by mouth every evening   • [DISCONTINUED] celecoxib (CeleBREX) 50 MG capsule Take 1 capsule (50 mg total) by mouth 2 (two) times a day as needed for mild pain   • [DISCONTINUED] furosemide (LASIX) 20 mg tablet Take 0 5 tablets (10 mg total) by mouth daily (Patient not taking: No sig reported)   • [DISCONTINUED] omega-3-acid ethyl esters (LOVAZA) 1 g capsule Take 2 capsules (2 g total) by mouth 2 (two) times a day       Objective     /72 (BP Location: Left arm, Patient Position: Sitting, Cuff Size: Standard)   Pulse 64   Temp (!) 97 3 °F (36 3 °C) (Temporal)   Resp 17   Ht 5' 2" (1 575 m)   Wt 54 kg (119 lb)   SpO2 98%   BMI 21 77 kg/m²     Physical Exam  Vitals and nursing note reviewed  Constitutional:       Appearance: She is well-developed  HENT:      Head: Normocephalic  Right Ear: External ear normal       Left Ear: External ear normal       Nose: Nose normal    Eyes:      Conjunctiva/sclera: Conjunctivae normal       Pupils: Pupils are equal, round, and reactive to light  Neck:      Thyroid: No thyromegaly  Cardiovascular:      Rate and Rhythm: Normal rate and regular rhythm  Heart sounds: Normal heart sounds  Pulmonary:      Effort: Pulmonary effort is normal       Breath sounds: Normal breath sounds  Abdominal:      Palpations: Abdomen is soft  Tenderness: There is no abdominal tenderness  There is no guarding or rebound  Musculoskeletal:         General: Normal range of motion  Cervical back: Normal range of motion and neck supple  Skin:     General: Skin is dry  Neurological:      Mental Status: She is alert and oriented to person, place, and time  Deep Tendon Reflexes: Reflexes are normal and symmetric         Mery Greenwood MD

## 2023-04-03 DIAGNOSIS — R41.89 COGNITIVE DECLINE: ICD-10-CM

## 2023-04-04 RX ORDER — MEMANTINE HYDROCHLORIDE 5 MG/1
TABLET ORAL
Qty: 180 TABLET | Refills: 0 | Status: SHIPPED | OUTPATIENT
Start: 2023-04-04

## 2023-05-03 ENCOUNTER — TELEPHONE (OUTPATIENT)
Dept: FAMILY MEDICINE CLINIC | Facility: CLINIC | Age: 73
End: 2023-05-03

## 2023-05-03 NOTE — TELEPHONE ENCOUNTER
IEB FORM RECEIVED ON 5/3/2023  GIVEN TO VANDANA MIRZA TO BE COMPLETED, SIGNED BY MD/ (INCLUDE LISC   NUMBER), AND FAXED BACK IN 3 DAYS

## 2023-05-05 NOTE — TELEPHONE ENCOUNTER
FORM COMPLETED, SIGNED BY MD/ (INCLUDE LIS  NUMBER) FAXED ON 05/05/23 TO [facility] at [phone]  FAX CONFIRMATION RECEIVED   FORM GIVEN TO MARICRUZ TO SCAN

## 2023-05-16 DIAGNOSIS — E78.5 DYSLIPIDEMIA: ICD-10-CM

## 2023-05-16 RX ORDER — OMEGA-3-ACID ETHYL ESTERS 1 G/1
CAPSULE, LIQUID FILLED ORAL
Qty: 180 CAPSULE | Refills: 0 | Status: SHIPPED | OUTPATIENT
Start: 2023-05-16

## 2023-05-17 NOTE — PROGRESS NOTES
Outpatient Cardiology Note - Gino Butler 67 y o  female MRN: [id number]    @ Encounter: [id number]        Patient Active Problem List    Diagnosis Date Noted   • Gastroesophageal reflux disease without esophagitis 09/13/2022   • Lightheadedness 12/03/2021   • CVA (cerebral vascular accident) ([facility] [address] ) 12/08/2019   • Spinal stenosis, lumbar region with neurogenic claudication    • Acute pain of left shoulder 08/23/2018   • Pre-op evaluation 02/08/2018   • Muscle spasms of neck 12/04/2017   • Arthralgia of left hip 07/19/2016   • Slow transit constipation 07/19/2016   • Impaired fasting glucose 07/19/2016   • Chronic right shoulder pain 03/15/2016   • Creatinine elevation 02/16/2015   • Neck pain 11/19/2014   • Low back pain 08/06/2014   • Acquired trigger finger 10/09/2013   • Vitamin D deficiency 07/29/2013   • Mammogram abnormal 07/29/2013   • Multiple joint pain 07/29/2013   • Other symptoms involving skin and integumentary tissues 06/03/2013   • Pemphigus 04/08/2013   • Bursitis 12/03/2012   • 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/2012   • Osteoarthrosis 09/24/2012   • Dyslipidemia 06/19/2012   • Essential hypertension 06/19/2012   • Hyperlipidemia 06/03/2008       Assessment:  # HTN,   Rx: lisinopril/HCTZ 10/12 5 mg daily    Studies- personally reviewed by me  EKG- SR    Echo 12/9/19  LVEF: 70%  RV: normal  Mild AI  Negative for PFO    # GERD- omeprazole  # dyslipidemia- atorvastatin 40 mg daily  6/15/22: LDL 36, HDL 71  2/8/21: LDL 48, 69  # CVA hx- chronic cerebellar stroke  - aspirin daily, statin  CT head 10/10/22: old bilateral cerebellar infarcts  MRI Brain 12/9/19: Bilaterally symmetric bilateral cerebellar encephalomalacia  There is primarily white matter volume loss with cortical thinning  Resulting ex vacuo dilatation of the 4th ventricle  No signs of previous hemorrhage  Findings suggest sequela of previous   ischemia    Echo 12/9/19 negative for PFO      Today's Plan:  Continue current BP Rx  Referred for MRI showing old cerebellar infarcts, possibly HTN related  No known afib  No PFO on echo    HPI:       68 yo with hx of HTN referred I believe BP management by primary  Was in ED 10/10 with dizziness and /90 mmHg  Pt has meclizine prn  Pt has seen neurology in hospital in Dec 2019 for reason for consult of finding of old bilateral cerebral age indeterminate infarcts  BP recorded in ED was 197/87 mmHg  They state that since being on medication, blood pressure checks at home are much better  No LUND, no chest pain  No edema  When her pressure is high she gets dizzy  Does not smoke or drink alcohol  Interim:  No new complaints        Past Medical History:   Diagnosis Date   • Arthralgia of hip     left   • Arthritis    • Chronic pain disorder     low back and shoulder   • Dyslipidemia    • Hyperlipidemia    • Hypertension    • Neck pain    • Pemphigus    • Pre-diabetes    • Shoulder pain    • Stroke [facility])    • Vitamin D deficiency        Review of Systems   Constitutional: Negative for activity change, appetite change, fatigue and unexpected weight change  HENT: Negative for congestion and nosebleeds  Eyes: Negative  Respiratory: Negative for cough, chest tightness and shortness of breath  Cardiovascular: Negative for chest pain, palpitations and leg swelling  Gastrointestinal: Negative for abdominal distention  Endocrine: Negative  Genitourinary: Negative  Musculoskeletal: Negative  Skin: Negative  Neurological: Negative for dizziness, syncope and weakness  Hematological: Negative  Psychiatric/Behavioral: Negative  No Known Allergies        Current Outpatient Medications:   •  acetaminophen (TYLENOL) 500 mg tablet, Take 500 mg by mouth every 6 (six) hours as needed for mild pain, Disp: , Rfl:   •  ammonium lactate (LAC-HYDRIN) 12 % lotion, Apply topically 2 (two) times a day, Disp: 400 g, Rfl: 0  •  aspirin (Aspirin Low Dose) 81 mg EC tablet, Take 1 tablet (81 mg total) by mouth daily, Disp: 90 tablet, Rfl: 0  •  atorvastatin (LIPITOR) 40 mg tablet, Take 1 tablet (40 mg total) by mouth every evening, Disp: 90 tablet, Rfl: 0  •  celecoxib (CeleBREX) 50 MG capsule, Take 1 capsule (50 mg total) by mouth 2 (two) times a day as needed for mild pain, Disp: 90 capsule, Rfl: 0  •  Diclofenac Sodium (VOLTAREN) 1 %, Apply 2 g topically 4 (four) times a day, Disp: 350 g, Rfl: 0  •  docusate sodium (COLACE) 100 mg capsule, Take 1 capsule (100 mg total) by mouth 2 (two) times a day, Disp: 180 capsule, Rfl: 1  •  Elastic Bandages & Supports (Thumb Splint/Neoprene Medium) MISC, by Does not apply route daily, Disp: 2 each, Rfl: 0  •  fluticasone (FLONASE) 50 mcg/act nasal spray, 2 sprays into each nostril daily, Disp: 16 g, Rfl: 0  •  lisinopril-hydrochlorothiazide (PRINZIDE,ZESTORETIC) 10-12 5 MG per tablet, Take 1 tablet by mouth daily, Disp: 90 tablet, Rfl: 3  •  meclizine (ANTIVERT) 25 mg tablet, Take 1 tablet (25 mg total) by mouth every 8 (eight) hours (Patient not taking: No sig reported), Disp: 30 tablet, Rfl: 0  •  memantine (NAMENDA) 5 mg tablet, take 1 tablet by mouth twice a day, Disp: 180 tablet, Rfl: 0  •  omega-3-acid ethyl esters (LOVAZA) 1 g capsule, take 2 capsules by mouth twice a day, Disp: 180 capsule, Rfl: 0  •  omeprazole (PriLOSEC) 20 mg delayed release capsule, Take 1 capsule (20 mg total) by mouth daily before breakfast, Disp: 90 capsule, Rfl: 1  •  oxyCODONE (ROXICODONE) 5 mg immediate release tablet, Take 1 tablet (5 mg total) by mouth every 6 (six) hours as needed for severe pain for up to 5 dosesMax Daily Amount: 20 mg, Disp: 5 tablet, Rfl: 0  •  VITAMIN D PO, Take 1 tablet by mouth daily, Disp: , Rfl:     Social History     Socioeconomic History   • Marital status: Single     Spouse name: Not on file   • Number of children: 1   • Years of education: Not on file   • Highest education level: Not on file   Occupational History   • Occupation: homemaker   Tobacco Use   • Smoking status: Never   • Smokeless tobacco: Never   Vaping Use   • Vaping Use: Never used   Substance and Sexual Activity   • Alcohol use: Never   • Drug use: No   • Sexual activity: Not Currently   Other Topics Concern   • Not on file   Social History Narrative    Advance directive declined by patient     Lives with adult children     Social Determinants of Health     Financial Resource Strain: Not on file   Food Insecurity: Not on file   Transportation Needs: Not on file   Physical Activity: Not on file   Stress: Not on file   Social Connections: Not on file   Intimate Partner Violence: Not on file   Housing Stability: Not on file       Family History   Problem Relation Age of Onset   • Hyperlipidemia Mother    • Hypertension Mother    • Heart disease Father    • No Known Problems Sister    • No Known Problems Sister    • No Known Problems Sister    • No Known Problems Maternal Aunt    • No Known Problems Paternal Aunt    • No Known Problems Paternal Aunt        Physical Exam:    Vitals: not currently breastfeeding  , There is no height or weight on file to calculate BMI ,   Wt Readings from Last 3 Encounters:   03/14/23 54 kg (119 lb)   01/10/23 54 1 kg (119 lb 3 2 oz)   12/01/22 52 6 kg (115 lb 15 4 oz)     Physical Exam    Labs & Results:    Lab Results   Component Value Date    WBC 11 37 (H) 10/10/2022    HGB 14 1 10/10/2022    HCT 43 2 10/10/2022    MCV 93 10/10/2022     10/10/2022     Lab Results   Component Value Date    SODIUM 137 10/10/2022    K 3 9 10/10/2022     10/10/2022    CO2 29 10/10/2022    BUN 19 10/10/2022    CREATININE 0 64 10/10/2022    GLUC 103 (H) 10/10/2022    CALCIUM 9 7 10/10/2022     No results found for: BNP   Lab Results   Component Value Date    CHOLESTEROL 118 06/15/2022    CHOLESTEROL 125 02/08/2021    CHOLESTEROL 127 04/20/2020     Lab Results   Component Value Date    HDL 71 06/15/2022    HDL 69 02/08/2021    HDL 69 04/20/2020     Lab Results   Component Value Date    TRIG 55 06/15/2022    TRIG 39 02/08/2021    TRIG 65 04/20/2020     Lab Results   Component Value Date    NONHDLC 47 06/15/2022    [place] 56 02/08/2021    [place] 58 04/20/2020       EKG personally reviewed by Leopold Lei  Counseling / Coordination of Care  Time spent today 25 minutes  Greater than 50% of total time was spent with the patient and / or family counseling and / or coordination of care  We discussed diagnoses, most recent studies and any changes in treatment  Thank you for the opportunity to participate in the care of this patient      [facility] PULMONARY HYPERTENSION  MEDICAL DIRECTOR OF [place]

## 2023-05-18 ENCOUNTER — OFFICE VISIT (OUTPATIENT)
Dept: CARDIOLOGY CLINIC | Facility: CLINIC | Age: 73
End: 2023-05-18

## 2023-05-18 VITALS
OXYGEN SATURATION: 97 % | HEIGHT: 62 IN | BODY MASS INDEX: 22.26 KG/M2 | DIASTOLIC BLOOD PRESSURE: 70 MMHG | SYSTOLIC BLOOD PRESSURE: 136 MMHG | WEIGHT: 121 LBS | HEART RATE: 71 BPM

## 2023-05-18 DIAGNOSIS — E78.00 PURE HYPERCHOLESTEROLEMIA: Primary | ICD-10-CM

## 2023-05-18 DIAGNOSIS — E78.5 DYSLIPIDEMIA: ICD-10-CM

## 2023-05-18 DIAGNOSIS — I10 ESSENTIAL HYPERTENSION: ICD-10-CM

## 2023-06-27 DIAGNOSIS — E78.5 DYSLIPIDEMIA: ICD-10-CM

## 2023-06-27 DIAGNOSIS — R41.89 COGNITIVE DECLINE: ICD-10-CM

## 2023-06-27 DIAGNOSIS — I63.9 CVA (CEREBRAL VASCULAR ACCIDENT) (HCC): ICD-10-CM

## 2023-06-27 RX ORDER — ATORVASTATIN CALCIUM 40 MG/1
TABLET, FILM COATED ORAL
Qty: 90 TABLET | Refills: 0 | Status: SHIPPED | OUTPATIENT
Start: 2023-06-27 | End: 2023-07-01 | Stop reason: SDUPTHER

## 2023-06-27 RX ORDER — MEMANTINE HYDROCHLORIDE 5 MG/1
TABLET ORAL
Qty: 180 TABLET | Refills: 0 | Status: SHIPPED | OUTPATIENT
Start: 2023-06-27 | End: 2023-07-01 | Stop reason: SDUPTHER

## 2023-07-01 DIAGNOSIS — I63.9 CVA (CEREBRAL VASCULAR ACCIDENT) (HCC): ICD-10-CM

## 2023-07-01 DIAGNOSIS — R41.89 COGNITIVE DECLINE: ICD-10-CM

## 2023-07-01 DIAGNOSIS — L85.3 DRY SKIN: ICD-10-CM

## 2023-07-01 DIAGNOSIS — M89.8X9 BONE PAIN: ICD-10-CM

## 2023-07-01 DIAGNOSIS — E78.5 DYSLIPIDEMIA: ICD-10-CM

## 2023-07-01 DIAGNOSIS — H69.83 DYSFUNCTION OF BOTH EUSTACHIAN TUBES: ICD-10-CM

## 2023-07-03 ENCOUNTER — APPOINTMENT (OUTPATIENT)
Dept: LAB | Age: 73
End: 2023-07-03
Payer: MEDICARE

## 2023-07-03 DIAGNOSIS — I10 ESSENTIAL HYPERTENSION: ICD-10-CM

## 2023-07-03 DIAGNOSIS — E78.5 DYSLIPIDEMIA: ICD-10-CM

## 2023-07-03 DIAGNOSIS — I63.9 CVA (CEREBRAL VASCULAR ACCIDENT) (HCC): ICD-10-CM

## 2023-07-03 DIAGNOSIS — E55.9 VITAMIN D DEFICIENCY: ICD-10-CM

## 2023-07-03 LAB
25(OH)D3 SERPL-MCNC: 37.5 NG/ML (ref 30–100)
ALBUMIN SERPL BCP-MCNC: 4 G/DL (ref 3.5–5)
ALP SERPL-CCNC: 75 U/L (ref 46–116)
ALT SERPL W P-5'-P-CCNC: 76 U/L (ref 12–78)
ANION GAP SERPL CALCULATED.3IONS-SCNC: -1 MMOL/L
AST SERPL W P-5'-P-CCNC: 54 U/L (ref 5–45)
BASOPHILS # BLD AUTO: 0.07 THOUSANDS/ÂΜL (ref 0–0.1)
BASOPHILS NFR BLD AUTO: 1 % (ref 0–1)
BILIRUB SERPL-MCNC: 0.52 MG/DL (ref 0.2–1)
BUN SERPL-MCNC: 19 MG/DL (ref 5–25)
CALCIUM SERPL-MCNC: 9.9 MG/DL (ref 8.3–10.1)
CHLORIDE SERPL-SCNC: 108 MMOL/L (ref 96–108)
CHOLEST SERPL-MCNC: 142 MG/DL
CO2 SERPL-SCNC: 32 MMOL/L (ref 21–32)
CREAT SERPL-MCNC: 0.89 MG/DL (ref 0.6–1.3)
CREAT UR-MCNC: 41.1 MG/DL
EOSINOPHIL # BLD AUTO: 0.19 THOUSAND/ÂΜL (ref 0–0.61)
EOSINOPHIL NFR BLD AUTO: 2 % (ref 0–6)
ERYTHROCYTE [DISTWIDTH] IN BLOOD BY AUTOMATED COUNT: 12.4 % (ref 11.6–15.1)
GFR SERPL CREATININE-BSD FRML MDRD: 64 ML/MIN/1.73SQ M
GLUCOSE P FAST SERPL-MCNC: 100 MG/DL (ref 65–99)
HCT VFR BLD AUTO: 41.1 % (ref 34.8–46.1)
HDLC SERPL-MCNC: 78 MG/DL
HGB BLD-MCNC: 13.4 G/DL (ref 11.5–15.4)
IMM GRANULOCYTES # BLD AUTO: 0.01 THOUSAND/UL (ref 0–0.2)
IMM GRANULOCYTES NFR BLD AUTO: 0 % (ref 0–2)
LDLC SERPL CALC-MCNC: 55 MG/DL (ref 0–100)
LYMPHOCYTES # BLD AUTO: 2.97 THOUSANDS/ÂΜL (ref 0.6–4.47)
LYMPHOCYTES NFR BLD AUTO: 31 % (ref 14–44)
MCH RBC QN AUTO: 30.8 PG (ref 26.8–34.3)
MCHC RBC AUTO-ENTMCNC: 32.6 G/DL (ref 31.4–37.4)
MCV RBC AUTO: 95 FL (ref 82–98)
MICROALBUMIN UR-MCNC: 5.5 MG/L (ref 0–20)
MICROALBUMIN/CREAT 24H UR: 13 MG/G CREATININE (ref 0–30)
MONOCYTES # BLD AUTO: 0.59 THOUSAND/ÂΜL (ref 0.17–1.22)
MONOCYTES NFR BLD AUTO: 6 % (ref 4–12)
NEUTROPHILS # BLD AUTO: 5.62 THOUSANDS/ÂΜL (ref 1.85–7.62)
NEUTS SEG NFR BLD AUTO: 60 % (ref 43–75)
NONHDLC SERPL-MCNC: 64 MG/DL
NRBC BLD AUTO-RTO: 0 /100 WBCS
PLATELET # BLD AUTO: 240 THOUSANDS/UL (ref 149–390)
PMV BLD AUTO: 10.9 FL (ref 8.9–12.7)
POTASSIUM SERPL-SCNC: 4.7 MMOL/L (ref 3.5–5.3)
PROT SERPL-MCNC: 8 G/DL (ref 6.4–8.4)
RBC # BLD AUTO: 4.35 MILLION/UL (ref 3.81–5.12)
SODIUM SERPL-SCNC: 139 MMOL/L (ref 135–147)
TRIGL SERPL-MCNC: 47 MG/DL
TSH SERPL DL<=0.05 MIU/L-ACNC: 1.75 UIU/ML (ref 0.45–4.5)
WBC # BLD AUTO: 9.45 THOUSAND/UL (ref 4.31–10.16)

## 2023-07-03 PROCEDURE — 80061 LIPID PANEL: CPT

## 2023-07-03 PROCEDURE — 36415 COLL VENOUS BLD VENIPUNCTURE: CPT

## 2023-07-03 PROCEDURE — 85025 COMPLETE CBC W/AUTO DIFF WBC: CPT

## 2023-07-03 PROCEDURE — 82306 VITAMIN D 25 HYDROXY: CPT

## 2023-07-03 PROCEDURE — 82570 ASSAY OF URINE CREATININE: CPT

## 2023-07-03 PROCEDURE — 82043 UR ALBUMIN QUANTITATIVE: CPT

## 2023-07-03 PROCEDURE — 84443 ASSAY THYROID STIM HORMONE: CPT

## 2023-07-03 PROCEDURE — 80053 COMPREHEN METABOLIC PANEL: CPT

## 2023-07-03 RX ORDER — OMEGA-3-ACID ETHYL ESTERS 1 G/1
2 CAPSULE, LIQUID FILLED ORAL 2 TIMES DAILY
Qty: 180 CAPSULE | Refills: 0 | Status: SHIPPED | OUTPATIENT
Start: 2023-07-03

## 2023-07-03 RX ORDER — AMMONIUM LACTATE 12 G/100G
LOTION TOPICAL 2 TIMES DAILY
Qty: 400 G | Refills: 0 | Status: SHIPPED | OUTPATIENT
Start: 2023-07-03

## 2023-07-03 RX ORDER — CELECOXIB 50 MG/1
50 CAPSULE ORAL 2 TIMES DAILY PRN
Qty: 90 CAPSULE | Refills: 0 | Status: SHIPPED | OUTPATIENT
Start: 2023-07-03 | End: 2023-07-11 | Stop reason: SDUPTHER

## 2023-07-03 RX ORDER — ATORVASTATIN CALCIUM 40 MG/1
40 TABLET, FILM COATED ORAL EVERY EVENING
Qty: 90 TABLET | Refills: 0 | Status: SHIPPED | OUTPATIENT
Start: 2023-07-03

## 2023-07-03 RX ORDER — FLUTICASONE PROPIONATE 50 MCG
2 SPRAY, SUSPENSION (ML) NASAL DAILY
Qty: 16 G | Refills: 0 | Status: SHIPPED | OUTPATIENT
Start: 2023-07-03

## 2023-07-03 RX ORDER — MEMANTINE HYDROCHLORIDE 5 MG/1
5 TABLET ORAL 2 TIMES DAILY
Qty: 180 TABLET | Refills: 0 | Status: SHIPPED | OUTPATIENT
Start: 2023-07-03

## 2023-07-11 ENCOUNTER — OFFICE VISIT (OUTPATIENT)
Dept: FAMILY MEDICINE CLINIC | Facility: CLINIC | Age: 73
End: 2023-07-11

## 2023-07-11 VITALS
HEIGHT: 62 IN | BODY MASS INDEX: 22.23 KG/M2 | HEART RATE: 65 BPM | RESPIRATION RATE: 18 BRPM | OXYGEN SATURATION: 96 % | TEMPERATURE: 97.9 F | WEIGHT: 120.8 LBS | DIASTOLIC BLOOD PRESSURE: 60 MMHG | SYSTOLIC BLOOD PRESSURE: 118 MMHG

## 2023-07-11 DIAGNOSIS — R73.01 IMPAIRED FASTING GLUCOSE: ICD-10-CM

## 2023-07-11 DIAGNOSIS — I63.9 CEREBROVASCULAR ACCIDENT (CVA), UNSPECIFIED MECHANISM (HCC): ICD-10-CM

## 2023-07-11 DIAGNOSIS — K21.9 GASTROESOPHAGEAL REFLUX DISEASE WITHOUT ESOPHAGITIS: ICD-10-CM

## 2023-07-11 DIAGNOSIS — I10 ESSENTIAL HYPERTENSION: Primary | ICD-10-CM

## 2023-07-11 DIAGNOSIS — R25.2 LEG CRAMPS: ICD-10-CM

## 2023-07-11 DIAGNOSIS — M89.8X9 BONE PAIN: ICD-10-CM

## 2023-07-11 PROCEDURE — 3074F SYST BP LT 130 MM HG: CPT | Performed by: FAMILY MEDICINE

## 2023-07-11 PROCEDURE — 3078F DIAST BP <80 MM HG: CPT | Performed by: FAMILY MEDICINE

## 2023-07-11 PROCEDURE — 99214 OFFICE O/P EST MOD 30 MIN: CPT | Performed by: FAMILY MEDICINE

## 2023-07-11 RX ORDER — OMEPRAZOLE 20 MG/1
20 CAPSULE, DELAYED RELEASE ORAL
Qty: 90 CAPSULE | Refills: 1 | Status: SHIPPED | OUTPATIENT
Start: 2023-07-11

## 2023-07-11 RX ORDER — AMLODIPINE BESYLATE 5 MG/1
5 TABLET ORAL DAILY
Qty: 90 TABLET | Refills: 0 | Status: SHIPPED | OUTPATIENT
Start: 2023-07-11

## 2023-07-11 RX ORDER — CELECOXIB 50 MG/1
50 CAPSULE ORAL 2 TIMES DAILY PRN
Qty: 90 CAPSULE | Refills: 0 | Status: SHIPPED | OUTPATIENT
Start: 2023-07-11

## 2023-07-11 NOTE — ASSESSMENT & PLAN NOTE
Given cough will stop ACEI  Given low blood pressure, will only start Amlodipine 5 mg  Daughter to check blood pressure at home, if it starts to increase and is consistently above 140/90 will let us know to adjust medications

## 2023-07-11 NOTE — PROGRESS NOTES
Name: Eileen Rosenbaum      :       MRN: [id number]  Encounter Provider: Gissel Quiñonez MD  Encounter Date: 2023   Encounter department: [facility]     1. Essential hypertension  Assessment & Plan:  Given cough will stop ACEI  Given low blood pressure, will only start Amlodipine 5 mg  Daughter to check blood pressure at home, if it starts to increase and is consistently above 140/90 will let us know to adjust medications     Orders:  -     amLODIPine (NORVASC) 5 mg tablet; Take 1 tablet (5 mg total) by mouth daily    2. Bone pain  -     celecoxib (CeleBREX) 50 MG capsule; Take 1 capsule (50 mg total) by mouth 2 (two) times a day as needed for mild pain    3. Gastroesophageal reflux disease without esophagitis  -     omeprazole (PriLOSEC) 20 mg delayed release capsule; Take 1 capsule (20 mg total) by mouth daily before breakfast    4. Leg cramps    5. Impaired fasting glucose  Assessment & Plan:  Reviewed low carb diet         6. Cerebrovascular accident (CVA), unspecified mechanism ([address])  Assessment & Plan:  Stable  Continue to control risk factors        Increase fluid intake  Suggested 3 ounces of tonic water at night       Subjective      66 yo  female with HTN s/p CVA here today for follow up  Her only concern is dry cough, worse at night  Also complains of nocturnal leg cramps   Otherwise doing well    Review of Systems   Respiratory: Positive for cough. Musculoskeletal:        As per HPI   All other systems reviewed and are negative.       Current Outpatient Medications on File Prior to Visit   Medication Sig   • acetaminophen (TYLENOL) 500 mg tablet Take 500 mg by mouth every 6 (six) hours as needed for mild pain   • ammonium lactate (LAC-HYDRIN) 12 % lotion Apply topically 2 (two) times a day   • aspirin (Aspirin Low Dose) 81 mg EC tablet Take 1 tablet (81 mg total) by mouth daily   • atorvastatin (LIPITOR) 40 mg tablet Take 1 tablet (40 mg total) by mouth every evening   • Diclofenac Sodium (VOLTAREN) 1 % Apply 2 g topically 4 (four) times a day   • docusate sodium (COLACE) 100 mg capsule Take 1 capsule (100 mg total) by mouth 2 (two) times a day (Patient not taking: Reported on 5/18/2023)   • Elastic Bandages & Supports (Thumb Splint/Neoprene Medium) MISC by Does not apply route daily   • fluticasone (FLONASE) 50 mcg/act nasal spray 2 sprays into each nostril daily   • meclizine (ANTIVERT) 25 mg tablet Take 1 tablet (25 mg total) by mouth every 8 (eight) hours (Patient not taking: No sig reported)   • memantine (NAMENDA) 5 mg tablet Take 1 tablet (5 mg total) by mouth 2 (two) times a day   • omega-3-acid ethyl esters (LOVAZA) 1 g capsule Take 2 capsules (2 g total) by mouth 2 (two) times a day   • oxyCODONE (ROXICODONE) 5 mg immediate release tablet Take 1 tablet (5 mg total) by mouth every 6 (six) hours as needed for severe pain for up to 5 dosesMax Daily Amount: 20 mg (Patient not taking: Reported on 5/18/2023)   • VITAMIN D PO Take 1 tablet by mouth daily   • [DISCONTINUED] celecoxib (CeleBREX) 50 MG capsule Take 1 capsule (50 mg total) by mouth 2 (two) times a day as needed for mild pain   • [DISCONTINUED] lisinopril-hydrochlorothiazide (PRINZIDE,ZESTORETIC) 10-12.5 MG per tablet Take 1 tablet by mouth daily   • [DISCONTINUED] omeprazole (PriLOSEC) 20 mg delayed release capsule Take 1 capsule (20 mg total) by mouth daily before breakfast       Objective     /60 (BP Location: Left arm, Patient Position: Sitting, Cuff Size: Standard)   Pulse 65   Temp 97.9 °F (36.6 °C) (Temporal)   Resp 18   Ht 5' 2" (1.575 m)   Wt 54.8 kg (120 lb 12.8 oz)   SpO2 96%   BMI 22.09 kg/m²     Physical Exam  Vitals and nursing note reviewed. Constitutional:       Appearance: She is well-developed. HENT:      Head: Normocephalic.       Right Ear: External ear normal.      Left Ear: External ear normal.      Nose: Nose normal.   Eyes: Conjunctiva/sclera: Conjunctivae normal.      Pupils: Pupils are equal, round, and reactive to light. Neck:      Thyroid: No thyromegaly. Cardiovascular:      Rate and Rhythm: Normal rate and regular rhythm. Heart sounds: Normal heart sounds. Pulmonary:      Effort: Pulmonary effort is normal.      Breath sounds: Normal breath sounds. Abdominal:      Palpations: Abdomen is soft. Tenderness: There is no abdominal tenderness. There is no guarding or rebound. Musculoskeletal:         General: Normal range of motion. Cervical back: Normal range of motion and neck supple. Skin:     General: Skin is dry. Neurological:      Mental Status: She is alert and oriented to person, place, and time. Deep Tendon Reflexes: Reflexes are normal and symmetric.        Galen Correa MD

## 2023-08-13 DIAGNOSIS — E78.5 DYSLIPIDEMIA: ICD-10-CM

## 2023-08-17 RX ORDER — OMEGA-3-ACID ETHYL ESTERS 1 G/1
2 CAPSULE, LIQUID FILLED ORAL 2 TIMES DAILY
Qty: 180 CAPSULE | Refills: 0 | Status: SHIPPED | OUTPATIENT
Start: 2023-08-17

## 2023-08-22 ENCOUNTER — OFFICE VISIT (OUTPATIENT)
Dept: FAMILY MEDICINE CLINIC | Facility: CLINIC | Age: 73
End: 2023-08-22

## 2023-08-22 VITALS
HEART RATE: 62 BPM | SYSTOLIC BLOOD PRESSURE: 116 MMHG | BODY MASS INDEX: 22.19 KG/M2 | RESPIRATION RATE: 16 BRPM | WEIGHT: 120.6 LBS | HEIGHT: 62 IN | DIASTOLIC BLOOD PRESSURE: 78 MMHG | TEMPERATURE: 97.9 F | OXYGEN SATURATION: 98 %

## 2023-08-22 DIAGNOSIS — I10 ESSENTIAL HYPERTENSION: Primary | ICD-10-CM

## 2023-08-22 PROCEDURE — 3078F DIAST BP <80 MM HG: CPT | Performed by: FAMILY MEDICINE

## 2023-08-22 PROCEDURE — 3074F SYST BP LT 130 MM HG: CPT | Performed by: FAMILY MEDICINE

## 2023-08-22 PROCEDURE — 99213 OFFICE O/P EST LOW 20 MIN: CPT | Performed by: FAMILY MEDICINE

## 2023-08-22 RX ORDER — AMLODIPINE BESYLATE 5 MG/1
5 TABLET ORAL DAILY
Qty: 90 TABLET | Refills: 0 | Status: SHIPPED | OUTPATIENT
Start: 2023-08-22

## 2023-08-22 NOTE — PROGRESS NOTES
Name: Chicho Pearson      :       MRN: [id number]  Encounter Provider: Kristina Ortiz MD  Encounter Date: 2023   Encounter department: [address] [facility]     1. Essential hypertension  Assessment & Plan:  Doing well on Amlodipine 5 mg  Cough resolved since stopping ACEI  BP journal from home reviewed all BP reading below 140/90n   No current side effects to Amlodipine     Orders:  -     amLODIPine (NORVASC) 5 mg tablet; Take 1 tablet (5 mg total) by mouth daily           Subjective      68 yo  female with h/o CVA, here today for follow up of HTN  Daughter with her today brought in a BP journal from home. BP all within range. Highest BP recorded was 134/72   Denies CP, SOB, LUND, LE edema, HA, Change in vision, loss of balance  Has lower leg pain when first getting out of bed in the morning, which improves as she starts walking     Review of Systems   All other systems reviewed and are negative.       Current Outpatient Medications on File Prior to Visit   Medication Sig   • acetaminophen (TYLENOL) 500 mg tablet Take 500 mg by mouth every 6 (six) hours as needed for mild pain   • ammonium lactate (LAC-HYDRIN) 12 % lotion Apply topically 2 (two) times a day   • aspirin (Aspirin Low Dose) 81 mg EC tablet Take 1 tablet (81 mg total) by mouth daily   • atorvastatin (LIPITOR) 40 mg tablet Take 1 tablet (40 mg total) by mouth every evening   • celecoxib (CeleBREX) 50 MG capsule Take 1 capsule (50 mg total) by mouth 2 (two) times a day as needed for mild pain   • Diclofenac Sodium (VOLTAREN) 1 % Apply 2 g topically 4 (four) times a day   • Elastic Bandages & Supports (Thumb Splint/Neoprene Medium) MISC by Does not apply route daily   • fluticasone (FLONASE) 50 mcg/act nasal spray 2 sprays into each nostril daily   • meclizine (ANTIVERT) 25 mg tablet Take 1 tablet (25 mg total) by mouth every 8 (eight) hours (Patient not taking: No sig reported) • memantine (NAMENDA) 5 mg tablet Take 1 tablet (5 mg total) by mouth 2 (two) times a day   • omega-3-acid ethyl esters (LOVAZA) 1 g capsule take 2 capsules by mouth twice a day   • omeprazole (PriLOSEC) 20 mg delayed release capsule Take 1 capsule (20 mg total) by mouth daily before breakfast   • VITAMIN D PO Take 1 tablet by mouth daily   • [DISCONTINUED] amLODIPine (NORVASC) 5 mg tablet Take 1 tablet (5 mg total) by mouth daily   • [DISCONTINUED] docusate sodium (COLACE) 100 mg capsule Take 1 capsule (100 mg total) by mouth 2 (two) times a day (Patient not taking: Reported on 5/18/2023)   • [DISCONTINUED] oxyCODONE (ROXICODONE) 5 mg immediate release tablet Take 1 tablet (5 mg total) by mouth every 6 (six) hours as needed for severe pain for up to 5 dosesMax Daily Amount: 20 mg (Patient not taking: Reported on 5/18/2023)       Objective     /78 (BP Location: Left arm, Patient Position: Sitting, Cuff Size: Standard)   Pulse 62   Temp 97.9 °F (36.6 °C) (Temporal)   Resp 16   Ht 5' 2" (1.575 m)   Wt 54.7 kg (120 lb 9.6 oz)   SpO2 98%   BMI 22.06 kg/m²     Physical Exam  Vitals and nursing note reviewed. Constitutional:       Appearance: She is well-developed. HENT:      Head: Normocephalic. Right Ear: External ear normal.      Left Ear: External ear normal.      Nose: Nose normal.   Eyes:      Conjunctiva/sclera: Conjunctivae normal.      Pupils: Pupils are equal, round, and reactive to light. Neck:      Thyroid: No thyromegaly. Cardiovascular:      Rate and Rhythm: Normal rate and regular rhythm. Heart sounds: Normal heart sounds. Pulmonary:      Effort: Pulmonary effort is normal.      Breath sounds: Normal breath sounds. Abdominal:      Palpations: Abdomen is soft. Tenderness: There is no abdominal tenderness. There is no guarding or rebound. Musculoskeletal:         General: Normal range of motion. Cervical back: Normal range of motion and neck supple.    Skin: General: Skin is dry. Neurological:      Mental Status: She is alert and oriented to person, place, and time. Deep Tendon Reflexes: Reflexes are normal and symmetric.        Ivan Singer MD

## 2023-08-22 NOTE — ASSESSMENT & PLAN NOTE
Doing well on Amlodipine 5 mg  Cough resolved since stopping ACEI  BP journal from home reviewed all BP reading below 140/90n   No current side effects to Amlodipine

## 2023-09-18 ENCOUNTER — TELEPHONE (OUTPATIENT)
Dept: FAMILY MEDICINE CLINIC | Facility: CLINIC | Age: 73
End: 2023-09-18

## 2023-09-18 DIAGNOSIS — Z12.31 ENCOUNTER FOR SCREENING MAMMOGRAM FOR MALIGNANT NEOPLASM OF BREAST: Primary | ICD-10-CM

## 2023-09-18 NOTE — TELEPHONE ENCOUNTER
Daughter would like a ambulatory ref placed in [facility] for a mammogram. Please advise.

## 2023-09-27 DIAGNOSIS — R41.89 COGNITIVE DECLINE: ICD-10-CM

## 2023-09-27 DIAGNOSIS — E78.5 DYSLIPIDEMIA: ICD-10-CM

## 2023-09-27 DIAGNOSIS — I63.9 CVA (CEREBRAL VASCULAR ACCIDENT) (HCC): ICD-10-CM

## 2023-09-28 RX ORDER — MEMANTINE HYDROCHLORIDE 5 MG/1
5 TABLET ORAL 2 TIMES DAILY
Qty: 180 TABLET | Refills: 0 | Status: SHIPPED | OUTPATIENT
Start: 2023-09-28 | End: 2023-10-01 | Stop reason: SDUPTHER

## 2023-09-28 RX ORDER — ATORVASTATIN CALCIUM 40 MG/1
40 TABLET, FILM COATED ORAL EVERY EVENING
Qty: 90 TABLET | Refills: 0 | Status: SHIPPED | OUTPATIENT
Start: 2023-09-28 | End: 2023-10-01 | Stop reason: SDUPTHER

## 2023-10-01 DIAGNOSIS — L85.3 DRY SKIN: ICD-10-CM

## 2023-10-01 DIAGNOSIS — H69.93 DYSFUNCTION OF BOTH EUSTACHIAN TUBES: ICD-10-CM

## 2023-10-01 DIAGNOSIS — I63.9 CVA (CEREBRAL VASCULAR ACCIDENT) (HCC): ICD-10-CM

## 2023-10-01 DIAGNOSIS — I10 ESSENTIAL HYPERTENSION: ICD-10-CM

## 2023-10-01 DIAGNOSIS — E78.5 DYSLIPIDEMIA: ICD-10-CM

## 2023-10-01 DIAGNOSIS — R41.89 COGNITIVE DECLINE: ICD-10-CM

## 2023-10-01 DIAGNOSIS — M25.511 CHRONIC RIGHT SHOULDER PAIN: ICD-10-CM

## 2023-10-01 DIAGNOSIS — G89.29 CHRONIC RIGHT SHOULDER PAIN: ICD-10-CM

## 2023-10-02 RX ORDER — MEMANTINE HYDROCHLORIDE 5 MG/1
5 TABLET ORAL 2 TIMES DAILY
Qty: 180 TABLET | Refills: 0 | Status: SHIPPED | OUTPATIENT
Start: 2023-10-02

## 2023-10-02 RX ORDER — ATORVASTATIN CALCIUM 40 MG/1
40 TABLET, FILM COATED ORAL EVERY EVENING
Qty: 90 TABLET | Refills: 0 | Status: SHIPPED | OUTPATIENT
Start: 2023-10-02 | End: 2023-10-04 | Stop reason: SDUPTHER

## 2023-10-02 RX ORDER — AMLODIPINE BESYLATE 5 MG/1
5 TABLET ORAL DAILY
Qty: 90 TABLET | Refills: 0 | Status: SHIPPED | OUTPATIENT
Start: 2023-10-02

## 2023-10-02 RX ORDER — AMMONIUM LACTATE 12 G/100G
1 LOTION TOPICAL 2 TIMES DAILY
Qty: 400 G | Refills: 0 | Status: SHIPPED | OUTPATIENT
Start: 2023-10-02

## 2023-10-02 RX ORDER — OMEGA-3-ACID ETHYL ESTERS 1 G/1
2 CAPSULE, LIQUID FILLED ORAL 2 TIMES DAILY
Qty: 180 CAPSULE | Refills: 0 | Status: SHIPPED | OUTPATIENT
Start: 2023-10-02

## 2023-10-02 RX ORDER — FLUTICASONE PROPIONATE 50 MCG
2 SPRAY, SUSPENSION (ML) NASAL DAILY
Qty: 16 G | Refills: 0 | Status: SHIPPED | OUTPATIENT
Start: 2023-10-02

## 2023-10-02 RX ORDER — AMMONIUM LACTATE 12 G/100G
LOTION TOPICAL 2 TIMES DAILY
Qty: 400 G | Refills: 0 | Status: SHIPPED | OUTPATIENT
Start: 2023-10-02

## 2023-10-04 DIAGNOSIS — I63.9 CVA (CEREBRAL VASCULAR ACCIDENT) (HCC): ICD-10-CM

## 2023-10-04 DIAGNOSIS — E78.5 DYSLIPIDEMIA: ICD-10-CM

## 2023-10-06 RX ORDER — ATORVASTATIN CALCIUM 40 MG/1
40 TABLET, FILM COATED ORAL EVERY EVENING
Qty: 90 TABLET | Refills: 0 | Status: SHIPPED | OUTPATIENT
Start: 2023-10-06

## 2023-10-12 NOTE — PROGRESS NOTES
Daily Note     Today's date: 10/24/2019  Patient name: Isabelle Dowd  :   MRN: [id number]  Referring provider: Artie Gonzalez MD  Dx:   Encounter Diagnosis     ICD-10-CM    1  Spinal stenosis, lumbar region with neurogenic claudication M48 [id number]      Start Time: 820  Stop Time: 910  Total time in clinic (min): 50 minutes  Subjective: Pt reports only "a little" pain in LBP upon arrival to today's treatment  Objective: See treatment diary below  Precautions: HTN, OA  Exercise Diary  10/14 10/17 10/22 10/24         Bike L1 7' L1 7' L1 8' L2 8'            Hamstring stretch B strap  30"x3 ea Seated 30"x3 ea Seated 30"x3ea --         Piriformis stretch B /c towel Pain  Seated 30"x3 ea Seated 30"x3ea --         Gastroc stretch B Pro 30"x3 ea Pro 30"x3 ea Pro 30"x3 ea --         SKTC 30"x3 ea 30"x3 ea 30"x3ea 30"x3ea         Sciatic nerve glides B   20xea 2x20ea         Pball rollouts  10"x10 10x10" 10x10"         LTR 10"x10 ea 10"x10 ea 10"x10ea 10"x10ea         HR/TR 20xea 30x ea  30xea         Standing hip 3 way  2x10 ea  pain         NBOS on foam 2x30"            Multifidus press    [place]  5"x5ea         Bridges    3x5         SLR    [id number]                                                                                           Assessment: Pt challenged /c addition of bridges, tolerated addition of SLR well and without c/o of increased LBP Sx  Pt expressed signficant pain and fatigue with standing hip 3 way so she was redirected to table exercises - resume standing TE NV  Increased time required for translation of treatment - used [facility] ben; pt demonstrates fair understanding of instructions and requires prompting for correct technique and duration of exercises  Pt demonstrated fatigue post treatment, and would benefit from continued PT to address remaining strength, mobility, and functional deficits re: LBP Sx     Plan: Continue per plan of care  Progress treatment as tolerated  Melanie Santana, PTA [facility] Superficial Pigmented Histology Text: There were aggregates of basaloid cells with pigment budding from the epidermis.

## 2023-12-04 ENCOUNTER — HOSPITAL ENCOUNTER (OUTPATIENT)
Dept: MAMMOGRAPHY | Facility: MEDICAL CENTER | Age: 73
Discharge: HOME | End: 2023-12-04
Payer: MEDICARE

## 2023-12-04 DIAGNOSIS — Z12.31 ENCOUNTER FOR SCREENING MAMMOGRAM FOR MALIGNANT NEOPLASM OF BREAST: ICD-10-CM

## 2023-12-04 PROCEDURE — 77063 BREAST TOMOSYNTHESIS BI: CPT

## 2023-12-04 PROCEDURE — 77067 SCR MAMMO BI INCL CAD: CPT

## 2023-12-07 ENCOUNTER — TELEPHONE (OUTPATIENT)
Dept: FAMILY MEDICINE CLINIC | Facility: CLINIC | Age: 73
End: 2023-12-07

## 2023-12-10 DIAGNOSIS — G89.29 CHRONIC RIGHT SHOULDER PAIN: ICD-10-CM

## 2023-12-10 DIAGNOSIS — M25.511 CHRONIC RIGHT SHOULDER PAIN: ICD-10-CM

## 2023-12-20 ENCOUNTER — OFFICE VISIT (OUTPATIENT)
Dept: FAMILY MEDICINE CLINIC | Facility: CLINIC | Age: 73
End: 2023-12-20

## 2023-12-20 VITALS
HEART RATE: 65 BPM | HEIGHT: 62 IN | TEMPERATURE: 97.7 F | SYSTOLIC BLOOD PRESSURE: 142 MMHG | RESPIRATION RATE: 18 BRPM | BODY MASS INDEX: 22.08 KG/M2 | WEIGHT: 120 LBS | DIASTOLIC BLOOD PRESSURE: 88 MMHG | OXYGEN SATURATION: 98 %

## 2023-12-20 DIAGNOSIS — M25.511 CHRONIC RIGHT SHOULDER PAIN: Primary | ICD-10-CM

## 2023-12-20 DIAGNOSIS — Z23 ENCOUNTER FOR IMMUNIZATION: ICD-10-CM

## 2023-12-20 DIAGNOSIS — G89.29 CHRONIC RIGHT SHOULDER PAIN: Primary | ICD-10-CM

## 2023-12-20 DIAGNOSIS — M89.8X9 BONE PAIN: ICD-10-CM

## 2023-12-20 PROCEDURE — 3077F SYST BP >= 140 MM HG: CPT | Performed by: FAMILY MEDICINE

## 2023-12-20 PROCEDURE — 3079F DIAST BP 80-89 MM HG: CPT | Performed by: FAMILY MEDICINE

## 2023-12-20 PROCEDURE — 90662 IIV NO PRSV INCREASED AG IM: CPT | Performed by: FAMILY MEDICINE

## 2023-12-20 PROCEDURE — G0008 ADMIN INFLUENZA VIRUS VAC: HCPCS | Performed by: FAMILY MEDICINE

## 2023-12-20 PROCEDURE — 99214 OFFICE O/P EST MOD 30 MIN: CPT | Performed by: FAMILY MEDICINE

## 2023-12-20 RX ORDER — METHYLPREDNISOLONE 4 MG/1
TABLET ORAL
Qty: 21 EACH | Refills: 0 | Status: SHIPPED | OUTPATIENT
Start: 2023-12-20

## 2023-12-20 NOTE — PROGRESS NOTES
Assessment/Plan:    No problem-specific Assessment & Plan notes found for this encounter.       Diagnoses and all orders for this visit:    Chronic right shoulder pain  -     methylPREDNISolone 4 MG tablet therapy pack; Use as directed on package    Bone pain    Encounter for immunization  -     influenza vaccine, high-dose, PF 0.7 mL (FLUZONE HIGH-DOSE)        [employer] reviewed and discussed with patient   Subjective:      Patient ID: Hawa Cheek is a 73 y.o. female.    Shoulder Pain   The pain is present in the right shoulder. This is a new problem. The current episode started 1 to 4 weeks ago. There has been no history of extremity trauma. The problem occurs constantly. The problem has been unchanged. The quality of the pain is described as aching. The pain is at a severity of 8/10. Associated symptoms include joint swelling, a limited range of motion and stiffness. The symptoms are aggravated by activity. She has tried nothing for the symptoms.       The following portions of the patient's history were reviewed and updated as appropriate: She  has a past medical history of Arthralgia of hip, Arthritis, Chronic pain disorder, Dyslipidemia, Hyperlipidemia, Hypertension, Neck pain, Pemphigus, Pre-diabetes, Shoulder pain, Stroke ([facility]), and Vitamin D deficiency.  She   Patient Active Problem List    Diagnosis Date Noted   • Gastroesophageal reflux disease without esophagitis 09/13/2022   • Lightheadedness 12/03/2021   • CVA (cerebral vascular accident) (HCC) 12/08/2019   • Spinal stenosis, lumbar region with neurogenic claudication    • Acute pain of left shoulder 08/23/2018   • Pre-op evaluation 02/08/2018   • Muscle spasms of neck 12/04/2017   • Arthralgia of left hip 07/19/2016   • Slow transit constipation 07/19/2016   • Impaired fasting glucose 07/19/2016   • Chronic right shoulder pain 03/15/2016   • Creatinine elevation 02/16/2015   • Neck pain 11/19/2014   • Low back pain 08/06/2014   • Acquired  trigger finger 10/09/2013   • Vitamin D deficiency 07/29/2013   • Mammogram abnormal 07/29/2013   • Multiple joint pain 07/29/2013   • Other symptoms involving skin and integumentary tissues 06/03/2013   • Pemphigus 04/08/2013   • Bursitis 12/03/2012   • 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  Her family history includes Heart disease in her father; Hyperlipidemia in her mother; Hypertension in her mother; No Known Problems in her maternal aunt, paternal aunt, paternal aunt, sister, sister, and sister.  She  reports that she has never smoked. She has never used smokeless tobacco. She reports that she does not drink alcohol and does not use drugs.  Current Outpatient Medications   Medication Sig Dispense Refill   • methylPREDNISolone 4 MG tablet therapy pack Use as directed on package 21 each 0   • acetaminophen (TYLENOL) 500 mg tablet Take 500 mg by mouth every 6 (six) hours as needed for mild pain     • amLODIPine (NORVASC) 5 mg tablet Take 1 tablet (5 mg total) by mouth daily 90 tablet 0   • ammonium lactate (LAC-HYDRIN) 12 % lotion apply topically twice a day 400 g 0   • ammonium lactate (LAC-HYDRIN) 12 % lotion Apply topically 2 (two) times a day 400 g 0   • aspirin (Aspirin Low Dose) 81 mg EC tablet Take 1 tablet (81 mg total) by mouth daily 90 tablet 0   • atorvastatin (LIPITOR) 40 mg tablet Take 1 tablet (40 mg total) by mouth every evening 90 tablet 0   • celecoxib (CeleBREX) 50 MG capsule Take 1 capsule (50  mg total) by mouth 2 (two) times a day as needed for mild pain 90 capsule 0   • Diclofenac Sodium (VOLTAREN) 1 % Apply 2 g topically 4 (four) times a day 350 g 0   • Elastic Bandages & Supports (Thumb Splint/Neoprene Medium) MISC by Does not apply route daily 2 each 0   • fluticasone (FLONASE) 50 mcg/act nasal spray 2 sprays into each nostril daily 16 g 0   • meclizine (ANTIVERT) 25 mg tablet Take 1 tablet (25 mg total) by mouth every 8 (eight) hours (Patient not taking: No sig reported) 30 tablet 0   • memantine (NAMENDA) 5 mg tablet Take 1 tablet (5 mg total) by mouth 2 (two) times a day 180 tablet 0   • omega-3-acid ethyl esters (LOVAZA) 1 g capsule take 2 capsules by mouth twice a day 180 capsule 0   • omega-3-acid ethyl esters (LOVAZA) 1 g capsule Take 2 capsules (2 g total) by mouth 2 (two) times a day 180 capsule 0   • omeprazole (PriLOSEC) 20 mg delayed release capsule Take 1 capsule (20 mg total) by mouth daily before breakfast 90 capsule 1   • VITAMIN D PO Take 1 tablet by mouth daily       No current facility-administered medications for this visit.     Current Outpatient Medications on File Prior to Visit   Medication Sig   • acetaminophen (TYLENOL) 500 mg tablet Take 500 mg by mouth every 6 (six) hours as needed for mild pain   • amLODIPine (NORVASC) 5 mg tablet Take 1 tablet (5 mg total) by mouth daily   • ammonium lactate (LAC-HYDRIN) 12 % lotion apply topically twice a day   • ammonium lactate (LAC-HYDRIN) 12 % lotion Apply topically 2 (two) times a day   • aspirin (Aspirin Low Dose) 81 mg EC tablet Take 1 tablet (81 mg total) by mouth daily   • atorvastatin (LIPITOR) 40 mg tablet Take 1 tablet (40 mg total) by mouth every evening   • celecoxib (CeleBREX) 50 MG capsule Take 1 capsule (50 mg total) by mouth 2 (two) times a day as needed for mild pain   • Diclofenac Sodium (VOLTAREN) 1 % Apply 2 g topically 4 (four) times a day   • Elastic Bandages & Supports (Thumb Splint/Neoprene Medium) MISC by Does  "not apply route daily   • fluticasone (FLONASE) 50 mcg/act nasal spray 2 sprays into each nostril daily   • meclizine (ANTIVERT) 25 mg tablet Take 1 tablet (25 mg total) by mouth every 8 (eight) hours (Patient not taking: No sig reported)   • memantine (NAMENDA) 5 mg tablet Take 1 tablet (5 mg total) by mouth 2 (two) times a day   • omega-3-acid ethyl esters (LOVAZA) 1 g capsule take 2 capsules by mouth twice a day   • omega-3-acid ethyl esters (LOVAZA) 1 g capsule Take 2 capsules (2 g total) by mouth 2 (two) times a day   • omeprazole (PriLOSEC) 20 mg delayed release capsule Take 1 capsule (20 mg total) by mouth daily before breakfast   • VITAMIN D PO Take 1 tablet by mouth daily     No current facility-administered medications on file prior to visit.     She has No Known Allergies..    Review of Systems   Musculoskeletal:  Positive for arthralgias, back pain, gait problem and stiffness.   All other systems reviewed and are negative.        Objective:      /88 (BP Location: Right arm, Patient Position: Sitting, Cuff Size: Standard)   Pulse 65   Temp 97.7 °F (36.5 °C) (Temporal)   Resp 18   Ht 5' 2\" (1.575 m)   Wt 54.4 kg (120 lb)   SpO2 98%   BMI 21.95 kg/m²          Physical Exam  Vitals and nursing note reviewed.   Constitutional:       Appearance: She is well-developed.   HENT:      Head: Normocephalic.      Right Ear: External ear normal.      Left Ear: External ear normal.      Nose: Nose normal.   Eyes:      Conjunctiva/sclera: Conjunctivae normal.      Pupils: Pupils are equal, round, and reactive to light.   Neck:      Thyroid: No thyromegaly.   Cardiovascular:      Rate and Rhythm: Normal rate and regular rhythm.      Heart sounds: Normal heart sounds.   Pulmonary:      Effort: Pulmonary effort is normal.      Breath sounds: Normal breath sounds.   Abdominal:      Palpations: Abdomen is soft.      Tenderness: There is no abdominal tenderness. There is no guarding or rebound.   Musculoskeletal: "         General: Tenderness present.      Right shoulder: Tenderness present. Decreased range of motion.      Left shoulder: Normal.      Cervical back: Normal range of motion and neck supple.   Skin:     General: Skin is dry.   Neurological:      Mental Status: She is alert and oriented to person, place, and time.      Deep Tendon Reflexes: Reflexes are normal and symmetric.

## 2023-12-26 DIAGNOSIS — I10 ESSENTIAL HYPERTENSION: ICD-10-CM

## 2023-12-26 DIAGNOSIS — R41.89 COGNITIVE DECLINE: ICD-10-CM

## 2023-12-26 RX ORDER — AMLODIPINE BESYLATE 5 MG/1
5 TABLET ORAL DAILY
Qty: 90 TABLET | Refills: 0 | Status: SHIPPED | OUTPATIENT
Start: 2023-12-26

## 2023-12-26 RX ORDER — MEMANTINE HYDROCHLORIDE 5 MG/1
5 TABLET ORAL 2 TIMES DAILY
Qty: 180 TABLET | Refills: 0 | Status: SHIPPED | OUTPATIENT
Start: 2023-12-26

## 2023-12-30 DIAGNOSIS — I63.9 CVA (CEREBRAL VASCULAR ACCIDENT) (HCC): ICD-10-CM

## 2023-12-30 DIAGNOSIS — E78.5 DYSLIPIDEMIA: ICD-10-CM

## 2023-12-30 DIAGNOSIS — K21.9 GASTROESOPHAGEAL REFLUX DISEASE WITHOUT ESOPHAGITIS: ICD-10-CM

## 2024-01-02 RX ORDER — ATORVASTATIN CALCIUM 40 MG/1
40 TABLET, FILM COATED ORAL EVERY EVENING
Qty: 90 TABLET | Refills: 0 | Status: SHIPPED | OUTPATIENT
Start: 2024-01-02

## 2024-01-02 RX ORDER — OMEPRAZOLE 20 MG/1
20 CAPSULE, DELAYED RELEASE ORAL
Qty: 90 CAPSULE | Refills: 1 | Status: SHIPPED | OUTPATIENT
Start: 2024-01-02

## 2024-01-09 ENCOUNTER — TELEPHONE (OUTPATIENT)
Dept: FAMILY MEDICINE CLINIC | Facility: CLINIC | Age: 74
End: 2024-01-09

## 2024-01-09 NOTE — TELEPHONE ENCOUNTER
Patient message :Estoy llamando para hawa Osod garcia cumpleaños de [place] a [birth year]viembre 17 de 48. Necesito to fidel con to doctora, a redd si param tiene disponible. Lo más pronto posible. Porque mi mamá le está doliendo el brazo. Tc la [place]ura del brazo. Derecho. Entonces necesito to fidel para que joni la doctora, me pueden llamar al [phone]. Mi nombre es Lexis Echeverria y la paciente es Hawa Sood. El cumpleaños de noviembre, 17 de 48. Si me pueden llamar para atrás, pues wanda es el destinee mensaje que dejo. Espero que me llamen christiana.      Edmundo schedule for 12/10/24

## 2024-01-10 ENCOUNTER — OFFICE VISIT (OUTPATIENT)
Dept: FAMILY MEDICINE CLINIC | Facility: CLINIC | Age: 74
End: 2024-01-10

## 2024-01-10 VITALS
RESPIRATION RATE: 16 BRPM | DIASTOLIC BLOOD PRESSURE: 60 MMHG | TEMPERATURE: 97.6 F | SYSTOLIC BLOOD PRESSURE: 110 MMHG | WEIGHT: 119 LBS | HEART RATE: 66 BPM | BODY MASS INDEX: 21.77 KG/M2

## 2024-01-10 DIAGNOSIS — M25.511 CHRONIC RIGHT SHOULDER PAIN: ICD-10-CM

## 2024-01-10 DIAGNOSIS — M77.8 RIGHT SHOULDER TENDONITIS: Primary | ICD-10-CM

## 2024-01-10 DIAGNOSIS — M89.8X9 BONE PAIN: ICD-10-CM

## 2024-01-10 DIAGNOSIS — G89.29 CHRONIC RIGHT SHOULDER PAIN: ICD-10-CM

## 2024-01-10 PROCEDURE — 3074F SYST BP LT 130 MM HG: CPT | Performed by: FAMILY MEDICINE

## 2024-01-10 PROCEDURE — 3078F DIAST BP <80 MM HG: CPT | Performed by: FAMILY MEDICINE

## 2024-01-10 PROCEDURE — 99214 OFFICE O/P EST MOD 30 MIN: CPT | Performed by: FAMILY MEDICINE

## 2024-01-10 RX ORDER — METHYLPREDNISOLONE 4 MG/1
TABLET ORAL
Qty: 21 EACH | Refills: 0 | Status: SHIPPED | OUTPATIENT
Start: 2024-01-10

## 2024-01-10 RX ORDER — OXYCODONE HYDROCHLORIDE 5 MG/1
5 TABLET ORAL 2 TIMES DAILY PRN
Qty: 20 TABLET | Refills: 0 | Status: SHIPPED | OUTPATIENT
Start: 2024-01-10

## 2024-01-10 RX ORDER — CELECOXIB 50 MG/1
50 CAPSULE ORAL 2 TIMES DAILY PRN
Qty: 90 CAPSULE | Refills: 0 | Status: SHIPPED | OUTPATIENT
Start: 2024-01-10

## 2024-01-10 NOTE — PROGRESS NOTES
Assessment/Plan:    No problem-specific Assessment & Plan notes found for this encounter.       Diagnoses and all orders for this visit:    Right shoulder tendonitis  -     Ambulatory Referral to Orthopedic Surgery; Future  -     Ambulatory Referral to Physical Therapy; Future  -     oxyCODONE (Roxicodone) 5 immediate release tablet; Take 1 tablet (5 mg total) by mouth 2 (two) times a day as needed for moderate pain Max Daily Amount: 10 mg    Bone pain  -     celecoxib (CeleBREX) 50 MG capsule; Take 1 capsule (50 mg total) by mouth 2 (two) times a day as needed for mild pain    Chronic right shoulder pain  -     methylPREDNISolone 4 MG tablet therapy pack; Use as directed on package  -     Ambulatory Referral to Physical Therapy; Future  -     oxyCODONE (Roxicodone) 5 immediate release tablet; Take 1 tablet (5 mg total) by mouth 2 (two) times a day as needed for moderate pain Max Daily Amount: 10 mg          Subjective:      Patient ID: Hawa Cheek is a 73 y.o. female.    Arm Pain   The incident occurred more than 1 week ago. There was no injury mechanism. The pain is present in the right shoulder. The quality of the pain is described as aching, stabbing and shooting. The pain radiates to the right neck. The pain is at a severity of 10/10. The pain is severe. The pain has been Constant since the incident. Associated symptoms include muscle weakness. The symptoms are aggravated by movement, lifting and palpation. She has tried ice and rest (Steroid taper) for the symptoms. The treatment provided mild relief.       The following portions of the patient's history were reviewed and updated as appropriate: She  has a past medical history of Arthralgia of hip, Arthritis, Chronic pain disorder, Dyslipidemia, Hyperlipidemia, Hypertension, Neck pain, Pemphigus, Pre-diabetes, Shoulder pain, Stroke (HCC), and Vitamin D deficiency.  She   Patient Active Problem List    Diagnosis Date Noted   • Gastroesophageal reflux  disease without esophagitis 09/13/2022   • Lightheadedness 12/03/2021   • CVA (cerebral vascular accident) ([facility]) 12/08/2019   • Spinal stenosis, lumbar region with neurogenic claudication    • Acute pain of left shoulder 08/23/2018   • Pre-op evaluation 02/08/2018   • Muscle spasms of neck 12/04/2017   • Arthralgia of left hip 07/19/2016   • Slow transit constipation 07/19/2016   • Impaired fasting glucose 07/19/2016   • Chronic right shoulder pain 03/15/2016   • Creatinine elevation 02/16/2015   • Neck pain 11/19/2014   • Low back pain 08/06/2014   • Acquired trigger finger 10/09/2013   • Vitamin D deficiency 07/29/2013   • Mammogram abnormal 07/29/2013   • Multiple joint pain 07/29/2013   • Other symptoms involving skin and integumentary tissues 06/03/2013   • Pemphigus 04/08/2013   • Bursitis 12/03/2012   • 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  Her family history includes Heart disease in her father; Hyperlipidemia in her mother; Hypertension in her mother; No Known Problems in her maternal aunt, paternal aunt, paternal aunt, sister, sister, and sister.  She  reports that she has never smoked. She has never used smokeless tobacco. She reports that she does not drink alcohol and does not use drugs.  Current Outpatient Medications   Medication Sig Dispense Refill   • acetaminophen (TYLENOL) 500 mg tablet Take 500 mg by mouth every 6 (six) hours as needed for mild pain     • amLODIPine (NORVASC) 5 mg tablet take 1  tablet by mouth once daily 90 tablet 0   • ammonium lactate (LAC-HYDRIN) 12 % lotion apply topically twice a day 400 g 0   • ammonium lactate (LAC-HYDRIN) 12 % lotion Apply topically 2 (two) times a day 400 g 0   • aspirin (Aspirin Low Dose) 81 mg EC tablet Take 1 tablet (81 mg total) by mouth daily 90 tablet 0   • atorvastatin (LIPITOR) 40 mg tablet take 1 tablet by mouth every evening 90 tablet 0   • celecoxib (CeleBREX) 50 MG capsule Take 1 capsule (50 mg total) by mouth 2 (two) times a day as needed for mild pain 90 capsule 0   • Diclofenac Sodium (VOLTAREN) 1 % Apply 2 g topically 4 (four) times a day 350 g 0   • Elastic Bandages & Supports (Thumb Splint/Neoprene Medium) MISC by Does not apply route daily 2 each 0   • fluticasone (FLONASE) 50 mcg/act nasal spray 2 sprays into each nostril daily 16 g 0   • memantine (NAMENDA) 5 mg tablet take 1 tablet by mouth twice a day 180 tablet 0   • methylPREDNISolone 4 MG tablet therapy pack Use as directed on package 21 each 0   • omega-3-acid ethyl esters (LOVAZA) 1 g capsule take 2 capsules by mouth twice a day 180 capsule 0   • omega-3-acid ethyl esters (LOVAZA) 1 g capsule Take 2 capsules (2 g total) by mouth 2 (two) times a day 180 capsule 0   • omeprazole (PriLOSEC) 20 mg delayed release capsule take 1 capsule by mouth daily BEFORE BREAKFAST 90 capsule 1   • oxyCODONE (Roxicodone) 5 immediate release tablet Take 1 tablet (5 mg total) by mouth 2 (two) times a day as needed for moderate pain Max Daily Amount: 10 mg 20 tablet 0   • VITAMIN D PO Take 1 tablet by mouth daily       No current facility-administered medications for this visit.     Current Outpatient Medications on File Prior to Visit   Medication Sig   • acetaminophen (TYLENOL) 500 mg tablet Take 500 mg by mouth every 6 (six) hours as needed for mild pain   • amLODIPine (NORVASC) 5 mg tablet take 1 tablet by mouth once daily   • ammonium lactate (LAC-HYDRIN) 12 % lotion apply topically twice a day   •  ammonium lactate (LAC-HYDRIN) 12 % lotion Apply topically 2 (two) times a day   • aspirin (Aspirin Low Dose) 81 mg EC tablet Take 1 tablet (81 mg total) by mouth daily   • atorvastatin (LIPITOR) 40 mg tablet take 1 tablet by mouth every evening   • Diclofenac Sodium (VOLTAREN) 1 % Apply 2 g topically 4 (four) times a day   • Elastic Bandages & Supports (Thumb Splint/Neoprene Medium) MISC by Does not apply route daily   • fluticasone (FLONASE) 50 mcg/act nasal spray 2 sprays into each nostril daily   • memantine (NAMENDA) 5 mg tablet take 1 tablet by mouth twice a day   • omega-3-acid ethyl esters (LOVAZA) 1 g capsule take 2 capsules by mouth twice a day   • omega-3-acid ethyl esters (LOVAZA) 1 g capsule Take 2 capsules (2 g total) by mouth 2 (two) times a day   • omeprazole (PriLOSEC) 20 mg delayed release capsule take 1 capsule by mouth daily BEFORE BREAKFAST   • VITAMIN D PO Take 1 tablet by mouth daily   • [DISCONTINUED] celecoxib (CeleBREX) 50 MG capsule Take 1 capsule (50 mg total) by mouth 2 (two) times a day as needed for mild pain   • [DISCONTINUED] methylPREDNISolone 4 MG tablet therapy pack Use as directed on package   • [DISCONTINUED] meclizine (ANTIVERT) 25 mg tablet Take 1 tablet (25 mg total) by mouth every 8 (eight) hours (Patient not taking: Reported on 5/13/2021)     No current facility-administered medications on file prior to visit.     She has No Known Allergies..    Review of Systems   Musculoskeletal:  Positive for arthralgias.   All other systems reviewed and are negative.        Objective:      /60 (BP Location: Left arm, Patient Position: Sitting, Cuff Size: Standard)   Pulse 66   Temp 97.6 °F (36.4 °C) (Temporal)   Resp 16   Wt 54 kg (119 lb)   BMI 21.77 kg/m²          Physical Exam  Vitals and nursing note reviewed.   Constitutional:       Appearance: She is well-developed.   HENT:      Head: Normocephalic.      Right Ear: External ear normal.      Left Ear: External ear normal.       Nose: Nose normal.   Eyes:      Conjunctiva/sclera: Conjunctivae normal.      Pupils: Pupils are equal, round, and reactive to light.   Neck:      Thyroid: No thyromegaly.   Cardiovascular:      Rate and Rhythm: Normal rate and regular rhythm.      Heart sounds: Normal heart sounds.   Pulmonary:      Effort: Pulmonary effort is normal.      Breath sounds: Normal breath sounds.   Abdominal:      Palpations: Abdomen is soft.      Tenderness: There is no abdominal tenderness. There is no guarding or rebound.   Musculoskeletal:         General: Swelling and tenderness present.      Right shoulder: Swelling, deformity and tenderness present. Decreased range of motion.      Cervical back: Normal range of motion and neck supple.   Skin:     General: Skin is dry.   Neurological:      Mental Status: She is alert and oriented to person, place, and time.      Deep Tendon Reflexes: Reflexes are normal and symmetric.

## 2024-01-12 ENCOUNTER — OFFICE VISIT (OUTPATIENT)
Dept: OBGYN CLINIC | Facility: CLINIC | Age: 74
End: 2024-01-12

## 2024-01-12 VITALS
WEIGHT: 119 LBS | DIASTOLIC BLOOD PRESSURE: 60 MMHG | SYSTOLIC BLOOD PRESSURE: 110 MMHG | BODY MASS INDEX: 21.9 KG/M2 | HEIGHT: 62 IN

## 2024-01-12 DIAGNOSIS — M75.41 IMPINGEMENT SYNDROME OF RIGHT SHOULDER: Primary | ICD-10-CM

## 2024-01-12 RX ORDER — BUPIVACAINE HYDROCHLORIDE 2.5 MG/ML
2 INJECTION, SOLUTION INFILTRATION; PERINEURAL
Status: COMPLETED | OUTPATIENT
Start: 2024-01-12 | End: 2024-01-12

## 2024-01-12 RX ORDER — LIDOCAINE HYDROCHLORIDE 10 MG/ML
2 INJECTION, SOLUTION INFILTRATION; PERINEURAL
Status: COMPLETED | OUTPATIENT
Start: 2024-01-12 | End: 2024-01-12

## 2024-01-12 RX ORDER — METHYLPREDNISOLONE ACETATE 40 MG/ML
1 INJECTION, SUSPENSION INTRA-ARTICULAR; INTRALESIONAL; INTRAMUSCULAR; SOFT TISSUE
Status: COMPLETED | OUTPATIENT
Start: 2024-01-12 | End: 2024-01-12

## 2024-01-12 RX ADMIN — LIDOCAINE HYDROCHLORIDE 2 ML: 10 INJECTION, SOLUTION INFILTRATION; PERINEURAL at 09:30

## 2024-01-12 RX ADMIN — BUPIVACAINE HYDROCHLORIDE 2 ML: 2.5 INJECTION, SOLUTION INFILTRATION; PERINEURAL at 09:30

## 2024-01-12 RX ADMIN — METHYLPREDNISOLONE ACETATE 1 ML: 40 INJECTION, SUSPENSION INTRA-ARTICULAR; INTRALESIONAL; INTRAMUSCULAR; SOFT TISSUE at 09:30

## 2024-01-12 NOTE — PROGRESS NOTES
"Patient Name:  Hawa Cheek  MRN:  [id number]    Assessment & Plan     Right shoulder rotator cuff tendinitis/bursitis  Corticosteroid injection performed today into the right shoulder subacromial space.  Proceed with physical therapy as scheduled.  Tylenol as needed for pain.  Activities as tolerated with modification avoid pain.  Follow-up in 6 to 8 weeks for repeat evaluation.    Chief Complaint     Right shoulder pain    History of the Present Illness     Hawa Cheek is a 73 y.o. right-hand-dominant female who reports to the office today for evaluation of her right shoulder.  Patient's daughter is present in the room and provides translation.  The pain a few months ago.  She denies any injury or trauma.  Pain is localized primarily to the anterior and lateral aspect of the shoulder.  Pain is worse with overhead reaching as well as reaching behind her back.  She also notes increased pain with lying on her right side at night.  She does note occasional radiation distally to the elbow.  She notes weakness due to the pain.  No instability.  She denies any numbness and tingling.  No fevers or chills.  She did see her PCP recently who prescribed a Medrol Dosepak.  She does note some improvement while taking this.    Physical Exam     /60   Ht 5' 2\" (1.575 m)   Wt 54 kg (119 lb)   BMI 21.77 kg/m²     Right shoulder: No gross deformity.  Skin intact without erythema ecchymosis or swelling.  No tenderness over the AC joint.  There is some tenderness over the anterior and lateral shoulder.  No posterior shoulder tenderness. PROM is , ER-abd 90, IR-abd 50.  Impingement signs are positive.  Empty can test is positive.  Speed's Test is positive.  Cross-body adduction test is negative.  4+ out of 5 forward flexion strength.  Sensation intact axillary, median, ulnar and radial nerves.  2+ radial pulse.    Eyes: Anicteric sclerae.  ENT: Trachea midline.  Lungs: Normal respiratory effort.  CV: " Capillary refill is less than 2 seconds.  Skin: Intact without erythema.  Lymph: No palpable lymphadenopathy.  Neuro: Sensation is grossly intact to light touch.  Psych: Mood and affect are appropriate.    Data Review     I have personally reviewed pertinent films in PACS, and my interpretation follows:    X-rays right shoulder 1/12/2024: No acute osseous abnormality.  No fracture or dislocation.  Mild degenerative changes glenohumeral and AC joints.    Past Medical History:   Diagnosis Date    Arthralgia of hip     left    Arthritis     Chronic pain disorder     low back and shoulder    Dyslipidemia     Hyperlipidemia     Hypertension     Neck pain     Pemphigus     Pre-diabetes     Shoulder pain     Stroke (HCC)     Vitamin D deficiency        Past Surgical History:   Procedure Laterality Date    CATARACT EXTRACTION Bilateral     COLONOSCOPY      OK COLONOSCOPY FLX DX W/COLLJ SPEC WHEN PFRMD N/A 11/10/2016    Procedure: COLONOSCOPY;  Surgeon: Tom Carter MD;  Location: [facility];  Service: Gastroenterology    OK INCISION EXTENSOR TENDON SHEATH WRIST Left 7/12/2021    Procedure: RELEASE LEFT DEQUERVAINS;  Surgeon: Jt White MD;  Location:  MAIN OR;  Service: Orthopedics    OK INCISION EXTENSOR TENDON SHEATH WRIST Right 7/26/2021    Procedure: RELEASE RIGHT DEQUERVAINS;  Surgeon: Jt White MD;  Location:  MAIN OR;  Service: Orthopedics    OK XCAPSL CTRC RMVL INSJ IO LENS PROSTH W/O ECP Left 9/27/2018    Procedure: EXTRACAPSULAR CATARACT REMOVAL/INSERTION OF INTRAOCULAR LENS;  Surgeon: Alfred Stephens MD;  Location:  MAIN OR;  Service: Ophthalmology       No Known Allergies    Current Outpatient Medications on File Prior to Visit   Medication Sig Dispense Refill    acetaminophen (TYLENOL) 500 mg tablet Take 500 mg by mouth every 6 (six) hours as needed for mild pain      amLODIPine (NORVASC) 5 mg tablet take 1 tablet by mouth once daily 90 tablet 0    ammonium lactate (LAC-HYDRIN) 12 % lotion apply  topically twice a day 400 g 0    ammonium lactate (LAC-HYDRIN) 12 % lotion Apply topically 2 (two) times a day 400 g 0    aspirin (Aspirin Low Dose) 81 mg EC tablet Take 1 tablet (81 mg total) by mouth daily 90 tablet 0    atorvastatin (LIPITOR) 40 mg tablet take 1 tablet by mouth every evening 90 tablet 0    celecoxib (CeleBREX) 50 MG capsule Take 1 capsule (50 mg total) by mouth 2 (two) times a day as needed for mild pain 90 capsule 0    Diclofenac Sodium (VOLTAREN) 1 % Apply 2 g topically 4 (four) times a day 350 g 0    Elastic Bandages & Supports (Thumb Splint/Neoprene Medium) MISC by Does not apply route daily 2 each 0    fluticasone (FLONASE) 50 mcg/act nasal spray 2 sprays into each nostril daily 16 g 0    memantine (NAMENDA) 5 mg tablet take 1 tablet by mouth twice a day 180 tablet 0    methylPREDNISolone 4 MG tablet therapy pack Use as directed on package 21 each 0    omega-3-acid ethyl esters (LOVAZA) 1 g capsule take 2 capsules by mouth twice a day 180 capsule 0    omega-3-acid ethyl esters (LOVAZA) 1 g capsule Take 2 capsules (2 g total) by mouth 2 (two) times a day 180 capsule 0    omeprazole (PriLOSEC) 20 mg delayed release capsule take 1 capsule by mouth daily BEFORE BREAKFAST 90 capsule 1    oxyCODONE (Roxicodone) 5 immediate release tablet Take 1 tablet (5 mg total) by mouth 2 (two) times a day as needed for moderate pain Max Daily Amount: 10 mg 20 tablet 0    VITAMIN D PO Take 1 tablet by mouth daily       No current facility-administered medications on file prior to visit.       Social History     Tobacco Use    Smoking status: Never    Smokeless tobacco: Never   Vaping Use    Vaping status: Never Used   Substance Use Topics    Alcohol use: Never    Drug use: No       Family History   Problem Relation Age of Onset    Hyperlipidemia Mother     Hypertension Mother     Heart disease Father     No Known Problems Sister     No Known Problems Sister     No Known Problems Sister     No Known Problems  Maternal Aunt     No Known Problems Paternal Aunt     No Known Problems Paternal Aunt        Review of Systems     As stated in the HPI. All other systems reviewed and are negative.      Large joint arthrocentesis: R subacromial bursa  Procedure Details  Location: shoulder - R subacromial bursa  Needle size: 22 G  Ultrasound guidance: no  Approach: posterior  Medications administered: 2 mL bupivacaine 0.25 %; 2 mL lidocaine 1 %; 1 mL methylPREDNISolone acetate 40 mg/mL    Patient tolerance: patient tolerated the procedure well with no immediate complications  Dressing:  Sterile dressing applied

## 2024-01-24 ENCOUNTER — EVALUATION (OUTPATIENT)
Dept: PHYSICAL THERAPY | Facility: CLINIC | Age: 74
End: 2024-01-24
Payer: MEDICARE

## 2024-01-24 VITALS — SYSTOLIC BLOOD PRESSURE: 124 MMHG | DIASTOLIC BLOOD PRESSURE: 72 MMHG

## 2024-01-24 DIAGNOSIS — M77.8 RIGHT SHOULDER TENDONITIS: ICD-10-CM

## 2024-01-24 DIAGNOSIS — G89.29 CHRONIC RIGHT SHOULDER PAIN: ICD-10-CM

## 2024-01-24 DIAGNOSIS — M25.511 CHRONIC RIGHT SHOULDER PAIN: ICD-10-CM

## 2024-01-24 PROCEDURE — 97162 PT EVAL MOD COMPLEX 30 MIN: CPT

## 2024-01-24 PROCEDURE — 97530 THERAPEUTIC ACTIVITIES: CPT

## 2024-01-24 NOTE — PROGRESS NOTES
PT Evaluation     Today's date: 2024  Patient name: Hawa Cheek  : 1950  MRN: [id number]  Referring provider: Casandra Mixon MD  Dx:   Encounter Diagnosis     ICD-10-CM    1. Right shoulder tendonitis  M77.8 Ambulatory Referral to Physical Therapy      2. Chronic right shoulder pain  M25.511 Ambulatory Referral to Physical Therapy    G89.29           Start Time: 1038          Assessment  Assessment details: Hawa Cheek  is a pleasant 73 y.o. female who presents with severe R shoulder pain and mobility deficits  The primary movement problem is likely impingement as most pain is noted with overhead motion however difficulty distinguishing due to increased hyperalgesia, movement fear and increased guarding throughout limiting mobility tolerance  resulting in movement hesitancy and decreased activation, which limit her ability to reach, perform ADLS and lift secondary to high levels of pain.  No referral is necessary at this time based on examination results. Educated on importance of stretching and relaxing to reduce increased guarding and pain.       Problem List:  1) Decreased AROM/ PROM in the shoulder secondary to pain   2) Decreased UE strength/ activation   3) Postural deficits     Tolerated session without adverse effects.  Pt. will benefit from skilled PT services that includes manual therapy techniques to enhance tissue extensibility, neuromuscular re-education to facilitate motor control, therapeutic exercise to increase functional mobility, and modalities prn to reduce pain and inflammation.   Impairments: impaired physical strength and scapular dyskinesis    Symptom irritability: moderateUnderstanding of Dx/Px/POC: good   Prognosis: good    Goals  Impairment Goals  - Pt I with initial HEP in 1-2 visits  - Improve AROM equal to contralateral side in 4-6 weeks without pain   - Increase strength to 5/5 in all affected areas in 4-6 weeks    Functional Goals  - Increase Functional  Status Measure to: MDC by d/c   - Patient will be independent with comprehensive HEP in 6-8 weeks  - Patient will be able to reach for object from shelf at shoulder height with min reports of difficulty in 2-4 weeks  - Patient will be able to reach for object overhead with min to difficulty in 6-8 weeks  - Patient will be able to lift/carry objects without provocation of symptoms in 6-8 weeks     Plan  Plan details: Educated on progressing exercises as tolerated  Exercises to be provided NV due to high pain levels, increased time for eval and provocation of pain with all movement  Patient would benefit from: PT eval and skilled physical therapy  Referral necessary: No  Planned modality interventions: cryotherapy and thermotherapy: hydrocollator packs  Planned therapy interventions: IASTM, joint mobilization, kinesiology taping, manual therapy, neuromuscular re-education, patient education, postural training, strengthening, stretching, therapeutic activities, therapeutic exercise, home exercise program, functional ROM exercises and flexibility  Frequency: 2x week  Duration in weeks: 8  Treatment plan discussed with: patient and family      Subjective Evaluation    History of Present Illness  Mechanism of injury: Hawa Cheek presents with c/c of R shoulder pain. Symptoms began in October/ November  with no known  mechanism of injury. Denies numbness/ tingling in the R UE. Patient is R handed. Denies previous injury on the shoulder.   Aggravating factors: lifting the arm, lifting overhead and behind the head   Relieving factors: not moving the arm, oral medications, steroid injection   24hr pain pattern: 2/10 (current), 0/10 (best), 10/10 (worst), location:front of the R shoulder/ top , descriptors: intense   Imaging: RIGHT SHOULDER     INDICATION:   Other enthesopathies, not elsewhere classified.      COMPARISON:  There are no previous examinations available for comparison.     VIEWS:  XR SHOULDER 2+ VW  "RIGHT  Images: 5     FINDINGS:     There is no acute fracture or dislocation.     Moderate osteoarthritis of the glenohumeral and acromioclavicular joints.     No lytic or blastic osseous lesion.     Soft tissues are unremarkable.     IMPRESSION:        No acute osseous abnormality.    Previous treatments: cortisone injection 2 weeks ago, notes relief   Patient goals: \" to get rid of pain\"    Translation services utilized via ben           Recurrent probem    Quality of life: good        Objective     Postural Observations  Seated posture: fair  Standing posture: fair    Additional Postural Observation Details  Forward shoulder noted throughout     Observations     Right Shoulder  Positive for atrophy.     Additional Observation Details  Fear of movement noted throughout hesitancy with assisted motion     Palpation     Additional Palpation Details  Hyperalgesia noted throughout  Increased fear of movement and touch noted secondary in part to fear of pain elicitation noted  Mild bicep tenderness noted increased pec restrictions (note inconsistent response to palpation)   No tenderness on bony aspects   Upper trap restrictions noted no increased tenderness noted with palpation     Cervical/Thoracic Screen   Cervical range of motion within normal limits with the following exceptions: ext WNL No pain   Flex WNL no pain   Lat flex B no pain WNL   Rotation B no pain WNL    Neurological Testing     Sensation     Shoulder     Comments   Left light touch: C3-T1  Right light touch: C3-T1    Active Range of Motion   Left Shoulder   Flexion: WFL and with pain  Abduction: WFL  External rotation BTH: T4   Internal rotation BTB: T10     Right Shoulder   Flexion: 140 degrees with pain  Abduction: 85 degrees with pain  External rotation BTH: T4 with pain  Internal rotation BTB: L2 with pain    Passive Range of Motion   Left Shoulder   Normal passive range of motion    Right Shoulder   Flexion: 180 degrees   Abduction: 100 degrees " with pain  External rotation 0°: WFL and with pain  External rotation 90°: WFL and with pain    Additional Passive Range of Motion Details  Difficulty relaxing throughout   Increased hesitancy with performing movement throughout     Strength/Myotome Testing     Left Shoulder     Planes of Motion   Flexion: 4-   Abduction: 3   External rotation at 0°: 4   Internal rotation at 45°: 4     Isolated Muscles   Biceps: 4   Middle trapezius: 3     Right Shoulder     Planes of Motion   Flexion: 3+   Abduction: 3   External rotation at 0°: 3   Internal rotation at 45°: 3     Isolated Muscles   Biceps: 4   Middle trapezius: 3     Additional Strength Details  Patient very guarded and hesitant to move throughout the examination MMT based on AROM   Patient moves arm better freely than with the examination     Tests     Right Shoulder   Positive empty can, full can, painful arc and passive horizontal adduction.   Negative belly press, Hawkin's, Neer's, ULTT1, ULTT2, ULTT3 and ULTT4.     Additional Tests Details  Unable to complete resistance RTC testing as patient did not resist secondary perhaps to pain       Flowsheet Rows      Flowsheet Row Most Recent Value   PT/OT G-Codes    Current Score 40   Projected Score 52               Precautions: GERD without esophagitis, essential HTN, CVA, acquired trigger finger, hyperlipidemia, neck pain, see med history     PROVIDE HEP NV - not provided due to time/ pain levels       Manuals 1/24            PROM if tolerated             STM to UE                                        Neuro Re-Ed 1/24            [place] rows/ ext              Iso activation                                                                               Ther Ex 1/25            Pball flex stretch              Cane ER/ abd              Post capsule stretch             Upper trap stretch              Levator stretch                                                     Ther Activity 1/24            Pulleys               Patient education  KR                                      FOTO             Modalities 1/24            CP prn

## 2024-01-30 ENCOUNTER — OFFICE VISIT (OUTPATIENT)
Dept: PHYSICAL THERAPY | Facility: CLINIC | Age: 74
End: 2024-01-30
Payer: MEDICARE

## 2024-01-30 DIAGNOSIS — G89.29 CHRONIC RIGHT SHOULDER PAIN: ICD-10-CM

## 2024-01-30 DIAGNOSIS — M77.8 RIGHT SHOULDER TENDONITIS: Primary | ICD-10-CM

## 2024-01-30 DIAGNOSIS — M25.511 CHRONIC RIGHT SHOULDER PAIN: ICD-10-CM

## 2024-01-30 PROCEDURE — 97140 MANUAL THERAPY 1/> REGIONS: CPT

## 2024-01-30 PROCEDURE — 97112 NEUROMUSCULAR REEDUCATION: CPT

## 2024-01-30 PROCEDURE — 97110 THERAPEUTIC EXERCISES: CPT

## 2024-01-30 NOTE — PROGRESS NOTES
"Daily Note     Today's date: 2024  Patient name: Hawa Cheek  : 1950  MRN: [id number]  Referring provider: Casandra Mixon MD  Dx:   Encounter Diagnosis     ICD-10-CM    1. Right shoulder tendonitis  M77.8       2. Chronic right shoulder pain  M25.511     G89.29                      Subjective: \" I felt better after the eval\"       Objective: See treatment diary below      Assessment: Laure presents to therapy with shoulder pain. Requires heavy cuing throughout both manually and verbally with translation by daughter for exercises performance. Added strengthening and mobility exercises to tolerance with encouragement for slowed performance and increased posterior chain activation. Increased UT overactivaiton noted throughout. Educated on stretching vs. Strengthening.  Tolerated session without adverse effects. Recommend continued skilled therapy to improve overall strength and mobility for functional return with decreased compensation and pain.        Plan: Continue per plan of care.      Precautions: GERD without esophagitis, essential HTN, CVA, acquired trigger finger, hyperlipidemia, neck pain, see med history     PROVIDE HEP NV - not provided due to time/ pain levels       Manuals            PROM if tolerated             STM to UE   To pec to leonela                                      Neuro Re-Ed            [place] rows/ ext   RTB x 20 ea            Iso activation              No moneys   PTB x10            Thoracic ext   10                                                  Ther Ex            Pball flex stretch   10x10\"            Cane ER/ abd   10x10\" ea           Post capsule stretch  5x10\"            Upper trap stretch   5x10\"           Levator stretch              Cane flexion   10x5\"            Pec stretch supine   3x20\"                         Ther Activity            Pulleys   3' to leonela            Patient education  KR KR                            "           FOTO             Modalities 1/24            CP prn

## 2024-02-01 ENCOUNTER — OFFICE VISIT (OUTPATIENT)
Dept: PHYSICAL THERAPY | Facility: CLINIC | Age: 74
End: 2024-02-01
Payer: MEDICARE

## 2024-02-01 DIAGNOSIS — M77.8 RIGHT SHOULDER TENDONITIS: Primary | ICD-10-CM

## 2024-02-01 DIAGNOSIS — G89.29 CHRONIC RIGHT SHOULDER PAIN: ICD-10-CM

## 2024-02-01 DIAGNOSIS — M25.511 CHRONIC RIGHT SHOULDER PAIN: ICD-10-CM

## 2024-02-01 PROCEDURE — 97110 THERAPEUTIC EXERCISES: CPT

## 2024-02-01 PROCEDURE — 97530 THERAPEUTIC ACTIVITIES: CPT

## 2024-02-01 PROCEDURE — 97112 NEUROMUSCULAR REEDUCATION: CPT

## 2024-02-01 NOTE — PROGRESS NOTES
"Daily Note     Today's date: 2024  Patient name: Hawa Cheek  : 1950  MRN: [id number]  Referring provider: Casandra Mixon MD  Dx:   Encounter Diagnosis     ICD-10-CM    1. Right shoulder tendonitis  M77.8       2. Chronic right shoulder pain  M25.511     G89.29             Start Time: 1100          Subjective: \" I have no pain\"     Objective: See treatment diary below      Assessment: Laure presents to therapy with shoulder pain. Progressed strengthening exercises to tolerance noting mild provocation of symptoms with ER.  Continued to exhibit increased restrictions in the UT and cervical region with increased shoulder compensation noted- heavy cuing to reduce. Heavy tactile and verbal cuing provided throughout.  Tolerated session without adverse effects. Recommend continued skilled therapy to improve overall strength and mobility for functional return with decreased compensation and pain.        Plan: Continue per plan of care.      Precautions: GERD without esophagitis, essential HTN, CVA, acquired trigger finger, hyperlipidemia, neck pain, see med history     PROVIDE HEP NV - not provided due to time/ pain levels       Manuals           PROM if tolerated             STM to UE   To pec to leonela  Pec/ deltoid and bicep to leonela KR                                     Neuro Re-Ed  2          [place] rows/ ext   RTB x 20 ea  RTB x 20 ea          Iso activation    nv          No moneys   PTB x10  No band x 20           Thoracic ext   10 10          IR/ ER    RTB x 20 ea                                     Ther Ex  2          Pball flex stretch   10x10\"  10x10\"           Cane ER/ abd   10x10\" ea 10x10\"  ea          Post capsule stretch  5x10\"  5x10\"           Upper trap stretch   5x10\" 5x10\"           Levator stretch    5x10\"           Cane flexion   10x5\"  5\" x 10           Pec stretch supine   3x20\"  nv          Bicep curls    nv          Ther Activity  " 2/1          Pulleys   3' to leonela  5' to leonela           Patient education  KR SHYLA                                      FOTO             Modalities 1/24            CP prn

## 2024-02-06 ENCOUNTER — OFFICE VISIT (OUTPATIENT)
Dept: PHYSICAL THERAPY | Facility: CLINIC | Age: 74
End: 2024-02-06
Payer: MEDICARE

## 2024-02-06 DIAGNOSIS — G89.29 CHRONIC RIGHT SHOULDER PAIN: ICD-10-CM

## 2024-02-06 DIAGNOSIS — M77.8 RIGHT SHOULDER TENDONITIS: Primary | ICD-10-CM

## 2024-02-06 DIAGNOSIS — M25.511 CHRONIC RIGHT SHOULDER PAIN: ICD-10-CM

## 2024-02-06 PROCEDURE — 97112 NEUROMUSCULAR REEDUCATION: CPT

## 2024-02-06 PROCEDURE — 97110 THERAPEUTIC EXERCISES: CPT

## 2024-02-06 NOTE — PROGRESS NOTES
"Daily Note     Today's date: 2024  Patient name: Hawa Cheek  : 1950  MRN: [id number]  Referring provider: Casandra Mixon MD  Dx:   Encounter Diagnosis     ICD-10-CM    1. Right shoulder tendonitis  M77.8       2. Chronic right shoulder pain  M25.511     G89.29             Start Time: 1055  Stop Time: 1130  Total time in clinic (min): 35 minutes    Subjective: Pt reports she is feeling ok today, no new c/o pain or symptoms.     Objective: See treatment diary below      Assessment: Laure presents to therapy with shoulder pain. Pt continues to work towards goals of increased UE ROM and function. Recommend continued skilled therapy to improve overall strength and mobility for functional return with decreased compensation and pain.  Continue to progress as able.       Plan: Continue per plan of care.      Precautions: GERD without esophagitis, essential HTN, CVA, acquired trigger finger, hyperlipidemia, neck pain, see med history     PROVIDE HEP NV - not provided due to time/ pain levels       Manuals          PROM if tolerated             STM to UE   To pec to leonela  Pec/ deltoid and bicep to leonela KR                                     Neuro Re-Ed          [place] rows/ ext   RTB x 20 ea  RTB x 20 ea RTB x 20 ea         Iso activation    nv          No moneys   PTB x10  No band x 20  No band x 20         Thoracic ext   10 10 10x         IR/ ER    RTB x 20 ea  RTB x 20 ea                                    Ther Ex  2         Pball flex stretch   10x10\"  10x10\"  10x10\"         Cane ER/ abd   10x10\" ea 10x10\"  ea 10x10\"  ea         Post capsule stretch  5x10\"  5x10\"  5x10\"          Upper trap stretch   5x10\" 5x10\"  5x10\"          Levator stretch    5x10\"  5x10\"          Cane flexion   10x5\"  5\" x 10  5\" x 10          Pec stretch supine   3x20\"  nv 3x20\"         Bicep curls    nv          Ther Activity  2         Pulleys   3' to leonela  5' " to leonela  5' to leonela          Patient education  SHYLA MANSFIELD                                      FOTO             Modalities 1/24            CP prn

## 2024-02-08 ENCOUNTER — OFFICE VISIT (OUTPATIENT)
Dept: PHYSICAL THERAPY | Facility: CLINIC | Age: 74
End: 2024-02-08
Payer: MEDICARE

## 2024-02-08 DIAGNOSIS — M25.511 CHRONIC RIGHT SHOULDER PAIN: ICD-10-CM

## 2024-02-08 DIAGNOSIS — M77.8 RIGHT SHOULDER TENDONITIS: Primary | ICD-10-CM

## 2024-02-08 DIAGNOSIS — G89.29 CHRONIC RIGHT SHOULDER PAIN: ICD-10-CM

## 2024-02-08 PROCEDURE — 97112 NEUROMUSCULAR REEDUCATION: CPT

## 2024-02-08 PROCEDURE — 97110 THERAPEUTIC EXERCISES: CPT

## 2024-02-08 NOTE — PROGRESS NOTES
"Daily Note     Today's date: 2024  Patient name: Hawa Cheek  : 1950  MRN: [id number]  Referring provider: Casandra Mixon MD  Dx:   Encounter Diagnosis     ICD-10-CM    1. Right shoulder tendonitis  M77.8       2. Chronic right shoulder pain  M25.511     G89.29             Start Time: 0900  Stop Time: 0950  Total time in clinic (min): 50 minutes    Subjective: Pt reports she is feeling ok today, no new c/o pain or symptoms.     Objective: See treatment diary below      Assessment: Pt tolerated today's session well with no adverse symptoms. Pt continues to show limitations in pec and UE ROM. Recommend continued skilled therapy to improve overall strength and mobility for functional return with decreased compensation and pain.  Continue to progress as able.       Plan: Continue per plan of care.      Precautions: GERD without esophagitis, essential HTN, CVA, acquired trigger finger, hyperlipidemia, neck pain, see med history     PROVIDE HEP NV - not provided due to time/ pain levels       Manuals         PROM if tolerated             STM to UE   To pec to leonela  Pec/ deltoid and bicep to leonela KR   nv             FOTO DC                     Neuro Re-Ed         [place] rows/ ext   RTB x 20 ea  RTB x 20 ea RTB x 20 ea RTB x 20 ea        Iso activation    nv          No moneys   PTB x10  No band x 20  No band x 20 No band x 20        Thoracic ext   10 10 10x 10x        IR/ ER    RTB x 20 ea  RTB x 20 ea  RTB x 20 ea                                   Ther Ex  2        Pball flex stretch   10x10\"  10x10\"  10x10\" 10x10\"        Cane ER/ abd   10x10\" ea 10x10\"  ea 10x10\"  ea 10x10\"        Post capsule stretch  5x10\"  5x10\"  5x10\"  5x10\"        Upper trap stretch   5x10\" 5x10\"  5x10\"  5x10\"        Levator stretch    5x10\"  5x10\"  5x10\"        Cane flexion   10x5\"  5\" x 10  5\" x 10  nv        Pec stretch supine   3x20\"  nv 3x20\" nv        Bicep curls  "   nv          Ther Activity 1/24 1/30 2/1 2/6 2/8        Pulleys   3' to leonela  5' to leonela  5' to leonela  5' to leonela         Patient education  KR KR                                      FOTO             Modalities 1/24 2/8        CP prn

## 2024-02-15 ENCOUNTER — OFFICE VISIT (OUTPATIENT)
Dept: PHYSICAL THERAPY | Facility: CLINIC | Age: 74
End: 2024-02-15
Payer: MEDICARE

## 2024-02-15 DIAGNOSIS — M25.511 CHRONIC RIGHT SHOULDER PAIN: ICD-10-CM

## 2024-02-15 DIAGNOSIS — G89.29 CHRONIC RIGHT SHOULDER PAIN: ICD-10-CM

## 2024-02-15 DIAGNOSIS — M77.8 RIGHT SHOULDER TENDONITIS: Primary | ICD-10-CM

## 2024-02-15 PROCEDURE — 97110 THERAPEUTIC EXERCISES: CPT

## 2024-02-15 NOTE — PROGRESS NOTES
"Daily Note     Today's date: 2/15/2024  Patient name: Hawa Cheek  : 1950  MRN: [id number]  Referring provider: Casandra Mixon MD  Dx:   Encounter Diagnosis     ICD-10-CM    1. Right shoulder tendonitis  M77.8       2. Chronic right shoulder pain  M25.511     G89.29                      Subjective:  Patient states she is in pain today.       Objective: See treatment diary below      Assessment: Tolerated treatment fair. Initiated POC on pulleys for active warmup. Resumed with prescribed POC. She did complain of pain throughout session. ROM is limited secondary to pain. Patient demonstrated fatigue post treatment and would benefit from continued PT      Plan: Continue per plan of care.      Precautions: GERD without esophagitis, essential HTN, CVA, acquired trigger finger, hyperlipidemia, neck pain, see med history     PROVIDE HEP NV - not provided due to time/ pain levels       Manuals 1/24 1/30 2/1 2/6 2/8 2/15       PROM if tolerated             STM to UE   To pec to leonela  Pec/ deltoid and bicep to leonela KR   nv Nv             FOTO DC                     Neuro Re-Ed 1/24 1/30 2/1 2/6 2/8 2/15       [place] rows/ ext   RTB x 20 ea  RTB x 20 ea RTB x 20 ea RTB x 20 ea RTB 20x ea        Iso activation    nv          No moneys   PTB x10  No band x 20  No band x 20 No band x 20 No band x 20       Thoracic ext   10 10 10x 10x        IR/ ER    RTB x 20 ea  RTB x 20 ea  RTB x 20 ea  RTB 20 ea                                  Ther Ex 1/25 1/30 2/1 2/6 2/8 2/15       Pball flex stretch   10x10\"  10x10\"  10x10\" 10x10\" 10\"x10        Cane ER/ abd   10x10\" ea 10x10\"  ea 10x10\"  ea 10x10\" 10\"x10       Post capsule stretch  5x10\"  5x10\"  5x10\"  5x10\" NV       Upper trap stretch   5x10\" 5x10\"  5x10\"  5x10\" 5\" x 10       Levator stretch    5x10\"  5x10\"  5x10\" 5\" x10       Cane flexion   10x5\"  5\" x 10  5\" x 10  nv Nv        Pec stretch supine   3x20\"  nv 3x20\" nv Nv        Bicep curls    nv          Ther Activity  " 1/30 2/1 2/6 2/8 2/15       Pulleys   3' to leonela  5' to leonela  5' to leonela  5' to leonela  5' to leonela       Patient education  KR KR                                      FOTO             Modalities 1/24    2/8 2/15       CP prn

## 2024-02-20 ENCOUNTER — OFFICE VISIT (OUTPATIENT)
Dept: PHYSICAL THERAPY | Facility: CLINIC | Age: 74
End: 2024-02-20
Payer: MEDICARE

## 2024-02-20 DIAGNOSIS — G89.29 CHRONIC RIGHT SHOULDER PAIN: ICD-10-CM

## 2024-02-20 DIAGNOSIS — M77.8 RIGHT SHOULDER TENDONITIS: Primary | ICD-10-CM

## 2024-02-20 DIAGNOSIS — M25.511 CHRONIC RIGHT SHOULDER PAIN: ICD-10-CM

## 2024-02-20 PROCEDURE — 97110 THERAPEUTIC EXERCISES: CPT

## 2024-02-20 NOTE — PROGRESS NOTES
"Daily Note     Today's date: 2024  Patient name: Hawa Cheek  : 1950  MRN: [id number]  Referring provider: Casandra Mixon MD  Dx:   Encounter Diagnosis     ICD-10-CM    1. Right shoulder tendonitis  M77.8       2. Chronic right shoulder pain  M25.511     G89.29           Start Time: 1100  Stop Time: 1135  Total time in clinic (min): 35 minutes    Subjective:  Pt states she is feeling some pain and soreness in the shoulder today.       Objective: See treatment diary below      Assessment: Tolerated treatment fair. Pt continues to work towards goals of increased ROM and shoulder function. Pt continues to show limitations in shoulder ER strength and ROM. Patient demonstrated fatigue post treatment and would benefit from continued PT. Continue to progress as able.       Plan: Continue per plan of care.      Precautions: GERD without esophagitis, essential HTN, CVA, acquired trigger finger, hyperlipidemia, neck pain, see med history     PROVIDE HEP NV - not provided due to time/ pain levels       Manuals 1/24 1/30 2/1 2/6 2/8 2/15 2/20      PROM if tolerated                          STM to UE   To pec to leonela  Pec/ deltoid and bicep to leonela KR   nv Nv             FOTO DC                     Neuro Re-Ed 1/24 1/30 2/1 2/6 2/8 2/15 2/20      [place] rows/ ext   RTB x 20 ea  RTB x 20 ea RTB x 20 ea RTB x 20 ea RTB 20x ea  RTB 20x ea       Iso activation    nv          No moneys   PTB x10  No band x 20  No band x 20 No band x 20 No band x 20 No band x 20x      Thoracic ext   10 10 10x 10x        IR/ ER    RTB x 20 ea  RTB x 20 ea  RTB x 20 ea  RTB 20 ea  RTB 20 ea                                 Ther Ex 1/25 1/30 2/1 2/6 2/8 2/15 2/20      Pball flex stretch   10x10\"  10x10\"  10x10\" 10x10\" 10\"x10  10x10\"      Cane ER/ abd   10x10\" ea 10x10\"  ea 10x10\"  ea 10x10\" 10\"x10 10x10\"      Post capsule stretch  5x10\"  5x10\"  5x10\"  5x10\" NV 5x10\"      Upper trap stretch   5x10\" 5x10\"  5x10\"  5x10\" 5\" x 10 3x30\"    " "  Levator stretch    5x10\"  5x10\"  5x10\" 5\" x10 10x5\"      Cane flexion   10x5\"  5\" x 10  5\" x 10  nv Nv  10x10\"      Pec stretch supine   3x20\"  nv 3x20\" nv Nv  3x20\"      Finger ladder        10x10\"      Bicep curls    nv          Ther Activity 1/24 1/30 2/1 2/6 2/8 2/15 2/20      Pulleys   3' to leonela  5' to leonela  5' to leonela  5' to leonela  5' to leonela 5' to leonela      Patient education  KR KR                                      FOTO             Modalities 1/24    2/8 2/15 2/20      CP prn                                    "

## 2024-02-22 ENCOUNTER — OFFICE VISIT (OUTPATIENT)
Dept: PHYSICAL THERAPY | Facility: CLINIC | Age: 74
End: 2024-02-22
Payer: MEDICARE

## 2024-02-22 DIAGNOSIS — M25.511 CHRONIC RIGHT SHOULDER PAIN: ICD-10-CM

## 2024-02-22 DIAGNOSIS — G89.29 CHRONIC RIGHT SHOULDER PAIN: ICD-10-CM

## 2024-02-22 DIAGNOSIS — M77.8 RIGHT SHOULDER TENDONITIS: Primary | ICD-10-CM

## 2024-02-22 PROCEDURE — 97110 THERAPEUTIC EXERCISES: CPT

## 2024-02-22 NOTE — PROGRESS NOTES
"Daily Note     Today's date: 2024  Patient name: Hawa Cheek  : 1950  MRN: [id number]  Referring provider: Casandra Mixon MD  Dx:   Encounter Diagnosis     ICD-10-CM    1. Right shoulder tendonitis  M77.8       2. Chronic right shoulder pain  M25.511     G89.29           Start Time: 1155  Stop Time: 1245  Total time in clinic (min): 50 minutes    Subjective:  Pt states she is feeling some pain and soreness in the shoulder with shoulder elevation and flexion.       Objective: See treatment diary below      Assessment: Tolerated treatment well. Pt continues to work towards goals of increased ROM and shoulder function. Pt has MD appointment scheduled tomorrow, state she wants to wait to schedule more appointment until her appointment. Patient demonstrated fatigue post treatment and would benefit from continued PT. Continue to progress as able. Possible DC depending on MD assessment.       Plan: Continue per plan of care.      Precautions: GERD without esophagitis, essential HTN, CVA, acquired trigger finger, hyperlipidemia, neck pain, see med history         Manuals 1/24 1/30 2/1 2/6 2/8 2/15 2/20 2/22     PROM if tolerated                          STM to UE   To pec to leonela  Pec/ deltoid and bicep to leonela KR   nv Nv             FOTO DC                     Neuro Re-Ed 1/24 1/30 2/1 2/6 2/8 2/15 2/20 2/22     [place] rows/ ext   RTB x 20 ea  RTB x 20 ea RTB x 20 ea RTB x 20 ea RTB 20x ea  RTB 20x ea  GTB 20x ea     Iso activation    nv          No moneys   PTB x10  No band x 20  No band x 20 No band x 20 No band x 20 No band x 20x No band x 20x     Thoracic ext   10 10 10x 10x        IR/ ER    RTB x 20 ea  RTB x 20 ea  RTB x 20 ea  RTB 20 ea  RTB 20 ea  RTB 20 ea                                Ther Ex 1/25 1/30 2/1 2/6 2/8 2/15 2/20 2/22     Pball flex stretch   10x10\"  10x10\"  10x10\" 10x10\" 10\"x10  10x10\" 10x10\"     Cane ER/ abd   10x10\" ea 10x10\"  ea 10x10\"  ea 10x10\" 10\"x10 10x10\" 10x10\"     Post " "capsule stretch  5x10\"  5x10\"  5x10\"  5x10\" NV 5x10\" 5x10\"     Upper trap stretch   5x10\" 5x10\"  5x10\"  5x10\" 5\" x 10 3x30\" 3x30\"     Levator stretch    5x10\"  5x10\"  5x10\" 5\" x10 10x5\" 10x5\"     Cane flexion   10x5\"  5\" x 10  5\" x 10  nv Nv  10x10\" 10x10\"     Pec stretch supine   3x20\"  nv 3x20\" nv Nv  3x20\" 3x20\"     Finger ladder        10x10\" 10x10\"     Bicep curls    nv          Ther Activity 1/24 1/30 2/1 2/6 2/8 2/15 2/20 2/22     Pulleys   3' to leonela  5' to leonela  5' to leonela  5' to leonela  5' to leonela 5' to leonela 5' to leonela     Patient education  KR KR                                      FOTO             Modalities 1/24    2/8 2/15 2/20 2/22     CP prn                                    "

## 2024-02-23 ENCOUNTER — OFFICE VISIT (OUTPATIENT)
Dept: OBGYN CLINIC | Facility: CLINIC | Age: 74
End: 2024-02-23
Payer: MEDICARE

## 2024-02-23 VITALS — HEIGHT: 62 IN | WEIGHT: 119 LBS | BODY MASS INDEX: 21.9 KG/M2

## 2024-02-23 DIAGNOSIS — M75.41 IMPINGEMENT SYNDROME OF RIGHT SHOULDER: Primary | ICD-10-CM

## 2024-02-23 PROCEDURE — 99213 OFFICE O/P EST LOW 20 MIN: CPT | Performed by: PHYSICIAN ASSISTANT

## 2024-02-23 NOTE — PROGRESS NOTES
"Patient Name:  Hawa Cheek  MRN:  [id number]    Assessment & Plan     Improving right shoulder rotator cuff tendinitis/bursitis.  Patient notes 50 to 60% improvement in her symptoms since initial presentation.  Patient would like to continue to maximize conservative management at this time.  She enjoys participating in formal physical therapy.  New referral placed today for continued formal therapy.  She may transition to home exercise program as well.  Tylenol as needed for pain.  Activities as tolerated with modification avoid pain.  Follow-up in 6 weeks.  Discussed repeat injection versus MRI at that time as indicated.    Chief Complaint     Follow-up right shoulder pain    History of the Present Illness     Hawa Cheek is a 73 y.o. female who returns to the office today for follow-up regarding her right shoulder.  She was initially seen on 1/12/2024.  At that time she received a right shoulder subacromial space corticosteroid injection.  She was also advised to proceed with physical therapy.  She returns to the office today noting approximately 50 to 60% of improvement since her initial presentation.  She still notes occasional discomfort in the lateral aspect of the shoulder worse with increased activity and lying on her right side at night.  She notes improvement in her range of motion and strength.  She denies any significant weakness or instability at this time.  No numbness or tingling.  No fevers or chills.  She currently takes nothing for pain.    Physical Exam     Ht 5' 2\" (1.575 m)   Wt 54 kg (119 lb)   BMI 21.77 kg/m²     Right shoulder: No gross deformity.  Skin intact.  No erythema ecchymosis or swelling.  Slight tenderness anterior and lateral shoulder.  No tenderness posterior shoulder and AC joint. PROM is , ER-abd 90, IR-abd 50.  Impingement signs are mildly positive.  Empty can and speeds tests are mildly positive as well.  Cross-body adduction test is negative.  5 out of 5 " forward flexion strength.  Sensation intact axillary, median, ulnar, and radial nerves.  2+ radial pulse.    Eyes: Anicteric sclerae.  ENT: Trachea midline.  Lungs: Normal respiratory effort.  CV: Capillary refill is less than 2 seconds.  Skin: Intact without erythema.  Lymph: No palpable lymphadenopathy.  Neuro: Sensation is grossly intact to light touch.  Psych: Mood and affect are appropriate.      Past Medical History:   Diagnosis Date    Arthralgia of hip     left    Arthritis     Chronic pain disorder     low back and shoulder    Dyslipidemia     Hyperlipidemia     Hypertension     Neck pain     Pemphigus     Pre-diabetes     Shoulder pain     Stroke (HCC)     Vitamin D deficiency        Past Surgical History:   Procedure Laterality Date    CATARACT EXTRACTION Bilateral     COLONOSCOPY      MN COLONOSCOPY FLX DX W/COLLJ SPEC WHEN PFRMD N/A 11/10/2016    Procedure: COLONOSCOPY;  Surgeon: Tom Carter MD;  Location: [facility];  Service: Gastroenterology    MN INCISION EXTENSOR TENDON SHEATH WRIST Left 7/12/2021    Procedure: RELEASE LEFT DEQUERVAINS;  Surgeon: Jt White MD;  Location:  MAIN OR;  Service: Orthopedics    MN INCISION EXTENSOR TENDON SHEATH WRIST Right 7/26/2021    Procedure: RELEASE RIGHT DEQUERVAINS;  Surgeon: Jt White MD;  Location:  MAIN OR;  Service: Orthopedics    MN XCAPSL CTRC RMVL INSJ IO LENS PROSTH W/O ECP Left 9/27/2018    Procedure: EXTRACAPSULAR CATARACT REMOVAL/INSERTION OF INTRAOCULAR LENS;  Surgeon: Alfred Stephens MD;  Location:  MAIN OR;  Service: Ophthalmology       No Known Allergies    Current Outpatient Medications on File Prior to Visit   Medication Sig Dispense Refill    acetaminophen (TYLENOL) 500 mg tablet Take 500 mg by mouth every 6 (six) hours as needed for mild pain      amLODIPine (NORVASC) 5 mg tablet take 1 tablet by mouth once daily 90 tablet 0    ammonium lactate (LAC-HYDRIN) 12 % lotion apply topically twice a day 400 g 0    ammonium lactate  (LAC-HYDRIN) 12 % lotion Apply topically 2 (two) times a day 400 g 0    aspirin (Aspirin Low Dose) 81 mg EC tablet Take 1 tablet (81 mg total) by mouth daily 90 tablet 0    atorvastatin (LIPITOR) 40 mg tablet take 1 tablet by mouth every evening 90 tablet 0    celecoxib (CeleBREX) 50 MG capsule Take 1 capsule (50 mg total) by mouth 2 (two) times a day as needed for mild pain 90 capsule 0    Diclofenac Sodium (VOLTAREN) 1 % Apply 2 g topically 4 (four) times a day 350 g 0    Elastic Bandages & Supports (Thumb Splint/Neoprene Medium) MISC by Does not apply route daily 2 each 0    fluticasone (FLONASE) 50 mcg/act nasal spray 2 sprays into each nostril daily 16 g 0    memantine (NAMENDA) 5 mg tablet take 1 tablet by mouth twice a day 180 tablet 0    methylPREDNISolone 4 MG tablet therapy pack Use as directed on package 21 each 0    omega-3-acid ethyl esters (LOVAZA) 1 g capsule take 2 capsules by mouth twice a day 180 capsule 0    omega-3-acid ethyl esters (LOVAZA) 1 g capsule Take 2 capsules (2 g total) by mouth 2 (two) times a day 180 capsule 0    omeprazole (PriLOSEC) 20 mg delayed release capsule take 1 capsule by mouth daily BEFORE BREAKFAST 90 capsule 1    oxyCODONE (Roxicodone) 5 immediate release tablet Take 1 tablet (5 mg total) by mouth 2 (two) times a day as needed for moderate pain Max Daily Amount: 10 mg 20 tablet 0    VITAMIN D PO Take 1 tablet by mouth daily       No current facility-administered medications on file prior to visit.       Social History     Tobacco Use    Smoking status: Never    Smokeless tobacco: Never   Vaping Use    Vaping status: Never Used   Substance Use Topics    Alcohol use: Never    Drug use: No       Family History   Problem Relation Age of Onset    Hyperlipidemia Mother     Hypertension Mother     Heart disease Father     No Known Problems Sister     No Known Problems Sister     No Known Problems Sister     No Known Problems Maternal Aunt     No Known Problems Paternal Aunt      No Known Problems Paternal Aunt        Review of Systems     As stated in the HPI. All other systems reviewed and are negative.

## 2024-02-29 ENCOUNTER — OFFICE VISIT (OUTPATIENT)
Dept: PHYSICAL THERAPY | Facility: CLINIC | Age: 74
End: 2024-02-29
Payer: MEDICARE

## 2024-02-29 DIAGNOSIS — M25.511 CHRONIC RIGHT SHOULDER PAIN: ICD-10-CM

## 2024-02-29 DIAGNOSIS — M77.8 RIGHT SHOULDER TENDONITIS: Primary | ICD-10-CM

## 2024-02-29 DIAGNOSIS — G89.29 CHRONIC RIGHT SHOULDER PAIN: ICD-10-CM

## 2024-02-29 DIAGNOSIS — M75.41 IMPINGEMENT SYNDROME OF RIGHT SHOULDER: ICD-10-CM

## 2024-02-29 PROCEDURE — 97110 THERAPEUTIC EXERCISES: CPT

## 2024-02-29 NOTE — PROGRESS NOTES
"Daily Note     Today's date: 2024  Patient name: Hawa Cheek  : 1950  MRN: [id number]  Referring provider: Casandra Mixon MD  Dx:   Encounter Diagnosis     ICD-10-CM    1. Right shoulder tendonitis  M77.8       2. Impingement syndrome of right shoulder  M75.41 Ambulatory Referral to Physical Therapy      3. Chronic right shoulder pain  M25.511     G89.29           Start Time: 1125  Stop Time: 1205  Total time in clinic (min): 40 minutes    Subjective:  Pt states she is feeling some pain and soreness in the shoulder with shoulder elevation and flexion.       Objective: See treatment diary below      Assessment: Tolerated treatment well. Pt continues to work towards goals of increased ROM and shoulder function. Pt has MD appointment scheduled tomorrow, state she wants to wait to schedule more appointment until her appointment. Patient demonstrated fatigue post treatment and would benefit from continued PT. Continue to progress as able.        Plan: Continue per plan of care.      Precautions: GERD without esophagitis, essential HTN, CVA, acquired trigger finger, hyperlipidemia, neck pain, see med history         Manuals 1/24 1/30 2/1 2/6 2/8 2/15 2/20 2/22 2/29    PROM if tolerated                          STM to UE   To pec to leonela  Pec/ deltoid and bicep to leonela KR   nv Nv             FOTO DC                     Neuro Re-Ed 1/24 1/30 2/1 2/6 2/8 2/15 2/20 2/22 2/29    [place] rows/ ext   RTB x 20 ea  RTB x 20 ea RTB x 20 ea RTB x 20 ea RTB 20x ea  RTB 20x ea  GTB 20x ea GTB 20x ea    Iso activation    nv          No moneys   PTB x10  No band x 20  No band x 20 No band x 20 No band x 20 No band x 20x No band x 20x No band x 20x    Thoracic ext   10 10 10x 10x        IR/ ER    RTB x 20 ea  RTB x 20 ea  RTB x 20 ea  RTB 20 ea  RTB 20 ea  RTB 20 ea  RTB 20 ea                               Ther Ex  2/ 2/8 2/15 2/20 2/22 2/29    Pball flex stretch   10x10\"  10x10\"  10x10\" 10x10\" 10\"x10  " "10x10\" 10x10\" 10x10\"    Cane ER/ abd   10x10\" ea 10x10\"  ea 10x10\"  ea 10x10\" 10\"x10 10x10\" 10x10\" 10x10\"    Post capsule stretch  5x10\"  5x10\"  5x10\"  5x10\" NV 5x10\" 5x10\" 5x10\"    Upper trap stretch   5x10\" 5x10\"  5x10\"  5x10\" 5\" x 10 3x30\" 3x30\" 3x30\"    Levator stretch    5x10\"  5x10\"  5x10\" 5\" x10 10x5\" 10x5\" 10x5\"    Cane flexion   10x5\"  5\" x 10  5\" x 10  nv Nv  10x10\" 10x10\" 10x10\"    Pec stretch supine   3x20\"  nv 3x20\" nv Nv  3x20\" 3x20\" 3x20\"    Finger ladder        10x10\" 10x10\" 10x10\"    Bicep riki    nv          Ther Activity 1/24 1/30 2/1 2/6 2/8 2/15 2/20 2/22 2/29    Pulleys   3' to leonela  5' to leonela  5' to leonela  5' to leonela  5' to leonela 5' to leoneal 5' to leonela 5'     Patient education  KR KR                                      FOTO             Modalities 1/24    2/8 2/15 2/20 2/22 2/29    CP prn                                    "

## 2024-03-05 ENCOUNTER — OFFICE VISIT (OUTPATIENT)
Dept: PHYSICAL THERAPY | Facility: CLINIC | Age: 74
End: 2024-03-05
Payer: MEDICARE

## 2024-03-05 DIAGNOSIS — M75.41 IMPINGEMENT SYNDROME OF RIGHT SHOULDER: Primary | ICD-10-CM

## 2024-03-05 DIAGNOSIS — M77.8 RIGHT SHOULDER TENDONITIS: ICD-10-CM

## 2024-03-05 DIAGNOSIS — G89.29 CHRONIC RIGHT SHOULDER PAIN: ICD-10-CM

## 2024-03-05 DIAGNOSIS — M25.511 CHRONIC RIGHT SHOULDER PAIN: ICD-10-CM

## 2024-03-05 PROCEDURE — 97112 NEUROMUSCULAR REEDUCATION: CPT

## 2024-03-05 PROCEDURE — 97110 THERAPEUTIC EXERCISES: CPT

## 2024-03-05 NOTE — PROGRESS NOTES
"Daily Note     Today's date: 3/5/2024  Patient name: Hawa Cheek  : 1950  MRN: [id number]  Referring provider: Casandra Mixon MD  Dx:   Encounter Diagnosis     ICD-10-CM    1. Impingement syndrome of right shoulder  M75.41       2. Right shoulder tendonitis  M77.8       3. Chronic right shoulder pain  M25.511     G89.29           Start Time: 1130  Stop Time: 1220  Total time in clinic (min): 50 minutes    Subjective:  Pt states she is feeling ok today, no new c/o pain or symptoms today.       Objective: See treatment diary below      Assessment: Tolerated treatment well. Pt continues to work towards goals of increased ROM and shoulder function. Pt continues to shows pain and symptoms with end range.  Patient demonstrated fatigue post treatment and would benefit from continued PT. Continue to progress as able.        Plan: Continue per plan of care.      Precautions: GERD without esophagitis, essential HTN, CVA, acquired trigger finger, hyperlipidemia, neck pain, see med history         Manuals 1/24 1/30 2/1 2/6 2/8 2/15 2/20 2/22 2/29 3   PROM if tolerated                          STM to UE   To pec to leonela  Pec/ deltoid and bicep to leonela KR   nv Nv             FOTO DC                     Neuro Re-Ed 1/24 1/30 2/1 2/6 2/8 2/15 2/20 2/22 2/29 3   [place] rows/ ext   RTB x 20 ea  RTB x 20 ea RTB x 20 ea RTB x 20 ea RTB 20x ea  RTB 20x ea  GTB 20x ea GTB 20x ea BTB 20x ea   Iso activation    nv          No moneys   PTB x10  No band x 20  No band x 20 No band x 20 No band x 20 No band x 20x No band x 20x No band x 20x No band x 20x   Thoracic ext   10 10 10x 10x        IR/ ER    RTB x 20 ea  RTB x 20 ea  RTB x 20 ea  RTB 20 ea  RTB 20 ea  RTB 20 ea  RTB 20 ea  RTB 20 ea                              Ther Ex  2/6 2/8 2/15 2/20 2/22 2/29 3   Pball flex stretch   10x10\"  10x10\"  10x10\" 10x10\" 10\"x10  10x10\" 10x10\" 10x10\" 10x10\"   Cane ER/ abd   10x10\" ea 10x10\"  ea 10x10\"  ea 10x10\" 10\"x10 " "10x10\" 10x10\" 10x10\" 10x10\"   Post capsule stretch  5x10\"  5x10\"  5x10\"  5x10\" NV 5x10\" 5x10\" 5x10\" 10x10\"   Upper trap stretch   5x10\" 5x10\"  5x10\"  5x10\" 5\" x 10 3x30\" 3x30\" 3x30\" 3x30\"   Levator stretch    5x10\"  5x10\"  5x10\" 5\" x10 10x5\" 10x5\" 10x5\" 10x5\"   Cane flexion   10x5\"  5\" x 10  5\" x 10  nv Nv  10x10\" 10x10\" 10x10\" 10x10\"   Pec stretch supine   3x20\"  nv 3x20\" nv Nv  3x20\" 3x20\" 3x20\" 3x30\"   Finger ladder        10x10\" 10x10\" 10x10\" 10x10\"   Bicep curls    nv          Ther Activity 1/24 1/30 2/1 2/6 2/8 2/15 2/20 2/22 2/29 3/5   Pulleys   3' to leonela  5' to leonela  5' to leonlea  5' to leonela  5' to leonela 5' to leonela 5' to leonela 5'  5'   Patient education  KR KR                                      FOTO             Modalities 1/24    2/8 2/15 2/20 2/22 2/29 3/5   CP prn                                    "

## 2024-03-11 ENCOUNTER — OFFICE VISIT (OUTPATIENT)
Dept: PHYSICAL THERAPY | Facility: CLINIC | Age: 74
End: 2024-03-11
Payer: MEDICARE

## 2024-03-11 DIAGNOSIS — M25.511 CHRONIC RIGHT SHOULDER PAIN: ICD-10-CM

## 2024-03-11 DIAGNOSIS — G89.29 CHRONIC RIGHT SHOULDER PAIN: ICD-10-CM

## 2024-03-11 DIAGNOSIS — M75.41 IMPINGEMENT SYNDROME OF RIGHT SHOULDER: Primary | ICD-10-CM

## 2024-03-11 DIAGNOSIS — M77.8 RIGHT SHOULDER TENDONITIS: ICD-10-CM

## 2024-03-11 PROCEDURE — 97110 THERAPEUTIC EXERCISES: CPT

## 2024-03-11 PROCEDURE — 97530 THERAPEUTIC ACTIVITIES: CPT

## 2024-03-11 PROCEDURE — 97112 NEUROMUSCULAR REEDUCATION: CPT

## 2024-03-11 NOTE — PROGRESS NOTES
"Daily Note     Today's date: 3/11/2024  Patient name: Hawa Cheek  : 1950  MRN: [id number]  Referring provider: Casandra Mixon MD  Dx:   Encounter Diagnosis     ICD-10-CM    1. Impingement syndrome of right shoulder  M75.41       2. Right shoulder tendonitis  M77.8       3. Chronic right shoulder pain  M25.511     G89.29             Start Time: 1200          Subjective:  \" I have a little pain\" translation provided by daughter        Objective: See treatment diary below      Assessment: Hawa presents with shoulder pain. Progressed UE resistance to tolerance noting continued fatigue with ER activation. Note minimal ability to progress ER activation with continued fatigue and decreased muscle activation. Frequent cuing for muscle activation and exercise form throughout. Continues to demonstrate decreased overhead activation. Tolerated session without adverse effects. Recommend continued skilled therapy to improve overall strength and mobility for functional return with decreased compensation and pain.        Plan: Continue per plan of care.      Precautions: GERD without esophagitis, essential HTN, CVA, acquired trigger finger, hyperlipidemia, neck pain, see med history         Manuals 3/11 1/30 2/1 2/6 2/8 2/15 2/20 2/22 2/29 3/5   PROM if tolerated                          STM to UE   To pec to leonela  Pec/ deltoid and bicep to leonela KR   nv Nv             FOTO DC                     Neuro Re-Ed 3/11 1/30 2/1 2 2/8 2/15 2/20 2/22 2/29 3/5   [place] rows/ ext  BTB 20x ea  RTB x 20 ea  RTB x 20 ea RTB x 20 ea RTB x 20 ea RTB 20x ea  RTB 20x ea  GTB 20x ea GTB 20x ea BTB 20x ea   Iso activation    nv          No moneys  OTB x 15 PTB x10  No band x 20  No band x 20 No band x 20 No band x 20 No band x 20x No band x 20x No band x 20x No band x 20x   Thoracic ext   10 10 10x 10x        IR/ ER  GTB 2 x 10   RTB x 20 ea  RTB x 20 ea  RTB x 20 ea  RTB 20 ea  RTB 20 ea  RTB 20 ea  RTB 20 ea  RTB 20 ea  " "  Ball on wall  nv                         Ther Ex 3/11 1/30 2/1 2/6 2/8 2/15 2/20 2/22 2/29 3/5   Pball flex stretch  10x10\"  10x10\"  10x10\"  10x10\" 10x10\" 10\"x10  10x10\" 10x10\" 10x10\" 10x10\"   Cane ER/ abd  X20 ea  10x10\" ea 10x10\"  ea 10x10\"  ea 10x10\" 10\"x10 10x10\" 10x10\" 10x10\" 10x10\"   Post capsule stretch nv 5x10\"  5x10\"  5x10\"  5x10\" NV 5x10\" 5x10\" 5x10\" 10x10\"   S/l shoulder ER/ abd  X20 ea             Upper trap stretch  3x20\"  5x10\" 5x10\"  5x10\"  5x10\" 5\" x 10 3x30\" 3x30\" 3x30\" 3x30\"   Levator stretch  5x10\"   5x10\"  5x10\"  5x10\" 5\" x10 10x5\" 10x5\" 10x5\" 10x5\"   Cane flexion  10x10\"  10x5\"  5\" x 10  5\" x 10  nv Nv  10x10\" 10x10\" 10x10\" 10x10\"   Pec stretch supine  3x30\"  3x20\"  nv 3x20\" nv Nv  3x20\" 3x20\" 3x20\" 3x30\"   Finger ladder  10x10\"      10x10\" 10x10\" 10x10\" 10x10\"   Bicep curls    nv          Ther Activity 3/11 1/30 2/1 2/6 2/8 2/15 2/20 2/22 2/29 3/5   Pulleys  5' to leonela  3' to leonela  5' to leonela  5' to leonela  5' to leonela  5' to leonela 5' to leonela 5' to leonela 5'  5'   Patient education   KR                                      FOTO             Modalities 3/11    2/8 2/15 2/20 2/22 2/29 3/5   CP prn                                    "

## 2024-03-14 ENCOUNTER — APPOINTMENT (OUTPATIENT)
Dept: PHYSICAL THERAPY | Facility: CLINIC | Age: 74
End: 2024-03-14
Payer: MEDICARE

## 2024-03-18 ENCOUNTER — EVALUATION (OUTPATIENT)
Dept: PHYSICAL THERAPY | Facility: CLINIC | Age: 74
End: 2024-03-18
Payer: MEDICARE

## 2024-03-18 DIAGNOSIS — M25.511 CHRONIC RIGHT SHOULDER PAIN: ICD-10-CM

## 2024-03-18 DIAGNOSIS — M77.8 RIGHT SHOULDER TENDONITIS: ICD-10-CM

## 2024-03-18 DIAGNOSIS — G89.29 CHRONIC RIGHT SHOULDER PAIN: ICD-10-CM

## 2024-03-18 DIAGNOSIS — M75.41 IMPINGEMENT SYNDROME OF RIGHT SHOULDER: Primary | ICD-10-CM

## 2024-03-18 PROCEDURE — 97530 THERAPEUTIC ACTIVITIES: CPT

## 2024-03-18 PROCEDURE — 97110 THERAPEUTIC EXERCISES: CPT

## 2024-03-18 PROCEDURE — 97164 PT RE-EVAL EST PLAN CARE: CPT

## 2024-03-18 PROCEDURE — 97112 NEUROMUSCULAR REEDUCATION: CPT

## 2024-03-18 NOTE — PROGRESS NOTES
PT Re-Evaluation     Today's date: 3/18/2024  Patient name: Hawa Cheek  : 1950  MRN: [id number]  Referring provider: Casandra Mixon MD  Dx:   Encounter Diagnosis     ICD-10-CM    1. Impingement syndrome of right shoulder  M75.41       2. Right shoulder tendonitis  M77.8       3. Chronic right shoulder pain  M25.511     G89.29                        Assessment  Assessment details: Hawa Cheek  is a pleasant 73 y.o. female who presents with severe R shoulder pain and mobility deficits. Demonstrates improved AROM throughout all planes in the shoulder with most limitation in IR and ER still noted. MMT slightly improved averaging a 4 throughout notes most deficits with ER/ flex strength. Recommended speaking to physician due to continued gross strength limitations and pain. Presents with forward shoulder limiting anterior mobility and causing increased pec/ bicep proximal tendon restrictions with pain. Educated pt and daughter on maintaining stretching and strengthening to tolerance at home.   Tolerated session without adverse effects. Recommend continued skilled therapy to improve overall strength and mobility for functional return with decreased compensation and pain.    Impairments: impaired physical strength and scapular dyskinesis    Symptom irritability: moderateUnderstanding of Dx/Px/POC: good   Prognosis: good    Goals  Impairment Goals  - Pt I with initial HEP in 1-2 visits- MET  - Improve AROM equal to contralateral side in 4-6 weeks without pain - PROGRESSING mid improvement noted   - Increase strength to 5/5 in all affected areas in 4-6 weeks- PROGRESSING     Functional Goals  - Increase Functional Status Measure to: MDC by d/c - MET  - Patient will be independent with comprehensive HEP in 6-8 weeks- PROGRESSING  - Patient will be able to reach for object from shelf at shoulder height with min reports of difficulty in 2-4 weeks- MET (depending)   - Patient will be able to reach for  object overhead with min to difficulty in 6-8 weeks- PROGRESSING inconsistent progress   - Patient will be able to lift/carry objects without provocation of symptoms in 6-8 weeks - PROGRESSING inconsistent lifting reports     Plan  Plan details: Educated on progressing exercises as tolerated    Patient would benefit from: PT eval and skilled physical therapy  Referral necessary: No  Planned modality interventions: cryotherapy and thermotherapy: hydrocollator packs  Planned therapy interventions: IASTM, joint mobilization, kinesiology taping, manual therapy, neuromuscular re-education, patient education, postural training, strengthening, stretching, therapeutic activities, therapeutic exercise, home exercise program, functional ROM exercises and flexibility  Frequency: 2x week  Duration in weeks: 8  Treatment plan discussed with: patient and family        Subjective Evaluation    History of Present Illness  Mechanism of injury: Hawashanice Cheek presents with c/c of R shoulder pain s/p 2 months of PT. Notes improvement in shoulder pain and feels better but still has a little bit of pain. Notes continued difficulty with washing the dishes and opening the spigot due to pain in the shoulder.           Recurrent probem    Quality of life: good          Objective     Postural Observations  Seated posture: fair  Standing posture: fair    Additional Postural Observation Details  Forward shoulder noted throughout     Observations     Right Shoulder  Positive for atrophy.     Palpation     Additional Palpation Details  Hyperalgesia noted throughout  Increased fear of movement and touch noted secondary in part to fear of pain elicitation noted  Mild bicep tenderness noted increased pec restrictions noted at proximal bicep attachment with pec restrictions noted   No tenderness on bony aspects   Upper trap restrictions noted no increased tenderness noted with palpation     Cervical/Thoracic Screen   Cervical range of motion  within normal limits with the following exceptions: ext WNL No pain   Flex WNL no pain   Lat flex B no pain WNL   Rotation B no pain WNL    Neurological Testing     Sensation     Shoulder     Comments   Left light touch: C3-T1  Right light touch: C3-T1    Active Range of Motion   Left Shoulder   Flexion: WFL and with pain  Abduction: WFL  External rotation BTH: T4   Internal rotation BTB: T10     Right Shoulder   Flexion: 180 degrees   Abduction: 170 degrees   External rotation BTH: T5   Internal rotation BTB: T11 with pain    Additional Active Range of Motion Details  Ant shoulder pain with stretching     Passive Range of Motion   Left Shoulder   Normal passive range of motion    Right Shoulder   Flexion: 180 degrees   Abduction: 180 degrees   External rotation 0°: WFL and with pain  External rotation 90°: WFL and with pain    Strength/Myotome Testing     Left Shoulder     Planes of Motion   Flexion: 4-   Abduction: 3   External rotation at 0°: 4   Internal rotation at 45°: 4     Isolated Muscles   Biceps: 4   Middle trapezius: 3     Right Shoulder     Planes of Motion   Flexion: 3+   Abduction: 4-   External rotation at 0°: 3+   Internal rotation at 45°: 4     Isolated Muscles   Biceps: 4+   Middle trapezius: 3     Additional Strength Details    Patient moves arm better freely than with the examination     Tests     Right Shoulder   Positive painful arc and passive horizontal adduction.   Negative belly press, empty can, full can, Hawkin's, Neer's, ULTT1, ULTT2, ULTT3 and ULTT4.     Additional Tests Details                   Precautions: GERD without esophagitis, essential HTN, CVA, acquired trigger finger, hyperlipidemia, neck pain, see med history           Manuals 3/11 3/18 2/1 2/6 2/8 2/15 2/20 2/22 2/29 3/5   PROM if tolerated                                             STM to UE     Pec/ deltoid and bicep to leonela KR    nv Nv                     FOTO DC                                    Neuro Re-Ed 3/11 3/18  "2/1 2/6 2/8 2/15 2/20 2/22 2/29 3/5   [place] rows/ ext  BTB 20x ea  BTB x 30 ea  RTB x 20 ea RTB x 20 ea RTB x 20 ea RTB 20x ea  RTB 20x ea  GTB 20x ea GTB 20x ea BTB 20x ea   Iabd/ flex AROM     nv                 No moneys  OTB x 15 OTB x 15  No band x 20  No band x 20 No band x 20 No band x 20 No band x 20x No band x 20x No band x 20x No band x 20x   Thoracic ext     10 10x 10x             IR/ ER  GTB 2 x 10  GTB 2 x 10  RTB x 20 ea  RTB x 20 ea  RTB x 20 ea  RTB 20 ea  RTB 20 ea  RTB 20 ea  RTB 20 ea  RTB 20 ea    Ball on wall  nv GMB x 20CW/ CCW                                           Ther Ex 3/11 3/18 2/1 2/6 2/8 2/15 2/20 2/22 2/29 3/5   Pball flex stretch  10x10\"   10x10\"  10x10\" 10x10\" 10\"x10  10x10\" 10x10\" 10x10\" 10x10\"   Cane ER/ abd  X20 ea   10x10\"  ea 10x10\"  ea 10x10\" 10\"x10 10x10\" 10x10\" 10x10\" 10x10\"   Post capsule stretch nv  5x10\"  5x10\"  5x10\" NV 5x10\" 5x10\" 5x10\" 10x10\"   S/l shoulder ER/ abd  X20 ea  nv                   Upper trap stretch  3x20\"   5x10\"  5x10\"  5x10\" 5\" x 10 3x30\" 3x30\" 3x30\" 3x30\"   Levator stretch  5x10\"   5x10\"  5x10\"  5x10\" 5\" x10 10x5\" 10x5\" 10x5\" 10x5\"   Cane flexion  10x10\"   5\" x 10  5\" x 10  nv Nv  10x10\" 10x10\" 10x10\" 10x10\"   Pec stretch supine  3x30\"  3x30\"  nv 3x20\" nv Nv  3x20\" 3x20\" 3x20\" 3x30\"   Finger ladder  10x10\" 10x10\" to leonela          10x10\" 10x10\" 10x10\" 10x10\"   Maryse flowers                 Ther Activity 3/11 3/18 2/1 2/6 2/8 2/15 2/20 2/22 2/29 3/5   Pulleys  5' to leonela  5' to leonela  5' to leonela  5' to leonela  5' to leonela  5' to leonela 5' to leonela 5' to leonela 5'  5'   Patient education    KR                     UBE   nv                                          FOTO         d/c             Modalities 3/11      2/8 2/15 2/20 2/22 2/29 3/5   CP prn                                                 "

## 2024-03-19 ENCOUNTER — OFFICE VISIT (OUTPATIENT)
Dept: FAMILY MEDICINE CLINIC | Facility: CLINIC | Age: 74
End: 2024-03-19

## 2024-03-19 VITALS
SYSTOLIC BLOOD PRESSURE: 130 MMHG | BODY MASS INDEX: 22.63 KG/M2 | DIASTOLIC BLOOD PRESSURE: 80 MMHG | OXYGEN SATURATION: 96 % | TEMPERATURE: 97.5 F | WEIGHT: 123 LBS | HEART RATE: 66 BPM | HEIGHT: 62 IN | RESPIRATION RATE: 18 BRPM

## 2024-03-19 DIAGNOSIS — E55.9 VITAMIN D DEFICIENCY: ICD-10-CM

## 2024-03-19 DIAGNOSIS — Z23 ENCOUNTER FOR IMMUNIZATION: Primary | ICD-10-CM

## 2024-03-19 DIAGNOSIS — M89.8X9 BONE PAIN: ICD-10-CM

## 2024-03-19 DIAGNOSIS — R41.89 COGNITIVE DECLINE: ICD-10-CM

## 2024-03-19 DIAGNOSIS — I63.9 CVA (CEREBRAL VASCULAR ACCIDENT) (HCC): ICD-10-CM

## 2024-03-19 DIAGNOSIS — E78.5 DYSLIPIDEMIA: ICD-10-CM

## 2024-03-19 DIAGNOSIS — R73.01 IMPAIRED FASTING GLUCOSE: ICD-10-CM

## 2024-03-19 DIAGNOSIS — I10 ESSENTIAL HYPERTENSION: ICD-10-CM

## 2024-03-19 PROCEDURE — 90750 HZV VACC RECOMBINANT IM: CPT

## 2024-03-19 PROCEDURE — 3075F SYST BP GE 130 - 139MM HG: CPT | Performed by: FAMILY MEDICINE

## 2024-03-19 PROCEDURE — 3079F DIAST BP 80-89 MM HG: CPT | Performed by: FAMILY MEDICINE

## 2024-03-19 PROCEDURE — 99214 OFFICE O/P EST MOD 30 MIN: CPT | Performed by: FAMILY MEDICINE

## 2024-03-19 PROCEDURE — 90471 IMMUNIZATION ADMIN: CPT

## 2024-03-19 RX ORDER — LANCETS
EACH MISCELLANEOUS DAILY
Qty: 102 EACH | Refills: 1 | Status: SHIPPED | OUTPATIENT
Start: 2024-03-19

## 2024-03-19 RX ORDER — AMLODIPINE BESYLATE 5 MG/1
5 TABLET ORAL DAILY
Qty: 90 TABLET | Refills: 1 | Status: SHIPPED | OUTPATIENT
Start: 2024-03-19

## 2024-03-19 RX ORDER — BLOOD SUGAR DIAGNOSTIC
1 STRIP MISCELLANEOUS DAILY
Qty: 100 EACH | Refills: 1 | Status: SHIPPED | OUTPATIENT
Start: 2024-03-19

## 2024-03-19 RX ORDER — CELECOXIB 50 MG/1
50 CAPSULE ORAL 2 TIMES DAILY PRN
Qty: 90 CAPSULE | Refills: 0 | Status: SHIPPED | OUTPATIENT
Start: 2024-03-19

## 2024-03-19 RX ORDER — MEMANTINE HYDROCHLORIDE 5 MG/1
5 TABLET ORAL 2 TIMES DAILY
Qty: 180 TABLET | Refills: 3 | Status: SHIPPED | OUTPATIENT
Start: 2024-03-19

## 2024-03-19 RX ORDER — ATORVASTATIN CALCIUM 40 MG/1
40 TABLET, FILM COATED ORAL EVERY EVENING
Qty: 90 TABLET | Refills: 3 | Status: SHIPPED | OUTPATIENT
Start: 2024-03-19

## 2024-03-19 RX ORDER — BLOOD-GLUCOSE METER
EACH MISCELLANEOUS DAILY
Qty: 1 KIT | Refills: 0 | Status: SHIPPED | OUTPATIENT
Start: 2024-03-19

## 2024-03-19 NOTE — PROGRESS NOTES
Assessment/Plan:    No problem-specific Assessment & Plan notes found for this encounter.       Diagnoses and all orders for this visit:    Encounter for immunization  -     Zoster Vaccine Recombinant IM    Cognitive decline  -     memantine (NAMENDA) 5 mg tablet; Take 1 tablet (5 mg total) by mouth 2 (two) times a day    Essential hypertension  -     amLODIPine (NORVASC) 5 mg tablet; Take 1 tablet (5 mg total) by mouth daily  -     CBC and differential; Future  -     Comprehensive metabolic panel; Future  -     Hemoglobin A1C; Future  -     Lipid panel; Future  -     Vitamin D 25 hydroxy; Future    CVA (cerebral vascular accident) (HCC)  -     atorvastatin (LIPITOR) 40 mg tablet; Take 1 tablet (40 mg total) by mouth every evening    Dyslipidemia  -     atorvastatin (LIPITOR) 40 mg tablet; Take 1 tablet (40 mg total) by mouth every evening  -     CBC and differential; Future  -     Comprehensive metabolic panel; Future  -     Hemoglobin A1C; Future  -     Lipid panel; Future  -     Vitamin D 25 hydroxy; Future    Bone pain  -     celecoxib (CeleBREX) 50 MG capsule; Take 1 capsule (50 mg total) by mouth 2 (two) times a day as needed for mild pain    Vitamin D deficiency  -     Vitamin D 25 hydroxy; Future    Impaired fasting glucose  -     Hemoglobin A1C; Future  -     Blood Glucose Monitoring Suppl (Accu-Chek Guide) w/Device KIT; Use in the morning  -     glucose blood (Accu-Chek Guide) test strip; Use 1 each in the morning  -     Accu-Chek FastClix Lancets MISC; Use in the morning          Subjective:      Patient ID: Hawa Cheek is a 73 y.o. female.    74 yo female with known HTN, s/p CVA here today for follow up  Continues to have shoulder pain.         The following portions of the patient's history were reviewed and updated as appropriate: She  has a past medical history of Arthralgia of hip, Arthritis, Chronic pain disorder, Dyslipidemia, Hyperlipidemia, Hypertension, Neck pain, Pemphigus, Pre-diabetes,  Shoulder pain, Stroke (HCC), and Vitamin D deficiency.  She   Patient Active Problem List    Diagnosis Date Noted   • Gastroesophageal reflux disease without esophagitis 09/13/2022   • Lightheadedness 12/03/2021   • CVA (cerebral vascular accident) (HCC) 12/08/2019   • Spinal stenosis, lumbar region with neurogenic claudication    • Acute pain of left shoulder 08/23/2018   • Pre-op evaluation 02/08/2018   • Muscle spasms of neck 12/04/2017   • Arthralgia of left hip 07/19/2016   • Slow transit constipation 07/19/2016   • Impaired fasting glucose 07/19/2016   • Chronic right shoulder pain 03/15/2016   • Creatinine elevation 02/16/2015   • Neck pain 11/19/2014   • Low back pain 08/06/2014   • Acquired trigger finger 10/09/2013   • Vitamin D deficiency 07/29/2013   • Mammogram abnormal 07/29/2013   • Multiple joint pain 07/29/2013   • Other symptoms involving skin and integumentary tissues 06/03/2013   • Pemphigus 04/08/2013   • Bursitis 12/03/2012   • 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  Her family history includes Heart disease in her father; Hyperlipidemia in her mother; Hypertension in her mother; No Known Problems in her maternal aunt, paternal aunt, paternal aunt, sister, sister, and sister.  She  reports that she has never smoked. She has never used smokeless tobacco. She reports that she does not drink alcohol and does not use drugs.  Current Outpatient Medications   Medication Sig Dispense Refill   •  Accu-Chek FastClix Lancets MISC Use in the morning 102 each 1   • amLODIPine (NORVASC) 5 mg tablet Take 1 tablet (5 mg total) by mouth daily 90 tablet 1   • atorvastatin (LIPITOR) 40 mg tablet Take 1 tablet (40 mg total) by mouth every evening 90 tablet 3   • Blood Glucose Monitoring Suppl (Accu-Chek Guide) w/Device KIT Use in the morning 1 kit 0   • celecoxib (CeleBREX) 50 MG capsule Take 1 capsule (50 mg total) by mouth 2 (two) times a day as needed for mild pain 90 capsule 0   • glucose blood (Accu-Chek Guide) test strip Use 1 each in the morning 100 each 1   • memantine (NAMENDA) 5 mg tablet Take 1 tablet (5 mg total) by mouth 2 (two) times a day 180 tablet 3   • acetaminophen (TYLENOL) 500 mg tablet Take 500 mg by mouth every 6 (six) hours as needed for mild pain     • ammonium lactate (LAC-HYDRIN) 12 % lotion apply topically twice a day 400 g 0   • ammonium lactate (LAC-HYDRIN) 12 % lotion Apply topically 2 (two) times a day 400 g 0   • aspirin (Aspirin Low Dose) 81 mg EC tablet Take 1 tablet (81 mg total) by mouth daily 90 tablet 0   • Diclofenac Sodium (VOLTAREN) 1 % Apply 2 g topically 4 (four) times a day 350 g 0   • Elastic Bandages & Supports (Thumb Splint/Neoprene Medium) MISC by Does not apply route daily 2 each 0   • fluticasone (FLONASE) 50 mcg/act nasal spray 2 sprays into each nostril daily 16 g 0   • methylPREDNISolone 4 MG tablet therapy pack Use as directed on package 21 each 0   • omega-3-acid ethyl esters (LOVAZA) 1 g capsule take 2 capsules by mouth twice a day 180 capsule 0   • omega-3-acid ethyl esters (LOVAZA) 1 g capsule Take 2 capsules (2 g total) by mouth 2 (two) times a day 180 capsule 0   • omeprazole (PriLOSEC) 20 mg delayed release capsule take 1 capsule by mouth daily BEFORE BREAKFAST 90 capsule 1   • oxyCODONE (Roxicodone) 5 immediate release tablet Take 1 tablet (5 mg total) by mouth 2 (two) times a day as needed for moderate pain Max Daily Amount: 10 mg 20 tablet 0   • VITAMIN D  PO Take 1 tablet by mouth daily       No current facility-administered medications for this visit.     Current Outpatient Medications on File Prior to Visit   Medication Sig   • acetaminophen (TYLENOL) 500 mg tablet Take 500 mg by mouth every 6 (six) hours as needed for mild pain   • ammonium lactate (LAC-HYDRIN) 12 % lotion apply topically twice a day   • ammonium lactate (LAC-HYDRIN) 12 % lotion Apply topically 2 (two) times a day   • aspirin (Aspirin Low Dose) 81 mg EC tablet Take 1 tablet (81 mg total) by mouth daily   • Diclofenac Sodium (VOLTAREN) 1 % Apply 2 g topically 4 (four) times a day   • Elastic Bandages & Supports (Thumb Splint/Neoprene Medium) MISC by Does not apply route daily   • fluticasone (FLONASE) 50 mcg/act nasal spray 2 sprays into each nostril daily   • methylPREDNISolone 4 MG tablet therapy pack Use as directed on package   • omega-3-acid ethyl esters (LOVAZA) 1 g capsule take 2 capsules by mouth twice a day   • omega-3-acid ethyl esters (LOVAZA) 1 g capsule Take 2 capsules (2 g total) by mouth 2 (two) times a day   • omeprazole (PriLOSEC) 20 mg delayed release capsule take 1 capsule by mouth daily BEFORE BREAKFAST   • oxyCODONE (Roxicodone) 5 immediate release tablet Take 1 tablet (5 mg total) by mouth 2 (two) times a day as needed for moderate pain Max Daily Amount: 10 mg   • VITAMIN D PO Take 1 tablet by mouth daily   • [DISCONTINUED] amLODIPine (NORVASC) 5 mg tablet take 1 tablet by mouth once daily   • [DISCONTINUED] atorvastatin (LIPITOR) 40 mg tablet take 1 tablet by mouth every evening   • [DISCONTINUED] celecoxib (CeleBREX) 50 MG capsule Take 1 capsule (50 mg total) by mouth 2 (two) times a day as needed for mild pain   • [DISCONTINUED] memantine (NAMENDA) 5 mg tablet take 1 tablet by mouth twice a day     No current facility-administered medications on file prior to visit.     She has No Known Allergies..    Review of Systems   Musculoskeletal:  Positive for arthralgias.  "  Psychiatric/Behavioral:  Positive for sleep disturbance.    All other systems reviewed and are negative.        Objective:      /80 (BP Location: Right arm, Patient Position: Sitting, Cuff Size: Standard)   Pulse 66   Temp 97.5 °F (36.4 °C) (Temporal)   Resp 18   Ht 5' 2\" (1.575 m)   Wt 55.8 kg (123 lb)   SpO2 96%   BMI 22.50 kg/m²          Physical Exam  Vitals and nursing note reviewed.   Constitutional:       Appearance: She is well-developed.   HENT:      Head: Normocephalic.      Right Ear: External ear normal.      Left Ear: External ear normal.      Nose: Nose normal.   Eyes:      Conjunctiva/sclera: Conjunctivae normal.      Pupils: Pupils are equal, round, and reactive to light.   Neck:      Thyroid: No thyromegaly.   Cardiovascular:      Rate and Rhythm: Normal rate and regular rhythm.      Heart sounds: Normal heart sounds.   Pulmonary:      Effort: Pulmonary effort is normal.      Breath sounds: Normal breath sounds.   Abdominal:      Palpations: Abdomen is soft.      Tenderness: There is no abdominal tenderness. There is no guarding or rebound.   Musculoskeletal:         General: Tenderness present.      Right shoulder: Tenderness present. Decreased range of motion.      Cervical back: Normal range of motion and neck supple.   Skin:     General: Skin is dry.   Neurological:      Mental Status: She is alert and oriented to person, place, and time.      Deep Tendon Reflexes: Reflexes are normal and symmetric.           "

## 2024-03-21 ENCOUNTER — OFFICE VISIT (OUTPATIENT)
Dept: PHYSICAL THERAPY | Facility: CLINIC | Age: 74
End: 2024-03-21
Payer: MEDICARE

## 2024-03-21 DIAGNOSIS — M75.41 IMPINGEMENT SYNDROME OF RIGHT SHOULDER: Primary | ICD-10-CM

## 2024-03-21 DIAGNOSIS — G89.29 CHRONIC RIGHT SHOULDER PAIN: ICD-10-CM

## 2024-03-21 DIAGNOSIS — M77.8 RIGHT SHOULDER TENDONITIS: ICD-10-CM

## 2024-03-21 DIAGNOSIS — M25.511 CHRONIC RIGHT SHOULDER PAIN: ICD-10-CM

## 2024-03-21 PROCEDURE — 97112 NEUROMUSCULAR REEDUCATION: CPT

## 2024-03-21 PROCEDURE — 97110 THERAPEUTIC EXERCISES: CPT

## 2024-03-21 NOTE — PROGRESS NOTES
"Daily Note     Today's date: 3/21/2024  Patient name: Hawa Cheek  : 1950  MRN: [id number]  Referring provider: Casandra Mixon MD  Dx:   Encounter Diagnosis     ICD-10-CM    1. Impingement syndrome of right shoulder  M75.41       2. Right shoulder tendonitis  M77.8       3. Chronic right shoulder pain  M25.511     G89.29                      Subjective: \" I am good\"       Objective: See treatment diary below      Assessment: Hawa presents with shoulder pain. Performed exercises to tolerance- noting improved scap activation throughout the shoulder. Note continued deficits with bicep activation and restrictions in the proximal bicep tendon. Tolerated session without adverse effects. Recommend continued skilled therapy to improve overall strength and mobility for functional return with decreased compensation and pain.        Plan: Continue per plan of care.      Precautions: GERD without esophagitis, essential HTN, CVA, acquired trigger finger, hyperlipidemia, neck pain, see med history           Manuals 3/11 3/18 3/21 2/6 2/8 2/15 2/20 2/22 2/29 3/5   PROM if tolerated                                           STM to UE        nv Nv                    FOTO DC                                   Neuro Re-Ed 3/11 3/18 3/21 2/6 2/8 2/15 2/20 2/22 2/29 35   [place] rows/ ext  BTB 20x ea  BTB x 30 ea  BTB x 30 ea  RTB x 20 ea RTB x 20 ea RTB 20x ea  RTB 20x ea  GTB 20x ea GTB 20x ea BTB 20x ea   Iabd/ flex AROM                      No moneys  OTB x 15 OTB x 15  OTB x 15  No band x 20 No band x 20 No band x 20 No band x 20x No band x 20x No band x 20x No band x 20x   Thoracic ext      10x 10x             IR/ ER  GTB 2 x 10  GTB 2 x 10  GTB 2 x 10 ea RTB x 20 ea  RTB x 20 ea  RTB 20 ea  RTB 20 ea  RTB 20 ea  RTB 20 ea  RTB 20 ea    Ball on wall  nv GMB x 20CW/ CCW  GMB x 20CW/ CCW                                        Ther Ex 3/11 3/18 3/21 2 2/8 2/15 2/20 2/22 2/29 3/5   Pball flex stretch  10x10\"   " "10x10\"  10x10\" 10x10\" 10\"x10  10x10\" 10x10\" 10x10\" 10x10\"   Cane ER/ abd  X20 ea   nv 10x10\"  ea 10x10\" 10\"x10 10x10\" 10x10\" 10x10\" 10x10\"   Post capsule stretch nv  nv 5x10\"  5x10\" NV 5x10\" 5x10\" 5x10\" 10x10\"   S/l shoulder ER/ abd  X20 ea  nv X20 ea                  Upper trap stretch  3x20\"   3x20\"  5x10\"  5x10\" 5\" x 10 3x30\" 3x30\" 3x30\" 3x30\"   Levator stretch  5x10\"   nv 5x10\"  5x10\" 5\" x10 10x5\" 10x5\" 10x5\" 10x5\"   Cane flexion  10x10\"   Seated x10  5\" x 10  nv Nv  10x10\" 10x10\" 10x10\" 10x10\"   Pec stretch supine  3x30\"  3x30\"  nv 3x20\" nv Nv  3x20\" 3x20\" 3x20\" 3x30\"   Finger ladder  10x10\" 10x10\" to leonela  10x10\" to leonela        10x10\" 10x10\" 10x10\" 10x10\"   Bicep curls     2# x 20                  Ther Activity 3/11 3/18 3/21 2/6 2/8 2/15 2/20 2/22 2/29 3/5   Pulleys  5' to leonela  5' to leonela  5' to leonela  5' to leonela  5' to leonela  5' to leonela 5' to leonela 5' to leonela 5'  5'   Patient education    KR                    UBE   nv nv                                       FOTO        d/c             Modalities 3/11     2/8 2/15 2/20 2/22 2/29 3/5   CP prn                                                    "

## 2024-03-25 ENCOUNTER — APPOINTMENT (OUTPATIENT)
Dept: PHYSICAL THERAPY | Facility: CLINIC | Age: 74
End: 2024-03-25
Payer: MEDICARE

## 2024-03-25 ENCOUNTER — OFFICE VISIT (OUTPATIENT)
Dept: PHYSICAL THERAPY | Facility: CLINIC | Age: 74
End: 2024-03-25
Payer: MEDICARE

## 2024-03-25 DIAGNOSIS — M75.41 IMPINGEMENT SYNDROME OF RIGHT SHOULDER: Primary | ICD-10-CM

## 2024-03-25 DIAGNOSIS — M77.8 RIGHT SHOULDER TENDONITIS: ICD-10-CM

## 2024-03-25 DIAGNOSIS — M25.511 CHRONIC RIGHT SHOULDER PAIN: ICD-10-CM

## 2024-03-25 DIAGNOSIS — G89.29 CHRONIC RIGHT SHOULDER PAIN: ICD-10-CM

## 2024-03-25 PROCEDURE — 97112 NEUROMUSCULAR REEDUCATION: CPT

## 2024-03-25 PROCEDURE — 97110 THERAPEUTIC EXERCISES: CPT

## 2024-03-25 NOTE — PROGRESS NOTES
"Daily Note     Today's date: 3/25/2024  Patient name: Hawa Cheek  : 1950  MRN: [id number]  Referring provider: Casandra Mixon MD  Dx:   Encounter Diagnosis     ICD-10-CM    1. Impingement syndrome of right shoulder  M75.41       2. Right shoulder tendonitis  M77.8       3. Chronic right shoulder pain  M25.511     G89.29                      Subjective: \" I feel okay today\"       Objective: See treatment diary below      Assessment: Hawa presents with shoulder pain. Continues to demonstrate ER weakness in the shoulder- recommended discussion with physician due to continued limitation.  Noted continued limitation with overhead active lifting due to pain in the underarm and continued strength deficits. Heavy cuing for relaxing the UT with exercises today. Tolerated session without adverse effects. Recommend continued skilled therapy to improve overall strength and mobility for functional return with decreased compensation and pain.        Plan: Continue per plan of care.      Precautions: GERD without esophagitis, essential HTN, CVA, acquired trigger finger, hyperlipidemia, neck pain, see med history           Manuals 3/11 3/18 3/21 3/25 2/8 2/15 2/20 2/22 2/29 3/5   PROM if tolerated                                         STM to UE       nv Nv                   FOTO DC                                  Neuro Re-Ed 3/11 3/18 3/21 3/25 2/8 2/15 2/20 2/22 2/29 3   [place] rows/ ext  BTB 20x ea  BTB x 30 ea  BTB x 30 ea  BTB x 30 ea  RTB x 20 ea RTB 20x ea  RTB 20x ea  GTB 20x ea GTB 20x ea BTB 20x ea   Iabd/ flex AROM                     No moneys  OTB x 15 OTB x 15  OTB x 15  OTB x 15  No band x 20 No band x 20 No band x 20x No band x 20x No band x 20x No band x 20x   Thoracic ext       10x             IR/ ER  GTB 2 x 10  GTB 2 x 10  GTB 2 x 10 ea GTB 3 x 10  RTB x 20 ea  RTB 20 ea  RTB 20 ea  RTB 20 ea  RTB 20 ea  RTB 20 ea    Ball on wall  nv GMB x 20CW/ CCW  GMB x 20CW/ CCW  GMB x 20CW/ CCW  " "                                   Ther Ex 3/11 3/18 3/21 3/25 2/8 2/15 2/20 2/22 2/29 3/5   Pball flex stretch  10x10\"   10x10\"  10x10\"  10x10\" 10\"x10  10x10\" 10x10\" 10x10\" 10x10\"   Cane ER/ abd  X20 ea   nv  10x10\" 10\"x10 10x10\" 10x10\" 10x10\" 10x10\"   Post capsule stretch nv  nv 3x20\"  5x10\" NV 5x10\" 5x10\" 5x10\" 10x10\"   S/l shoulder ER/ abd  X20 ea  nv X20 ea                 Upper trap stretch  3x20\"   3x20\"  3x20\" 5x10\" 5\" x 10 3x30\" 3x30\" 3x30\" 3x30\"   Levator stretch  5x10\"   nv  5x10\" 5\" x10 10x5\" 10x5\" 10x5\" 10x5\"   Cane flexion  10x10\"   Seated x10   nv Nv  10x10\" 10x10\" 10x10\" 10x10\"   Scap / abd AROM     10         Pec stretch supine  3x30\"  3x30\"  nv  nv Nv  3x20\" 3x20\" 3x20\" 3x30\"   Finger ladder  10x10\" 10x10\" to leonela  10x10\" to leonela  10x10\" to leonela      10x10\" 10x10\" 10x10\" 10x10\"   Bicep curls     2# x 20  2# x 20                Ther Activity 3/11 3/18 3/21 3/25 2/8 2/15 2/20 2/22 2/29 3/5   Pulleys  5' to leonela  5' to leonela  5' to leonela  5' to leonela  5' to leonela  5' to leonela 5' to leonela 5' to leonela 5'  5'   Patient education    KR                   UBE   nv nv                                     FOTO       d/c             Modalities 3/11    2/8 2/15 2/20 2/22 2/29 3/5   CP prn                                                  "

## 2024-03-28 ENCOUNTER — OFFICE VISIT (OUTPATIENT)
Dept: PHYSICAL THERAPY | Facility: CLINIC | Age: 74
End: 2024-03-28
Payer: MEDICARE

## 2024-03-28 DIAGNOSIS — M75.41 IMPINGEMENT SYNDROME OF RIGHT SHOULDER: Primary | ICD-10-CM

## 2024-03-28 DIAGNOSIS — G89.29 CHRONIC RIGHT SHOULDER PAIN: ICD-10-CM

## 2024-03-28 DIAGNOSIS — M77.8 RIGHT SHOULDER TENDONITIS: ICD-10-CM

## 2024-03-28 DIAGNOSIS — M25.511 CHRONIC RIGHT SHOULDER PAIN: ICD-10-CM

## 2024-03-28 PROCEDURE — 97110 THERAPEUTIC EXERCISES: CPT

## 2024-03-28 PROCEDURE — 97112 NEUROMUSCULAR REEDUCATION: CPT

## 2024-03-28 PROCEDURE — 97530 THERAPEUTIC ACTIVITIES: CPT

## 2024-03-28 NOTE — PROGRESS NOTES
"Daily Note     Today's date: 3/28/2024  Patient name: Hawa Cheek  : 1950  MRN: [id number]  Referring provider: Casandra Mixon MD  Dx:   Encounter Diagnosis     ICD-10-CM    1. Impingement syndrome of right shoulder  M75.41       2. Right shoulder tendonitis  M77.8       3. Chronic right shoulder pain  M25.511     G89.29                      Subjective: \" I feel okay today\"       Objective: See treatment diary below      Assessment: Hawa presents with shoulder pain. Cuing for slowed performance of exercises to facilitate improved muscle activation throughout.  Improved overall mobility noted throughout. Advised to consult physician due to continued weakness and pain. Tolerated session without adverse effects. Recommend discharge to [facility] to facilitate independent management of recovery- patient is in agreement with recommendation.        Plan: D/C to [facility] due to progress/ insurance     Precautions: GERD without esophagitis, essential HTN, CVA, acquired trigger finger, hyperlipidemia, neck pain, see med history           Manuals 3/11 3/18 3/21 3/25 3/28 2/15 2/20 2/22 2/29 3   PROM if tolerated                                       STM to UE        Nv                                                    Neuro Re-Ed 3/11 3/18 3/21 3/25 3/28 2/15 2/20 2/22 2/29 3   Maira rows/ ext  BTB 20x ea  BTB x 30 ea  BTB x 30 ea  BTB x 30 ea  BTB x 30 ea  RTB 20x ea  RTB 20x ea  GTB 20x ea GTB 20x ea BTB 20x ea   Iabd/ flex AROM       10             No moneys  OTB x 15 OTB x 15  OTB x 15  OTB x 15  OTB x 15  No band x 20 No band x 20x No band x 20x No band x 20x No band x 20x   Thoracic ext                    IR/ ER  GTB 2 x 10  GTB 2 x 10  GTB 2 x 10 ea GTB 3 x 10  GTB 3 x 10  RTB 20 ea  RTB 20 ea  RTB 20 ea  RTB 20 ea  RTB 20 ea    Ball on wall  nv GMB x 20CW/ CCW  GMB x 20CW/ CCW  GMB x 20CW/ CCW  GMB x 30CW/ CCW                                  Ther Ex 3/11 3/18 3/21 3/25 3/28 2/15 2/20 2/22 2/29 3/5 " "  Pball flex stretch  10x10\"   10x10\"  10x10\"  10x10\"  10\"x10  10x10\" 10x10\" 10x10\" 10x10\"   Cane ER/ abd  X20 ea   nv   10\"x10 10x10\" 10x10\" 10x10\" 10x10\"   Post capsule stretch nv  nv 3x20\"   NV 5x10\" 5x10\" 5x10\" 10x10\"   S/l shoulder ER/ abd  X20 ea  nv X20 ea   30x ea              Upper trap stretch  3x20\"   3x20\"  3x20\" 3x20\" B  5\" x 10 3x30\" 3x30\" 3x30\" 3x30\"   Levator stretch  5x10\"   nv   5\" x10 10x5\" 10x5\" 10x5\" 10x5\"   Cane flexion  10x10\"   Seated x10   Supine 10x10\"  Nv  10x10\" 10x10\" 10x10\" 10x10\"   Scap / abd AROM     10         Pec stretch supine  3x30\"  3x30\"  nv   Nv  3x20\" 3x20\" 3x20\" 3x30\"   Finger ladder  10x10\" 10x10\" to leonela  10x10\" to leonela  10x10\" to leonela  10x10\" to leonela    10x10\" 10x10\" 10x10\" 10x10\"   Bicep curls     2# x 20  2# x 20  3# x 20              Ther Activity 3/11 3/18 3/21 3/25 3/28 2/15 2/20 2/22 2/29 3/5   Pulleys  5' to leonela  5' to leonela  5' to leonela  5' to leonela  5' to leonela  5' to leonela 5' to leonela 5' to leonela 5'  5'   Patient education    KR                  UBE   nv nv                                   FOTO                   Modalities 3/11     2/15 2/20 2/22 2/29 3/5   CP prn                                                "

## 2024-04-05 ENCOUNTER — OFFICE VISIT (OUTPATIENT)
Dept: OBGYN CLINIC | Facility: CLINIC | Age: 74
End: 2024-04-05
Payer: MEDICARE

## 2024-04-05 VITALS — WEIGHT: 123 LBS | HEIGHT: 62 IN | BODY MASS INDEX: 22.63 KG/M2

## 2024-04-05 DIAGNOSIS — M75.41 IMPINGEMENT SYNDROME OF RIGHT SHOULDER: Primary | ICD-10-CM

## 2024-04-05 PROCEDURE — 99213 OFFICE O/P EST LOW 20 MIN: CPT | Performed by: PHYSICIAN ASSISTANT

## 2024-04-05 PROCEDURE — 20610 DRAIN/INJ JOINT/BURSA W/O US: CPT | Performed by: PHYSICIAN ASSISTANT

## 2024-04-05 RX ORDER — LIDOCAINE HYDROCHLORIDE 10 MG/ML
2 INJECTION, SOLUTION INFILTRATION; PERINEURAL
Status: COMPLETED | OUTPATIENT
Start: 2024-04-05 | End: 2024-04-05

## 2024-04-05 RX ORDER — BUPIVACAINE HYDROCHLORIDE 2.5 MG/ML
2 INJECTION, SOLUTION INFILTRATION; PERINEURAL
Status: COMPLETED | OUTPATIENT
Start: 2024-04-05 | End: 2024-04-05

## 2024-04-05 RX ORDER — METHYLPREDNISOLONE ACETATE 40 MG/ML
1 INJECTION, SUSPENSION INTRA-ARTICULAR; INTRALESIONAL; INTRAMUSCULAR; SOFT TISSUE
Status: COMPLETED | OUTPATIENT
Start: 2024-04-05 | End: 2024-04-05

## 2024-04-05 RX ORDER — MELOXICAM 7.5 MG/1
7.5 TABLET ORAL DAILY
Qty: 30 TABLET | Refills: 0 | Status: SHIPPED | OUTPATIENT
Start: 2024-04-05

## 2024-04-05 RX ADMIN — BUPIVACAINE HYDROCHLORIDE 2 ML: 2.5 INJECTION, SOLUTION INFILTRATION; PERINEURAL at 11:00

## 2024-04-05 RX ADMIN — LIDOCAINE HYDROCHLORIDE 2 ML: 10 INJECTION, SOLUTION INFILTRATION; PERINEURAL at 11:00

## 2024-04-05 RX ADMIN — METHYLPREDNISOLONE ACETATE 1 ML: 40 INJECTION, SUSPENSION INTRA-ARTICULAR; INTRALESIONAL; INTRAMUSCULAR; SOFT TISSUE at 11:00

## 2024-04-05 NOTE — PROGRESS NOTES
"Patient Name:  Hawa Cheek  MRN:  [id number]    Assessment & Plan     Right shoulder rotator cuff tendinitis/bursitis.  Patient was offered excepted a right shoulder subacromial space corticosteroid injection today.  Continue home exercises.  Refill meloxicam 7.5 mg provided today at patient's request.  Advise she take this as needed.  Activities as tolerated modification avoid pain.  Follow-up in 3 months.  Consider repeat injection at that time versus MRI.     utilized during today's encounter.    Chief Complaint     Right shoulder pain.    History of the Present Illness     Hawa Cheek is a 73 y.o. female who returns to the office today for follow-up regarding her right shoulder.  She was last seen on 2/23/2024.  At that time she was experiencing significant improvement in her symptoms with physical therapy and meloxicam.  Prior to that she did receive a right shoulder subacromial space corticosteroid injection on 1/12/2024 with improvement as well.  Patient does note a slight increase in her pain recently.  Her daughter states that she has been doing too much around the house which has led to increase in pain.  Pain is localized to the lateral shoulder and worse with reaching overhead and behind her back as well as doing housework.  She also notes increased pain with lying on her right side at night.  She notes weakness due to the pain.  No instability.  No numbness or tingling.  No fevers or chills.  She does take meloxicam with some improvement.    Physical Exam     Ht 5' 2\" (1.575 m)   Wt 55.8 kg (123 lb)   BMI 22.50 kg/m²     Right shoulder: No gross deformity.  Skin intact.  No erythema ecchymosis or swelling.  Slight tenderness anterior and lateral shoulder.  No tenderness posterior shoulder and AC joint. PROM is , ER-abd 90, IR-abd 50.  Impingement signs are mildly positive.  Empty can and speeds tests are mildly positive as well.  Cross-body adduction test is " negative.  5 out of 5 forward flexion strength.  Sensation intact axillary, median, ulnar, and radial nerves.  2+ radial pulse.     Eyes: Anicteric sclerae.  ENT: Trachea midline.  Lungs: Normal respiratory effort.  CV: Capillary refill is less than 2 seconds.  Skin: Intact without erythema.  Lymph: No palpable lymphadenopathy.  Neuro: Sensation is grossly intact to light touch.  Psych: Mood and affect are appropriate.      Past Medical History:   Diagnosis Date    Arthralgia of hip     left    Arthritis     Chronic pain disorder     low back and shoulder    Dyslipidemia     Hyperlipidemia     Hypertension     Neck pain     Pemphigus     Pre-diabetes     Shoulder pain     Stroke (HCC)     Vitamin D deficiency        Past Surgical History:   Procedure Laterality Date    CATARACT EXTRACTION Bilateral     COLONOSCOPY      IA COLONOSCOPY FLX DX W/COLLJ SPEC WHEN PFRMD N/A 11/10/2016    Procedure: COLONOSCOPY;  Surgeon: Tom Carter MD;  Location: [facility];  Service: Gastroenterology    IA INCISION EXTENSOR TENDON SHEATH WRIST Left 7/12/2021    Procedure: RELEASE LEFT DEQUERVAINS;  Surgeon: Jt White MD;  Location:  MAIN OR;  Service: Orthopedics    IA INCISION EXTENSOR TENDON SHEATH WRIST Right 7/26/2021    Procedure: RELEASE RIGHT DEQUERVAINS;  Surgeon: Jt White MD;  Location:  MAIN OR;  Service: Orthopedics    IA XCAPSL CTRC RMVL INSJ IO LENS PROSTH W/O ECP Left 9/27/2018    Procedure: EXTRACAPSULAR CATARACT REMOVAL/INSERTION OF INTRAOCULAR LENS;  Surgeon: Alfred Stephens MD;  Location:  MAIN OR;  Service: Ophthalmology       No Known Allergies    Current Outpatient Medications on File Prior to Visit   Medication Sig Dispense Refill    Accu-Chek FastClix Lancets MISC Use in the morning 102 each 1    acetaminophen (TYLENOL) 500 mg tablet Take 500 mg by mouth every 6 (six) hours as needed for mild pain      amLODIPine (NORVASC) 5 mg tablet Take 1 tablet (5 mg total) by mouth daily 90 tablet 1     ammonium lactate (LAC-HYDRIN) 12 % lotion apply topically twice a day 400 g 0    ammonium lactate (LAC-HYDRIN) 12 % lotion Apply topically 2 (two) times a day 400 g 0    aspirin (Aspirin Low Dose) 81 mg EC tablet Take 1 tablet (81 mg total) by mouth daily 90 tablet 0    atorvastatin (LIPITOR) 40 mg tablet Take 1 tablet (40 mg total) by mouth every evening 90 tablet 3    Blood Glucose Monitoring Suppl (Accu-Chek Guide) w/Device KIT Use in the morning 1 kit 0    celecoxib (CeleBREX) 50 MG capsule Take 1 capsule (50 mg total) by mouth 2 (two) times a day as needed for mild pain 90 capsule 0    Diclofenac Sodium (VOLTAREN) 1 % Apply 2 g topically 4 (four) times a day 350 g 0    Elastic Bandages & Supports (Thumb Splint/Neoprene Medium) MISC by Does not apply route daily 2 each 0    fluticasone (FLONASE) 50 mcg/act nasal spray 2 sprays into each nostril daily 16 g 0    glucose blood (Accu-Chek Guide) test strip Use 1 each in the morning 100 each 1    memantine (NAMENDA) 5 mg tablet Take 1 tablet (5 mg total) by mouth 2 (two) times a day 180 tablet 3    methylPREDNISolone 4 MG tablet therapy pack Use as directed on package 21 each 0    omega-3-acid ethyl esters (LOVAZA) 1 g capsule take 2 capsules by mouth twice a day 180 capsule 0    omega-3-acid ethyl esters (LOVAZA) 1 g capsule Take 2 capsules (2 g total) by mouth 2 (two) times a day 180 capsule 0    omeprazole (PriLOSEC) 20 mg delayed release capsule take 1 capsule by mouth daily BEFORE BREAKFAST 90 capsule 1    oxyCODONE (Roxicodone) 5 immediate release tablet Take 1 tablet (5 mg total) by mouth 2 (two) times a day as needed for moderate pain Max Daily Amount: 10 mg 20 tablet 0    VITAMIN D PO Take 1 tablet by mouth daily       No current facility-administered medications on file prior to visit.       Social History     Tobacco Use    Smoking status: Never    Smokeless tobacco: Never   Vaping Use    Vaping status: Never Used   Substance Use Topics    Alcohol use:  Never    Drug use: No       Family History   Problem Relation Age of Onset    Hyperlipidemia Mother     Hypertension Mother     Heart disease Father     No Known Problems Sister     No Known Problems Sister     No Known Problems Sister     No Known Problems Maternal Aunt     No Known Problems Paternal Aunt     No Known Problems Paternal Aunt        Review of Systems     As stated in the HPI. All other systems reviewed and are negative.    Large joint arthrocentesis: R subacromial bursa  Procedure Details  Location: shoulder - R subacromial bursa  Needle size: 22 G  Ultrasound guidance: no  Approach: posterior  Medications administered: 2 mL bupivacaine 0.25 %; 2 mL lidocaine 1 %; 1 mL methylPREDNISolone acetate 40 mg/mL    Patient tolerance: patient tolerated the procedure well with no immediate complications  Dressing:  Sterile dressing applied

## 2024-05-28 DIAGNOSIS — E78.5 DYSLIPIDEMIA: ICD-10-CM

## 2024-05-29 RX ORDER — OMEGA-3-ACID ETHYL ESTERS 1 G/1
2 CAPSULE, LIQUID FILLED ORAL 2 TIMES DAILY
Qty: 180 CAPSULE | Refills: 0 | Status: SHIPPED | OUTPATIENT
Start: 2024-05-29

## 2024-06-06 ENCOUNTER — RA CDI HCC (OUTPATIENT)
Dept: OTHER | Facility: HOSPITAL | Age: 74
End: 2024-06-06

## 2024-06-06 PROBLEM — Z86.73 PERSONAL HISTORY OF TRANSIENT ISCHEMIC ATTACK (TIA), AND CEREBRAL INFARCTION WITHOUT RESIDUAL DEFICITS: Status: ACTIVE | Noted: 2019-12-08

## 2024-06-06 PROBLEM — Z86.73 PERSONAL HISTORY OF TRANSIENT ISCHEMIC ATTACK (TIA), AND CEREBRAL INFARCTION WITHOUT RESIDUAL DEFICITS: Status: ACTIVE | Noted: 2024-06-06

## 2024-06-13 NOTE — OP NOTE
OPERATIVE REPORT  PATIENT NAME: Génesis Sanchez    :  1950  MRN: [id number]  Pt Location:  OR ROOM 12    SURGERY DATE: 2018    Surgeon(s) and Role:     * Mirella Kowalski MD - Primary    Preop Diagnosis:  Age-related nuclear cataract of left eye [H25 12]  Other visual disturbances [H53 8]    Post-Op Diagnosis Codes:     * Age-related nuclear cataract of left eye [H25 12]     * Other visual disturbances [H53 8]    Procedure(s) (LRB):  EXTRACAPSULAR CATARACT REMOVAL/INSERTION OF INTRAOCULAR LENS (Left)    Specimen(s):  * No specimens in log *    Estimated Blood Loss:   Minimal    Drains:       Anesthesia Type:   IV Sedation with Anesthesia    Operative Indications:  Age-related nuclear cataract of left eye [H25 12]  Other visual disturbances [O88 0]    Complications:   None    Procedure and Technique:   The patient wasprepped and draped in the usual fashion Betadine solution was used to sterilize the conjunctival sac the entire procedure was done under the operating microscope positioned temporal to the patient  A Barraquer lid speculum was inserted a paracentesis was done about 30° to the left of the corneal incision using a microsurgical knife   Provisc was then injected into the anterior chamber  A 3 mm clear corneal incision was made with the angled phaco knife  A continuous capsulotomy was performed  Hydrodissection was done and the nucleus rotated  The Legacy phacoemulsification unit was used to remove the nucleus  Irrigation/ aspiration of cortical material was then performed leaving an intact posterior capsule  Viscoat was injected into the capsular bag  An acrylic foldable posterior chamber intraocular lens was inserted using a injector   Good centration of the intraocular lens was observed  Miochol was injected into the anterior chamber to induce miosis  Removal of the viscoelastic was done using the irrigation aspiration tip of the phaco unit  After that we made sure that the wound was self sealed  The Barraquer speculum was removed and antibiotic ointment was applied and a plastic shield was placed  in the operated eye  The patient tolerated procedure well and returned to the Recovery recovery room in good condition  The lens inserted has a power of 22 5 diopters,model [id number]  Company was [employer]    Patient Disposition:  PACU     SIGNATURE: Mirella Kowalski MD  DATE: September 27, 2018  TIME: 8:42 AM ambulatory

## 2024-06-16 NOTE — PROGRESS NOTES
Ambulatory Visit  Name: Hawa Cheek      : 1950      MRN: [id number]  Encounter Provider: Joyce Sims MD  Encounter Date: 2024   Encounter department: [facility]    Assessment & Plan   1. Essential hypertension  Assessment & Plan:  Home med amlodipine 5 mg   She does not take BP at home regularly the last BP readings    Blood Pressure: 140/70 (manual)    Denies adding too much salt in dietm home cook meals   Blood pressure controlled for age   Minor swelling from amlodipine, does not bother her     Plan:   Continue home med  Continue BP log   FU on CMP, CBC, Hba1c       Orders:  -     amLODIPine (NORVASC) 5 mg tablet; Take 1 tablet (5 mg total) by mouth daily  2. Mammogram abnormal  Assessment & Plan:  2023 mammogram: no evidence of malignancy     Plan:   Continue mammogram every 2 years ( due in 2025)   3. Vitamin D deficiency  Assessment & Plan:  Last vitamin D 720 : 37  Current med:  vitamin D over the counter unknown dose     Plan:   Continue current med   Recheck vitamin D   4. Dyslipidemia  Assessment & Plan:  Home med: atorvostatin 40 mg QD   Lab Results   Component Value Date    LDLCALC 55 2023     Lab Results   Component Value Date    CHOLESTEROL 142 2023    CHOLESTEROL 118 06/15/2022    CHOLESTEROL 125 2021     Lab Results   Component Value Date    HDL 78 2023    HDL 71 06/15/2022    HDL 69 2021     Lab Results   Component Value Date    TRIG 47 2023    TRIG 55 06/15/2022    TRIG 39 2021     Lab Results   Component Value Date    NONHDLC 64 2023    NONHDLC 47 06/15/2022    NONHDLC 56 2021            Plan:   Continue home med  Lipid panel yearly  5. Dry skin  -     ammonium lactate (LAC-HYDRIN) 12 % lotion; Apply 1 Application topically 2 (two) times a day  6. Arthralgia of left hip  Assessment & Plan:  Hip pain. On and off. Used tylenol this morning and helped   Full range of motion    No back pain.     Plan:   Tylenol extra strength every 6 hours as needed          History of Present Illness     HPI    Patient comes today to establish care, FU on blood pressure, and Left hip pain.   Patient has had chronic hip pain for years, has tried tylenol ( one dose), diclofenac, and baclofen previously for this pain but nothing makes it go away for good. Explained to the patient this is most likely arthritis, and that being pain free indefinitely is not possible.   Review of Systems   Constitutional:  Negative for chills and fever.   HENT:  Negative for sore throat.    Respiratory:  Negative for chest tightness and shortness of breath.    Cardiovascular:  Positive for leg swelling. Negative for chest pain and palpitations.   Genitourinary:  Negative for dysuria and urgency.   Skin:  Negative for pallor.   Neurological:  Negative for dizziness, syncope and light-headedness.     Past Medical History:   Diagnosis Date    Arthralgia of hip     left    Arthritis     Chronic pain disorder     low back and shoulder    Dyslipidemia     Hyperlipidemia     Hypertension     Neck pain     Pemphigus     Pre-diabetes     Shoulder pain     Stroke (HCC)     Vitamin D deficiency      Past Surgical History:   Procedure Laterality Date    CATARACT EXTRACTION Bilateral     COLONOSCOPY      MS COLONOSCOPY FLX DX W/COLLJ SPEC WHEN PFRMD N/A 11/10/2016    Procedure: COLONOSCOPY;  Surgeon: Tom Carter MD;  Location: [facility];  Service: Gastroenterology    MS INCISION EXTENSOR TENDON SHEATH WRIST Left 7/12/2021    Procedure: RELEASE LEFT DEQUERVAINS;  Surgeon: Jt White MD;  Location:  MAIN OR;  Service: Orthopedics    MS INCISION EXTENSOR TENDON SHEATH WRIST Right 7/26/2021    Procedure: RELEASE RIGHT DEQUERVAINS;  Surgeon: Jt White MD;  Location:  MAIN OR;  Service: Orthopedics    MS XCAPSL CTRC RMVL INSJ IO LENS PROSTH W/O ECP Left 9/27/2018    Procedure: EXTRACAPSULAR CATARACT REMOVAL/INSERTION OF  INTRAOCULAR LENS;  Surgeon: Alfred Stephens MD;  Location:  MAIN OR;  Service: Ophthalmology     Family History   Problem Relation Age of Onset    Hyperlipidemia Mother     Hypertension Mother     Heart disease Father     No Known Problems Sister     No Known Problems Sister     No Known Problems Sister     No Known Problems Maternal Aunt     No Known Problems Paternal Aunt     No Known Problems Paternal Aunt      Social History     Tobacco Use    Smoking status: Never    Smokeless tobacco: Never   Vaping Use    Vaping status: Never Used   Substance and Sexual Activity    Alcohol use: Never    Drug use: No    Sexual activity: Not Currently     Current Outpatient Medications on File Prior to Visit   Medication Sig    acetaminophen (TYLENOL) 500 mg tablet Take 500 mg by mouth every 6 (six) hours as needed for mild pain    atorvastatin (LIPITOR) 40 mg tablet Take 1 tablet (40 mg total) by mouth every evening    memantine (NAMENDA) 5 mg tablet Take 1 tablet (5 mg total) by mouth 2 (two) times a day    omega-3-acid ethyl esters (LOVAZA) 1 g capsule Take 2 capsules (2 g total) by mouth 2 (two) times a day    VITAMIN D PO Take 1 tablet by mouth daily    [DISCONTINUED] amLODIPine (NORVASC) 5 mg tablet Take 1 tablet (5 mg total) by mouth daily    [DISCONTINUED] ammonium lactate (LAC-HYDRIN) 12 % lotion apply topically twice a day    aspirin (Aspirin Low Dose) 81 mg EC tablet Take 1 tablet (81 mg total) by mouth daily    [DISCONTINUED] Accu-Chek FastClix Lancets MISC Use in the morning    [DISCONTINUED] ammonium lactate (LAC-HYDRIN) 12 % lotion Apply topically 2 (two) times a day    [DISCONTINUED] Blood Glucose Monitoring Suppl (Accu-Chek Guide) w/Device KIT Use in the morning    [DISCONTINUED] celecoxib (CeleBREX) 50 MG capsule Take 1 capsule (50 mg total) by mouth 2 (two) times a day as needed for mild pain    [DISCONTINUED] Diclofenac Sodium (VOLTAREN) 1 % Apply 2 g topically 4 (four) times a day    [DISCONTINUED] Elastic  "Bandages & Supports (Thumb Splint/Neoprene Medium) MISC by Does not apply route daily    [DISCONTINUED] fluticasone (FLONASE) 50 mcg/act nasal spray 2 sprays into each nostril daily    [DISCONTINUED] glucose blood (Accu-Chek Guide) test strip Use 1 each in the morning    [DISCONTINUED] meloxicam (Mobic) 7.5 mg tablet Take 1 tablet (7.5 mg total) by mouth daily    [DISCONTINUED] methylPREDNISolone 4 MG tablet therapy pack Use as directed on package    [DISCONTINUED] omega-3-acid ethyl esters (LOVAZA) 1 g capsule Take 2 capsules (2 g total) by mouth 2 (two) times a day    [DISCONTINUED] omeprazole (PriLOSEC) 20 mg delayed release capsule take 1 capsule by mouth daily BEFORE BREAKFAST    [DISCONTINUED] oxyCODONE (Roxicodone) 5 immediate release tablet Take 1 tablet (5 mg total) by mouth 2 (two) times a day as needed for moderate pain Max Daily Amount: 10 mg     No Known Allergies  Immunization History   Administered Date(s) Administered    COVID-19 PFIZER VACCINE 0.3 ML IM 03/16/2021, 04/06/2021, 01/31/2022    INFLUENZA 10/17/2006, 10/13/2022    Influenza Split High Dose Preservative Free IM 02/06/2017, 12/04/2017    Influenza, high dose seasonal 0.7 mL 12/20/2018, 09/26/2019, 10/05/2020, 12/07/2021, 10/13/2022, 12/20/2023    Influenza, seasonal, injectable 11/19/2014    Pneumococcal Conjugate Vaccine 20-valent (Pcv20), Polysace 09/13/2022    Pneumococcal Polysaccharide PPV23 10/10/2016    Zoster Vaccine Recombinant 03/19/2024     Objective     /70 (BP Location: Left arm, Patient Position: Sitting, Cuff Size: Standard) Comment: manual  Pulse 68   Temp 98 °F (36.7 °C) (Temporal)   Resp 16   Ht 5' 2\" (1.575 m)   Wt 56.2 kg (124 lb)   SpO2 95%   BMI 22.68 kg/m²     Physical Exam  Constitutional:       General: She is not in acute distress.     Appearance: Normal appearance. She is normal weight. She is not ill-appearing or toxic-appearing.   HENT:      Head: Normocephalic.      Nose: Nose normal.      " Mouth/Throat:      Mouth: Mucous membranes are moist.   Eyes:      Conjunctiva/sclera: Conjunctivae normal.   Cardiovascular:      Rate and Rhythm: Normal rate and regular rhythm.      Pulses: Normal pulses.      Heart sounds: Normal heart sounds. No murmur heard.     No gallop.   Pulmonary:      Effort: Pulmonary effort is normal. No respiratory distress.      Breath sounds: Normal breath sounds. No stridor. No wheezing.   Abdominal:      General: Abdomen is flat. There is no distension.      Tenderness: There is no abdominal tenderness. There is no guarding.   Musculoskeletal:      Right lower leg: Edema present.      Left lower leg: Edema present.      Comments: Minor edema bilaterally   Skin:     General: Skin is warm.      Capillary Refill: Capillary refill takes less than 2 seconds.   Neurological:      General: No focal deficit present.      Mental Status: She is alert.   Psychiatric:         Mood and Affect: Mood normal.     Administrative Statements   I have spent a total time of 40 minutes on 06/18/24 In caring for this patient including Diagnostic results, Risks and benefits of tx options, Patient and family education, Counseling / Coordination of care, Documenting in the medical record, Reviewing / ordering tests, medicine, procedures  , and Obtaining or reviewing history  .

## 2024-06-18 ENCOUNTER — TELEPHONE (OUTPATIENT)
Dept: FAMILY MEDICINE CLINIC | Facility: CLINIC | Age: 74
End: 2024-06-18

## 2024-06-18 ENCOUNTER — OFFICE VISIT (OUTPATIENT)
Dept: FAMILY MEDICINE CLINIC | Facility: CLINIC | Age: 74
End: 2024-06-18

## 2024-06-18 VITALS
HEART RATE: 68 BPM | HEIGHT: 62 IN | WEIGHT: 124 LBS | OXYGEN SATURATION: 95 % | DIASTOLIC BLOOD PRESSURE: 70 MMHG | BODY MASS INDEX: 22.82 KG/M2 | RESPIRATION RATE: 16 BRPM | TEMPERATURE: 98 F | SYSTOLIC BLOOD PRESSURE: 140 MMHG

## 2024-06-18 DIAGNOSIS — R92.8 MAMMOGRAM ABNORMAL: ICD-10-CM

## 2024-06-18 DIAGNOSIS — E55.9 VITAMIN D DEFICIENCY: ICD-10-CM

## 2024-06-18 DIAGNOSIS — L85.3 DRY SKIN: ICD-10-CM

## 2024-06-18 DIAGNOSIS — E78.5 DYSLIPIDEMIA: ICD-10-CM

## 2024-06-18 DIAGNOSIS — I10 ESSENTIAL HYPERTENSION: Primary | ICD-10-CM

## 2024-06-18 DIAGNOSIS — M25.552 ARTHRALGIA OF LEFT HIP: ICD-10-CM

## 2024-06-18 RX ORDER — AMLODIPINE BESYLATE 5 MG/1
5 TABLET ORAL DAILY
Qty: 90 TABLET | Refills: 3 | Status: SHIPPED | OUTPATIENT
Start: 2024-06-18

## 2024-06-18 RX ORDER — AMMONIUM LACTATE 12 G/100G
1 LOTION TOPICAL 2 TIMES DAILY
Qty: 400 G | Refills: 0 | Status: SHIPPED | OUTPATIENT
Start: 2024-06-18

## 2024-06-18 NOTE — TELEPHONE ENCOUNTER
Precepted with pcp in regards to HTN and hip pain     Recommended med rec to optimize duplicate nsaids, steroids etc    Recommend f/u two weeks to better assess HTN    For now, okay to hold off. Recommend sodium discussion    Recommend PT to help with hip    F/u 2 weeks for both issues     Pharmacist Tracking Tool  Reason For Outreach: Embedded Pharmacist  Demographics:  Intervention Method: Chart Review  Type of Intervention: New  Topics Addressed: Other  Pharmacologic Interventions: Prevent or Manage RASHEEDA and Drug Interaction   Non-Pharmacologic Interventions: Care coordination and Medication/Device education  Time:  Direct Patient Care:  0  mins  Care Coordination:  10  mins  Recommendation Recipient: Provider  Outcome: Accepted

## 2024-06-19 ENCOUNTER — APPOINTMENT (OUTPATIENT)
Dept: LAB | Age: 74
End: 2024-06-19
Payer: MEDICARE

## 2024-06-20 NOTE — PROGRESS NOTES
Assessment/Plan:     Cerebellar infarct [facility])  Patient's daughter reports home BP in the 120 to 130 over 70 range  Continue Lisinopril 5 mg daily, Atorvastatin 40 mg Lovaza and Aspirin       Impaired fasting glucose  Started Metformin 500 mg daily  Encouraged low carb diet and exercise     Hypertension  Given BP's at home, will not increase Lisinopril at this time  Continue to keep track of BP   Given mild LE edema Furosemide 10 mg daily for 5 days        Diagnoses and all orders for this visit:    Impaired fasting glucose  -     metFORMIN (GLUCOPHAGE) 500 mg tablet; Take 1 tablet (500 mg total) by mouth daily with breakfast  -     Hemoglobin A1C; Future    CVA (cerebral vascular accident) ([address] )  -     atorvastatin (LIPITOR) 40 mg tablet; Take 1 tablet (40 mg total) by mouth every evening    Dyslipidemia  -     atorvastatin (LIPITOR) 40 mg tablet; Take 1 tablet (40 mg total) by mouth every evening  -     omega-3-acid ethyl esters (LOVAZA) 1 g capsule; Take 2 capsules (2 g total) by mouth 2 (two) times a day  -     CBC and differential; Future  -     Comprehensive metabolic panel; Future  -     Lipid panel; Future  -     Hemoglobin A1C; Future  -     Microalbumin / creatinine urine ratio; Future  -     TSH, 3rd generation with Free T4 reflex; Future    Essential hypertension  -     lisinopril (ZESTRIL) 5 mg tablet; Take 1 tablet (5 mg total) by mouth daily  -     CBC and differential; Future  -     Comprehensive metabolic panel; Future  -     Lipid panel; Future  -     Hemoglobin A1C; Future  -     Microalbumin / creatinine urine ratio; Future  -     TSH, 3rd generation with Free T4 reflex; Future    Lower extremity edema  -     furosemide (LASIX) 20 mg tablet; Take 0 5 tablets (10 mg total) by mouth daily    Cerebellar infarct (HCC)         Subjective:     Patient ID: Shreyas Archibald is a 78 y o  female  Shreyas Archibald is a 78 y  o female with arthritis, dyslipidemia, hypertension, pre-diabetes, neck pain, vitamin-D deficiency whom presented to the ED at [facility] on 12/08  with acute onset of dizziness and headache  Her daughter took her [facility] [place], where she had an elevated blood pressure and was told to go to the ED  Blood pressures were elevated in the /87 mmhg, it was reported that all symptoms resolved except for the patient scored an NIH stroke scale for not remembering the month      The patient was admitted on the stroke pathway, she was placed on aspirin and statin  Testing completed as below       CT of the head - showed bilateral cerebral age-indeterminate infarcts/ischemic events, microangiopathy most prominent around the left periventricular frontal parietal lobe, bilateral basal ganglia calcifications, no evidence of acute intracranial hemorrhage      CTA of the head and neck - showed no evidence of basilar thrombus,, no evidence of hemodynamic significant stenosis, aneurysm or dissection, faintly visualized posterior inferior cerebral arteries       MRI of the brain - was completed which showed bilateral symmetric cerebellar encephalomalacia, there is primary white matter volume loss and cortical thinning, resulting in ex vacuo dilatation of the 4th ventricle, no previous hemorrhage, findings suggestive of sequela of previous ischemia      Echo was completed - which revealed a EF 69%,, there is no evidence of PFO/ASD, left atrium size was normal, right atrial size was normal    Here today for follow up    Patient reports her dizziness and headache have resolved and she feels back to base line    Leg and knee pain have improved  LBP is baseline patient does not wish further lumbar injections          Review of Systems   Musculoskeletal: Positive for arthralgias and back pain  All other systems reviewed and are negative  Objective:     Physical Exam   Constitutional: She is oriented to person, place, and time  She appears well-developed     HENT:   Head: Normocephalic  Right Ear: External ear normal    Left Ear: External ear normal    Nose: Nose normal    Mouth/Throat: Oropharynx is clear and moist    Eyes: Pupils are equal, round, and reactive to light  Conjunctivae and EOM are normal    Neck: Normal range of motion  Neck supple  No thyromegaly present  Cardiovascular: Normal rate, regular rhythm and normal heart sounds  Trace LE edema    Pulmonary/Chest: Effort normal and breath sounds normal    Abdominal: Soft  There is no tenderness  There is no rebound and no guarding  Musculoskeletal: Normal range of motion  Neurological: She is alert and oriented to person, place, and time  She has normal reflexes  Skin: Skin is dry  Psychiatric: She has a normal mood and affect  Nursing note and vitals reviewed  Vitals:    12/16/19 1100 12/16/19 1339   BP: 160/80 138/80   BP Location: Right arm    Patient Position: Sitting    Cuff Size: Standard    Pulse: 76    Resp: 18    Temp: 98 4 °F (36 9 °C)    TempSrc: Temporal    SpO2: 96%    Weight: 62 6 kg (138 lb)    Height: 5' 2" (1 575 m)        Transitional Care Management Review:  Herb Elizalde is a 78 y o  female here for TCM follow up       During the TCM phone call patient stated:    TCM Call (since 11/15/2019)     Date and time call was made  12/10/2019  1:59 PM    Hospital care reviewed  Records reviewed    Patient was hospitialized at  [address] [place] [address]    Date of Admission  12/08/19    Date of discharge  12/09/19    Diagnosis  Cerebellar infarct     Disposition  Home    Were the patients medications reviewed and updated  No    Current Symptoms  Dizziness    Dizziness severity  Mild      TCM Call (since 11/15/2019)     Did you obtain your prescribed medications  Yes    Do you need help managing your prescriptions or medications  No    Is transportation to your appointment needed  No    I have advised the patient to call PCP with any new or worsening symptoms  Kim Townsend Arrangements  Family members    Orlando Ramey MD show

## 2024-07-04 PROBLEM — R73.03 PRE-DIABETES: Status: ACTIVE | Noted: 2024-07-04

## 2024-07-04 NOTE — ASSESSMENT & PLAN NOTE
Assessment:   Blood pressure at today’s office visit Blood Pressure: 142/68   mmHg, controlled on amlodipine 5 mg daily   Blood Pressure goal as per JNC-8 less than 150/90 mmHg,   Currently amlodipine 5 mg daily , compliant  Home blood pressure from SBP - 109 - 127 for the last 2 weeks        Plan:   Will continue home medication  BP goals reviewed with patient.    The patient was educated on the importance of medication compliance and to monitor her blood pressure at home daily in a quiet room; after resting for at least 5 minutes and to bring the logs at next visit.   Will recommend performing aerobic exercise for at least more than 3 times per week.  Will recommend sodium and fat reduction and  Increase  in fruit consumption.    Reassess BP in 1 month  If BP less than 100 and symptomatic patient prompted to call office.   ED precautions discussed

## 2024-07-04 NOTE — ASSESSMENT & PLAN NOTE
Lab Results   Component Value Date    HGBA1C 6.0 (H) 06/19/2024      Controlled for age  Not on any diabetes medications    Plan:  Continue diet and exercise to control blood sugars  Follow-up in 6 months

## 2024-07-04 NOTE — ASSESSMENT & PLAN NOTE
Home medication: Atorvastatin 40 mg daily  Lab Results   Component Value Date    CHOLESTEROL 131 06/19/2024    TRIG 57 06/19/2024    HDL 70 06/19/2024    LDLCALC 50 06/19/2024   Lipids within goals on medication    Plan:  Continue home medication  Diet and exercise counseling

## 2024-07-04 NOTE — PROGRESS NOTES
Ambulatory Visit  Name: Hawa Cheek      : 1950      MRN: [id number]  Encounter Provider: Joyce Sims MD  Encounter Date: 2024   Encounter department: [facility]    Assessment & Plan   1. Dyslipidemia  Assessment & Plan:  Home medication: Atorvastatin 40 mg daily  Lab Results   Component Value Date    CHOLESTEROL 131 2024    TRIG 57 2024    HDL 70 2024    LDLCALC 50 2024   Lipids within goals on medication    Plan:  Continue home medication  Diet and exercise counseling    Orders:  -     omega-3-acid ethyl esters (LOVAZA) 1 g capsule; Take 2 capsules (2 g total) by mouth 2 (two) times a day  -     atorvastatin (LIPITOR) 40 mg tablet; Take 1 tablet (40 mg total) by mouth every evening  2. Essential hypertension  Assessment & Plan:    Assessment:   Blood pressure at today’s office visit Blood Pressure: 142/68   mmHg, controlled on amlodipine 5 mg daily   Blood Pressure goal as per JNC-8 less than 150/90 mmHg,   Currently amlodipine 5 mg daily , compliant  Home blood pressure from SBP - 109 - 127 for the last 2 weeks        Plan:   Will continue home medication  BP goals reviewed with patient.    The patient was educated on the importance of medication compliance and to monitor her blood pressure at home daily in a quiet room; after resting for at least 5 minutes and to bring the logs at next visit.   Will recommend performing aerobic exercise for at least more than 3 times per week.  Will recommend sodium and fat reduction and  Increase  in fruit consumption.    Reassess BP in 1 month  If BP less than 100 and symptomatic patient prompted to call office.   ED precautions discussed  Orders:  -     amLODIPine (NORVASC) 5 mg tablet; Take 1 tablet (5 mg total) by mouth daily  -     aspirin (Aspirin Low Dose) 81 mg EC tablet; Take 1 tablet (81 mg total) by mouth daily  3. Pre-diabetes  Assessment & Plan:  Lab Results   Component Value Date  "   HGBA1C 6.0 (H) 06/19/2024      Controlled for age  Not on any diabetes medications    Plan:  Continue diet and exercise to control blood sugars  Follow-up in 6 months  4. Dry skin  -     ammonium lactate (LAC-HYDRIN) 12 % lotion; Apply 1 Application topically 2 (two) times a day  5. CVA (cerebral vascular accident) (HCC)  -     atorvastatin (LIPITOR) 40 mg tablet; Take 1 tablet (40 mg total) by mouth every evening  6. Cognitive decline  -     memantine (NAMENDA) 5 mg tablet; Take 1 tablet (5 mg total) by mouth 2 (two) times a day  7. Vitamin D deficiency  -     Cholecalciferol (Vitamin D) 50 MCG (2000 UT) tablet; Take 1 tablet (2,000 Units total) by mouth daily  8. Chronic right shoulder pain  -     Diclofenac Sodium (VOLTAREN) 1 %; Apply 2 g topically 4 (four) times a day       History of Present Illness     HPI  This is a very pleasant 73 y.o. female who presents to the clinic for follow-up on blood pressure patient's medical conditions are stable unless noted otherwise above.  Patient has not had any recent hospitalizations, or medical emergencies since last visit.  Patient has no further complaints other than what is mentioned in the ROS. Patient is compliant with her currently medication regimen. At last visit patient's BP was elevated, and exercise counseling and abstinence from coffee was discussed and patient complaint with changes.   Review of Systems   Constitutional:  Negative for fever.   Respiratory:  Negative for cough, chest tightness and shortness of breath.    Gastrointestinal:  Negative for abdominal pain.   Genitourinary:  Negative for dysuria and hematuria.   Neurological:  Negative for dizziness, tremors, seizures, weakness, numbness and headaches.       Objective     /75 (BP Location: Left arm, Patient Position: Sitting, Cuff Size: Standard)   Pulse 63   Temp 97.5 °F (36.4 °C) (Temporal)   Resp 16   Ht 5' 2\" (1.575 m)   Wt 56.4 kg (124 lb 6.4 oz)   SpO2 98%   BMI 22.75 kg/m² "     Physical Exam  Constitutional:       General: She is not in acute distress.     Appearance: She is normal weight. She is not ill-appearing or toxic-appearing.   Eyes:      Conjunctiva/sclera: Conjunctivae normal.   Cardiovascular:      Rate and Rhythm: Normal rate and regular rhythm.      Pulses: Normal pulses.      Heart sounds: Normal heart sounds. No murmur heard.  Pulmonary:      Effort: Pulmonary effort is normal. No respiratory distress.      Breath sounds: Normal breath sounds. No wheezing or rales.   Abdominal:      General: Abdomen is flat. Bowel sounds are normal. There is no distension.      Palpations: Abdomen is soft.      Tenderness: There is no abdominal tenderness. There is no guarding.   Skin:     General: Skin is warm.      Capillary Refill: Capillary refill takes less than 2 seconds.   Neurological:      Mental Status: She is alert.       Administrative Statements   I have spent a total time of 20 minutes in caring for this patient on the day of the visit/encounter including Instructions for management, Patient and family education, Importance of tx compliance, Reviewing / ordering tests, medicine, procedures  , and Obtaining or reviewing history  .

## 2024-07-05 ENCOUNTER — OFFICE VISIT (OUTPATIENT)
Dept: FAMILY MEDICINE CLINIC | Facility: CLINIC | Age: 74
End: 2024-07-05

## 2024-07-05 VITALS
SYSTOLIC BLOOD PRESSURE: 120 MMHG | BODY MASS INDEX: 22.89 KG/M2 | HEIGHT: 62 IN | RESPIRATION RATE: 16 BRPM | DIASTOLIC BLOOD PRESSURE: 75 MMHG | OXYGEN SATURATION: 98 % | WEIGHT: 124.4 LBS | TEMPERATURE: 97.5 F | HEART RATE: 63 BPM

## 2024-07-05 DIAGNOSIS — G89.29 CHRONIC RIGHT SHOULDER PAIN: ICD-10-CM

## 2024-07-05 DIAGNOSIS — E55.9 VITAMIN D DEFICIENCY: ICD-10-CM

## 2024-07-05 DIAGNOSIS — R73.03 PRE-DIABETES: ICD-10-CM

## 2024-07-05 DIAGNOSIS — I63.9 CVA (CEREBRAL VASCULAR ACCIDENT) (HCC): ICD-10-CM

## 2024-07-05 DIAGNOSIS — L85.3 DRY SKIN: ICD-10-CM

## 2024-07-05 DIAGNOSIS — M25.511 CHRONIC RIGHT SHOULDER PAIN: ICD-10-CM

## 2024-07-05 DIAGNOSIS — R41.89 COGNITIVE DECLINE: ICD-10-CM

## 2024-07-05 DIAGNOSIS — I10 ESSENTIAL HYPERTENSION: ICD-10-CM

## 2024-07-05 DIAGNOSIS — E78.5 DYSLIPIDEMIA: Primary | ICD-10-CM

## 2024-07-05 RX ORDER — AMMONIUM LACTATE 12 G/100G
1 LOTION TOPICAL 2 TIMES DAILY
Qty: 400 G | Refills: 0 | Status: SHIPPED | OUTPATIENT
Start: 2024-07-05

## 2024-07-05 RX ORDER — AMLODIPINE BESYLATE 5 MG/1
5 TABLET ORAL DAILY
Qty: 90 TABLET | Refills: 3 | Status: SHIPPED | OUTPATIENT
Start: 2024-07-05

## 2024-07-05 RX ORDER — MEMANTINE HYDROCHLORIDE 5 MG/1
5 TABLET ORAL 2 TIMES DAILY
Qty: 180 TABLET | Refills: 3 | Status: SHIPPED | OUTPATIENT
Start: 2024-07-05

## 2024-07-05 RX ORDER — OMEGA-3-ACID ETHYL ESTERS 1 G/1
2 CAPSULE, LIQUID FILLED ORAL 2 TIMES DAILY
Qty: 180 CAPSULE | Refills: 0 | Status: SHIPPED | OUTPATIENT
Start: 2024-07-05

## 2024-07-05 RX ORDER — CHOLECALCIFEROL (VITAMIN D3) 50 MCG
2000 TABLET ORAL DAILY
Qty: 30 TABLET | Refills: 3 | Status: SHIPPED | OUTPATIENT
Start: 2024-07-05

## 2024-07-05 RX ORDER — ATORVASTATIN CALCIUM 40 MG/1
40 TABLET, FILM COATED ORAL EVERY EVENING
Qty: 90 TABLET | Refills: 3 | Status: SHIPPED | OUTPATIENT
Start: 2024-07-05

## 2024-07-05 RX ORDER — ASPIRIN 81 MG/1
81 TABLET ORAL DAILY
Qty: 90 TABLET | Refills: 0 | Status: SHIPPED | OUTPATIENT
Start: 2024-07-05

## 2024-07-08 ENCOUNTER — OFFICE VISIT (OUTPATIENT)
Dept: OBGYN CLINIC | Facility: CLINIC | Age: 74
End: 2024-07-08
Payer: MEDICARE

## 2024-07-08 VITALS — HEIGHT: 62 IN | WEIGHT: 124 LBS | BODY MASS INDEX: 22.82 KG/M2

## 2024-07-08 DIAGNOSIS — M75.41 IMPINGEMENT SYNDROME OF RIGHT SHOULDER: Primary | ICD-10-CM

## 2024-07-08 PROCEDURE — 20610 DRAIN/INJ JOINT/BURSA W/O US: CPT | Performed by: PHYSICIAN ASSISTANT

## 2024-07-08 PROCEDURE — 99213 OFFICE O/P EST LOW 20 MIN: CPT | Performed by: PHYSICIAN ASSISTANT

## 2024-07-08 RX ORDER — LIDOCAINE HYDROCHLORIDE 10 MG/ML
2 INJECTION, SOLUTION INFILTRATION; PERINEURAL
Status: COMPLETED | OUTPATIENT
Start: 2024-07-08 | End: 2024-07-08

## 2024-07-08 RX ORDER — CHLORHEXIDINE GLUCONATE ORAL RINSE 1.2 MG/ML
SOLUTION DENTAL
COMMUNITY
Start: 2024-04-01

## 2024-07-08 RX ORDER — BUPIVACAINE HYDROCHLORIDE 2.5 MG/ML
2 INJECTION, SOLUTION INFILTRATION; PERINEURAL
Status: COMPLETED | OUTPATIENT
Start: 2024-07-08 | End: 2024-07-08

## 2024-07-08 RX ORDER — BETAMETHASONE SODIUM PHOSPHATE AND BETAMETHASONE ACETATE 3; 3 MG/ML; MG/ML
6 INJECTION, SUSPENSION INTRA-ARTICULAR; INTRALESIONAL; INTRAMUSCULAR; SOFT TISSUE
Status: COMPLETED | OUTPATIENT
Start: 2024-07-08 | End: 2024-07-08

## 2024-07-08 RX ADMIN — LIDOCAINE HYDROCHLORIDE 2 ML: 10 INJECTION, SOLUTION INFILTRATION; PERINEURAL at 11:00

## 2024-07-08 RX ADMIN — BETAMETHASONE SODIUM PHOSPHATE AND BETAMETHASONE ACETATE 6 MG: 3; 3 INJECTION, SUSPENSION INTRA-ARTICULAR; INTRALESIONAL; INTRAMUSCULAR; SOFT TISSUE at 11:00

## 2024-07-08 RX ADMIN — BUPIVACAINE HYDROCHLORIDE 2 ML: 2.5 INJECTION, SOLUTION INFILTRATION; PERINEURAL at 11:00

## 2024-07-08 NOTE — PROGRESS NOTES
"Patient Name:  Hawa Cheek  MRN:  [id number]    Assessment & Plan     Right shoulder rotator cuff tendinitis/bursitis.  Patient does note overall continued improvement with regards to her right shoulder.  She still notes intermittent discomfort on occasion.  Patient would like a repeat right shoulder subacromial space corticosteroid injection today.  This was provided.  See procedure note below.  Continue home exercises.  Activities as tolerated.  Follow-up in 3 months.    Chief Complaint     Follow-up right shoulder    History of the Present Illness     Hawa Cheek is a 73 y.o. female who returns to the office today for follow-up regarding her right shoulder.  She was last seen on 4/5/2024.  At that time she received a right shoulder subacromial space corticosteroid injection.  She was also advised to continue home exercises.  She returns to the office today noting continued improvement.  She still notes mild intermittent discomfort in the lateral aspect of the shoulder worse with increased activity including housework.  She denies any significant weakness or instability.  No numbness or tingling.  No fevers or chills.    Physical Exam     Ht 5' 2\" (1.575 m)   Wt 56.2 kg (124 lb)   BMI 22.68 kg/m²     Right shoulder: No gross deformity.  Skin intact.  No erythema ecchymosis or swelling.  Slight tenderness anterior and lateral shoulder.  No tenderness posterior shoulder and AC joint. PROM is , ER-abd 90, IR-abd 50.  Impingement signs are mildly positive.  Empty can and speeds tests are mildly positive as well.  Cross-body adduction test is negative.  5 out of 5 forward flexion strength.  Sensation intact axillary, median, ulnar, and radial nerves.  2+ radial pulse.      Eyes: Anicteric sclerae.  ENT: Trachea midline.  Lungs: Normal respiratory effort.  CV: Capillary refill is less than 2 seconds.  Skin: Intact without erythema.  Lymph: No palpable lymphadenopathy.  Neuro: Sensation is grossly " intact to light touch.  Psych: Mood and affect are appropriate.    Past Medical History:   Diagnosis Date    Arthralgia of hip     left    Arthritis     Chronic pain disorder     low back and shoulder    Dyslipidemia     Hyperlipidemia     Hypertension     Neck pain     Pemphigus     Pre-diabetes     Shoulder pain     Stroke (HCC)     Vitamin D deficiency        Past Surgical History:   Procedure Laterality Date    CATARACT EXTRACTION Bilateral     COLONOSCOPY      GA COLONOSCOPY FLX DX W/COLLJ SPEC WHEN PFRMD N/A 11/10/2016    Procedure: COLONOSCOPY;  Surgeon: Tom Carter MD;  Location: [facility];  Service: Gastroenterology    GA INCISION EXTENSOR TENDON SHEATH WRIST Left 7/12/2021    Procedure: RELEASE LEFT DEQUERVAINS;  Surgeon: Jt White MD;  Location:  MAIN OR;  Service: Orthopedics    GA INCISION EXTENSOR TENDON SHEATH WRIST Right 7/26/2021    Procedure: RELEASE RIGHT DEQUERVAINS;  Surgeon: Jt White MD;  Location:  MAIN OR;  Service: Orthopedics    GA XCAPSL CTRC RMVL INSJ IO LENS PROSTH W/O ECP Left 9/27/2018    Procedure: EXTRACAPSULAR CATARACT REMOVAL/INSERTION OF INTRAOCULAR LENS;  Surgeon: Alfred Stephens MD;  Location:  MAIN OR;  Service: Ophthalmology       No Known Allergies    Current Outpatient Medications on File Prior to Visit   Medication Sig Dispense Refill    acetaminophen (TYLENOL) 500 mg tablet Take 500 mg by mouth every 6 (six) hours as needed for mild pain      amLODIPine (NORVASC) 5 mg tablet Take 1 tablet (5 mg total) by mouth daily 90 tablet 3    ammonium lactate (LAC-HYDRIN) 12 % lotion Apply 1 Application topically 2 (two) times a day 400 g 0    aspirin (Aspirin Low Dose) 81 mg EC tablet Take 1 tablet (81 mg total) by mouth daily 90 tablet 0    atorvastatin (LIPITOR) 40 mg tablet Take 1 tablet (40 mg total) by mouth every evening 90 tablet 3    chlorhexidine (PERIDEX) 0.12 % solution RINSE WITH 15 MLS 2 TIMES A DAY FOR 30 SECONDS AND SPIT. AVOID RI...  (REFER TO  PRESCRIPTION NOTES).      Cholecalciferol (Vitamin D) 50 MCG (2000 UT) tablet Take 1 tablet (2,000 Units total) by mouth daily 30 tablet 3    Diclofenac Sodium (VOLTAREN) 1 % Apply 2 g topically 4 (four) times a day 50 g 2    memantine (NAMENDA) 5 mg tablet Take 1 tablet (5 mg total) by mouth 2 (two) times a day 180 tablet 3    omega-3-acid ethyl esters (LOVAZA) 1 g capsule Take 2 capsules (2 g total) by mouth 2 (two) times a day 180 capsule 0     No current facility-administered medications on file prior to visit.       Social History     Tobacco Use    Smoking status: Never    Smokeless tobacco: Never   Vaping Use    Vaping status: Never Used   Substance Use Topics    Alcohol use: Never    Drug use: No       Family History   Problem Relation Age of Onset    Hyperlipidemia Mother     Hypertension Mother     Heart disease Father     No Known Problems Sister     No Known Problems Sister     No Known Problems Sister     No Known Problems Maternal Aunt     No Known Problems Paternal Aunt     No Known Problems Paternal Aunt        Review of Systems     As stated in the HPI. All other systems reviewed and are negative.      Large joint arthrocentesis: R subacromial bursa  Procedure Details  Location: shoulder - R subacromial bursa  Needle size: 22 G  Ultrasound guidance: no  Approach: posterior  Medications administered: 2 mL bupivacaine 0.25 %; 2 mL lidocaine 1 %; 6 mg betamethasone acetate-betamethasone sodium phosphate 6 (3-3) mg/mL    Patient tolerance: patient tolerated the procedure well with no immediate complications  Dressing:  Sterile dressing applied

## 2024-08-06 ENCOUNTER — OFFICE VISIT (OUTPATIENT)
Dept: FAMILY MEDICINE CLINIC | Facility: CLINIC | Age: 74
End: 2024-08-06

## 2024-08-06 VITALS
OXYGEN SATURATION: 95 % | HEART RATE: 55 BPM | SYSTOLIC BLOOD PRESSURE: 144 MMHG | TEMPERATURE: 98.7 F | WEIGHT: 122 LBS | BODY MASS INDEX: 22.45 KG/M2 | DIASTOLIC BLOOD PRESSURE: 70 MMHG | RESPIRATION RATE: 18 BRPM | HEIGHT: 62 IN

## 2024-08-06 DIAGNOSIS — Z23 ENCOUNTER FOR IMMUNIZATION: ICD-10-CM

## 2024-08-06 DIAGNOSIS — Z12.31 ENCOUNTER FOR SCREENING MAMMOGRAM FOR MALIGNANT NEOPLASM OF BREAST: ICD-10-CM

## 2024-08-06 DIAGNOSIS — M85.88 OSTEOPENIA OF OTHER SITE: ICD-10-CM

## 2024-08-06 DIAGNOSIS — Z00.00 MEDICARE ANNUAL WELLNESS VISIT, SUBSEQUENT: Primary | ICD-10-CM

## 2024-08-06 PROBLEM — M85.80 OSTEOPENIA: Status: ACTIVE | Noted: 2024-08-06

## 2024-08-06 PROCEDURE — 90471 IMMUNIZATION ADMIN: CPT

## 2024-08-06 PROCEDURE — 90750 HZV VACC RECOMBINANT IM: CPT

## 2024-08-06 PROCEDURE — 3077F SYST BP >= 140 MM HG: CPT | Performed by: FAMILY MEDICINE

## 2024-08-06 PROCEDURE — 3078F DIAST BP <80 MM HG: CPT | Performed by: FAMILY MEDICINE

## 2024-08-06 PROCEDURE — G0439 PPPS, SUBSEQ VISIT: HCPCS | Performed by: FAMILY MEDICINE

## 2024-08-06 NOTE — PATIENT INSTRUCTIONS
Medicare Preventive Visit Patient Instructions  Thank you for completing your Welcome to Medicare Visit or Medicare Annual Wellness Visit today. Your next wellness visit will be due in one year (8/7/2025).  The screening/preventive services that you may require over the next 5-10 years are detailed below. Some tests may not apply to you based off risk factors and/or age. Screening tests ordered at today's visit but not completed yet may show as past due. Also, please note that scanned in results may not display below.  Preventive Screenings:  Service Recommendations Previous Testing/Comments   Colorectal Cancer Screening  * Colonoscopy    * Fecal Occult Blood Test (FOBT)/Fecal Immunochemical Test (FIT)  * Fecal DNA/Cologuard Test  * Flexible Sigmoidoscopy Age: 45-75 years old   Colonoscopy: every 10 years (may be performed more frequently if at higher risk)  OR  FOBT/FIT: every 1 year  OR  Cologuard: every 3 years  OR  Sigmoidoscopy: every 5 years  Screening may be recommended earlier than age 45 if at higher risk for colorectal cancer. Also, an individualized decision between you and your healthcare provider will decide whether screening between the ages of 76-85 would be appropriate. Colonoscopy: 11/10/2016  FOBT/FIT: 09/12/2021  Cologuard: Not on file  Sigmoidoscopy: Not on file    Screening Current     Breast Cancer Screening Age: 40+ years old  Frequency: every 1-2 years  Not required if history of left and right mastectomy Mammogram: 12/04/2023    Screening Current   Cervical Cancer Screening Between the ages of 21-29, pap smear recommended once every 3 years.   Between the ages of 30-65, can perform pap smear with HPV co-testing every 5 years.   Recommendations may differ for women with a history of total hysterectomy, cervical cancer, or abnormal pap smears in past. Pap Smear: 12/07/2021    Screening Not Indicated   Hepatitis C Screening Once for adults born between 1945 and 1965  More frequently in patients  at high risk for Hepatitis C Hep C Antibody: 04/20/2020    Screening Current   Diabetes Screening 1-2 times per year if you're at risk for diabetes or have pre-diabetes Fasting glucose: 88 mg/dL (6/19/2024)  A1C: 6.0 % (6/19/2024)  Screening Current   Cholesterol Screening Once every 5 years if you don't have a lipid disorder. May order more often based on risk factors. Lipid panel: 06/19/2024    Screening Not Indicated  History Lipid Disorder     Other Preventive Screenings Covered by Medicare:  Abdominal Aortic Aneurysm (AAA) Screening: covered once if your at risk. You're considered to be at risk if you have a family history of AAA.  Lung Cancer Screening: covers low dose CT scan once per year if you meet all of the following conditions: (1) Age 55-77; (2) No signs or symptoms of lung cancer; (3) Current smoker or have quit smoking within the last 15 years; (4) You have a tobacco smoking history of at least 20 pack years (packs per day multiplied by number of years you smoked); (5) You get a written order from a healthcare provider.  Glaucoma Screening: covered annually if you're considered high risk: (1) You have diabetes OR (2) Family history of glaucoma OR (3)  aged 50 and older OR (4)  American aged 65 and older  Osteoporosis Screening: covered every 2 years if you meet one of the following conditions: (1) You're estrogen deficient and at risk for osteoporosis based off medical history and other findings; (2) Have a vertebral abnormality; (3) On glucocorticoid therapy for more than 3 months; (4) Have primary hyperparathyroidism; (5) On osteoporosis medications and need to assess response to drug therapy.   Last bone density test (DXA Scan): 04/12/2022.  HIV Screening: covered annually if you're between the age of 15-65. Also covered annually if you are younger than 15 and older than 65 with risk factors for HIV infection. For pregnant patients, it is covered up to 3 times per  pregnancy.    Immunizations:  Immunization Recommendations   Influenza Vaccine Annual influenza vaccination during flu season is recommended for all persons aged >= 6 months who do not have contraindications   Pneumococcal Vaccine   * Pneumococcal conjugate vaccine = PCV13 (Prevnar 13), PCV15 (Vaxneuvance), PCV20 (Prevnar 20)  * Pneumococcal polysaccharide vaccine = PPSV23 (Pneumovax) Adults 19-65 yo with certain risk factors or if 65+ yo  If never received any pneumonia vaccine: recommend Prevnar 20 (PCV20)  Give PCV20 if previously received 1 dose of PCV13 or PPSV23   Hepatitis B Vaccine 3 dose series if at intermediate or high risk (ex: diabetes, end stage renal disease, liver disease)   Respiratory syncytial virus (RSV) Vaccine - COVERED BY MEDICARE PART D  * RSVPreF3 (Arexvy) CDC recommends that adults 60 years of age and older may receive a single dose of RSV vaccine using shared clinical decision-making (SCDM)   Tetanus (Td) Vaccine - COST NOT COVERED BY MEDICARE PART B Following completion of primary series, a booster dose should be given every 10 years to maintain immunity against tetanus. Td may also be given as tetanus wound prophylaxis.   Tdap Vaccine - COST NOT COVERED BY MEDICARE PART B Recommended at least once for all adults. For pregnant patients, recommended with each pregnancy.   Shingles Vaccine (Shingrix) - COST NOT COVERED BY MEDICARE PART B  2 shot series recommended in those 19 years and older who have or will have weakened immune systems or those 50 years and older     Health Maintenance Due:      Topic Date Due   • Breast Cancer Screening: Mammogram  12/04/2025   • Colorectal Cancer Screening  11/10/2026   • Hepatitis C Screening  Completed     Immunizations Due:      Topic Date Due   • COVID-19 Vaccine (4 - 2023-24 season) 09/01/2023   • Influenza Vaccine (1) 09/01/2024     Advance Directives   What are advance directives?  Advance directives are legal documents that state your wishes and  plans for medical care. These plans are made ahead of time in case you lose your ability to make decisions for yourself. Advance directives can apply to any medical decision, such as the treatments you want, and if you want to donate organs.   What are the types of advance directives?  There are many types of advance directives, and each state has rules about how to use them. You may choose a combination of any of the following:  Living will:  This is a written record of the treatment you want. You can also choose which treatments you do not want, which to limit, and which to stop at a certain time. This includes surgery, medicine, IV fluid, and tube feedings.   Durable power of  for healthcare (DPAHC):  This is a written record that states who you want to make healthcare choices for you when you are unable to make them for yourself. This person, called a proxy, is usually a family member or a friend. You may choose more than 1 proxy.  Do not resuscitate (DNR) order:  A DNR order is used in case your heart stops beating or you stop breathing. It is a request not to have certain forms of treatment, such as CPR. A DNR order may be included in other types of advance directives.  Medical directive:  This covers the care that you want if you are in a coma, near death, or unable to make decisions for yourself. You can list the treatments you want for each condition. Treatment may include pain medicine, surgery, blood transfusions, dialysis, IV or tube feedings, and a ventilator (breathing machine).  Values history:  This document has questions about your views, beliefs, and how you feel and think about life. This information can help others choose the care that you would choose.  Why are advance directives important?  An advance directive helps you control your care. Although spoken wishes may be used, it is better to have your wishes written down. Spoken wishes can be misunderstood, or not followed. Treatments  may be given even if you do not want them. An advance directive may make it easier for your family to make difficult choices about your care.       © Copyright [employer] 2018 Information is for End User's use only and may not be sold, redistributed or otherwise used for commercial purposes. All illustrations and images included in CareNotes® are the copyrighted property of [employer], [employer]. or [employer]      Medicare Preventive Visit Patient Instructions  Thank you for completing your Welcome to Medicare Visit or Medicare Annual Wellness Visit today. Your next wellness visit will be due in one year (8/7/2025).  The screening/preventive services that you may require over the next 5-10 years are detailed below. Some tests may not apply to you based off risk factors and/or age. Screening tests ordered at today's visit but not completed yet may show as past due. Also, please note that scanned in results may not display below.  Preventive Screenings:  Service Recommendations Previous Testing/Comments   Colorectal Cancer Screening  * Colonoscopy    * Fecal Occult Blood Test (FOBT)/Fecal Immunochemical Test (FIT)  * Fecal DNA/Cologuard Test  * Flexible Sigmoidoscopy Age: 45-75 years old   Colonoscopy: every 10 years (may be performed more frequently if at higher risk)  OR  FOBT/FIT: every 1 year  OR  Cologuard: every 3 years  OR  Sigmoidoscopy: every 5 years  Screening may be recommended earlier than age 45 if at higher risk for colorectal cancer. Also, an individualized decision between you and your healthcare provider will decide whether screening between the ages of 76-85 would be appropriate. Colonoscopy: 11/10/2016  FOBT/FIT: 09/12/2021  Cologuard: Not on file  Sigmoidoscopy: Not on file    Screening Current     Breast Cancer Screening Age: 40+ years old  Frequency: every 1-2 years  Not required if history of left and right mastectomy Mammogram: 12/04/2023    Screening Current   Cervical Cancer Screening  Between the ages of 21-29, pap smear recommended once every 3 years.   Between the ages of 30-65, can perform pap smear with HPV co-testing every 5 years.   Recommendations may differ for women with a history of total hysterectomy, cervical cancer, or abnormal pap smears in past. Pap Smear: 12/07/2021    Screening Not Indicated   Hepatitis C Screening Once for adults born between 1945 and 1965  More frequently in patients at high risk for Hepatitis C Hep C Antibody: 04/20/2020    Screening Current   Diabetes Screening 1-2 times per year if you're at risk for diabetes or have pre-diabetes Fasting glucose: 88 mg/dL (6/19/2024)  A1C: 6.0 % (6/19/2024)  Screening Current   Cholesterol Screening Once every 5 years if you don't have a lipid disorder. May order more often based on risk factors. Lipid panel: 06/19/2024    Screening Not Indicated  History Lipid Disorder     Other Preventive Screenings Covered by Medicare:  Abdominal Aortic Aneurysm (AAA) Screening: covered once if your at risk. You're considered to be at risk if you have a family history of AAA.  Lung Cancer Screening: covers low dose CT scan once per year if you meet all of the following conditions: (1) Age 55-77; (2) No signs or symptoms of lung cancer; (3) Current smoker or have quit smoking within the last 15 years; (4) You have a tobacco smoking history of at least 20 pack years (packs per day multiplied by number of years you smoked); (5) You get a written order from a healthcare provider.  Glaucoma Screening: covered annually if you're considered high risk: (1) You have diabetes OR (2) Family history of glaucoma OR (3)  aged 50 and older OR (4)  American aged 65 and older  Osteoporosis Screening: covered every 2 years if you meet one of the following conditions: (1) You're estrogen deficient and at risk for osteoporosis based off medical history and other findings; (2) Have a vertebral abnormality; (3) On glucocorticoid therapy  for more than 3 months; (4) Have primary hyperparathyroidism; (5) On osteoporosis medications and need to assess response to drug therapy.   Last bone density test (DXA Scan): 04/12/2022.  HIV Screening: covered annually if you're between the age of 15-65. Also covered annually if you are younger than 15 and older than 65 with risk factors for HIV infection. For pregnant patients, it is covered up to 3 times per pregnancy.    Immunizations:  Immunization Recommendations   Influenza Vaccine Annual influenza vaccination during flu season is recommended for all persons aged >= 6 months who do not have contraindications   Pneumococcal Vaccine   * Pneumococcal conjugate vaccine = PCV13 (Prevnar 13), PCV15 (Vaxneuvance), PCV20 (Prevnar 20)  * Pneumococcal polysaccharide vaccine = PPSV23 (Pneumovax) Adults 19-63 yo with certain risk factors or if 65+ yo  If never received any pneumonia vaccine: recommend Prevnar 20 (PCV20)  Give PCV20 if previously received 1 dose of PCV13 or PPSV23   Hepatitis B Vaccine 3 dose series if at intermediate or high risk (ex: diabetes, end stage renal disease, liver disease)   Respiratory syncytial virus (RSV) Vaccine - COVERED BY MEDICARE PART D  * RSVPreF3 (Arexvy) CDC recommends that adults 60 years of age and older may receive a single dose of RSV vaccine using shared clinical decision-making (SCDM)   Tetanus (Td) Vaccine - COST NOT COVERED BY MEDICARE PART B Following completion of primary series, a booster dose should be given every 10 years to maintain immunity against tetanus. Td may also be given as tetanus wound prophylaxis.   Tdap Vaccine - COST NOT COVERED BY MEDICARE PART B Recommended at least once for all adults. For pregnant patients, recommended with each pregnancy.   Shingles Vaccine (Shingrix) - COST NOT COVERED BY MEDICARE PART B  2 shot series recommended in those 19 years and older who have or will have weakened immune systems or those 50 years and older     Health  Maintenance Due:      Topic Date Due   • Breast Cancer Screening: Mammogram  12/04/2025   • Colorectal Cancer Screening  11/10/2026   • Hepatitis C Screening  Completed     Immunizations Due:      Topic Date Due   • COVID-19 Vaccine (4 - 2023-24 season) 09/01/2023   • Influenza Vaccine (1) 09/01/2024     Advance Directives   What are advance directives?  Advance directives are legal documents that state your wishes and plans for medical care. These plans are made ahead of time in case you lose your ability to make decisions for yourself. Advance directives can apply to any medical decision, such as the treatments you want, and if you want to donate organs.   What are the types of advance directives?  There are many types of advance directives, and each state has rules about how to use them. You may choose a combination of any of the following:  Living will:  This is a written record of the treatment you want. You can also choose which treatments you do not want, which to limit, and which to stop at a certain time. This includes surgery, medicine, IV fluid, and tube feedings.   Durable power of  for healthcare (DPAHC):  This is a written record that states who you want to make healthcare choices for you when you are unable to make them for yourself. This person, called a proxy, is usually a family member or a friend. You may choose more than 1 proxy.  Do not resuscitate (DNR) order:  A DNR order is used in case your heart stops beating or you stop breathing. It is a request not to have certain forms of treatment, such as CPR. A DNR order may be included in other types of advance directives.  Medical directive:  This covers the care that you want if you are in a coma, near death, or unable to make decisions for yourself. You can list the treatments you want for each condition. Treatment may include pain medicine, surgery, blood transfusions, dialysis, IV or tube feedings, and a ventilator (breathing  machine).  Values history:  This document has questions about your views, beliefs, and how you feel and think about life. This information can help others choose the care that you would choose.  Why are advance directives important?  An advance directive helps you control your care. Although spoken wishes may be used, it is better to have your wishes written down. Spoken wishes can be misunderstood, or not followed. Treatments may be given even if you do not want them. An advance directive may make it easier for your family to make difficult choices about your care.       © Copyright [employer] 2018 Information is for End User's use only and may not be sold, redistributed or otherwise used for commercial purposes. All illustrations and images included in CareNotes® are the copyrighted property of [employer] or [employer]

## 2024-08-06 NOTE — PROGRESS NOTES
Ambulatory Visit  Name: Hawa Cheek      : 1950      MRN: [id number]  Encounter Provider: Keeley Mccormick MD  Encounter Date: 2024   Encounter department: [facility]    Assessment & Plan   1. Medicare annual wellness visit, subsequent  Assessment & Plan:  Mammogram 2023  Colonoscopy  2016  Dexa - Osteopenia, referred for repeat Dexa  Immunization history reviewed  Zoster # 2 today        2. Osteopenia of other site  Assessment & Plan:  Weightbearing exercise  Continue vitamin D supplementation  Repeat DEXA scan  Orders:  -     DXA bone density spine hip and pelvis; Future; Expected date: 2024  3. Encounter for immunization  -     Zoster Vaccine Recombinant (SHINGRIX) - IN OFFICE  4. Encounter for screening mammogram for malignant neoplasm of breast   Last mammogram 2023, needs repeat mammogram this year      Preventive health issues were discussed with patient, and age appropriate screening tests were ordered as noted in patient's After Visit Summary. Personalized health advice and appropriate referrals for health education or preventive services given if needed, as noted in patient's After Visit Summary.    History of Present Illness     HPI   Patient Care Team:  Keeley Mccormick MD as PCP - General (Family Medicine)  MD Tom Tyson MD as Endoscopist    Review of Systems   Constitutional:  Negative for chills and fever.   HENT:  Negative for congestion, rhinorrhea and sore throat.    Respiratory:  Negative for cough and shortness of breath.    Cardiovascular:  Negative for chest pain.   Gastrointestinal:  Negative for diarrhea, nausea and vomiting.   Skin:  Negative for rash.   Neurological:  Negative for dizziness and headaches.     Medical History Reviewed by provider this encounter:       Annual Wellness Visit Questionnaire   Hawa is here for her Initial Wellness visit.     Health Risk Assessment:   Patient  rates overall health as fair. Patient feels that their physical health rating is same. Patient is satisfied with their life. Eyesight was rated as same. Hearing was rated as much worse. Patient feels that their emotional and mental health rating is slightly better. Patients states they are never, rarely angry. Patient states they are sometimes unusually tired/fatigued. Pain experienced in the last 7 days has been a lot. Patient's pain rating has been 5/10. Patient states that she has experienced no weight loss or gain in last 6 months. H/o of chronic low back pain and sciatica bilaterally    Depression Screening:   PHQ-2 Score: 0      Fall Risk Screening:   In the past year, patient has experienced: no history of falling in past year      Urinary Incontinence Screening:   Patient has not leaked urine accidently in the last six months.     Home Safety:  Patient has trouble with stairs inside or outside of their home. Patient has working smoke alarms and has working carbon monoxide detector. Home safety hazards include: none. Lives with her daughter    Nutrition:   Current diet is Regular and Other (please comment). Low salt    Medications:   Patient is currently taking over-the-counter supplements. OTC medications include: see medication list. Patient is able to manage medications.     Activities of Daily Living (ADLs)/Instrumental Activities of Daily Living (IADLs):   Walk and transfer into and out of bed and chair?: Yes  Dress and groom yourself?: Yes    Bathe or shower yourself?: Yes    Feed yourself? Yes  Do your laundry/housekeeping?: No  Manage your money, pay your bills and track your expenses?: No  Make your own meals?: Yes    Do your own shopping?: No    Previous Hospitalizations:   Any hospitalizations or ED visits within the last 12 months?: No      Advance Care Planning:   Living will: No    Durable POA for healthcare: No    Advanced directive: No      Cognitive Screening:   Provider or  family/friend/caregiver concerned regarding cognition?: Yes    Cognition Comments: H/o of cognitive decline- takes Namenda    PREVENTIVE SCREENINGS      Cardiovascular Screening:    General: History Lipid Disorder      Diabetes Screening:     General: Screening Current      Colorectal Cancer Screening:     General: Screening Current      Breast Cancer Screening:     General: Screening Current      Cervical Cancer Screening:    General: Screening Not Indicated      Abdominal Aortic Aneurysm (AAA) Screening:        General: Screening Not Indicated      Lung Cancer Screening:     General: Screening Not Indicated      Hepatitis C Screening:    General: Screening Current    Screening, Brief Intervention, and Referral to Treatment (SBIRT)    Screening  Typical number of drinks in a day: 0  Typical number of drinks in a week: 0  Interpretation: Low risk drinking behavior.    Single Item Drug Screening:  How often have you used an illegal drug (including marijuana) or a prescription medication for non-medical reasons in the past year? never    Single Item Drug Screen Score: 0  Interpretation: Negative screen for possible drug use disorder    Social Determinants of Health     Financial Resource Strain: Low Risk  (8/6/2024)    Overall Financial Resource Strain (CARDIA)    • Difficulty of Paying Living Expenses: Not hard at all   Food Insecurity: No Food Insecurity (8/6/2024)    Hunger Vital Sign    • Worried About Running Out of Food in the Last Year: Never true    • Ran Out of Food in the Last Year: Never true   Transportation Needs: No Transportation Needs (8/6/2024)    PRAPARE - Transportation    • Lack of Transportation (Medical): No    • Lack of Transportation (Non-Medical): No   Housing Stability: Low Risk  (8/6/2024)    Housing Stability Vital Sign    • Unable to Pay for Housing in the Last Year: No    • Number of Times Moved in the Last Year: 0    • Homeless in the Last Year: No   Utilities: Not At Risk (6/18/2024)     "[facility] Utilities    • Threatened with loss of utilities: No     No results found.    Objective     /70 (BP Location: Right arm, Patient Position: Sitting, Cuff Size: Large)   Pulse 55   Temp 98.7 °F (37.1 °C) (Temporal)   Resp 18   Ht 5' 2\" (1.575 m)   Wt 55.3 kg (122 lb)   SpO2 95%   BMI 22.31 kg/m²     Physical Exam  Vitals and nursing note reviewed.   Constitutional:       General: She is not in acute distress.     Appearance: Normal appearance. She is well-developed.   HENT:      Head: Normocephalic and atraumatic.      Mouth/Throat:      Mouth: Mucous membranes are moist.   Eyes:      Extraocular Movements: Extraocular movements intact.      Conjunctiva/sclera: Conjunctivae normal.   Cardiovascular:      Rate and Rhythm: Normal rate and regular rhythm.      Heart sounds: Murmur heard.   Pulmonary:      Effort: Pulmonary effort is normal. No respiratory distress.      Breath sounds: Normal breath sounds.   Abdominal:      Palpations: Abdomen is soft.      Tenderness: There is no abdominal tenderness.   Musculoskeletal:         General: No swelling.      Cervical back: Neck supple.      Right lower leg: Edema present.      Left lower leg: Edema present.      Comments: + trace edema   Skin:     General: Skin is warm and dry.      Capillary Refill: Capillary refill takes less than 2 seconds.      Findings: No rash.   Neurological:      Mental Status: She is alert.   Psychiatric:         Mood and Affect: Mood normal.         Behavior: Behavior normal.           "

## 2024-08-06 NOTE — ASSESSMENT & PLAN NOTE
Mammogram 12/2023  Colonoscopy  2016  Dexa 2022- Osteopenia, referred for repeat Dexa  Immunization history reviewed  Zoster # 2 today

## 2024-08-07 NOTE — PROGRESS NOTES
Outpatient Cardiology Note - Hawa Ham 73 y.o. female MRN: [id number]    @ Encounter: [id number]        Patient Active Problem List    Diagnosis Date Noted    Medicare annual wellness visit, subsequent 08/06/2024    Osteopenia 08/06/2024    Pre-diabetes 07/04/2024    Gastroesophageal reflux disease without esophagitis 09/13/2022    Lightheadedness 12/03/2021    Personal history of transient ischemic attack (TIA), and cerebral infarction without residual deficits 12/08/2019    Spinal stenosis, lumbar region with neurogenic claudication     Acute pain of left shoulder 08/23/2018    Pre-op evaluation 02/08/2018    Muscle spasms of neck 12/04/2017    Arthralgia of left hip 07/19/2016    Slow transit constipation 07/19/2016    Impaired fasting glucose 07/19/2016    Chronic right shoulder pain 03/15/2016    Creatinine elevation 02/16/2015    Neck pain 11/19/2014    Low back pain 08/06/2014    Acquired trigger finger 10/09/2013    Vitamin D deficiency 07/29/2013    Mammogram abnormal 07/29/2013    Multiple joint pain 07/29/2013    Other symptoms involving skin and integumentary tissues 06/03/2013    Pemphigus 04/08/2013    Bursitis 12/03/2012    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/2012    Osteoarthrosis 09/24/2012    Dyslipidemia 06/19/2012    Essential hypertension 06/19/2012    Hyperlipidemia 06/03/2008       Assessment:  # HTN,   Rx: lisinopril/HCTZ 10/12.5 mg daily    Studies- personally reviewed by me  EKG- SR    Echo 12/9/19  LVEF: 70%  RV: normal  Mild AI  Negative for PFO    # GERD- omeprazole  # dyslipidemia- atorvastatin 40 mg daily  6/19/24: LDL 50, HDL 70  6/15/22: LDL 36, HDL 71  2/8/21: LDL 48, 69  # CVA hx- chronic cerebellar stroke  - aspirin daily, statin  CT head 10/10/22: old bilateral cerebellar infarcts  MRI Brain 12/9/19: Bilaterally symmetric bilateral cerebellar encephalomalacia.  There is primarily white matter volume loss with cortical thinning.  Resulting ex  vacuo dilatation of the 4th ventricle.  No signs of previous hemorrhage.  Findings suggest sequela of previous   ischemia.  Echo 12/9/19 negative for PFO      Today's Plan:  Continue current BP Rx  Referred for MRI showing old cerebellar infarcts, possibly HTN related  No known afib  Echo follow up  No PFO on echo    HPI:       72 yo with hx of HTN referred I believe BP management by primary. Was in ED 10/10 with dizziness and /90 mmHg. Pt has meclizine prn. Pt has seen neurology in hospital in Dec 2019 for reason for consult of finding of old bilateral cerebral age indeterminate infarcts. BP recorded in ED was 197/87 mmHg.      They state that since being on medication, blood pressure checks at home are much better. No LUND, no chest pain. No edema. When her pressure is high she gets dizzy.     Does not smoke or drink alcohol.     Interim:  Electrical shock in arm  No chest pain on ambulation        Past Medical History:   Diagnosis Date    Arthralgia of hip     left    Arthritis     Chronic pain disorder     low back and shoulder    Dyslipidemia     Hyperlipidemia     Hypertension     Neck pain     Pemphigus     Pre-diabetes     Shoulder pain     Stroke (HCC)     Vitamin D deficiency        Review of Systems   Constitutional:  Negative for activity change, appetite change, fatigue and unexpected weight change.   HENT:  Negative for congestion and nosebleeds.    Eyes: Negative.    Respiratory:  Negative for cough, chest tightness and shortness of breath.    Cardiovascular:  Negative for chest pain, palpitations and leg swelling.   Gastrointestinal:  Negative for abdominal distention.   Endocrine: Negative.    Genitourinary: Negative.    Musculoskeletal: Negative.    Skin: Negative.    Neurological:  Negative for dizziness, syncope and weakness.   Hematological: Negative.    Psychiatric/Behavioral: Negative.         No Known Allergies  .    Current Outpatient Medications:     acetaminophen (TYLENOL) 500 mg  tablet, Take 500 mg by mouth every 6 (six) hours as needed for mild pain, Disp: , Rfl:     amLODIPine (NORVASC) 5 mg tablet, Take 1 tablet (5 mg total) by mouth daily, Disp: 90 tablet, Rfl: 3    ammonium lactate (LAC-HYDRIN) 12 % lotion, Apply 1 Application topically 2 (two) times a day, Disp: 400 g, Rfl: 0    aspirin (Aspirin Low Dose) 81 mg EC tablet, Take 1 tablet (81 mg total) by mouth daily, Disp: 90 tablet, Rfl: 0    atorvastatin (LIPITOR) 40 mg tablet, Take 1 tablet (40 mg total) by mouth every evening, Disp: 90 tablet, Rfl: 3    chlorhexidine (PERIDEX) 0.12 % solution, RINSE WITH 15 MLS 2 TIMES A DAY FOR 30 SECONDS AND SPIT. AVOID RI...  (REFER TO PRESCRIPTION NOTES)., Disp: , Rfl:     Cholecalciferol (Vitamin D) 50 MCG (2000 UT) tablet, Take 1 tablet (2,000 Units total) by mouth daily, Disp: 30 tablet, Rfl: 3    Diclofenac Sodium (VOLTAREN) 1 %, Apply 2 g topically 4 (four) times a day, Disp: 50 g, Rfl: 2    memantine (NAMENDA) 5 mg tablet, Take 1 tablet (5 mg total) by mouth 2 (two) times a day, Disp: 180 tablet, Rfl: 3    omega-3-acid ethyl esters (LOVAZA) 1 g capsule, Take 2 capsules (2 g total) by mouth 2 (two) times a day, Disp: 180 capsule, Rfl: 0    Social History     Socioeconomic History    Marital status: Single     Spouse name: Not on file    Number of children: 1    Years of education: Not on file    Highest education level: Not on file   Occupational History    Occupation: homemaker   Tobacco Use    Smoking status: Never     Passive exposure: Never    Smokeless tobacco: Never   Vaping Use    Vaping status: Never Used   Substance and Sexual Activity    Alcohol use: Never    Drug use: No    Sexual activity: Not Currently   Other Topics Concern    Not on file   Social History Narrative    Advance directive declined by patient     Lives with adult children     Social Determinants of Health     Financial Resource Strain: Low Risk  (8/6/2024)    Overall Financial Resource Strain (CARDIA)      Difficulty of Paying Living Expenses: Not hard at all   Food Insecurity: No Food Insecurity (8/6/2024)    Hunger Vital Sign     Worried About Running Out of Food in the Last Year: Never true     Ran Out of Food in the Last Year: Never true   Transportation Needs: No Transportation Needs (8/6/2024)    PRAPARE - Transportation     Lack of Transportation (Medical): No     Lack of Transportation (Non-Medical): No   Physical Activity: Not on file   Stress: Not on file   Social Connections: Not on file   Intimate Partner Violence: Not on file   Housing Stability: Low Risk  (8/6/2024)    Housing Stability Vital Sign     Unable to Pay for Housing in the Last Year: No     Number of Times Moved in the Last Year: 0     Homeless in the Last Year: No       Family History   Problem Relation Age of Onset    Hyperlipidemia Mother     Hypertension Mother     Heart disease Father     No Known Problems Sister     No Known Problems Sister     No Known Problems Sister     No Known Problems Maternal Aunt     No Known Problems Paternal Aunt     No Known Problems Paternal Aunt        Physical Exam:    Vitals: not currently breastfeeding., There is no height or weight on file to calculate BMI.,   Wt Readings from Last 3 Encounters:   08/06/24 55.3 kg (122 lb)   07/08/24 56.2 kg (124 lb)   07/05/24 56.4 kg (124 lb 6.4 oz)     Physical Exam  Constitutional:       Appearance: She is well-developed.   HENT:      Head: Normocephalic and atraumatic.   Eyes:      Pupils: Pupils are equal, round, and reactive to light.   Neck:      Vascular: No JVD.   Cardiovascular:      Rate and Rhythm: Normal rate and regular rhythm.      Heart sounds: Murmur heard.   Pulmonary:      Effort: Pulmonary effort is normal. No respiratory distress.      Breath sounds: Normal breath sounds.   Abdominal:      General: There is no distension.      Palpations: Abdomen is soft.      Tenderness: There is no abdominal tenderness.   Musculoskeletal:         General: No  "edema. Normal range of motion.      Cervical back: Normal range of motion.   Skin:     General: Skin is warm and dry.      Findings: No rash.   Neurological:      Mental Status: She is alert and oriented to person, place, and time.   Psychiatric:         Mood and Affect: Mood and affect normal.         Labs & Results:    Lab Results   Component Value Date    WBC 8.71 06/19/2024    HGB 14.3 06/19/2024    HCT 43.6 06/19/2024    MCV 94 06/19/2024     06/19/2024     Lab Results   Component Value Date    SODIUM 142 06/19/2024    K 4.5 06/19/2024     06/19/2024    CO2 30 06/19/2024    BUN 18 06/19/2024    CREATININE 0.74 06/19/2024    GLUC 103 (H) 10/10/2022    CALCIUM 10.0 06/19/2024     No results found for: \"BNP\"   Lab Results   Component Value Date    CHOLESTEROL 131 06/19/2024    CHOLESTEROL 142 07/03/2023    CHOLESTEROL 118 06/15/2022     Lab Results   Component Value Date    HDL 70 06/19/2024    HDL 78 07/03/2023    HDL 71 06/15/2022     Lab Results   Component Value Date    TRIG 57 06/19/2024    TRIG 47 07/03/2023    TRIG 55 06/15/2022     Lab Results   Component Value Date    NONHDLC 61 06/19/2024    NONHDLC 64 07/03/2023    NONHDLC 47 06/15/2022       EKG personally reviewed by Solitario Rawls.     Counseling / Coordination of Care  Time spent today 25 minutes.  Greater than 50% of total time was spent with the patient and / or family counseling and / or coordination of care. We discussed diagnoses, most recent studies and any changes in treatment  Thank you for the opportunity to participate in the care of this patient.    SOLITARIO RAWLS D.O.  DIRECTOR OF HEART FAILURE/ PULMONARY HYPERTENSION  MEDICAL DIRECTOR OF LVAD PROGRAM  [facility]  "

## 2024-08-09 ENCOUNTER — OFFICE VISIT (OUTPATIENT)
Dept: CARDIOLOGY CLINIC | Facility: CLINIC | Age: 74
End: 2024-08-09
Payer: MEDICARE

## 2024-08-09 VITALS
DIASTOLIC BLOOD PRESSURE: 62 MMHG | OXYGEN SATURATION: 99 % | HEIGHT: 62 IN | BODY MASS INDEX: 22.26 KG/M2 | WEIGHT: 121 LBS | HEART RATE: 66 BPM | SYSTOLIC BLOOD PRESSURE: 130 MMHG

## 2024-08-09 DIAGNOSIS — Z86.73 PERSONAL HISTORY OF TRANSIENT ISCHEMIC ATTACK (TIA), AND CEREBRAL INFARCTION WITHOUT RESIDUAL DEFICITS: ICD-10-CM

## 2024-08-09 DIAGNOSIS — I10 ESSENTIAL HYPERTENSION: Primary | ICD-10-CM

## 2024-08-09 DIAGNOSIS — E78.5 DYSLIPIDEMIA: ICD-10-CM

## 2024-08-09 PROCEDURE — 99214 OFFICE O/P EST MOD 30 MIN: CPT | Performed by: INTERNAL MEDICINE

## 2024-08-17 DIAGNOSIS — E78.5 DYSLIPIDEMIA: ICD-10-CM

## 2024-08-20 RX ORDER — OMEGA-3-ACID ETHYL ESTERS 1 G/1
2 CAPSULE, LIQUID FILLED ORAL 2 TIMES DAILY
Qty: 180 CAPSULE | Refills: 0 | Status: SHIPPED | OUTPATIENT
Start: 2024-08-20

## 2024-08-28 ENCOUNTER — HOSPITAL ENCOUNTER (OUTPATIENT)
Dept: NON INVASIVE DIAGNOSTICS | Facility: CLINIC | Age: 74
Discharge: HOME | End: 2024-08-28
Payer: MEDICARE

## 2024-08-28 VITALS
WEIGHT: 121 LBS | BODY MASS INDEX: 22.26 KG/M2 | DIASTOLIC BLOOD PRESSURE: 62 MMHG | HEART RATE: 66 BPM | HEIGHT: 62 IN | SYSTOLIC BLOOD PRESSURE: 130 MMHG

## 2024-08-28 DIAGNOSIS — I10 ESSENTIAL HYPERTENSION: ICD-10-CM

## 2024-08-28 DIAGNOSIS — Z86.73 PERSONAL HISTORY OF TRANSIENT ISCHEMIC ATTACK (TIA), AND CEREBRAL INFARCTION WITHOUT RESIDUAL DEFICITS: ICD-10-CM

## 2024-08-28 LAB
AORTIC ROOT: 2.8 CM
APICAL FOUR CHAMBER EJECTION FRACTION: 68 %
ASCENDING AORTA: 3 CM
AV LVOT PEAK GRADIENT: 9 MMHG
AV PEAK GRADIENT: 11 MMHG
AV REGURGITATION PRESSURE HALF TIME: 525 MS
BSA FOR ECHO PROCEDURE: 1.54 M2
E WAVE DECELERATION TIME: 170 MS
E/A RATIO: 1.23
FRACTIONAL SHORTENING: 36 (ref 28–44)
INTERVENTRICULAR SEPTUM IN DIASTOLE (PARASTERNAL SHORT AXIS VIEW): 0.7 CM
INTERVENTRICULAR SEPTUM: 0.7 CM (ref 0.6–1.1)
IVC: 14 MM
LAAS-AP2: 20.1 CM2
LAAS-AP4: 18.3 CM2
LEFT ATRIUM SIZE: 3.6 CM
LEFT ATRIUM VOLUME (MOD BIPLANE): 56 ML
LEFT ATRIUM VOLUME INDEX (MOD BIPLANE): 36.4 ML/M2
LEFT INTERNAL DIMENSION IN SYSTOLE: 2.7 CM (ref 2.1–4)
LEFT VENTRICULAR INTERNAL DIMENSION IN DIASTOLE: 4.2 CM (ref 3.5–6)
LEFT VENTRICULAR POSTERIOR WALL IN END DIASTOLE: 0.6 CM
LEFT VENTRICULAR STROKE VOLUME: 49 ML
LVSV (TEICH): 49 ML
MV E'TISSUE VEL-SEP: 10 CM/S
MV PEAK A VEL: 0.87 M/S
MV PEAK E VEL: 107 CM/S
MV STENOSIS PRESSURE HALF TIME: 49 MS
MV VALVE AREA P 1/2 METHOD: 4.49
RA PRESSURE ESTIMATED: 3 MMHG
RIGHT ATRIUM AREA SYSTOLE A4C: 16.1 CM2
RIGHT VENTRICLE ID DIMENSION: 3.5 CM
RV PSP: 36 MMHG
SL CV AV DECELERATION TIME RETROGRADE: 1810 MS
SL CV AV PEAK GRADIENT RETROGRADE: 54 MMHG
SL CV LEFT ATRIUM LENGTH A2C: 5.3 CM
SL CV LV EF: 65
SL CV PED ECHO LEFT VENTRICLE DIASTOLIC VOLUME (MOD BIPLANE) 2D: 77 ML
SL CV PED ECHO LEFT VENTRICLE SYSTOLIC VOLUME (MOD BIPLANE) 2D: 28 ML
TR MAX PG: 33 MMHG
TR PEAK VELOCITY: 2.9 M/S
TRICUSPID ANNULAR PLANE SYSTOLIC EXCURSION: 2.1 CM
TRICUSPID VALVE PEAK REGURGITATION VELOCITY: 2.87 M/S

## 2024-08-28 PROCEDURE — 93306 TTE W/DOPPLER COMPLETE: CPT

## 2024-08-28 PROCEDURE — 93306 TTE W/DOPPLER COMPLETE: CPT | Performed by: INTERNAL MEDICINE

## 2024-09-05 PROBLEM — Z00.00 MEDICARE ANNUAL WELLNESS VISIT, SUBSEQUENT: Status: RESOLVED | Noted: 2024-08-06 | Resolved: 2024-09-05

## 2024-09-09 ENCOUNTER — OFFICE VISIT (OUTPATIENT)
Dept: FAMILY MEDICINE CLINIC | Facility: CLINIC | Age: 74
End: 2024-09-09

## 2024-09-09 VITALS
HEART RATE: 66 BPM | SYSTOLIC BLOOD PRESSURE: 138 MMHG | BODY MASS INDEX: 22.54 KG/M2 | HEIGHT: 61 IN | TEMPERATURE: 98 F | OXYGEN SATURATION: 97 % | WEIGHT: 119.38 LBS | RESPIRATION RATE: 18 BRPM | DIASTOLIC BLOOD PRESSURE: 76 MMHG

## 2024-09-09 DIAGNOSIS — R42 DIZZINESS: Primary | ICD-10-CM

## 2024-09-09 PROCEDURE — 99213 OFFICE O/P EST LOW 20 MIN: CPT | Performed by: FAMILY MEDICINE

## 2024-09-09 PROCEDURE — 3078F DIAST BP <80 MM HG: CPT | Performed by: FAMILY MEDICINE

## 2024-09-09 PROCEDURE — G2211 COMPLEX E/M VISIT ADD ON: HCPCS | Performed by: FAMILY MEDICINE

## 2024-09-09 PROCEDURE — 3075F SYST BP GE 130 - 139MM HG: CPT | Performed by: FAMILY MEDICINE

## 2024-09-09 NOTE — PROGRESS NOTES
"Ambulatory Visit  Name: Hawa Cheek      : 1950      MRN: [id number]  Encounter Provider: Samm Burrell MD  Encounter Date: 2024   Encounter department: [facility]    Assessment & Plan   1. Dizziness  Assessment & Plan:  Associates elevated BP to symptom  BP today  138/76; within goal  Suspect dehydration is contributory in the absence CV or neuro deficits and with response to hydration  Plan:  - Continue frequent hydration  - Strict ED precautions provided  - Change position slowly  - RTC in 3 weeks or sooner if symptoms worsen or does not improve.           History of Present Illness     [facility] #[id number]  Hawa Cheek is a 73 y.o. female with pmhx of HTN presenting today for same day visit for c/o episodes of \"dizziness\" ongoing for 5 days when patient goes from laying down to standing up. Endorses SBP with HBPM during these episodes are more elevated than usual- 150's-160's. No associated diaphoresis, palpitations, chest pain or SOB. Patient is accompanied by daughter who states she gives her lemon water after these episodes and when she checks her BP, they are back to her baseline of 130's.Patient reports that they have been compliant with medications. Problems stable unless otherwise mentioned.She denies n/v/diarrhea/constipation, abdominal pain, dysuria or headaches.           Review of Systems   Constitutional:  Negative for chills, diaphoresis, fatigue and fever.   HENT:  Negative for congestion.    Eyes:  Negative for visual disturbance.   Respiratory:  Negative for chest tightness, shortness of breath and wheezing.    Cardiovascular:  Negative for chest pain and palpitations.   Gastrointestinal:  Negative for abdominal pain, constipation, diarrhea, nausea and vomiting.   Genitourinary:  Negative for dysuria.   All other systems reviewed and are negative.    Medical History Reviewed by provider this encounter:  " "Tobacco  Allergies  Meds  Problems  Med Hx  Surg Hx  Fam Hx       Current Outpatient Medications on File Prior to Visit   Medication Sig Dispense Refill    acetaminophen (TYLENOL) 500 mg tablet Take 500 mg by mouth every 6 (six) hours as needed for mild pain      amLODIPine (NORVASC) 5 mg tablet Take 1 tablet (5 mg total) by mouth daily 90 tablet 3    ammonium lactate (LAC-HYDRIN) 12 % lotion Apply 1 Application topically 2 (two) times a day 400 g 0    aspirin (Aspirin Low Dose) 81 mg EC tablet Take 1 tablet (81 mg total) by mouth daily 90 tablet 0    atorvastatin (LIPITOR) 40 mg tablet Take 1 tablet (40 mg total) by mouth every evening 90 tablet 3    chlorhexidine (PERIDEX) 0.12 % solution RINSE WITH 15 MLS 2 TIMES A DAY FOR 30 SECONDS AND SPIT. AVOID RI...  (REFER TO PRESCRIPTION NOTES).      Cholecalciferol (Vitamin D) 50 MCG (2000 UT) tablet Take 1 tablet (2,000 Units total) by mouth daily 30 tablet 3    Diclofenac Sodium (VOLTAREN) 1 % Apply 2 g topically 4 (four) times a day 50 g 2    memantine (NAMENDA) 5 mg tablet Take 1 tablet (5 mg total) by mouth 2 (two) times a day 180 tablet 3    omega-3-acid ethyl esters (LOVAZA) 1 g capsule take 2 capsules by mouth twice a day 180 capsule 0     No current facility-administered medications on file prior to visit.      Objective     /76 (BP Location: Right arm, Patient Position: Sitting, Cuff Size: Standard)   Pulse 66   Temp 98 °F (36.7 °C) (Temporal)   Resp 18   Ht 5' 1\" (1.549 m)   Wt 54.1 kg (119 lb 6 oz)   SpO2 97%   BMI 22.56 kg/m²     Physical Exam  Vitals reviewed.   Constitutional:       General: She is not in acute distress.     Appearance: Normal appearance. She is normal weight. She is not ill-appearing.   HENT:      Head: Normocephalic.      Nose: Nose normal.      Mouth/Throat:      Mouth: Mucous membranes are moist.      Pharynx: No oropharyngeal exudate or posterior oropharyngeal erythema.   Eyes:      Extraocular Movements: " Extraocular movements intact.      Conjunctiva/sclera: Conjunctivae normal.      Pupils: Pupils are equal, round, and reactive to light.   Neck:      Vascular: No carotid bruit.   Cardiovascular:      Rate and Rhythm: Normal rate and regular rhythm.      Pulses: Normal pulses.      Heart sounds: Normal heart sounds.   Pulmonary:      Effort: Pulmonary effort is normal.      Breath sounds: Normal breath sounds.   Abdominal:      General: Abdomen is flat.      Palpations: Abdomen is soft. There is no mass.      Tenderness: There is no abdominal tenderness.   Musculoskeletal:      Cervical back: Normal range of motion and neck supple. No rigidity or tenderness.      Right lower leg: No edema.      Left lower leg: No edema.   Lymphadenopathy:      Cervical: No cervical adenopathy.   Skin:     General: Skin is warm.      Findings: No rash.   Neurological:      General: No focal deficit present.      Mental Status: She is alert. Mental status is at baseline.   Psychiatric:         Mood and Affect: Mood normal.         Behavior: Behavior normal.

## 2024-09-11 NOTE — ASSESSMENT & PLAN NOTE
Associates elevated BP to symptom  BP today  138/76; within goal  Suspect dehydration is contributory in the absence CV or neuro deficits and with response to hydration  Plan:  - Continue frequent hydration  - Strict ED precautions provided  - Change position slowly  - RTC in 3 weeks or sooner if symptoms worsen or does not improve.

## 2024-09-25 ENCOUNTER — TELEPHONE (OUTPATIENT)
Age: 74
End: 2024-09-25

## 2024-10-08 ENCOUNTER — OFFICE VISIT (OUTPATIENT)
Dept: OBGYN CLINIC | Facility: CLINIC | Age: 74
End: 2024-10-08
Payer: MEDICARE

## 2024-10-08 VITALS
HEIGHT: 62 IN | BODY MASS INDEX: 22.31 KG/M2 | DIASTOLIC BLOOD PRESSURE: 64 MMHG | SYSTOLIC BLOOD PRESSURE: 122 MMHG | WEIGHT: 121.2 LBS

## 2024-10-08 DIAGNOSIS — M75.41 IMPINGEMENT SYNDROME OF RIGHT SHOULDER: Primary | ICD-10-CM

## 2024-10-08 PROCEDURE — 20610 DRAIN/INJ JOINT/BURSA W/O US: CPT | Performed by: PHYSICIAN ASSISTANT

## 2024-10-08 PROCEDURE — 99213 OFFICE O/P EST LOW 20 MIN: CPT | Performed by: PHYSICIAN ASSISTANT

## 2024-10-08 RX ORDER — METHYLPREDNISOLONE ACETATE 40 MG/ML
1 INJECTION, SUSPENSION INTRA-ARTICULAR; INTRALESIONAL; INTRAMUSCULAR; SOFT TISSUE
Status: COMPLETED | OUTPATIENT
Start: 2024-10-08 | End: 2024-10-08

## 2024-10-08 RX ORDER — BUPIVACAINE HYDROCHLORIDE 2.5 MG/ML
4 INJECTION, SOLUTION INFILTRATION; PERINEURAL
Status: COMPLETED | OUTPATIENT
Start: 2024-10-08 | End: 2024-10-08

## 2024-10-08 RX ADMIN — BUPIVACAINE HYDROCHLORIDE 4 ML: 2.5 INJECTION, SOLUTION INFILTRATION; PERINEURAL at 11:00

## 2024-10-08 RX ADMIN — METHYLPREDNISOLONE ACETATE 1 ML: 40 INJECTION, SUSPENSION INTRA-ARTICULAR; INTRALESIONAL; INTRAMUSCULAR; SOFT TISSUE at 11:00

## 2024-10-08 NOTE — PROGRESS NOTES
"Patient Name:  Hawa Cheek  MRN:  [id number]    Assessment & Plan     Right shoulder rotator cuff tendinitis/bursitis.  Patient requested repeat right shoulder subacromial space corticosteroid injection which was performed today.  Continue home exercises.  Continue Celebrex as needed.  Activities as tolerated with modification avoid pain.  Follow-up in 6 weeks.  Discussed obtaining MRI at that point if symptoms persist.    Chief Complaint     Follow-up right shoulder pain    History of the Present Illness     Hawa Cheek is a 73 y.o. female who returns to the office today for evaluation of her right shoulder.  Patient's daughter is present in the room and provides translation.  She was last seen on 7/8/2024.  At that time she received a right shoulder subacromial space corticosteroid injection.  She noted significant improvement up until recently when she notes a gradual return of her pain.  Currently pain is 2 out of 10 intensity localized to the lateral aspect of the shoulder.  She does note episodic increases in her pain related to weather changes and increased activity.  She states her pain can reach 8 out of 10 in intensity.  She does take Celebrex 50 mg twice daily with improvement.  She continues to perform home exercises as well with improvement.  She denies any weakness or instability.  No numbness or tingling.  No fevers or chills.  Patient is requesting repeat injection today.    Physical Exam     /64 (BP Location: Right arm, Patient Position: Sitting, Cuff Size: Standard)   Ht 5' 2\" (1.575 m)   Wt 55 kg (121 lb 3.2 oz)   BMI 22.17 kg/m²     Right shoulder: No gross deformity.  Skin intact without erythema ecchymosis or swelling.  Slight tenderness anterior and lateral shoulder.  No tenderness posterior shoulder and AC joint. PROM is , ER-abd 90, IR-abd 50.  Impingement signs are mildly positive.  Empty can and speeds tests are mildly positive as well.  Cross-body adduction " test is negative.  5 out of 5 forward flexion strength.  Sensation intact axillary, median, ulnar, and radial nerves.  2+ radial pulse.      Eyes: Anicteric sclerae.  ENT: Trachea midline.  Lungs: Normal respiratory effort.  CV: Capillary refill is less than 2 seconds.  Skin: Intact without erythema.  Lymph: No palpable lymphadenopathy.  Neuro: Sensation is grossly intact to light touch.  Psych: Mood and affect are appropriate.      Past Medical History:   Diagnosis Date    Arthralgia of hip     left    Arthritis     Chronic pain disorder     low back and shoulder    Dyslipidemia     Hyperlipidemia     Hypertension     Murmur, cardiac 09/2024    Neck pain     Pemphigus     Pre-diabetes     Shoulder pain     Stroke (HCC)     Vitamin D deficiency        Past Surgical History:   Procedure Laterality Date    CATARACT EXTRACTION Bilateral     COLONOSCOPY      ND COLONOSCOPY FLX DX W/COLLJ SPEC WHEN PFRMD N/A 11/10/2016    Procedure: COLONOSCOPY;  Surgeon: Tom Carter MD;  Location: [facility];  Service: Gastroenterology    ND INCISION EXTENSOR TENDON SHEATH WRIST Left 7/12/2021    Procedure: RELEASE LEFT DEQUERVAINS;  Surgeon: Jt White MD;  Location:  MAIN OR;  Service: Orthopedics    ND INCISION EXTENSOR TENDON SHEATH WRIST Right 7/26/2021    Procedure: RELEASE RIGHT DEQUERVAINS;  Surgeon: Jt White MD;  Location:  MAIN OR;  Service: Orthopedics    ND XCAPSL CTRC RMVL INSJ IO LENS PROSTH W/O ECP Left 9/27/2018    Procedure: EXTRACAPSULAR CATARACT REMOVAL/INSERTION OF INTRAOCULAR LENS;  Surgeon: Alfred Stephens MD;  Location:  MAIN OR;  Service: Ophthalmology       No Known Allergies    Current Outpatient Medications on File Prior to Visit   Medication Sig Dispense Refill    acetaminophen (TYLENOL) 500 mg tablet Take 500 mg by mouth every 6 (six) hours as needed for mild pain      amLODIPine (NORVASC) 5 mg tablet Take 1 tablet (5 mg total) by mouth daily 90 tablet 3    ammonium lactate (LAC-HYDRIN) 12 %  lotion Apply 1 Application topically 2 (two) times a day 400 g 0    aspirin (Aspirin Low Dose) 81 mg EC tablet Take 1 tablet (81 mg total) by mouth daily 90 tablet 0    atorvastatin (LIPITOR) 40 mg tablet Take 1 tablet (40 mg total) by mouth every evening 90 tablet 3    chlorhexidine (PERIDEX) 0.12 % solution RINSE WITH 15 MLS 2 TIMES A DAY FOR 30 SECONDS AND SPIT. AVOID RI...  (REFER TO PRESCRIPTION NOTES).      Cholecalciferol (Vitamin D) 50 MCG (2000 UT) tablet Take 1 tablet (2,000 Units total) by mouth daily 30 tablet 3    Diclofenac Sodium (VOLTAREN) 1 % Apply 2 g topically 4 (four) times a day 50 g 2    memantine (NAMENDA) 5 mg tablet Take 1 tablet (5 mg total) by mouth 2 (two) times a day 180 tablet 3    omega-3-acid ethyl esters (LOVAZA) 1 g capsule take 2 capsules by mouth twice a day 180 capsule 0     No current facility-administered medications on file prior to visit.       Social History     Tobacco Use    Smoking status: Never     Passive exposure: Never    Smokeless tobacco: Never   Vaping Use    Vaping status: Never Used   Substance Use Topics    Alcohol use: Never    Drug use: No       Family History   Problem Relation Age of Onset    Hyperlipidemia Mother     Hypertension Mother     Heart disease Father     No Known Problems Sister     No Known Problems Sister     No Known Problems Sister     No Known Problems Maternal Aunt     No Known Problems Paternal Aunt     No Known Problems Paternal Aunt        Review of Systems     As stated in the HPI. All other systems reviewed and are negative.      Large joint arthrocentesis: R subacromial bursa  Procedure Details  Location: shoulder - R subacromial bursa  Needle size: 22 G  Ultrasound guidance: no  Approach: posterior  Medications administered: 4 mL bupivacaine 0.25 %; 1 mL methylPREDNISolone acetate 40 mg/mL    Patient tolerance: patient tolerated the procedure well with no immediate complications  Dressing:  Sterile dressing applied

## 2024-10-09 ENCOUNTER — TELEPHONE (OUTPATIENT)
Dept: FAMILY MEDICINE CLINIC | Facility: CLINIC | Age: 74
End: 2024-10-09

## 2024-10-09 ENCOUNTER — OFFICE VISIT (OUTPATIENT)
Dept: FAMILY MEDICINE CLINIC | Facility: CLINIC | Age: 74
End: 2024-10-09

## 2024-10-09 VITALS
DIASTOLIC BLOOD PRESSURE: 62 MMHG | RESPIRATION RATE: 16 BRPM | HEIGHT: 61 IN | TEMPERATURE: 97.7 F | OXYGEN SATURATION: 95 % | HEART RATE: 66 BPM | BODY MASS INDEX: 22.66 KG/M2 | SYSTOLIC BLOOD PRESSURE: 132 MMHG | WEIGHT: 120 LBS

## 2024-10-09 DIAGNOSIS — I10 ESSENTIAL HYPERTENSION: Primary | ICD-10-CM

## 2024-10-09 DIAGNOSIS — Z23 ENCOUNTER FOR IMMUNIZATION: ICD-10-CM

## 2024-10-09 DIAGNOSIS — R42 DIZZINESS: ICD-10-CM

## 2024-10-09 DIAGNOSIS — E78.00 PURE HYPERCHOLESTEROLEMIA: ICD-10-CM

## 2024-10-09 PROCEDURE — 90662 IIV NO PRSV INCREASED AG IM: CPT | Performed by: FAMILY MEDICINE

## 2024-10-09 PROCEDURE — G0008 ADMIN INFLUENZA VIRUS VAC: HCPCS | Performed by: FAMILY MEDICINE

## 2024-10-09 PROCEDURE — 3075F SYST BP GE 130 - 139MM HG: CPT | Performed by: FAMILY MEDICINE

## 2024-10-09 PROCEDURE — 3078F DIAST BP <80 MM HG: CPT | Performed by: FAMILY MEDICINE

## 2024-10-09 PROCEDURE — 99214 OFFICE O/P EST MOD 30 MIN: CPT | Performed by: FAMILY MEDICINE

## 2024-10-09 NOTE — PROGRESS NOTES
Ambulatory Visit  Name: Hawa Cheek      : 1950      MRN: [id number]  Encounter Provider: Keeley Mccormick MD  Encounter Date: 10/9/2024   Encounter department: [facility]    Assessment & Plan  Essential hypertension  BP Readings from Last 3 Encounters:   10/09/24 132/62   10/08/24 122/64   24 138/76     Stable, diastolic low, rechecked manually 122/58 mmHg  Current medications: Amlodipine 5 mg     Plan:  Will continue Amlodipine 5 mg   Advised adequate water intake  Monitor BP at home if lower than 90/60 mmHg contact the office         Dizziness  Resolved dizziness    Plan:  - Continue frequent hydration  - Strict ED precautions provided  - Change position slowly               Encounter for immunization    Orders:    influenza vaccine, high-dose, PF 0.5 mL (Fluzone High Dose)    Pure hypercholesterolemia  Lab Results   Component Value Date/Time    CHOLESTEROL 131 2024 09:48 AM    TRIG 57 2024 09:48 AM    TRIG 98 2015 09:17 AM    HDL 70 2024 09:48 AM    HDL 61 2015 09:17 AM    LDLCALC 50 2024 09:48 AM    LDLCALC 75 2015 09:17 AM       Continue atorvastatin 40 mg daily, Lovaza 1 g twice daily  Low Fat Diet, regular Exercise              History of Present Illness     HPI    Hawa Cheek is a 73 y.o. female  has a past medical history of Arthralgia of hip, Arthritis, Chronic pain disorder, Dyslipidemia, Hyperlipidemia, Hypertension, Murmur, cardiac, Neck pain, Pemphigus, Pre-diabetes, Shoulder pain, Stroke (HCC), and Vitamin D deficiency. who presented to the office today accompanied by her daughter.    Daughter states that the dizziness has resolved.  For the past 2-3 weeks her mother has not complained of the dizziness.  She gets up more slowly and is drinking mor water.    The following portions of the patient's history were reviewed and updated as appropriate: allergies, current medications, past family  "history, past medical history, past social history, past surgical history and problem list.    History obtained from : patient and pt's daughter  Review of Systems   Constitutional:  Negative for chills and fever.   HENT:  Negative for congestion, rhinorrhea and sore throat.    Respiratory:  Negative for cough and shortness of breath.    Cardiovascular:  Negative for chest pain.   Gastrointestinal:  Negative for diarrhea, nausea and vomiting.   Musculoskeletal:  Positive for back pain. Negative for gait problem.   Skin:  Negative for rash.   Neurological:  Negative for dizziness and headaches.           Objective     /62 (BP Location: Right arm, Patient Position: Sitting, Cuff Size: Standard)   Pulse 66   Temp 97.7 °F (36.5 °C) (Temporal)   Resp 16   Ht 5' 1\" (1.549 m)   Wt 54.4 kg (120 lb)   SpO2 95%   BMI 22.67 kg/m²     Physical Exam  Vitals and nursing note reviewed.   Constitutional:       General: She is not in acute distress.     Appearance: Normal appearance. She is well-developed.   HENT:      Head: Normocephalic and atraumatic.      Mouth/Throat:      Mouth: Mucous membranes are moist.   Eyes:      Extraocular Movements: Extraocular movements intact.      Conjunctiva/sclera: Conjunctivae normal.   Cardiovascular:      Rate and Rhythm: Normal rate and regular rhythm.      Heart sounds: Murmur heard.   Pulmonary:      Effort: Pulmonary effort is normal. No respiratory distress.      Breath sounds: Normal breath sounds.   Abdominal:      Palpations: Abdomen is soft.      Tenderness: There is no abdominal tenderness.   Musculoskeletal:         General: No swelling.      Cervical back: Neck supple.      Right lower leg: No edema.      Left lower leg: Edema present.      Comments: + trace edema LLE   Skin:     General: Skin is warm and dry.      Capillary Refill: Capillary refill takes less than 2 seconds.      Findings: No rash.   Neurological:      Mental Status: She is alert.   Psychiatric:       "   Mood and Affect: Mood normal.         Behavior: Behavior normal.

## 2024-10-09 NOTE — ASSESSMENT & PLAN NOTE
BP Readings from Last 3 Encounters:   10/09/24 132/62   10/08/24 122/64   09/09/24 138/76     Stable, diastolic low, rechecked manually 122/58 mmHg  Current medications: Amlodipine 5 mg     Plan:  Will continue Amlodipine 5 mg   Advised adequate water intake  Monitor BP at home if lower than 90/60 mmHg contact the office

## 2024-10-09 NOTE — ASSESSMENT & PLAN NOTE
Resolved dizziness    Plan:  - Continue frequent hydration  - Strict ED precautions provided  - Change position slowly

## 2024-10-10 NOTE — ASSESSMENT & PLAN NOTE
Lab Results   Component Value Date/Time    CHOLESTEROL 131 06/19/2024 09:48 AM    TRIG 57 06/19/2024 09:48 AM    TRIG 98 01/13/2015 09:17 AM    HDL 70 06/19/2024 09:48 AM    HDL 61 01/13/2015 09:17 AM    LDLCALC 50 06/19/2024 09:48 AM    LDLCALC 75 01/13/2015 09:17 AM       Continue atorvastatin 40 mg daily, Lovaza 1 g twice daily  Low Fat Diet, regular Exercise            Nursing home

## 2024-10-22 NOTE — PROGRESS NOTES
[facility] CARDIOLOGY  [address] [place] [address], [address]  [place] [address]  PHONE: [phone]          10/22/24    NAME:  Patricia Hale  : 1949  MRN: [id number]         SUBJECTIVE:   Patricia Hale is a 75 y.o. female seen for a visit regarding the following:     Chief Complaint   Patient presents with    Atrial Fibrillation           HPI:    Cardio problem list:  1.  Paroxysmal atrial [id number] score of 5-on metoprolol and Eliquis-diagnosed 2023-[facility] admission  2.  Left cerebellar hemisphere CVA-initial event around 2022, repeat event around January 15 and a third event in 2023-admitted to [facility], again in 2023  -MR angiogram of the head and neck from-2023 with no significant obstructive disease.  3.  Hyperlipidemia-familial  4.  GERD  5.  Asthma  6. FAISAL- on mandibular advancement device    I saw Ms. Hale was a pleasant 75-year-old woman in cardiovascular follow-up for paroxysmal atrial fibrillation, history of prior CVA involving left cerebellar hemisphere, hyperlipidemia, known GERD and asthma.    We last met with her 4 months ago at which time we put her back on metoprolol succinate 25 mg once daily.  We told her she could take it every evening to prevent much fatigue.    History of TIA-she had an episode which was concerning for a TIA in 2024-met with her primary neurologist-underwent an MRI of the brain and a CT angiogram of the head and neck which showed no concerning findings earlier this month.  She said she went on a strenuous hike which was downhill going out and it was all uphill coming back in.  She became very nauseous which is potentially from the effort related to the headache.    Paroxysmal atrial fibrillation: Denies any significant palpitations but for some reason she is back on metoprolol tartrate to metoprolol succinate once daily.  She remains on Eliquis for thromboembolic prophylaxis.  She remains on full dose  Assessment/Plan:    Impaired fasting glucose  Due for BW  Please complete BW as requested at last visit in March     Essential hypertension  Continue Lisinopril/HCTZ       Diagnoses and all orders for this visit:    Chronic right shoulder pain  -     Diclofenac Sodium (VOLTAREN) 1 %; Apply 2 g topically 4 (four) times a day    Dry skin  -     ammonium lactate (LAC-HYDRIN) 12 % lotion; Apply topically 2 (two) times a day    Essential hypertension    Impaired fasting glucose        Discussed the importance of staying active both physically and mentally, as patient herself states she is less achy when she exercises  Hip and shoulder pain: Apply moist heat for 15 minutes BID  through out the week  Subjective:      Patient ID: Laine Beal is a 66 y o  female  65 yo  female complains of R shoulder pain  Mild occasional, not radiated, described as an ache, going on for about 3 weeks  No weakness, numbness  Chronic L hip pain, on and off, improves when she is more physically active   States she feels stiff when she gets up from bed in the am  Stiffness improves as the day goes by  The following portions of the patient's history were reviewed and updated as appropriate:   She  has a past medical history of Arthralgia of hip, Arthritis, Chronic pain disorder, Dyslipidemia, Hyperlipidemia, Hypertension, Neck pain, Pemphigus, Pre-diabetes, Shoulder pain, Stroke ([address] ), and Vitamin D deficiency    She   Patient Active Problem List    Diagnosis Date Noted    Lightheadedness 12/03/2021    CVA (cerebral vascular accident) ([facility] [address] ) 12/08/2019    Spinal stenosis, lumbar region with neurogenic claudication     Acute pain of left shoulder 08/23/2018    Pre-op evaluation 02/08/2018    Muscle spasms of neck 12/04/2017    Arthralgia of left hip 07/19/2016    Slow transit constipation 07/19/2016    Impaired fasting glucose 07/19/2016    Chronic right shoulder pain 03/15/2016    Creatinine elevation 02/16/2015  Neck pain 11/19/2014    Low back pain 08/06/2014    Acquired trigger finger 10/09/2013    Vitamin D deficiency 07/29/2013    Mammogram abnormal 07/29/2013    Multiple joint pain 07/29/2013    Other symptoms involving skin and integumentary tissues 06/03/2013    Pemphigus 04/08/2013    Bursitis 12/03/2012    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/2012    Osteoarthrosis 09/24/2012    Dyslipidemia 06/19/2012    Essential hypertension 06/19/2012    Hyperlipidemia 06/03/2008     She  has a past surgical history that includes pr colonoscopy flx dx w/collj spec when pfrmd (N/A, 11/10/2016); Colonoscopy; pr xcapsl ctrc rmvl insj io lens prosth w/o ecp (Left, 9/27/2018); Cataract extraction (Bilateral); pr incis tendon sheath,radial styloid (Left, 7/12/2021); and pr incis tendon sheath,radial styloid (Right, 7/26/2021)  Her family history includes Heart disease in her father; Hyperlipidemia in her mother; Hypertension in her mother; No Known Problems in her maternal aunt, paternal aunt, paternal aunt, sister, sister, and sister  She  reports that she has never smoked  She has never used smokeless tobacco  She reports that she does not drink alcohol and does not use drugs    Current Outpatient Medications   Medication Sig Dispense Refill    ammonium lactate (LAC-HYDRIN) 12 % lotion Apply topically 2 (two) times a day 400 g 1    Diclofenac Sodium (VOLTAREN) 1 % Apply 2 g topically 4 (four) times a day 350 g 0    acetaminophen (TYLENOL) 500 mg tablet Take 500 mg by mouth every 6 (six) hours as needed for mild pain      aspirin (RA Aspirin EC) 81 mg EC tablet Take 1 tablet (81 mg total) by mouth daily 90 tablet 1    atorvastatin (LIPITOR) 40 mg tablet Take 1 tablet (40 mg total) by mouth every evening 90 tablet 1    docusate sodium (COLACE) 100 mg capsule Take 1 capsule (100 mg total) by mouth 2 (two) times a day 180 capsule 1    Elastic Bandages & Supports (Thumb Splint/Neoprene Medium) MISC by Does not apply route daily 2 each 0    fluticasone (FLONASE) 50 mcg/act nasal spray 2 sprays into each nostril daily 16 g 0    furosemide (LASIX) 20 mg tablet Take 0 5 tablets (10 mg total) by mouth daily (Patient not taking: Reported on 5/13/2021) 5 tablet 0    lisinopril-hydrochlorothiazide (PRINZIDE,ZESTORETIC) 10-12 5 MG per tablet Take 1 tablet by mouth daily 90 tablet 3    meclizine (ANTIVERT) 25 mg tablet Take 1 tablet (25 mg total) by mouth every 8 (eight) hours (Patient not taking: Reported on 5/13/2021) 30 tablet 0    memantine (NAMENDA) 5 mg tablet Take 1 tablet (5 mg total) by mouth 2 (two) times a day 180 tablet 1    metFORMIN (GLUCOPHAGE) 500 mg tablet Take 1 tablet (500 mg total) by mouth daily with breakfast 90 tablet 1    omega-3-acid ethyl esters (LOVAZA) 1 g capsule take 2 capsules by mouth twice a day 180 capsule 1    omega-3-acid ethyl esters (LOVAZA) 1 g capsule Take 2 capsules (2 g total) by mouth 2 (two) times a day 180 capsule 0    oxyCODONE (ROXICODONE) 5 mg immediate release tablet Take 1 tablet (5 mg total) by mouth every 6 (six) hours as needed for severe pain for up to 5 dosesMax Daily Amount: 20 mg (Patient not taking: Reported on 8/10/2021) 5 tablet 0    VITAMIN D PO Take by mouth daily       No current facility-administered medications for this visit       Current Outpatient Medications on File Prior to Visit   Medication Sig    acetaminophen (TYLENOL) 500 mg tablet Take 500 mg by mouth every 6 (six) hours as needed for mild pain    aspirin (RA Aspirin EC) 81 mg EC tablet Take 1 tablet (81 mg total) by mouth daily    atorvastatin (LIPITOR) 40 mg tablet Take 1 tablet (40 mg total) by mouth every evening    docusate sodium (COLACE) 100 mg capsule Take 1 capsule (100 mg total) by mouth 2 (two) times a day    Elastic Bandages & Supports (Thumb Splint/Neoprene Medium) MISC by Does not apply route daily    fluticasone (FLONASE) 50 mcg/act nasal spray 2 sprays into each nostril daily    furosemide (LASIX) 20 mg tablet Take 0 5 tablets (10 mg total) by mouth daily (Patient not taking: Reported on 5/13/2021)    lisinopril-hydrochlorothiazide (PRINZIDE,ZESTORETIC) 10-12 5 MG per tablet Take 1 tablet by mouth daily    meclizine (ANTIVERT) 25 mg tablet Take 1 tablet (25 mg total) by mouth every 8 (eight) hours (Patient not taking: Reported on 5/13/2021)    memantine (NAMENDA) 5 mg tablet Take 1 tablet (5 mg total) by mouth 2 (two) times a day    metFORMIN (GLUCOPHAGE) 500 mg tablet Take 1 tablet (500 mg total) by mouth daily with breakfast    omega-3-acid ethyl esters (LOVAZA) 1 g capsule take 2 capsules by mouth twice a day    omega-3-acid ethyl esters (LOVAZA) 1 g capsule Take 2 capsules (2 g total) by mouth 2 (two) times a day    oxyCODONE (ROXICODONE) 5 mg immediate release tablet Take 1 tablet (5 mg total) by mouth every 6 (six) hours as needed for severe pain for up to 5 dosesMax Daily Amount: 20 mg (Patient not taking: Reported on 8/10/2021)    VITAMIN D PO Take by mouth daily    [DISCONTINUED] ammonium lactate (LAC-HYDRIN) 12 % lotion Apply topically 2 (two) times a day    [DISCONTINUED] meloxicam (MOBIC) 7 5 mg tablet Take 1 tablet (7 5 mg total) by mouth daily (Patient not taking: Reported on 5/13/2021)     No current facility-administered medications on file prior to visit       Review of Systems   Musculoskeletal: Positive for arthralgias  All other systems reviewed and are negative  Objective:      /60 (BP Location: Left arm, Patient Position: Sitting, Cuff Size: Adult)   Pulse 62   Temp (!) 97 2 °F (36 2 °C) (Temporal)   Resp 18   Wt 55 3 kg (122 lb)   SpO2 96%   BMI 22 31 kg/m²          Physical Exam  Vitals and nursing note reviewed  Constitutional:       Appearance: She is well-developed  HENT:      Head: Normocephalic        Right Ear: External ear normal       Left Ear: External ear normal       Nose: Nose normal    Eyes: Conjunctiva/sclera: Conjunctivae normal       Pupils: Pupils are equal, round, and reactive to light  Neck:      Thyroid: No thyromegaly  Cardiovascular:      Rate and Rhythm: Normal rate and regular rhythm  Heart sounds: Normal heart sounds  Pulmonary:      Effort: Pulmonary effort is normal       Breath sounds: Normal breath sounds  Abdominal:      Palpations: Abdomen is soft  Tenderness: There is no abdominal tenderness  There is no guarding or rebound  Musculoskeletal:         General: Tenderness present  Right shoulder: Tenderness present  Decreased range of motion  Left shoulder: Normal       Cervical back: Normal range of motion and neck supple  Right hip: Normal       Left hip: Tenderness present  Skin:     General: Skin is dry  Neurological:      Mental Status: She is alert and oriented to person, place, and time  Deep Tendon Reflexes: Reflexes are normal and symmetric

## 2024-11-06 ENCOUNTER — OFFICE VISIT (OUTPATIENT)
Dept: FAMILY MEDICINE CLINIC | Facility: CLINIC | Age: 74
End: 2024-11-06

## 2024-11-06 VITALS
TEMPERATURE: 97.8 F | WEIGHT: 118.4 LBS | HEART RATE: 62 BPM | DIASTOLIC BLOOD PRESSURE: 74 MMHG | HEIGHT: 61 IN | RESPIRATION RATE: 16 BRPM | SYSTOLIC BLOOD PRESSURE: 142 MMHG | BODY MASS INDEX: 22.36 KG/M2 | OXYGEN SATURATION: 98 %

## 2024-11-06 DIAGNOSIS — L73.9 FOLLICULITIS OF PERINEUM: ICD-10-CM

## 2024-11-06 DIAGNOSIS — I10 ESSENTIAL HYPERTENSION: Primary | ICD-10-CM

## 2024-11-06 PROBLEM — M25.512 ACUTE PAIN OF LEFT SHOULDER: Status: RESOLVED | Noted: 2018-08-23 | Resolved: 2024-11-06

## 2024-11-06 PROCEDURE — 3078F DIAST BP <80 MM HG: CPT | Performed by: FAMILY MEDICINE

## 2024-11-06 PROCEDURE — 99214 OFFICE O/P EST MOD 30 MIN: CPT | Performed by: FAMILY MEDICINE

## 2024-11-06 PROCEDURE — G2211 COMPLEX E/M VISIT ADD ON: HCPCS | Performed by: FAMILY MEDICINE

## 2024-11-06 PROCEDURE — 3077F SYST BP >= 140 MM HG: CPT | Performed by: FAMILY MEDICINE

## 2024-11-06 RX ORDER — MUPIROCIN 20 MG/G
OINTMENT TOPICAL 2 TIMES DAILY
Qty: 30 G | Refills: 0 | Status: SHIPPED | OUTPATIENT
Start: 2024-11-06

## 2024-11-06 NOTE — PROGRESS NOTES
Ambulatory Visit  Name: Hawa Cheek      : 1950      MRN: [id number]  Encounter Provider: Keeley Mccormick MD  Encounter Date: 2024   Encounter department: [facility]    Assessment & Plan  Essential hypertension  BP Readings from Last 3 Encounters:   24 142/74   10/09/24 132/62   10/08/24 122/64     Stable  Current medications: Amlodipine 5 mg     Plan:  Will continue Amlodipine 5 mg   Improved Blood pressure  Monitor BP at home if lower than 90/60 mmHg contact the office       Folliculitis of perineum  Apply topical Bactroban ointment to affected area  Keep area clean and dry  Orders:    mupirocin (BACTROBAN) 2 % ointment; Apply topically 2 (two) times a day       History of Present Illness     HPI  Hawa Cheek is a 73 y.o. female  who presented to the office today to follow-up for hypertension.  Patient is taking amlodipine 5 mg daily without any reported side effects  She is concerned about a bump that she feels in her private area.  She states it is slightly painful and she noticed it last week      The following portions of the patient's history were reviewed and updated as appropriate: allergies, current medications, past family history, past medical history, past social history, past surgical history and problem list.    History obtained from : patient  Review of Systems   Constitutional:  Negative for chills and fever.   HENT:  Negative for congestion, rhinorrhea and sore throat.    Respiratory:  Negative for cough and shortness of breath.    Cardiovascular:  Negative for chest pain.   Gastrointestinal:  Negative for diarrhea, nausea and vomiting.   Musculoskeletal:  Positive for back pain. Negative for gait problem.   Skin:  Negative for rash.   Neurological:  Negative for dizziness and headaches.           Objective     /74 (BP Location: Right arm, Patient Position: Sitting, Cuff Size: Standard)   Pulse 62   Temp 97.8 °F (36.6  "°C) (Temporal)   Resp 16   Ht 5' 1\" (1.549 m)   Wt 53.7 kg (118 lb 6.4 oz)   SpO2 98%   BMI 22.37 kg/m²     Physical Exam  Vitals and nursing note reviewed. Exam conducted with a chaperone present.   Constitutional:       Appearance: Normal appearance.   HENT:      Head: Normocephalic and atraumatic.      Mouth/Throat:      Mouth: Mucous membranes are moist.   Cardiovascular:      Rate and Rhythm: Normal rate.   Pulmonary:      Effort: Pulmonary effort is normal.   Genitourinary:     Comments: + 2 mm inflamed follicle left labia major  Neurological:      Mental Status: She is alert and oriented to person, place, and time.   Psychiatric:         Mood and Affect: Mood normal.         Behavior: Behavior normal.         "

## 2024-11-06 NOTE — PROGRESS NOTES
"Ambulatory Visit  Name: Hawa Cheek      : 1950      MRN: [id number]  Encounter Provider: Keeley Mccormick MD  Encounter Date: 2024   Encounter department: [facility]    Assessment & Plan       History of Present Illness   {?Quick Links Encounters * My Last Note * Last Note in Specialty * Snapshot * Since Last Visit * History :95569}  HPI  Hawa Cheek is a 73 y.o. female  has a past medical history of Arthralgia of hip, Arthritis, Chronic pain disorder, Dyslipidemia, Hyperlipidemia, Hypertension, Murmur, cardiac, Neck pain, Pemphigus, Pre-diabetes, Shoulder pain, Stroke (HCC), and Vitamin D deficiency. who presented to the office today         The following portions of the patient's history were reviewed and updated as appropriate: allergies, current medications, past family history, past medical history, past social history, past surgical history and problem list.    {History obtained from (Optional):53623}  Review of Systems  {Select to Display PM (Optional):80794}      Objective   {?Quick Links Trend Vitals * Enter New Vitals * Results Review * Timeline (Adult) * Labs * Imaging * Cardiology * Procedures * Lung Cancer Screening * Surgical eConsent :79007}  /74 (BP Location: Right arm, Patient Position: Sitting, Cuff Size: Standard)   Pulse 62   Temp 97.8 °F (36.6 °C) (Temporal)   Resp 16   Ht 5' 1\" (1.549 m)   Wt 53.7 kg (118 lb 6.4 oz)   SpO2 98%   BMI 22.37 kg/m²     Physical Exam  {Administrative / Billing Section (Optional):00738}  "

## 2024-11-06 NOTE — ASSESSMENT & PLAN NOTE
BP Readings from Last 3 Encounters:   11/06/24 142/74   10/09/24 132/62   10/08/24 122/64     Stable  Current medications: Amlodipine 5 mg     Plan:  Will continue Amlodipine 5 mg   Improved Blood pressure  Monitor BP at home if lower than 90/60 mmHg contact the office

## 2024-11-19 ENCOUNTER — OFFICE VISIT (OUTPATIENT)
Dept: OBGYN CLINIC | Facility: CLINIC | Age: 74
End: 2024-11-19
Payer: MEDICARE

## 2024-11-19 VITALS — WEIGHT: 118 LBS | HEIGHT: 61 IN | BODY MASS INDEX: 22.28 KG/M2

## 2024-11-19 DIAGNOSIS — M75.41 IMPINGEMENT SYNDROME OF RIGHT SHOULDER: Primary | ICD-10-CM

## 2024-11-19 PROCEDURE — 99213 OFFICE O/P EST LOW 20 MIN: CPT | Performed by: PHYSICIAN ASSISTANT

## 2024-11-19 NOTE — PROGRESS NOTES
"Patient Name:  Hawa Cheek  MRN:  [id number]    Assessment & Plan     Resolved right shoulder rotator cuff tendinitis/bursitis.  Patient notes full resolution of her right shoulder pain at this time.  Continue home exercises.  Continue Celebrex as needed.  Activities as tolerated, no restrictions.  Follow-up as needed.    Chief Complaint     Follow-up right shoulder    History of the Present Illness     Hawa Cheek is a 74 y.o. female who returns to the office today for follow-up regarding her right shoulder.  She was last seen on 10/8/2024.  At that time she received a right shoulder subacromial space corticosteroid injection.  She was advised to continue Celebrex and continue her home exercises.  She returns today noting full resolution of her symptoms.  She only notes intermittent discomfort occasionally with housework.  She denies any weakness or instability.  She has been performing home exercises with improvement.  No numbness or tingling.  No fevers or chills.  She is happy with her progress.    Physical Exam     Ht 5' 1\" (1.549 m)   Wt 53.5 kg (118 lb)   BMI 22.30 kg/m²     Right shoulder: No gross deformity.  Skin intact without erythema ecchymosis or swelling.  No tenderness anterior and lateral shoulder.  No tenderness posterior shoulder and AC joint. PROM is , ER-abd 90, IR-abd 50.  Impingement signs are mildly positive.  Empty can and speeds tests are negative.  Cross-body adduction test is negative.  5 out of 5 forward flexion strength.  Sensation intact axillary, median, ulnar, and radial nerves.  2+ radial pulse.      Eyes: Anicteric sclerae.  ENT: Trachea midline.  Lungs: Normal respiratory effort.  CV: Capillary refill is less than 2 seconds.  Skin: Intact without erythema.  Lymph: No palpable lymphadenopathy.  Neuro: Sensation is grossly intact to light touch.  Psych: Mood and affect are appropriate.      Past Medical History:   Diagnosis Date    Arthralgia of hip     left    " Arthritis     Chronic pain disorder     low back and shoulder    Dyslipidemia     Hyperlipidemia     Hypertension     Murmur, cardiac 09/2024    Neck pain     Pemphigus     Pre-diabetes     Shoulder pain     Stroke (HCC)     Vitamin D deficiency        Past Surgical History:   Procedure Laterality Date    CATARACT EXTRACTION Bilateral     COLONOSCOPY      CT COLONOSCOPY FLX DX W/COLLJ SPEC WHEN PFRMD N/A 11/10/2016    Procedure: COLONOSCOPY;  Surgeon: Tom Carter MD;  Location: [facility];  Service: Gastroenterology    CT INCISION EXTENSOR TENDON SHEATH WRIST Left 7/12/2021    Procedure: RELEASE LEFT DEQUERVAINS;  Surgeon: Jt White MD;  Location:  [facility] OR;  Service: Orthopedics    CT INCISION EXTENSOR TENDON SHEATH WRIST Right 7/26/2021    Procedure: RELEASE RIGHT DEQUERVAINS;  Surgeon: Jt White MD;  Location:  MAIN OR;  Service: Orthopedics    CT XCAPSL CTRC RMVL INSJ IO LENS PROSTH W/O ECP Left 9/27/2018    Procedure: EXTRACAPSULAR CATARACT REMOVAL/INSERTION OF INTRAOCULAR LENS;  Surgeon: Alfred Stephens MD;  Location:  MAIN OR;  Service: Ophthalmology       No Known Allergies    Current Outpatient Medications on File Prior to Visit   Medication Sig Dispense Refill    acetaminophen (TYLENOL) 500 mg tablet Take 500 mg by mouth every 6 (six) hours as needed for mild pain      amLODIPine (NORVASC) 5 mg tablet Take 1 tablet (5 mg total) by mouth daily 90 tablet 3    ammonium lactate (LAC-HYDRIN) 12 % lotion Apply 1 Application topically 2 (two) times a day 400 g 0    aspirin (Aspirin Low Dose) 81 mg EC tablet Take 1 tablet (81 mg total) by mouth daily 90 tablet 0    atorvastatin (LIPITOR) 40 mg tablet Take 1 tablet (40 mg total) by mouth every evening 90 tablet 3    chlorhexidine (PERIDEX) 0.12 % solution RINSE WITH 15 MLS 2 TIMES A DAY FOR 30 SECONDS AND SPIT. AVOID RI...  (REFER TO PRESCRIPTION NOTES).      Cholecalciferol (Vitamin D) 50 MCG (2000 UT) tablet Take 1 tablet (2,000 Units total) by  mouth daily 30 tablet 3    Diclofenac Sodium (VOLTAREN) 1 % Apply 2 g topically 4 (four) times a day 50 g 2    memantine (NAMENDA) 5 mg tablet Take 1 tablet (5 mg total) by mouth 2 (two) times a day 180 tablet 3    mupirocin (BACTROBAN) 2 % ointment Apply topically 2 (two) times a day 30 g 0    omega-3-acid ethyl esters (LOVAZA) 1 g capsule take 2 capsules by mouth twice a day 180 capsule 0     No current facility-administered medications on file prior to visit.       Social History     Tobacco Use    Smoking status: Never     Passive exposure: Never    Smokeless tobacco: Never   Vaping Use    Vaping status: Never Used   Substance Use Topics    Alcohol use: Never    Drug use: No       Family History   Problem Relation Age of Onset    Hyperlipidemia Mother     Hypertension Mother     Heart disease Father     No Known Problems Sister     No Known Problems Sister     No Known Problems Sister     No Known Problems Maternal Aunt     No Known Problems Paternal Aunt     No Known Problems Paternal Aunt        Review of Systems     As stated in the HPI. All other systems reviewed and are negative.

## 2024-11-27 DIAGNOSIS — E78.5 DYSLIPIDEMIA: ICD-10-CM

## 2024-11-27 RX ORDER — OMEGA-3-ACID ETHYL ESTERS 1 G/1
2 CAPSULE, LIQUID FILLED ORAL 2 TIMES DAILY
Qty: 180 CAPSULE | Refills: 0 | Status: SHIPPED | OUTPATIENT
Start: 2024-11-27

## 2024-12-06 ENCOUNTER — HOSPITAL ENCOUNTER (OUTPATIENT)
Dept: BONE DENSITY | Facility: MEDICAL CENTER | Age: 74
Discharge: HOME | End: 2024-12-06
Payer: MEDICARE

## 2024-12-06 ENCOUNTER — HOSPITAL ENCOUNTER (OUTPATIENT)
Dept: MAMMOGRAPHY | Facility: MEDICAL CENTER | Age: 74
Discharge: HOME | End: 2024-12-06
Payer: MEDICARE

## 2024-12-06 VITALS — HEIGHT: 61 IN | BODY MASS INDEX: 22.28 KG/M2 | WEIGHT: 118 LBS

## 2024-12-06 VITALS — HEIGHT: 58 IN | WEIGHT: 118 LBS | BODY MASS INDEX: 24.77 KG/M2

## 2024-12-06 DIAGNOSIS — Z12.31 ENCOUNTER FOR SCREENING MAMMOGRAM FOR MALIGNANT NEOPLASM OF BREAST: ICD-10-CM

## 2024-12-06 DIAGNOSIS — M85.88 OSTEOPENIA OF OTHER SITE: ICD-10-CM

## 2024-12-06 PROCEDURE — 77067 SCR MAMMO BI INCL CAD: CPT

## 2024-12-06 PROCEDURE — 77063 BREAST TOMOSYNTHESIS BI: CPT

## 2024-12-06 PROCEDURE — 77080 DXA BONE DENSITY AXIAL: CPT

## 2024-12-19 ENCOUNTER — RESULTS FOLLOW-UP (OUTPATIENT)
Dept: FAMILY MEDICINE CLINIC | Facility: CLINIC | Age: 74
End: 2024-12-19

## 2025-01-17 DIAGNOSIS — E78.5 DYSLIPIDEMIA: ICD-10-CM

## 2025-01-17 RX ORDER — OMEGA-3-ACID ETHYL ESTERS 1 G/1
2 CAPSULE, LIQUID FILLED ORAL 2 TIMES DAILY
Qty: 180 CAPSULE | Refills: 0 | Status: SHIPPED | OUTPATIENT
Start: 2025-01-17

## 2025-01-27 ENCOUNTER — APPOINTMENT (EMERGENCY)
Dept: RADIOLOGY | Facility: HOSPITAL | Age: 75
End: 2025-01-27
Payer: COMMERCIAL

## 2025-01-27 ENCOUNTER — HOSPITAL ENCOUNTER (EMERGENCY)
Facility: HOSPITAL | Age: 75
Discharge: HOME | End: 2025-01-27
Attending: EMERGENCY MEDICINE | Admitting: EMERGENCY MEDICINE
Payer: COMMERCIAL

## 2025-01-27 VITALS
WEIGHT: 115.3 LBS | DIASTOLIC BLOOD PRESSURE: 84 MMHG | BODY MASS INDEX: 21.79 KG/M2 | SYSTOLIC BLOOD PRESSURE: 164 MMHG | TEMPERATURE: 97 F | OXYGEN SATURATION: 100 % | HEART RATE: 85 BPM | RESPIRATION RATE: 18 BRPM

## 2025-01-27 DIAGNOSIS — M70.70: Primary | ICD-10-CM

## 2025-01-27 PROCEDURE — 99284 EMERGENCY DEPT VISIT MOD MDM: CPT | Performed by: PHYSICIAN ASSISTANT

## 2025-01-27 PROCEDURE — 72170 X-RAY EXAM OF PELVIS: CPT

## 2025-01-27 PROCEDURE — 99284 EMERGENCY DEPT VISIT MOD MDM: CPT

## 2025-01-27 RX ORDER — ACETAMINOPHEN 325 MG/1
975 TABLET ORAL ONCE
Status: COMPLETED | OUTPATIENT
Start: 2025-01-27 | End: 2025-01-27

## 2025-01-27 RX ORDER — METHOCARBAMOL 500 MG/1
500 TABLET, FILM COATED ORAL ONCE
Status: COMPLETED | OUTPATIENT
Start: 2025-01-27 | End: 2025-01-27

## 2025-01-27 RX ORDER — IBUPROFEN 400 MG/1
400 TABLET, FILM COATED ORAL EVERY 8 HOURS PRN
Qty: 20 TABLET | Refills: 0 | Status: ON HOLD | OUTPATIENT
Start: 2025-01-27 | End: 2025-02-01

## 2025-01-27 RX ORDER — LIDOCAINE 50 MG/G
1 PATCH TOPICAL ONCE
Status: DISCONTINUED | OUTPATIENT
Start: 2025-01-27 | End: 2025-01-27 | Stop reason: HOSPADM

## 2025-01-27 RX ORDER — METHOCARBAMOL 500 MG/1
500 TABLET, FILM COATED ORAL 2 TIMES DAILY
Qty: 20 TABLET | Refills: 0 | Status: ON HOLD | OUTPATIENT
Start: 2025-01-27

## 2025-01-27 RX ADMIN — ACETAMINOPHEN 975 MG: 325 TABLET, FILM COATED ORAL at 18:10

## 2025-01-27 RX ADMIN — METHOCARBAMOL TABLETS 500 MG: 500 TABLET, COATED ORAL at 18:10

## 2025-01-27 RX ADMIN — LIDOCAINE 5% 1 PATCH: 700 PATCH TOPICAL at 18:10

## 2025-01-27 NOTE — ED PROVIDER NOTES
Time reflects when diagnosis was documented in both MDM as applicable and the Disposition within this note       Time User Action Codes Description Comment    1/27/2025  6:35 PM Lauren Peguero Add [M70.70] Ischiogluteal bursitis           ED Disposition       ED Disposition   Discharge    Condition   Stable    Date/Time   Mon Jan 27, 2025  6:35 PM    Comment   Hawa Ham discharge to home/self care.                   Assessment & Plan       Medical Decision Making  Differential diagnosis includes but not limited to: Bursitis, fracture, bony lesion, musculoskeletal pain    Problems Addressed:  Ischiogluteal bursitis: acute illness or injury    Amount and/or Complexity of Data Reviewed  Radiology: ordered and independent interpretation performed. Decision-making details documented in ED Course.    Risk  OTC drugs.  Prescription drug management.        ED Course as of 01/27/25 1902 Mon Jan 27, 2025   1856 Test results reviewed using  number [id number].  Patient with improvement of symptoms at this time.  Will follow-up as an outpatient.       Medications   lidocaine (LIDODERM) 5 % patch 1 patch (1 patch Topical Medication Applied 1/27/25 1810)   acetaminophen (TYLENOL) tablet 975 mg (975 mg Oral Given 1/27/25 1810)   methocarbamol (ROBAXIN) tablet 500 mg (500 mg Oral Given 1/27/25 1810)       ED Risk Strat Scores                          SBIRT 22yo+      Flowsheet Row Most Recent Value   Initial Alcohol Screen: US AUDIT-C     1. How often do you have a drink containing alcohol? 0 Filed at: 01/27/2025 1830   2. How many drinks containing alcohol do you have on a typical day you are drinking?  0 Filed at: 01/27/2025 1830   3a. Male UNDER 65: How often do you have five or more drinks on one occasion? 0 Filed at: 01/27/2025 1830   3b. FEMALE Any Age, or MALE 65+: How often do you have 4 or more drinks on one occassion? 0 Filed at: 01/27/2025 1830   Audit-C Score 0 Filed at: 01/27/2025 1830   ASH: How  many times in the past year have you...    Used an illegal drug or used a prescription medication for non-medical reasons? Never Filed at: 01/27/2025 1830                            History of Present Illness       Chief Complaint   Patient presents with    Back Pain     Lower back/butt pain, no fall or injury       Past Medical History:   Diagnosis Date    Arthralgia of hip     left    Arthritis     Chronic pain disorder     low back and shoulder    Dyslipidemia     Hyperlipidemia     Hypertension     Murmur, cardiac 09/2024    Neck pain     Pemphigus     Pre-diabetes     Shoulder pain     Stroke (HCC)     Vitamin D deficiency       Past Surgical History:   Procedure Laterality Date    CATARACT EXTRACTION Bilateral     COLONOSCOPY      NV COLONOSCOPY FLX DX W/COLLJ SPEC WHEN PFRMD N/A 11/10/2016    Procedure: COLONOSCOPY;  Surgeon: Tom Carter MD;  Location: [facility];  Service: Gastroenterology    NV INCISION EXTENSOR TENDON SHEATH WRIST Left 7/12/2021    Procedure: RELEASE LEFT DEQUERVAINS;  Surgeon: Jt White MD;  Location:  MAIN OR;  Service: Orthopedics    NV INCISION EXTENSOR TENDON SHEATH WRIST Right 7/26/2021    Procedure: RELEASE RIGHT DEQUERVAINS;  Surgeon: Jt White MD;  Location:  MAIN OR;  Service: Orthopedics    NV XCAPSL CTRC RMVL INSJ IO LENS PROSTH W/O ECP Left 9/27/2018    Procedure: EXTRACAPSULAR CATARACT REMOVAL/INSERTION OF INTRAOCULAR LENS;  Surgeon: Alfred Stephens MD;  Location:  MAIN OR;  Service: Ophthalmology      Family History   Problem Relation Age of Onset    Hyperlipidemia Mother     Hypertension Mother     Heart disease Father     No Known Problems Sister     No Known Problems Sister     No Known Problems Sister     No Known Problems Daughter     No Known Problems Maternal Grandmother     No Known Problems Maternal Grandfather     No Known Problems Paternal Grandmother     No Known Problems Paternal Grandfather     No Known Problems Maternal Aunt     No Known  Problems Paternal Aunt     No Known Problems Paternal Aunt     No Known Problems Paternal Aunt       Social History     Tobacco Use    Smoking status: Never     Passive exposure: Never    Smokeless tobacco: Never   Vaping Use    Vaping status: Never Used   Substance Use Topics    Alcohol use: Never    Drug use: No      E-Cigarette/Vaping    E-Cigarette Use Never User       E-Cigarette/Vaping Substances    Nicotine No     THC No     CBD No     Flavoring No     Other No     Unknown No       I have reviewed and agree with the history as documented.     74-year-old female presents the emergency department with complaints of gluteal pain.  States that pain started over the past 2 days.  Denies injury to the area.  States that pain seems to be worse with walking or prolonged periods of sitting.  Denies injury or sitting for swelling to it chair in the past several days.  No history of similar.  Has tried Tylenol and menthol cream at home without alleviation of symptoms.  Denies radiation of pain down the leg.      History provided by:  Patient and relative   used: Yes ([id number])    Back Pain  Location:  Gluteal region  Quality:  Unable to specify  Radiates to:  Does not radiate  Pain severity:  Moderate  Onset quality:  Gradual  Duration:  2 days  Timing:  Constant  Progression:  Waxing and waning  Chronicity:  New  Relieved by:  Nothing  Ineffective treatments: tylenol, topical pain reliever.  Associated symptoms: no abdominal pain, no abdominal swelling, no bladder incontinence, no bowel incontinence, no chest pain, no dysuria, no fever, no headaches, no leg pain, no numbness, no paresthesias, no pelvic pain, no perianal numbness, no tingling, no weakness and no weight loss        Review of Systems   Constitutional:  Negative for activity change, fever and weight loss.   Cardiovascular:  Negative for chest pain.   Gastrointestinal:  Negative for abdominal pain, bowel incontinence, nausea and vomiting.    Genitourinary:  Negative for bladder incontinence, decreased urine volume (no urinary incontinence.), dysuria, flank pain and pelvic pain.   Musculoskeletal:  Negative for back pain.        Gluteal pain   Skin:  Negative for rash.   Neurological:  Negative for tingling, weakness, numbness, headaches and paresthesias.           Objective       ED Triage Vitals [01/27/25 1744]   Temperature Pulse Blood Pressure Respirations SpO2 Patient Position - Orthostatic VS   (!) 97 °F (36.1 °C) 85 164/84 18 100 % Lying      Temp Source Heart Rate Source BP Location FiO2 (%) Pain Score    Oral Monitor Left arm -- --      Vitals      Date and Time Temp Pulse SpO2 Resp BP Pain Score FACES Pain Rating User   01/27/25 1744 97 °F (36.1 °C) 85 100 % 18 164/84 -- -- TW            Physical Exam  Vitals and nursing note reviewed.   Constitutional:       General: She is not in acute distress.     Appearance: She is not diaphoretic.   HENT:      Head: Normocephalic and atraumatic.      Right Ear: External ear normal.      Left Ear: External ear normal.      Mouth/Throat:      Pharynx: No oropharyngeal exudate.   Eyes:      Conjunctiva/sclera: Conjunctivae normal.   Neck:      Vascular: No JVD.      Trachea: No tracheal deviation.   Cardiovascular:      Rate and Rhythm: Normal rate and regular rhythm.      Heart sounds: Murmur heard.      No friction rub. No gallop.   Pulmonary:      Effort: Pulmonary effort is normal. No respiratory distress.      Breath sounds: Normal breath sounds. No wheezing or rales.   Chest:      Chest wall: No tenderness.   Abdominal:      General: Bowel sounds are normal. There is no distension.      Palpations: Abdomen is soft.      Tenderness: There is no abdominal tenderness. There is no guarding.   Musculoskeletal:         General: No tenderness or deformity. Normal range of motion.        Legs:    Lymphadenopathy:      Cervical: No cervical adenopathy.   Skin:     General: Skin is warm and dry.      Findings:  No erythema or rash.   Neurological:      General: No focal deficit present.      Mental Status: She is alert and oriented to person, place, and time.   Psychiatric:         Mood and Affect: Mood normal.         Behavior: Behavior normal.         Results Reviewed       None            XR pelvis ap only 1 or 2 views   ED Interpretation by Lauren Peguero PA-C (01/27 1834)   No fracture or lytic lesion.          Procedures    ED Medication and Procedure Management   Prior to Admission Medications   Prescriptions Last Dose Informant Patient Reported? Taking?   Cholecalciferol (Vitamin D) 50 MCG (2000 UT) tablet  Family Member No No   Sig: Take 1 tablet (2,000 Units total) by mouth daily   Diclofenac Sodium (VOLTAREN) 1 %  Family Member No No   Sig: Apply 2 g topically 4 (four) times a day   acetaminophen (TYLENOL) 500 mg tablet  Child, Family Member Yes No   Sig: Take 500 mg by mouth every 6 (six) hours as needed for mild pain   amLODIPine (NORVASC) 5 mg tablet  Family Member No No   Sig: Take 1 tablet (5 mg total) by mouth daily   ammonium lactate (LAC-HYDRIN) 12 % lotion  Family Member No No   Sig: Apply 1 Application topically 2 (two) times a day   aspirin (Aspirin Low Dose) 81 mg EC tablet  Family Member No No   Sig: Take 1 tablet (81 mg total) by mouth daily   atorvastatin (LIPITOR) 40 mg tablet  Family Member No No   Sig: Take 1 tablet (40 mg total) by mouth every evening   chlorhexidine (PERIDEX) 0.12 % solution  Family Member Yes No   Sig: RINSE WITH 15 MLS 2 TIMES A DAY FOR 30 SECONDS AND SPIT. AVOID RI...  (REFER TO PRESCRIPTION NOTES).   memantine (NAMENDA) 5 mg tablet  Family Member No No   Sig: Take 1 tablet (5 mg total) by mouth 2 (two) times a day   mupirocin (BACTROBAN) 2 % ointment   No No   Sig: Apply topically 2 (two) times a day   omega-3-acid ethyl esters (LOVAZA) 1 g capsule   No No   Sig: take 2 capsules by mouth twice a day      Facility-Administered Medications: None     Patient's Medications    Discharge Prescriptions    IBUPROFEN (MOTRIN) 400 MG TABLET    Take 1 tablet (400 mg total) by mouth every 8 (eight) hours as needed for moderate pain for up to 5 days       Start Date: 1/27/2025 End Date: 2/1/2025       Order Dose: 400 mg       Quantity: 20 tablet    Refills: 0    METHOCARBAMOL (ROBAXIN) 500 MG TABLET    Take 1 tablet (500 mg total) by mouth 2 (two) times a day       Start Date: 1/27/2025 End Date: --       Order Dose: 500 mg       Quantity: 20 tablet    Refills: 0       ED SEPSIS DOCUMENTATION   Time reflects when diagnosis was documented in both MDM as applicable and the Disposition within this note       Time User Action Codes Description Comment    1/27/2025  6:35 PM Lauren Peguero Add [M70.70] Ischiogluteal bursitis                  Lauren Peguero PA-C  01/27/25 1902

## 2025-01-27 NOTE — Clinical Note
Lexis Echeverria accompanied Hawa Cheek to the emergency department on 1/27/2025.    Return date if applicable: 01/28/2025        If you have any questions or concerns, please don't hesitate to call.      Lauren Peguero PA-C

## 2025-01-28 ENCOUNTER — OFFICE VISIT (OUTPATIENT)
Dept: FAMILY MEDICINE CLINIC | Facility: CLINIC | Age: 75
End: 2025-01-28

## 2025-01-28 VITALS
BODY MASS INDEX: 22.09 KG/M2 | DIASTOLIC BLOOD PRESSURE: 88 MMHG | HEIGHT: 61 IN | WEIGHT: 117 LBS | HEART RATE: 88 BPM | SYSTOLIC BLOOD PRESSURE: 128 MMHG | OXYGEN SATURATION: 98 % | RESPIRATION RATE: 20 BRPM | TEMPERATURE: 98.9 F

## 2025-01-28 DIAGNOSIS — M25.551 RIGHT HIP PAIN: Primary | ICD-10-CM

## 2025-01-28 PROCEDURE — 3078F DIAST BP <80 MM HG: CPT | Performed by: FAMILY MEDICINE

## 2025-01-28 PROCEDURE — 96372 THER/PROPH/DIAG INJ SC/IM: CPT | Performed by: FAMILY MEDICINE

## 2025-01-28 PROCEDURE — 99213 OFFICE O/P EST LOW 20 MIN: CPT | Performed by: FAMILY MEDICINE

## 2025-01-28 PROCEDURE — 3074F SYST BP LT 130 MM HG: CPT | Performed by: FAMILY MEDICINE

## 2025-01-28 RX ORDER — KETOROLAC TROMETHAMINE 30 MG/ML
30 INJECTION, SOLUTION INTRAMUSCULAR; INTRAVENOUS ONCE
Status: COMPLETED | OUTPATIENT
Start: 2025-01-28 | End: 2025-01-28

## 2025-01-28 RX ADMIN — KETOROLAC TROMETHAMINE 30 MG: 30 INJECTION, SOLUTION INTRAMUSCULAR; INTRAVENOUS at 16:30

## 2025-01-28 NOTE — PROGRESS NOTES
"Name: Hawa Cheek      : 1950      MRN: [id number]  Encounter Provider: Keeley Mccormick MD  Encounter Date: 2025   Encounter department: [facility]  :  Assessment & Plan  Right hip pain  Severe right hip pain  ER visit on 2024 diagnosed with bursitis and referred to Ortho  Daughter called Ortho and not able to make appt  I called Ortho during office visit today and appt made for tomorrow  ER precautions given  Orders:  •  ketorolac (TORADOL) injection 30 mg           History of Present Illness   HPI    Hawa Cheek is a 74 y.o. female  who presented to the office today accompanied by her daughter after an ER visit on with right buttocks pain.  She was diagnosed with bursitis and referred to Orthopedics.  The pain is severe, in the inner side of the right buttocks.  The pain is worse with movement and prolonged sitting or walking.  NO h/o fall or injury  No associated fever, chills, nausea or vomiting.  Daughter is concerned she has had missed days at work.        Review of Systems   Constitutional:  Negative for chills and fever.   HENT:  Negative for congestion, rhinorrhea and sore throat.    Respiratory:  Negative for cough and shortness of breath.    Cardiovascular:  Negative for chest pain.   Gastrointestinal:  Negative for diarrhea, nausea and vomiting.   Musculoskeletal:  Positive for back pain and gait problem.   Skin:  Negative for rash.   Neurological:  Negative for dizziness and headaches.       Objective   /88 (BP Location: Right arm, Patient Position: Sitting, Cuff Size: Standard)   Pulse 88   Temp 98.9 °F (37.2 °C) (Temporal)   Resp 20   Ht 5' 1\" (1.549 m)   Wt 53.1 kg (117 lb)   SpO2 98%   BMI 22.11 kg/m²      Physical Exam  Vitals and nursing note reviewed.   Constitutional:       General: She is not in acute distress.     Appearance: She is well-developed.      Comments: Pt lying on exam table in pain   HENT:      " Head: Normocephalic and atraumatic.   Eyes:      Conjunctiva/sclera: Conjunctivae normal.   Cardiovascular:      Rate and Rhythm: Normal rate.      Heart sounds: No murmur heard.  Pulmonary:      Effort: Pulmonary effort is normal. No respiratory distress.   Abdominal:      Palpations: Abdomen is soft.      Tenderness: There is no abdominal tenderness.   Musculoskeletal:         General: No swelling.      Cervical back: Neck supple.      Right hip: Tenderness present. Decreased range of motion.      Comments: Right hip: + pain laterally and post right buttocks/thigh area, no warmth or erythema   Skin:     General: Skin is warm and dry.      Capillary Refill: Capillary refill takes less than 2 seconds.   Neurological:      Mental Status: She is alert and oriented to person, place, and time.   Psychiatric:         Mood and Affect: Mood normal.         Behavior: Behavior normal.

## 2025-01-29 ENCOUNTER — OFFICE VISIT (OUTPATIENT)
Age: 75
End: 2025-01-29
Payer: COMMERCIAL

## 2025-01-29 ENCOUNTER — TELEPHONE (OUTPATIENT)
Dept: FAMILY MEDICINE CLINIC | Facility: CLINIC | Age: 75
End: 2025-01-29

## 2025-01-29 VITALS — HEIGHT: 61 IN | WEIGHT: 117 LBS | BODY MASS INDEX: 22.09 KG/M2

## 2025-01-29 DIAGNOSIS — M70.71: Primary | ICD-10-CM

## 2025-01-29 PROCEDURE — 20610 DRAIN/INJ JOINT/BURSA W/O US: CPT | Performed by: PHYSICIAN ASSISTANT

## 2025-01-29 PROCEDURE — 99214 OFFICE O/P EST MOD 30 MIN: CPT | Performed by: PHYSICIAN ASSISTANT

## 2025-01-29 RX ORDER — LIDOCAINE HYDROCHLORIDE 10 MG/ML
2 INJECTION, SOLUTION INFILTRATION; PERINEURAL
Status: COMPLETED | OUTPATIENT
Start: 2025-01-29 | End: 2025-01-29

## 2025-01-29 RX ORDER — PREDNISONE 10 MG/1
TABLET ORAL
Qty: 42 TABLET | Refills: 0 | Status: ON HOLD | OUTPATIENT
Start: 2025-01-29

## 2025-01-29 RX ORDER — BETAMETHASONE SODIUM PHOSPHATE AND BETAMETHASONE ACETATE 3; 3 MG/ML; MG/ML
6 INJECTION, SUSPENSION INTRA-ARTICULAR; INTRALESIONAL; INTRAMUSCULAR; SOFT TISSUE
Status: COMPLETED | OUTPATIENT
Start: 2025-01-29 | End: 2025-01-29

## 2025-01-29 RX ADMIN — LIDOCAINE HYDROCHLORIDE 2 ML: 10 INJECTION, SOLUTION INFILTRATION; PERINEURAL at 10:30

## 2025-01-29 RX ADMIN — BETAMETHASONE SODIUM PHOSPHATE AND BETAMETHASONE ACETATE 6 MG: 3; 3 INJECTION, SUSPENSION INTRA-ARTICULAR; INTRALESIONAL; INTRAMUSCULAR; SOFT TISSUE at 10:30

## 2025-01-29 NOTE — PROGRESS NOTES
"Patient Name:  Hawa Cheek  MRN:  [id number]    Assessment & Plan     Right ischiogluteal bursitis  Corticosteroid injection performed today into the right ischiogluteal bursa.  She did note immediate improvement in her pain after the injection was performed.  Prescription also provided for prednisone taper.  Referral to physical therapy.  Activities as tolerated modification avoid pain.  Work note provided for patient's daughter who has been taking care of her mother due to the pain.  Follow-up in 6 to 8 weeks for repeat evaluation.    Chief Complaint     Right hip pain    History of the Present Illness     Hawa Cheek is a 74 y.o. female who reports to the office today with her daughter for evaluation of her right hip.  She notes an onset of pain 3 days ago.  She denies any injury or trauma.  She notes pain in the posterior buttock region specifically in the region of the ischial tuberosity.  Pain is severe and rated 10 out of 10 in intensity.  Pain is worse with direct pressure and sitting.  She also has pain with ambulating and bearing weight.  She denies any radiating pain distally.  She denies any numbness and tingling.  She does report weakness due to the pain.  She did report to the emergency department her x-rays were performed and she was prescribed methocarbamol and ibuprofen which has been providing minimal improvement.  She did see her PCP yesterday who performed an IM injection of Toradol which provided transient improvement.  No bowel or bladder dysfunction.  No saddle paresthesias.  No fevers or chills.    Physical Exam     Ht 5' 1\" (1.549 m)   Wt 53.1 kg (117 lb)   BMI 22.11 kg/m²     Right hip: No gross deformity.  Skin intact without erythema ecchymosis or swelling.  There is severe tenderness over the ischial tuberosity.  No tenderness lateral hip and anterior hip.  Hip range of motion is intact and limited with pain in the region of the ischial tuberosity.  Sensation intact " distally.  Skin warm and well-perfused.    Eyes: Anicteric sclerae.  ENT: Trachea midline.  Lungs: Normal respiratory effort.  CV: Capillary refill is less than 2 seconds.  Skin: Intact without erythema.  Lymph: No palpable lymphadenopathy.  Neuro: Sensation is grossly intact to light touch.  Psych: Mood and affect are appropriate.    Data Review     I have personally reviewed pertinent films in PACS, and my interpretation follows:    AP pelvis radiograph 1/27/2025: No acute osseous abnormality.  No fracture or dislocation.  No significant degenerative changes about the hip joints.  Visualized portion of the lower lumbar spine does exhibit multilevel degenerative changes.    Past Medical History:   Diagnosis Date    Arthralgia of hip     left    Arthritis     Chronic pain disorder     low back and shoulder    Dyslipidemia     Hyperlipidemia     Hypertension     Murmur, cardiac 09/2024    Neck pain     Pemphigus     Pre-diabetes     Shoulder pain     Stroke (HCC)     Vitamin D deficiency        Past Surgical History:   Procedure Laterality Date    CATARACT EXTRACTION Bilateral     COLONOSCOPY      UT COLONOSCOPY FLX DX W/COLLJ SPEC WHEN PFRMD N/A 11/10/2016    Procedure: COLONOSCOPY;  Surgeon: Tom Carter MD;  Location: [facility];  Service: Gastroenterology    UT INCISION EXTENSOR TENDON SHEATH WRIST Left 7/12/2021    Procedure: RELEASE LEFT DEQUERVAINS;  Surgeon: Jt White MD;  Location:  MAIN OR;  Service: Orthopedics    UT INCISION EXTENSOR TENDON SHEATH WRIST Right 7/26/2021    Procedure: RELEASE RIGHT DEQUERVAINS;  Surgeon: Jt White MD;  Location:  MAIN OR;  Service: Orthopedics    UT XCAPSL CTRC RMVL INSJ IO LENS PROSTH W/O ECP Left 9/27/2018    Procedure: EXTRACAPSULAR CATARACT REMOVAL/INSERTION OF INTRAOCULAR LENS;  Surgeon: Alfred Stephens MD;  Location:  MAIN OR;  Service: Ophthalmology       No Known Allergies    Current Outpatient Medications on File Prior to Visit   Medication Sig  Dispense Refill    acetaminophen (TYLENOL) 500 mg tablet Take 500 mg by mouth every 6 (six) hours as needed for mild pain      amLODIPine (NORVASC) 5 mg tablet Take 1 tablet (5 mg total) by mouth daily 90 tablet 3    ammonium lactate (LAC-HYDRIN) 12 % lotion Apply 1 Application topically 2 (two) times a day 400 g 0    aspirin (Aspirin Low Dose) 81 mg EC tablet Take 1 tablet (81 mg total) by mouth daily 90 tablet 0    atorvastatin (LIPITOR) 40 mg tablet Take 1 tablet (40 mg total) by mouth every evening 90 tablet 3    chlorhexidine (PERIDEX) 0.12 % solution RINSE WITH 15 MLS 2 TIMES A DAY FOR 30 SECONDS AND SPIT. AVOID RI...  (REFER TO PRESCRIPTION NOTES).      Cholecalciferol (Vitamin D) 50 MCG (2000 UT) tablet Take 1 tablet (2,000 Units total) by mouth daily 30 tablet 3    Diclofenac Sodium (VOLTAREN) 1 % Apply 2 g topically 4 (four) times a day 50 g 2    ibuprofen (MOTRIN) 400 mg tablet Take 1 tablet (400 mg total) by mouth every 8 (eight) hours as needed for moderate pain for up to 5 days 20 tablet 0    memantine (NAMENDA) 5 mg tablet Take 1 tablet (5 mg total) by mouth 2 (two) times a day 180 tablet 3    methocarbamol (ROBAXIN) 500 mg tablet Take 1 tablet (500 mg total) by mouth 2 (two) times a day 20 tablet 0    mupirocin (BACTROBAN) 2 % ointment Apply topically 2 (two) times a day 30 g 0    omega-3-acid ethyl esters (LOVAZA) 1 g capsule take 2 capsules by mouth twice a day 180 capsule 0     Current Facility-Administered Medications on File Prior to Visit   Medication Dose Route Frequency Provider Last Rate Last Admin    [COMPLETED] ketorolac (TORADOL) injection 30 mg  30 mg Intramuscular Once    30 mg at 01/28/25 1630       Social History     Tobacco Use    Smoking status: Never     Passive exposure: Never    Smokeless tobacco: Never   Vaping Use    Vaping status: Never Used   Substance Use Topics    Alcohol use: Never    Drug use: No       Family History   Problem Relation Age of Onset    Hyperlipidemia  Mother     Hypertension Mother     Heart disease Father     No Known Problems Sister     No Known Problems Sister     No Known Problems Sister     No Known Problems Daughter     No Known Problems Maternal Grandmother     No Known Problems Maternal Grandfather     No Known Problems Paternal Grandmother     No Known Problems Paternal Grandfather     No Known Problems Maternal Aunt     No Known Problems Paternal Aunt     No Known Problems Paternal Aunt     No Known Problems Paternal Aunt        Review of Systems     As stated in the HPI. All other systems reviewed and are negative.      Large joint arthrocentesis: R ischiogluteal bursa  Procedure Details  Location: hip - R ischiogluteal bursa  Needle size: 22 G  Ultrasound guidance: no  Approach: posterior  Medications administered: 2 mL lidocaine 1 %; 6 mg betamethasone acetate-betamethasone sodium phosphate 6 (3-3) mg/mL    Patient tolerance: patient tolerated the procedure well with no immediate complications  Dressing:  Sterile dressing applied

## 2025-01-29 NOTE — LETTER
January 29, 2025     Patient: Hawa Cheek  YOB: 1950  Date of Visit: 1/29/2025      To Whom it May Concern:    Hawa Cheek is under my professional care. Hawa was seen in my office on 1/29/2025. Patient's daughter, Lexis Echeverria, has been out of work on 1/27/25-1/29/25 due to taking care of her mother for a hip injury.    If you have any questions or concerns, please don't hesitate to call.         Sincerely,          Rodo Interiano PA-C

## 2025-01-29 NOTE — TELEPHONE ENCOUNTER
FMLA Source form received on 01/29/25  to be completed by PCP. Copy made and placed in PCP folder.    Forms to be delivered to PCP mailbox at assigned time.    This is for daughter Lexis to care for mother she is requesting 3 days a week for intermittent leave. Any questions please call [phone] or pt daughter will discuss at upcoming 2/6/25 appt.

## 2025-02-02 ENCOUNTER — APPOINTMENT (EMERGENCY)
Dept: CT IMAGING | Facility: HOSPITAL | Age: 75
DRG: 557 | End: 2025-02-02
Payer: COMMERCIAL

## 2025-02-02 ENCOUNTER — HOSPITAL ENCOUNTER (INPATIENT)
Facility: HOSPITAL | Age: 75
LOS: 12 days | Discharge: HOME HEALTH | DRG: 557 | End: 2025-02-15
Payer: COMMERCIAL

## 2025-02-02 DIAGNOSIS — M79.18 GLUTEAL PAIN: ICD-10-CM

## 2025-02-02 DIAGNOSIS — R78.81 GRAM-POSITIVE BACTEREMIA: ICD-10-CM

## 2025-02-02 DIAGNOSIS — L02.31 ABSCESS OF GLUTEAL REGION: ICD-10-CM

## 2025-02-02 DIAGNOSIS — M60.009 ABSCESS OF MUSCLE: Primary | ICD-10-CM

## 2025-02-02 LAB
ANION GAP SERPL CALCULATED.3IONS-SCNC: 6 MMOL/L (ref 4–13)
BACTERIA UR QL AUTO: NORMAL /HPF
BASOPHILS # BLD AUTO: 0.03 THOUSANDS/ΜL (ref 0–0.1)
BASOPHILS NFR BLD AUTO: 0 % (ref 0–1)
BILIRUB UR QL STRIP: NEGATIVE
BUN SERPL-MCNC: 26 MG/DL (ref 5–25)
CALCIUM SERPL-MCNC: 9.6 MG/DL (ref 8.4–10.2)
CHLORIDE SERPL-SCNC: 97 MMOL/L (ref 96–108)
CLARITY UR: CLEAR
CO2 SERPL-SCNC: 30 MMOL/L (ref 21–32)
COLOR UR: YELLOW
CREAT SERPL-MCNC: 0.7 MG/DL (ref 0.6–1.3)
CRP SERPL QL: 141.6 MG/L
EOSINOPHIL # BLD AUTO: 0.01 THOUSAND/ΜL (ref 0–0.61)
EOSINOPHIL NFR BLD AUTO: 0 % (ref 0–6)
ERYTHROCYTE [DISTWIDTH] IN BLOOD BY AUTOMATED COUNT: 12.3 % (ref 11.6–15.1)
ERYTHROCYTE [SEDIMENTATION RATE] IN BLOOD: 76 MM/HOUR (ref 0–29)
FLUAV AG UPPER RESP QL IA.RAPID: NEGATIVE
FLUBV AG UPPER RESP QL IA.RAPID: NEGATIVE
GFR SERPL CREATININE-BSD FRML MDRD: 85 ML/MIN/1.73SQ M
GLUCOSE SERPL-MCNC: 240 MG/DL (ref 65–140)
GLUCOSE UR STRIP-MCNC: ABNORMAL MG/DL
HCT VFR BLD AUTO: 36.9 % (ref 34.8–46.1)
HGB BLD-MCNC: 12.4 G/DL (ref 11.5–15.4)
HGB UR QL STRIP.AUTO: 10
IMM GRANULOCYTES # BLD AUTO: 0.24 THOUSAND/UL (ref 0–0.2)
IMM GRANULOCYTES NFR BLD AUTO: 1 % (ref 0–2)
KETONES UR STRIP-MCNC: NEGATIVE MG/DL
LEUKOCYTE ESTERASE UR QL STRIP: 25
LYMPHOCYTES # BLD AUTO: 1.65 THOUSANDS/ΜL (ref 0.6–4.47)
LYMPHOCYTES NFR BLD AUTO: 8 % (ref 14–44)
MCH RBC QN AUTO: 31.3 PG (ref 26.8–34.3)
MCHC RBC AUTO-ENTMCNC: 33.6 G/DL (ref 31.4–37.4)
MCV RBC AUTO: 93 FL (ref 82–98)
MONOCYTES # BLD AUTO: 1.26 THOUSAND/ΜL (ref 0.17–1.22)
MONOCYTES NFR BLD AUTO: 6 % (ref 4–12)
NEUTROPHILS # BLD AUTO: 16.73 THOUSANDS/ΜL (ref 1.85–7.62)
NEUTS SEG NFR BLD AUTO: 85 % (ref 43–75)
NITRITE UR QL STRIP: NEGATIVE
NON-SQ EPI CELLS URNS QL MICRO: NORMAL /HPF
NRBC BLD AUTO-RTO: 0 /100 WBCS
PH UR STRIP.AUTO: 6 [PH]
PLATELET # BLD AUTO: 225 THOUSANDS/UL (ref 149–390)
PMV BLD AUTO: 9.5 FL (ref 8.9–12.7)
POTASSIUM SERPL-SCNC: 3.8 MMOL/L (ref 3.5–5.3)
PROT UR STRIP-MCNC: ABNORMAL MG/DL
RBC # BLD AUTO: 3.96 MILLION/UL (ref 3.81–5.12)
RBC #/AREA URNS AUTO: NORMAL /HPF
SARS-COV+SARS-COV-2 AG RESP QL IA.RAPID: NEGATIVE
SODIUM SERPL-SCNC: 133 MMOL/L (ref 135–147)
SP GR UR STRIP.AUTO: 1 (ref 1–1.04)
UROBILINOGEN UA: NEGATIVE MG/DL
WBC # BLD AUTO: 19.92 THOUSAND/UL (ref 4.31–10.16)
WBC #/AREA URNS AUTO: NORMAL /HPF

## 2025-02-02 PROCEDURE — 85025 COMPLETE CBC W/AUTO DIFF WBC: CPT

## 2025-02-02 PROCEDURE — 85652 RBC SED RATE AUTOMATED: CPT

## 2025-02-02 PROCEDURE — 99285 EMERGENCY DEPT VISIT HI MDM: CPT

## 2025-02-02 PROCEDURE — 36415 COLL VENOUS BLD VENIPUNCTURE: CPT

## 2025-02-02 PROCEDURE — 96372 THER/PROPH/DIAG INJ SC/IM: CPT

## 2025-02-02 PROCEDURE — 74177 CT ABD & PELVIS W/CONTRAST: CPT

## 2025-02-02 PROCEDURE — 87804 INFLUENZA ASSAY W/OPTIC: CPT

## 2025-02-02 PROCEDURE — 81001 URINALYSIS AUTO W/SCOPE: CPT

## 2025-02-02 PROCEDURE — 86140 C-REACTIVE PROTEIN: CPT

## 2025-02-02 PROCEDURE — 87811 SARS-COV-2 COVID19 W/OPTIC: CPT

## 2025-02-02 PROCEDURE — 81003 URINALYSIS AUTO W/O SCOPE: CPT

## 2025-02-02 PROCEDURE — 80048 BASIC METABOLIC PNL TOTAL CA: CPT

## 2025-02-02 RX ORDER — LIDOCAINE 50 MG/G
1 PATCH TOPICAL ONCE
Status: COMPLETED | OUTPATIENT
Start: 2025-02-02 | End: 2025-02-03

## 2025-02-02 RX ORDER — ACETAMINOPHEN 325 MG/1
975 TABLET ORAL ONCE
Status: COMPLETED | OUTPATIENT
Start: 2025-02-02 | End: 2025-02-02

## 2025-02-02 RX ORDER — KETOROLAC TROMETHAMINE 30 MG/ML
15 INJECTION, SOLUTION INTRAMUSCULAR; INTRAVENOUS ONCE
Status: DISCONTINUED | OUTPATIENT
Start: 2025-02-02 | End: 2025-02-02

## 2025-02-02 RX ORDER — VANCOMYCIN HYDROCHLORIDE 750 MG/150ML
15 INJECTION, SOLUTION INTRAVENOUS ONCE
Status: DISCONTINUED | OUTPATIENT
Start: 2025-02-03 | End: 2025-02-03

## 2025-02-02 RX ORDER — CEFAZOLIN SODIUM 2 G/50ML
2000 SOLUTION INTRAVENOUS ONCE
Status: COMPLETED | OUTPATIENT
Start: 2025-02-03 | End: 2025-02-03

## 2025-02-02 RX ORDER — KETOROLAC TROMETHAMINE 30 MG/ML
15 INJECTION, SOLUTION INTRAMUSCULAR; INTRAVENOUS ONCE
Status: COMPLETED | OUTPATIENT
Start: 2025-02-02 | End: 2025-02-02

## 2025-02-02 RX ADMIN — LIDOCAINE 5% 1 PATCH: 700 PATCH TOPICAL at 20:07

## 2025-02-02 RX ADMIN — KETOROLAC TROMETHAMINE 15 MG: 30 INJECTION, SOLUTION INTRAMUSCULAR; INTRAVENOUS at 20:08

## 2025-02-02 RX ADMIN — ACETAMINOPHEN 975 MG: 325 TABLET, FILM COATED ORAL at 20:03

## 2025-02-02 RX ADMIN — IOHEXOL 85 ML: 350 INJECTION, SOLUTION INTRAVENOUS at 21:40

## 2025-02-02 NOTE — LETTER
[address] MEDICAL SURGICAL UNIT  [address] [facility] [id number] [phone]  Dept: [phone]    February 4, 2025     Patient: Hawa Cheek   YOB: 1950   Date of Visit: 2/2/2025       To Whom it May Concern:    Hawa Cheek is under my professional care. She was admitted to the hospital on 2/2/2025 and remains admitted. Her daughter, Lexis Echeverria, is currently in the hospital with her to assist with her care.     If you have any questions or concerns, please don't hesitate to call.         Sincerely,          Houston Parker MD

## 2025-02-03 PROBLEM — M60.009 ABSCESS OF MUSCLE: Status: ACTIVE | Noted: 2025-02-03

## 2025-02-03 PROBLEM — N30.90 CYSTITIS: Status: ACTIVE | Noted: 2025-02-03

## 2025-02-03 PROBLEM — Z01.818 PRE-OP EVALUATION: Status: RESOLVED | Noted: 2018-02-08 | Resolved: 2025-02-03

## 2025-02-03 PROBLEM — F03.90 DEMENTIA (HCC): Status: ACTIVE | Noted: 2025-02-03

## 2025-02-03 LAB
ANION GAP SERPL CALCULATED.3IONS-SCNC: 10 MMOL/L (ref 4–13)
BASOPHILS # BLD AUTO: 0.04 THOUSANDS/ΜL (ref 0–0.1)
BASOPHILS NFR BLD AUTO: 0 % (ref 0–1)
BUN SERPL-MCNC: 16 MG/DL (ref 5–25)
CALCIUM SERPL-MCNC: 9.6 MG/DL (ref 8.4–10.2)
CHLORIDE SERPL-SCNC: 100 MMOL/L (ref 96–108)
CO2 SERPL-SCNC: 29 MMOL/L (ref 21–32)
CREAT SERPL-MCNC: 0.64 MG/DL (ref 0.6–1.3)
EOSINOPHIL # BLD AUTO: 0.01 THOUSAND/ΜL (ref 0–0.61)
EOSINOPHIL NFR BLD AUTO: 0 % (ref 0–6)
ERYTHROCYTE [DISTWIDTH] IN BLOOD BY AUTOMATED COUNT: 12.4 % (ref 11.6–15.1)
GFR SERPL CREATININE-BSD FRML MDRD: 87 ML/MIN/1.73SQ M
GLUCOSE SERPL-MCNC: 165 MG/DL (ref 65–140)
HCT VFR BLD AUTO: 39.8 % (ref 34.8–46.1)
HGB BLD-MCNC: 12.9 G/DL (ref 11.5–15.4)
IMM GRANULOCYTES # BLD AUTO: 0.24 THOUSAND/UL (ref 0–0.2)
IMM GRANULOCYTES NFR BLD AUTO: 1 % (ref 0–2)
LYMPHOCYTES # BLD AUTO: 1.4 THOUSANDS/ΜL (ref 0.6–4.47)
LYMPHOCYTES NFR BLD AUTO: 7 % (ref 14–44)
MCH RBC QN AUTO: 30.6 PG (ref 26.8–34.3)
MCHC RBC AUTO-ENTMCNC: 32.4 G/DL (ref 31.4–37.4)
MCV RBC AUTO: 95 FL (ref 82–98)
MONOCYTES # BLD AUTO: 0.98 THOUSAND/ΜL (ref 0.17–1.22)
MONOCYTES NFR BLD AUTO: 5 % (ref 4–12)
NEUTROPHILS # BLD AUTO: 16.28 THOUSANDS/ΜL (ref 1.85–7.62)
NEUTS SEG NFR BLD AUTO: 87 % (ref 43–75)
NRBC BLD AUTO-RTO: 0 /100 WBCS
PLATELET # BLD AUTO: 230 THOUSANDS/UL (ref 149–390)
PMV BLD AUTO: 10.5 FL (ref 8.9–12.7)
POTASSIUM SERPL-SCNC: 3.8 MMOL/L (ref 3.5–5.3)
RBC # BLD AUTO: 4.21 MILLION/UL (ref 3.81–5.12)
SODIUM SERPL-SCNC: 139 MMOL/L (ref 135–147)
WBC # BLD AUTO: 18.95 THOUSAND/UL (ref 4.31–10.16)

## 2025-02-03 PROCEDURE — 87040 BLOOD CULTURE FOR BACTERIA: CPT

## 2025-02-03 PROCEDURE — 36415 COLL VENOUS BLD VENIPUNCTURE: CPT

## 2025-02-03 PROCEDURE — 87077 CULTURE AEROBIC IDENTIFY: CPT

## 2025-02-03 PROCEDURE — 87086 URINE CULTURE/COLONY COUNT: CPT

## 2025-02-03 PROCEDURE — 80048 BASIC METABOLIC PNL TOTAL CA: CPT

## 2025-02-03 PROCEDURE — 99222 1ST HOSP IP/OBS MODERATE 55: CPT | Performed by: ORTHOPAEDIC SURGERY

## 2025-02-03 PROCEDURE — 99285 EMERGENCY DEPT VISIT HI MDM: CPT

## 2025-02-03 PROCEDURE — 87154 CUL TYP ID BLD PTHGN 6+ TRGT: CPT

## 2025-02-03 PROCEDURE — 87181 SC STD AGAR DILUTION PER AGT: CPT

## 2025-02-03 PROCEDURE — 85025 COMPLETE CBC W/AUTO DIFF WBC: CPT

## 2025-02-03 PROCEDURE — 96365 THER/PROPH/DIAG IV INF INIT: CPT

## 2025-02-03 PROCEDURE — 99222 1ST HOSP IP/OBS MODERATE 55: CPT

## 2025-02-03 RX ORDER — ATORVASTATIN CALCIUM 40 MG/1
40 TABLET, FILM COATED ORAL EVERY EVENING
Status: DISCONTINUED | OUTPATIENT
Start: 2025-02-04 | End: 2025-02-03

## 2025-02-03 RX ORDER — IBUPROFEN 400 MG/1
400 TABLET, FILM COATED ORAL EVERY 6 HOURS PRN
Status: DISCONTINUED | OUTPATIENT
Start: 2025-02-03 | End: 2025-02-03

## 2025-02-03 RX ORDER — PREDNISONE 20 MG/1
20 TABLET ORAL DAILY
Status: DISCONTINUED | OUTPATIENT
Start: 2025-02-06 | End: 2025-02-03

## 2025-02-03 RX ORDER — PREDNISONE 20 MG/1
40 TABLET ORAL ONCE
Status: COMPLETED | OUTPATIENT
Start: 2025-02-03 | End: 2025-02-03

## 2025-02-03 RX ORDER — IBUPROFEN 400 MG/1
400 TABLET, FILM COATED ORAL EVERY 6 HOURS PRN
Status: CANCELLED | OUTPATIENT
Start: 2025-02-03

## 2025-02-03 RX ORDER — ATORVASTATIN CALCIUM 40 MG/1
40 TABLET, FILM COATED ORAL EVERY EVENING
Status: DISCONTINUED | OUTPATIENT
Start: 2025-02-03 | End: 2025-02-15 | Stop reason: HOSPADM

## 2025-02-03 RX ORDER — IBUPROFEN 400 MG/1
400 TABLET, FILM COATED ORAL EVERY 6 HOURS PRN
Status: DISCONTINUED | OUTPATIENT
Start: 2025-02-03 | End: 2025-02-15 | Stop reason: HOSPADM

## 2025-02-03 RX ORDER — AMLODIPINE BESYLATE 5 MG/1
5 TABLET ORAL DAILY
Status: DISCONTINUED | OUTPATIENT
Start: 2025-02-04 | End: 2025-02-15 | Stop reason: HOSPADM

## 2025-02-03 RX ORDER — IBUPROFEN 400 MG/1
400 TABLET, FILM COATED ORAL EVERY 8 HOURS PRN
Status: DISCONTINUED | OUTPATIENT
Start: 2025-02-03 | End: 2025-02-03

## 2025-02-03 RX ORDER — ENOXAPARIN SODIUM 100 MG/ML
40 INJECTION SUBCUTANEOUS DAILY
Status: DISCONTINUED | OUTPATIENT
Start: 2025-02-03 | End: 2025-02-15 | Stop reason: HOSPADM

## 2025-02-03 RX ORDER — VANCOMYCIN HYDROCHLORIDE 750 MG/150ML
15 INJECTION, SOLUTION INTRAVENOUS EVERY 12 HOURS
Status: DISCONTINUED | OUTPATIENT
Start: 2025-02-03 | End: 2025-02-03

## 2025-02-03 RX ORDER — VANCOMYCIN HYDROCHLORIDE 1 G/20ML
INJECTION, POWDER, LYOPHILIZED, FOR SOLUTION INTRAVENOUS
Status: COMPLETED
Start: 2025-02-03 | End: 2025-02-03

## 2025-02-03 RX ORDER — CHLORAL HYDRATE 500 MG
1000 CAPSULE ORAL DAILY
Status: DISCONTINUED | OUTPATIENT
Start: 2025-02-04 | End: 2025-02-15 | Stop reason: HOSPADM

## 2025-02-03 RX ORDER — ACETAMINOPHEN 325 MG/1
975 TABLET ORAL EVERY 8 HOURS SCHEDULED
Status: DISCONTINUED | OUTPATIENT
Start: 2025-02-03 | End: 2025-02-06

## 2025-02-03 RX ORDER — METHOCARBAMOL 500 MG/1
500 TABLET, FILM COATED ORAL 2 TIMES DAILY
Status: DISCONTINUED | OUTPATIENT
Start: 2025-02-03 | End: 2025-02-15 | Stop reason: HOSPADM

## 2025-02-03 RX ORDER — SODIUM CHLORIDE 9 MG/ML
110 INJECTION, SOLUTION INTRAVENOUS CONTINUOUS
Status: DISPENSED | OUTPATIENT
Start: 2025-02-03 | End: 2025-02-04

## 2025-02-03 RX ORDER — CEFAZOLIN SODIUM 1 G/50ML
1000 SOLUTION INTRAVENOUS EVERY 8 HOURS
Status: DISCONTINUED | OUTPATIENT
Start: 2025-02-03 | End: 2025-02-04

## 2025-02-03 RX ORDER — PREDNISONE 10 MG/1
10 TABLET ORAL DAILY
Status: DISCONTINUED | OUTPATIENT
Start: 2025-02-08 | End: 2025-02-03

## 2025-02-03 RX ORDER — MEMANTINE HYDROCHLORIDE 5 MG/1
5 TABLET ORAL 2 TIMES DAILY
Status: DISCONTINUED | OUTPATIENT
Start: 2025-02-03 | End: 2025-02-15 | Stop reason: HOSPADM

## 2025-02-03 RX ORDER — ASPIRIN 81 MG/1
81 TABLET ORAL DAILY
Status: DISCONTINUED | OUTPATIENT
Start: 2025-02-04 | End: 2025-02-15 | Stop reason: HOSPADM

## 2025-02-03 RX ADMIN — SODIUM CHLORIDE 110 ML/HR: 0.9 INJECTION, SOLUTION INTRAVENOUS at 11:48

## 2025-02-03 RX ADMIN — CEFAZOLIN SODIUM 1000 MG: 1 SOLUTION INTRAVENOUS at 08:34

## 2025-02-03 RX ADMIN — METHOCARBAMOL TABLETS 500 MG: 500 TABLET, COATED ORAL at 18:37

## 2025-02-03 RX ADMIN — SODIUM CHLORIDE 110 ML/HR: 0.9 INJECTION, SOLUTION INTRAVENOUS at 15:59

## 2025-02-03 RX ADMIN — VANCOMYCIN HYDROCHLORIDE 1250 MG: 1 INJECTION, POWDER, LYOPHILIZED, FOR SOLUTION INTRAVENOUS at 16:45

## 2025-02-03 RX ADMIN — SODIUM CHLORIDE 500 ML: 0.9 INJECTION, SOLUTION INTRAVENOUS at 09:53

## 2025-02-03 RX ADMIN — VANCOMYCIN HYDROCHLORIDE 1000 MG: 1 INJECTION, POWDER, LYOPHILIZED, FOR SOLUTION INTRAVENOUS at 04:06

## 2025-02-03 RX ADMIN — ACETAMINOPHEN 975 MG: 325 TABLET, FILM COATED ORAL at 05:21

## 2025-02-03 RX ADMIN — CEFAZOLIN SODIUM 1000 MG: 1 SOLUTION INTRAVENOUS at 23:07

## 2025-02-03 RX ADMIN — ACETAMINOPHEN 975 MG: 325 TABLET, FILM COATED ORAL at 14:35

## 2025-02-03 RX ADMIN — PREDNISONE 40 MG: 20 TABLET ORAL at 08:24

## 2025-02-03 RX ADMIN — MEMANTINE HYDROCHLORIDE 5 MG: 5 TABLET, FILM COATED ORAL at 08:24

## 2025-02-03 RX ADMIN — CEFAZOLIN SODIUM 2000 MG: 2 SOLUTION INTRAVENOUS at 00:39

## 2025-02-03 RX ADMIN — ATORVASTATIN CALCIUM 40 MG: 40 TABLET, FILM COATED ORAL at 18:37

## 2025-02-03 RX ADMIN — CEFAZOLIN SODIUM 1000 MG: 1 SOLUTION INTRAVENOUS at 16:01

## 2025-02-03 RX ADMIN — Medication 2.5 MG: at 04:04

## 2025-02-03 RX ADMIN — MEMANTINE HYDROCHLORIDE 5 MG: 5 TABLET, FILM COATED ORAL at 18:37

## 2025-02-03 NOTE — ASSESSMENT & PLAN NOTE
Ct pelvis/abdominal -  Lobulated fluid collections within the right obturator musculature - compatible with intramuscular abscesses in the appropriate clinical setting.  Medial thigh musculature with surrounding fat stranding/edema which may be due to myositis   Remains afebrile, however CRP, SED and WBC trending up  S/p - ceFAZolin IVPB  2,000 mg for 2 days   Abscess could have been present during corticosteroid injection on 1/29/2025 vs the corticosteroid steroid shot causing iatrogenic abscess  Orthopedic  is on board  MRI 2/4/25 Intramuscular abscesses in the right pelvis/hip musculature as described with     adjacent muscular edema/enhancement consistent with myositis.Right trochanteric bursitis, which   may be infectious  Blood culture # 2 Staph aureus       Plan:  Waiting on blood culture #1  Escalation to Cefepime 2 gr BID and continue  Vancomycin 750 mg every 12 hours  Ibuprofen 600 mg as needed every 4 hours  Tylenol 975 mg every 8 hours scheduled  Roxicodone 2.5 one time  Added oxycodone 2.5 mg every 6 as needed moderate pain and breakthrough pain

## 2025-02-03 NOTE — ED NOTES
Patient brought to restroom in wheelchair and provided hat to catch urine. Patient's visitor went into restroom to help patient. Visitor removed hat from toilet and added water to urine sample. While this RN was assisting patient into wheel chair to go back to exam room the visitor began to raise voice and become verbally aggressive with this RN. Visitor then refilled urine hat from toilet and throw it in the restroom on the floor. Change nurse and provider notified about situation. Patient returned to exam room.     Sophia Escoto RN  02/02/25 2251

## 2025-02-03 NOTE — ED PROVIDER NOTES
Time reflects when diagnosis was documented in both MDM as applicable and the Disposition within this note       Time User Action Codes Description Comment    2/2/2025 10:26 PM Emanuel Raven Add [M79.18] Gluteal pain     2/3/2025  1:02 AM Samuel Canela Add [L02.31] Abscess of gluteal region     2/3/2025  2:58 AM Houston Parker Add [M60.009] Abscess of muscle           ED Disposition       ED Disposition   Admit    Condition   Stable    Date/Time   Mon Feb 3, 2025  1:01 AM    Comment   Case was discussed with Family Medicine and the patient's admission status was agreed to be Admission Status: inpatient status to the service of Dr. Herbert.               Assessment & Plan       Medical Decision Making  Patient is a 74-year-old female presenting for evaluation of right gluteal/thigh pain. Upon examination, patient is grimacing in pain and is holding the back of her right leg. Vital signs showing temperature of 100.7, but are otherwise normal. Normal heart and lung sounds noted. No abdominal tenderness upon palpation. Patient noting tenderness upon palpation of right gluteal muscle, and posterior/lateral thigh. Pain noted with elevation of right leg.     Given recent gluteal injection and presents today febrile, patient is at risk for: intragluteal abscess and septic joint. Low suspicion for fracture as patient had x-rays performed on 1/27 which were normal and there is no reported injury since.     Blood work showing leukocytosis of 19.92. No electrolyte abnormalities noted. COVID/influenza negative. Inflammatory markers added which show a CRP of 141.6 and sed rate of 76. CT of abdomen/pelvis ordered to rule-out intraabdominal pathologies. Sign-out given to Samuel Canela PA-C at 2230 who will assume care of patient at this time. CT and urinalysis pending.    Amount and/or Complexity of Data Reviewed  Labs: ordered. Decision-making details documented in ED Course.     Details: Reviewed   Radiology: ordered.      Details: Pending     Risk  OTC drugs.  Prescription drug management.  Decision regarding hospitalization.        ED Course as of 02/04/25 0727   Sun Feb 02, 2025   2100 WBC(!): 19.92   2122 FLU/COVID Rapid Antigen (30 min. TAT) - Preferred screening test in ED  Negative    2145 C-REACTIVE PROTEIN(!): 141.6   2145 Sed Rate(!): 76       Medications   acetaminophen (TYLENOL) tablet 975 mg (975 mg Oral Given 2/2/25 2003)   lidocaine (LIDODERM) 5 % patch 1 patch (1 patch Topical Medication Applied 2/2/25 2007)   ketorolac (TORADOL) injection 15 mg (15 mg Intramuscular Given 2/2/25 2008)   iohexol (OMNIPAQUE) 350 MG/ML injection (MULTI-DOSE) 85 mL (85 mL Intravenous Given 2/2/25 2140)   ceFAZolin (ANCEF) IVPB (premix in dextrose) 2,000 mg 50 mL (2,000 mg Intravenous New Bag 2/3/25 0039)   vancomycin (VANCOCIN) 1 g injection **ADS Override Pull** (has no administration in time range)       ED Risk Strat Scores                          SBIRT 20yo+      Flowsheet Row Most Recent Value   Initial Alcohol Screen: US AUDIT-C     1. How often do you have a drink containing alcohol? 0 Filed at: 02/02/2025 1904   2. How many drinks containing alcohol do you have on a typical day you are drinking?  0 Filed at: 02/02/2025 1904   3b. FEMALE Any Age, or MALE 65+: How often do you have 4 or more drinks on one occassion? 0 Filed at: 02/02/2025 1904   Audit-C Score 0 Filed at: 02/02/2025 1904   ASH: How many times in the past year have you...    Used an illegal drug or used a prescription medication for non-medical reasons? Never Filed at: 02/02/2025 1904                            History of Present Illness       Chief Complaint   Patient presents with    Leg Pain     Pt having R leg pain since Monday. No injury or fall. No meds pta.        Past Medical History:   Diagnosis Date    Arthralgia of hip     left    Arthritis     Chronic pain disorder     low back and shoulder    Dyslipidemia     Hyperlipidemia     Hypertension     Murmur,  cardiac 09/2024    Neck pain     Pemphigus     Pre-diabetes     Shoulder pain     Stroke (HCC)     Vitamin D deficiency       Past Surgical History:   Procedure Laterality Date    CATARACT EXTRACTION Bilateral     COLONOSCOPY      [place] COLONOSCOPY FLX DX W/COLLJ SPEC WHEN PFRMD N/A 11/10/2016    Procedure: COLONOSCOPY;  Surgeon: Tom Carter MD;  Location: [facility];  Service: Gastroenterology    WV INCISION EXTENSOR TENDON SHEATH WRIST Left 7/12/2021    Procedure: RELEASE LEFT DEQUERVAINS;  Surgeon: Jt White MD;  Location:  MAIN OR;  Service: Orthopedics    WV INCISION EXTENSOR TENDON SHEATH WRIST Right 7/26/2021    Procedure: RELEASE RIGHT DEQUERVAINS;  Surgeon: Jt White MD;  Location:  MAIN OR;  Service: Orthopedics    WV XCAPSL CTRC RMVL INSJ IO LENS PROSTH W/O ECP Left 9/27/2018    Procedure: EXTRACAPSULAR CATARACT REMOVAL/INSERTION OF INTRAOCULAR LENS;  Surgeon: Alfred Stephens MD;  Location:  MAIN OR;  Service: Ophthalmology      Family History   Problem Relation Age of Onset    Hyperlipidemia Mother     Hypertension Mother     Heart disease Father     No Known Problems Sister     No Known Problems Sister     No Known Problems Sister     No Known Problems Daughter     No Known Problems Maternal Grandmother     No Known Problems Maternal Grandfather     No Known Problems Paternal Grandmother     No Known Problems Paternal Grandfather     No Known Problems Maternal Aunt     No Known Problems Paternal Aunt     No Known Problems Paternal Aunt     No Known Problems Paternal Aunt       Social History     Tobacco Use    Smoking status: Never     Passive exposure: Never    Smokeless tobacco: Never   Vaping Use    Vaping status: Never Used   Substance Use Topics    Alcohol use: Never    Drug use: No      E-Cigarette/Vaping    E-Cigarette Use Never User       E-Cigarette/Vaping Substances    Nicotine No     THC No     CBD No     Flavoring No     Other No     Unknown No       I have reviewed and agree  "with the history as documented.     Patient is a 74-year-old female with a past medical history including hypertension, hyperlipidemia, CVA, and pre-diabetes. Presents today for evaluation of right hip pain. Patient has been experiencing right-sided hip/gluteal pain over the last 18 days. Was seen in the ED on 01/27, was diagnosed with ischiogluteal bursitis, and referred to orthopedics. She then met with orthopedics on 01/29 where she had a corticosteroid injection into the right ischiogluteal bursa for which she began to experience some relief. She was also placed on a steroid taper for 12 days. Since then, patient continues to experience pain unrelieved by ibuprofen and topical patches. Daughter notes that she gave her mother 6 tablets of prednisone the first day, 5 the second day, and then 2 of the 4 tablets the fourth day and reports that \"it is not helping.\" Medication administration does not follow the recommended directions provided by orthopedics. Daughter reports that her mother continues with pain day and night and is still experiencing difficulty walking. She denies any abdominal pain, urinary symptoms, or recent illnesses.           Review of Systems   Constitutional:  Negative for chills and fever.   Respiratory:  Negative for shortness of breath.    Cardiovascular:  Negative for chest pain.   Gastrointestinal:  Negative for abdominal pain, diarrhea, nausea and vomiting.   Genitourinary:  Negative for difficulty urinating, frequency and urgency.   Musculoskeletal:  Positive for myalgias (right gluteal/posterior right leg).   All other systems reviewed and are negative.          Objective       ED Triage Vitals   Temperature Pulse Blood Pressure Respirations SpO2 Patient Position - Orthostatic VS   02/02/25 1916 02/02/25 1916 02/02/25 1916 02/02/25 1916 02/02/25 1916 02/02/25 1916   (!) 100.7 °F (38.2 °C) 68 132/68 20 95 % Sitting      Temp Source Heart Rate Source BP Location FiO2 (%) Pain Score  "   02/02/25 1916 02/02/25 1916 02/02/25 1916 -- 02/02/25 2003    Tympanic Monitor Left arm  6      Vitals      Date and Time Temp Pulse SpO2 Resp BP Pain Score FACES Pain Rating User   02/04/25 0510 -- -- -- -- -- No Pain -- [place]   02/03/25 2239 -- -- -- -- -- No Pain -- [place]   02/03/25 2204 97.6 °F (36.4 °C) 76 96 % 18 100/61 -- -- NR   02/03/25 2204 -- -- -- -- -- No Pain -- [place]   02/03/25 1525 97.7 °F (36.5 °C) 78 96 % 18 90/62 -- -- RB   02/03/25 1435 -- -- -- -- -- 4 -- NM   02/03/25 0900 -- -- -- -- -- No Pain -- NM   02/03/25 0705 98.3 °F (36.8 °C) 64 94 % 16 94/56 -- -- DM   02/03/25 0521 -- -- -- -- -- No Pain -- MM   02/03/25 0404 -- -- -- -- -- 10 - Worst Possible Pain -- MM   02/03/25 0238 -- -- -- -- -- 2 -- MM   02/03/25 0228 98 °F (36.7 °C) 79 98 % 18 125/71 -- -- LM   02/02/25 2053 -- -- -- -- -- 6 -- KB   02/02/25 2008 -- -- -- -- -- 6 -- KB   02/02/25 2003 -- -- -- -- -- 6 -- KB   02/02/25 1916 100.7 °F (38.2 °C) 68 95 % 20 132/68 -- -- RG            Physical Exam  Vitals and nursing note reviewed.   Constitutional:       Appearance: Normal appearance.   HENT:      Head: Normocephalic and atraumatic.   Cardiovascular:      Rate and Rhythm: Normal rate.      Heart sounds: Normal heart sounds.   Pulmonary:      Effort: Pulmonary effort is normal.      Breath sounds: Normal breath sounds.   Abdominal:      General: Abdomen is flat. Bowel sounds are normal.      Palpations: Abdomen is soft.      Tenderness: There is no abdominal tenderness.   Musculoskeletal:         General: Tenderness present.        Legs:       Comments: Tenderness upon palpation of right gluteal muscle and posterior right thigh. + SLR. 2+ pedal pulse.   Skin:     General: Skin is warm and dry.      Capillary Refill: Capillary refill takes less than 2 seconds.   Neurological:      General: No focal deficit present.      Mental Status: She is alert and oriented to person, place, and time.   Psychiatric:         Mood and Affect: Mood  normal.         Behavior: Behavior normal.         Results Reviewed       Procedure Component Value Units Date/Time    Blood culture #2 [[id number]]  (Abnormal) Collected: 02/03/25 0021    Lab Status: Preliminary result Specimen: Blood from Arm, Right Updated: 02/04/25 0726     Gram Stain Result Gram positive cocci in clusters    Blood Culture Identification Panel [[id number]] Collected: 02/03/25 0021    Lab Status: In process Specimen: Blood from Arm, Right Updated: 02/04/25 0725    Blood culture #1 [[id number]] Collected: 02/03/25 0038    Lab Status: Preliminary result Specimen: Blood from Arm, Right Updated: 02/03/25 1301     Blood Culture Received in Microbiology Lab. Culture in Progress.    CBC and differential [[id number]]  (Abnormal) Collected: 02/03/25 0511    Lab Status: Final result Specimen: Blood from Arm, Left Updated: 02/03/25 0717     WBC 18.95 Thousand/uL      RBC 4.21 Million/uL      Hemoglobin 12.9 g/dL      Hematocrit 39.8 %      MCV 95 fL      MCH 30.6 pg      MCHC 32.4 g/dL      RDW 12.4 %      MPV 10.5 fL      Platelets 230 Thousands/uL      nRBC 0 /100 WBCs      Segmented % 87 %      Immature Grans % 1 %      Lymphocytes % 7 %      Monocytes % 5 %      Eosinophils Relative 0 %      Basophils Relative 0 %      Absolute Neutrophils 16.28 Thousands/µL      Absolute Immature Grans 0.24 Thousand/uL      Absolute Lymphocytes 1.40 Thousands/µL      Absolute Monocytes 0.98 Thousand/µL      Eosinophils Absolute 0.01 Thousand/µL      Basophils Absolute 0.04 Thousands/µL     Urine culture [[id number]] Collected: 02/03/25 0233    Lab Status: In process Specimen: Urine, Clean Catch Updated: 02/03/25 0259    Urine Microscopic [[id number]]  (Normal) Collected: 02/02/25 2303    Lab Status: Final result Specimen: Urine, Clean Catch Updated: 02/02/25 2326     RBC, UA 1-2 /hpf      WBC, UA 2-4 /hpf      Epithelial Cells Occasional /hpf      Bacteria, UA Occasional /hpf     UA w Reflex to Microscopic w Reflex to  Culture [[id number]]  (Abnormal) Collected: 02/02/25 2303    Lab Status: Final result Specimen: Urine, Clean Catch Updated: 02/02/25 2314     Color, UA Yellow     Clarity, UA Clear     Specific Gravity, UA 1.005     pH, UA 6.0     Leukocytes, UA 25.0     Nitrite, UA Negative     Protein, UA 30 (1+) mg/dl      Glucose,  (1/10%) mg/dl      Ketones, UA Negative mg/dl      Bilirubin, UA Negative     Occult Blood, UA 10.0     UROBILINOGEN UA Negative mg/dL     Sedimentation rate, automated [[id number]]  (Abnormal) Collected: 02/02/25 2052    Lab Status: Final result Specimen: Blood from Arm, Right Updated: 02/02/25 2134     Sed Rate 76 mm/hour     C-reactive protein [[id number]]  (Abnormal) Collected: 02/02/25 2052    Lab Status: Final result Specimen: Blood from Arm, Right Updated: 02/02/25 2130     .6 mg/L     FLU/COVID Rapid Antigen (30 min. TAT) - Preferred screening test in ED [[id number]]  (Normal) Collected: 02/02/25 2052    Lab Status: Final result Specimen: Nares from Nose Updated: 02/02/25 2113     SARS COV Rapid Antigen Negative     Influenza A Rapid Antigen Negative     Influenza B Rapid Antigen Negative    Narrative:      This test has been performed using the Quidel Ashlee 2 FLU+SARS Antigen test under the Emergency Use Authorization (EUA). This test has been validated by the  and verified by the performing laboratory. The Ashlee uses lateral flow immunofluorescent sandwich assay to detect SARS-COV, Influenza A and Influenza B Antigen.     The Quidel Ashlee 2 SARS Antigen test does not differentiate between SARS-CoV and SARS-CoV-2.     Negative results are presumptive and may be confirmed with a molecular assay, if necessary, for patient management. Negative results do not rule out SARS-CoV-2 or influenza infection and should not be used as the sole basis for treatment or patient management decisions. A negative test result may occur if the level of antigen in a sample is below the limit  of detection of this test.     Positive results are indicative of the presence of viral antigens, but do not rule out bacterial infection or co-infection with other viruses.     All test results should be used as an adjunct to clinical observations and other information available to the provider.    FOR PEDIATRIC PATIENTS - copy/paste COVID Guidelines URL to browser: [email or web address]    Basic metabolic panel [[id number]]  (Abnormal) Collected: 02/02/25 2052    Lab Status: Final result Specimen: Blood from Arm, Right Updated: 02/02/25 2112     Sodium 133 mmol/L      Potassium 3.8 mmol/L      Chloride 97 mmol/L      CO2 30 mmol/L      ANION GAP 6 mmol/L      BUN 26 mg/dL      Creatinine 0.70 mg/dL      Glucose 240 mg/dL      Calcium 9.6 mg/dL      eGFR 85 ml/min/1.73sq m     Narrative:      National Kidney Disease Foundation guidelines for Chronic Kidney Disease (CKD):     Stage 1 with normal or high GFR (GFR > 90 mL/min/1.73 square meters)    Stage 2 Mild CKD (GFR = 60-89 mL/min/1.73 square meters)    Stage 3A Moderate CKD (GFR = 45-59 mL/min/1.73 square meters)    Stage 3B Moderate CKD (GFR = 30-44 mL/min/1.73 square meters)    Stage 4 Severe CKD (GFR = 15-29 mL/min/1.73 square meters)    Stage 5 End Stage CKD (GFR <15 mL/min/1.73 square meters)  Note: GFR calculation is accurate only with a steady state creatinine    CBC and differential [[id number]]  (Abnormal) Collected: 02/02/25 2052    Lab Status: Final result Specimen: Blood from Arm, Right Updated: 02/02/25 2057     WBC 19.92 Thousand/uL      RBC 3.96 Million/uL      Hemoglobin 12.4 g/dL      Hematocrit 36.9 %      MCV 93 fL      MCH 31.3 pg      MCHC 33.6 g/dL      RDW 12.3 %      MPV 9.5 fL      Platelets 225 Thousands/uL      nRBC 0 /100 WBCs      Segmented % 85 %      Immature Grans % 1 %      Lymphocytes % 8 %      Monocytes % 6 %      Eosinophils Relative 0 %      Basophils Relative 0 %       Absolute Neutrophils 16.73 Thousands/µL      Absolute Immature Grans 0.24 Thousand/uL      Absolute Lymphocytes 1.65 Thousands/µL      Absolute Monocytes 1.26 Thousand/µL      Eosinophils Absolute 0.01 Thousand/µL      Basophils Absolute 0.03 Thousands/µL             CT abdomen pelvis with contrast   Final Interpretation by Tito Gonsalez MD (02/02 2332)      1.  Lobulated fluid collections within the right obturator musculature the largest components measuring up to 3.1 x 1.9 x 2.2 cm and 3.4 x 1.9 x 1.8 cm compatible with intramuscular abscesses in the appropriate clinical setting. In addition, there is    enlargement of the medial thigh musculature with surrounding fat stranding/edema which may be due to myositis.   2.  Mild thickening of the urinary bladder which may be due to cystitis, correlate with urinalysis.         The study was marked in EPIC for immediate notification.         Workstation performed: [id number]         MRI pelvis bony wo and w contrast    (Results Pending)       Procedures    ED Medication and Procedure Management   Prior to Admission Medications   Prescriptions Last Dose Informant Patient Reported? Taking?   Cholecalciferol (Vitamin D) 50 MCG (2000 UT) tablet Not Taking Family Member No No   Sig: Take 1 tablet (2,000 Units total) by mouth daily   Patient not taking: Reported on 2/3/2025   Diclofenac Sodium (VOLTAREN) 1 % Not Taking Family Member No No   Sig: Apply 2 g topically 4 (four) times a day   Patient not taking: Reported on 2/3/2025   acetaminophen (TYLENOL) 500 mg tablet Not Taking Child, Family Member Yes No   Sig: Take 500 mg by mouth every 6 (six) hours as needed for mild pain   Patient not taking: Reported on 2/3/2025   amLODIPine (NORVASC) 5 mg tablet 2/3/2025 Morning Family Member No Yes   Sig: Take 1 tablet (5 mg total) by mouth daily   ammonium lactate (LAC-HYDRIN) 12 % lotion Not Taking Family Member No No   Sig: Apply 1 Application topically 2 (two) times a day    Patient not taking: Reported on 2/3/2025   aspirin (Aspirin Low Dose) 81 mg EC tablet 2/3/2025 Morning Family Member No Yes   Sig: Take 1 tablet (81 mg total) by mouth daily   atorvastatin (LIPITOR) 40 mg tablet 2/3/2025 Morning Family Member No Yes   Sig: Take 1 tablet (40 mg total) by mouth every evening   chlorhexidine (PERIDEX) 0.12 % solution Not Taking Family Member Yes No   Sig: RINSE WITH 15 MLS 2 TIMES A DAY FOR 30 SECONDS AND SPIT. AVOID RI...  (REFER TO PRESCRIPTION NOTES).   Patient not taking: Reported on 2/3/2025   ibuprofen (MOTRIN) 400 mg tablet   No No   Sig: Take 1 tablet (400 mg total) by mouth every 8 (eight) hours as needed for moderate pain for up to 5 days   memantine (NAMENDA) 5 mg tablet Not Taking Family Member No No   Sig: Take 1 tablet (5 mg total) by mouth 2 (two) times a day   Patient not taking: Reported on 2/3/2025   methocarbamol (ROBAXIN) 500 mg tablet 2/3/2025 Morning  No Yes   Sig: Take 1 tablet (500 mg total) by mouth 2 (two) times a day   mupirocin (BACTROBAN) 2 % ointment Not Taking  No No   Sig: Apply topically 2 (two) times a day   Patient not taking: Reported on 2/3/2025   omega-3-acid ethyl esters (LOVAZA) 1 g capsule 2/3/2025 Morning  No Yes   Sig: take 2 capsules by mouth twice a day   predniSONE 10 mg tablet Not Taking  No No   Sig: Take 6 tabs days 1&2, 5 tabs days 3&4, 4 tabs days 5&6, 3 tabs days 7&8, 2 tabs days 9&10, 1 tab days 11&12. Take with food and spread pills out with meals. For example on days 1 and 2 take 2 tabs with breakfast, 2 tabs with lunch, and 2 tabs with dinner.   Patient not taking: Reported on 2/3/2025      Facility-Administered Medications: None     Current Discharge Medication List        CONTINUE these medications which have NOT CHANGED    Details   amLODIPine (NORVASC) 5 mg tablet Take 1 tablet (5 mg total) by mouth daily  Qty: 90 tablet, Refills: 3    Associated Diagnoses: Essential hypertension      aspirin (Aspirin Low Dose) 81 mg EC  tablet Take 1 tablet (81 mg total) by mouth daily  Qty: 90 tablet, Refills: 0    Associated Diagnoses: Essential hypertension      atorvastatin (LIPITOR) 40 mg tablet Take 1 tablet (40 mg total) by mouth every evening  Qty: 90 tablet, Refills: 3    Associated Diagnoses: Dyslipidemia; CVA (cerebral vascular accident) (HCC)      methocarbamol (ROBAXIN) 500 mg tablet Take 1 tablet (500 mg total) by mouth 2 (two) times a day  Qty: 20 tablet, Refills: 0    Associated Diagnoses: Ischiogluteal bursitis      omega-3-acid ethyl esters (LOVAZA) 1 g capsule take 2 capsules by mouth twice a day  Qty: 180 capsule, Refills: 0    Associated Diagnoses: Dyslipidemia      acetaminophen (TYLENOL) 500 mg tablet Take 500 mg by mouth every 6 (six) hours as needed for mild pain      ammonium lactate (LAC-HYDRIN) 12 % lotion Apply 1 Application topically 2 (two) times a day  Qty: 400 g, Refills: 0    Associated Diagnoses: Dry skin      chlorhexidine (PERIDEX) 0.12 % solution RINSE WITH 15 MLS 2 TIMES A DAY FOR 30 SECONDS AND SPIT. AVOID RI...  (REFER TO PRESCRIPTION NOTES).      Cholecalciferol (Vitamin D) 50 MCG (2000 UT) tablet Take 1 tablet (2,000 Units total) by mouth daily  Qty: 30 tablet, Refills: 3    Associated Diagnoses: Vitamin D deficiency      Diclofenac Sodium (VOLTAREN) 1 % Apply 2 g topically 4 (four) times a day  Qty: 50 g, Refills: 2    Associated Diagnoses: Chronic right shoulder pain      ibuprofen (MOTRIN) 400 mg tablet Take 1 tablet (400 mg total) by mouth every 8 (eight) hours as needed for moderate pain for up to 5 days  Qty: 20 tablet, Refills: 0    Associated Diagnoses: Ischiogluteal bursitis      memantine (NAMENDA) 5 mg tablet Take 1 tablet (5 mg total) by mouth 2 (two) times a day  Qty: 180 tablet, Refills: 3    Associated Diagnoses: Cognitive decline      mupirocin (BACTROBAN) 2 % ointment Apply topically 2 (two) times a day  Qty: 30 g, Refills: 0    Associated Diagnoses: Folliculitis of perineum       predniSONE 10 mg tablet Take 6 tabs days 1&2, 5 tabs days 3&4, 4 tabs days 5&6, 3 tabs days 7&8, 2 tabs days 9&10, 1 tab days 11&12. Take with food and spread pills out with meals. For example on days 1 and 2 take 2 tabs with breakfast, 2 tabs with lunch, and 2 tabs with dinner.  Qty: 42 tablet, Refills: 0    Associated Diagnoses: Ischiogluteal bursitis, right           No discharge procedures on file.  ED SEPSIS DOCUMENTATION   Time reflects when diagnosis was documented in both MDM as applicable and the Disposition within this note       Time User Action Codes Description Comment    2/2/2025 10:26 PM Rvaen Castellanos Add [M79.18] Gluteal pain     2/3/2025  1:02 AM Samuel Canela Add [L02.31] Abscess of gluteal region     2/3/2025  2:58 AM Houston Parker Add [M60.009] Abscess of muscle                  Raven LAYLA Spann  02/04/25 0728

## 2025-02-03 NOTE — PLAN OF CARE
Problem: PAIN - ADULT  Goal: Verbalizes/displays adequate comfort level or baseline comfort level  Description: Interventions:  - Encourage patient to monitor pain and request assistance  - Assess pain using appropriate pain scale  - Administer analgesics based on type and severity of pain and evaluate response  - Implement non-pharmacological measures as appropriate and evaluate response  - Consider cultural and social influences on pain and pain management  - Notify physician/advanced practitioner if interventions unsuccessful or patient reports new pain  Outcome: Progressing     Problem: INFECTION - ADULT  Goal: Absence or prevention of progression during hospitalization  Description: INTERVENTIONS:  - Assess and monitor for signs and symptoms of infection  - Monitor lab/diagnostic results  - Monitor all insertion sites, i.e. indwelling lines, tubes, and drains  - Monitor endotracheal if appropriate and nasal secretions for changes in amount and color  - [place] appropriate cooling/warming therapies per order  - Administer medications as ordered  - Instruct and encourage patient and family to use good hand hygiene technique  - Identify and instruct in appropriate isolation precautions for identified infection/condition  Outcome: Progressing  Goal: Absence of fever/infection during neutropenic period  Description: INTERVENTIONS:  - Monitor WBC    Outcome: Progressing     Problem: SAFETY ADULT  Goal: Patient will remain free of falls  Description: INTERVENTIONS:  - Educate patient/family on patient safety including physical limitations  - Instruct patient to call for assistance with activity   - Consult OT/PT to assist with strengthening/mobility   - Keep Call bell within reach  - Keep bed low and locked with side rails adjusted as appropriate  - Keep care items and personal belongings within reach  - Initiate and maintain comfort rounds  - Make Fall Risk Sign visible to staff  - O- Apply yellow socks and  bracelet for high fall risk patients  - Consider moving patient to room near nurses station  Outcome: Progressing  Goal: Maintain or return to baseline ADL function  Description: INTERVENTIONS:  -  Assess patient's ability to carry out ADLs; assess patient's baseline for ADL function and identify physical deficits which impact ability to perform ADLs (bathing, care of mouth/teeth, toileting, grooming, dressing, etc.)  - Assess/evaluate cause of self-care deficits   - Assess range of motion  - Assess patient's mobility; develop plan if impaired  - Assess patient's need for assistive devices and provide as appropriate  - Encourage maximum independence but intervene and supervise when necessary  - Involve family in performance of ADLs  - Assess for home care needs following discharge   - Consider OT consult to assist with ADL evaluation and planning for discharge  - Provide patient education as appropriate  Outcome: Progressing  Goal: Maintains/Returns to pre admission functional level  Description: INTERVENTIONS:  - Perform AM-PAC 6 Click Basic Mobility/ Daily Activity assessment daily.  - Set and communicate daily mobility goal to care team and patient/family/caregiver.   - Collaborate with rehabilitation services on mobility goals if consulted  - Perf- - Record patient progress and toleration of activity level   Outcome: Progressing     Problem: DISCHARGE PLANNING  Goal: Discharge to home or other facility with appropriate resources  Description: INTERVENTIONS:  - Identify barriers to discharge w/patient and caregiver  - Arrange for needed discharge resources and transportation as appropriate  - Identify discharge learning needs (meds, wound care, etc.)  - Arrange for interpretive services to assist at discharge as needed  - Refer to Case Management Department for coordinating discharge planning if the patient needs post-hospital services based on physician/advanced practitioner order or complex needs related to  functional status, cognitive ability, or social support system  Outcome: Progressing

## 2025-02-03 NOTE — CONSULTS
Consultation - Orthopedics   Name: Hawa Cheek 74 y.o. female I MRN: [id number]  Unit/Bed#: 7T [facility] 709-01 I Date of Admission: 2/2/2025   Date of Service: 2/3/2025 I Hospital Day: 0   Inpatient consult to Orthopedic Surgery  Consult performed by: Lacho Ibarra PA-C  Consult ordered by: Jesús Nunez MD        Physician Requesting Evaluation: Felice Herbert MD   Reason for Evaluation / Principal Problem: Fluid collection pelvis, R/O Abscess    Assessment & Plan  Abscess of muscle  CT positive for 2 fluid collections, abscess vs fluid secondary to cortisone injections.  Elevated WBC this am. 18.95, as well as elevated .6 and SED rate. Repeat labs in a.m.ordered  Continue IV ABX for now with Ancef and Vanco as patient has had pain improvement since last night.   Plan for MRI pelvis with and without contrast to assess fluid collection.  May consider aspirate by IR after reviewing images to RO infection  May eat tonight, NPO after midnight as precaution  MRI scheduled or 8am tomorrow as radiology is leaving for the evening, will review images when completed.       [facility]  line was used and assistance to gain HPI and communicate with daughter and the patient  HPI: Hawa Cheek is a 74 y.o. year old Qatari speaking female who presents with right buttock discomfort since last Sunday per her daughter in which she had discomfort with weightbearing.  This was atraumatic in onset.  Apparently they presented to the emergency room on 1-27 with a diagnosis of ischiogluteal bursitis and discharged on ibuprofen and methocarbamol.  A referral was placed to orthopedics upon discharge.  The patient did have an x-ray which showed just mild arthritis of the hips.    Patient then presented to her primary care physician on 1-28 in which she appears to be given a Toradol IM injection.  Per daughter, she states it helped with the pain and discomfort a couple hours but it  returned.  She is then saw orthopedics on 1- in which she had a corticosteroid injection into the right ischiogluteal bursa.  She was also given a prescription for prednisone taper to physical therapy and the patient was supposed to follow-up.  Again, the daughter states this did provide several hours of relief and per the orthopedic note, she noticed improvement while in the office.  The pain progressively worsened over the next 2 days and the patient presented once again to the emergency room on the evening of 1 - 2 - 25 laboratory work showed an elevated blood cell count of 19.92, CRP of 141.6, and a sedimentation rate of 76.  The patient was admitted on the medical service for IV antibiotics which were started with Ancef and vancomycin.  Patient also had low-grade fever at 100.7. Denies bowel or bladder symptoms, denies back pain.     Through the interpretation services, the patient states that she feels her pain is improved today after being on antibiotics.  Her pain last night was a 10 out of 10, it is currently a 2 out of 10 although the patient has been laying around all day and not ambulating.    Review of Systems   Musculoskeletal:  Positive for arthralgias, joint swelling and myalgias.   All other systems reviewed and are negative.   significant for findings described in the HPI.  I have reviewed the patient's PMH, PSH, Social History, Family History, Meds, and Allergies    Objective :  Temp:  [97.7 °F (36.5 °C)-100.7 °F (38.2 °C)] 97.7 °F (36.5 °C)  HR:  [64-79] 78  BP: ()/(56-71) 90/62  Resp:  [16-20] 18  SpO2:  [94 %-98 %] 96 %  O2 Device: None (Room air)  Right Hip Exam     Tenderness   The patient is experiencing tenderness in the posterior and ischial tuberosity.    Range of Motion   Abduction:  abnormal   Adduction:  abnormal   Extension:  normal   Flexion:  abnormal Right hip flexion: 100 deg. w/ discomfort posterior thigh.  External rotation:  abnormal Right hip external rotation: 40  with discomfort posterior leg.  Internal rotation:  abnormal Right hip internal rotation: 15 degrees, slight discomfort posterior leg.    Muscle Strength   Abduction: 3/5   Adduction: 3/5   Flexion: 3/5     Other   Sensation: normal  Pulse: present (2+DP/PT)    Comments:  Appears to be swelling compared to Left in area of ischial tuberosity. TTP in this area as well as well as posterior hamstring.   Discomfort with any attempts of ROM but tolerable  No erythema noted at previous injection site.   Swelling noted at area of ischial tuberosity on Right compared to left.                    Left Hip Exam   Left hip exam is normal.    Range of Motion   The patient has normal left hip ROM.    Muscle Strength   The patient has normal left hip strength.     Other   Sensation: normal  Pulse: present               Lab Results: I have reviewed the following results:   Recent Labs     02/02/25 2052 02/03/25  0511   WBC 19.92* 18.95*   HGB 12.4 12.9   HCT 36.9 39.8    230   BUN 26* 16   CREATININE 0.70 0.64   ESR 76*  --    .6*  --      Blood Culture:   Lab Results   Component Value Date    BLOODCX Received in Microbiology Lab. Culture in Progress. 02/03/2025     Wound Culture:   Lab Results   Component Value Date    WOUNDCULT Few Colonies of Staphylococcus coagulase negative (A) 10/21/2020       Imaging Results Review: I personally reviewed the following image studies in PACS and associated radiology reports: CT pelvis. My interpretation of the radiology images/reports is: 2 Fluid collections within obturator muscle with myositis medial thigh. .

## 2025-02-03 NOTE — ASSESSMENT & PLAN NOTE
Resolved  CT- Abdomen and Pelvis - 2.  Mild thickening of the urinary bladder which may be due to cystitis, correlate with urinalysis.  UA- Leukocyte Positive Nitrite Negative Occult blood positive   Urine culture negative    Plan:  Patient already being treated for abscess with I.V antiobiotics which should cover organisms for Cystitis.

## 2025-02-03 NOTE — ED PROVIDER NOTES
Emergency Department Sign Out Note        Sign out and transfer of care from [facility]. See Separate Emergency Department note.     The patient, Hawa Cheek, was evaluated by the previous provider for right gluteal/hip pain.    Workup Completed:  Results Reviewed       Procedure Component Value Units Date/Time    CBC and differential [[id number]]     Lab Status: No result Specimen: Blood     Urine culture [[id number]] Collected: 02/03/25 0233    Lab Status: In process Specimen: Urine, Clean Catch Updated: 02/03/25 0259    Blood culture #2 [[id number]] Collected: 02/03/25 0021    Lab Status: In process Specimen: Blood from Arm, Right Updated: 02/03/25 0049    Blood culture #1 [[id number]] Collected: 02/03/25 0038    Lab Status: In process Specimen: Blood from Arm, Right Updated: 02/03/25 0048    Urine Microscopic [[id number]]  (Normal) Collected: 02/02/25 2303    Lab Status: Final result Specimen: Urine, Clean Catch Updated: 02/02/25 2326     RBC, UA 1-2 /hpf      WBC, UA 2-4 /hpf      Epithelial Cells Occasional /hpf      Bacteria, UA Occasional /hpf     UA w Reflex to Microscopic w Reflex to Culture [[id number]]  (Abnormal) Collected: 02/02/25 2303    Lab Status: Final result Specimen: Urine, Clean Catch Updated: 02/02/25 2314     Color, UA Yellow     Clarity, UA Clear     Specific Gravity, UA 1.005     pH, UA 6.0     Leukocytes, UA 25.0     Nitrite, UA Negative     Protein, UA 30 (1+) mg/dl      Glucose,  (1/10%) mg/dl      Ketones, UA Negative mg/dl      Bilirubin, UA Negative     Occult Blood, UA 10.0     UROBILINOGEN UA Negative mg/dL     Sedimentation rate, automated [[id number]]  (Abnormal) Collected: 02/02/25 2052    Lab Status: Final result Specimen: Blood from Arm, Right Updated: 02/02/25 2134     Sed Rate 76 mm/hour     C-reactive protein [[id number]]  (Abnormal) Collected: 02/02/25 2052    Lab Status: Final result Specimen: Blood from Arm, Right Updated: 02/02/25 2130     .6 mg/L      FLU/COVID Rapid Antigen (30 min. TAT) - Preferred screening test in ED [[id number]]  (Normal) Collected: 02/02/25 2052    Lab Status: Final result Specimen: Nares from Nose Updated: 02/02/25 2113     SARS COV Rapid Antigen Negative     Influenza A Rapid Antigen Negative     Influenza B Rapid Antigen Negative    Narrative:      This test has been performed using the Quidel Ashlee 2 FLU+SARS Antigen test under the Emergency Use Authorization (EUA). This test has been validated by the  and verified by the performing laboratory. The Ashlee uses lateral flow immunofluorescent sandwich assay to detect SARS-COV, Influenza A and Influenza B Antigen.     The Quidel Ashlee 2 SARS Antigen test does not differentiate between SARS-CoV and SARS-CoV-2.     Negative results are presumptive and may be confirmed with a molecular assay, if necessary, for patient management. Negative results do not rule out SARS-CoV-2 or influenza infection and should not be used as the sole basis for treatment or patient management decisions. A negative test result may occur if the level of antigen in a sample is below the limit of detection of this test.     Positive results are indicative of the presence of viral antigens, but do not rule out bacterial infection or co-infection with other viruses.     All test results should be used as an adjunct to clinical observations and other information available to the provider.    FOR PEDIATRIC PATIENTS - copy/paste COVID Guidelines URL to browser: [email or web address]    Basic metabolic panel [[id number]]  (Abnormal) Collected: 02/02/25 2052    Lab Status: Final result Specimen: Blood from Arm, Right Updated: 02/02/25 2112     Sodium 133 mmol/L      Potassium 3.8 mmol/L      Chloride 97 mmol/L      CO2 30 mmol/L      ANION GAP 6 mmol/L      BUN 26 mg/dL      Creatinine 0.70 mg/dL      Glucose 240 mg/dL      Calcium 9.6 mg/dL      eGFR 85  ml/min/1.73sq m     Narrative:      National Kidney Disease Foundation guidelines for Chronic Kidney Disease (CKD):     Stage 1 with normal or high GFR (GFR > 90 mL/min/1.73 square meters)    Stage 2 Mild CKD (GFR = 60-89 mL/min/1.73 square meters)    Stage 3A Moderate CKD (GFR = 45-59 mL/min/1.73 square meters)    Stage 3B Moderate CKD (GFR = 30-44 mL/min/1.73 square meters)    Stage 4 Severe CKD (GFR = 15-29 mL/min/1.73 square meters)    Stage 5 End Stage CKD (GFR <15 mL/min/1.73 square meters)  Note: GFR calculation is accurate only with a steady state creatinine    CBC and differential [[id number]]  (Abnormal) Collected: 02/02/25 2052    Lab Status: Final result Specimen: Blood from Arm, Right Updated: 02/02/25 2057     WBC 19.92 Thousand/uL      RBC 3.96 Million/uL      Hemoglobin 12.4 g/dL      Hematocrit 36.9 %      MCV 93 fL      MCH 31.3 pg      MCHC 33.6 g/dL      RDW 12.3 %      MPV 9.5 fL      Platelets 225 Thousands/uL      nRBC 0 /100 WBCs      Segmented % 85 %      Immature Grans % 1 %      Lymphocytes % 8 %      Monocytes % 6 %      Eosinophils Relative 0 %      Basophils Relative 0 %      Absolute Neutrophils 16.73 Thousands/µL      Absolute Immature Grans 0.24 Thousand/uL      Absolute Lymphocytes 1.65 Thousands/µL      Absolute Monocytes 1.26 Thousand/µL      Eosinophils Absolute 0.01 Thousand/µL      Basophils Absolute 0.03 Thousands/µL           CT abdomen pelvis with contrast   Final Result by Tito Gonsalez MD (02/02 2332)      1.  Lobulated fluid collections within the right obturator musculature the largest components measuring up to 3.1 x 1.9 x 2.2 cm and 3.4 x 1.9 x 1.8 cm compatible with intramuscular abscesses in the appropriate clinical setting. In addition, there is    enlargement of the medial thigh musculature with surrounding fat stranding/edema which may be due to myositis.   2.  Mild thickening of the urinary bladder which may be due to cystitis, correlate with urinalysis.          The study was marked in EPIC for immediate notification.         Workstation performed: [id number]               ED Course / Workup Pending (followup):                                       Procedures  Medical Decision Making  74-year-old female signed out from [facility].  See previous provider note.  Hip located in the right gluteal region.  Was seen by orthopedics on January 29 and given bursal injection, oral steroid.  Pain has worsened since.  Overall has been having right gluteal pain for several weeks.  Today with low-grade fever.  Leukocytosis of 19.92.  Significantly elevated CRP, sed rate.  CT abdomen pelvis IV contrast returned showing suspected intramuscular abscess which is at site of patient's pain.  Discussed with general surgery on-call Dr. Messina who advises IV antibiotic, discussing with orthopedics, likely eventual IR guided aspiration and culture.  Discussed with on-call Ortho attending Dr. Taylor who concurs with general surgery.  Orthopedics to see patient in the morning.  Started patient on vancomycin, ceftriaxone.  Blood cultures obtained prior to antibiotics.  Admitted to family medicine service.  Patient agreeable to admission.  Patient remained stable in the ED.     Amount and/or Complexity of Data Reviewed  Labs: ordered.  Radiology: ordered.    Risk  OTC drugs.  Prescription drug management.  Decision regarding hospitalization.            Disposition  Final diagnoses:   Gluteal pain   Abscess of gluteal region     Time reflects when diagnosis was documented in both MDM as applicable and the Disposition within this note       Time User Action Codes Description Comment    2/2/2025 10:26 PM Raven Castellanos Add [M79.18] Gluteal pain     2/3/2025  1:02 AM Samuel Canela Add [L02.31] Abscess of gluteal region     2/3/2025  2:58 AM Houston Parker Add [M60.009] Abscess of muscle           ED Disposition       ED Disposition   Admit    Condition   Stable    Date/Time   Mon Feb 3,  2025  1:01 AM    Comment   Case was discussed with Family Medicine and the patient's admission status was agreed to be Admission Status: inpatient status to the service of Dr. Herbert.               Follow-up Information    None       Current Discharge Medication List        CONTINUE these medications which have NOT CHANGED    Details   amLODIPine (NORVASC) 5 mg tablet Take 1 tablet (5 mg total) by mouth daily  Qty: 90 tablet, Refills: 3    Associated Diagnoses: Essential hypertension      aspirin (Aspirin Low Dose) 81 mg EC tablet Take 1 tablet (81 mg total) by mouth daily  Qty: 90 tablet, Refills: 0    Associated Diagnoses: Essential hypertension      atorvastatin (LIPITOR) 40 mg tablet Take 1 tablet (40 mg total) by mouth every evening  Qty: 90 tablet, Refills: 3    Associated Diagnoses: Dyslipidemia; CVA (cerebral vascular accident) ([facility])      methocarbamol (ROBAXIN) 500 mg tablet Take 1 tablet (500 mg total) by mouth 2 (two) times a day  Qty: 20 tablet, Refills: 0    Associated Diagnoses: Ischiogluteal bursitis      omega-3-acid ethyl esters (LOVAZA) 1 g capsule take 2 capsules by mouth twice a day  Qty: 180 capsule, Refills: 0    Associated Diagnoses: Dyslipidemia      acetaminophen (TYLENOL) 500 mg tablet Take 500 mg by mouth every 6 (six) hours as needed for mild pain      ammonium lactate (LAC-HYDRIN) 12 % lotion Apply 1 Application topically 2 (two) times a day  Qty: 400 g, Refills: 0    Associated Diagnoses: Dry skin      chlorhexidine (PERIDEX) 0.12 % solution RINSE WITH 15 MLS 2 TIMES A DAY FOR 30 SECONDS AND SPIT. AVOID RI...  (REFER TO PRESCRIPTION NOTES).      Cholecalciferol (Vitamin D) 50 MCG (2000 UT) tablet Take 1 tablet (2,000 Units total) by mouth daily  Qty: 30 tablet, Refills: 3    Associated Diagnoses: Vitamin D deficiency      Diclofenac Sodium (VOLTAREN) 1 % Apply 2 g topically 4 (four) times a day  Qty: 50 g, Refills: 2    Associated Diagnoses: Chronic right shoulder pain      ibuprofen  (MOTRIN) 400 mg tablet Take 1 tablet (400 mg total) by mouth every 8 (eight) hours as needed for moderate pain for up to 5 days  Qty: 20 tablet, Refills: 0    Associated Diagnoses: Ischiogluteal bursitis      memantine (NAMENDA) 5 mg tablet Take 1 tablet (5 mg total) by mouth 2 (two) times a day  Qty: 180 tablet, Refills: 3    Associated Diagnoses: Cognitive decline      mupirocin (BACTROBAN) 2 % ointment Apply topically 2 (two) times a day  Qty: 30 g, Refills: 0    Associated Diagnoses: Folliculitis of perineum      predniSONE 10 mg tablet Take 6 tabs days 1&2, 5 tabs days 3&4, 4 tabs days 5&6, 3 tabs days 7&8, 2 tabs days 9&10, 1 tab days 11&12. Take with food and spread pills out with meals. For example on days 1 and 2 take 2 tabs with breakfast, 2 tabs with lunch, and 2 tabs with dinner.  Qty: 42 tablet, Refills: 0    Associated Diagnoses: Ischiogluteal bursitis, right           No discharge procedures on file.       ED Provider  Electronically Signed by     Samuel Canela PA-C  02/03/25 0412
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
## 2025-02-03 NOTE — ASSESSMENT & PLAN NOTE
Stable  Compliant with home medications  BP within Normal Limits  Home medications - aspirin (Aspirin Low Dose) 81 mg EC tablet , atorvastatin (LIPITOR) 40 mg tablet , omega-3-acid ethyl esters (LOVAZA) 1 g capsule , amlodipine 5 mg tablet     Plan:  Continue home medications  Keep BP within normal limits

## 2025-02-03 NOTE — PROGRESS NOTES
Hawa Cheek is a 74 y.o. female who is currently ordered Vancomycin IV with management by the Pharmacy Consult service.  Relevant clinical data and objective / subjective history reviewed.  Vancomycin Assessment:  Indication and Goal AUC/Trough: Soft tissue (goal -600, trough >10)  Clinical Status: New Consult  Micro: Pending blood cultures  Renal Function:  SCr: 0.7 mg/dL  CrCl: 52.4 mL/min  Renal replacement: Not on dialysis  Days of Therapy: 1  Current Dose: 750 mg IV Load  Vancomycin Plan:  New Dosin mg IV Q24H  Estimated AUC: 503 mcg*hr/mL  Estimated Trough: 11.4 mcg/mL  Next Level: Random level 2/4 with AM labs  Renal Function Monitoring: Daily BMP and UOP  Pharmacy will continue to follow closely for s/sx of nephrotoxicity, infusion reactions and appropriateness of therapy.  BMP and CBC will be ordered per protocol. We will continue to follow the patient’s culture results and clinical progress daily.    Quan Garrett, Pharmacist

## 2025-02-03 NOTE — PLAN OF CARE
Problem: PAIN - ADULT  Goal: Verbalizes/displays adequate comfort level or baseline comfort level  Description: Interventions:  - Encourage patient to monitor pain and request assistance  - Assess pain using appropriate pain scale  - Administer analgesics based on type and severity of pain and evaluate response  - Implement non-pharmacological measures as appropriate and evaluate response  - Consider cultural and social influences on pain and pain management  - Notify physician/advanced practitioner if interventions unsuccessful or patient reports new pain  Outcome: Progressing     Problem: INFECTION - ADULT  Goal: Absence or prevention of progression during hospitalization  Description: INTERVENTIONS:  - Assess and monitor for signs and symptoms of infection  - Monitor lab/diagnostic results  - Monitor all insertion sites, i.e. indwelling lines, tubes, and drains  - Monitor endotracheal if appropriate and nasal secretions for changes in amount and color  - [place] appropriate cooling/warming therapies per order  - Administer medications as ordered  - Instruct and encourage patient and family to use good hand hygiene technique  - Identify and instruct in appropriate isolation precautions for identified infection/condition  Outcome: Progressing  Goal: Absence of fever/infection during neutropenic period  Description: INTERVENTIONS:  - Monitor WBC    Outcome: Progressing     Problem: SAFETY ADULT  Goal: Patient will remain free of falls  Description: INTERVENTIONS:  - Educate patient/family on patient safety including physical limitations  - Instruct patient to call for assistance with activity   - Consult OT/PT to assist with strengthening/mobility   - Keep Call bell within reach  - Keep bed low and locked with side rails adjusted as appropriate  - Keep care items and personal belongings within reach  - Initiate and maintain comfort rounds  - Make Fall Risk Sign visible to staff  - O- Apply yellow socks and  bracelet for high fall risk patients  - Consider moving patient to room near nurses station  Outcome: Progressing  Goal: Maintain or return to baseline ADL function  Description: INTERVENTIONS:  -  Assess patient's ability to carry out ADLs; assess patient's baseline for ADL function and identify physical deficits which impact ability to perform ADLs (bathing, care of mouth/teeth, toileting, grooming, dressing, etc.)  - Assess/evaluate cause of self-care deficits   - Assess range of motion  - Assess patient's mobility; develop plan if impaired  - Assess patient's need for assistive devices and provide as appropriate  - Encourage maximum independence but intervene and supervise when necessary  - Involve family in performance of ADLs  - Assess for home care needs following discharge   - Consider OT consult to assist with ADL evaluation and planning for discharge  - Provide patient education as appropriate  Outcome: Progressing  Goal: Maintains/Returns to pre admission functional level  Description: INTERVENTIONS:  - Perform AM-PAC 6 Click Basic Mobility/ Daily Activity assessment daily.  - Set and communicate daily mobility goal to care team and patient/family/caregiver.   - Collaborate with rehabilitation services on mobility goals if consulted  - Perf- - Record patient progress and toleration of activity level   Outcome: Progressing

## 2025-02-03 NOTE — DISCHARGE INSTRUCTIONS
2/2: take 5 pills  2/3: take 5 pills  2/4: take 4 pills  2/5: take 4 pills  2/6: take 3 pills  2/7: take 3 pills  2/7: take 2 pills   2/8: take 2 pills  2/9: take 1 pill  2/10: take 1 pill    May use 400mg ibuprofen every 6-8 hours for pain. Can also apply lidocaine patches or topical [place]cy [place] or

## 2025-02-03 NOTE — ASSESSMENT & PLAN NOTE
CT positive for 2 fluid collections, abscess vs fluid secondary to cortisone injections.  Elevated WBC this am. 18.95, as well as elevated .6 and SED rate. Repeat labs in a.m.ordered  Continue IV ABX for now with Ancef and Vanco as patient has had pain improvement since last night.   Plan for MRI pelvis with and without contrast to assess fluid collection.  May consider aspirate by IR after reviewing images to RO infection  May eat tonight, NPO after midnight as precaution  MRI scheduled or 8am tomorrow as radiology is leaving for the evening, will review images when completed.

## 2025-02-03 NOTE — H&P
History and Physical - [facility]    Patient Information: Hawa Cheek 74 y.o. female MRN: [id number]  Unit/Bed#: 7T [facility] 709-01 Encounter: [id number]  Admitting Physician: Jesús Nunez MD  PCP: Keeley Mccormick MD  Date of Admission:  02/03/25    Assessment and Plan    * Abscess of muscle  Assessment & Plan  Ct pelvis/abdominal -  Lobulated fluid collections within the right obturator musculature - compatible with intramuscular abscesses in the appropriate clinical setting.  Medial thigh musculature with surrounding fat stranding/edema which may be due to myositis   Mass and tenderness noted upon physical examination  Temp 100.7, WBC 19.92, Sed Rate 76, ,   S/p - ceFAZolin (ANCEF) IVPB (premix in dextrose) 2,000 mg 50 mL   Abscess could have been present during corticosteroid injection on 1/29/2025 vs the corticosteroid steroid shot causing iatrogenic absess      Plan:  - Orthopedic consultation. Patient currently NPO pending possible procedures  - Possible I and D   - Blood cultures pending  - am CBC and BMP  - Cefazolin 2g every 12 hours + Vancomycin 750 mg every 12 hours  - Ibuprofen 600 mg as needed every 4 hours  - Tylenol 975 mg every 8 hours scheduled  - Roxicodone 2.5 one time  - Patient started prednisone taper in office on 1/29/25, will continue prednisone taper    Cystitis  Assessment & Plan  Unknown significance   CT- Abdomen and Pelvis - 2.  Mild thickening of the urinary bladder which may be due to cystitis, correlate with urinalysis.  UA- Leukocyte Positive Nitrite Negative Occult blood positive   Temp 100.7, WBC 19.92  ( patient is taking prednisone which can increase WBC marker)   S/p - ceFAZolin (ANCEF) IVPB (premix in dextrose) 2,000 mg 50 mL      Plan:  - Patient already being treated for abscess with I.V antiobiotics which should cover organisms for Cystitis.  - Urine culture pending  - am CBC and BMP     Personal history of transient ischemic attack (TIA),  and cerebral infarction without residual deficits  Assessment & Plan  Stable  Compliant with home medications  BP within Normal Limits  Home medications - aspirin (Aspirin Low Dose) 81 mg EC tablet , atorvastatin (LIPITOR) 40 mg tablet , omega-3-acid ethyl esters (LOVAZA) 1 g capsule , amlodipine 5 mg tablet         Plan:  - Continue home medications  - Keep BP within normal limits    Essential hypertension  Assessment & Plan  Home medications - amLODIPine (NORVASC) 5 mg tablet  ,     Plan:   - Continue home medications    Dyslipidemia  Assessment & Plan  Home medication - atorvastatin (LIPITOR) 40 mg tablet     Plan:  -Continue home medications    Dementia (HCC)  Assessment & Plan  Home medication - Memantine 5 mg bid      Plan  - Continue home medications        VTE Prophylaxis: VTE Score: 6 High Risk (Score >/= 5) - Pharmacological DVT Prophylaxis Ordered: enoxaparin (Lovenox). Sequential Compression Devices Ordered.  Code Status: Level 1 - Full Code  Anticipated Length of Stay:  Patient will be admitted on an Inpatient basis with an anticipated length of stay of  more than 1 midnights.     Justification for Hospital Stay: Abscess of Muscle  Total Time for Visit, including Counseling / Coordination of Care: 60 mins. Greater than 50% of this total time spent on direct patient counseling and coordination of care.  Patient Information Sharing: With the consent of Hawa Cheek , their loved ones (insert name(s) here daughter) were notified today by inpatient team of the patient’s condition and current plan.  All questions answered.     Chief Complaint:     Chief Complaint   Patient presents with    Leg Pain     Pt having R leg pain since Monday. No injury or fall. No meds pta.      History of Present Illness:      Hawa Cheek is a 74 y.o. female  has a past medical history of Arthralgia of hip, Arthritis, Chronic pain disorder, Dyslipidemia, Hyperlipidemia, Hypertension, Murmur, cardiac, Neck pain,  Pemphigus, Pre-diabetes, Shoulder pain, Stroke (HCC), and Vitamin D deficiency. who presented to the ED today with pain in her right Gluteal area that the patients daughters states has been so bad that the patient has not been able to walk the past three days. The right gluteal pain started last Sunday, and then on Tuesday the patients daughter took the patient to her PCP  Where he gave her pain medication and booked her for an appointment with the Orthopedic the next day. The daughter then states that the Orthopedic  Gave her steroid medication prednisone for inflammation that same day and sent the patient home.  Since then the pain has been waxing and weaning but then on Sunday got to the point where it was unbearable ( pain 8/10 rated ) for the patient and that is when the daughter states that they made the decision to come to the ED.    ED COURSE:  Pulse - 68,  SpO2-95% Resp 20 , /68  Labs- UA - Leukocyte positive Protein positive, Occult Blood positive  Sed Rate 76, .6   BMP - Sodium 133 , BUN 26, Glucose 240   CBC  - 19.92, absolute neutrophils 16.73   Imaging Results -       1.  Lobulated fluid collections within the right obturator musculature the largest components measuring up to 3.1 x 1.9 x 2.2 cm and 3.4 x 1.9 x 1.8 cm compatible with intramuscular abscesses in the appropriate clinical setting. In addition, there is  enlargement of the medial thigh musculature with surrounding fat stranding/edema which may be due to myositis.  2.  Mild thickening of the urinary bladder which may be due to cystitis, correlate with urinalysis.            Medications -      Medication Administration from 02/02/2025 0000 to 02/03/2025 0209     Date/Time Order Dose Route Action Action by Comments    02/02/2025 2018 EST ketorolac (TORADOL) injection 15 mg 15 mg Intravenous Not Given Sophia Escoto RN --    02/02/2025 2003 EST acetaminophen (TYLENOL) tablet 975 mg 975 mg Oral Given Sophia Escoto RN --     02/02/2025 2007 EST lidocaine (LIDODERM) 5 % patch 1 patch 1 patch Topical Medication Applied Sophia Escoto RN --    02/02/2025 2008 EST ketorolac (TORADOL) injection 15 mg 15 mg Intramuscular Given Sophia Escoto RN --    02/02/2025 2140 EST iohexol (OMNIPAQUE) 350 MG/ML injection (MULTI-DOSE) 85 mL 85 mL Intravenous Given Barb Pappas --    02/03/2025 0039 EST ceFAZolin (ANCEF) IVPB (premix in dextrose) 2,000 mg 50 mL 2,000 mg Intravenous New Bag Sophia Escoto RN --       The ED provider discussed with general surgery and Orthopedics who recommended patient be admitted for iv antibiotics pending evaluation for possible I&D    Patient admitted to family medicine service for management of acute abscess of right gluteal region.        Review of Systems:  Review of Systems   Constitutional:  Negative for chills, diaphoresis and fever.   HENT:  Negative for sinus pressure, sinus pain, sneezing and sore throat.    Respiratory:  Negative for cough, chest tightness and shortness of breath.    Cardiovascular:  Negative for chest pain and leg swelling.   Gastrointestinal:  Negative for abdominal distention, anal bleeding, blood in stool and diarrhea.   Endocrine: Negative for polydipsia and polyphagia.   Genitourinary:  Negative for difficulty urinating, dysuria, flank pain and genital sores.   Musculoskeletal:  Positive for gait problem, joint swelling and myalgias.   Skin:  Negative for pallor and rash.   Neurological:  Negative for light-headedness and headaches.   Hematological:  Negative for adenopathy.       Past Medical and Surgical History:   Past Medical History:   Diagnosis Date    Arthralgia of hip     left    Arthritis     Chronic pain disorder     low back and shoulder    Dyslipidemia     Hyperlipidemia     Hypertension     Murmur, cardiac 09/2024    Neck pain     Pemphigus     Pre-diabetes     Shoulder pain     Stroke (HCC)     Vitamin D deficiency      Past Surgical History:   Procedure Laterality Date     CATARACT EXTRACTION Bilateral     COLONOSCOPY      GA COLONOSCOPY FLX DX W/COLLJ SPEC WHEN PFRMD N/A 11/10/2016    Procedure: COLONOSCOPY;  Surgeon: Tom Carter MD;  Location: [facility];  Service: Gastroenterology    GA INCISION EXTENSOR TENDON SHEATH WRIST Left 7/12/2021    Procedure: RELEASE LEFT DEQUERVAINS;  Surgeon: Jt White MD;  Location:  MAIN OR;  Service: Orthopedics    GA INCISION EXTENSOR TENDON SHEATH WRIST Right 7/26/2021    Procedure: RELEASE RIGHT DEQUERVAINS;  Surgeon: Jt White MD;  Location:  MAIN OR;  Service: Orthopedics    GA XCAPSL CTRC RMVL INSJ IO LENS PROSTH W/O ECP Left 9/27/2018    Procedure: EXTRACAPSULAR CATARACT REMOVAL/INSERTION OF INTRAOCULAR LENS;  Surgeon: Alfred Stephens MD;  Location:  MAIN OR;  Service: Ophthalmology     Meds/Allergies:  Allergies: No Known Allergies  Prior to Admission Medications   Prescriptions Last Dose Informant Patient Reported? Taking?   Cholecalciferol (Vitamin D) 50 MCG (2000 UT) tablet Not Taking Family Member No No   Sig: Take 1 tablet (2,000 Units total) by mouth daily   Patient not taking: Reported on 2/3/2025   Diclofenac Sodium (VOLTAREN) 1 % Not Taking Family Member No No   Sig: Apply 2 g topically 4 (four) times a day   Patient not taking: Reported on 2/3/2025   acetaminophen (TYLENOL) 500 mg tablet Not Taking Child, Family Member Yes No   Sig: Take 500 mg by mouth every 6 (six) hours as needed for mild pain   Patient not taking: Reported on 2/3/2025   amLODIPine (NORVASC) 5 mg tablet 2/3/2025 Morning Family Member No Yes   Sig: Take 1 tablet (5 mg total) by mouth daily   ammonium lactate (LAC-HYDRIN) 12 % lotion Not Taking Family Member No No   Sig: Apply 1 Application topically 2 (two) times a day   Patient not taking: Reported on 2/3/2025   aspirin (Aspirin Low Dose) 81 mg EC tablet 2/3/2025 Morning Family Member No Yes   Sig: Take 1 tablet (81 mg total) by mouth daily   atorvastatin (LIPITOR) 40 mg tablet 2/3/2025 Morning  Family Member No Yes   Sig: Take 1 tablet (40 mg total) by mouth every evening   chlorhexidine (PERIDEX) 0.12 % solution Not Taking Family Member Yes No   Sig: RINSE WITH 15 MLS 2 TIMES A DAY FOR 30 SECONDS AND SPIT. AVOID RI...  (REFER TO PRESCRIPTION NOTES).   Patient not taking: Reported on 2/3/2025   ibuprofen (MOTRIN) 400 mg tablet   No No   Sig: Take 1 tablet (400 mg total) by mouth every 8 (eight) hours as needed for moderate pain for up to 5 days   memantine (NAMENDA) 5 mg tablet Not Taking Family Member No No   Sig: Take 1 tablet (5 mg total) by mouth 2 (two) times a day   Patient not taking: Reported on 2/3/2025   methocarbamol (ROBAXIN) 500 mg tablet 2/3/2025 Morning  No Yes   Sig: Take 1 tablet (500 mg total) by mouth 2 (two) times a day   mupirocin (BACTROBAN) 2 % ointment Not Taking  No No   Sig: Apply topically 2 (two) times a day   Patient not taking: Reported on 2/3/2025   omega-3-acid ethyl esters (LOVAZA) 1 g capsule 2/3/2025 Morning  No Yes   Sig: take 2 capsules by mouth twice a day   predniSONE 10 mg tablet Not Taking  No No   Sig: Take 6 tabs days 1&2, 5 tabs days 3&4, 4 tabs days 5&6, 3 tabs days 7&8, 2 tabs days 9&10, 1 tab days 11&12. Take with food and spread pills out with meals. For example on days 1 and 2 take 2 tabs with breakfast, 2 tabs with lunch, and 2 tabs with dinner.   Patient not taking: Reported on 2/3/2025      Facility-Administered Medications: None     Social History:     Social History     Socioeconomic History    Marital status: Single     Spouse name: Not on file    Number of children: 1    Years of education: Not on file    Highest education level: Not on file   Occupational History    Occupation: homemaker   Tobacco Use    Smoking status: Never     Passive exposure: Never    Smokeless tobacco: Never   Vaping Use    Vaping status: Never Used   Substance and Sexual Activity    Alcohol use: Never    Drug use: No    Sexual activity: Not Currently   Other Topics Concern     Not on file   Social History Narrative    Advance directive declined by patient     Lives with adult children     Social Drivers of Health     Financial Resource Strain: Low Risk  (2024)    Overall Financial Resource Strain (CARDIA)     Difficulty of Paying Living Expenses: Not hard at all   Food Insecurity: No Food Insecurity (2/3/2025)    Nursing - Inadequate Food Risk Classification     Worried About Running Out of Food in the Last Year: Never true     Ran Out of Food in the Last Year: Never true     Ran Out of Food in the Last Year: Never true   Transportation Needs: No Transportation Needs (2/3/2025)    Nursing - Transportation Risk Classification     Lack of Transportation: Not on file     Lack of Transportation: No   Physical Activity: Not on file   Stress: Not on file   Social Connections: Not on file   Intimate Partner Violence: Unknown (2/3/2025)    Nursing IPS     Feels Physically and Emotionally Safe: Not on file     Physically Hurt by Someone: Not on file     Humiliated or Emotionally Abused by Someone: Not on file     Physically Hurt by Someone: No     Hurt or Threatened by Someone: No   Housing Stability: Unknown (2/3/2025)    Nursing: Inadequate Housing Risk Classification     Has Housing: Not on file     Worried About Losing Housing: Not on file     Unable to Get Utilities: Not on file     Unable to Pay for Housing in the Last Year: No     Has Housin     Patient Pre-hospital Living Situation: home  Patient Pre-hospital Level of Mobility: non mobile last 3 days because of abscess on right gluteal - before that patient has been fully mobile  Patient Pre-hospital Diet Restrictions: none    Family History:  Family History   Problem Relation Age of Onset    Hyperlipidemia Mother     Hypertension Mother     Heart disease Father     No Known Problems Sister     No Known Problems Sister     No Known Problems Sister     No Known Problems Daughter     No Known Problems Maternal Grandmother     No  "Known Problems Maternal Grandfather     No Known Problems Paternal Grandmother     No Known Problems Paternal Grandfather     No Known Problems Maternal Aunt     No Known Problems Paternal Aunt     No Known Problems Paternal Aunt     No Known Problems Paternal Aunt        Physical Exam:   Vitals:   Blood Pressure: 125/71 (02/03/25 0228)  Pulse: 79 (02/03/25 0228)  Temperature: 98 °F (36.7 °C) (02/03/25 0228)  Temp Source: Temporal (02/03/25 0228)  Respirations: 18 (02/03/25 0228)  Height: 5' 1\" (154.9 cm) (02/03/25 0228)  Weight - Scale: 52.3 kg (115 lb 4.8 oz) (02/03/25 0228)  SpO2: 98 % (02/03/25 0228)    Physical Exam  HENT:      Head: Normocephalic.      Nose: Nose normal.      Mouth/Throat:      Mouth: Mucous membranes are moist.   Eyes:      Conjunctiva/sclera: Conjunctivae normal.   Cardiovascular:      Rate and Rhythm: Normal rate and regular rhythm.   Pulmonary:      Effort: Pulmonary effort is normal.      Breath sounds: Normal breath sounds.   Abdominal:      General: Bowel sounds are normal.   Musculoskeletal:         General: Normal range of motion.      Cervical back: Normal range of motion and neck supple.      Right hip: Tenderness (Extreme tenderness to touch on right gluteal) present. No bony tenderness. Normal range of motion.      Right lower leg: No edema.      Left lower leg: No edema.        Legs:       Comments: Small mass felt on right gluteal upon PE  No edema, No skin breaks, No pus    Skin:     General: Skin is warm.      Capillary Refill: Capillary refill takes less than 2 seconds.   Neurological:      Mental Status: She is alert.   Psychiatric:         Mood and Affect: Mood normal.         Behavior: Behavior normal.         Lab Results: Results Review Statement: No pertinent imaging studies reviewed.  Results from last 7 days   Lab Units 02/02/25  2052   WBC Thousand/uL 19.92*   HEMOGLOBIN g/dL 12.4   HEMATOCRIT % 36.9   PLATELETS Thousands/uL 225   SEGS PCT % 85*   LYMPHO PCT % 8* "   MONO PCT % 6   EOS PCT % 0     Results from last 7 days   Lab Units 02/02/25  2052   POTASSIUM mmol/L 3.8   CHLORIDE mmol/L 97   CO2 mmol/L 30   BUN mg/dL 26*   CREATININE mg/dL 0.70   CALCIUM mg/dL 9.6   EGFR ml/min/1.73sq m 85                          Results from last 7 days   Lab Units 02/02/25  2303   COLOR UA  Yellow   CLARITY UA  Clear   SPEC GRAV UA  1.005   PH UA  6.0   LEUKOCYTES UA  25.0*   NITRITE UA  Negative   GLUCOSE UA mg/dl 100 (1/10%)*   KETONES UA mg/dl Negative   BILIRUBIN UA  Negative   BLOOD UA  10.0*      Results from last 7 days   Lab Units 02/02/25  2303   RBC UA /hpf 1-2   WBC UA /hpf 2-4   EPITHELIAL CELLS WET PREP /hpf Occasional   BACTERIA UA /hpf Occasional        Imaging: Results Review Statement: No pertinent imaging studies reviewed.  CT abdomen pelvis with contrast  Result Date: 2/2/2025  Narrative: CT ABDOMEN AND PELVIS WITH IV CONTRAST INDICATION: r/o infection process, abscess. COMPARISON: None. TECHNIQUE: CT examination of the abdomen and pelvis was performed. Multiplanar 2D reformatted images were created from the source data. This examination, like all CT scans performed in the [facility], was performed utilizing techniques to minimize radiation dose exposure, including the use of iterative reconstruction and automated exposure control. Radiation dose length product (DLP) for this visit: 274 mGy-cm IV Contrast: 85 mL of iohexol (OMNIPAQUE) Enteric Contrast: Not administered. FINDINGS: ABDOMEN LOWER CHEST: No clinically significant abnormality in the visualized lower chest. LIVER/BILIARY TREE: Unremarkable. GALLBLADDER: No calcified gallstones. No pericholecystic inflammatory change. SPLEEN: Unremarkable. PANCREAS: Unremarkable. ADRENAL GLANDS: Unremarkable. KIDNEYS/URETERS: Unremarkable. No hydronephrosis. STOMACH AND BOWEL: No bowel obstruction. APPENDIX: Normal. ABDOMINOPELVIC CAVITY: Trace pelvic free fluid. No pneumoperitoneum. No lymphadenopathy.  VESSELS: Unremarkable for patient's age. PELVIS REPRODUCTIVE ORGANS: Unremarkable for patient's age. URINARY BLADDER: There is mild thickening of the urinary bladder. ABDOMINAL WALL/INGUINAL REGIONS: There is enlargement of the medial thigh musculature with surrounding fat stranding/edema which may be due to myositis. There are lobulated fluid collections within the right obturator musculature the largest components measuring up to 3.1 x 1.9 x 2.2 cm (series 2, image 171) and 3.4 x 1.9 x 1.8 cm (series 2, image 159). BONES: No acute fracture or suspicious osseous lesion. Spinal degenerative changes.     Impression: 1.  Lobulated fluid collections within the right obturator musculature the largest components measuring up to 3.1 x 1.9 x 2.2 cm and 3.4 x 1.9 x 1.8 cm compatible with intramuscular abscesses in the appropriate clinical setting. In addition, there is enlargement of the medial thigh musculature with surrounding fat stranding/edema which may be due to myositis. 2.  Mild thickening of the urinary bladder which may be due to cystitis, correlate with urinalysis. The study was marked in EPIC for immediate notification. Workstation performed: [id number]     XR pelvis ap only 1 or 2 views  Result Date: 1/28/2025  Narrative: XR PELVIS AP ONLY 1 OR 2 VW INDICATION: pain. COMPARISON: None FINDINGS: No acute fracture or dislocation. No significant degenerative changes. No lytic or blastic osseous lesion. Unremarkable soft tissues. Degenerative changes in the visualized lower lumbar spine.     Impression: No acute osseous abnormality. Computerized Assisted Algorithm (CAA) may have been used to analyze all applicable images. Workstation performed: [id number]         EKG, Pathology, and Other Studies Reviewed on Admission:   EKG  Result Date: 02/03/25  Impression:  none      Entire H&P was discussed with Dr. Herbert who agreed to what is noted above    Jesús Nunez MD  02/03/25  4:32 AM

## 2025-02-04 ENCOUNTER — APPOINTMENT (INPATIENT)
Dept: INTERVENTIONAL RADIOLOGY/VASCULAR | Facility: HOSPITAL | Age: 75
DRG: 557 | End: 2025-02-04
Attending: STUDENT IN AN ORGANIZED HEALTH CARE EDUCATION/TRAINING PROGRAM
Payer: COMMERCIAL

## 2025-02-04 ENCOUNTER — APPOINTMENT (INPATIENT)
Dept: MRI IMAGING | Facility: HOSPITAL | Age: 75
DRG: 557 | End: 2025-02-04
Payer: COMMERCIAL

## 2025-02-04 PROBLEM — N30.90 CYSTITIS: Status: RESOLVED | Noted: 2025-02-03 | Resolved: 2025-02-04

## 2025-02-04 LAB
ANION GAP SERPL CALCULATED.3IONS-SCNC: 7 MMOL/L (ref 4–13)
BACTERIA UR CULT: NORMAL
BASOPHILS # BLD AUTO: 0.03 THOUSANDS/ΜL (ref 0–0.1)
BASOPHILS NFR BLD AUTO: 0 % (ref 0–1)
BUN SERPL-MCNC: 18 MG/DL (ref 5–25)
CALCIUM SERPL-MCNC: 8.8 MG/DL (ref 8.4–10.2)
CHLORIDE SERPL-SCNC: 106 MMOL/L (ref 96–108)
CO2 SERPL-SCNC: 26 MMOL/L (ref 21–32)
CREAT SERPL-MCNC: 0.58 MG/DL (ref 0.6–1.3)
CRP SERPL QL: 186.1 MG/L
EOSINOPHIL # BLD AUTO: 0.03 THOUSAND/ΜL (ref 0–0.61)
EOSINOPHIL NFR BLD AUTO: 0 % (ref 0–6)
ERYTHROCYTE [DISTWIDTH] IN BLOOD BY AUTOMATED COUNT: 12.5 % (ref 11.6–15.1)
ERYTHROCYTE [SEDIMENTATION RATE] IN BLOOD: 84 MM/HOUR (ref 0–29)
GFR SERPL CREATININE-BSD FRML MDRD: 90 ML/MIN/1.73SQ M
GLUCOSE SERPL-MCNC: 152 MG/DL (ref 65–140)
HCT VFR BLD AUTO: 32.9 % (ref 34.8–46.1)
HGB BLD-MCNC: 10.9 G/DL (ref 11.5–15.4)
IMM GRANULOCYTES # BLD AUTO: 0.49 THOUSAND/UL (ref 0–0.2)
IMM GRANULOCYTES NFR BLD AUTO: 2 % (ref 0–2)
LYMPHOCYTES # BLD AUTO: 2.69 THOUSANDS/ΜL (ref 0.6–4.47)
LYMPHOCYTES NFR BLD AUTO: 13 % (ref 14–44)
MCH RBC QN AUTO: 30.7 PG (ref 26.8–34.3)
MCHC RBC AUTO-ENTMCNC: 33.1 G/DL (ref 31.4–37.4)
MCV RBC AUTO: 93 FL (ref 82–98)
MONOCYTES # BLD AUTO: 1.56 THOUSAND/ΜL (ref 0.17–1.22)
MONOCYTES NFR BLD AUTO: 7 % (ref 4–12)
NEUTROPHILS # BLD AUTO: 16.45 THOUSANDS/ΜL (ref 1.85–7.62)
NEUTS SEG NFR BLD AUTO: 78 % (ref 43–75)
NRBC BLD AUTO-RTO: 0 /100 WBCS
PLATELET # BLD AUTO: 240 THOUSANDS/UL (ref 149–390)
PMV BLD AUTO: 9.9 FL (ref 8.9–12.7)
POTASSIUM SERPL-SCNC: 3.8 MMOL/L (ref 3.5–5.3)
RBC # BLD AUTO: 3.55 MILLION/UL (ref 3.81–5.12)
SODIUM SERPL-SCNC: 139 MMOL/L (ref 135–147)
VANCOMYCIN SERPL-MCNC: 9.8 UG/ML (ref 10–20)
WBC # BLD AUTO: 21.25 THOUSAND/UL (ref 4.31–10.16)

## 2025-02-04 PROCEDURE — 80202 ASSAY OF VANCOMYCIN: CPT

## 2025-02-04 PROCEDURE — 99232 SBSQ HOSP IP/OBS MODERATE 35: CPT | Performed by: ORTHOPAEDIC SURGERY

## 2025-02-04 PROCEDURE — 85652 RBC SED RATE AUTOMATED: CPT | Performed by: PHYSICIAN ASSISTANT

## 2025-02-04 PROCEDURE — 99233 SBSQ HOSP IP/OBS HIGH 50: CPT

## 2025-02-04 PROCEDURE — 80048 BASIC METABOLIC PNL TOTAL CA: CPT

## 2025-02-04 PROCEDURE — A9585 GADOBUTROL INJECTION: HCPCS

## 2025-02-04 PROCEDURE — 10160 PNXR ASPIR ABSC HMTMA BULLA: CPT | Performed by: STUDENT IN AN ORGANIZED HEALTH CARE EDUCATION/TRAINING PROGRAM

## 2025-02-04 PROCEDURE — 86140 C-REACTIVE PROTEIN: CPT | Performed by: PHYSICIAN ASSISTANT

## 2025-02-04 PROCEDURE — 76942 ECHO GUIDE FOR BIOPSY: CPT | Performed by: STUDENT IN AN ORGANIZED HEALTH CARE EDUCATION/TRAINING PROGRAM

## 2025-02-04 PROCEDURE — 72197 MRI PELVIS W/O & W/DYE: CPT

## 2025-02-04 PROCEDURE — 10005 FNA BX W/US GDN 1ST LES: CPT

## 2025-02-04 PROCEDURE — C1729 CATH, DRAINAGE: HCPCS

## 2025-02-04 PROCEDURE — NC001 PR NO CHARGE: Performed by: STUDENT IN AN ORGANIZED HEALTH CARE EDUCATION/TRAINING PROGRAM

## 2025-02-04 PROCEDURE — 85025 COMPLETE CBC W/AUTO DIFF WBC: CPT | Performed by: PHYSICIAN ASSISTANT

## 2025-02-04 RX ORDER — GADOBUTROL 604.72 MG/ML
5 INJECTION INTRAVENOUS
Status: COMPLETED | OUTPATIENT
Start: 2025-02-04 | End: 2025-02-04

## 2025-02-04 RX ORDER — SODIUM CHLORIDE 9 MG/ML
75 INJECTION, SOLUTION INTRAVENOUS CONTINUOUS
Status: DISCONTINUED | OUTPATIENT
Start: 2025-02-04 | End: 2025-02-10

## 2025-02-04 RX ORDER — CEFEPIME HYDROCHLORIDE 2 G/50ML
2000 INJECTION, SOLUTION INTRAVENOUS EVERY 12 HOURS
Status: DISCONTINUED | OUTPATIENT
Start: 2025-02-04 | End: 2025-02-05

## 2025-02-04 RX ORDER — VANCOMYCIN HYDROCHLORIDE 750 MG/150ML
15 INJECTION, SOLUTION INTRAVENOUS EVERY 12 HOURS
Status: DISCONTINUED | OUTPATIENT
Start: 2025-02-04 | End: 2025-02-10 | Stop reason: ALTCHOICE

## 2025-02-04 RX ADMIN — CEFEPIME HYDROCHLORIDE 2000 MG: 2 INJECTION, SOLUTION INTRAVENOUS at 10:42

## 2025-02-04 RX ADMIN — ATORVASTATIN CALCIUM 40 MG: 40 TABLET, FILM COATED ORAL at 17:49

## 2025-02-04 RX ADMIN — IBUPROFEN 400 MG: 400 TABLET, FILM COATED ORAL at 23:56

## 2025-02-04 RX ADMIN — CEFEPIME HYDROCHLORIDE 2000 MG: 2 INJECTION, SOLUTION INTRAVENOUS at 21:55

## 2025-02-04 RX ADMIN — CEFAZOLIN SODIUM 1000 MG: 1 SOLUTION INTRAVENOUS at 09:39

## 2025-02-04 RX ADMIN — IBUPROFEN 400 MG: 400 TABLET, FILM COATED ORAL at 09:32

## 2025-02-04 RX ADMIN — Medication 2.5 MG: at 21:32

## 2025-02-04 RX ADMIN — MEMANTINE HYDROCHLORIDE 5 MG: 5 TABLET, FILM COATED ORAL at 17:49

## 2025-02-04 RX ADMIN — METHOCARBAMOL TABLETS 500 MG: 500 TABLET, COATED ORAL at 09:32

## 2025-02-04 RX ADMIN — ENOXAPARIN SODIUM 40 MG: 40 INJECTION SUBCUTANEOUS at 09:33

## 2025-02-04 RX ADMIN — MEMANTINE HYDROCHLORIDE 5 MG: 5 TABLET, FILM COATED ORAL at 09:32

## 2025-02-04 RX ADMIN — VANCOMYCIN HYDROCHLORIDE 750 MG: 750 INJECTION, SOLUTION INTRAVENOUS at 16:17

## 2025-02-04 RX ADMIN — GADOBUTROL 5 ML: 604.72 INJECTION INTRAVENOUS at 08:35

## 2025-02-04 RX ADMIN — OMEGA-3 FATTY ACIDS CAP 1000 MG 1000 MG: 1000 CAP at 09:32

## 2025-02-04 RX ADMIN — SODIUM CHLORIDE 75 ML/HR: 0.9 INJECTION, SOLUTION INTRAVENOUS at 21:54

## 2025-02-04 RX ADMIN — METHOCARBAMOL TABLETS 500 MG: 500 TABLET, COATED ORAL at 17:49

## 2025-02-04 RX ADMIN — ASPIRIN 81 MG: 81 TABLET, COATED ORAL at 09:33

## 2025-02-04 NOTE — CONSULTS
e-Consult (IPC)  - Interventional Radiology  Hawa Cheek 74 y.o. female MRN: [id number]  Unit/Bed#: 7T [facility] 709-01 Encounter: [id number]          Interventional Radiology has been consulted to evaluate Hawa Cheek    We were consulted by surgery concerning this patient.    Inpatient Consult to IR  Consult performed by: Mark Farley MD  Consult ordered by: Lacho Ibarra PA-C        02/04/25    Assessment/Recommendation:   74 yof p/w right gluteal pain, found to have a multiseptated, transfacial right gluteal/perineal abscess.    IR consulted regarding possible aspiration/drain placement.    The collection is deep in the right perineum.  The more deep part of the collection is not accessible percutaneously.      The more superficial part may be accessible.  The surgical team is interested in at least aspiration to guide antibiotics.    We'll plan on aspiration with possible drain placement tomorrow given likely need for sedation given location.  I suspect that there is overall a good chance of incomplete percutaneous drainage.    Location of this collection puts this patient at risk of Renato gangrene.    11-20 minutes, >50% of the total time devoted to medical consultative verbal/EMR discussion between providers. Written report will be generated in the EMR.     Thank you for allowing Interventional Radiology to participate in the care of Hawa Cheek. Please don't hesitate to call or TigerText us with any questions.     Mark Farley MD

## 2025-02-04 NOTE — BRIEF OP NOTE (RAD/CATH)
INTERVENTIONAL RADIOLOGY PROCEDURE NOTE    Date: 2/4/2025    Procedure:   Attempted R groin aspiration    Preoperative diagnosis:   1. Abscess of muscle    2. Gluteal pain    3. Abscess of gluteal region         Postoperative diagnosis: Same.    Surgeon: Mark Farley MD     Assistant: None. No qualified resident was available.    Blood loss: 0 ml    Specimens: none.     Findings:   Attempted right perineal/groin aspiration in a frog leg position failed to return any fluid.    The more superficial collection is not very well defined on ultrasound.  The deeper pelvic collection is more defined but not accessible percutaneously.    Discussed with SYLVIE Ibarra.    Complications: None immediate.    Anesthesia: local

## 2025-02-04 NOTE — PROGRESS NOTES
Hawa Cheek is a 74 y.o. female who is currently ordered Vancomycin IV with management by the Pharmacy Consult service.  Relevant clinical data and objective / subjective history reviewed.  Vancomycin Assessment:  Indication and Goal AUC/Trough: Soft tissue (goal -600, trough >10)  Clinical Status: stable  Micro:     Renal Function:  SCr: 0.58 mg/dL  CrCl: 72.9 mL/min  Renal replacement: Not on dialysis  Days of Therapy: 2  Current Dose: 1250 mg q24h  Vancomycin Plan:  New Dosin mg q12h  Estimated AUC: 528 mcg*hr/mL  Estimated Trough: 14.7 mcg/mL  Next Level: trough at 15:00 25  Renal Function Monitoring: Daily BMP and UOP  Pharmacy will continue to follow closely for s/sx of nephrotoxicity, infusion reactions and appropriateness of therapy.  BMP and CBC will be ordered per protocol. We will continue to follow the patient’s culture results and clinical progress daily.    Van Taylor, Pharmacist

## 2025-02-04 NOTE — PROGRESS NOTES
Progress Note - Orthopedics   Name: Hawa Cheek 74 y.o. female I MRN: [id number]  Unit/Bed#: 7T [facility] 709-01 I Date of Admission: 2/2/2025   Date of Service: 2/4/2025 I Hospital Day: 1     Assessment & Plan  Abscess of muscle  MRI with and without contrast showed 2 fluid collections obturator internus and externus. IR consulted for aspiration. Attempted US guided aspiration, however was unsuccessful. They don't think from IR standpoint, not much more they can do as it in a difficult location.   Abx switched from Ancef to Cefepime 2g q 12hr and Vanco q12. Blood cultures now positive for gram postitive cocci  Reviewed with attending, assess response to Cefepime overnight. Repeat labs ordered for a.m.  Discussed with patient and daughter Lexis through phone . Discussed w/ them IR could not get a sample as it is in difficult location. Also discussed reasoning for switching ABX as she had questions regarding the need for switching antibiotics.  Check labs in the AM.  Will discuss with attending further treatment if not improving.        Subjective   74-year-old  female with several day history of increasing pain as she reported to the ER on Sunday night.  She was at the ER previously and is documented in the previous note as well as follow-up visits.  Patient states there is still pain and swelling in the area.  There is tenderness in the medial thigh.  She was only up today to use the bathroom.  Objective :  Temp:  [97.5 °F (36.4 °C)-98.4 °F (36.9 °C)] 98.4 °F (36.9 °C)  HR:  [66-76] 71  BP: (100-140)/(61-85) 119/68  Resp:  [16-20] 16  SpO2:  [96 %-98 %] 98 %  O2 Device: None (Room air)    Physical Exam  Musculoskeletal: rightlower  Skin wamm . No erythema or ecchymosis.  There is tenderness and swelling over the medial and posterior thigh area.  TTP ischial tuberosity as well as medial thigh to palpation  2+ DP pulse  Discomfort with internal and external rotation as she is limited at only 15  "internal and 35 external with discomfort medial thigh and posterior leg  No calf swelling or tenderness to palpation      Lab Results: I have reviewed the following results:  Recent Labs     02/02/25 2052 02/03/25 0511 02/04/25  0533   WBC 19.92* 18.95* 21.25*   HGB 12.4 12.9 10.9*   HCT 36.9 39.8 32.9*    230 240   BUN 26* 16 18   CREATININE 0.70 0.64 0.58*   ESR 76*  --  84*   .6*  --  186.1*     Blood Culture:  No results found for: \"BLOODCX\"  Wound Culture:   Lab Results   Component Value Date    WOUNDCULT Few Colonies of Staphylococcus coagulase negative (A) 10/21/2020     "

## 2025-02-04 NOTE — ASSESSMENT & PLAN NOTE
MRI with and without contrast showed 2 fluid collections obturator internus and externus. IR consulted for aspiration. Attempted US guided aspiration, however was unsuccessful. They don't think from IR standpoint, not much more they can do as it in a difficult location.   Abx switched from Ancef to Cefepime 2g q 12hr and Vanco q12. Blood cultures now positive for gram postitive cocci  Reviewed with attending, assess response to Cefepime overnight. Repeat labs ordered for a.m.  Discussed with patient and daughter Lexis through phone . Discussed w/ them IR could not get a sample as it is in difficult location. Also discussed reasoning for switching ABX as she had questions regarding the need for switching antibiotics.  Check labs in the AM.  Will discuss with attending further treatment if not improving.

## 2025-02-04 NOTE — UTILIZATION REVIEW
Initial Clinical Review    Patient started care in ED on 2/2 and has already crossed 1 midnight.     Admission: Date/Time/Statement:   Admission Orders (From admission, onward)       Ordered        02/03/25 0100  INPATIENT ADMISSION  Once                          Orders Placed This Encounter   Procedures    INPATIENT ADMISSION     Standing Status:   Standing     Number of Occurrences:   1     Level of Care:   Med Surg [16]     Estimated length of stay:   Not Applicable     ED Arrival Information       Expected   -    Arrival   2/2/2025 18:39    Acuity   Less Urgent              Means of arrival   Wheelchair    Escorted by   Self    Service   Family Medicine    Admission type   Emergency              Arrival complaint   pain             Chief Complaint   Patient presents with    Leg Pain     Pt having R leg pain since Monday. No injury or fall. No meds pta.        Initial Presentation: 74 y.o. female with PMH of Arthralgia of hip, Arthritis, Chronic pain disorder, Dyslipidemia, Hyperlipidemia, Hypertension, Murmur, cardiac, Neck pain, Pemphigus, Pre-diabetes, Shoulder pain, Stroke (HCC), and Vitamin D deficiency. who presented to the ED today with pain in her right Gluteal area that the patients daughters states has been so bad that the patient has not been able to walk the past three days. The right gluteal pain started last Sunday, and then on Tuesday the patients daughter took the patient to her PCP. He gave her pain medication and booked her for an appointment with the Orthopedic the next day. The daughter then states that the Orthopedic  gave her steroid medication prednisone for inflammation that same day and sent the patient home. Since then the pain has been waxing and weaning but then on Sunday got to the point where it was unbearable ( pain 8/10 rated ) for the patient and that is when the daughter states that they made the decision to come to the ED. Plan: Inpatient admission for evaluation and  treatment of abscess of muscle, cystitis, hx of TIA and cerebral infarction, HTN, dyslipidemia, dementia: Ortho consult, NPO, blood cultures pending, CBC and BMP in am, IV cefazolin and vanco, Tylenol 975 mg q8 hrs scheduled, continue prednisone taper, urine culture pending, continue ASA, atorvastatin, Lovaza, amlodipine, memantine.     Ortho consult: CT positive for 2 fluid collections, abscess vs fluid secondary to cortisone injections. Elevated WBC this am. 18.95, as well as elevated .6 and SED rate. Repeat labs in a.m.ordered. Continue IV ABX for now with Ancef and Vanco as patient has had pain improvement since last night. Plan for MRI pelvis with and without contrast to assess fluid collection. May consider aspirate by IR after reviewing images to RO infection. NPO at midnight.     Anticipated Length of Stay/Certification Statement: Patient will be admitted on an Inpatient basis with an anticipated length of stay of  more than 1 midnights. Justification for Hospital Stay: Abscess of Muscle    Date: 2/4  Day 3: Has surpassed a 2nd midnight with active treatments and services. Follow blood cultures. Escalation to Cefepime 2 gr BID and continue  Vancomycin 750 mg every 12 hours. Tylenol 975 mg every 8 hours scheduled. Added oxycodone 2.5 mg every 6 as needed moderate pain and breakthrough pain. Continue prednisone taper, urine culture pending, continue ASA, atorvastatin, Lovaza, amlodipine, memantine.       ED Treatment-Medication Administration from 02/02/2025 1839 to 02/03/2025 0209         Date/Time Order Dose Route Action     02/02/2025 2003 acetaminophen (TYLENOL) tablet 975 mg 975 mg Oral Given     02/02/2025 2007 lidocaine (LIDODERM) 5 % patch 1 patch 1 patch Topical Medication Applied     02/02/2025 2008 ketorolac (TORADOL) injection 15 mg 15 mg Intramuscular Given     02/02/2025 2140 iohexol (OMNIPAQUE) 350 MG/ML injection (MULTI-DOSE) 85 mL 85 mL Intravenous Given     02/03/2025 0039 ceFAZolin  (ANCEF) IVPB (premix in dextrose) 2,000 mg 50 mL 2,000 mg Intravenous New Bag            Scheduled Medications:  acetaminophen, 975 mg, Oral, Q8H TAMMY  amLODIPine, 5 mg, Oral, Daily  aspirin, 81 mg, Oral, Daily  atorvastatin, 40 mg, Oral, QPM  cefepime, 2,000 mg, Intravenous, Q12H  enoxaparin, 40 mg, Subcutaneous, Daily  fish oil, 1,000 mg, Oral, Daily  memantine, 5 mg, Oral, BID  methocarbamol, 500 mg, Oral, BID  vancomycin, 1,250 mg, Intravenous, Q24H      Continuous IV Infusions:     PRN Meds:  ibuprofen, 400 mg, Oral, Q6H PRN  oxyCODONE, 2.5 mg, Oral, Q6H PRN      ED Triage Vitals   Temperature Pulse Respirations Blood Pressure SpO2 Pain Score   02/02/25 1916 02/02/25 1916 02/02/25 1916 02/02/25 1916 02/02/25 1916 02/02/25 2003   (!) 100.7 °F (38.2 °C) 68 20 132/68 95 % 6     Weight (last 2 days)       Date/Time Weight    02/03/25 0228 52.3 (115.3)            Vital Signs (last 3 days)       Date/Time Temp Pulse Resp BP MAP (mmHg) SpO2 O2 Device Patient Position - Orthostatic VS Josesito Coma Scale Score Pain    02/04/25 0932 -- -- -- -- -- -- -- -- -- 10 - Worst Possible Pain    02/04/25 0931 97.8 °F (36.6 °C) 66 18 106/67 87 -- -- Lying -- 10 - Worst Possible Pain    02/04/25 0904 97.7 °F (36.5 °C) 72 20 134/85 -- 98 % None (Room air) Sitting -- --    02/04/25 0510 -- -- -- -- -- -- -- -- -- No Pain    02/03/25 2239 -- -- -- -- -- -- -- -- -- No Pain    02/03/25 2204 97.6 °F (36.4 °C) 76 18 100/61 -- 96 % None (Room air) Lying -- No Pain    02/03/25 2200 -- -- -- -- -- -- -- -- 15 --    02/03/25 1525 97.7 °F (36.5 °C) 78 18 90/62 -- 96 % None (Room air) Lying -- --    02/03/25 1435 -- -- -- -- -- -- -- -- -- 4    02/03/25 0900 -- -- -- -- -- -- -- -- -- No Pain    02/03/25 0705 98.3 °F (36.8 °C) 64 16 94/56 76 94 % None (Room air) Lying -- --    02/03/25 0521 -- -- -- -- -- -- -- -- -- No Pain    02/03/25 0404 -- -- -- -- -- -- -- -- -- 10 - Worst Possible Pain    02/03/25 0238 -- -- -- -- -- -- -- -- -- 2     02/03/25 0228 98 °F (36.7 °C) 79 18 125/71 82 98 % None (Room air) Lying -- --    02/02/25 2053 -- -- -- -- -- -- -- -- -- 6 02/02/25 2008 -- -- -- -- -- -- -- -- -- 6    02/02/25 2003 -- -- -- -- -- -- -- -- -- 6 02/02/25 1916 100.7 °F (38.2 °C) 68 20 132/68 -- 95 % None (Room air) Sitting -- --              Pertinent Labs/Diagnostic Test Results:   Radiology:  MRI pelvis bony wo and w contrast   Final Interpretation by Tim Peters MD (02/04 1008)      Intramuscular abscesses in the right pelvis/hip musculature as described with adjacent muscular edema/enhancement consistent with myositis.      Right trochanteric bursitis, which may be infectious.            Workstation performed: [id number]         CT abdomen pelvis with contrast   Final Interpretation by Tito Gonsalez MD (02/02 2332)      1.  Lobulated fluid collections within the right obturator musculature the largest components measuring up to 3.1 x 1.9 x 2.2 cm and 3.4 x 1.9 x 1.8 cm compatible with intramuscular abscesses in the appropriate clinical setting. In addition, there is    enlargement of the medial thigh musculature with surrounding fat stranding/edema which may be due to myositis.   2.  Mild thickening of the urinary bladder which may be due to cystitis, correlate with urinalysis.         The study was marked in EPIC for immediate notification.         Workstation performed: [id number]           Cardiology:  No orders to display     GI:  No orders to display           Results from last 7 days   Lab Units 02/04/25 0533 02/03/25  0511 02/02/25 2052   WBC Thousand/uL 21.25* 18.95* 19.92*   HEMOGLOBIN g/dL 10.9* 12.9 12.4   HEMATOCRIT % 32.9* 39.8 36.9   PLATELETS Thousands/uL 240 230 225   TOTAL NEUT ABS Thousands/µL 16.45* 16.28* 16.73*         Results from last 7 days   Lab Units 02/04/25 0533 02/03/25 0511 02/02/25 2052   SODIUM mmol/L 139 139 133*   POTASSIUM mmol/L 3.8 3.8 3.8   CHLORIDE mmol/L 106 100 97   CO2 mmol/L  26 29 30   ANION GAP mmol/L 7 10 6   BUN mg/dL 18 16 26*   CREATININE mg/dL 0.58* 0.64 0.70   EGFR ml/min/1.73sq m 90 87 85   CALCIUM mg/dL 8.8 9.6 9.6             Results from last 7 days   Lab Units 02/04/25  0533 02/03/25  0511 02/02/25 2052   GLUCOSE RANDOM mg/dL 152* 165* 240*           Results from last 7 days   Lab Units 02/04/25  0533 02/02/25 2052   CRP mg/L 186.1* 141.6*   SED RATE mm/hour 84* 76*             Results from last 7 days   Lab Units 02/02/25  2303   CLARITY UA  Clear   COLOR UA  Yellow   SPEC GRAV UA  1.005   PH UA  6.0   GLUCOSE UA mg/dl 100 (1/10%)*   KETONES UA mg/dl Negative   BLOOD UA  10.0*   PROTEIN UA mg/dl 30 (1+)*   NITRITE UA  Negative   BILIRUBIN UA  Negative   UROBILINOGEN UA mg/dL Negative   LEUKOCYTES UA  25.0*   WBC UA /hpf 2-4   RBC UA /hpf 1-2   BACTERIA UA /hpf Occasional   EPITHELIAL CELLS WET PREP /hpf Occasional           Results from last 7 days   Lab Units 02/03/25  0233 02/03/25  0038 02/03/25  0021   BLOOD CULTURE   --  Received in Microbiology Lab. Culture in Progress.  --    GRAM STAIN RESULT   --   --  Gram positive cocci in clusters*   URINE CULTURE  20,000-29,000 cfu/ml  --   --              Results from last 7 days   Lab Units 02/04/25  0533   VANCOMYCIN RM ug/mL 9.8*       Past Medical History:   Diagnosis Date    Arthralgia of hip     left    Arthritis     Chronic pain disorder     low back and shoulder    Dyslipidemia     Hyperlipidemia     Hypertension     Murmur, cardiac 09/2024    Neck pain     Pemphigus     Pre-diabetes     Shoulder pain     Stroke (HCC)     Vitamin D deficiency      Present on Admission:   Essential hypertension   Dyslipidemia   Abscess of muscle      Admitting Diagnosis: Abscess of gluteal region [L02.31]  Leg pain [M79.606]  Gluteal pain [M79.18]  Age/Sex: 74 y.o. female    Network Utilization Review Department  ATTENTION: Please call with any questions or concerns to [phone] and carefully listen to the prompts so that you are  directed to the right person. All voicemails are confidential.   For Discharge needs, contact Care Management DC Support Team at [phone] opt. 2  Send all requests for admission clinical reviews, approved or denied determinations and any other requests to dedicated fax number below belonging to the [place] where the patient is receiving treatment. List of dedicated fax numbers for the Facilities:  FACILITY NAME UR FAX NUMBER   ADMISSION DENIALS (Administrative/Medical Necessity) [phone]   DISCHARGE SUPPORT TEAM (NETWORK) [phone]   PARENT CHILD HEALTH (Maternity/NICU/Pediatrics) [phone]   [facility] [phone]   [facility] [phone]   [facility] [phone]   [facility] [phone]   [facility] [phone]   [facility] [phone]   [facility] [phone]   [facility] [phone]   [facility] [phone]   [facility] [phone]   [facility] [phone]   [facility] [phone]

## 2025-02-04 NOTE — PLAN OF CARE
Problem: PAIN - ADULT  Goal: Verbalizes/displays adequate comfort level or baseline comfort level  Description: Interventions:  - Encourage patient to monitor pain and request assistance  - Assess pain using appropriate pain scale  - Administer analgesics based on type and severity of pain and evaluate response  - Implement non-pharmacological measures as appropriate and evaluate response  - Consider cultural and social influences on pain and pain management  - Notify physician/advanced practitioner if interventions unsuccessful or patient reports new pain  Outcome: Progressing     Problem: INFECTION - ADULT  Goal: Absence or prevention of progression during hospitalization  Description: INTERVENTIONS:  - Assess and monitor for signs and symptoms of infection  - Monitor lab/diagnostic results  - Monitor all insertion sites, i.e. indwelling lines, tubes, and drains  - Monitor endotracheal if appropriate and nasal secretions for changes in amount and color  - [place] appropriate cooling/warming therapies per order  - Administer medications as ordered  - Instruct and encourage patient and family to use good hand hygiene technique  - Identify and instruct in appropriate isolation precautions for identified infection/condition  Outcome: Progressing  Goal: Absence of fever/infection during neutropenic period  Description: INTERVENTIONS:  - Monitor WBC    Outcome: Progressing

## 2025-02-05 ENCOUNTER — APPOINTMENT (INPATIENT)
Dept: NON INVASIVE DIAGNOSTICS | Facility: HOSPITAL | Age: 75
DRG: 557 | End: 2025-02-05
Payer: COMMERCIAL

## 2025-02-05 PROBLEM — A41.9 SEPSIS (HCC): Status: ACTIVE | Noted: 2025-02-05

## 2025-02-05 PROBLEM — A41.9 SEPSIS (HCC): Status: RESOLVED | Noted: 2025-02-05 | Resolved: 2025-02-05

## 2025-02-05 PROBLEM — R78.81 GRAM-POSITIVE BACTEREMIA: Status: ACTIVE | Noted: 2025-02-05

## 2025-02-05 LAB
ANION GAP SERPL CALCULATED.3IONS-SCNC: 7 MMOL/L (ref 4–13)
BASOPHILS # BLD AUTO: 0.04 THOUSANDS/ΜL (ref 0–0.1)
BASOPHILS NFR BLD AUTO: 0 % (ref 0–1)
BSA FOR ECHO PROCEDURE: 1.49 M2
BUN SERPL-MCNC: 17 MG/DL (ref 5–25)
CALCIUM SERPL-MCNC: 9.2 MG/DL (ref 8.4–10.2)
CHLORIDE SERPL-SCNC: 104 MMOL/L (ref 96–108)
CO2 SERPL-SCNC: 29 MMOL/L (ref 21–32)
CREAT SERPL-MCNC: 0.63 MG/DL (ref 0.6–1.3)
E WAVE DECELERATION TIME: 199 MS
E/A RATIO: 0.9
EOSINOPHIL # BLD AUTO: 0.19 THOUSAND/ΜL (ref 0–0.61)
EOSINOPHIL NFR BLD AUTO: 1 % (ref 0–6)
ERYTHROCYTE [DISTWIDTH] IN BLOOD BY AUTOMATED COUNT: 12.4 % (ref 11.6–15.1)
GFR SERPL CREATININE-BSD FRML MDRD: 88 ML/MIN/1.73SQ M
GLUCOSE SERPL-MCNC: 122 MG/DL (ref 65–140)
HCT VFR BLD AUTO: 36.6 % (ref 34.8–46.1)
HGB BLD-MCNC: 12 G/DL (ref 11.5–15.4)
IMM GRANULOCYTES # BLD AUTO: 0.25 THOUSAND/UL (ref 0–0.2)
IMM GRANULOCYTES NFR BLD AUTO: 1 % (ref 0–2)
LV EF US.2D.A4C+ESTIMATED: 79 %
LYMPHOCYTES # BLD AUTO: 2.97 THOUSANDS/ΜL (ref 0.6–4.47)
LYMPHOCYTES NFR BLD AUTO: 16 % (ref 14–44)
MCH RBC QN AUTO: 30.3 PG (ref 26.8–34.3)
MCHC RBC AUTO-ENTMCNC: 32.8 G/DL (ref 31.4–37.4)
MCV RBC AUTO: 92 FL (ref 82–98)
MONOCYTES # BLD AUTO: 1.36 THOUSAND/ΜL (ref 0.17–1.22)
MONOCYTES NFR BLD AUTO: 7 % (ref 4–12)
MV PEAK A VEL: 1.05 M/S
MV PEAK E VEL: 95 CM/S
MV STENOSIS PRESSURE HALF TIME: 58 MS
MV VALVE AREA P 1/2 METHOD: 3.79
NEUTROPHILS # BLD AUTO: 13.65 THOUSANDS/ΜL (ref 1.85–7.62)
NEUTS SEG NFR BLD AUTO: 75 % (ref 43–75)
NRBC BLD AUTO-RTO: 0 /100 WBCS
PLATELET # BLD AUTO: 307 THOUSANDS/UL (ref 149–390)
PMV BLD AUTO: 9.7 FL (ref 8.9–12.7)
POTASSIUM SERPL-SCNC: 3.9 MMOL/L (ref 3.5–5.3)
RBC # BLD AUTO: 3.96 MILLION/UL (ref 3.81–5.12)
SL CV LV EF: 70
SODIUM SERPL-SCNC: 140 MMOL/L (ref 135–147)
TR MAX PG: 30 MMHG
TR PEAK VELOCITY: 2.7 M/S
TRICUSPID VALVE PEAK REGURGITATION VELOCITY: 2.73 M/S
VANCOMYCIN TROUGH SERPL-MCNC: 8.6 UG/ML (ref 10–20)
WBC # BLD AUTO: 18.46 THOUSAND/UL (ref 4.31–10.16)

## 2025-02-05 PROCEDURE — 80202 ASSAY OF VANCOMYCIN: CPT

## 2025-02-05 PROCEDURE — G0545 PR INHERENT VISIT TO INPT: HCPCS | Performed by: INTERNAL MEDICINE

## 2025-02-05 PROCEDURE — 85025 COMPLETE CBC W/AUTO DIFF WBC: CPT

## 2025-02-05 PROCEDURE — 93325 DOPPLER ECHO COLOR FLOW MAPG: CPT

## 2025-02-05 PROCEDURE — 93308 TTE F-UP OR LMTD: CPT | Performed by: INTERNAL MEDICINE

## 2025-02-05 PROCEDURE — 99232 SBSQ HOSP IP/OBS MODERATE 35: CPT | Performed by: PHYSICIAN ASSISTANT

## 2025-02-05 PROCEDURE — 93308 TTE F-UP OR LMTD: CPT

## 2025-02-05 PROCEDURE — 80048 BASIC METABOLIC PNL TOTAL CA: CPT

## 2025-02-05 PROCEDURE — 93325 DOPPLER ECHO COLOR FLOW MAPG: CPT | Performed by: INTERNAL MEDICINE

## 2025-02-05 PROCEDURE — 93321 DOPPLER ECHO F-UP/LMTD STD: CPT | Performed by: INTERNAL MEDICINE

## 2025-02-05 PROCEDURE — 99223 1ST HOSP IP/OBS HIGH 75: CPT | Performed by: INTERNAL MEDICINE

## 2025-02-05 PROCEDURE — 99232 SBSQ HOSP IP/OBS MODERATE 35: CPT

## 2025-02-05 PROCEDURE — 93321 DOPPLER ECHO F-UP/LMTD STD: CPT

## 2025-02-05 RX ADMIN — VANCOMYCIN HYDROCHLORIDE 750 MG: 750 INJECTION, SOLUTION INTRAVENOUS at 03:51

## 2025-02-05 RX ADMIN — METHOCARBAMOL TABLETS 500 MG: 500 TABLET, COATED ORAL at 17:44

## 2025-02-05 RX ADMIN — ACETAMINOPHEN 975 MG: 325 TABLET, FILM COATED ORAL at 21:06

## 2025-02-05 RX ADMIN — SODIUM CHLORIDE 75 ML/HR: 0.9 INJECTION, SOLUTION INTRAVENOUS at 16:06

## 2025-02-05 RX ADMIN — AMLODIPINE BESYLATE 5 MG: 5 TABLET ORAL at 08:44

## 2025-02-05 RX ADMIN — OMEGA-3 FATTY ACIDS CAP 1000 MG 1000 MG: 1000 CAP at 08:44

## 2025-02-05 RX ADMIN — ASPIRIN 81 MG: 81 TABLET, COATED ORAL at 08:54

## 2025-02-05 RX ADMIN — ENOXAPARIN SODIUM 40 MG: 40 INJECTION SUBCUTANEOUS at 08:54

## 2025-02-05 RX ADMIN — METHOCARBAMOL TABLETS 500 MG: 500 TABLET, COATED ORAL at 08:44

## 2025-02-05 RX ADMIN — ACETAMINOPHEN 975 MG: 325 TABLET, FILM COATED ORAL at 05:41

## 2025-02-05 RX ADMIN — MEMANTINE HYDROCHLORIDE 5 MG: 5 TABLET, FILM COATED ORAL at 17:44

## 2025-02-05 RX ADMIN — VANCOMYCIN HYDROCHLORIDE 750 MG: 750 INJECTION, SOLUTION INTRAVENOUS at 16:22

## 2025-02-05 RX ADMIN — MEMANTINE HYDROCHLORIDE 5 MG: 5 TABLET, FILM COATED ORAL at 08:44

## 2025-02-05 RX ADMIN — ATORVASTATIN CALCIUM 40 MG: 40 TABLET, FILM COATED ORAL at 17:44

## 2025-02-05 RX ADMIN — ACETAMINOPHEN 975 MG: 325 TABLET, FILM COATED ORAL at 14:09

## 2025-02-05 NOTE — PROGRESS NOTES
Progress Note - Orthopedics   Name: Hawa Cheek 74 y.o. female I MRN: [id number]  Unit/Bed#: 7T [facility] 709-01 I Date of Admission: 2/2/2025   Date of Service: 2/5/2025 I Hospital Day: 2     Assessment & Plan  Abscess of muscle  75yo Female Right pelvic intra-muscular abscess of obturator internus and externus     Plan:  There is no acute orthopedic surgical intervention required at this time.  Patient does not have a right hip infection and therefore there is no need for orthopedics to be involved.  We would recommend that general surgery be consulted for this patient for consideration of possible I&D of this abscess.  Patient has had clinical improvement stating that she has less pain at rest, with passive motion, and to palpation though it still very tender to palpation.  Patient also was having improvement of her white count from 21.25 to 18.46 since switching her to cefepime and Vanco.  Patient is having again improvement but would still defer to general surgery for possible I&D.  Patient was NPO at midnight for possible I&D but again will defer to General Surgery   Referral placed  WBAT RLE  Pain control PRN  Medical management per SLIM  Continue IV Abx per ID  Ortho will continue to follow   Dyslipidemia    Essential hypertension    Personal history of transient ischemic attack (TIA), and cerebral infarction without residual deficits    Dementia (HCC)    Leukocytosis    Gram-positive bacteremia        Subjective   74 y.o.female 75yo Female Right pelvic intra-muscular abscess of obturator internus and externus. No acute events, no new complaints overnight.  Patient was seen and examined at bedside.  Dr. Motley provided Swedish translation.  Patient continues to have pain to the right medial groin but states this pain is a less compared to yesterday at rest.  Also with passive range of motion of the right hip she also has less pain compared to yesterday.  Patient continues to have tenderness to palpation to  this region.  Patient Nuys any fevers chills or sweats.  Patient states that she feels well overall.      Objective :  Temp:  [96.9 °F (36.1 °C)-98.7 °F (37.1 °C)] 96.9 °F (36.1 °C)  HR:  [66-72] 66  BP: (106-140)/(64-85) 122/67  Resp:  [16-20] 20  SpO2:  [94 %-98 %] 98 %  O2 Device: None (Room air)    Physical Exam  Vitals reviewed.   Constitutional:       Appearance: Normal appearance.   HENT:      Head: Normocephalic and atraumatic.      Right Ear: External ear normal.      Left Ear: External ear normal.      Nose: Nose normal.      Mouth/Throat:      Mouth: Mucous membranes are moist.   Eyes:      Extraocular Movements: Extraocular movements intact.      Conjunctiva/sclera: Conjunctivae normal.      Pupils: Pupils are equal, round, and reactive to light.   Cardiovascular:      Comments: No apparent distress  Pulmonary:      Effort: Pulmonary effort is normal.   Abdominal:      General: Abdomen is flat.   Musculoskeletal:      Cervical back: Normal range of motion and neck supple.   Skin:     General: Skin is warm.      Capillary Refill: Capillary refill takes less than 2 seconds.   Neurological:      Mental Status: She is alert. Mental status is at baseline.   Psychiatric:         Mood and Affect: Mood normal.       Musculoskeletal:  Right Lower Extremity Exam  Alignment:  Leg lengths appear equal.  Inspection: There is no erythema or ecchymosis present.  There is a Band-Aid on the proximal medial thigh from where IR aspiration attempt was performed.  There is no visible fluid collection.  Palpation: Patient does have tenderness to palpation about the ischial tuberosity as well as the proximal medial thigh.  This pain is a less compared to yesterday.  There is pain with passive range of motion but patient states that its less compared to yesterday.  ROM:  Pain with hip internal and externa rotation but less.  Strength:  EHL 5/5. FHL 5/5.  Neurovascular:  Sensation intact in DP/SP/Wade/Sa/T nerve distributions.  "Palpable DP & PT pulses.  Gait:  Not tested.       Lab Results: I have reviewed the following results:  Recent Labs     02/03/25  0511 02/04/25  0533 02/05/25  0500   WBC 18.95* 21.25* 18.46*   HGB 12.9 10.9* 12.0   HCT 39.8 32.9* 36.6    240 307   BUN 16 18 17   CREATININE 0.64 0.58* 0.63   ESR  --  84*  --    CRP  --  186.1*  --      Blood Culture:  No results found for: \"BLOODCX\"  Wound Culture:   Lab Results   Component Value Date    WOUNDCULT Few Colonies of Staphylococcus coagulase negative (A) 10/21/2020       Vanesa Chiu PA-C    "

## 2025-02-05 NOTE — ASSESSMENT & PLAN NOTE
On admission meeting sepsis criteria due to fever, elevated wbc   Admission blood cultures showing GPC in both sets, awaiting BCI and formal species identification.  -stop IV cefepime  -continue IV vancomycin  -continue pharmacy consult for guidance on vancomycin dosage  -check CBCD and BMP tomorrow  -follow up blood cultures  -recheck blood cultures tomorrow morning  -check TTE  -monitor vitals

## 2025-02-05 NOTE — PROGRESS NOTES
Hawa Cheek is a 74 y.o. female who is currently ordered Vancomycin IV with management by the Pharmacy Consult service.  Relevant clinical data and objective / subjective history reviewed.  Vancomycin Assessment:  Indication and Goal AUC/Trough: Soft tissue (goal -600, trough >10)  Clinical Status: stable  Micro:     Renal Function:  SCr: 0.63 mg/dL  CrCl: 67.2 mL/min  Days of Therapy: 3  Current Dose: 750 mg IV every 12 hours  Vancomycin Plan:  New Dosing: continue same dose  Estimated AUC: 523 mcg*hr/mL  Estimated Trough: 14.4 mcg/mL  Next Level: random level on February 12  Renal Function Monitoring: Daily BMP and UOP  Pharmacy will continue to follow closely for s/sx of nephrotoxicity, infusion reactions and appropriateness of therapy.  BMP and CBC will be ordered per protocol. We will continue to follow the patient’s culture results and clinical progress daily.    Traci Cook, Pharmacist

## 2025-02-05 NOTE — CONSULTS
Consultation - Infectious Disease   Name: Hawa Cheek 74 y.o. female I MRN: [id number]  Unit/Bed#: 7T [facility] 709-01 I Date of Admission: 2/2/2025   Date of Service: 2/5/2025 I Hospital Day: 2   Inpatient consult to Infectious Diseases  Consult performed by: LAYLA Galvan  Consult ordered by: Joyce Sims MD      Physician Requesting Evaluation: Felice Herbert MD   Reason for Evaluation / Principal Problem: abscess of gluteal region  Assessment & Plan  Gram-positive bacteremia  Admission blood cultures showing GPC in both sets. Preliminary update shows pathogens do not match with one set growing alpha hemolytic strep and the other growing staph epidermidis. While staph epidermidis may be a skin contaminant, will need to consider the strep as active bacteremia. Patient will need a TTE. She will need repeat blood cultures sent tomorrow morning. The patient has been receiving IV cefepime and IV vancomycin. She appears to be tolerating antibiotic without difficulty. Given updated culture results I recommend she stop the cefepime and remain on vancomycin. Can likely de-escalate further to ceftriaxone but given mis-match cultures, will await further culture updates before adjusting further.   -stop IV cefepime  -continue IV vancomycin  -continue pharmacy consult for guidance on vancomycin dosage  -check CBCD and BMP tomorrow  -follow up blood cultures  -recheck blood cultures tomorrow morning  -check TTE  -monitor vitals   Abscess of muscle  In patient with recent ischiogluteal bursitis diagnosis and corticosteroid injection in the R ischiogluteal bursa on 1/29/2025. I personally reviewed her CT A/P which showed enlargement of the medial R thigh musculature with surrounding fat stranding and 2 lobulated fluid collections in the obturators musculature. I personally reviewed her follow up MRI of the R hip which showed moderate volume fluid in the trochanteric bursa, rim enhancing fluid  collections in the obturators musculature measuring 4.1 x 2.1 and 6.1 x 1.8, smaller abscesses noted in the right hip/pelvis in the region of the superior gemellus, and R thigh musculature edema. Orthopedics has evaluated. IR attempted aspiration but R perianal/groin aspiration failed any fluid return, and deeper collection was not accessible percutaneously. In setting of recent corticosteroid injection, do need to consider this fluid could be infected. Recommend ongoing re-evaluation for possible access for sampling. Recommend speaking with ortho vs general surgery about I&D.  -antibiotic as above  -check CBCD and BMP tomorrow  -monitor vitals  -seral R thigh exams  -continue follow up with orthopedic surgery   -consider possible general surgery consult   Leukocytosis  WBC count elevated upon admission, then with further elevation. WBC count not fully reliable at this time given patient's recent steroid injection and oral prednisone use. Consider role of patient's bacteremia and R gluteal abscess. This morning her WBC count is trending down.   -antibiotic as above  -check CBCD tomorrow  -follow up blood cultures  -monitor vitals  Dementia (HCC)  Patient on memantine.  -monitor mood and mental status  -supportive care    Above plan was discussed in detail with patient at the bedside utilizing [employer] interpretation service.   Above plan was discussed in detail with primary service who agrees with plan for ongoing antibiotic treatment.    HISTORY OF PRESENT ILLNESS:  HPI: Hawa Cheek is a 74 y.o. year old female who presented to the [facility] ED on 2/2/2025 with R leg pain. Patient denied falls or trauma to the leg. She was previously diagnosed with ischiogluteal bursitis and was referred to orthopedics. She was seen by the outpatient orthopedic surgery team and received a corticosteroid injection in the R ischiogluteal bursa on 1/29/2025. She was additionally prescribed a prednisone  taper. Patient reports some improvement but pain was still intense and she was having difficulty walking.    Upon arrival to the ED the patient's temperature was 100.7. HR was 68. RR was 20. O2 saturation was 95% on room air. BP was 132/68. WBC count was 19.92. Creatinine was 0.7 with GFR = 85. COVID-19/Flu antigens were negative. UA was abnormal but no indicative of infection. Blood cultures were sent. The patient completed a CT A/P with contrast which showed enlarged R thigh musculature with fat stranding and 2 lobulated fluid collection within the R obturator musculature. The patient was given cefazolin and vancomycin. She was admitted for additional medical management.    After her admission she was continued on cefazolin and vancomycin as her antibiotic regimen. Orthopedics evaluated patient and recommended MRI of the pelvis for further evaluation. MRI results showed rim enhancing fluid collections in the obturators musculature measuring 4.1 x 2.1 and 6.1 x 1.8, smaller abscesses in the right hip/pelvis in the region of the superior gemellus, and R thigh muscles with edema. IR was consulted for drainage attempt. R perianal/groin aspiration failed any fluid return. Deeper collection was not accessible percutaneously. Antibiotic was broadened to cefepime and vancomycin. We have been asked for formal consult for abscess of gluteal region.     The patient has HTN, pre-diabetes, arthritis, chronic pain disorder, hyperlipidemia, vitamin D deficiency, dementia, L hip arthralgia, slow transit constipation, spinal stenosis, and OA.  She has a murmer. She has a history of stroke, pemphigus, colonoscopy, cataract extraction, release B/L wrist tendon sheath, elevated creatinine, trigger finger, bursitis, benign neoplasm of mouth, abnormal mammogram, muscle spasms of the neck, and insertion of intraocular lens. She has no known allergies.    REVIEW OF SYSTEMS:  Family at bedside reports history. Report patient has had  ongoing pain in the side of her R leg for over a week, has been evaluated by several providers. Now appears to have firm skin extending down her R medial thigh that is incredibly tender to touch. At rest patient feels alright but she is having a very hard time walking due to pain. Patient denies fever, chills, sweats, shakes. She denies cough, SOB, chest pain, difficulty breathing. She denies nausea, vomiting, abdominal pain, diarrhea. A complete 12 point system-based review of systems is otherwise negative.    PAST MEDICAL HISTORY:  Past Medical History:   Diagnosis Date    Arthralgia of hip     left    Arthritis     Chronic pain disorder     low back and shoulder    Dyslipidemia     Hyperlipidemia     Hypertension     Murmur, cardiac 09/2024    Neck pain     Pemphigus     Pre-diabetes     Shoulder pain     Stroke (HCC)     Vitamin D deficiency      Past Surgical History:   Procedure Laterality Date    CATARACT EXTRACTION Bilateral     COLONOSCOPY      IR ASPIRATION ONLY  2/4/2025    ID COLONOSCOPY FLX DX W/COLLJ SPEC WHEN PFRMD N/A 11/10/2016    Procedure: COLONOSCOPY;  Surgeon: Tom Carter MD;  Location: [facility];  Service: Gastroenterology    ID INCISION EXTENSOR TENDON SHEATH WRIST Left 7/12/2021    Procedure: RELEASE LEFT DEQUERVAINS;  Surgeon: Jt White MD;  Location:  MAIN OR;  Service: Orthopedics    ID INCISION EXTENSOR TENDON SHEATH WRIST Right 7/26/2021    Procedure: RELEASE RIGHT DEQUERVAINS;  Surgeon: Jt White MD;  Location:  MAIN OR;  Service: Orthopedics    ID XCAPSL CTRC RMVL INSJ IO LENS PROSTH W/O ECP Left 9/27/2018    Procedure: EXTRACAPSULAR CATARACT REMOVAL/INSERTION OF INTRAOCULAR LENS;  Surgeon: Alfred Stephens MD;  Location:  MAIN OR;  Service: Ophthalmology     FAMILY HISTORY:  Non-contributory    SOCIAL HISTORY:  Social History   Social History     Substance and Sexual Activity   Alcohol Use Never     Social History     Substance and Sexual Activity   Drug Use No      Social History     Tobacco Use   Smoking Status Never    Passive exposure: Never   Smokeless Tobacco Never     ALLERGIES:  No Known Allergies    MEDICATIONS:  All current active medications have been reviewed.    ANTIBIOTICS:  Cefepime - stop  Vancomycin 3  Antibiotics 3    PHYSICAL EXAM:  Temp:  [96.9 °F (36.1 °C)-98.7 °F (37.1 °C)] 96.9 °F (36.1 °C)  HR:  [66-72] 66  Resp:  [16-20] 20  BP: (119-140)/(64-72) 122/67  SpO2:  [94 %-98 %] 98 %  Temp (24hrs), Av °F (36.7 °C), Min:96.9 °F (36.1 °C), Max:98.7 °F (37.1 °C)  Current: Temperature: (!) 96.9 °F (36.1 °C)    Intake/Output Summary (Last 24 hours) at 2025 1043  Last data filed at 2025 1001  Gross per 24 hour   Intake 120 ml   Output 1300 ml   Net -1180 ml     PE limited - echo tech came in during visit and started echo exam  General Appearance:  Appearing well, nontoxic, and in no distress.   Head:  Normocephalic, without obvious abnormality, atraumatic.   Eyes:  Conjunctiva pink and sclera anicteric, both eyes.   Nose: Nares normal, mucosa normal, no drainage.   Lungs:   Respirations unlabored on room air.   Heart:  RRR; no murmur, rub or gallop.   Extremities: No cyanosis or clubbing, no edema.   Skin: No rashes noted on exposed skin.   Neurologic: Unable to fully assess, patient with baseline dementia, family gives history above. She appears calm and comfortable sitting up in bed, was able to move with some assistance for echo tech.     LABS, IMAGING, & OTHER STUDIES:  Lab Results:  I have personally reviewed pertinent labs.  Results from last 7 days   Lab Units 25  0500 25  0533 25  0511   WBC Thousand/uL 18.46* 21.25* 18.95*   HEMOGLOBIN g/dL 12.0 10.9* 12.9   PLATELETS Thousands/uL 307 240 230     Results from last 7 days   Lab Units 25  0500   POTASSIUM mmol/L 3.9   CHLORIDE mmol/L 104   CO2 mmol/L 29   BUN mg/dL 17   CREATININE mg/dL 0.63   EGFR ml/min/1.73sq m 88   CALCIUM mg/dL 9.2     Results from last 7 days    Lab Units 02/03/25  0233 02/03/25  0038 02/03/25  0021   GRAM STAIN RESULT   --  Gram positive cocci in clusters* Gram positive cocci in clusters*   URINE CULTURE  20,000-29,000 cfu/ml  --   --      Imaging Studies:   I have personally reviewed pertinent imaging study reports and images in PACS.    CT ABDOMEN PELVIS WITH CONTRAST 2/2/2025 - I personally reviewed this study which showed no significant abnormalities in captured lower chest, unremarkable liver/gallbladder/spleen/pancreas, unremarkable kidneys without hydronephrosis, no obstruction of the bowel or stomach, trace pelvic free fluid, mild bladder wall thickening, enlargement of the medial R thigh musculature with surrounding fat stranding and lobulated fluid collections measuring 3.1. x 1.9 x 2.2cm and 3.4 x 1.9 x 1.8cm.    MRI PELVIS BONY WO AND W CONTRAST 2/4/2025 - I personally reviewed this study which showed R hipe with no joint effusions, normal bone marrow signal, no acute hip fracture, moderate volume fluid in the trochanteric bursa, rim enhancing fluid collections in the obturators musculature measuring 4.1 x 2.1 and 6.1 x 1.8. Smaller abscesses noted in the right hip/pelvis in the region of the superior gemellus. R Thigh muscles with edema.     Other Studies:   I have personally reviewed pertinent reports:    02/02/2025 2052 02/02/2025 2113 FLU/COVID Rapid Antigen (30 min. TAT) - Preferred screening test in ED [[id number]]    Nares from Nose    Final result [facility] This test has been performed using the Quidel Ashlee 2 FLU+SARS Antigen test under the Emergency Use Authorization (EUA). This test has been validated by the  and verified by the performing laboratory. The Ashlee uses lateral flow immunofluorescent sandwich assay to detect SARS-COV, Influenza A and Influenza B Antigen.       The Quidel Ashlee 2 SARS Antigen test does not differentiate between SARS-CoV and SARS-CoV-2.       Negative results  are presumptive and may be confirmed with a molecular assay, if necessary, for patient management. Negative results do not rule out SARS-CoV-2 or influenza infection and should not be used as the sole basis for treatment or patient management decisions. A negative test result may occur if the level of antigen in a sample is below the limit of detection of this test.       Positive results are indicative of the presence of viral antigens, but do not rule out bacterial infection or co-infection with other viruses.       All test results should be used as an adjunct to clinical observations and other information available to the provider.   FOR PEDIATRIC PATIENTS - copy/paste COVID Guidelines URL to browser: [email or web address]    Component Value   SARS COV Rapid Antigen Negative   Influenza A Rapid Antigen Negative   Influenza B Rapid Antigen Negative

## 2025-02-05 NOTE — ASSESSMENT & PLAN NOTE
Ct pelvis/abdominal -  Lobulated fluid collections within the right obturator musculature - compatible with intramuscular abscesses in the appropriate clinical setting.  Medial thigh musculature with surrounding fat stranding/edema which may be due to myositis   Remains afebrile, however CRP, SED and WBC trending up  S/p - ceFAZolin IVPB  2,000 mg for 2 days   Abscess could have been present during corticosteroid injection on 1/29/2025 vs the corticosteroid steroid shot causing iatrogenic abscess  MRI on 2/4/25 Intramuscular abscesses in the right pelvis/hip musculature as described with     adjacent muscular edema/enhancement consistent with myositis. Right trochanteric bursitis, which   may be infectious  Blood culture positives for Staph aureus   IR attempted U/S guided aspiration on right perineal unsuccessful due to difficult location(right ischial tuberosity and within the obturator externus)  Orthopedic sign off and defer the case to General Surgery.     ID consulted. Cefepime stopped by them   Pain well controlled.       Plan:  Continue  Vancomycin 750 mg every 12 hours  Ibuprofen 600 mg as needed every 4 hours  Tylenol 975 mg every 8 hours scheduled  Roxicodone 2.5 one time  Added oxycodone 2.5 mg every 6 as needed moderate pain and breakthrough pain  CBC and BMP am

## 2025-02-05 NOTE — ASSESSMENT & PLAN NOTE
In patient with recent ischiogluteal bursitis diagnosis and corticosteroid injection in the R ischiogluteal bursa on 1/29/2025. I personally reviewed her CT A/P which showed enlargement of the medial R thigh musculature with surrounding fat stranding and 2 lobulated fluid collections in the obturators musculature. I personally reviewed her follow up MRI of the R hip which showed moderate volume fluid in the trochanteric bursa, rim enhancing fluid collections in the obturators musculature measuring 4.1 x 2.1 and 6.1 x 1.8, smaller abscesses noted in the right hip/pelvis in the region of the superior gemellus, and R thigh musculature edema. Orthopedics has evaluated. IR attempted aspiration but R perianal/groin aspiration failed any fluid return, and deeper collection was not accessible percutaneously. In setting of recent corticosteroid injection, do need to consider this fluid could be infected. Recommend ongoing re-evaluation for possible access for sampling. Recommend speaking with ortho vs general surgery about I&D.  -antibiotic as above  -check CBCD and BMP tomorrow  -monitor vitals  -seral R thigh exams  -continue follow up with orthopedic surgery   -consider possible general surgery consult

## 2025-02-05 NOTE — ASSESSMENT & PLAN NOTE
WBC count elevated upon admission, then with further elevation. WBC count not fully reliable at this time given patient's recent steroid injection and oral prednisone use. Consider role of patient's bacteremia and R gluteal abscess. This morning her WBC count is trending down.   -antibiotic as above  -check CBCD tomorrow  -follow up blood cultures  -monitor vitals

## 2025-02-05 NOTE — PLAN OF CARE
Problem: PAIN - ADULT  Goal: Verbalizes/displays adequate comfort level or baseline comfort level  Description: Interventions:  - Encourage patient to monitor pain and request assistance  - Assess pain using appropriate pain scale  - Administer analgesics based on type and severity of pain and evaluate response  - Implement non-pharmacological measures as appropriate and evaluate response  - Consider cultural and social influences on pain and pain management  - Notify physician/advanced practitioner if interventions unsuccessful or patient reports new pain  Outcome: Progressing     Problem: INFECTION - ADULT  Goal: Absence or prevention of progression during hospitalization  Description: INTERVENTIONS:  - Assess and monitor for signs and symptoms of infection  - Monitor lab/diagnostic results  - Monitor all insertion sites, i.e. indwelling lines, tubes, and drains  - Monitor endotracheal if appropriate and nasal secretions for changes in amount and color  - [place] appropriate cooling/warming therapies per order  - Administer medications as ordered  - Instruct and encourage patient and family to use good hand hygiene technique  - Identify and instruct in appropriate isolation precautions for identified infection/condition  Outcome: Progressing  Goal: Absence of fever/infection during neutropenic period  Description: INTERVENTIONS:  - Monitor WBC    Outcome: Progressing     Problem: DISCHARGE PLANNING  Goal: Discharge to home or other facility with appropriate resources  Description: INTERVENTIONS:  - Identify barriers to discharge w/patient and caregiver  - Arrange for needed discharge resources and transportation as appropriate  - Identify discharge learning needs (meds, wound care, etc.)  - Arrange for interpretive services to assist at discharge as needed  - Refer to Case Management Department for coordinating discharge planning if the patient needs post-hospital services based on physician/advanced  practitioner order or complex needs related to functional status, cognitive ability, or social support system  Outcome: Progressing

## 2025-02-05 NOTE — ASSESSMENT & PLAN NOTE
Admission blood cultures showing GPC in both sets. Preliminary update shows pathogens do not match with one set growing alpha hemolytic strep and the other growing staph epidermidis. While staph epidermidis may be a skin contaminant, will need to consider the strep as active bacteremia. Patient will need a TTE. She will need repeat blood cultures sent tomorrow morning. The patient has been receiving IV cefepime and IV vancomycin. She appears to be tolerating antibiotic without difficulty. Given updated culture results I recommend she stop the cefepime and remain on vancomycin. Can likely de-escalate further to ceftriaxone but given mis-match cultures, will await further culture updates before adjusting further.   -stop IV cefepime  -continue IV vancomycin  -continue pharmacy consult for guidance on vancomycin dosage  -check CBCD and BMP tomorrow  -follow up blood cultures  -recheck blood cultures tomorrow morning  -check TTE  -monitor vitals

## 2025-02-05 NOTE — TELEPHONE ENCOUNTER
Patient daughter called requesting status of form. Daughter stated she need this form completed asap. Informed of form process. Verbally understood.

## 2025-02-05 NOTE — ASSESSMENT & PLAN NOTE
On admission meeting criteria due to fever and leukocytosis in the setting of abscess of muscle.   Blood cultures positive for Staph aureus   Clinically patient is improving   ID consulted and on board     Plan:  TTE   Continue IV vancomycin   CBC, CMP am   Follow up B.C sensitivity   Blood cultures recheck am

## 2025-02-05 NOTE — PROGRESS NOTES
Progress Note - Family Medicine   Name: Hawa Cheek 74 y.o. female I MRN: [id number]  Unit/Bed#: 7T [facility] 709-01 I Date of Admission: 2/2/2025   Date of Service: 2/5/2025 I Hospital Day: 2     Assessment & Plan  Abscess of muscle  Ct pelvis/abdominal -  Lobulated fluid collections within the right obturator musculature - compatible with intramuscular abscesses in the appropriate clinical setting.  Medial thigh musculature with surrounding fat stranding/edema which may be due to myositis   Remains afebrile, however CRP, SED and WBC trending up  S/p - ceFAZolin IVPB  2,000 mg for 2 days   Abscess could have been present during corticosteroid injection on 1/29/2025 vs the corticosteroid steroid shot causing iatrogenic abscess  MRI on 2/4/25 Intramuscular abscesses in the right pelvis/hip musculature as described with     adjacent muscular edema/enhancement consistent with myositis. Right trochanteric bursitis, which   may be infectious  Blood culture positives for Staph aureus   IR attempted U/S guided aspiration on right perineal unsuccessful due to difficult location(right ischial tuberosity and within the obturator externus)  Orthopedic sign off and defer the case to General Surgery.     ID consulted. Cefepime stopped by them   Pain well controlled.       Plan:  Continue  Vancomycin 750 mg every 12 hours  Ibuprofen 600 mg as needed every 4 hours  Tylenol 975 mg every 8 hours scheduled  Roxicodone 2.5 one time  Added oxycodone 2.5 mg every 6 as needed moderate pain and breakthrough pain  CBC and BMP am     Leukocytosis  In the setting of infection  See a/p for bacteremia   Gram-positive bacteremia  On admission meeting criteria due to fever and leukocytosis in the setting of abscess of muscle.   Blood cultures positive for Staph aureus   Clinically patient is improving   ID consulted and on board     Plan:  TTE   Continue IV vancomycin   CBC, CMP am   Follow up B.C sensitivity   Blood cultures recheck am      Dyslipidemia  Home medication - atorvastatin (LIPITOR) 40 mg tablet     Plan:  Continue home medications  Essential hypertension  Home medications - amLODIPine (NORVASC) 5 mg tablet  ,     Plan:   Continue home medications  Personal history of transient ischemic attack (TIA), and cerebral infarction without residual deficits  Stable  Compliant with home medications  BP within Normal Limits  Home medications - aspirin (Aspirin Low Dose) 81 mg EC tablet , atorvastatin (LIPITOR) 40 mg tablet , omega-3-acid ethyl esters (LOVAZA) 1 g capsule , amlodipine 5 mg tablet     Plan:  Continue home medications  Keep BP within normal limits  Dementia (HCC)  Home medication - Memantine 5 mg bid      Plan  Continue home medications  Sepsis (HCC) (Resolved: 2/5/2025)      VTE Pharmacologic Prophylaxis: VTE Score: 6 High Risk (Score >/= 5) - Pharmacological DVT Prophylaxis Ordered: enoxaparin (Lovenox). Sequential Compression Devices Ordered.    Mobility:   Basic Mobility Inpatient Raw Score: 22  JH-HLM Goal: 7: Walk 25 feet or more  JH-HLM Achieved: 7: Walk 25 feet or more  JH-HLM Goal achieved. Continue to encourage appropriate mobility.    Patient Centered Rounds: I performed bedside rounds with nursing staff today.   Discussions with Specialists or Other Care Team Provider: Orthopedics     Education and Discussions with Family / Patient: Updated  (daughter) via phone.    Current Length of Stay: 2 day(s)  Current Patient Status: Inpatient   Certification Statement: The patient will continue to require additional inpatient hospital stay due to currently treatment of abscess on the gluteal area.   Discharge Plan: Anticipate discharge in 24-48 hrs to home.    Code Status: Level 1 - Full Code    Subjective   Patient was seen and examined at bedside during this morning. Patient was resting comfortably in bed   Pian is well controlled and is improving. Patient remains afebrile and without signs of distress. Reports  difficulty to walk and pain when he tries to walk or move her R leg. Plan was discussed with patient and daughter over the phone. All the questions were answered to patient's satisfaction.     Objective :  Temp:  [97.5 °F (36.4 °C)-98.7 °F (37.1 °C)] 98.7 °F (37.1 °C)  HR:  [66-72] 66  BP: (106-140)/(64-85) 125/64  Resp:  [16-20] 18  SpO2:  [94 %-98 %] 94 %  O2 Device: None (Room air)    Body mass index is 21.79 kg/m².     Input and Output Summary (last 24 hours):     Intake/Output Summary (Last 24 hours) at 2/5/2025 0631  Last data filed at 2/5/2025 0500  Gross per 24 hour   Intake 120 ml   Output 500 ml   Net -380 ml       Physical Exam  Vitals and nursing note reviewed.   Constitutional:       General: She is not in acute distress.     Appearance: She is well-developed.   HENT:      Head: Normocephalic and atraumatic.      Nose: Nose normal. No congestion or rhinorrhea.      Mouth/Throat:      Mouth: Mucous membranes are moist.      Pharynx: Oropharynx is clear.   Eyes:      Extraocular Movements: Extraocular movements intact.      Conjunctiva/sclera: Conjunctivae normal.      Pupils: Pupils are equal, round, and reactive to light.   Cardiovascular:      Rate and Rhythm: Normal rate and regular rhythm.      Heart sounds: No murmur heard.  Pulmonary:      Effort: Pulmonary effort is normal. No respiratory distress.      Breath sounds: Normal breath sounds.   Abdominal:      Palpations: Abdomen is soft.      Tenderness: There is no abdominal tenderness.   Musculoskeletal:         General: No swelling.      Cervical back: Neck supple.      Right lower leg: No edema.      Left lower leg: No edema.      Comments: Skin warm with mild erythema in the medial and posterior thigh area.  No ecchymosis. Very tenderness at palpation. No drainage.     Skin:     General: Skin is warm and dry.      Capillary Refill: Capillary refill takes less than 2 seconds.   Neurological:      General: No focal deficit present.      Mental  Status: She is alert and oriented to person, place, and time.      Cranial Nerves: No cranial nerve deficit.   Psychiatric:         Mood and Affect: Mood normal.               Lab Results: I have reviewed the following results:   Results from last 7 days   Lab Units 02/05/25  0500   WBC Thousand/uL 18.46*   HEMOGLOBIN g/dL 12.0   HEMATOCRIT % 36.6   PLATELETS Thousands/uL 307   SEGS PCT % 75   LYMPHO PCT % 16   MONO PCT % 7   EOS PCT % 1     Results from last 7 days   Lab Units 02/04/25  0533   SODIUM mmol/L 139   POTASSIUM mmol/L 3.8   CHLORIDE mmol/L 106   CO2 mmol/L 26   BUN mg/dL 18   CREATININE mg/dL 0.58*   ANION GAP mmol/L 7   CALCIUM mg/dL 8.8   GLUCOSE RANDOM mg/dL 152*                       Recent Cultures (last 7 days):   Results from last 7 days   Lab Units 02/03/25  0233 02/03/25  0038 02/03/25  0021   GRAM STAIN RESULT   --  Gram positive cocci in clusters* Gram positive cocci in clusters*   URINE CULTURE  20,000-29,000 cfu/ml  --   --              Last 24 Hours Medication List:     Current Facility-Administered Medications:     acetaminophen (TYLENOL) tablet 975 mg, Q8H TAMMY    amLODIPine (NORVASC) tablet 5 mg, Daily    aspirin (ECOTRIN LOW STRENGTH) EC tablet 81 mg, Daily    atorvastatin (LIPITOR) tablet 40 mg, QPM    cefepime (MAXIPIME) IVPB (premix in dextrose) 2,000 mg 50 mL, Q12H, Last Rate: 2,000 mg (02/04/25 2155)    enoxaparin (LOVENOX) subcutaneous injection 40 mg, Daily    fish oil capsule 1,000 mg, Daily    ibuprofen (MOTRIN) tablet 400 mg, Q6H PRN    memantine (NAMENDA) tablet 5 mg, BID    methocarbamol (ROBAXIN) tablet 500 mg, BID    oxyCODONE (ROXICODONE) split tablet 2.5 mg, Q6H PRN    sodium chloride 0.9 % infusion, Continuous, Last Rate: 75 mL/hr (02/04/25 2154)    vancomycin (VANCOCIN) IVPB (premix in dextrose) 750 mg 150 mL, Q12H, Last Rate: 750 mg (02/05/25 0351)    Administrative Statements   Today, Patient Was Seen By: Polina Motley MD

## 2025-02-05 NOTE — ASSESSMENT & PLAN NOTE
75yo Female Right pelvic intra-muscular abscess of obturator internus and externus     Plan:  There is no acute orthopedic surgical intervention required at this time.  Patient does not have a right hip infection and therefore there is no need for orthopedics to be involved.  We would recommend that general surgery be consulted for this patient for consideration of possible I&D of this abscess.  Patient has had clinical improvement stating that she has less pain at rest, with passive motion, and to palpation though it still very tender to palpation.  Patient also was having improvement of her white count from 21.25 to 18.46 since switching her to cefepime and Vanco.  Patient is having again improvement but would still defer to general surgery for possible I&D.  Patient was NPO at midnight for possible I&D but again will defer to General Surgery   Referral placed  WBAT RLE  Pain control PRN  Medical management per SLIM  Continue IV Abx per ID  Ortho will continue to follow

## 2025-02-06 PROBLEM — R74.01 TRANSAMINITIS: Status: ACTIVE | Noted: 2025-02-06

## 2025-02-06 LAB
ALBUMIN SERPL BCG-MCNC: 3.1 G/DL (ref 3.5–5)
ALP SERPL-CCNC: 102 U/L (ref 34–104)
ALT SERPL W P-5'-P-CCNC: 86 U/L (ref 7–52)
ANION GAP SERPL CALCULATED.3IONS-SCNC: 9 MMOL/L (ref 4–13)
AST SERPL W P-5'-P-CCNC: 60 U/L (ref 13–39)
BASOPHILS # BLD AUTO: 0.08 THOUSANDS/ΜL (ref 0–0.1)
BASOPHILS NFR BLD AUTO: 1 % (ref 0–1)
BILIRUB SERPL-MCNC: 0.38 MG/DL (ref 0.2–1)
BUN SERPL-MCNC: 17 MG/DL (ref 5–25)
CALCIUM ALBUM COR SERPL-MCNC: 10.1 MG/DL (ref 8.3–10.1)
CALCIUM SERPL-MCNC: 9.4 MG/DL (ref 8.4–10.2)
CHLORIDE SERPL-SCNC: 105 MMOL/L (ref 96–108)
CO2 SERPL-SCNC: 29 MMOL/L (ref 21–32)
CREAT SERPL-MCNC: 0.61 MG/DL (ref 0.6–1.3)
EOSINOPHIL # BLD AUTO: 0.33 THOUSAND/ΜL (ref 0–0.61)
EOSINOPHIL NFR BLD AUTO: 2 % (ref 0–6)
ERYTHROCYTE [DISTWIDTH] IN BLOOD BY AUTOMATED COUNT: 12.5 % (ref 11.6–15.1)
FLUAV RNA RESP QL NAA+PROBE: POSITIVE
FLUBV RNA RESP QL NAA+PROBE: NEGATIVE
GFR SERPL CREATININE-BSD FRML MDRD: 88 ML/MIN/1.73SQ M
GLUCOSE SERPL-MCNC: 123 MG/DL (ref 65–140)
HCT VFR BLD AUTO: 41.2 % (ref 34.8–46.1)
HGB BLD-MCNC: 13.2 G/DL (ref 11.5–15.4)
IMM GRANULOCYTES # BLD AUTO: 0.31 THOUSAND/UL (ref 0–0.2)
IMM GRANULOCYTES NFR BLD AUTO: 2 % (ref 0–2)
LYMPHOCYTES # BLD AUTO: 3.43 THOUSANDS/ΜL (ref 0.6–4.47)
LYMPHOCYTES NFR BLD AUTO: 21 % (ref 14–44)
MCH RBC QN AUTO: 30.1 PG (ref 26.8–34.3)
MCHC RBC AUTO-ENTMCNC: 32 G/DL (ref 31.4–37.4)
MCV RBC AUTO: 94 FL (ref 82–98)
MONOCYTES # BLD AUTO: 1.24 THOUSAND/ΜL (ref 0.17–1.22)
MONOCYTES NFR BLD AUTO: 8 % (ref 4–12)
NEUTROPHILS # BLD AUTO: 11.02 THOUSANDS/ΜL (ref 1.85–7.62)
NEUTS SEG NFR BLD AUTO: 66 % (ref 43–75)
NRBC BLD AUTO-RTO: 0 /100 WBCS
PLATELET # BLD AUTO: 333 THOUSANDS/UL (ref 149–390)
PMV BLD AUTO: 10 FL (ref 8.9–12.7)
POTASSIUM SERPL-SCNC: 4 MMOL/L (ref 3.5–5.3)
PROT SERPL-MCNC: 6.7 G/DL (ref 6.4–8.4)
RBC # BLD AUTO: 4.38 MILLION/UL (ref 3.81–5.12)
RSV RNA RESP QL NAA+PROBE: NEGATIVE
SARS-COV-2 RNA RESP QL NAA+PROBE: NEGATIVE
SODIUM SERPL-SCNC: 143 MMOL/L (ref 135–147)
WBC # BLD AUTO: 16.41 THOUSAND/UL (ref 4.31–10.16)

## 2025-02-06 PROCEDURE — 99222 1ST HOSP IP/OBS MODERATE 55: CPT | Performed by: SPECIALIST

## 2025-02-06 PROCEDURE — 85025 COMPLETE CBC W/AUTO DIFF WBC: CPT

## 2025-02-06 PROCEDURE — 80053 COMPREHEN METABOLIC PANEL: CPT

## 2025-02-06 PROCEDURE — 0241U HB NFCT DS VIR RESP RNA 4 TRGT: CPT

## 2025-02-06 PROCEDURE — 87040 BLOOD CULTURE FOR BACTERIA: CPT | Performed by: NURSE PRACTITIONER

## 2025-02-06 PROCEDURE — 99232 SBSQ HOSP IP/OBS MODERATE 35: CPT

## 2025-02-06 RX ORDER — ACETAMINOPHEN 325 MG/1
975 TABLET ORAL 2 TIMES DAILY
Status: DISCONTINUED | OUTPATIENT
Start: 2025-02-06 | End: 2025-02-15 | Stop reason: HOSPADM

## 2025-02-06 RX ADMIN — VANCOMYCIN HYDROCHLORIDE 750 MG: 750 INJECTION, SOLUTION INTRAVENOUS at 03:43

## 2025-02-06 RX ADMIN — ASPIRIN 81 MG: 81 TABLET, COATED ORAL at 08:51

## 2025-02-06 RX ADMIN — ACETAMINOPHEN 975 MG: 325 TABLET, FILM COATED ORAL at 21:49

## 2025-02-06 RX ADMIN — SODIUM CHLORIDE 75 ML/HR: 0.9 INJECTION, SOLUTION INTRAVENOUS at 15:08

## 2025-02-06 RX ADMIN — OMEGA-3 FATTY ACIDS CAP 1000 MG 1000 MG: 1000 CAP at 08:51

## 2025-02-06 RX ADMIN — ACETAMINOPHEN 975 MG: 325 TABLET, FILM COATED ORAL at 05:00

## 2025-02-06 RX ADMIN — ENOXAPARIN SODIUM 40 MG: 40 INJECTION SUBCUTANEOUS at 08:50

## 2025-02-06 RX ADMIN — AMLODIPINE BESYLATE 5 MG: 5 TABLET ORAL at 08:50

## 2025-02-06 RX ADMIN — ATORVASTATIN CALCIUM 40 MG: 40 TABLET, FILM COATED ORAL at 17:17

## 2025-02-06 RX ADMIN — MEMANTINE HYDROCHLORIDE 5 MG: 5 TABLET, FILM COATED ORAL at 17:17

## 2025-02-06 RX ADMIN — METHOCARBAMOL TABLETS 500 MG: 500 TABLET, COATED ORAL at 17:17

## 2025-02-06 RX ADMIN — VANCOMYCIN HYDROCHLORIDE 750 MG: 750 INJECTION, SOLUTION INTRAVENOUS at 15:13

## 2025-02-06 RX ADMIN — METHOCARBAMOL TABLETS 500 MG: 500 TABLET, COATED ORAL at 08:51

## 2025-02-06 RX ADMIN — MEMANTINE HYDROCHLORIDE 5 MG: 5 TABLET, FILM COATED ORAL at 08:51

## 2025-02-06 NOTE — CONSULTS
"Chief Complaint: Abscess obturator internus and externus muscle right      History of Present Illness: Patient is a 74-year-old Finnish-speaking female admitted with an intramuscular abscess of the obturator internus and extremities muscle.  She is followed by family practice and orthopedics.  Pain and swelling is noted on the medial aspect of the right thigh.  She has been treated with antibiotics and her symptoms have improved along with her leukocytosis.  IR attempted drainage was unsuccessful due to \"a difficult location\".  At that time consultation was obtained with general surgery.    In the interim her symptoms have improved.  The pain and swelling have decreased.  White count is also improved.      Past Medical History:   Past Medical History:   Diagnosis Date    Arthralgia of hip     left    Arthritis     Chronic pain disorder     low back and shoulder    Dyslipidemia     Hyperlipidemia     Hypertension     Murmur, cardiac 09/2024    Neck pain     Pemphigus     Pre-diabetes     Shoulder pain     Stroke (HCC)     Vitamin D deficiency          Past Surgical History:    Past Surgical History:   Procedure Laterality Date    CATARACT EXTRACTION Bilateral     COLONOSCOPY      IR ASPIRATION ONLY  2/4/2025    IL COLONOSCOPY FLX DX W/COLLJ SPEC WHEN PFRMD N/A 11/10/2016    Procedure: COLONOSCOPY;  Surgeon: Tom Carter MD;  Location: [facility];  Service: Gastroenterology    IL INCISION EXTENSOR TENDON SHEATH WRIST Left 7/12/2021    Procedure: RELEASE LEFT DEQUERVAINS;  Surgeon: Jt White MD;  Location:  MAIN OR;  Service: Orthopedics    IL INCISION EXTENSOR TENDON SHEATH WRIST Right 7/26/2021    Procedure: RELEASE RIGHT DEQUERVAINS;  Surgeon: Jt White MD;  Location:  MAIN OR;  Service: Orthopedics    IL XCAPSL CTRC RMVL INSJ IO LENS PROSTH W/O ECP Left 9/27/2018    Procedure: EXTRACAPSULAR CATARACT REMOVAL/INSERTION OF INTRAOCULAR LENS;  Surgeon: Alfred Stephens MD;  Location:  MAIN OR;  Service: " Ophthalmology         Allergies:  No Known Allergies      Medications:    Current Facility-Administered Medications:     acetaminophen (TYLENOL) tablet 975 mg, 975 mg, Oral, BID, Joyce Sims MD    amLODIPine (NORVASC) tablet 5 mg, 5 mg, Oral, Daily, Jesús Nunez MD, 5 mg at 02/06/25 0850    aspirin (ECOTRIN LOW STRENGTH) EC tablet 81 mg, 81 mg, Oral, Daily, Jesús Nunez MD, 81 mg at 02/06/25 0851    atorvastatin (LIPITOR) tablet 40 mg, 40 mg, Oral, QPM, Houston Parker MD, 40 mg at 02/05/25 1744    enoxaparin (LOVENOX) subcutaneous injection 40 mg, 40 mg, Subcutaneous, Daily, Jesús Nunez MD, 40 mg at 02/06/25 0850    fish oil capsule 1,000 mg, 1,000 mg, Oral, Daily, Jesús Nunez MD, 1,000 mg at 02/06/25 0851    ibuprofen (MOTRIN) tablet 400 mg, 400 mg, Oral, Q6H PRN, Joyce Sims MD, 400 mg at 02/04/25 2356    memantine (NAMENDA) tablet 5 mg, 5 mg, Oral, BID, Jesús Nunez MD, 5 mg at 02/06/25 0851    methocarbamol (ROBAXIN) tablet 500 mg, 500 mg, Oral, BID, Jesús Nunez MD, 500 mg at 02/06/25 0851    sodium chloride 0.9 % infusion, 75 mL/hr, Intravenous, Continuous, Mark Farley MD, Last Rate: 75 mL/hr at 02/05/25 1606, 75 mL/hr at 02/05/25 1606    vancomycin (VANCOCIN) IVPB (premix in dextrose) 750 mg 150 mL, 15 mg/kg, Intravenous, Q12H, Houston Parker MD, Last Rate: 150 mL/hr at 02/06/25 0343, 750 mg at 02/06/25 0343      Social History:  Social History     Social History     Substance and Sexual Activity   Alcohol Use Never     Social History     Substance and Sexual Activity   Drug Use No     Social History     Tobacco Use   Smoking Status Never    Passive exposure: Never   Smokeless Tobacco Never         Family History:    Family History   Problem Relation Age of Onset    Hyperlipidemia Mother     Hypertension Mother     Heart disease Father     No Known Problems Sister     No Known Problems Sister     No Known Problems Sister     No Known Problems Daughter     No Known Problems  "Maternal Grandmother     No Known Problems Maternal Grandfather     No Known Problems Paternal Grandmother     No Known Problems Paternal Grandfather     No Known Problems Maternal Aunt     No Known Problems Paternal Aunt     No Known Problems Paternal Aunt     No Known Problems Paternal Aunt          Review of Systems:    Please refer to the formal H&P.    Vitals:  Vitals:    02/06/25 0719   BP: 127/75   Pulse: 63   Resp: 20   Temp: (!) 97.3 °F (36.3 °C)   SpO2: 97%       Physical Exam:  Elderly cachectic appearing  female awake and alert.  On the phone.  5 foot 1 inches 115 pounds.    Vital signs as above    Medial aspect of right thigh is firm.  Dressing noted with IR note.  Minimally tender to palpation.              Lab Results: I have personally reviewed pertinent reports. See below.  Imaging: I have personally reviewed pertinent imaging studies including CT scan and MRI.  EKG, Pathology, and Other Studies: I have personally reviewed pertinent reports.     No results displayed because visit has over 200 results.            Impression:  Abscess noted in right obturator internus and externus muscle.  I agree with IR \"difficult location\".  Patient appears to be improving with antibiotic therapy as white count and symptoms have both improved.    Plan:  No surgery planned at this time.  Continue current therapy.     "

## 2025-02-06 NOTE — PLAN OF CARE
Problem: PAIN - ADULT  Goal: Verbalizes/displays adequate comfort level or baseline comfort level  Description: Interventions:  - Encourage patient to monitor pain and request assistance  - Assess pain using appropriate pain scale  - Administer analgesics based on type and severity of pain and evaluate response  - Implement non-pharmacological measures as appropriate and evaluate response  - Consider cultural and social influences on pain and pain management  - Notify physician/advanced practitioner if interventions unsuccessful or patient reports new pain  Outcome: Progressing     Problem: INFECTION - ADULT  Goal: Absence or prevention of progression during hospitalization  Description: INTERVENTIONS:  - Assess and monitor for signs and symptoms of infection  - Monitor lab/diagnostic results  - Monitor all insertion sites, i.e. indwelling lines, tubes, and drains  - Monitor endotracheal if appropriate and nasal secretions for changes in amount and color  - [place] appropriate cooling/warming therapies per order  - Administer medications as ordered  - Instruct and encourage patient and family to use good hand hygiene technique  - Identify and instruct in appropriate isolation precautions for identified infection/condition  Outcome: Progressing  Goal: Absence of fever/infection during neutropenic period  Description: INTERVENTIONS:  - Monitor WBC    Outcome: Progressing     Problem: DISCHARGE PLANNING  Goal: Discharge to home or other facility with appropriate resources  Description: INTERVENTIONS:  - Identify barriers to discharge w/patient and caregiver  - Arrange for needed discharge resources and transportation as appropriate  - Identify discharge learning needs (meds, wound care, etc.)  - Arrange for interpretive services to assist at discharge as needed  - Refer to Case Management Department for coordinating discharge planning if the patient needs post-hospital services based on physician/advanced  practitioner order or complex needs related to functional status, cognitive ability, or social support system  Outcome: Progressing

## 2025-02-06 NOTE — PROGRESS NOTES
Hawa Cheek is a 74 y.o. female who is currently ordered Vancomycin IV with management by the Pharmacy Consult service.  Relevant clinical data and objective / subjective history reviewed.  Vancomycin Assessment:  1. Indication and Goal AUC/Trough: Soft tissue (goal -600, trough >10)  2. Clinical Status: stable  3. Micro:         4. Renal Function:  SCr: 0.61 mg/dL  CrCl: 59.9 mL/min  5. Days of Therapy: 4  6. Current Dose: 750 mg IV every 12 hours  Vancomycin Plan:  1. Dosing: continue current dose  2. Estimated AUC: 520 mcg*hr/mL  3. Estimated Trough: 14.2 mcg/mL  4. Next Level: 2/12/2025 with morning labs  5. Renal Function Monitoring: Daily BMP and UOP  Pharmacy will continue to follow closely for s/sx of nephrotoxicity, infusion reactions and appropriateness of therapy.  BMP and CBC will be ordered per protocol. We will continue to follow the patient’s culture results and clinical progress daily.    Ana Vu, Pharmacist

## 2025-02-06 NOTE — PLAN OF CARE
Problem: PAIN - ADULT  Goal: Verbalizes/displays adequate comfort level or baseline comfort level  Description: Interventions:  - Encourage patient to monitor pain and request assistance  - Assess pain using appropriate pain scale  - Administer analgesics based on type and severity of pain and evaluate response  - Implement non-pharmacological measures as appropriate and evaluate response  - Consider cultural and social influences on pain and pain management  - Notify physician/advanced practitioner if interventions unsuccessful or patient reports new pain  Outcome: Progressing     Problem: INFECTION - ADULT  Goal: Absence or prevention of progression during hospitalization  Description: INTERVENTIONS:  - Assess and monitor for signs and symptoms of infection  - Monitor lab/diagnostic results  - Monitor all insertion sites, i.e. indwelling lines, tubes, and drains  - Monitor endotracheal if appropriate and nasal secretions for changes in amount and color  - [place] appropriate cooling/warming therapies per order  - Administer medications as ordered  - Instruct and encourage patient and family to use good hand hygiene technique  - Identify and instruct in appropriate isolation precautions for identified infection/condition  Outcome: Progressing  Goal: Absence of fever/infection during neutropenic period  Description: INTERVENTIONS:  - Monitor WBC    Outcome: Progressing

## 2025-02-06 NOTE — QUICK NOTE
Quick Note - Orthopedics   Name: Hawa Cheek 74 y.o. female I MRN: [id number]  Unit/Bed#: 7T [facility] 709-01 I Date of Admission: 2/2/2025   Date of Service: 2/5/2025 I Hospital Day: 2      Assessment & Plan  Abscess of muscle  75yo Female Right pelvic intra-muscular abscess of obturator internus and externus      Plan:  There is no acute orthopedic surgical intervention required at this time.  Patient does not have a right hip infection and therefore there is no need for orthopedics to be involved.  Patient continues to have clinical improvement and her leukocytosis continues to trend down   WBAT RLE  Pain control PRN  Medical management per SLIM  Continue IV Abx per ID  Ortho signing off    Vanesa Chiu PA-C

## 2025-02-06 NOTE — ASSESSMENT & PLAN NOTE
Could be in the setting of acetaminophen v/s current infection process  Asymptomatic    Plan:  CMP am   Decreased tylenol to BID   Consider stop tylenol if liver enzymes continue trending up.

## 2025-02-06 NOTE — PROGRESS NOTES
Progress Note - Family Medicine   Name: Hawa Cheek 74 y.o. female I MRN: [id number]  Unit/Bed#: 7T [facility] 709-01 I Date of Admission: 2/2/2025   Date of Service: 2/6/2025 I Hospital Day: 3     Assessment & Plan  Abscess of muscle  Ct pelvis/abdominal -  Lobulated fluid collections within the right obturator musculature - compatible with intramuscular abscesses in the appropriate clinical setting.  Medial thigh musculature with surrounding fat stranding/edema which may be due to myositis   Remains afebrile, however CRP, SED and WBC trending up  S/p - ceFAZolin IVPB  2,000 mg for 2 days   Abscess could have been present during corticosteroid injection on 1/29/2025 vs the corticosteroid steroid shot causing iatrogenic abscess  MRI on 2/4/25 Intramuscular abscesses in the right pelvis/hip musculature as described with     adjacent muscular edema/enhancement consistent with myositis. Right trochanteric bursitis, which   may be infectious  Blood culture positives for Staph aureus   IR attempted U/S guided aspiration on right perineal unsuccessful due to difficult location(right ischial tuberosity and within the obturator externus)  Orthopedic sign off and defer the case to General Surgery  General Surgery was consulted no surgery planned at this time due to difficult location.    ID consulted. Cefepime stopped by them   Pain well controlled.   Clinically patient is improving       Plan:  Continue  Vancomycin 750 mg every 12 hours  Ibuprofen 600 mg as needed every 6 hours  Tylenol decreased to 975 mg every 12 hours scheduled  Roxicodone 2.5 one time  oxycodone 2.5 mg every 6 as needed moderate pain and breakthrough pain  CBC and CMP am     Leukocytosis  In the setting of infection  See a/p for bacteremia   Gram-positive bacteremia  On admission meeting criteria due to fever and leukocytosis in the setting of abscess of muscle.   Blood cultures positive for Staph aureus   Clinically patient is improving   ID consulted  and on board   TTE on 02/06/2025 unremarkable for vegetation     Plan:  TTE if new cultures are positives for Staph aureus.   Continue IV vancomycin   CBC, CMP am   Waiting on new blood cultures results       Dyslipidemia  Home medication - atorvastatin (LIPITOR) 40 mg tablet     Plan:  Continue home medications  Essential hypertension  Home medications - amLODIPine (NORVASC) 5 mg tablet  ,     Plan:   Continue home medications  Personal history of transient ischemic attack (TIA), and cerebral infarction without residual deficits  Stable  Compliant with home medications  BP within Normal Limits  Home medications - aspirin (Aspirin Low Dose) 81 mg EC tablet , atorvastatin (LIPITOR) 40 mg tablet , omega-3-acid ethyl esters (LOVAZA) 1 g capsule , amlodipine 5 mg tablet     Plan:  Continue home medications  Keep BP within normal limits  Dementia (HCC)  Home medication - Memantine 5 mg bid    Plan  Continue home medications  Transaminitis  Could be in the setting of acetaminophen v/s current infection process  Asymptomatic    Plan:  CMP am   Decreased tylenol to BID   Consider stop tylenol if liver enzymes continue trending up.     VTE Pharmacologic Prophylaxis: VTE Score: 6 High Risk (Score >/= 5) - Pharmacological DVT Prophylaxis Ordered: enoxaparin (Lovenox). Sequential Compression Devices Ordered.    Mobility:   Basic Mobility Inpatient Raw Score: 22  JH-HLM Goal: 7: Walk 25 feet or more  JH-HLM Achieved: 7: Walk 25 feet or more  JH-HLM Goal achieved. Continue to encourage appropriate mobility.    Patient Centered Rounds: I performed bedside rounds with nursing staff today.   Discussions with Specialists or Other Care Team Provider: none     Education and Discussions with Family / Patient: Updated  (daughter) at bedside.    Current Length of Stay: 3 day(s)  Current Patient Status: Inpatient   Certification Statement: The patient will continue to require additional inpatient hospital stay due to  bacteremia and abscess on the right tight muscle    Discharge Plan: Anticipate discharge in 48 hrs to home.    Code Status: Level 1 - Full Code    Subjective   Patient was seen and examined at bedside during rounds by medical team. Patient states feeling well this morning, reports the pain was scored 0/10. She does report dry cough for the last days. Had a bowel movement last night and passing urine w/o issues. Patient said that the area of the abscess hurts at minimal touch, but the pain is controlled with the meds that she is receiving. Patient is eating and drinking well, no signs of distress. Plan was discussed with patient and all the questions were answered to patient's satisfaction.      Objective   Temp:  [97.3 °F (36.3 °C)-98.6 °F (37 °C)] 97.3 °F (36.3 °C)  HR:  [60-69] 63  BP: ()/(55-75) 127/75  Resp:  [18-20] 20  SpO2:  [96 %-99 %] 97 %  O2 Device: None (Room air)    Body mass index is 21.73 kg/m².     Input and Output Summary (last 24 hours):     Intake/Output Summary (Last 24 hours) at 2/6/2025 1104  Last data filed at 2/6/2025 0630  Gross per 24 hour   Intake 2945 ml   Output 1150 ml   Net 1795 ml       Physical Exam  Vitals and nursing note reviewed.   Constitutional:       General: She is not in acute distress.     Appearance: She is well-developed.   HENT:      Head: Normocephalic and atraumatic.      Nose: Nose normal. No congestion or rhinorrhea.      Mouth/Throat:      Mouth: Mucous membranes are moist.      Pharynx: Oropharynx is clear.   Eyes:      Extraocular Movements: Extraocular movements intact.      Conjunctiva/sclera: Conjunctivae normal.      Pupils: Pupils are equal, round, and reactive to light.   Cardiovascular:      Rate and Rhythm: Normal rate and regular rhythm.      Heart sounds: No murmur heard.  Pulmonary:      Effort: Pulmonary effort is normal. No respiratory distress.      Breath sounds: Normal breath sounds.   Abdominal:      Palpations: Abdomen is soft.       Tenderness: There is no abdominal tenderness.   Musculoskeletal:         General: No swelling.      Cervical back: Neck supple.      Right lower leg: No edema.      Left lower leg: No edema.      Comments: Skin warm with mild erythema in the medial and posterior thigh area.  No ecchymosis. Very tenderness at palpation. No drainage.     Skin:     General: Skin is warm and dry.      Capillary Refill: Capillary refill takes less than 2 seconds.   Neurological:      General: No focal deficit present.      Mental Status: She is alert and oriented to person, place, and time.      Cranial Nerves: No cranial nerve deficit.   Psychiatric:         Mood and Affect: Mood normal.             Lab Results: I have reviewed the following results:   Results from last 7 days   Lab Units 02/06/25  0448   WBC Thousand/uL 16.41*   HEMOGLOBIN g/dL 13.2   HEMATOCRIT % 41.2   PLATELETS Thousands/uL 333   SEGS PCT % 66   LYMPHO PCT % 21   MONO PCT % 8   EOS PCT % 2     Results from last 7 days   Lab Units 02/06/25  0448   SODIUM mmol/L 143   POTASSIUM mmol/L 4.0   CHLORIDE mmol/L 105   CO2 mmol/L 29   BUN mg/dL 17   CREATININE mg/dL 0.61   ANION GAP mmol/L 9   CALCIUM mg/dL 9.4   ALBUMIN g/dL 3.1*   TOTAL BILIRUBIN mg/dL 0.38   ALK PHOS U/L 102   ALT U/L 86*   AST U/L 60*   GLUCOSE RANDOM mg/dL 123                       Recent Cultures (last 7 days):   Results from last 7 days   Lab Units 02/03/25  0233 02/03/25  0038 02/03/25  0021   BLOOD CULTURE   --  Streptococcus intermedius* Staphylococcus epidermidis*   GRAM STAIN RESULT   --  Gram positive cocci in clusters* Gram positive cocci in clusters*   URINE CULTURE  20,000-29,000 cfu/ml  --   --            Last 24 Hours Medication List:     Current Facility-Administered Medications:     acetaminophen (TYLENOL) tablet 975 mg, BID    amLODIPine (NORVASC) tablet 5 mg, Daily    aspirin (ECOTRIN LOW STRENGTH) EC tablet 81 mg, Daily    atorvastatin (LIPITOR) tablet 40 mg, QPM    enoxaparin  (LOVENOX) subcutaneous injection 40 mg, Daily    fish oil capsule 1,000 mg, Daily    ibuprofen (MOTRIN) tablet 400 mg, Q6H PRN    memantine (NAMENDA) tablet 5 mg, BID    methocarbamol (ROBAXIN) tablet 500 mg, BID    sodium chloride 0.9 % infusion, Continuous, Last Rate: 75 mL/hr (02/05/25 1606)    vancomycin (VANCOCIN) IVPB (premix in dextrose) 750 mg 150 mL, Q12H, Last Rate: 750 mg (02/06/25 8573)    Administrative Statements   Today, Patient Was Seen By: Polina Motley MD

## 2025-02-06 NOTE — QUICK NOTE
Performed chart review. This morning the patient remains afebrile. WBC count with slight decrease to 16.41, still likely influenced by recent steroid. Lab has further updated patient's blood cultures which are now showing a staph epidermidis in one set and a streptococcus intermedius in the second set. I personally reviewed patients TTE from 2/5/2025 which showed no obvious vegetation. The patient remains on IV vancomycin. Repeat blood cultures were sent this morning. Recommend patient continue IV vancomycin for now.

## 2025-02-06 NOTE — ASSESSMENT & PLAN NOTE
Ct pelvis/abdominal -  Lobulated fluid collections within the right obturator musculature - compatible with intramuscular abscesses in the appropriate clinical setting.  Medial thigh musculature with surrounding fat stranding/edema which may be due to myositis   Remains afebrile, however CRP, SED and WBC trending up  S/p - ceFAZolin IVPB  2,000 mg for 2 days   Abscess could have been present during corticosteroid injection on 1/29/2025 vs the corticosteroid steroid shot causing iatrogenic abscess  MRI on 2/4/25 Intramuscular abscesses in the right pelvis/hip musculature as described with     adjacent muscular edema/enhancement consistent with myositis. Right trochanteric bursitis, which   may be infectious  Blood culture positives for Staph aureus   IR attempted U/S guided aspiration on right perineal unsuccessful due to difficult location(right ischial tuberosity and within the obturator externus)  Orthopedic sign off and defer the case to General Surgery  General Surgery was consulted no surgery planned at this time due to difficult location.    ID consulted. Cefepime stopped by them   Pain well controlled.   Clinically patient is improving       Plan:  Continue  Vancomycin 750 mg every 12 hours  Ibuprofen 600 mg as needed every 6 hours  Tylenol decreased to 975 mg every 12 hours scheduled  Roxicodone 2.5 one time  oxycodone 2.5 mg every 6 as needed moderate pain and breakthrough pain  CBC and CMP am

## 2025-02-06 NOTE — ASSESSMENT & PLAN NOTE
On admission meeting criteria due to fever and leukocytosis in the setting of abscess of muscle.   Blood cultures positive for Staph aureus   Clinically patient is improving   ID consulted and on board   TTE on 02/06/2025 unremarkable for vegetation     Plan:  TTE if new cultures are positives for Staph aureus.   Continue IV vancomycin   CBC, CMP am   Waiting on new blood cultures results

## 2025-02-07 PROBLEM — J10.1 INFLUENZA A: Status: ACTIVE | Noted: 2025-02-07

## 2025-02-07 LAB
ALBUMIN SERPL BCG-MCNC: 3.1 G/DL (ref 3.5–5)
ALP SERPL-CCNC: 106 U/L (ref 34–104)
ALT SERPL W P-5'-P-CCNC: 65 U/L (ref 7–52)
ANION GAP SERPL CALCULATED.3IONS-SCNC: 9 MMOL/L (ref 4–13)
AST SERPL W P-5'-P-CCNC: 35 U/L (ref 13–39)
BACTERIA BLD CULT: ABNORMAL
BASOPHILS # BLD AUTO: 0.09 THOUSANDS/ΜL (ref 0–0.1)
BASOPHILS NFR BLD AUTO: 1 % (ref 0–1)
BILIRUB SERPL-MCNC: 0.39 MG/DL (ref 0.2–1)
BUN SERPL-MCNC: 11 MG/DL (ref 5–25)
CALCIUM ALBUM COR SERPL-MCNC: 10.2 MG/DL (ref 8.3–10.1)
CALCIUM SERPL-MCNC: 9.5 MG/DL (ref 8.4–10.2)
CHLORIDE SERPL-SCNC: 102 MMOL/L (ref 96–108)
CO2 SERPL-SCNC: 29 MMOL/L (ref 21–32)
CREAT SERPL-MCNC: 0.6 MG/DL (ref 0.6–1.3)
EOSINOPHIL # BLD AUTO: 0.38 THOUSAND/ΜL (ref 0–0.61)
EOSINOPHIL NFR BLD AUTO: 2 % (ref 0–6)
ERYTHROCYTE [DISTWIDTH] IN BLOOD BY AUTOMATED COUNT: 12.5 % (ref 11.6–15.1)
GFR SERPL CREATININE-BSD FRML MDRD: 89 ML/MIN/1.73SQ M
GLUCOSE SERPL-MCNC: 114 MG/DL (ref 65–140)
GRAM STN SPEC: ABNORMAL
GRAM STN SPEC: ABNORMAL
HCT VFR BLD AUTO: 41.4 % (ref 34.8–46.1)
HGB BLD-MCNC: 13.2 G/DL (ref 11.5–15.4)
IMM GRANULOCYTES # BLD AUTO: 0.42 THOUSAND/UL (ref 0–0.2)
IMM GRANULOCYTES NFR BLD AUTO: 2 % (ref 0–2)
LYMPHOCYTES # BLD AUTO: 3.19 THOUSANDS/ΜL (ref 0.6–4.47)
LYMPHOCYTES NFR BLD AUTO: 18 % (ref 14–44)
MCH RBC QN AUTO: 29.8 PG (ref 26.8–34.3)
MCHC RBC AUTO-ENTMCNC: 31.9 G/DL (ref 31.4–37.4)
MCV RBC AUTO: 94 FL (ref 82–98)
MONOCYTES # BLD AUTO: 1.39 THOUSAND/ΜL (ref 0.17–1.22)
MONOCYTES NFR BLD AUTO: 8 % (ref 4–12)
NEUTROPHILS # BLD AUTO: 11.95 THOUSANDS/ΜL (ref 1.85–7.62)
NEUTS SEG NFR BLD AUTO: 69 % (ref 43–75)
NRBC BLD AUTO-RTO: 0 /100 WBCS
PLATELET # BLD AUTO: 293 THOUSANDS/UL (ref 149–390)
PMV BLD AUTO: 10.5 FL (ref 8.9–12.7)
POTASSIUM SERPL-SCNC: 4.1 MMOL/L (ref 3.5–5.3)
PROT SERPL-MCNC: 7 G/DL (ref 6.4–8.4)
RBC # BLD AUTO: 4.43 MILLION/UL (ref 3.81–5.12)
S EPIDERMIDIS DNA BLD POS QL NAA+NON-PRB: DETECTED
SODIUM SERPL-SCNC: 140 MMOL/L (ref 135–147)
STREPTOCOCCUS DNA BLD POS NAA+NON-PROBE: DETECTED
WBC # BLD AUTO: 17.42 THOUSAND/UL (ref 4.31–10.16)

## 2025-02-07 PROCEDURE — 80053 COMPREHEN METABOLIC PANEL: CPT

## 2025-02-07 PROCEDURE — 85025 COMPLETE CBC W/AUTO DIFF WBC: CPT

## 2025-02-07 PROCEDURE — NC001 PR NO CHARGE: Performed by: SPECIALIST

## 2025-02-07 PROCEDURE — 99232 SBSQ HOSP IP/OBS MODERATE 35: CPT | Performed by: FAMILY MEDICINE

## 2025-02-07 RX ORDER — OSELTAMIVIR PHOSPHATE 75 MG/1
75 CAPSULE ORAL EVERY 12 HOURS SCHEDULED
Status: DISCONTINUED | OUTPATIENT
Start: 2025-02-07 | End: 2025-02-07

## 2025-02-07 RX ORDER — OSELTAMIVIR PHOSPHATE 75 MG/1
75 CAPSULE ORAL EVERY 12 HOURS SCHEDULED
Status: COMPLETED | OUTPATIENT
Start: 2025-02-07 | End: 2025-02-11

## 2025-02-07 RX ADMIN — OMEGA-3 FATTY ACIDS CAP 1000 MG 1000 MG: 1000 CAP at 08:45

## 2025-02-07 RX ADMIN — METHOCARBAMOL TABLETS 500 MG: 500 TABLET, COATED ORAL at 08:45

## 2025-02-07 RX ADMIN — ATORVASTATIN CALCIUM 40 MG: 40 TABLET, FILM COATED ORAL at 17:25

## 2025-02-07 RX ADMIN — IBUPROFEN 400 MG: 400 TABLET, FILM COATED ORAL at 03:37

## 2025-02-07 RX ADMIN — ENOXAPARIN SODIUM 40 MG: 40 INJECTION SUBCUTANEOUS at 08:51

## 2025-02-07 RX ADMIN — METHOCARBAMOL TABLETS 500 MG: 500 TABLET, COATED ORAL at 17:25

## 2025-02-07 RX ADMIN — ACETAMINOPHEN 975 MG: 325 TABLET, FILM COATED ORAL at 08:46

## 2025-02-07 RX ADMIN — VANCOMYCIN HYDROCHLORIDE 750 MG: 750 INJECTION, SOLUTION INTRAVENOUS at 17:25

## 2025-02-07 RX ADMIN — MEMANTINE HYDROCHLORIDE 5 MG: 5 TABLET, FILM COATED ORAL at 17:25

## 2025-02-07 RX ADMIN — OSELTAMIVIR PHOSPHATE 75 MG: 75 CAPSULE ORAL at 17:27

## 2025-02-07 RX ADMIN — ASPIRIN 81 MG: 81 TABLET, COATED ORAL at 08:46

## 2025-02-07 RX ADMIN — OSELTAMIVIR PHOSPHATE 75 MG: 75 CAPSULE ORAL at 03:37

## 2025-02-07 RX ADMIN — AMLODIPINE BESYLATE 5 MG: 5 TABLET ORAL at 08:46

## 2025-02-07 RX ADMIN — SODIUM CHLORIDE 75 ML/HR: 0.9 INJECTION, SOLUTION INTRAVENOUS at 03:41

## 2025-02-07 RX ADMIN — SODIUM CHLORIDE 75 ML/HR: 0.9 INJECTION, SOLUTION INTRAVENOUS at 17:45

## 2025-02-07 RX ADMIN — MEMANTINE HYDROCHLORIDE 5 MG: 5 TABLET, FILM COATED ORAL at 08:45

## 2025-02-07 RX ADMIN — ACETAMINOPHEN 975 MG: 325 TABLET, FILM COATED ORAL at 21:59

## 2025-02-07 RX ADMIN — VANCOMYCIN HYDROCHLORIDE 750 MG: 750 INJECTION, SOLUTION INTRAVENOUS at 03:37

## 2025-02-07 NOTE — QUICK NOTE
Performed chart review. Patient has tested positive for influenza A and has been appropriately started on treatment with Tamiflu. She continues on IV vancomycin for ongoing treatment of bacteremia and R thigh/buttock collections. She has been assessed by ortho, IR, and now general surgery. Her R thigh/buttock collections remain undrained, however it is documented that both patient's pain and swelling are improving with antibiotic. Still awaiting lab update on final sensitivities of strep intermedius in blood culture on 2/3/2025. I continue to feel the second culture from 2/3/2025 with staph epidermidis is likely a contaminant. Repeat blood cultures are pending. Patient will need 14-days of treatment with IV antibiotic for her bacteremia. I recommend continuing the vancomycin for now while we await final sensitivities. I recommend patient have repeat imaging of her R buttock/thigh next week to re-evaluate collections. Patient may need to transition to oral antibiotic after finishing IV treatment course for ongoing treatment of collections. ID will continue to follow. She will be formally reassessed by the ID team on Monday, 2/10/2025.

## 2025-02-07 NOTE — ASSESSMENT & PLAN NOTE
Influenza A PCR positive   Likely viral etiology given the seasonal prevalence and symptomatology     Plan:  Oseltamivir 75 mg daily for 5 days   Droplet precautions

## 2025-02-07 NOTE — PLAN OF CARE
Problem: PAIN - ADULT  Goal: Verbalizes/displays adequate comfort level or baseline comfort level  Description: Interventions:  - Encourage patient to monitor pain and request assistance  - Assess pain using appropriate pain scale  - Administer analgesics based on type and severity of pain and evaluate response  - Implement non-pharmacological measures as appropriate and evaluate response  - Consider cultural and social influences on pain and pain management  - Notify physician/advanced practitioner if interventions unsuccessful or patient reports new pain  Outcome: Progressing     Problem: INFECTION - ADULT  Goal: Absence or prevention of progression during hospitalization  Description: INTERVENTIONS:  - Assess and monitor for signs and symptoms of infection  - Monitor lab/diagnostic results  - Monitor all insertion sites, i.e. indwelling lines, tubes, and drains  - Monitor endotracheal if appropriate and nasal secretions for changes in amount and color  - [place] appropriate cooling/warming therapies per order  - Administer medications as ordered  - Instruct and encourage patient and family to use good hand hygiene technique  - Identify and instruct in appropriate isolation precautions for identified infection/condition  Outcome: Progressing     Problem: SAFETY ADULT  Goal: Patient will remain free of falls  Description: INTERVENTIONS:  - Educate patient/family on patient safety including physical limitations  - Instruct patient to call for assistance with activity   - Consult OT/PT to assist with strengthening/mobility   - Keep Call bell within reach  - Keep bed low and locked with side rails adjusted as appropriate  - Keep care items and personal belongings within reach  - Initiate and maintain comfort rounds  - Make Fall Risk Sign visible to staff  - O- Apply yellow socks and bracelet for high fall risk patients  - Consider moving patient to room near nurses station  Outcome: Progressing     Problem:  DISCHARGE PLANNING  Goal: Discharge to home or other facility with appropriate resources  Description: INTERVENTIONS:  - Identify barriers to discharge w/patient and caregiver  - Arrange for needed discharge resources and transportation as appropriate  - Identify discharge learning needs (meds, wound care, etc.)  - Arrange for interpretive services to assist at discharge as needed  - Refer to Case Management Department for coordinating discharge planning if the patient needs post-hospital services based on physician/advanced practitioner order or complex needs related to functional status, cognitive ability, or social support system  Outcome: Progressing

## 2025-02-07 NOTE — PROGRESS NOTES
Discussed situation with family practice this morning.    Improving on current therapy.  Continue same.  No surgery planned at this time.

## 2025-02-07 NOTE — ASSESSMENT & PLAN NOTE
Chief Complaint   Patient presents with   • Derm Problem     hair loss       Medication verified, no changes  Denies known Latex allergy or symptoms of Latex sensitivity   Tobacco history verified.  Verbal permission granted by patient to leave a detailed message with medical information on answering machine at phone number given? yes  If female, are you pregnant, trying to become pregnant, or breastfeeding? N/A  New Patient   In the setting of infection  See a/p for bacteremia

## 2025-02-07 NOTE — ASSESSMENT & PLAN NOTE
Ct pelvis/abdominal -  Lobulated fluid collections within the right obturator musculature - compatible with intramuscular abscesses in the appropriate clinical setting.  Medial thigh musculature with surrounding fat stranding/edema which may be due to myositis   Remains afebrile, however CRP, SED and WBC trending up  S/p - ceFAZolin IVPB  2,000 mg for 2 days   Abscess could have been present during corticosteroid injection on 1/29/2025 vs the corticosteroid steroid shot causing iatrogenic abscess  MRI on 2/4/25 Intramuscular abscesses in the right pelvis/hip musculature as described with     adjacent muscular edema/enhancement consistent with myositis. Right trochanteric bursitis, which   may be infectious  Blood culture positives for Staph aureus   IR attempted U/S guided aspiration on right perineal unsuccessful due to difficult location(right ischial tuberosity and within the obturator externus)  Orthopedic sign off and defer the case to General Surgery  General Surgery was consulted no surgery planned at this time due to difficult location.    ID consulted. Cefepime stopped by them   Pain well controlled.   Clinically improving       Plan:  Continue  Vancomycin 750 mg every 12 hours  Ibuprofen 600 mg as needed every 6 hours  Tylenol decreased to 975 mg every 12 hours scheduled  Roxicodone 2.5 one time  oxycodone 2.5 mg every 6 as needed moderate pain and breakthrough pain  CBC and CMP am

## 2025-02-07 NOTE — PROGRESS NOTES
Hawa Cheek is a 74 y.o. female who is currently ordered Vancomycin IV with management by the Pharmacy Consult service.  Relevant clinical data and objective / subjective history reviewed.  Vancomycin Assessment:  Indication and Goal AUC/Trough: Soft tissue (goal -600, trough >10)  Clinical Status: improving  Micro:     Renal Function:  SCr: 0.61 mg/dL  CrCl: 59.9 mL/min  Renal replacement: Not on dialysis  Days of Therapy: 5  Current Dose: 750 mg IV every 12 hours  Vancomycin Plan:  New Dosing: continue same dose  Estimated AUC: 520 mcg*hr/mL  Estimated Trough: 14.2 mcg/mL  Next Level: 02/12/25 with AM labs  Renal Function Monitoring: Daily BMP and UOP  Pharmacy will continue to follow closely for s/sx of nephrotoxicity, infusion reactions and appropriateness of therapy.  BMP and CBC will be ordered per protocol. We will continue to follow the patient’s culture results and clinical progress daily.    Van Taylor, Pharmacist

## 2025-02-07 NOTE — TELEPHONE ENCOUNTER
Pts daughter came into office and is requesting update on form, she stated the form is needed before or by 2/13. Pt was informed of form protocol. Please advise.

## 2025-02-07 NOTE — PROGRESS NOTES
Progress Note - Family Medicine   Name: Hawa Cheek 74 y.o. female I MRN: [id number]  Unit/Bed#: 7T [facility] 709-01 I Date of Admission: 2/2/2025   Date of Service: 2/7/2025 I Hospital Day: 4     Assessment & Plan  Abscess of muscle  Ct pelvis/abdominal -  Lobulated fluid collections within the right obturator musculature - compatible with intramuscular abscesses in the appropriate clinical setting.  Medial thigh musculature with surrounding fat stranding/edema which may be due to myositis   Remains afebrile, however CRP, SED and WBC trending up  S/p - ceFAZolin IVPB  2,000 mg for 2 days   Abscess could have been present during corticosteroid injection on 1/29/2025 vs the corticosteroid steroid shot causing iatrogenic abscess  MRI on 2/4/25 Intramuscular abscesses in the right pelvis/hip musculature as described with     adjacent muscular edema/enhancement consistent with myositis. Right trochanteric bursitis, which   may be infectious  Blood culture positives for Staph aureus   IR attempted U/S guided aspiration on right perineal unsuccessful due to difficult location(right ischial tuberosity and within the obturator externus)  Orthopedic sign off and defer the case to General Surgery  General Surgery was consulted no surgery planned at this time due to difficult location.    ID consulted. Cefepime stopped by them   Pain well controlled.   Clinically improving       Plan:  Continue  Vancomycin 750 mg every 12 hours  Ibuprofen 600 mg as needed every 6 hours  Tylenol decreased to 975 mg every 12 hours scheduled  Roxicodone 2.5 one time  oxycodone 2.5 mg every 6 as needed moderate pain and breakthrough pain  CBC and CMP am     Leukocytosis  In the setting of infection  See a/p for bacteremia   Gram-positive bacteremia  On admission meeting criteria due to fever and leukocytosis in the setting of abscess of muscle.   Blood cultures positive for Staph aureus   Clinically patient is improving   ID consulted and on  board   TTE on 02/06/2025 unremarkable for vegetation     Plan:  TTE if new cultures are positives for Staph aureus.   Continue IV vancomycin   CBC, CMP am   Waiting on new blood cultures results       Dyslipidemia  Home medication - atorvastatin (LIPITOR) 40 mg tablet     Plan:  Continue home medications  Essential hypertension  Home medications - amLODIPine (NORVASC) 5 mg tablet  ,     Plan:   Continue home medications  Personal history of transient ischemic attack (TIA), and cerebral infarction without residual deficits  Stable  Compliant with home medications  BP within Normal Limits  Home medications - aspirin (Aspirin Low Dose) 81 mg EC tablet , atorvastatin (LIPITOR) 40 mg tablet , omega-3-acid ethyl esters (LOVAZA) 1 g capsule , amlodipine 5 mg tablet     Plan:  Continue home medications  Keep BP within normal limits  Dementia (HCC)  Home medication - Memantine 5 mg bid    Plan  Continue home medications  Transaminitis  Could be in the setting of acetaminophen v/s current infection process  Asymptomatic    Plan:  CMP am   Decreased tylenol to BID   Consider stop tylenol if liver enzymes continue trending up.   Influenza A  Influenza A PCR positive   Likely viral etiology given the seasonal prevalence and symptomatology     Plan:  Oseltamivir 75 mg daily for 5 days   Droplet precautions     VTE Pharmacologic Prophylaxis: VTE Score: 6 High Risk (Score >/= 5) - Pharmacological DVT Prophylaxis Ordered: enoxaparin (Lovenox). Sequential Compression Devices Ordered.    Mobility:   Basic Mobility Inpatient Raw Score: 22  JH-HLM Goal: 7: Walk 25 feet or more  JH-HLM Achieved: 7: Walk 25 feet or more  JH-HLM Goal achieved. Continue to encourage appropriate mobility.    Patient Centered Rounds: I performed bedside rounds with nursing staff today.   Discussions with Specialists or Other Care Team Provider: none     Education and Discussions with Family / Patient: Updated  (daughter) at bedside.    Current  Length of Stay: 4 day(s)  Current Patient Status: Inpatient   Certification Statement: The patient will continue to require additional inpatient hospital stay due to Abscess of muscle and Bacteremia   Discharge Plan: Anticipate discharge in >72 hrs to home.    Code Status: Level 1 - Full Code    Subjective   Patient was seen and examined at bedside this morning by family medicine team during rounds. Patient was resting comfortably in bed. Reports the pain was scored 0/10 and well controlled. Patient continues with dry cough, remains afebrile and her appetite not changed. Patient is passing urine and stool without issues. No signs of distress and she remains at baseline. Plan was discussed with patient and all the questions were answered to patient's satisfaction.         Objective   Temp:  [97.6 °F (36.4 °C)-98 °F (36.7 °C)] 98 °F (36.7 °C)  HR:  [61-66] 61  BP: (110-132)/(58-75) 121/75  Resp:  [18-20] 20  SpO2:  [95 %-96 %] 95 %  O2 Device: None (Room air)    Body mass index is 21.73 kg/m².     Input and Output Summary (last 24 hours):     Intake/Output Summary (Last 24 hours) at 2/7/2025 0823  Last data filed at 2/7/2025 0341  Gross per 24 hour   Intake 1828.75 ml   Output 200 ml   Net 1628.75 ml       Physical Exam     Vitals and nursing note reviewed.   Constitutional:       General: She is not in acute distress.     Appearance: She is well-developed.   HENT:      Head: Normocephalic and atraumatic.      Nose: Nose normal. No congestion or rhinorrhea.      Mouth/Throat:      Mouth: Mucous membranes are moist.      Pharynx: Oropharynx is clear.   Eyes:      Extraocular Movements: Extraocular movements intact.      Conjunctiva/sclera: Conjunctivae normal.      Pupils: Pupils are equal, round, and reactive to light.   Cardiovascular:      Rate and Rhythm: Normal rate and regular rhythm.      Heart sounds: No murmur heard.  Pulmonary:      Effort: Pulmonary effort is normal. No respiratory distress.      Breath  sounds: Normal breath sounds.   Abdominal:      Palpations: Abdomen is soft.      Tenderness: There is no abdominal tenderness.   Musculoskeletal:         General: No swelling.      Cervical back: Neck supple.      Right lower leg: No edema.      Left lower leg: No edema.      Comments: Skin warm with mild erythema in the medial and posterior thigh area.  No ecchymosis. Less tenderness at palpation. No drainage.     Skin:     General: Skin is warm and dry.      Capillary Refill: Capillary refill takes less than 2 seconds.   Neurological:      General: No focal deficit present.      Mental Status: She is alert and oriented to person, place, and time.      Cranial Nerves: No cranial nerve deficit.   Psychiatric:         Mood and Affect: Mood normal.      Lab Results: I have reviewed the following results:   Results from last 7 days   Lab Units 02/07/25  0429   WBC Thousand/uL 17.42*   HEMOGLOBIN g/dL 13.2   HEMATOCRIT % 41.4   PLATELETS Thousands/uL 293   SEGS PCT % 69   LYMPHO PCT % 18   MONO PCT % 8   EOS PCT % 2     Results from last 7 days   Lab Units 02/07/25  0429   SODIUM mmol/L 140   POTASSIUM mmol/L 4.1   CHLORIDE mmol/L 102   CO2 mmol/L 29   BUN mg/dL 11   CREATININE mg/dL 0.60   ANION GAP mmol/L 9   CALCIUM mg/dL 9.5   ALBUMIN g/dL 3.1*   TOTAL BILIRUBIN mg/dL 0.39   ALK PHOS U/L 106*   ALT U/L 65*   AST U/L 35   GLUCOSE RANDOM mg/dL 114                       Recent Cultures (last 7 days):   Results from last 7 days   Lab Units 02/06/25  0449 02/03/25  0233 02/03/25  0038 02/03/25  0021   BLOOD CULTURE  Received in Microbiology Lab. Culture in Progress.  Received in Microbiology Lab. Culture in Progress.  --  Streptococcus intermedius* Staphylococcus epidermidis*   GRAM STAIN RESULT   --   --  Gram positive cocci in clusters* Gram positive cocci in clusters*   URINE CULTURE   --  20,000-29,000 cfu/ml  --   --            Last 24 Hours Medication List:     Current Facility-Administered Medications:      acetaminophen (TYLENOL) tablet 975 mg, BID    amLODIPine (NORVASC) tablet 5 mg, Daily    aspirin (ECOTRIN LOW STRENGTH) EC tablet 81 mg, Daily    atorvastatin (LIPITOR) tablet 40 mg, QPM    enoxaparin (LOVENOX) subcutaneous injection 40 mg, Daily    fish oil capsule 1,000 mg, Daily    ibuprofen (MOTRIN) tablet 400 mg, Q6H PRN    memantine (NAMENDA) tablet 5 mg, BID    methocarbamol (ROBAXIN) tablet 500 mg, BID    oseltamivir (TAMIFLU) capsule 75 mg, Q12H TAMMY    sodium chloride 0.9 % infusion, Continuous, Last Rate: 75 mL/hr (02/07/25 0341)    vancomycin (VANCOCIN) IVPB (premix in dextrose) 750 mg 150 mL, Q12H, Last Rate: 750 mg (02/07/25 0337)    Administrative Statements   Today, Patient Was Seen By: Polina Motley MD

## 2025-02-08 LAB
ALBUMIN SERPL BCG-MCNC: 3.2 G/DL (ref 3.5–5)
ALP SERPL-CCNC: 114 U/L (ref 34–104)
ALT SERPL W P-5'-P-CCNC: 60 U/L (ref 7–52)
ANION GAP SERPL CALCULATED.3IONS-SCNC: 8 MMOL/L (ref 4–13)
AST SERPL W P-5'-P-CCNC: 34 U/L (ref 13–39)
BASOPHILS # BLD AUTO: 0.07 THOUSANDS/ΜL (ref 0–0.1)
BASOPHILS NFR BLD AUTO: 1 % (ref 0–1)
BILIRUB SERPL-MCNC: 0.38 MG/DL (ref 0.2–1)
BUN SERPL-MCNC: 9 MG/DL (ref 5–25)
CALCIUM ALBUM COR SERPL-MCNC: 10.3 MG/DL (ref 8.3–10.1)
CALCIUM SERPL-MCNC: 9.7 MG/DL (ref 8.4–10.2)
CHLORIDE SERPL-SCNC: 103 MMOL/L (ref 96–108)
CO2 SERPL-SCNC: 28 MMOL/L (ref 21–32)
CREAT SERPL-MCNC: 0.64 MG/DL (ref 0.6–1.3)
EOSINOPHIL # BLD AUTO: 0.37 THOUSAND/ΜL (ref 0–0.61)
EOSINOPHIL NFR BLD AUTO: 3 % (ref 0–6)
ERYTHROCYTE [DISTWIDTH] IN BLOOD BY AUTOMATED COUNT: 12.7 % (ref 11.6–15.1)
GFR SERPL CREATININE-BSD FRML MDRD: 87 ML/MIN/1.73SQ M
GLUCOSE SERPL-MCNC: 105 MG/DL (ref 65–140)
HCT VFR BLD AUTO: 42.8 % (ref 34.8–46.1)
HGB BLD-MCNC: 13.6 G/DL (ref 11.5–15.4)
IMM GRANULOCYTES # BLD AUTO: >0.5 THOUSAND/UL (ref 0–0.2)
IMM GRANULOCYTES NFR BLD AUTO: 4 % (ref 0–2)
LYMPHOCYTES # BLD AUTO: 3.32 THOUSANDS/ΜL (ref 0.6–4.47)
LYMPHOCYTES NFR BLD AUTO: 23 % (ref 14–44)
MCH RBC QN AUTO: 30.2 PG (ref 26.8–34.3)
MCHC RBC AUTO-ENTMCNC: 31.8 G/DL (ref 31.4–37.4)
MCV RBC AUTO: 95 FL (ref 82–98)
MONOCYTES # BLD AUTO: 1.18 THOUSAND/ΜL (ref 0.17–1.22)
MONOCYTES NFR BLD AUTO: 8 % (ref 4–12)
NEUTROPHILS # BLD AUTO: 9.2 THOUSANDS/ΜL (ref 1.85–7.62)
NEUTS SEG NFR BLD AUTO: 61 % (ref 43–75)
NRBC BLD AUTO-RTO: 0 /100 WBCS
PLATELET # BLD AUTO: 355 THOUSANDS/UL (ref 149–390)
PMV BLD AUTO: 9.1 FL (ref 8.9–12.7)
POTASSIUM SERPL-SCNC: 4.1 MMOL/L (ref 3.5–5.3)
PROT SERPL-MCNC: 7.3 G/DL (ref 6.4–8.4)
RBC # BLD AUTO: 4.51 MILLION/UL (ref 3.81–5.12)
SODIUM SERPL-SCNC: 139 MMOL/L (ref 135–147)
WBC # BLD AUTO: 14.65 THOUSAND/UL (ref 4.31–10.16)

## 2025-02-08 PROCEDURE — 85025 COMPLETE CBC W/AUTO DIFF WBC: CPT

## 2025-02-08 PROCEDURE — NC001 PR NO CHARGE: Performed by: FAMILY MEDICINE

## 2025-02-08 PROCEDURE — 80053 COMPREHEN METABOLIC PANEL: CPT

## 2025-02-08 RX ORDER — POLYETHYLENE GLYCOL 3350 17 G/17G
17 POWDER, FOR SOLUTION ORAL DAILY PRN
Status: DISCONTINUED | OUTPATIENT
Start: 2025-02-08 | End: 2025-02-15 | Stop reason: HOSPADM

## 2025-02-08 RX ADMIN — ACETAMINOPHEN 975 MG: 325 TABLET, FILM COATED ORAL at 21:32

## 2025-02-08 RX ADMIN — AMLODIPINE BESYLATE 5 MG: 5 TABLET ORAL at 09:26

## 2025-02-08 RX ADMIN — METHOCARBAMOL TABLETS 500 MG: 500 TABLET, COATED ORAL at 09:26

## 2025-02-08 RX ADMIN — OSELTAMIVIR PHOSPHATE 75 MG: 75 CAPSULE ORAL at 09:27

## 2025-02-08 RX ADMIN — OMEGA-3 FATTY ACIDS CAP 1000 MG 1000 MG: 1000 CAP at 09:27

## 2025-02-08 RX ADMIN — METHOCARBAMOL TABLETS 500 MG: 500 TABLET, COATED ORAL at 17:07

## 2025-02-08 RX ADMIN — ENOXAPARIN SODIUM 40 MG: 40 INJECTION SUBCUTANEOUS at 09:26

## 2025-02-08 RX ADMIN — SODIUM CHLORIDE 75 ML/HR: 0.9 INJECTION, SOLUTION INTRAVENOUS at 13:07

## 2025-02-08 RX ADMIN — ACETAMINOPHEN 975 MG: 325 TABLET, FILM COATED ORAL at 09:25

## 2025-02-08 RX ADMIN — VANCOMYCIN HYDROCHLORIDE 750 MG: 750 INJECTION, SOLUTION INTRAVENOUS at 16:37

## 2025-02-08 RX ADMIN — VANCOMYCIN HYDROCHLORIDE 750 MG: 750 INJECTION, SOLUTION INTRAVENOUS at 05:30

## 2025-02-08 RX ADMIN — ATORVASTATIN CALCIUM 40 MG: 40 TABLET, FILM COATED ORAL at 17:07

## 2025-02-08 RX ADMIN — ASPIRIN 81 MG: 81 TABLET, COATED ORAL at 09:27

## 2025-02-08 RX ADMIN — OSELTAMIVIR PHOSPHATE 75 MG: 75 CAPSULE ORAL at 21:32

## 2025-02-08 RX ADMIN — SODIUM CHLORIDE 75 ML/HR: 0.9 INJECTION, SOLUTION INTRAVENOUS at 23:34

## 2025-02-08 RX ADMIN — MEMANTINE HYDROCHLORIDE 5 MG: 5 TABLET, FILM COATED ORAL at 09:25

## 2025-02-08 RX ADMIN — MEMANTINE HYDROCHLORIDE 5 MG: 5 TABLET, FILM COATED ORAL at 17:07

## 2025-02-08 NOTE — ASSESSMENT & PLAN NOTE
Ct pelvis/abdominal -  Lobulated fluid collections within the right obturator musculature - compatible with intramuscular abscesses in the appropriate clinical setting.  Medial thigh musculature with surrounding fat stranding/edema which may be due to myositis   Remains afebrile, however CRP, SED and WBC trending up  S/p - ceFAZolin IVPB  2,000 mg for 2 days   Abscess could have been present during corticosteroid injection on 1/29/2025 vs the corticosteroid steroid shot causing iatrogenic abscess  MRI on 2/4/25 Intramuscular abscesses in the right pelvis/hip musculature as described with     adjacent muscular edema/enhancement consistent with myositis. Right trochanteric bursitis, which   may be infectious  Blood culture positives for Staph aureus   IR attempted U/S guided aspiration on right perineal unsuccessful due to difficult location(right ischial tuberosity and within the obturator externus)  Orthopedic sign off and defer the case to General Surgery  General Surgery was consulted no surgery planned at this time due to difficult location.    ID consulted. Cefepime stopped by them   Pain well controlled.   Clinically improving       Plan:  Continue  Vancomycin 750 mg every 12 hours  Ibuprofen 600 mg as needed every 6 hours  Tylenol 975 mg every 12 hours scheduled  Roxicodone 2.5 one time  oxycodone 2.5 mg every 6 as needed moderate pain and breakthrough pain  CBC and CMP am

## 2025-02-08 NOTE — ASSESSMENT & PLAN NOTE
On admission meeting criteria due to fever and leukocytosis in the setting of abscess of muscle.   Blood cultures positive for Staph aureus   Clinically patient is improving   ID consulted and on board   TTE on 02/06/2025 unremarkable for vegetation   Blood culture negative at 24hrs    Plan:  TTE if new cultures are positives for Staph aureus.   Continue IV vancomycin   CBC am

## 2025-02-08 NOTE — PLAN OF CARE
Problem: PAIN - ADULT  Goal: Verbalizes/displays adequate comfort level or baseline comfort level  Description: Interventions:  - Encourage patient to monitor pain and request assistance  - Assess pain using appropriate pain scale  - Administer analgesics based on type and severity of pain and evaluate response  - Implement non-pharmacological measures as appropriate and evaluate response  - Consider cultural and social influences on pain and pain management  - Notify physician/advanced practitioner if interventions unsuccessful or patient reports new pain  Outcome: Progressing     Problem: INFECTION - ADULT  Goal: Absence or prevention of progression during hospitalization  Description: INTERVENTIONS:  - Assess and monitor for signs and symptoms of infection  - Monitor lab/diagnostic results  - Monitor all insertion sites, i.e. indwelling lines, tubes, and drains  - Monitor endotracheal if appropriate and nasal secretions for changes in amount and color  - [place] appropriate cooling/warming therapies per order  - Administer medications as ordered  - Instruct and encourage patient and family to use good hand hygiene technique  - Identify and instruct in appropriate isolation precautions for identified infection/condition  Outcome: Progressing  Goal: Absence of fever/infection during neutropenic period  Description: INTERVENTIONS:  - Monitor WBC    Outcome: Progressing     Problem: SAFETY ADULT  Goal: Patient will remain free of falls  Description: INTERVENTIONS:  - Educate patient/family on patient safety including physical limitations  - Instruct patient to call for assistance with activity   - Consult OT/PT to assist with strengthening/mobility   - Keep Call bell within reach  - Keep bed low and locked with side rails adjusted as appropriate  - Keep care items and personal belongings within reach  - Initiate and maintain comfort rounds  - Make Fall Risk Sign visible to staff  - O- Apply yellow socks and  bracelet for high fall risk patients  - Consider moving patient to room near nurses station  Outcome: Progressing  Goal: Maintain or return to baseline ADL function  Description: INTERVENTIONS:  -  Assess patient's ability to carry out ADLs; assess patient's baseline for ADL function and identify physical deficits which impact ability to perform ADLs (bathing, care of mouth/teeth, toileting, grooming, dressing, etc.)  - Assess/evaluate cause of self-care deficits   - Assess range of motion  - Assess patient's mobility; develop plan if impaired  - Assess patient's need for assistive devices and provide as appropriate  - Encourage maximum independence but intervene and supervise when necessary  - Involve family in performance of ADLs  - Assess for home care needs following discharge   - Consider OT consult to assist with ADL evaluation and planning for discharge  - Provide patient education as appropriate  Outcome: Progressing  Goal: Maintains/Returns to pre admission functional level  Description: INTERVENTIONS:  - Perform AM-PAC 6 Click Basic Mobility/ Daily Activity assessment daily.  - Set and communicate daily mobility goal to care team and patient/family/caregiver.   - Collaborate with rehabilitation services on mobility goals if consulted  - Perf- - Record patient progress and toleration of activity level   Outcome: Progressing     Problem: DISCHARGE PLANNING  Goal: Discharge to home or other facility with appropriate resources  Description: INTERVENTIONS:  - Identify barriers to discharge w/patient and caregiver  - Arrange for needed discharge resources and transportation as appropriate  - Identify discharge learning needs (meds, wound care, etc.)  - Arrange for interpretive services to assist at discharge as needed  - Refer to Case Management Department for coordinating discharge planning if the patient needs post-hospital services based on physician/advanced practitioner order or complex needs related to  functional status, cognitive ability, or social support system  Outcome: Progressing

## 2025-02-08 NOTE — PROGRESS NOTES
Hawa Cheek is a 74 y.o. female who is currently ordered Vancomycin IV with management by the Pharmacy Consult service.  Relevant clinical data and objective / subjective history reviewed.  Vancomycin Assessment:  1. Indication and Goal AUC/Trough: Soft tissue (goal -600, trough >10)  2. Clinical Status: stable  3. Micro:               4. Renal Function:  SCr: 0.64 mg/dL  CrCl: 57.1 mL/min  5. Days of Therapy: 6  6. Current Dose: 750 mg IV every 12 hours  Vancomycin Plan:  1. New Dosing: Continue current dose  2. Estimated AUC: 554 mcg*hr/mL  3. Estimated Trough: 15.5 mcg/mL  4. Next Level: 2/12/2025 with morning labs  5. Renal Function Monitoring: Daily BMP and UOP  Pharmacy will continue to follow closely for s/sx of nephrotoxicity, infusion reactions and appropriateness of therapy.  BMP and CBC will be ordered per protocol. We will continue to follow the patient’s culture results and clinical progress daily.    Ana Vu, Pharmacist

## 2025-02-08 NOTE — PROGRESS NOTES
Progress Note - Family Medicine   Name: Hawa Cheek 74 y.o. female I MRN: [id number]  Unit/Bed#: 7T [facility] 709-01 I Date of Admission: 2/2/2025   Date of Service: 2/8/2025 I Hospital Day: 5     Assessment & Plan  Abscess of muscle  Ct pelvis/abdominal -  Lobulated fluid collections within the right obturator musculature - compatible with intramuscular abscesses in the appropriate clinical setting.  Medial thigh musculature with surrounding fat stranding/edema which may be due to myositis   Remains afebrile, however CRP, SED and WBC trending up  S/p - ceFAZolin IVPB  2,000 mg for 2 days   Abscess could have been present during corticosteroid injection on 1/29/2025 vs the corticosteroid steroid shot causing iatrogenic abscess  MRI on 2/4/25 Intramuscular abscesses in the right pelvis/hip musculature as described with     adjacent muscular edema/enhancement consistent with myositis. Right trochanteric bursitis, which   may be infectious  Blood culture positives for Staph aureus   IR attempted U/S guided aspiration on right perineal unsuccessful due to difficult location(right ischial tuberosity and within the obturator externus)  Orthopedic sign off and defer the case to General Surgery  General Surgery was consulted no surgery planned at this time due to difficult location.    ID consulted. Cefepime stopped by them   Pain well controlled.   Clinically improving       Plan:  Continue  Vancomycin 750 mg every 12 hours  Ibuprofen 600 mg as needed every 6 hours  Tylenol 975 mg every 12 hours scheduled  Roxicodone 2.5 one time  oxycodone 2.5 mg every 6 as needed moderate pain and breakthrough pain  CBC and CMP am     Leukocytosis  In the setting of infection, improving  See a/p for bacteremia     Gram-positive bacteremia  On admission meeting criteria due to fever and leukocytosis in the setting of abscess of muscle.   Blood cultures positive for Staph aureus   Clinically patient is improving   ID consulted and on  board   TTE on 02/06/2025 unremarkable for vegetation   Blood culture negative at 24hrs    Plan:  TTE if new cultures are positives for Staph aureus.   Continue IV vancomycin   CBC am     Dyslipidemia  Home medication - atorvastatin (LIPITOR) 40 mg tablet     Plan:  Continue home medications    Essential hypertension  Home medications - amLODIPine (NORVASC) 5 mg tablet  ,     Plan:   Continue home medications    Personal history of transient ischemic attack (TIA), and cerebral infarction without residual deficits  Stable  Compliant with home medications  Home medications - aspirin (Aspirin Low Dose) 81 mg EC tablet , atorvastatin (LIPITOR) 40 mg tablet , omega-3-acid ethyl esters (LOVAZA) 1 g capsule , amlodipine 5 mg tablet     Plan:  Continue home medications  Keep BP within normal limits    Dementia (HCC)  Home medication - Memantine 5 mg bid    Plan  Continue home medications    Transaminitis  Could be in the setting of acetaminophen v/s current infection process  Asymptomatic    Plan:  CMP am   Decreased tylenol to BID   Consider stop tylenol if liver enzymes continue trending up.     Influenza A  Influenza A PCR positive   Likely viral etiology given the seasonal prevalence and symptomatology     Plan:  Oseltamivir 75 mg daily for 5 days   Droplet precautions     VTE Pharmacologic Prophylaxis: VTE Score: 6 High Risk (Score >/= 5) - Pharmacological DVT Prophylaxis Ordered: enoxaparin (Lovenox). Sequential Compression Devices Ordered.    Mobility:   Basic Mobility Inpatient Raw Score: 22  JH-HLM Goal: 7: Walk 25 feet or more  JH-HLM Achieved: 6: Walk 10 steps or more  JH-HLM Goal achieved. Continue to encourage appropriate mobility.    Patient Centered Rounds: I performed bedside rounds with nursing staff today.   Discussions with Specialists or Other Care Team Provider: none     Education and Discussions with Family / Patient: Updated  (daughter) at bedside.    Current Length of Stay: 5  day(s)  Current Patient Status: Inpatient   Certification Statement: The patient will continue to require additional inpatient hospital stay due to Abscess of muscle and Bacteremia   Discharge Plan: Anticipate discharge in >72 hrs to home.    Code Status: Level 1 - Full Code    Subjective     Patient was seen and examined at bedside with the attending physician. She was resting comfortably in bed. No acute overnight events. Pain is well controlled. Denies fever, chills, chest pain, palpitations, abdominal discomfort, nausea, vomiting or diarrhea. Patient is concerned that she has not had a bowel movement, but does not remember exactly how many days have passed. Assessment and plan discussed. All questions answered.     Objective   Temp:  [97.8 °F (36.6 °C)-98.4 °F (36.9 °C)] 97.8 °F (36.6 °C)  HR:  [58-67] 67  BP: (105-143)/(60-76) 143/76  Resp:  [20] 20  SpO2:  [95 %-97 %] 97 %  O2 Device: None (Room air)    Body mass index is 21.73 kg/m².     Input and Output Summary (last 24 hours):     Intake/Output Summary (Last 24 hours) at 2/8/2025 0716  Last data filed at 2/7/2025 2029  Gross per 24 hour   Intake 480 ml   Output 200 ml   Net 280 ml       Physical Exam     Vitals and nursing note reviewed.   Constitutional:       General: She is not in acute distress.     Appearance: She is well-developed.   HENT:      Head: Normocephalic and atraumatic.      Nose: Nose normal. No congestion or rhinorrhea.      Mouth/Throat:      Mouth: Mucous membranes are moist.      Pharynx: Oropharynx is clear.   Eyes:      Extraocular Movements: Extraocular movements intact.      Conjunctiva/sclera: Conjunctivae normal.      Pupils: Pupils are equal, round, and reactive to light.   Cardiovascular:      Rate and Rhythm: Normal rate and regular rhythm.      Heart sounds: No murmur heard.  Pulmonary:      Effort: Pulmonary effort is normal. No respiratory distress.      Breath sounds: Normal breath sounds.   Abdominal:      Palpations:  Abdomen is soft.      Tenderness: There is no abdominal tenderness.   Musculoskeletal:         General: No swelling.      Cervical back: Neck supple.      Right lower leg: No edema.      Left lower leg: No edema.      Comments: Skin warm with mild erythema in the medial and posterior thigh area.  No ecchymosis. Less tenderness at palpation. No drainage.     Skin:     General: Skin is warm and dry.      Capillary Refill: Capillary refill takes less than 2 seconds.   Neurological:      General: No focal deficit present.      Mental Status: She is alert and oriented to person, place, and time.      Cranial Nerves: No cranial nerve deficit.   Psychiatric:         Mood and Affect: Mood normal.      Lab Results: I have reviewed the following results:   Results from last 7 days   Lab Units 02/08/25  0503   WBC Thousand/uL 14.65*   HEMOGLOBIN g/dL 13.6   HEMATOCRIT % 42.8   PLATELETS Thousands/uL 355   SEGS PCT % 61   LYMPHO PCT % 23   MONO PCT % 8   EOS PCT % 3     Results from last 7 days   Lab Units 02/08/25  0503   SODIUM mmol/L 139   POTASSIUM mmol/L 4.1   CHLORIDE mmol/L 103   CO2 mmol/L 28   BUN mg/dL 9   CREATININE mg/dL 0.64   ANION GAP mmol/L 8   CALCIUM mg/dL 9.7   ALBUMIN g/dL 3.2*   TOTAL BILIRUBIN mg/dL 0.38   ALK PHOS U/L 114*   ALT U/L 60*   AST U/L 34   GLUCOSE RANDOM mg/dL 105                       Recent Cultures (last 7 days):   Results from last 7 days   Lab Units 02/06/25  0449 02/03/25  0233 02/03/25  0038 02/03/25  0021   BLOOD CULTURE  No Growth at 24 hrs.  No Growth at 24 hrs.  --  Streptococcus intermedius* Staphylococcus epidermidis*  Streptococcus intermedius*   GRAM STAIN RESULT   --   --  Gram positive cocci in clusters* Gram positive cocci in clusters*   URINE CULTURE   --  20,000-29,000 cfu/ml  --   --            Last 24 Hours Medication List:     Current Facility-Administered Medications:     acetaminophen (TYLENOL) tablet 975 mg, BID    amLODIPine (NORVASC) tablet 5 mg, Daily    aspirin  (ECOTRIN LOW STRENGTH) EC tablet 81 mg, Daily    atorvastatin (LIPITOR) tablet 40 mg, QPM    enoxaparin (LOVENOX) subcutaneous injection 40 mg, Daily    fish oil capsule 1,000 mg, Daily    ibuprofen (MOTRIN) tablet 400 mg, Q6H PRN    memantine (NAMENDA) tablet 5 mg, BID    methocarbamol (ROBAXIN) tablet 500 mg, BID    oseltamivir (TAMIFLU) capsule 75 mg, Q12H TAMMY    sodium chloride 0.9 % infusion, Continuous, Last Rate: 75 mL/hr (02/07/25 0178)    vancomycin (VANCOCIN) IVPB (premix in dextrose) 750 mg 150 mL, Q12H, Last Rate: 750 mg (02/08/25 6330)    Administrative Statements   Today, Patient Was Seen By: Carola Kilgore MD

## 2025-02-08 NOTE — ASSESSMENT & PLAN NOTE
Stable  Compliant with home medications  Home medications - aspirin (Aspirin Low Dose) 81 mg EC tablet , atorvastatin (LIPITOR) 40 mg tablet , omega-3-acid ethyl esters (LOVAZA) 1 g capsule , amlodipine 5 mg tablet     Plan:  Continue home medications  Keep BP within normal limits

## 2025-02-09 LAB
BASOPHILS # BLD AUTO: 0.06 THOUSANDS/ΜL (ref 0–0.1)
BASOPHILS NFR BLD AUTO: 1 % (ref 0–1)
EOSINOPHIL # BLD AUTO: 0.27 THOUSAND/ΜL (ref 0–0.61)
EOSINOPHIL NFR BLD AUTO: 3 % (ref 0–6)
ERYTHROCYTE [DISTWIDTH] IN BLOOD BY AUTOMATED COUNT: 12.8 % (ref 11.6–15.1)
HCT VFR BLD AUTO: 37 % (ref 34.8–46.1)
HGB BLD-MCNC: 12 G/DL (ref 11.5–15.4)
IMM GRANULOCYTES # BLD AUTO: 0.23 THOUSAND/UL (ref 0–0.2)
IMM GRANULOCYTES NFR BLD AUTO: 2 % (ref 0–2)
LYMPHOCYTES # BLD AUTO: 3.22 THOUSANDS/ΜL (ref 0.6–4.47)
LYMPHOCYTES NFR BLD AUTO: 30 % (ref 14–44)
MCH RBC QN AUTO: 30.8 PG (ref 26.8–34.3)
MCHC RBC AUTO-ENTMCNC: 32.4 G/DL (ref 31.4–37.4)
MCV RBC AUTO: 95 FL (ref 82–98)
MONOCYTES # BLD AUTO: 0.94 THOUSAND/ΜL (ref 0.17–1.22)
MONOCYTES NFR BLD AUTO: 9 % (ref 4–12)
NEUTROPHILS # BLD AUTO: 6.11 THOUSANDS/ΜL (ref 1.85–7.62)
NEUTS SEG NFR BLD AUTO: 55 % (ref 43–75)
NRBC BLD AUTO-RTO: 0 /100 WBCS
PLATELET # BLD AUTO: 348 THOUSANDS/UL (ref 149–390)
PMV BLD AUTO: 9.6 FL (ref 8.9–12.7)
RBC # BLD AUTO: 3.9 MILLION/UL (ref 3.81–5.12)
WBC # BLD AUTO: 10.83 THOUSAND/UL (ref 4.31–10.16)

## 2025-02-09 PROCEDURE — 85025 COMPLETE CBC W/AUTO DIFF WBC: CPT

## 2025-02-09 PROCEDURE — NC001 PR NO CHARGE: Performed by: FAMILY MEDICINE

## 2025-02-09 RX ADMIN — ACETAMINOPHEN 975 MG: 325 TABLET, FILM COATED ORAL at 21:38

## 2025-02-09 RX ADMIN — AMLODIPINE BESYLATE 5 MG: 5 TABLET ORAL at 09:26

## 2025-02-09 RX ADMIN — ENOXAPARIN SODIUM 40 MG: 40 INJECTION SUBCUTANEOUS at 09:26

## 2025-02-09 RX ADMIN — ASPIRIN 81 MG: 81 TABLET, COATED ORAL at 09:26

## 2025-02-09 RX ADMIN — SODIUM CHLORIDE 75 ML/HR: 0.9 INJECTION, SOLUTION INTRAVENOUS at 14:41

## 2025-02-09 RX ADMIN — VANCOMYCIN HYDROCHLORIDE 750 MG: 750 INJECTION, SOLUTION INTRAVENOUS at 15:21

## 2025-02-09 RX ADMIN — ACETAMINOPHEN 975 MG: 325 TABLET, FILM COATED ORAL at 09:26

## 2025-02-09 RX ADMIN — VANCOMYCIN HYDROCHLORIDE 750 MG: 750 INJECTION, SOLUTION INTRAVENOUS at 05:00

## 2025-02-09 RX ADMIN — METHOCARBAMOL TABLETS 500 MG: 500 TABLET, COATED ORAL at 17:52

## 2025-02-09 RX ADMIN — IBUPROFEN 400 MG: 400 TABLET, FILM COATED ORAL at 17:58

## 2025-02-09 RX ADMIN — OSELTAMIVIR PHOSPHATE 75 MG: 75 CAPSULE ORAL at 21:38

## 2025-02-09 RX ADMIN — OMEGA-3 FATTY ACIDS CAP 1000 MG 1000 MG: 1000 CAP at 09:26

## 2025-02-09 RX ADMIN — MEMANTINE HYDROCHLORIDE 5 MG: 5 TABLET, FILM COATED ORAL at 17:52

## 2025-02-09 RX ADMIN — OSELTAMIVIR PHOSPHATE 75 MG: 75 CAPSULE ORAL at 09:26

## 2025-02-09 RX ADMIN — ATORVASTATIN CALCIUM 40 MG: 40 TABLET, FILM COATED ORAL at 17:52

## 2025-02-09 RX ADMIN — METHOCARBAMOL TABLETS 500 MG: 500 TABLET, COATED ORAL at 09:26

## 2025-02-09 RX ADMIN — MEMANTINE HYDROCHLORIDE 5 MG: 5 TABLET, FILM COATED ORAL at 09:26

## 2025-02-09 NOTE — ASSESSMENT & PLAN NOTE
Could be in the setting of acetaminophen v/s current infection process  Asymptomatic    Plan:  AM CMP   Decreased tylenol to BID   Consider stop tylenol if liver enzymes continue trending up.

## 2025-02-09 NOTE — PLAN OF CARE
Problem: PAIN - ADULT  Goal: Verbalizes/displays adequate comfort level or baseline comfort level  Description: Interventions:  - Encourage patient to monitor pain and request assistance  - Assess pain using appropriate pain scale  - Administer analgesics based on type and severity of pain and evaluate response  - Implement non-pharmacological measures as appropriate and evaluate response  - Consider cultural and social influences on pain and pain management  - Notify physician/advanced practitioner if interventions unsuccessful or patient reports new pain  Outcome: Progressing     Problem: INFECTION - ADULT  Goal: Absence or prevention of progression during hospitalization  Description: INTERVENTIONS:  - Assess and monitor for signs and symptoms of infection  - Monitor lab/diagnostic results  - Monitor all insertion sites, i.e. indwelling lines, tubes, and drains  - Monitor endotracheal if appropriate and nasal secretions for changes in amount and color  - [place] appropriate cooling/warming therapies per order  - Administer medications as ordered  - Instruct and encourage patient and family to use good hand hygiene technique  - Identify and instruct in appropriate isolation precautions for identified infection/condition  Outcome: Progressing     Problem: DISCHARGE PLANNING  Goal: Discharge to home or other facility with appropriate resources  Description: INTERVENTIONS:  - Identify barriers to discharge w/patient and caregiver  - Arrange for needed discharge resources and transportation as appropriate  - Identify discharge learning needs (meds, wound care, etc.)  - Arrange for interpretive services to assist at discharge as needed  - Refer to Case Management Department for coordinating discharge planning if the patient needs post-hospital services based on physician/advanced practitioner order or complex needs related to functional status, cognitive ability, or social support system  Outcome: Progressing

## 2025-02-09 NOTE — ASSESSMENT & PLAN NOTE
Ct pelvis/abdominal -  Lobulated fluid collections within the right obturator musculature - compatible with intramuscular abscesses in the appropriate clinical setting.  Medial thigh musculature with surrounding fat stranding/edema which may be due to myositis   Remains afebrile, however CRP, SED and WBC trending up  S/p - ceFAZolin IVPB  2,000 mg for 2 days   Abscess could have been present during corticosteroid injection on 1/29/2025 vs the corticosteroid steroid shot causing iatrogenic abscess  MRI on 2/4/25 Intramuscular abscesses in the right pelvis/hip musculature as described with     adjacent muscular edema/enhancement consistent with myositis. Right trochanteric bursitis, which   may be infectious  Blood culture positives for Staph aureus   IR attempted U/S guided aspiration on right perineal unsuccessful due to difficult location(right ischial tuberosity and within the obturator externus)  Orthopedic sign off and defer the case to General Surgery  General Surgery was consulted no surgery planned at this time due to difficult location.    ID consulted. Cefepime stopped by them   Pain well controlled.   Clinically improving       Plan:  Continue Vancomycin 750 mg every 12 hours  Ibuprofen 600 mg as needed every 6 hours  Tylenol 975 mg every 12 hours scheduled  Roxicodone 2.5 one time  oxycodone 2.5 mg every 6 as needed moderate pain and breakthrough pain  AM CBC and CMP

## 2025-02-09 NOTE — PROGRESS NOTES
Hawa Cheek is a 74 y.o. female who is currently ordered Vancomycin IV with management by the Pharmacy Consult service.  Relevant clinical data and objective / subjective history reviewed.  Vancomycin Assessment:  1. Indication and Goal AUC/Trough: Soft tissue (goal -600, trough >10)  2. Clinical Status: stable  3. Micro: See 2/8/2025 pharmacy progress note  4. Renal Function: from 2/8/2025  SCr: 0.64 mg/dL  CrCl: 57.1 mL/min  5. Days of Therapy: 7  6. Current Dose: 750 mg IV every 12 hours  Vancomycin Plan:  1. Dosing: Continue current dose  2. Estimated AUC: 558 mcg*hr/mL  3. Estimated Trough: 15.7 mcg/mL  4. Next Level: 2/12/2025 with morning labs  5. Renal Function Monitoring: Daily BMP and UOP  Pharmacy will continue to follow closely for s/sx of nephrotoxicity, infusion reactions and appropriateness of therapy.  BMP and CBC will be ordered per protocol. We will continue to follow the patient’s culture results and clinical progress daily.    Ana Vu, Pharmacist

## 2025-02-09 NOTE — ASSESSMENT & PLAN NOTE
On admission meeting criteria due to fever and leukocytosis in the setting of abscess of muscle.   Clinically patient is improving   ID consulted and on board   TTE on 02/06/2025 unremarkable for vegetation   Blood cultures (set 1) positive for Strep intermedius and staph epidermis  Blood culture (set 2) no growth at 48 hrs    Plan:  TTE no longer required as 2nd set of cultures had no growth at 48 hours.   Continue IV vancomycin   AM CBC   To touch base with ID prior to discharge

## 2025-02-09 NOTE — ASSESSMENT & PLAN NOTE
Influenza A PCR positive   Likely viral etiology given the seasonal prevalence and symptomatology     Plan:  Oseltamivir 75 mg daily x 5 days (started 2/7/2025)  Droplet precautions

## 2025-02-09 NOTE — PROGRESS NOTES
Progress Note - Family Medicine   Name: Hawa Cheek 74 y.o. female I MRN: [id number]  Unit/Bed#: 7T [facility] 709-01 I Date of Admission: 2/2/2025   Date of Service: 2/9/2025 I Hospital Day: 6     Assessment & Plan  Abscess of muscle  Ct pelvis/abdominal -  Lobulated fluid collections within the right obturator musculature - compatible with intramuscular abscesses in the appropriate clinical setting.  Medial thigh musculature with surrounding fat stranding/edema which may be due to myositis   Remains afebrile, however CRP, SED and WBC trending up  S/p - ceFAZolin IVPB  2,000 mg for 2 days   Abscess could have been present during corticosteroid injection on 1/29/2025 vs the corticosteroid steroid shot causing iatrogenic abscess  MRI on 2/4/25 Intramuscular abscesses in the right pelvis/hip musculature as described with     adjacent muscular edema/enhancement consistent with myositis. Right trochanteric bursitis, which   may be infectious  Blood culture positives for Staph aureus   IR attempted U/S guided aspiration on right perineal unsuccessful due to difficult location(right ischial tuberosity and within the obturator externus)  Orthopedic sign off and defer the case to General Surgery  General Surgery was consulted no surgery planned at this time due to difficult location.    ID consulted. Cefepime stopped by them   Pain well controlled.   Clinically improving       Plan:  Continue Vancomycin 750 mg every 12 hours  Ibuprofen 600 mg as needed every 6 hours  Tylenol 975 mg every 12 hours scheduled  Roxicodone 2.5 one time  oxycodone 2.5 mg every 6 as needed moderate pain and breakthrough pain  AM CBC and CMP     Leukocytosis  In the setting of infection, improving  See a/p for bacteremia     Gram-positive bacteremia  On admission meeting criteria due to fever and leukocytosis in the setting of abscess of muscle.   Clinically patient is improving   ID consulted and on board   TTE on 02/06/2025 unremarkable for  vegetation   Blood cultures (set 1) positive for Strep intermedius and staph epidermis  Blood culture (set 2) no growth at 48 hrs    Plan:  TTE no longer required as 2nd set of cultures had no growth at 48 hours.   Continue IV vancomycin   AM CBC   To touch base with ID prior to discharge    Dyslipidemia  Home medication - atorvastatin (LIPITOR) 40 mg tablet     Plan:  Continue home medications    Essential hypertension  Home medications - amLODIPine (NORVASC) 5 mg tablet  ,     Plan:   Continue home medications    Personal history of transient ischemic attack (TIA), and cerebral infarction without residual deficits  Stable  Compliant with home medications  Home medications - aspirin (Aspirin Low Dose) 81 mg EC tablet , atorvastatin (LIPITOR) 40 mg tablet , omega-3-acid ethyl esters (LOVAZA) 1 g capsule , amlodipine 5 mg tablet     Plan:  Continue home medications  Keep BP within normal limits    Dementia (HCC)  Home medication - Memantine 5 mg bid    Plan  Continue home medications    Transaminitis  Could be in the setting of acetaminophen v/s current infection process  Asymptomatic    Plan:  AM CMP   Decreased tylenol to BID   Consider stop tylenol if liver enzymes continue trending up.     Influenza A  Influenza A PCR positive   Likely viral etiology given the seasonal prevalence and symptomatology     Plan:  Oseltamivir 75 mg daily x 5 days (started 2/7/2025)  Droplet precautions     24 Hour Events : None  Subjective : She was seen at bedside with attending physician and senior resident. She was well-appearing and cooperative.  She did not complain of any pain at her right gluteal region of the abscess. She was well-informed of the management in place and agrees. Team will update her daughter today when she comes in for a visit.    Objective :  Temp:  [97 °F (36.1 °C)-98 °F (36.7 °C)] 97 °F (36.1 °C)  HR:  [55-94] 94  BP: (104-130)/(62-77) 130/77  Resp:  [18-20] 20  SpO2:  [97 %-99 %] 97 %  O2 Device: None (Room  air)    Physical Exam  Constitutional:       General: She is not in acute distress.     Appearance: Normal appearance. She is normal weight. She is not ill-appearing or toxic-appearing.   Eyes:      Extraocular Movements: Extraocular movements intact.      Conjunctiva/sclera: Conjunctivae normal.   Cardiovascular:      Rate and Rhythm: Normal rate and regular rhythm.      Pulses: Normal pulses.      Heart sounds: Normal heart sounds. No murmur heard.  Pulmonary:      Effort: Pulmonary effort is normal. No respiratory distress.      Breath sounds: Normal breath sounds.   Abdominal:      General: Abdomen is flat. Bowel sounds are normal. There is no distension.      Palpations: Abdomen is soft.      Tenderness: There is no abdominal tenderness.   Genitourinary:     Comments: -Right intramuscular abscess region covered with band-aid however no signs of infection noted around  -Non-tender  Musculoskeletal:      Cervical back: Normal range of motion.   Skin:     General: Skin is warm.      Capillary Refill: Capillary refill takes less than 2 seconds.   Neurological:      Mental Status: She is alert. Mental status is at baseline.         Lab Results: I have reviewed the following results:          VTE Pharmacologic Prophylaxis: Enoxaparin (Lovenox)  VTE Mechanical Prophylaxis: sequential compression device

## 2025-02-10 ENCOUNTER — APPOINTMENT (INPATIENT)
Dept: CT IMAGING | Facility: HOSPITAL | Age: 75
DRG: 557 | End: 2025-02-10
Payer: COMMERCIAL

## 2025-02-10 LAB
ALBUMIN SERPL BCG-MCNC: 3.5 G/DL (ref 3.5–5)
ALP SERPL-CCNC: 122 U/L (ref 34–104)
ALT SERPL W P-5'-P-CCNC: 72 U/L (ref 7–52)
ANION GAP SERPL CALCULATED.3IONS-SCNC: 7 MMOL/L (ref 4–13)
AST SERPL W P-5'-P-CCNC: 50 U/L (ref 13–39)
ATRIAL RATE: 55 BPM
BASOPHILS # BLD AUTO: 0.06 THOUSANDS/ΜL (ref 0–0.1)
BASOPHILS NFR BLD AUTO: 1 % (ref 0–1)
BILIRUB SERPL-MCNC: 0.2 MG/DL (ref 0.2–1)
BUN SERPL-MCNC: 11 MG/DL (ref 5–25)
CALCIUM SERPL-MCNC: 9.6 MG/DL (ref 8.4–10.2)
CHLORIDE SERPL-SCNC: 102 MMOL/L (ref 96–108)
CO2 SERPL-SCNC: 31 MMOL/L (ref 21–32)
CREAT SERPL-MCNC: 0.6 MG/DL (ref 0.6–1.3)
EOSINOPHIL # BLD AUTO: 0.17 THOUSAND/ΜL (ref 0–0.61)
EOSINOPHIL NFR BLD AUTO: 2 % (ref 0–6)
ERYTHROCYTE [DISTWIDTH] IN BLOOD BY AUTOMATED COUNT: 12.9 % (ref 11.6–15.1)
GFR SERPL CREATININE-BSD FRML MDRD: 89 ML/MIN/1.73SQ M
GLUCOSE SERPL-MCNC: 97 MG/DL (ref 65–140)
HCT VFR BLD AUTO: 40.3 % (ref 34.8–46.1)
HGB BLD-MCNC: 12.4 G/DL (ref 11.5–15.4)
IMM GRANULOCYTES # BLD AUTO: 0.14 THOUSAND/UL (ref 0–0.2)
IMM GRANULOCYTES NFR BLD AUTO: 1 % (ref 0–2)
LYMPHOCYTES # BLD AUTO: 3.81 THOUSANDS/ΜL (ref 0.6–4.47)
LYMPHOCYTES NFR BLD AUTO: 34 % (ref 14–44)
MCH RBC QN AUTO: 30.6 PG (ref 26.8–34.3)
MCHC RBC AUTO-ENTMCNC: 30.8 G/DL (ref 31.4–37.4)
MCV RBC AUTO: 100 FL (ref 82–98)
MONOCYTES # BLD AUTO: 0.56 THOUSAND/ΜL (ref 0.17–1.22)
MONOCYTES NFR BLD AUTO: 5 % (ref 4–12)
NEUTROPHILS # BLD AUTO: 6.5 THOUSANDS/ΜL (ref 1.85–7.62)
NEUTS SEG NFR BLD AUTO: 57 % (ref 43–75)
NRBC BLD AUTO-RTO: 0 /100 WBCS
P AXIS: 24 DEGREES
PLATELET # BLD AUTO: 317 THOUSANDS/UL (ref 149–390)
PMV BLD AUTO: 9.7 FL (ref 8.9–12.7)
POTASSIUM SERPL-SCNC: 3.9 MMOL/L (ref 3.5–5.3)
PR INTERVAL: 144 MS
PROT SERPL-MCNC: 8.2 G/DL (ref 6.4–8.4)
QRS AXIS: -3 DEGREES
QRSD INTERVAL: 74 MS
QT INTERVAL: 406 MS
QTC INTERVAL: 389 MS
RBC # BLD AUTO: 4.05 MILLION/UL (ref 3.81–5.12)
SODIUM SERPL-SCNC: 140 MMOL/L (ref 135–147)
T WAVE AXIS: -5 DEGREES
VENTRICULAR RATE: 55 BPM
WBC # BLD AUTO: 11.24 THOUSAND/UL (ref 4.31–10.16)

## 2025-02-10 PROCEDURE — NC001 PR NO CHARGE: Performed by: FAMILY MEDICINE

## 2025-02-10 PROCEDURE — 93005 ELECTROCARDIOGRAM TRACING: CPT

## 2025-02-10 PROCEDURE — 80053 COMPREHEN METABOLIC PANEL: CPT

## 2025-02-10 PROCEDURE — 99233 SBSQ HOSP IP/OBS HIGH 50: CPT | Performed by: INTERNAL MEDICINE

## 2025-02-10 PROCEDURE — G0545 PR INHERENT VISIT TO INPT: HCPCS | Performed by: INTERNAL MEDICINE

## 2025-02-10 PROCEDURE — 85025 COMPLETE CBC W/AUTO DIFF WBC: CPT

## 2025-02-10 PROCEDURE — 93010 ELECTROCARDIOGRAM REPORT: CPT | Performed by: STUDENT IN AN ORGANIZED HEALTH CARE EDUCATION/TRAINING PROGRAM

## 2025-02-10 PROCEDURE — 74177 CT ABD & PELVIS W/CONTRAST: CPT

## 2025-02-10 RX ORDER — CEFTRIAXONE 2 G/50ML
2000 INJECTION, SOLUTION INTRAVENOUS EVERY 24 HOURS
Status: DISCONTINUED | OUTPATIENT
Start: 2025-02-10 | End: 2025-02-15 | Stop reason: HOSPADM

## 2025-02-10 RX ADMIN — METHOCARBAMOL TABLETS 500 MG: 500 TABLET, COATED ORAL at 17:10

## 2025-02-10 RX ADMIN — IOHEXOL 80 ML: 350 INJECTION, SOLUTION INTRAVENOUS at 10:01

## 2025-02-10 RX ADMIN — MEMANTINE HYDROCHLORIDE 5 MG: 5 TABLET, FILM COATED ORAL at 08:49

## 2025-02-10 RX ADMIN — OSELTAMIVIR PHOSPHATE 75 MG: 75 CAPSULE ORAL at 20:31

## 2025-02-10 RX ADMIN — METHOCARBAMOL TABLETS 500 MG: 500 TABLET, COATED ORAL at 08:49

## 2025-02-10 RX ADMIN — ENOXAPARIN SODIUM 40 MG: 40 INJECTION SUBCUTANEOUS at 08:49

## 2025-02-10 RX ADMIN — ACETAMINOPHEN 975 MG: 325 TABLET, FILM COATED ORAL at 08:48

## 2025-02-10 RX ADMIN — OSELTAMIVIR PHOSPHATE 75 MG: 75 CAPSULE ORAL at 08:48

## 2025-02-10 RX ADMIN — AMLODIPINE BESYLATE 5 MG: 5 TABLET ORAL at 08:48

## 2025-02-10 RX ADMIN — ASPIRIN 81 MG: 81 TABLET, COATED ORAL at 08:48

## 2025-02-10 RX ADMIN — OMEGA-3 FATTY ACIDS CAP 1000 MG 1000 MG: 1000 CAP at 08:49

## 2025-02-10 RX ADMIN — MEMANTINE HYDROCHLORIDE 5 MG: 5 TABLET, FILM COATED ORAL at 17:10

## 2025-02-10 RX ADMIN — VANCOMYCIN HYDROCHLORIDE 750 MG: 750 INJECTION, SOLUTION INTRAVENOUS at 04:44

## 2025-02-10 RX ADMIN — CEFTRIAXONE 2000 MG: 2 INJECTION, SOLUTION INTRAVENOUS at 13:15

## 2025-02-10 RX ADMIN — ATORVASTATIN CALCIUM 40 MG: 40 TABLET, FILM COATED ORAL at 17:10

## 2025-02-10 RX ADMIN — ACETAMINOPHEN 975 MG: 325 TABLET, FILM COATED ORAL at 20:31

## 2025-02-10 NOTE — PLAN OF CARE
Problem: PAIN - ADULT  Goal: Verbalizes/displays adequate comfort level or baseline comfort level  Description: Interventions:  - Encourage patient to monitor pain and request assistance  - Assess pain using appropriate pain scale  - Administer analgesics based on type and severity of pain and evaluate response  - Implement non-pharmacological measures as appropriate and evaluate response  - Consider cultural and social influences on pain and pain management  - Notify physician/advanced practitioner if interventions unsuccessful or patient reports new pain  Outcome: Progressing     Problem: SAFETY ADULT  Goal: Patient will remain free of falls  Description: INTERVENTIONS:  - Educate patient/family on patient safety including physical limitations  - Instruct patient to call for assistance with activity   - Consult OT/PT to assist with strengthening/mobility   - Keep Call bell within reach  - Keep bed low and locked with side rails adjusted as appropriate  - Keep care items and personal belongings within reach  - Initiate and maintain comfort rounds  - Make Fall Risk Sign visible to staff  - O- Apply yellow socks and bracelet for high fall risk patients  - Consider moving patient to room near nurses station  Outcome: Progressing  Goal: Maintain or return to baseline ADL function  Description: INTERVENTIONS:  -  Assess patient's ability to carry out ADLs; assess patient's baseline for ADL function and identify physical deficits which impact ability to perform ADLs (bathing, care of mouth/teeth, toileting, grooming, dressing, etc.)  - Assess/evaluate cause of self-care deficits   - Assess range of motion  - Assess patient's mobility; develop plan if impaired  - Assess patient's need for assistive devices and provide as appropriate  - Encourage maximum independence but intervene and supervise when necessary  - Involve family in performance of ADLs  - Assess for home care needs following discharge   - Consider OT  consult to assist with ADL evaluation and planning for discharge  - Provide patient education as appropriate  Outcome: Progressing  Goal: Maintains/Returns to pre admission functional level  Description: INTERVENTIONS:  - Perform AM-PAC 6 Click Basic Mobility/ Daily Activity assessment daily.  - Set and communicate daily mobility goal to care team and patient/family/caregiver.   - Collaborate with rehabilitation services on mobility goals if consulted  - Perf- - Record patient progress and toleration of activity level   Outcome: Progressing     Problem: DISCHARGE PLANNING  Goal: Discharge to home or other facility with appropriate resources  Description: INTERVENTIONS:  - Identify barriers to discharge w/patient and caregiver  - Arrange for needed discharge resources and transportation as appropriate  - Identify discharge learning needs (meds, wound care, etc.)  - Arrange for interpretive services to assist at discharge as needed  - Refer to Case Management Department for coordinating discharge planning if the patient needs post-hospital services based on physician/advanced practitioner order or complex needs related to functional status, cognitive ability, or social support system  Outcome: Progressing

## 2025-02-10 NOTE — QUICK NOTE
Patient was seen during the rounds this evening.  She was laying down in bed comfortably.  She was talking to her daughter on the phone and the daughter helped with the translation.  Patient states that she does not have any complaints or concerns.  Eating and drinking well.  Vital signs within normal limits.  Will continue the current management plan established by the day team.

## 2025-02-10 NOTE — ASSESSMENT & PLAN NOTE
WBC count elevated upon admission, then with further elevation. WBC count not fully reliable at this time given patient's recent steroid injection and oral prednisone use. Consider role of patient's bacteremia and R gluteal abscess. This morning her WBC count is trending down. Repeat blood cultures are negative >72 hours.  -antibiotic as above  -weekly CBCD while on IV antibiotic  -follow up repeat blood cultures  -monitor vitals

## 2025-02-10 NOTE — ASSESSMENT & PLAN NOTE
PCR positive on 2/6/2025. She has been appropriately started on Tamiflu. Her O2 saturation remains stable on room air.  -recommend completing 5-days of treatment with Tamiflu

## 2025-02-10 NOTE — CASE MANAGEMENT
Case Management Discharge Planning Note    Patient name Hawa Cheek  Location 7T [facility] 709/7T [facility] 709-01 MRN [id number]  : 1950 Date 2/10/2025       Current Admission Date: 2025  Current Admission Diagnosis:Abscess of muscle   Patient Active Problem List    Diagnosis Date Noted Date Diagnosed    Influenza A 2025     Transaminitis 2025     Gram-positive bacteremia 2025     Abscess of muscle 2025     Dementia (HCC) 2025     Osteopenia 2024     Pre-diabetes 2024     Gastroesophageal reflux disease without esophagitis 2022     Dizziness 2021     Leukocytosis 2019     Personal history of transient ischemic attack (TIA), 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     Spinal stenosis, lumbar region with neurogenic claudication      Muscle spasms of neck 2017     Arthralgia of left hip 2016     Slow transit constipation 2016     Impaired fasting glucose 2016     Chronic right shoulder pain 03/15/2016     Creatinine elevation 2015     Neck pain 2014     Low back pain 2014     Acquired trigger finger 10/09/2013     Vitamin D deficiency 2013     Mammogram abnormal 2013     Multiple joint pain 2013     Other symptoms involving skin and integumentary tissues 2013     Pemphigus 2013     Bursitis 2012     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  [address] [address]  Phone: [phone] Fax: [phone]    Primary Care Provider: Keeley Mccormick MD    Primary Insurance: [facility]  Secondary Insurance:     DISCHARGE DETAILS:    Discharge planning discussed with:: Patient and daughter Lexis      CM contacted family/caregiver?: Yes      Were patient/caregiver advised of the risks associated with not following Treatment Team discharge recommendations?: Yes    Contacts  Patient Contacts: Lexis  Relationship to Patient:: Family  Reason/Outcome: Emergency Contact, Discharge Planning    Other Referral/Resources/Interventions Provided:  Interventions: HHC, Home Infusion     Additional Comments: Met with patient, daughter Mildered at bedside used interperter Polly [id number] and explained need for home infusion.  Daughter willing to learn infusion.  Agreeable to infusion company and HHC referrals.  CM department followingthru discharge

## 2025-02-10 NOTE — PROGRESS NOTES
Hawa Cheek is a 74 y.o. female who is currently ordered Vancomycin IV with management by the Pharmacy Consult service.  Relevant clinical data and objective / subjective history reviewed.  Vancomycin Assessment:  Indication and Goal AUC/Trough: Soft tissue (goal -600, trough >10)  Clinical Status: improving  Renal Function:  SCr: 0.64 mg/dL  CrCl: 57.3 mL/min  Renal replacement: Not on dialysis  Days of Therapy: 8  Current Dose: 750 mg q12h  Vancomycin Plan:  New Dosing: continue same dose today 2/10 , will start 1250 mg q24h 2/11/25   Estimated AUC: 484 mcg*hr/mL  Estimated Trough: 10.3 mcg/mL  Next Level: 2/12/25 am draw  Renal Function Monitoring: Daily BMP and UOP  Pharmacy will continue to follow closely for s/sx of nephrotoxicity, infusion reactions and appropriateness of therapy.  BMP and CBC will be ordered per protocol. We will continue to follow the patient’s culture results and clinical progress daily.    Sharon Crain, Pharmacist

## 2025-02-10 NOTE — PLAN OF CARE
Problem: PAIN - ADULT  Goal: Verbalizes/displays adequate comfort level or baseline comfort level  Description: Interventions:  - Encourage patient to monitor pain and request assistance  - Assess pain using appropriate pain scale  - Administer analgesics based on type and severity of pain and evaluate response  - Implement non-pharmacological measures as appropriate and evaluate response  - Consider cultural and social influences on pain and pain management  - Notify physician/advanced practitioner if interventions unsuccessful or patient reports new pain  Outcome: Progressing     Problem: INFECTION - ADULT  Goal: Absence or prevention of progression during hospitalization  Description: INTERVENTIONS:  - Assess and monitor for signs and symptoms of infection  - Monitor lab/diagnostic results  - Monitor all insertion sites, i.e. indwelling lines, tubes, and drains  - Monitor endotracheal if appropriate and nasal secretions for changes in amount and color  - [place] appropriate cooling/warming therapies per order  - Administer medications as ordered  - Instruct and encourage patient and family to use good hand hygiene technique  - Identify and instruct in appropriate isolation precautions for identified infection/condition  Outcome: Progressing  Goal: Absence of fever/infection during neutropenic period  Description: INTERVENTIONS:  - Monitor WBC    Outcome: Progressing     Problem: SAFETY ADULT  Goal: Patient will remain free of falls  Description: INTERVENTIONS:  - Educate patient/family on patient safety including physical limitations  - Instruct patient to call for assistance with activity   - Consult OT/PT to assist with strengthening/mobility   - Keep Call bell within reach  - Keep bed low and locked with side rails adjusted as appropriate  - Keep care items and personal belongings within reach  - Initiate and maintain comfort rounds  - Make Fall Risk Sign visible to staff  - O- Apply yellow socks and  bracelet for high fall risk patients  - Consider moving patient to room near nurses station  Outcome: Progressing  Goal: Maintain or return to baseline ADL function  Description: INTERVENTIONS:  -  Assess patient's ability to carry out ADLs; assess patient's baseline for ADL function and identify physical deficits which impact ability to perform ADLs (bathing, care of mouth/teeth, toileting, grooming, dressing, etc.)  - Assess/evaluate cause of self-care deficits   - Assess range of motion  - Assess patient's mobility; develop plan if impaired  - Assess patient's need for assistive devices and provide as appropriate  - Encourage maximum independence but intervene and supervise when necessary  - Involve family in performance of ADLs  - Assess for home care needs following discharge   - Consider OT consult to assist with ADL evaluation and planning for discharge  - Provide patient education as appropriate  Outcome: Progressing  Goal: Maintains/Returns to pre admission functional level  Description: INTERVENTIONS:  - Perform AM-PAC 6 Click Basic Mobility/ Daily Activity assessment daily.  - Set and communicate daily mobility goal to care team and patient/family/caregiver.   - Collaborate with rehabilitation services on mobility goals if consulted  - Perf- - Record patient progress and toleration of activity level   Outcome: Progressing     Problem: Nutrition/Hydration-ADULT  Goal: Nutrient/Hydration intake appropriate for improving, restoring or maintaining nutritional needs  Description: Monitor and assess patient's nutrition/hydration status for malnutrition. Collaborate with interdisciplinary team and initiate plan and interventions as ordered.  Monitor patient's weight and dietary intake as ordered or per policy. Utilize nutrition screening tool and intervene as necessary. Determine patient's food preferences and provide high-protein, high-caloric foods as appropriate.     INTERVENTIONS:  - Monitor oral intake,  urinary output, labs, and treatment plans  - Assess nutrition and hydration status and recommend course of action  - Evaluate amount of meals eaten  - Assist patient with eating if necessary   - Allow adequate time for meals  - Recommend/ encourage appropriate diets, oral nutritional supplements, and vitamin/mineral supplements  - Order, calculate, and assess calorie counts as needed  - Recommend, monitor, and adjust tube feedings and TPN/PPN based on assessed needs  - Assess need for intravenous fluids  - Provide specific nutrition/hydration education as appropriate  - Include patient/family/caregiver in decisions related to nutrition  Outcome: Progressing

## 2025-02-10 NOTE — ASSESSMENT & PLAN NOTE
In patient with recent ischiogluteal bursitis diagnosis and corticosteroid injection in the R ischiogluteal bursa on 1/29/2025. CT A/P showed enlargement of the medial R thigh musculature with surrounding fat stranding and 2 lobulated fluid collections in the obturators musculature. Follow up MRI of the R hip showed moderate volume fluid in the trochanteric bursa, rim enhancing fluid collections in the obturators musculature measuring 4.1 x 2.1 and 6.1 x 1.8, smaller abscesses noted in the right hip/pelvis in the region of the superior gemellus, and R thigh musculature edema. Orthopedics has evaluated, no plans for surgical intervention as joint is not involved. IR attempted aspiration but R perianal/groin aspiration failed any fluid return, and deeper collection was not accessible percutaneously. General surgery evaluated, no plans for surgical intervention. Patient remains on antibiotic as above for bacteremia. I personally reviewed her repeat CT A/P w contrast from earlier today which showed persistent multilocular intramuscular abscesses with adjacent myositis. She will need to transition to PO Amoxicillin treatment after finishing IV above which she will need to remain on until radiographic resolution of collections.   -continue IV antibiotic as above  -after completing IV antibiotic above patient will transition to PO Amoxicillin, 500mg q8, which she will remain on until radiographic resolution of collections   -monitor CBCD and BMP  -monitor vitals  -seral R thigh exams  -recommend repeat CT A/P w contrast to re-evaluate collections in approximately 1 month, outpatient ID office to coordinate this follow up imaging   -patient to follow up in the outpatient ID office after discharge, appointment information placed in the discharge planning

## 2025-02-10 NOTE — PROGRESS NOTES
Progress Note - Infectious Disease   Name: Hawa Cheek 74 y.o. female I MRN: [id number]  Unit/Bed#: 7T [facility] 709-01 I Date of Admission: 2/2/2025   Date of Service: 2/10/2025 I Hospital Day: 7   Assessment & Plan  Gram-positive bacteremia  Admission blood cultures showing GPC in both sets. Final pathogens with a staph epidermidis in single set and streptococcus intermedius in both sets. Suspect the staph is a skin contaminant and the strep is the infective pathogen. Suspect bacteremia is secondary to R thigh/buttock abscesses occurring after recent steroid injection. The patient has been receiving IV vancomycin which she appears to be tolerating without difficulty. I personally reviewed her TTE which showed no evidence of valvular vegetations. Repeat blood cultures are negative >72 hours. Given final pathogen, I will transition patient to IV ceftriaxone. In setting of known source, with rapid clearance, will plan for PICC placement and 2 weeks of IV antibiotic treatment after cleared blood cultures, through 2/19/2025. She will then need ongoing oral amoxicillin treatment due to R buttock/thigh collections which remain undrained. Patient's daughter is comfortable dosing at home with help of VNA.  to run insurance and check options as she can also go to infusion center if nursing/home infusion is not covered.   -stop IV vancomycin  -start IV ceftriaxone, 2g q24, through 2/19/2025 for 2 weeks of IV antibiotic after cleared blood cultures  -weekly CBCD and CMP while on IV antibiotic   -follow up repeat blood cultures  -monitor vitals   -patient is okay for PICC placement  -PICC to be removed by RN after final dose of IV antibiotic on 2/19/2025  -patient will need home nursing care vs infusion appointments for ongoing antibiotic management after discharge, can place [place] orders as needed   -script for IV antibiotic and lab work provided to  on 2/10/2025  -patient to follow up in the  outpatient ID office after discharge, appointment information placed in the discharge planning   Abscess of muscle  In patient with recent ischiogluteal bursitis diagnosis and corticosteroid injection in the R ischiogluteal bursa on 1/29/2025. CT A/P showed enlargement of the medial R thigh musculature with surrounding fat stranding and 2 lobulated fluid collections in the obturators musculature. Follow up MRI of the R hip showed moderate volume fluid in the trochanteric bursa, rim enhancing fluid collections in the obturators musculature measuring 4.1 x 2.1 and 6.1 x 1.8, smaller abscesses noted in the right hip/pelvis in the region of the superior gemellus, and R thigh musculature edema. Orthopedics has evaluated, no plans for surgical intervention as joint is not involved. IR attempted aspiration but R perianal/groin aspiration failed any fluid return, and deeper collection was not accessible percutaneously. General surgery evaluated, no plans for surgical intervention. Patient remains on antibiotic as above for bacteremia. I personally reviewed her repeat CT A/P w contrast from earlier today which showed persistent multilocular intramuscular abscesses with adjacent myositis. She will need to transition to PO Amoxicillin treatment after finishing IV above which she will need to remain on until radiographic resolution of collections.   -continue IV antibiotic as above  -after completing IV antibiotic above patient will transition to PO Amoxicillin, 500mg q8, which she will remain on until radiographic resolution of collections   -monitor CBCD and BMP  -monitor vitals  -seral R thigh exams  -recommend repeat CT A/P w contrast to re-evaluate collections in approximately 1 month, outpatient ID office to coordinate this follow up imaging   -patient to follow up in the outpatient ID office after discharge, appointment information placed in the discharge planning   Leukocytosis  WBC count elevated upon admission, then  "with further elevation. WBC count not fully reliable at this time given patient's recent steroid injection and oral prednisone use. Consider role of patient's bacteremia and R gluteal abscess. This morning her WBC count is trending down. Repeat blood cultures are negative >72 hours.  -antibiotic as above  -weekly CBCD while on IV antibiotic  -follow up repeat blood cultures  -monitor vitals  Dementia (HCC)  Patient on memantine. Her daughter is her primary care giver.  -monitor mood and mental status  -supportive care  Influenza A  PCR positive on 2/6/2025. She has been appropriately started on Tamiflu. Her O2 saturation remains stable on room air.  -recommend completing 5-days of treatment with Tamiflu     Above plan was discussed in detail with patient and her daughter at the bedside utilizing [employer] services.    Above plan was discussed in detail with  who was present for second half of my visit who discussed home care plans with family in depth.    Above plan was discussed in detail with primary service who agree with plan for ongoing IV antibiotic treatment. PICC to be placed tomorrow. Requested patient be sent with enough oral Amoxicillin to be utilized from 2/20/2025 until her follow up appointment with us on 3/13/2025.     Antibiotics:  Vancomycin - stop  Ceftriaxone 1  Antibiotics 9    Subjective:  Patient's daughter at bedside provides most of the history. Patient reports the pain in her R buttock and thigh is \"only a little\" today. Patient tells me that she has been able to ambulate around her room with the walker and some assistance, feels good walking. She's had no fever, chills, sweats, shakes. She has no nausea, vomiting, abdominal pain, diarrhea, or dysuria. She has no cough, shortness of breath, or chest pain. Patient and her daughter offer no new symptoms.     Objective:  Vitals:  Temp:  [96.5 °F (35.8 °C)-97.8 °F (36.6 °C)] 96.9 °F (36.1 °C)  HR:  [53-59] " 53  Resp:  [18] 18  BP: (114-130)/(58-69) 130/68  SpO2:  [97 %-98 %] 98 %  Temp (24hrs), Av.1 °F (36.2 °C), Min:96.5 °F (35.8 °C), Max:97.8 °F (36.6 °C)  Current: Temperature: (!) 96.9 °F (36.1 °C)    Physical Exam:   General Appearance:  Alert, interactive, nontoxic, no acute distress. She appears comfortable sitting up in bed.   Lungs:   Clear to auscultation bilaterally; no wheezes, rhonchi or rales; respirations unlabored on room air.   Heart:  Bradycardic; no murmur, rub or gallop.   Extremities: R thigh/groin and buttock regions no longer tender to palpate, no open wounds or active drainage/weeping.   Skin: No new rashes noted on exposed skin.     Labs, Imaging, & Other studies:   All pertinent labs and imaging studies were personally reviewed  Results from last 7 days   Lab Units 25  0539 25  0503 25  0429   WBC Thousand/uL 10.83* 14.65* 17.42*   HEMOGLOBIN g/dL 12.0 13.6 13.2   PLATELETS Thousands/uL 348 355 293     Results from last 7 days   Lab Units 25  0503 25  0429 25  0448   POTASSIUM mmol/L 4.1 4.1 4.0   CHLORIDE mmol/L 103 102 105   CO2 mmol/L 28 29 29   BUN mg/dL 9 11 17   CREATININE mg/dL 0.64 0.60 0.61   EGFR ml/min/1.73sq m 87 89 88   CALCIUM mg/dL 9.7 9.5 9.4   AST U/L 34 35 60*   ALT U/L 60* 65* 86*   ALK PHOS U/L 114* 106* 102

## 2025-02-10 NOTE — PROGRESS NOTES
Progress Note    Hawa Cheek 74 y.o. female MRN: [id number]  Unit/Bed#: 7T [facility] 709-01 Encounter: [id number]  Admitting Physician: Carola Kilgore MD  PCP: Keeley Mccormick MD  Date of Admission:  2/2/2025  7:09 PM    Assessment & Plan    Abscess of muscle  Ct pelvis/abdominal -  Lobulated fluid collections within the right obturator musculature - compatible with intramuscular abscesses in the appropriate clinical setting.  Medial thigh musculature with surrounding fat stranding/edema which may be due to myositis   Remains afebrile, however CRP, SED and WBC trending up  S/p - ceFAZolin IVPB  2,000 mg for 2 days   Abscess could have been present during corticosteroid injection on 1/29/2025 vs the corticosteroid steroid shot causing iatrogenic abscess  MRI on 2/4/25 Intramuscular abscesses in the right pelvis/hip musculature as described with     adjacent muscular edema/enhancement consistent with myositis. Right trochanteric bursitis, which   may be infectious  Blood culture positives for Staph aureus   IR attempted U/S guided aspiration on right perineal unsuccessful due to difficult location(right ischial tuberosity and within the obturator externus)  Orthopedic sign off and defer the case to General Surgery  General Surgery was consulted no surgery planned at this time due to difficult location.    ID consulted. Cefepime stopped by them   Pain well controlled.   Clinically improving     Plan:  Repeat CT abdomen/pelvis with contrast today  Will require high dose ceftriaxone 2g q24 for 2 weeks from date of clearance, then over to oral antibiotics after  Continue Vancomycin 750 mg every 12 hours in the meantime  Ibuprofen 600 mg as needed every 6 hours  Tylenol 975 mg every 12 hours scheduled  ID following  IR to place PICC line tomorrow  AM CBC and CMP     Leukocytosis  In the setting of infection, improving  See a/p for bacteremia     Gram-positive bacteremia  On admission meeting criteria due  to fever and leukocytosis in the setting of abscess of muscle.   Clinically patient is improving   ID consulted and on board   TTE on 02/06/2025 unremarkable for vegetation   Blood cultures (set 1) positive for Strep intermedius and staph epidermis  Blood culture (set 2) no growth at 72 hrs    Plan:  Continue ID recommendations  AM CBC and CMP     Dyslipidemia  Home medication - atorvastatin (LIPITOR) 40 mg tablet     Plan:  Continue home medications    Essential hypertension  Home medications - amLODIPine (NORVASC) 5 mg tablet  ,     Plan:   Continue home medications    Personal history of transient ischemic attack (TIA), and cerebral infarction without residual deficits  Stable  Compliant with home medications  Home medications - aspirin (Aspirin Low Dose) 81 mg EC tablet , atorvastatin (LIPITOR) 40 mg tablet , omega-3-acid ethyl esters (LOVAZA) 1 g capsule , amlodipine 5 mg tablet     Plan:  Continue home medications    Dementia (HCC)  Home medication - Memantine 5 mg bid    Plan  Continue home medications    Transaminitis  Could be in the setting of acetaminophen v/s current infection process  Asymptomatic    Plan:  AM CMP   Continue tylenol to BID   Consider stopping tylenol if liver enzymes continue trending up.     Influenza A  Influenza A PCR positive   Likely viral etiology given the seasonal prevalence and symptomatology     Plan:  Oseltamivir 75 mg daily x 5 days (started 2/7/2025)  Droplet precautions       VTE Pharmacologic Prophylaxis: VTE Score: 6 High Risk (Score >/= 5) - Pharmacological DVT Prophylaxis Ordered: enoxaparin (Lovenox). Sequential Compression Devices Ordered.     Mobility:   Basic Mobility Inpatient Raw Score: 22  JH-HLM Goal: 7: Walk 25 feet or more  JH-HLM Achieved: 6: Walk 10 steps or more  JH-HLM Goal achieved. Continue to encourage appropriate mobility.     Patient Centered Rounds: I performed bedside rounds with nursing staff today.   Discussions with Specialists or Other Care  Team Provider: none      Education and Discussions with Family / Patient: Updated  (daughter) at bedside.     Current Length of Stay: 5 day(s)  Current Patient Status: Inpatient   Certification Statement: The patient will continue to require additional inpatient hospital stay due to Abscess of muscle and Bacteremia   Discharge Plan: Anticipate discharge in >72 hrs to home.     Code Status: Level 1 - Full Code    Subjective:   Patient was seen and examined at bedside with the attending physician. No acute events overnight. Patient reports feeling well this morning. She is lying comfortably in bed. Denies fever, chills, headache, chest pain, abdominal discomfort, n/v/d/c. No other concerns. Assessment and plan discussed.     Objective:     Vitals:   Temp (24hrs), Av.1 °F (36.2 °C), Min:96.5 °F (35.8 °C), Max:97.8 °F (36.6 °C)    Temp:  [96.5 °F (35.8 °C)-97.8 °F (36.6 °C)] 96.9 °F (36.1 °C)  HR:  [53-59] 53  Resp:  [18] 18  BP: (114-130)/(58-69) 130/68  SpO2:  [97 %-98 %] 98 %  Body mass index is 21.73 kg/m².     Input and Output Summary (last 24 hours):       Intake/Output Summary (Last 24 hours) at 2/10/2025 1027  Last data filed at 2/10/2025 0858  Gross per 24 hour   Intake 2143.75 ml   Output --   Net 2143.75 ml       Physical Exam:   Physical Exam      Vitals and nursing note reviewed.   Constitutional:       General: She is not in acute distress.     Appearance: She is well-developed.   HENT:      Head: Normocephalic and atraumatic.      Nose: Nose normal. No congestion or rhinorrhea.      Mouth/Throat:      Mouth: Mucous membranes are moist.      Pharynx: Oropharynx is clear.   Eyes:      Extraocular Movements: Extraocular movements intact.      Conjunctiva/sclera: Conjunctivae normal.      Pupils: Pupils are equal, round, and reactive to light.   Cardiovascular:      Rate and Rhythm: Normal rate and regular rhythm.      Heart sounds: No murmur heard.  Pulmonary:      Effort: Pulmonary effort  is normal. No respiratory distress.      Breath sounds: Normal breath sounds.   Abdominal:      Palpations: Abdomen is soft.      Tenderness: There is no abdominal tenderness.   Musculoskeletal:         General: No swelling.      Cervical back: Neck supple.      Right lower leg: No edema.      Left lower leg: No edema.      Comments: Skin warm with mild erythema in the medial and posterior thigh area. No ecchymosis. No tenderness at palpation. No drainage. Improving from previous exam    Skin:     General: Skin is warm and dry.      Capillary Refill: Capillary refill takes less than 2 seconds.   Neurological:      General: No focal deficit present.      Mental Status: She is alert and oriented to person, place, and time.      Cranial Nerves: No cranial nerve deficit.   Psychiatric:         Mood and Affect: Mood normal.  Additional Data:     Labs:    Results from last 7 days   Lab Units 02/09/25  0539   WBC Thousand/uL 10.83*   HEMOGLOBIN g/dL 12.0   HEMATOCRIT % 37.0   PLATELETS Thousands/uL 348   SEGS PCT % 55   LYMPHO PCT % 30   MONO PCT % 9   EOS PCT % 3     Results from last 7 days   Lab Units 02/08/25  0503   POTASSIUM mmol/L 4.1   CHLORIDE mmol/L 103   CO2 mmol/L 28   BUN mg/dL 9   CREATININE mg/dL 0.64   CALCIUM mg/dL 9.7   ALK PHOS U/L 114*   ALT U/L 60*   AST U/L 34                   * I Have Reviewed All Lab Data Listed Above.  * Additional Pertinent Lab Tests Reviewed: All Labs For Current Hospital Admission Reviewed. Waiting on Morning labs to be drawn.       Recent Cultures (last 7 days):     Results from last 7 days   Lab Units 02/06/25  0449   BLOOD CULTURE  No Growth at 72 hrs.  No Growth at 72 hrs.       Last 24 Hours Medication List:   Current Facility-Administered Medications   Medication Dose Route Frequency Provider Last Rate    acetaminophen  975 mg Oral BID Joyce Sims MD      amLODIPine  5 mg Oral Daily Jesús Nunez MD      aspirin  81 mg Oral Daily Jesús Nunez MD      atorvastatin  40  mg Oral QPM Houston Parker MD      enoxaparin  40 mg Subcutaneous Daily Jesús Nunez MD      fish oil  1,000 mg Oral Daily Jesús Nunez MD      ibuprofen  400 mg Oral Q6H PRN Joyce Sims MD      melatonin  3 mg Oral Daily PRN Daksha Villaseñor MD      memantine  5 mg Oral BID Jesús Nunez MD      methocarbamol  500 mg Oral BID Jesús Nunez MD      oseltamivir  75 mg Oral Q12H TAMMY Houston Parker MD      polyethylene glycol  17 g Oral Daily PRN Yesenia Chambers MD      sodium chloride  75 mL/hr Intravenous Continuous Mark Farley MD 75 mL/hr (02/09/25 1441)    vancomycin  15 mg/kg Intravenous Q12H Houston Parker  mg (02/10/25 5504)        ** Please Note: Dictation voice to text software may have been used in the creation of this document. **    Carola Kilgore MD  02/10/25  10:27 AM

## 2025-02-10 NOTE — NURSING NOTE
Slept well through the night. Tried to change IV site, but unable, other staff tried as welL, incoming nurse made aware.

## 2025-02-10 NOTE — ASSESSMENT & PLAN NOTE
Admission blood cultures showing GPC in both sets. Final pathogens with a staph epidermidis in single set and streptococcus intermedius in both sets. Suspect the staph is a skin contaminant and the strep is the infective pathogen. Suspect bacteremia is secondary to R thigh/buttock abscesses occurring after recent steroid injection. The patient has been receiving IV vancomycin which she appears to be tolerating without difficulty. I personally reviewed her TTE which showed no evidence of valvular vegetations. Repeat blood cultures are negative >72 hours. Given final pathogen, I will transition patient to IV ceftriaxone. In setting of known source, with rapid clearance, will plan for PICC placement and 2 weeks of IV antibiotic treatment after cleared blood cultures, through 2/19/2025. She will then need ongoing oral amoxicillin treatment due to R buttock/thigh collections which remain undrained. Patient's daughter is comfortable dosing at home with help of VNA.  to run insurance and check options as she can also go to infusion center if nursing/home infusion is not covered.   -stop IV vancomycin  -start IV ceftriaxone, 2g q24, through 2/19/2025 for 2 weeks of IV antibiotic after cleared blood cultures  -weekly CBCD and CMP while on IV antibiotic   -follow up repeat blood cultures  -monitor vitals   -patient is okay for PICC placement  -PICC to be removed by RN after final dose of IV antibiotic on 2/19/2025  -patient will need home nursing care vs infusion appointments for ongoing antibiotic management after discharge, can place [place] orders as needed   -script for IV antibiotic and lab work provided to  on 2/10/2025  -patient to follow up in the outpatient ID office after discharge, appointment information placed in the discharge planning

## 2025-02-10 NOTE — DISCHARGE SUMMARY
Discharge Summary - [facility]    Patient Information: Hawa Cheek 74 y.o. female MRN: [id number]  Unit/Bed#: 7T [facility] 701-01 Encounter: [id number]    Discharging Physician / Practitioner: Dr. Sotelo  PCP: Keeley Mccormick MD  Admission Date: 02/02/2025  Admission Orders (From admission, onward)       Ordered        02/03/25 0100  INPATIENT ADMISSION  Once                          Discharge Date: 02/15/2025    Reason for Admission: Abscess of muscle requiring IV antibiotics    Discharge Diagnoses:     Principal Problem:    Abscess of muscle  Active Problems:    Dyslipidemia    Essential hypertension    Personal history of transient ischemic attack (TIA), and cerebral infarction without residual deficits    Dementia (HCC)    Transaminitis  Resolved Problems:    Gram-positive bacteremia    Leukocytosis    Cystitis    Sepsis (HCC)    Influenza A        * Abscess of muscle  Assessment & Plan  Improved and stable for discharge   S/p ceFAZolin IVPB  2,000 mg for 2 days and Vancomycin 750 mg every BID  Abscess could have been present during corticosteroid injection on 1/29/2025 vs the corticosteroid steroid shot causing iatrogenic abscess  General Surgery was consulted no surgery planned due to difficult location.    CT abd/pelvis 2/10/25 Multilocular intramuscular abscesses within the right pelvis/hip musculature with adjacent myositis, slightly increased in size since MRI 2/4/2025  Blood culture negative after 5 days  8 French drain placed into medial upper right thigh, 7 cc of pus aspirated by IR  Gram stain and culture pending  ID was on board.    Plan:  Continue Rocephin 2g IV until 2/19/2025  Transition to PO amoxicillin 500mg q8 after 2/19/25  PICC line placed by IR on 2/11/2025  Weekly CBC and CMP while on IV antibiotic  Repeat CT A/P w contrast to re-evaluate collections in approximately 1 month  Tylenol as needed   daily drain irrigation as outpatient   Outpatient follow up with IR in 2  weeks  Follow-up with PCP      Gram-positive bacteremia-resolved as of 2/15/2025  Assessment & Plan  On admission meeting criteria due to fever and leukocytosis in the setting of abscess of muscle.   Clinically patient is improving   ID consulted and was on board   TTE on 02/06/2025 unremarkable for vegetation   Blood cultures (set 1) positive for Strep intermedius and staph epidermis  Blood culture (set 2) no growth after 5 days  gram stain and fluid final cultures negative    Plan:  Rocephin 2g IV for 2 weeks, will need to transition to PO amoxicillin 500mg q8 after 2/19/25     Transaminitis  Assessment & Plan  Could be in the setting of acetaminophen v/s current infection process  Asymptomatic    Plan:  Follow-up PCP      Dementia (HCC)  Assessment & Plan  Home medication - Memantine 5 mg bid    Plan  Continue home medications  Follow-up with neurology outpatient      Personal history of transient ischemic attack (TIA), and cerebral infarction without residual deficits  Assessment & Plan  Stable  Compliant with home medications  Home medications - aspirin (Aspirin Low Dose) 81 mg EC tablet , atorvastatin (LIPITOR) 40 mg tablet , omega-3-acid ethyl esters (LOVAZA) 1 g capsule , amlodipine 5 mg tablet     Plan:  Continue home medication      Essential hypertension  Assessment & Plan  Home medications - amLODIPine (NORVASC) 5 mg tablet  ,     Plan:   Continue home medications  Follow-up with PCP      Dyslipidemia  Assessment & Plan  Home medication - atorvastatin (LIPITOR) 40 mg tablet     Plan:  Continue home medications  Follow-up with PCP         Consultations During Hospital Stay:  IR  [place]  Pharmacy  ID  General surgery     Procedures Performed:   Attempted right perineal/groin aspiration in a frog leg position failed to return any fluid   PICC line placed by IR on 2/11/2025  8 Kyrgyz drain placed into medial upper right thigh, 7 cc of pus aspirated by IR on 2/12/2025    Significant Findings / Test Results:    WBC 19.92 > 17.42>10.83   Glucose 240 > 114>105  ESR 76 > 84; .6 > 186.1  UA Leuk +, nitrite -, +1 protein. Umx & Ucx unremarkable.   Bcx (set 1): Strep intermedius & Staph epidermidis  Bcx (set 2): no growth at 48 h  Influenza A +  CT abd/pel: intramuscular abscesses w/i right obturator musculature. Enlargement of medial thigh w/ surrounding fat stranding. Mild thickening of urinary bladder.  MRI pelv: intramuscular abscesses in R pelvis/hip w/ adjacent myositis. Possible infectious R trochanteric bursitis.  ECHO: EF 70%. Grade 1 systolic dysfn    Test Results Pending at Discharge (will require follow up):   None      Outpatient Tests Requested:  CBC weekly ordered-  Cmp weekly ordered    Outpatient follow-up Requested:  PCP  IR    Complications:  none    Hospital Course:     Hawa Cheek is a 74 y.o. female patient with PMH of HTN, HLD, Arthritis, Chronic pain disorder, who originally presented to the hospital on 2/2/2025 due to pain her right gluteal are for the previous 3 days. She had a corticosteroid injection on 1/29/2025. CT significant for lobulated fluid collections within the right obturator musculature compatible with intramuscular abscesses. Patient admitted to family medicine service for management of acute abscess of right gluteal region. Initially given ceFAZolin IVPB  2,000 mg for 2 days, transitioned to Vancomycin 750 mg every BID and then Rocephin 2g IV. IR attempted U/S guided aspiration on right perineal unsuccessful due to difficult location but subsequent attempt aspirated 7cc of puss and 8 Vietnamese drain placed into medial upper thigh.  Wound culture Gram stain and body fluid culture and Gram stain  were negatives with no growth organisms. ID was on board during her hospitalization and patient was medically stable for discharge.  Patient will continue with ceftriaxone for the next 4 days until 2/19/2025, then transition to PO amoxicillin 500mg q8 until patient will be seen by IR  "in 2 weeks.  Monitoring with weekly CBC and CMP while on IV antibiotic. Repeat CT A/P w contrast to re-evaluate collections in approximately 1 month .  Her her daughter is willing to drain irrigation as outpatient daily and follow-up with VNS.  Patient can daughter is aware that she needs to follow-up outpatient with IR in 2 weeks and PCP as well.  All the questions were answered patient's satisfaction.     Review of Systems   Constitutional:  Negative for chills, fatigue and fever.   HENT:  Negative for congestion, ear pain and sore throat.    Eyes:  Negative for pain and visual disturbance.   Respiratory:  Negative for cough, chest tightness and shortness of breath.    Cardiovascular:  Negative for chest pain, palpitations and leg swelling.   Gastrointestinal:  Negative for abdominal pain, constipation, diarrhea, nausea and vomiting.   Genitourinary:  Negative for dysuria and hematuria.   Musculoskeletal:  Negative for arthralgias and back pain.   Skin:  Negative for color change and rash.   Neurological:  Negative for dizziness, seizures, syncope, weakness, light-headedness and headaches.   All other systems reviewed and are negative.      Condition at Discharge: stable     Discharge Day Visit / Exam:     Vitals: Blood Pressure: 118/70 (02/15/25 1100)  Pulse: 60 (02/15/25 1100)  Temperature: 97.7 °F (36.5 °C) (02/15/25 1100)  Temp Source: Temporal (02/15/25 1100)  Respirations: 20 (02/15/25 1100)  Height: 5' 1\" (154.9 cm) (02/05/25 1438)  Weight - Scale: 52.2 kg (115 lb) (02/05/25 1438)  SpO2: 96 % (02/15/25 1100)    Exam:   Physical Exam   Vitals and nursing note reviewed.   Constitutional:       General: She is not in acute distress.     Appearance: She is well-developed.   HENT:      Head: Normocephalic and atraumatic.      Nose: Nose normal. No congestion or rhinorrhea.      Mouth/Throat:      Mouth: Mucous membranes are moist.      Pharynx: Oropharynx is clear.   Eyes:      Extraocular Movements: Extraocular " movements intact.      Conjunctiva/sclera: Conjunctivae normal.      Pupils: Pupils are equal, round, and reactive to light.   Cardiovascular:      Rate and Rhythm: Normal rate and regular rhythm.      Heart sounds: No murmur heard.  Pulmonary:      Effort: Pulmonary effort is normal. No respiratory distress.      Breath sounds: Normal breath sounds.   Abdominal:      Palpations: Abdomen is soft.      Tenderness: There is no abdominal tenderness.   Musculoskeletal:         General: No swelling.      Cervical back: Neck supple.      Right lower leg: No edema.      Left lower leg: No edema.      Comments: Right medial thigh Drain insitu, insertion site clear of signs of infection. Draining serosanguinous fluid.   Skin:     General: Skin is warm and dry.      Capillary Refill: Capillary refill takes less than 2 seconds.   Neurological:      General: No focal deficit present.      Mental Status: She is alert and oriented to person, place, and time.      Cranial Nerves: No cranial nerve deficit.   Psychiatric:         Mood and Affect: Mood normal.       Discussion with Family: Yes  Patient Information Sharing: With the consent of Hawa Cheek , their loved ones were notified today by inpatient team of the patient’s condition and current plan.  All questions answered.    Discharge instructions/Information to patient and family:   See after visit summary for information provided to patient and family.      Discharge Medications:  amLODIPine Besylate 5 mg Oral Daily    Ammonium Lactate 12 % 1 Application Topical 2 times daily  Patient not taking: Reported on 2/3/2025    Amoxicillin 500 mg Oral 3 times daily    Aspirin 81 mg Oral Daily    Atorvastatin Calcium 40 mg Oral Every evening    Chlorhexidine Gluconate 0.12 % RINSE WITH 15 MLS 2 TIMES A DAY FOR 30 SECONDS AND SPIT. AVOID RI...  (REFER TO PRESCRIPTION NOTES).  Patient not taking: Reported on 2/3/2025    Cholecalciferol 2,000 Units Oral Daily  Patient not taking:  Reported on 2/3/2025    Ibuprofen 400 mg Oral Every 8 hours PRN    Memantine HCl 5 mg Oral 2 times daily  Patient not taking: Reported on 2/3/2025    Methocarbamol 500 mg Oral 2 times daily    Omega-3-acid Ethyl Esters 2 g Oral 2 times daily    Sodium Chloride 0.9 % 10 mL Intracatheter Daily, Intracatheter flushing daily. May substitute prefilled syringe with normal saline 10 mL vials, 10 mL syringes, and 18 g blunt needles    Provisions for Follow-Up Care:  See after visit summary for information related to follow-up care and any pertinent home health orders.      Disposition:     Home    For Discharges to [facility]:   Not Applicable to this Patient - Not Applicable to this Patient    Planned Readmission: no     Discharge Statement:  I spent 30 minutes discharging the patient. This time was spent on the day of discharge. I had direct contact with the patient on the day of discharge. Greater than 50% of the total time was spent examining patient, answering all patient questions, arranging and discussing plan of care with patient as well as directly providing post-discharge instructions.  Additional time then spent on discharge activities.    ** Please Note: This note has been constructed using a voice recognition system **    Polina Motley MD  02/15/25  4:16 PM

## 2025-02-10 NOTE — ASSESSMENT & PLAN NOTE
Patient on memantine. Her daughter is her primary care giver.  -monitor mood and mental status  -supportive care

## 2025-02-10 NOTE — PROGRESS NOTES
Vancomycin IV Pharmacy-to-Dose Consultation     Vancomycin has been discontinued.    Pharmacy will sign off.    Please contact or re-consult with questions.    Sharon Crain, Pharmacist

## 2025-02-10 NOTE — PROGRESS NOTES
Hawa Cheek is a 74 y.o. female who is currently ordered Vancomycin IV with management by the Pharmacy Consult service.  Relevant clinical data and objective / subjective history reviewed.  Vancomycin Assessment:  Indication and Goal AUC/Trough: Soft tissue (goal -600, trough >10)  Clinical Status: improving  Micro:     Renal Function:  SCr: 0.64 mg/dL  CrCl: 57.3 mL/min  Renal replacement: Not on dialysis  Days of Therapy: 8  Current Dose: 750 mg q12h  Vancomycin Plan:  New Dosing: continue same dose  Estimated AUC: 561 mcg*hr/mL  Estimated Trough: 15.8 mcg/mL  Next Level: 2/12/25   Renal Function Monitoring: Daily BMP and UOP  Pharmacy will continue to follow closely for s/sx of nephrotoxicity, infusion reactions and appropriateness of therapy.  BMP and CBC will be ordered per protocol. We will continue to follow the patient’s culture results and clinical progress daily.    Sharon Crain, Pharmacist

## 2025-02-11 ENCOUNTER — APPOINTMENT (INPATIENT)
Dept: INTERVENTIONAL RADIOLOGY/VASCULAR | Facility: HOSPITAL | Age: 75
DRG: 557 | End: 2025-02-11
Payer: COMMERCIAL

## 2025-02-11 ENCOUNTER — HOME HEALTH ADMISSION (OUTPATIENT)
Dept: HOME HEALTH SERVICES | Facility: HOME HEALTHCARE | Age: 75
End: 2025-02-11
Payer: COMMERCIAL

## 2025-02-11 LAB
ANION GAP SERPL CALCULATED.3IONS-SCNC: 6 MMOL/L (ref 4–13)
BACTERIA BLD CULT: NORMAL
BACTERIA BLD CULT: NORMAL
BASOPHILS # BLD AUTO: 0.05 THOUSANDS/ΜL (ref 0–0.1)
BASOPHILS NFR BLD AUTO: 0 % (ref 0–1)
BUN SERPL-MCNC: 9 MG/DL (ref 5–25)
CALCIUM SERPL-MCNC: 9.5 MG/DL (ref 8.4–10.2)
CHLORIDE SERPL-SCNC: 103 MMOL/L (ref 96–108)
CO2 SERPL-SCNC: 31 MMOL/L (ref 21–32)
CREAT SERPL-MCNC: 0.6 MG/DL (ref 0.6–1.3)
EOSINOPHIL # BLD AUTO: 0.2 THOUSAND/ΜL (ref 0–0.61)
EOSINOPHIL NFR BLD AUTO: 2 % (ref 0–6)
ERYTHROCYTE [DISTWIDTH] IN BLOOD BY AUTOMATED COUNT: 12.7 % (ref 11.6–15.1)
GFR SERPL CREATININE-BSD FRML MDRD: 89 ML/MIN/1.73SQ M
GLUCOSE SERPL-MCNC: 109 MG/DL (ref 65–140)
HCT VFR BLD AUTO: 37.7 % (ref 34.8–46.1)
HGB BLD-MCNC: 11.9 G/DL (ref 11.5–15.4)
IMM GRANULOCYTES # BLD AUTO: 0.12 THOUSAND/UL (ref 0–0.2)
IMM GRANULOCYTES NFR BLD AUTO: 1 % (ref 0–2)
LYMPHOCYTES # BLD AUTO: 3.11 THOUSANDS/ΜL (ref 0.6–4.47)
LYMPHOCYTES NFR BLD AUTO: 28 % (ref 14–44)
MCH RBC QN AUTO: 29.9 PG (ref 26.8–34.3)
MCHC RBC AUTO-ENTMCNC: 31.6 G/DL (ref 31.4–37.4)
MCV RBC AUTO: 95 FL (ref 82–98)
MONOCYTES # BLD AUTO: 0.56 THOUSAND/ΜL (ref 0.17–1.22)
MONOCYTES NFR BLD AUTO: 5 % (ref 4–12)
NEUTROPHILS # BLD AUTO: 7.22 THOUSANDS/ΜL (ref 1.85–7.62)
NEUTS SEG NFR BLD AUTO: 64 % (ref 43–75)
NRBC BLD AUTO-RTO: 0 /100 WBCS
PLATELET # BLD AUTO: 380 THOUSANDS/UL (ref 149–390)
PMV BLD AUTO: 9.7 FL (ref 8.9–12.7)
POTASSIUM SERPL-SCNC: 4.5 MMOL/L (ref 3.5–5.3)
RBC # BLD AUTO: 3.98 MILLION/UL (ref 3.81–5.12)
SODIUM SERPL-SCNC: 140 MMOL/L (ref 135–147)
WBC # BLD AUTO: 11.26 THOUSAND/UL (ref 4.31–10.16)

## 2025-02-11 PROCEDURE — C1751 CATH, INF, PER/CENT/MIDLINE: HCPCS

## 2025-02-11 PROCEDURE — NC001 PR NO CHARGE: Performed by: RADIOLOGY

## 2025-02-11 PROCEDURE — 99232 SBSQ HOSP IP/OBS MODERATE 35: CPT | Performed by: FAMILY MEDICINE

## 2025-02-11 PROCEDURE — 80048 BASIC METABOLIC PNL TOTAL CA: CPT

## 2025-02-11 PROCEDURE — 85025 COMPLETE CBC W/AUTO DIFF WBC: CPT

## 2025-02-11 PROCEDURE — 36573 INSJ PICC RS&I 5 YR+: CPT

## 2025-02-11 RX ORDER — SODIUM CHLORIDE 9 MG/ML
90 INJECTION, SOLUTION INTRAVENOUS CONTINUOUS
Status: DISCONTINUED | OUTPATIENT
Start: 2025-02-12 | End: 2025-02-13

## 2025-02-11 RX ADMIN — OMEGA-3 FATTY ACIDS CAP 1000 MG 1000 MG: 1000 CAP at 08:17

## 2025-02-11 RX ADMIN — ASPIRIN 81 MG: 81 TABLET, COATED ORAL at 08:17

## 2025-02-11 RX ADMIN — ENOXAPARIN SODIUM 40 MG: 40 INJECTION SUBCUTANEOUS at 08:17

## 2025-02-11 RX ADMIN — ACETAMINOPHEN 975 MG: 325 TABLET, FILM COATED ORAL at 20:34

## 2025-02-11 RX ADMIN — OSELTAMIVIR PHOSPHATE 75 MG: 75 CAPSULE ORAL at 08:17

## 2025-02-11 RX ADMIN — CEFTRIAXONE 2000 MG: 2 INJECTION, SOLUTION INTRAVENOUS at 11:14

## 2025-02-11 RX ADMIN — OSELTAMIVIR PHOSPHATE 75 MG: 75 CAPSULE ORAL at 20:34

## 2025-02-11 RX ADMIN — ACETAMINOPHEN 975 MG: 325 TABLET, FILM COATED ORAL at 08:23

## 2025-02-11 RX ADMIN — METHOCARBAMOL TABLETS 500 MG: 500 TABLET, COATED ORAL at 08:17

## 2025-02-11 RX ADMIN — METHOCARBAMOL TABLETS 500 MG: 500 TABLET, COATED ORAL at 17:05

## 2025-02-11 RX ADMIN — SODIUM CHLORIDE 90 ML/HR: 0.9 INJECTION, SOLUTION INTRAVENOUS at 23:48

## 2025-02-11 RX ADMIN — AMLODIPINE BESYLATE 5 MG: 5 TABLET ORAL at 08:17

## 2025-02-11 RX ADMIN — MEMANTINE HYDROCHLORIDE 5 MG: 5 TABLET, FILM COATED ORAL at 08:17

## 2025-02-11 RX ADMIN — ATORVASTATIN CALCIUM 40 MG: 40 TABLET, FILM COATED ORAL at 17:05

## 2025-02-11 RX ADMIN — MEMANTINE HYDROCHLORIDE 5 MG: 5 TABLET, FILM COATED ORAL at 17:05

## 2025-02-11 RX ADMIN — Medication 3 MG: at 20:34

## 2025-02-11 NOTE — PLAN OF CARE
Problem: PAIN - ADULT  Goal: Verbalizes/displays adequate comfort level or baseline comfort level  Description: Interventions:  - Encourage patient to monitor pain and request assistance  - Assess pain using appropriate pain scale  - Administer analgesics based on type and severity of pain and evaluate response  - Implement non-pharmacological measures as appropriate and evaluate response  - Consider cultural and social influences on pain and pain management  - Notify physician/advanced practitioner if interventions unsuccessful or patient reports new pain  Outcome: Progressing     Problem: INFECTION - ADULT  Goal: Absence or prevention of progression during hospitalization  Description: INTERVENTIONS:  - Assess and monitor for signs and symptoms of infection  - Monitor lab/diagnostic results  - Monitor all insertion sites, i.e. indwelling lines, tubes, and drains  - Monitor endotracheal if appropriate and nasal secretions for changes in amount and color  - [place] appropriate cooling/warming therapies per order  - Administer medications as ordered  - Instruct and encourage patient and family to use good hand hygiene technique  - Identify and instruct in appropriate isolation precautions for identified infection/condition  Outcome: Progressing     Problem: INFECTION - ADULT  Goal: Absence of fever/infection during neutropenic period  Description: INTERVENTIONS:  - Monitor WBC    Outcome: Progressing     Problem: SAFETY ADULT  Goal: Patient will remain free of falls  Description: INTERVENTIONS:  - Educate patient/family on patient safety including physical limitations  - Instruct patient to call for assistance with activity   - Consult OT/PT to assist with strengthening/mobility   - Keep Call bell within reach  - Keep bed low and locked with side rails adjusted as appropriate  - Keep care items and personal belongings within reach  - Initiate and maintain comfort rounds  - Make Fall Risk Sign visible to staff  -  O- Apply yellow socks and bracelet for high fall risk patients  - Consider moving patient to room near nurses station  Outcome: Progressing

## 2025-02-11 NOTE — PROGRESS NOTES
Progress Note    Hawakelly Cheek 74 y.o. female MRN: [id number]  Unit/Bed#: 7T [facility] 709-01 Encounter: [id number]  Admitting Physician: Carola Kilgore MD  PCP: Keeley Mccormick MD  Date of Admission:  2/2/2025  7:09 PM    Assessment and Plan    * Abscess of muscle  Assessment & Plan  Ct pelvis/abdominal -  Lobulated fluid collections within the right obturator musculature - compatible with intramuscular abscesses in the appropriate clinical setting.  Medial thigh musculature with surrounding fat stranding/edema which may be due to myositis   Remains afebrile, however CRP, SED and WBC trending up  S/p - ceFAZolin IVPB  2,000 mg for 2 days   Abscess could have been present during corticosteroid injection on 1/29/2025 vs the corticosteroid steroid shot causing iatrogenic abscess  MRI on 2/4/25 Intramuscular abscesses in the right pelvis/hip musculature as described with     adjacent muscular edema/enhancement consistent with myositis. Right trochanteric bursitis, which   may be infectious  Blood culture positives for Staph aureus   IR attempted U/S guided aspiration on right perineal unsuccessful due to difficult location(right ischial tuberosity and within the obturator externus)  Orthopedic sign off and defer the case to General Surgery  General Surgery was consulted no surgery planned at this time due to difficult location.    CT abd/pelvis 2/10/25 Multilocular intramuscular abscesses within the right pelvis/hip musculature with adjacent myositis, slightly increased in size since MRI 2/4/2025  Pain well controlled.   Clinically improving       Plan:  Vancomycin 750 mg every 12 hours discontinued  Continue Rocephin 2g IV for 2 weeks  Transition to PO amoxicillin 500mg q8 after 2/19/25  PICC line to be placed by IR today  IR may attempt to repeat aspiration today  Weekly CBCD and CMP while on IV antibiotic  Repeat CT A/P w contrast to re-evaluate collections in approximately 1 month  ID  following  Tylenol 975 mg every 12 hours scheduled, and Ibuprofen 600 mg as needed every 6 hours      Influenza A  Assessment & Plan  Influenza A PCR positive   Likely viral etiology given the seasonal prevalence and symptomatology     Plan:  Oseltamivir 75 mg daily x 5 days (started 2/7/2025). Last day today (2/11/2025)  Droplet precautions     Transaminitis  Assessment & Plan  Could be in the setting of acetaminophen v/s current infection process  Asymptomatic    Plan:  AM CMP   Consider stop tylenol if liver enzymes continue trending up.       Gram-positive bacteremia  Assessment & Plan  On admission meeting criteria due to fever and leukocytosis in the setting of abscess of muscle.   Clinically patient is improving   ID consulted and on board   TTE on 02/06/2025 unremarkable for vegetation   Blood cultures (set 1) positive for Strep intermedius and staph epidermis  Blood culture (set 2) no growth after 4 days    Plan:  Rocephin 2g IV for 2 weeks, will need to transition to PO amoxicillin 500mg q8 after 2/19/25   Following ID recommendations      Dementia (HCC)  Assessment & Plan  Home medication - Memantine 5 mg bid    Plan  Continue home medications      Personal history of transient ischemic attack (TIA), and cerebral infarction without residual deficits  Assessment & Plan  Stable  Compliant with home medications  Home medications - aspirin (Aspirin Low Dose) 81 mg EC tablet , atorvastatin (LIPITOR) 40 mg tablet , omega-3-acid ethyl esters (LOVAZA) 1 g capsule , amlodipine 5 mg tablet     Plan:  Continue home medications  Keep BP within normal limits      Leukocytosis  Assessment & Plan  In the setting of infection, improving  See a/p for bacteremia       Essential hypertension  Assessment & Plan  Home medications - amLODIPine (NORVASC) 5 mg tablet  ,     Plan:   Continue home medications      Dyslipidemia  Assessment & Plan  Home medication - atorvastatin (LIPITOR) 40 mg tablet     Plan:  Continue home  medications          VTE Pharmacologic Prophylaxis: VTE Score: 6 High Risk (Score >/= 5) - Pharmacological DVT Prophylaxis Ordered: enoxaparin (Lovenox). Sequential Compression Devices Ordered.     Mobility:   Basic Mobility Inpatient Raw Score: 22  JH-HLM Goal: 7: Walk 25 feet or more  JH-HLM Achieved: 6: Walk 10 steps or more  JH-HLM Goal achieved. Continue to encourage appropriate mobility.     Patient Centered Rounds: I performed bedside rounds with nursing staff today.   Discussions with Specialists or Other Care Team Provider: none      Education and Discussions with Family / Patient: Updated  (daughter) at bedside.     Current Length of Stay: 5 day(s)  Current Patient Status: Inpatient   Certification Statement: The patient will continue to require additional inpatient hospital stay due to Abscess of muscle and Bacteremia   Discharge Plan: Anticipate discharge in >72 hrs to home.     Code Status: Level 1 - Full Code      Subjective:   Patient was seen and examined at bedside with the attending physician. She reports feeling well and is eager to go back home. No acute events overnight. Denies fever, chills, headache, chest pain, abdominal discomfort, n/v/d/c. Voices no other concerns. Spoke with daughter on the phone, assessment and plan discussed.     Objective:     Vitals:   Temp (24hrs), Av.7 °F (36.5 °C), Min:97.6 °F (36.4 °C), Max:98 °F (36.7 °C)    Temp:  [97.6 °F (36.4 °C)-98 °F (36.7 °C)] 98 °F (36.7 °C)  HR:  [56-67] 56  Resp:  [18] 18  BP: (125-157)/(62-81) 125/62  SpO2:  [97 %-99 %] 97 %  Body mass index is 21.73 kg/m².     Input and Output Summary (last 24 hours):       Intake/Output Summary (Last 24 hours) at 2025 0953  Last data filed at 2025 0801  Gross per 24 hour   Intake 720 ml   Output 2500 ml   Net -1780 ml       Physical Exam:     Physical Exam:   Physical Exam      Vitals and nursing note reviewed.   Constitutional:       General: She is not in acute  distress.     Appearance: She is well-developed.   HENT:      Head: Normocephalic and atraumatic.      Nose: Nose normal. No congestion or rhinorrhea.      Mouth/Throat:      Mouth: Mucous membranes are moist.      Pharynx: Oropharynx is clear.   Eyes:      Extraocular Movements: Extraocular movements intact.      Conjunctiva/sclera: Conjunctivae normal.      Pupils: Pupils are equal, round, and reactive to light.   Cardiovascular:      Rate and Rhythm: Normal rate and regular rhythm.      Heart sounds: No murmur heard.  Pulmonary:      Effort: Pulmonary effort is normal. No respiratory distress.      Breath sounds: Normal breath sounds.   Abdominal:      Palpations: Abdomen is soft.      Tenderness: There is no abdominal tenderness.   Musculoskeletal:         General: No swelling.      Cervical back: Neck supple.      Right lower leg: No edema.      Left lower leg: No edema.      Comments: Skin warm with mild erythema in the medial and posterior thigh area. No ecchymosis. No tenderness at palpation. No drainage. Improving from previous exam    Skin:     General: Skin is warm and dry.      Capillary Refill: Capillary refill takes less than 2 seconds.   Neurological:      General: No focal deficit present.      Mental Status: She is alert and oriented to person, place, and time.      Cranial Nerves: No cranial nerve deficit.   Psychiatric:         Mood and Affect: Mood normal.    Additional Data:     Labs:    Results from last 7 days   Lab Units 02/11/25  0528   WBC Thousand/uL 11.26*   HEMOGLOBIN g/dL 11.9   HEMATOCRIT % 37.7   PLATELETS Thousands/uL 380   SEGS PCT % 64   LYMPHO PCT % 28   MONO PCT % 5   EOS PCT % 2     Results from last 7 days   Lab Units 02/11/25  0528 02/10/25  1535   POTASSIUM mmol/L 4.5 3.9   CHLORIDE mmol/L 103 102   CO2 mmol/L 31 31   BUN mg/dL 9 11   CREATININE mg/dL 0.60 0.60   CALCIUM mg/dL 9.5 9.6   ALK PHOS U/L  --  122*   ALT U/L  --  72*   AST U/L  --  50*                     * I Have  Reviewed All Lab Data Listed Above.  * Additional Pertinent Lab Tests Reviewed: Yes      Recent Cultures (last 7 days):     Results from last 7 days   Lab Units 02/06/25  4606   BLOOD CULTURE  No Growth After 4 Days.  No Growth After 4 Days.       Last 24 Hours Medication List:   Current Facility-Administered Medications   Medication Dose Route Frequency Provider Last Rate    acetaminophen  975 mg Oral BID Joyce Sims MD      amLODIPine  5 mg Oral Daily Jesús Nunez MD      aspirin  81 mg Oral Daily Jesús Nunez MD      atorvastatin  40 mg Oral QPM Houston Parker MD      cefTRIAXone  2,000 mg Intravenous Q24H Viviansoledad Zhaop Wilde, CRNP 2,000 mg (02/10/25 1315)    enoxaparin  40 mg Subcutaneous Daily Jesús Nunez MD      fish oil  1,000 mg Oral Daily Jesús Nunez MD      ibuprofen  400 mg Oral Q6H PRN Joyce Sims MD      melatonin  3 mg Oral Daily PRN Daksha Villaseñor MD      memantine  5 mg Oral BID Jesús Nunez MD      methocarbamol  500 mg Oral BID Jesús Nunez MD      oseltamivir  75 mg Oral Q12H TAMMY Houston Parker MD      polyethylene glycol  17 g Oral Daily PRN Yesenia Chambers MD          ** Please Note: Dictation voice to text software may have been used in the creation of this document. **    Carola Kilgore MD  02/11/25  9:53 AM

## 2025-02-11 NOTE — ASSESSMENT & PLAN NOTE
Stable  Compliant with home medications  Home medications - aspirin (Aspirin Low Dose) 81 mg EC tablet , atorvastatin (LIPITOR) 40 mg tablet , omega-3-acid ethyl esters (LOVAZA) 1 g capsule , amlodipine 5 mg tablet     Plan:  Aspirin restarted  Keep BP within normal limits

## 2025-02-11 NOTE — PROCEDURES
Venous Access Line Insertion    Date/Time: 2/11/2025 1:18 PM    Performed by: Jt Fleming RN  Authorized by: Denise Sotelo MD    Patient location:  IR  Other Assisting Provider: No    Consent:     Consent obtained:  Verbal and written    Consent given by:  Parent    Alternatives discussed:  No treatment  Universal protocol:     Procedure explained and questions answered to patient or proxy's satisfaction: yes      Immediately prior to procedure, a time out was called: yes      Relevant documents present and verified: yes      Test results available and properly labeled: yes      Radiology Images displayed and confirmed.  If images not available, report reviewed: yes      Required blood products, implants, devices, and special equipment available: yes      Site/side marked: yes      Patient identity confirmed:  Verbally with patient and arm band  Pre-procedure details:     Hand hygiene: Hand hygiene performed prior to insertion      Sterile barrier technique: All elements of maximal sterile technique followed      Skin preparation:  ChloraPrep    Skin preparation agent: Skin preparation agent completely dried prior to procedure    Procedure details:     Complex Venous Access Line Type: PICC      Complex Venous Access Line Indications: long term antibiotics      Orientation:  Right    Location:  Basilic    Procedural supplies:  Single lumen    Catheter size:  5 Fr    Total catheter length (cm):  37cm    Catheter out on skin (cm):  0cm    Max flow rate:  5ml/sec    Arm circumference:  25cm    Patient evaluated for contraindications to access (i.e. fistula, thrombosis, etc): Yes      Approach: percutaneous technique used      Patient position:  Flat    Ultrasound image availability:  Images available in PACS    Sterile ultrasound techniques: Sterile gel and sterile probe covers were used      Number of attempts:  1    Successful placement: yes      Landmarks identified: yes    Anesthesia (see MAR for exact  dosages):     Anesthesia method:  Local infiltration    Local anesthetic:  Lidocaine 1% w/o epi  Post-procedure details:     Post-procedure:  Securement device placed    Assessment:  Blood return through all ports    Post-procedure complications: none      Patient tolerance of procedure:  Tolerated well, no immediate complications

## 2025-02-11 NOTE — TELEMEDICINE
e-Consult (IPC)  - Interventional Radiology  Hawa Cheek 74 y.o. female MRN: [id number]  Unit/Bed#: 7T [facility] 709-01 Encounter: [id number]          Interventional Radiology has been consulted to evaluate Hawa Cheek    We were consulted by Dr. Wagner concerning this patient with abscess.    Inpatient Consult to IR  Consult performed by: Chago Lemus DO  Consult ordered by: Deana Wagner MD        02/11/25    Assessment/Recommendation:   74 y F w/ worsening multilocular pelvic / upper thigh abscesses. Afebrile with mild leukocytosis and rising CRP. On Abx. Attempted aspiration on 2/4 was unsuccessful, with updated CT now showing larger more formed collections. Several of the collections are not amenable to percutaneous drainage. We will target the larger upper medial thigh intramuscular collection.     NPO@MN  Obtain INR  Hold Lovenox    Patient is on aspirin portending to higher bleed risk.     11-20 minutes, >50% of the total time devoted to medical consultative verbal/EMR discussion between providers. Written report will be generated in the EMR.     Thank you for allowing Interventional Radiology to participate in the care of Hawa Cheek. Please don't hesitate to call or TigerText us with any questions.     Chago Lemus DO

## 2025-02-11 NOTE — ASSESSMENT & PLAN NOTE
Influenza A PCR positive   Likely viral etiology given the seasonal prevalence and symptomatology     Plan:  Completed 5 day course of Oseltamivir

## 2025-02-11 NOTE — PROGRESS NOTES
Patient:  WILSON STEWART    MRN:  [id number]    Aidin Request ID:  [id number]    Level of care reserved:  Home Health Agency    Partner Reserved:  [facility], [place] [address] [phone]    Clinical needs requested:  intravenous therapy    Geography searched:  [id number]    Start of Service:    Request sent:  3:23pm EST on 2/10/2025 by Mary Cristobal    Partner reserved:  10:31am EST on 2/11/2025 by Mary Cristobal    Choice list shared:  10:31am EST on 2/11/2025 by Mary Cristobal

## 2025-02-11 NOTE — ASSESSMENT & PLAN NOTE
Remains afebrile and WBC trending down  S/p ceFAZolin IVPB  2,000 mg for 2 days and Vancomycin 750 mg every BID  Abscess could have been present during corticosteroid injection on 1/29/2025 vs the corticosteroid steroid shot causing iatrogenic abscess  Orthopedic sign off and defer the case to General Surgery  General Surgery was consulted no surgery planned due to difficult location.    CT abd/pelvis 2/10/25 Multilocular intramuscular abscesses within the right pelvis/hip musculature with adjacent myositis, slightly increased in size since MRI 2/4/2025  Blood culture negative after 5 days  8 French drain placed into medial upper right thigh, 7 cc of pus aspirated by IR    Plan:  Gram stain and culture pending. Prelim results show no growth.    ID recommends patient remains inpatient until culture has finalized to make sure no adjustment to antibiotic is needed   Continue Rocephin 2g IV for 2 weeks  Transition to PO amoxicillin 500mg q8 after 2/19/25  PICC line placed by IR on 2/11/2025  Weekly CBC and CMP while on IV antibiotic  Repeat CT A/P w contrast to re-evaluate collections in approximately 1 month  Tylenol 975 mg BID scheduled, Ibuprofen 600 mg as needed every 6 hours, warm compresses as needed.   Irrigate every 8 hours with 10 cc normal saline while inpatient, once daily drain irrigation as outpatient   Outpatient follow up with IR in 2 weeks

## 2025-02-11 NOTE — PLAN OF CARE
Problem: PAIN - ADULT  Goal: Verbalizes/displays adequate comfort level or baseline comfort level  Description: Interventions:  - Encourage patient to monitor pain and request assistance  - Assess pain using appropriate pain scale  - Administer analgesics based on type and severity of pain and evaluate response  - Implement non-pharmacological measures as appropriate and evaluate response  - Consider cultural and social influences on pain and pain management  - Notify physician/advanced practitioner if interventions unsuccessful or patient reports new pain  Outcome: Progressing     Problem: INFECTION - ADULT  Goal: Absence or prevention of progression during hospitalization  Description: INTERVENTIONS:  - Assess and monitor for signs and symptoms of infection  - Monitor lab/diagnostic results  - Monitor all insertion sites, i.e. indwelling lines, tubes, and drains  - Monitor endotracheal if appropriate and nasal secretions for changes in amount and color  - [place] appropriate cooling/warming therapies per order  - Administer medications as ordered  - Instruct and encourage patient and family to use good hand hygiene technique  - Identify and instruct in appropriate isolation precautions for identified infection/condition  Outcome: Progressing     Problem: SAFETY ADULT  Goal: Maintain or return to baseline ADL function  Description: INTERVENTIONS:  -  Assess patient's ability to carry out ADLs; assess patient's baseline for ADL function and identify physical deficits which impact ability to perform ADLs (bathing, care of mouth/teeth, toileting, grooming, dressing, etc.)  - Assess/evaluate cause of self-care deficits   - Assess range of motion  - Assess patient's mobility; develop plan if impaired  - Assess patient's need for assistive devices and provide as appropriate  - Encourage maximum independence but intervene and supervise when necessary  - Involve family in performance of ADLs  - Assess for home care needs  following discharge   - Consider OT consult to assist with ADL evaluation and planning for discharge  - Provide patient education as appropriate  Outcome: Progressing

## 2025-02-11 NOTE — ASSESSMENT & PLAN NOTE
Could be in the setting of acetaminophen v/s current infection process  Asymptomatic    Plan:  Consider stop tylenol if liver enzymes continue trending up.

## 2025-02-11 NOTE — CASE MANAGEMENT
Case Management Discharge Planning Note    Patient name Hawa Cheek  Location 7T [facility] 709/7T 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     Spinal stenosis, lumbar region with neurogenic claudication      Muscle spasms of neck 2017     Arthralgia of left hip 2016     Slow transit constipation 2016     Impaired fasting glucose 2016     Chronic right shoulder pain 03/15/2016     Creatinine elevation 2015     Neck pain 2014     Low back pain 2014     Acquired trigger finger 10/09/2013     Vitamin D deficiency 2013     Mammogram abnormal 2013     Multiple joint pain 2013     Other symptoms involving skin and integumentary tissues 2013     Pemphigus 2013     Bursitis 2012     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   [facility]  [place] [address]  Phone: [phone] Fax: [phone]    [facility] [employer]  [id number] [place] [facility] [address]  [address] [address]  Phone: [phone] Fax: [phone]    Primary Care Provider: Keeley Mccormick MD    Primary Insurance: [employer]  Secondary Insurance:     DISCHARGE DETAILS:    Contacts  Patient Contacts: Lexis  Relationship to Patient:: Family  Contact Method: Phone  Phone Number: [phone]  Reason/Outcome: Emergency Contact, Discharge Planning    Requested Home Health Care         Is the patient interested in C at discharge?: Yes  Home Health Discipline requested:: Nursing  Home Health Agency Name:: [facility]  Home Health Follow-Up Provider:: PCP  Home Health Services Needed:: Central Line Care, Administration of IV, IM or SC Medications  Homebound Criteria Met:: Requires the Assistance of Another Person for Safe Ambulation or to Leave the Home  Supporting Clincal Findings:: Fatigues Easliy in Short Distances, Limited Endurance       [facility]  [facility] not contracted with insurance additional infusion referrals made.  Per Family medicine team dtr has more questions about home infusion and is requesting a nurse daily for infusion.  Call to dtr Lexis with interperter Crystal #[phone].    Call to dtr Lexis and answered all questions.  Again explained home infusion and need a willing care giver and explained other option SNF for infusion.  Lexis will learn to adm IVAB.  All daughter's questions answered.      Confirmed with [facility] that they are in network and IV covered 100%.  They can deliver IVAB today    Message to [facility] that PICC will be inserted today at 1500. DC 1/12 after IVAB and HHC SOC 1/13 at 1300.

## 2025-02-11 NOTE — ASSESSMENT & PLAN NOTE
On admission meeting criteria due to fever and leukocytosis in the setting of abscess of muscle.   Clinically patient is improving   ID consulted and on board   TTE on 02/06/2025 unremarkable for vegetation   Blood cultures (set 1) positive for Strep intermedius and staph epidermis  Blood culture (set 2) no growth after 5 days    Plan:  Rocephin 2g IV for 2 weeks, will need to transition to PO amoxicillin 500mg q8 after 2/19/25   Awaiting gram stain and fluid final cultures

## 2025-02-12 ENCOUNTER — APPOINTMENT (INPATIENT)
Dept: CT IMAGING | Facility: HOSPITAL | Age: 75
DRG: 557 | End: 2025-02-12
Attending: RADIOLOGY
Payer: COMMERCIAL

## 2025-02-12 LAB
BASOPHILS # BLD AUTO: 0.04 THOUSANDS/ΜL (ref 0–0.1)
BASOPHILS NFR BLD AUTO: 1 % (ref 0–1)
EOSINOPHIL # BLD AUTO: 0.16 THOUSAND/ΜL (ref 0–0.61)
EOSINOPHIL NFR BLD AUTO: 2 % (ref 0–6)
ERYTHROCYTE [DISTWIDTH] IN BLOOD BY AUTOMATED COUNT: 13 % (ref 11.6–15.1)
HCT VFR BLD AUTO: 32.5 % (ref 34.8–46.1)
HGB BLD-MCNC: 10.4 G/DL (ref 11.5–15.4)
IMM GRANULOCYTES # BLD AUTO: 0.06 THOUSAND/UL (ref 0–0.2)
IMM GRANULOCYTES NFR BLD AUTO: 1 % (ref 0–2)
INR PPP: 0.98 (ref 0.85–1.19)
LYMPHOCYTES # BLD AUTO: 2.5 THOUSANDS/ΜL (ref 0.6–4.47)
LYMPHOCYTES NFR BLD AUTO: 32 % (ref 14–44)
MCH RBC QN AUTO: 30.4 PG (ref 26.8–34.3)
MCHC RBC AUTO-ENTMCNC: 32 G/DL (ref 31.4–37.4)
MCV RBC AUTO: 95 FL (ref 82–98)
MONOCYTES # BLD AUTO: 0.42 THOUSAND/ΜL (ref 0.17–1.22)
MONOCYTES NFR BLD AUTO: 5 % (ref 4–12)
NEUTROPHILS # BLD AUTO: 4.6 THOUSANDS/ΜL (ref 1.85–7.62)
NEUTS SEG NFR BLD AUTO: 59 % (ref 43–75)
NRBC BLD AUTO-RTO: 0 /100 WBCS
PLATELET # BLD AUTO: 368 THOUSANDS/UL (ref 149–390)
PMV BLD AUTO: 9.1 FL (ref 8.9–12.7)
PROTHROMBIN TIME: 13.3 SECONDS (ref 12.3–15)
RBC # BLD AUTO: 3.42 MILLION/UL (ref 3.81–5.12)
WBC # BLD AUTO: 7.78 THOUSAND/UL (ref 4.31–10.16)

## 2025-02-12 PROCEDURE — C1729 CATH, DRAINAGE: HCPCS

## 2025-02-12 PROCEDURE — 10030 IMG GID FLU COLL DRG SFT TIS: CPT

## 2025-02-12 PROCEDURE — 87075 CULTR BACTERIA EXCEPT BLOOD: CPT | Performed by: RADIOLOGY

## 2025-02-12 PROCEDURE — 10030 IMG GID FLU COLL DRG SFT TIS: CPT | Performed by: RADIOLOGY

## 2025-02-12 PROCEDURE — 99152 MOD SED SAME PHYS/QHP 5/>YRS: CPT | Performed by: RADIOLOGY

## 2025-02-12 PROCEDURE — NC001 PR NO CHARGE: Performed by: FAMILY MEDICINE

## 2025-02-12 PROCEDURE — 85610 PROTHROMBIN TIME: CPT | Performed by: RADIOLOGY

## 2025-02-12 PROCEDURE — 0Y9C30Z DRAINAGE OF RIGHT UPPER LEG WITH DRAINAGE DEVICE, PERCUTANEOUS APPROACH: ICD-10-PCS | Performed by: RADIOLOGY

## 2025-02-12 PROCEDURE — 87070 CULTURE OTHR SPECIMN AEROBIC: CPT | Performed by: RADIOLOGY

## 2025-02-12 PROCEDURE — 85025 COMPLETE CBC W/AUTO DIFF WBC: CPT

## 2025-02-12 PROCEDURE — C1769 GUIDE WIRE: HCPCS

## 2025-02-12 PROCEDURE — 87205 SMEAR GRAM STAIN: CPT | Performed by: RADIOLOGY

## 2025-02-12 RX ORDER — FENTANYL CITRATE 50 UG/ML
INJECTION, SOLUTION INTRAMUSCULAR; INTRAVENOUS AS NEEDED
Status: DISCONTINUED | OUTPATIENT
Start: 2025-02-12 | End: 2025-02-15 | Stop reason: HOSPADM

## 2025-02-12 RX ORDER — MIDAZOLAM HYDROCHLORIDE 2 MG/2ML
INJECTION, SOLUTION INTRAMUSCULAR; INTRAVENOUS AS NEEDED
Status: DISCONTINUED | OUTPATIENT
Start: 2025-02-12 | End: 2025-02-15 | Stop reason: HOSPADM

## 2025-02-12 RX ORDER — SODIUM CHLORIDE 9 MG/ML
10 INJECTION, SOLUTION INTRAMUSCULAR; INTRAVENOUS; SUBCUTANEOUS DAILY
Qty: 300 ML | Refills: 2 | Status: SHIPPED | OUTPATIENT
Start: 2025-02-12 | End: 2025-02-17

## 2025-02-12 RX ADMIN — ACETAMINOPHEN 975 MG: 325 TABLET, FILM COATED ORAL at 08:06

## 2025-02-12 RX ADMIN — FENTANYL CITRATE 25 MCG: 50 INJECTION, SOLUTION INTRAMUSCULAR; INTRAVENOUS at 15:50

## 2025-02-12 RX ADMIN — IBUPROFEN 400 MG: 400 TABLET, FILM COATED ORAL at 17:42

## 2025-02-12 RX ADMIN — SODIUM CHLORIDE 90 ML/HR: 0.9 INJECTION, SOLUTION INTRAVENOUS at 16:54

## 2025-02-12 RX ADMIN — MEMANTINE HYDROCHLORIDE 5 MG: 5 TABLET, FILM COATED ORAL at 08:06

## 2025-02-12 RX ADMIN — FENTANYL CITRATE 25 MCG: 50 INJECTION, SOLUTION INTRAMUSCULAR; INTRAVENOUS at 15:56

## 2025-02-12 RX ADMIN — MIDAZOLAM 0.5 MG: 1 INJECTION INTRAMUSCULAR; INTRAVENOUS at 15:50

## 2025-02-12 RX ADMIN — ACETAMINOPHEN 975 MG: 325 TABLET, FILM COATED ORAL at 21:07

## 2025-02-12 RX ADMIN — MIDAZOLAM 0.5 MG: 1 INJECTION INTRAMUSCULAR; INTRAVENOUS at 15:56

## 2025-02-12 RX ADMIN — AMLODIPINE BESYLATE 5 MG: 5 TABLET ORAL at 08:06

## 2025-02-12 RX ADMIN — MEMANTINE HYDROCHLORIDE 5 MG: 5 TABLET, FILM COATED ORAL at 17:01

## 2025-02-12 RX ADMIN — MIDAZOLAM 0.5 MG: 1 INJECTION INTRAMUSCULAR; INTRAVENOUS at 15:47

## 2025-02-12 RX ADMIN — ATORVASTATIN CALCIUM 40 MG: 40 TABLET, FILM COATED ORAL at 17:01

## 2025-02-12 RX ADMIN — FENTANYL CITRATE 25 MCG: 50 INJECTION, SOLUTION INTRAMUSCULAR; INTRAVENOUS at 15:47

## 2025-02-12 RX ADMIN — METHOCARBAMOL TABLETS 500 MG: 500 TABLET, COATED ORAL at 08:06

## 2025-02-12 RX ADMIN — CEFTRIAXONE 2000 MG: 2 INJECTION, SOLUTION INTRAVENOUS at 11:48

## 2025-02-12 RX ADMIN — METHOCARBAMOL TABLETS 500 MG: 500 TABLET, COATED ORAL at 17:01

## 2025-02-12 RX ADMIN — OMEGA-3 FATTY ACIDS CAP 1000 MG 1000 MG: 1000 CAP at 08:06

## 2025-02-12 NOTE — BRIEF OP NOTE (RAD/CATH)
INTERVENTIONAL RADIOLOGY PROCEDURE NOTE    Date: 2/12/2025    Procedure:   Procedure Summary       Date:  Room / Location:     Anesthesia Start:  Anesthesia Stop:     Procedure:  Diagnosis:     Scheduled Providers:  Responsible Provider:     Anesthesia Type: Not recorded ASA Status: Not recorded            Preoperative diagnosis:   1. Abscess of muscle    2. Gluteal pain    3. Abscess of gluteal region    4. Gram-positive bacteremia         Postoperative diagnosis: Same.    Surgeon: Louis Miranda MD     Assistant: None. No qualified resident was available.    Blood loss: Minimal    Specimens: Fluid for cultures    Findings: 8 [place] drain placed into medial upper right thigh collection using CT guidance.  7 cc of pus aspirated.    Orders placed to irrigate drain every 8 hours with 10 cc normal saline while inpatient.  Once daily drain irrigation as outpatient with 10 cc normal saline  Will schedule outpatient IR drain evaluation in about 2 weeks.    Complications: None immediate.    Anesthesia: conscious sedation

## 2025-02-12 NOTE — QUICK NOTE
Patient was seen during rounds this evening.  She was laying down in her bed comfortably and watching TV.  She states that she has mild pain on her right medial thigh.  No other complaints at this time.  Vital signs within normal limits.  Chest is clear and S1-S2 well heard.  Will continue the current management plan established by the day team.

## 2025-02-12 NOTE — PLAN OF CARE
Problem: PAIN - ADULT  Goal: Verbalizes/displays adequate comfort level or baseline comfort level  Description: Interventions:  - Encourage patient to monitor pain and request assistance  - Assess pain using appropriate pain scale  - Administer analgesics based on type and severity of pain and evaluate response  - Implement non-pharmacological measures as appropriate and evaluate response  - Consider cultural and social influences on pain and pain management  - Notify physician/advanced practitioner if interventions unsuccessful or patient reports new pain  Outcome: Progressing     Problem: INFECTION - ADULT  Goal: Absence of fever/infection during neutropenic period  Description: INTERVENTIONS:  - Monitor WBC    Outcome: Progressing     Problem: SAFETY ADULT  Goal: Patient will remain free of falls  Description: INTERVENTIONS:  - Educate patient/family on patient safety including physical limitations  - Instruct patient to call for assistance with activity   - Consult OT/PT to assist with strengthening/mobility   - Keep Call bell within reach  - Keep bed low and locked with side rails adjusted as appropriate  - Keep care items and personal belongings within reach  - Initiate and maintain comfort rounds  - Make Fall Risk Sign visible to staff  - O- Apply yellow socks and bracelet for high fall risk patients  - Consider moving patient to room near nurses station  Outcome: Progressing

## 2025-02-12 NOTE — CASE MANAGEMENT
Case Management Discharge Planning Note    Patient name Hawa Cheek  Location 7T [facility] 709/7T [facility] 709-01 MRN [id number]  : 1950 Date 2025       Current Admission Date: 2025  Current Admission Diagnosis:Abscess of muscle   Patient Active Problem List    Diagnosis Date Noted Date Diagnosed    Influenza A 2025     Transaminitis 2025     Gram-positive bacteremia 2025     Abscess of muscle 2025     Dementia (HCC) 2025     Osteopenia 2024     Pre-diabetes 2024     Gastroesophageal reflux disease without esophagitis 2022     Dizziness 2021     Leukocytosis 2019     Personal history of transient ischemic attack (TIA), 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     Spinal stenosis, lumbar region with neurogenic claudication      Muscle spasms of neck 2017     Arthralgia of left hip 2016     Slow transit constipation 2016     Impaired fasting glucose 2016     Chronic right shoulder pain 03/15/2016     Creatinine elevation 2015     Neck pain 2014     Low back pain 2014     Acquired trigger finger 10/09/2013     Vitamin D deficiency 2013     Mammogram abnormal 2013     Multiple joint pain 2013     Other symptoms involving skin and integumentary tissues 2013     Pemphigus 2013     Bursitis 2012     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     Osteoarthrosis 2012     Dyslipidemia 2012     Essential hypertension 2012     Hyperlipidemia 2008       LOS (days): 9  Geometric Mean LOS (GMLOS) (days): 3.1  Days to GMLOS:-6.3     OBJECTIVE:  Risk of Unplanned Readmission Score: 9.45      Current admission status: Inpatient   Preferred Pharmacy:   [id number] - [place] -  [facility]   [facility]  [place] [address]  Phone: [phone] Fax: [phone]    [facility] [employer]  #[id number] - [place] - [address]  [address] [place] [address]  Phone: [phone] Fax: [phone]    Primary Care Provider: Keeley Mccormick MD    Primary Insurance: [employer]  Secondary Insurance:     DISCHARGE DETAILS:    Discharge planning discussed with:: Daughter Lexis  Freedom of Choice: Yes        Were Treatment Team discharge recommendations reviewed with patient/caregiver?: Yes  Did patient/caregiver verbalize understanding of patient care needs?: Yes  Were patient/caregiver advised of the risks associated with not following Treatment Team discharge recommendations?: Yes    Contacts  Patient Contacts: Lexis  Relationship to Patient:: Family  Phone Number: [phone]  Reason/Outcome: Emergency Contact, Discharge Planning       Additional Comments: Per Family medicine at medical round going to IR today at 1500 aspiration abscess. If fluid is obtained will need to wait for final cultures prior to discharge. Discharge for today on HOLD.Update to home infusion company [facility] and [facility] that discharge is on HOLD. Call to Dtr Lexis used Language line Carlos [phone] for [place] interpertation. Lexis updated that discharge on HOLD. Dtr with medical questions message to Dr Sotelo to update dtr. CM department following thru discharge

## 2025-02-12 NOTE — PLAN OF CARE
Problem: PAIN - ADULT  Goal: Verbalizes/displays adequate comfort level or baseline comfort level  Description: Interventions:  - Encourage patient to monitor pain and request assistance  - Assess pain using appropriate pain scale  - Administer analgesics based on type and severity of pain and evaluate response  - Implement non-pharmacological measures as appropriate and evaluate response  - Consider cultural and social influences on pain and pain management  - Notify physician/advanced practitioner if interventions unsuccessful or patient reports new pain  Outcome: Progressing     Problem: INFECTION - ADULT  Goal: Absence or prevention of progression during hospitalization  Description: INTERVENTIONS:  - Assess and monitor for signs and symptoms of infection  - Monitor lab/diagnostic results  - Monitor all insertion sites, i.e. indwelling lines, tubes, and drains  - Monitor endotracheal if appropriate and nasal secretions for changes in amount and color  - [place] appropriate cooling/warming therapies per order  - Administer medications as ordered  - Instruct and encourage patient and family to use good hand hygiene technique  - Identify and instruct in appropriate isolation precautions for identified infection/condition  Outcome: Progressing  Goal: Absence of fever/infection during neutropenic period  Description: INTERVENTIONS:  - Monitor WBC    Outcome: Progressing     Problem: SAFETY ADULT  Goal: Patient will remain free of falls  Description: INTERVENTIONS:  - Educate patient/family on patient safety including physical limitations  - Instruct patient to call for assistance with activity   - Consult OT/PT to assist with strengthening/mobility   - Keep Call bell within reach  - Keep bed low and locked with side rails adjusted as appropriate  - Keep care items and personal belongings within reach  - Initiate and maintain comfort rounds  - Make Fall Risk Sign visible to staff  - O- Apply yellow socks and  bracelet for high fall risk patients  - Consider moving patient to room near nurses station  Outcome: Progressing  Goal: Maintain or return to baseline ADL function  Description: INTERVENTIONS:  -  Assess patient's ability to carry out ADLs; assess patient's baseline for ADL function and identify physical deficits which impact ability to perform ADLs (bathing, care of mouth/teeth, toileting, grooming, dressing, etc.)  - Assess/evaluate cause of self-care deficits   - Assess range of motion  - Assess patient's mobility; develop plan if impaired  - Assess patient's need for assistive devices and provide as appropriate  - Encourage maximum independence but intervene and supervise when necessary  - Involve family in performance of ADLs  - Assess for home care needs following discharge   - Consider OT consult to assist with ADL evaluation and planning for discharge  - Provide patient education as appropriate  Outcome: Progressing  Goal: Maintains/Returns to pre admission functional level  Description: INTERVENTIONS:  - Perform AM-PAC 6 Click Basic Mobility/ Daily Activity assessment daily.  - Set and communicate daily mobility goal to care team and patient/family/caregiver.   - Collaborate with rehabilitation services on mobility goals if consulted  - Perf- - Record patient progress and toleration of activity level   Outcome: Progressing     Problem: DISCHARGE PLANNING  Goal: Discharge to home or other facility with appropriate resources  Description: INTERVENTIONS:  - Identify barriers to discharge w/patient and caregiver  - Arrange for needed discharge resources and transportation as appropriate  - Identify discharge learning needs (meds, wound care, etc.)  - Arrange for interpretive services to assist at discharge as needed  - Refer to Case Management Department for coordinating discharge planning if the patient needs post-hospital services based on physician/advanced practitioner order or complex needs related to  functional status, cognitive ability, or social support system  Outcome: Progressing     Problem: Nutrition/Hydration-ADULT  Goal: Nutrient/Hydration intake appropriate for improving, restoring or maintaining nutritional needs  Description: Monitor and assess patient's nutrition/hydration status for malnutrition. Collaborate with interdisciplinary team and initiate plan and interventions as ordered.  Monitor patient's weight and dietary intake as ordered or per policy. Utilize nutrition screening tool and intervene as necessary. Determine patient's food preferences and provide high-protein, high-caloric foods as appropriate.     INTERVENTIONS:  - Monitor oral intake, urinary output, labs, and treatment plans  - Assess nutrition and hydration status and recommend course of action  - Evaluate amount of meals eaten  - Assist patient with eating if necessary   - Allow adequate time for meals  - Recommend/ encourage appropriate diets, oral nutritional supplements, and vitamin/mineral supplements  - Order, calculate, and assess calorie counts as needed  - Recommend, monitor, and adjust tube feedings and TPN/PPN based on assessed needs  - Assess need for intravenous fluids  - Provide specific nutrition/hydration education as appropriate  - Include patient/family/caregiver in decisions related to nutrition  Outcome: Progressing

## 2025-02-12 NOTE — QUICK NOTE
Performed chart review. Patients morning vitals are stable. WBC count remains normal this morning. IR has re-evaluated for possible placement of drainage tube into her upper medial thigh intramuscular collection. If aspiration is successful, recommend fluid culture be sent. Patient should remain inpatient until fluid culture has resulted to determine if change to antibiotic choice is needed. In mean time, recommend she continue Ceftriaxone for now. I will reassess antibiotic plan after aspiration attempt is complete.

## 2025-02-12 NOTE — PROGRESS NOTES
Progress Note    Hawashanice Cheek 74 y.o. female MRN: [id number]  Unit/Bed#: 7T [facility] 709-01 Encounter: [id number]  Admitting Physician: Carola Kilgore MD  PCP: Keeley Mccormick MD  Date of Admission:  2/2/2025  7:09 PM    Assessment and Plan    * Abscess of muscle  Assessment & Plan    Remains afebrile and WBC trending down  S/p ceFAZolin IVPB  2,000 mg for 2 days and Vancomycin 750 mg every BID  Abscess could have been present during corticosteroid injection on 1/29/2025 vs the corticosteroid steroid shot causing iatrogenic abscess  IR attempted U/S guided aspiration on right perineal unsuccessful due to difficult location(right ischial tuberosity and within the obturator externus)  Orthopedic sign off and defer the case to General Surgery  General Surgery was consulted no surgery planned due to difficult location.    CT abd/pelvis 2/10/25 Multilocular intramuscular abscesses within the right pelvis/hip musculature with adjacent myositis, slightly increased in size since MRI 2/4/2025  Blood culture negative after 5 days    Plan:  Continue Rocephin 2g IV for 2 weeks  Transition to PO amoxicillin 500mg q8 after 2/19/25  PICC line placed by IR on 2/11/1015  IR will attempt to repeat aspiration today at 3pm  Weekly CBC and CMP while on IV antibiotic  Repeat CT A/P w contrast to re-evaluate collections in approximately 1 month  Tylenol 975 mg BID scheduled, Ibuprofen 600 mg as needed every 6 hours, warm compresses as needed.   ID following      Influenza A  Assessment & Plan  Influenza A PCR positive   Likely viral etiology given the seasonal prevalence and symptomatology     Plan:  Completed 5 day course of Oseltamivir     Transaminitis  Assessment & Plan  Could be in the setting of acetaminophen v/s current infection process  Asymptomatic    Plan:  Consider stop tylenol if liver enzymes continue trending up.       Gram-positive bacteremia  Assessment & Plan  On admission meeting criteria due to fever  and leukocytosis in the setting of abscess of muscle.   Clinically patient is improving   ID consulted and on board   TTE on 02/06/2025 unremarkable for vegetation   Blood cultures (set 1) positive for Strep intermedius and staph epidermis  Blood culture (set 2) no growth after 4 days    Plan:  Rocephin 2g IV for 2 weeks, will need to transition to PO amoxicillin 500mg q8 after 2/19/25   Following ID recommendations      Dementia (HCC)  Assessment & Plan  Home medication - Memantine 5 mg bid    Plan  Continue home medications      Personal history of transient ischemic attack (TIA), and cerebral infarction without residual deficits  Assessment & Plan  Stable  Compliant with home medications  Home medications - aspirin (Aspirin Low Dose) 81 mg EC tablet , atorvastatin (LIPITOR) 40 mg tablet , omega-3-acid ethyl esters (LOVAZA) 1 g capsule , amlodipine 5 mg tablet     Plan:  Aspirin on hold, IR will attempt aspiration of abscess today  Keep BP within normal limits      Leukocytosis  Assessment & Plan    WBC 7.7 today  In the setting of infection, improving  See a/p for bacteremia       Essential hypertension  Assessment & Plan  Home medications - amLODIPine (NORVASC) 5 mg tablet  ,     Plan:   Continue home medications      Dyslipidemia  Assessment & Plan  Home medication - atorvastatin (LIPITOR) 40 mg tablet     Plan:  Continue home medications          VTE Pharmacologic Prophylaxis: VTE Score: 6 High Risk (Score >/= 5) - Pharmacological DVT Prophylaxis Ordered: enoxaparin (Lovenox). Sequential Compression Devices Ordered.     Mobility:   Basic Mobility Inpatient Raw Score: 22  JH-HLM Goal: 7: Walk 25 feet or more  JH-HLM Achieved: 6: Walk 10 steps or more  JH-HLM Goal achieved. Continue to encourage appropriate mobility.     Patient Centered Rounds: I performed bedside rounds with nursing staff today.   Discussions with Specialists or Other Care Team Provider: none      Education and Discussions with Family /  Patient: Updated  (daughter) at bedside.     Current Length of Stay: 5 day(s)  Current Patient Status: Inpatient   Certification Statement: The patient will continue to require additional inpatient hospital stay due to Abscess of muscle and Bacteremia   Discharge Plan: Anticipate discharge in >72 hrs to home.     Code Status: Level 1 - Full Code      Subjective:   Patient was seen and examined at bedside with the attending physician. She appears uncomfortable in bed. Encouraged to get up from the bed and move to the chair and walk around with assistance. Does report mild pain in her thigh. Denies fever, chills, headache, chest pain, abdominal discomfort, n/v/d/c. All  questions answered.    Objective:     Vitals:   Temp (24hrs), Av.8 °F (36.6 °C), Min:97.6 °F (36.4 °C), Max:98.1 °F (36.7 °C)    Temp:  [97.6 °F (36.4 °C)-98.1 °F (36.7 °C)] 98.1 °F (36.7 °C)  HR:  [58-63] 63  Resp:  [18-20] 19  BP: (105-140)/(60-71) 140/69  SpO2:  [96 %-98 %] 98 %  Body mass index is 21.73 kg/m².     Input and Output Summary (last 24 hours):       Intake/Output Summary (Last 24 hours) at 2025 1020  Last data filed at 2025 0846  Gross per 24 hour   Intake 720 ml   Output --   Net 720 ml       Physical Exam:     Physical Exam:   Physical Exam      Vitals and nursing note reviewed.   Constitutional:       General: She is not in acute distress.     Appearance: She is well-developed.   HENT:      Head: Normocephalic and atraumatic.      Nose: Nose normal. No congestion or rhinorrhea.      Mouth/Throat:      Mouth: Mucous membranes are moist.      Pharynx: Oropharynx is clear.   Eyes:      Extraocular Movements: Extraocular movements intact.      Conjunctiva/sclera: Conjunctivae normal.      Pupils: Pupils are equal, round, and reactive to light.   Cardiovascular:      Rate and Rhythm: Normal rate and regular rhythm.      Heart sounds: No murmur heard.  Pulmonary:      Effort: Pulmonary effort is normal. No  respiratory distress.      Breath sounds: Normal breath sounds.   Abdominal:      Palpations: Abdomen is soft.      Tenderness: There is no abdominal tenderness.   Musculoskeletal:         General: No swelling.      Cervical back: Neck supple.      Right lower leg: No edema.      Left lower leg: No edema.      Comments: Skin warm with mild erythema in the medial and posterior thigh area. No ecchymosis. No tenderness at palpation. No drainage. Improving from previous exam    Skin:     General: Skin is warm and dry.      Capillary Refill: Capillary refill takes less than 2 seconds.   Neurological:      General: No focal deficit present.      Mental Status: She is alert and oriented to person, place, and time.      Cranial Nerves: No cranial nerve deficit.   Psychiatric:         Mood and Affect: Mood normal.    Additional Data:     Labs:    Results from last 7 days   Lab Units 02/12/25  0519   WBC Thousand/uL 7.78   HEMOGLOBIN g/dL 10.4*   HEMATOCRIT % 32.5*   PLATELETS Thousands/uL 368   SEGS PCT % 59   LYMPHO PCT % 32   MONO PCT % 5   EOS PCT % 2     Results from last 7 days   Lab Units 02/11/25  0528 02/10/25  1535   POTASSIUM mmol/L 4.5 3.9   CHLORIDE mmol/L 103 102   CO2 mmol/L 31 31   BUN mg/dL 9 11   CREATININE mg/dL 0.60 0.60   CALCIUM mg/dL 9.5 9.6   ALK PHOS U/L  --  122*   ALT U/L  --  72*   AST U/L  --  50*     Results from last 7 days   Lab Units 02/12/25  0519   INR  0.98                 * I Have Reviewed All Lab Data Listed Above.  * Additional Pertinent Lab Tests Reviewed: Yes      Recent Cultures (last 7 days):     Results from last 7 days   Lab Units 02/06/25  0449   BLOOD CULTURE  No Growth After 5 Days.  No Growth After 5 Days.       Last 24 Hours Medication List:   Current Facility-Administered Medications   Medication Dose Route Frequency Provider Last Rate    acetaminophen  975 mg Oral BID Joyce Sims MD      amLODIPine  5 mg Oral Daily Jesús Nunez MD      [Held by provider] aspirin  81 mg  Oral Daily Jesús Nunez MD      atorvastatin  40 mg Oral QPM Houston Parker MD      cefTRIAXone  2,000 mg Intravenous Q24H VivianLAYLA Boyer 2,000 mg (02/11/25 1114)    [Held by provider] enoxaparin  40 mg Subcutaneous Daily Jesús Nunez MD      fish oil  1,000 mg Oral Daily Jesús Nunez MD      ibuprofen  400 mg Oral Q6H PRN Joyce Sims MD      melatonin  3 mg Oral Daily PRN Daksha Villaseñor MD      memantine  5 mg Oral BID Jesús Nunez MD      methocarbamol  500 mg Oral BID Jesús Nunez MD      polyethylene glycol  17 g Oral Daily PRN Yesenia Chambers MD      sodium chloride  90 mL/hr Intravenous Continuous Deana Wagner MD 90 mL/hr (02/11/25 3036)        ** Please Note: Dictation voice to text software may have been used in the creation of this document. **    Carola Kilgore MD  02/12/25  10:20 AM

## 2025-02-12 NOTE — H&P
"Interventional Radiology  History and Physical 2/12/2025     Hawa Cheek   1950   [id number]    H&P reviewed. There have been no interval changes since the time the H&P was written.    /61   Pulse 70   Temp 98.1 °F (36.7 °C) (Temporal)   Resp 18   Ht 5' 1\" (1.549 m)   Wt 52.2 kg (115 lb)   SpO2 99%   BMI 21.73 kg/m²     Prior imaging was reviewed.  Worsening complex fluid collection within the right pelvis and upper medial right thigh.  Previous aspiration was not successful.  Plan to repeat aspiration and leave drain.  Consent was obtained previously.  I discussed procedure with patient's daughter again today including likelihood of leaving drain behind.        Louis Miranda MD   "

## 2025-02-13 PROBLEM — J10.1 INFLUENZA A: Status: RESOLVED | Noted: 2025-02-07 | Resolved: 2025-02-13

## 2025-02-13 PROCEDURE — NC001 PR NO CHARGE: Performed by: FAMILY MEDICINE

## 2025-02-13 RX ADMIN — ACETAMINOPHEN 975 MG: 325 TABLET, FILM COATED ORAL at 08:02

## 2025-02-13 RX ADMIN — ENOXAPARIN SODIUM 40 MG: 40 INJECTION SUBCUTANEOUS at 08:01

## 2025-02-13 RX ADMIN — MEMANTINE HYDROCHLORIDE 5 MG: 5 TABLET, FILM COATED ORAL at 18:04

## 2025-02-13 RX ADMIN — METHOCARBAMOL TABLETS 500 MG: 500 TABLET, COATED ORAL at 18:04

## 2025-02-13 RX ADMIN — METHOCARBAMOL TABLETS 500 MG: 500 TABLET, COATED ORAL at 08:01

## 2025-02-13 RX ADMIN — AMLODIPINE BESYLATE 5 MG: 5 TABLET ORAL at 08:01

## 2025-02-13 RX ADMIN — CEFTRIAXONE 2000 MG: 2 INJECTION, SOLUTION INTRAVENOUS at 11:06

## 2025-02-13 RX ADMIN — OMEGA-3 FATTY ACIDS CAP 1000 MG 1000 MG: 1000 CAP at 08:02

## 2025-02-13 RX ADMIN — ACETAMINOPHEN 975 MG: 325 TABLET, FILM COATED ORAL at 21:48

## 2025-02-13 RX ADMIN — ASPIRIN 81 MG: 81 TABLET, COATED ORAL at 08:02

## 2025-02-13 RX ADMIN — MEMANTINE HYDROCHLORIDE 5 MG: 5 TABLET, FILM COATED ORAL at 08:01

## 2025-02-13 RX ADMIN — ATORVASTATIN CALCIUM 40 MG: 40 TABLET, FILM COATED ORAL at 18:07

## 2025-02-13 NOTE — QUICK NOTE
Patient was seen during rounds with senior resident this evening.  She was sleeping when we got into the room.  She does not have any complaints.  Vital signs within normal limits.  Will continue the current management plan established by the day team

## 2025-02-13 NOTE — QUICK NOTE
Reviewed chart. Patient was seen in IR yesterday afternoon. 7ml of pus was aspirated from the medial R upper thigh. Cultures were sent. 8F drain remains in place. ID recommends patient remains inpatient until culture has finalized to make sure no adjustment to antibiotic is needed. Recommend she remain on IV ceftriaxone for now. ID will continue to follow.

## 2025-02-13 NOTE — PLAN OF CARE
Problem: PAIN - ADULT  Goal: Verbalizes/displays adequate comfort level or baseline comfort level  Description: Interventions:  - Encourage patient to monitor pain and request assistance  - Assess pain using appropriate pain scale  - Administer analgesics based on type and severity of pain and evaluate response  - Implement non-pharmacological measures as appropriate and evaluate response  - Consider cultural and social influences on pain and pain management  - Notify physician/advanced practitioner if interventions unsuccessful or patient reports new pain  Outcome: Progressing     Problem: INFECTION - ADULT  Goal: Absence or prevention of progression during hospitalization  Description: INTERVENTIONS:  - Assess and monitor for signs and symptoms of infection  - Monitor lab/diagnostic results  - Monitor all insertion sites, i.e. indwelling lines, tubes, and drains  - Monitor endotracheal if appropriate and nasal secretions for changes in amount and color  - [place] appropriate cooling/warming therapies per order  - Administer medications as ordered  - Instruct and encourage patient and family to use good hand hygiene technique  - Identify and instruct in appropriate isolation precautions for identified infection/condition  Outcome: Progressing     Problem: Nutrition/Hydration-ADULT  Goal: Nutrient/Hydration intake appropriate for improving, restoring or maintaining nutritional needs  Description: Monitor and assess patient's nutrition/hydration status for malnutrition. Collaborate with interdisciplinary team and initiate plan and interventions as ordered.  Monitor patient's weight and dietary intake as ordered or per policy. Utilize nutrition screening tool and intervene as necessary. Determine patient's food preferences and provide high-protein, high-caloric foods as appropriate.     INTERVENTIONS:  - Monitor oral intake, urinary output, labs, and treatment plans  - Assess nutrition and hydration status and  recommend course of action  - Evaluate amount of meals eaten  - Assist patient with eating if necessary   - Allow adequate time for meals  - Recommend/ encourage appropriate diets, oral nutritional supplements, and vitamin/mineral supplements  - Order, calculate, and assess calorie counts as needed  - Recommend, monitor, and adjust tube feedings and TPN/PPN based on assessed needs  - Assess need for intravenous fluids  - Provide specific nutrition/hydration education as appropriate  - Include patient/family/caregiver in decisions related to nutrition  Outcome: Progressing

## 2025-02-13 NOTE — QUICK NOTE
Patient was seen during rounds this evening.  She was laying down in her bed comfortably.  She denies chest pain, shortness of breath or palpitation.  No other complaints at this time.  Vital signs are within normal limits.  Chest is clear and on cardiovascular system S1-S2 well heard.  Abdomen is soft on palpation.  We will continue the current management plan established by the day team.

## 2025-02-13 NOTE — PROGRESS NOTES
Progress Note    Hawashanice Cheek 74 y.o. female MRN: [id number]  Unit/Bed#: 7T [facility] 701-01 Encounter: [id number]  Admitting Physician: Carola Kilgore MD  PCP: Keeley Mccormick MD  Date of Admission:  2/2/2025  7:09 PM    Assessment and Plan    * Abscess of muscle  Assessment & Plan    Remains afebrile and WBC trending down  S/p ceFAZolin IVPB  2,000 mg for 2 days and Vancomycin 750 mg every BID  Abscess could have been present during corticosteroid injection on 1/29/2025 vs the corticosteroid steroid shot causing iatrogenic abscess  IR attempted U/S guided aspiration on right perineal unsuccessful due to difficult location(right ischial tuberosity and within the obturator externus)  Orthopedic sign off and defer the case to General Surgery  General Surgery was consulted no surgery planned due to difficult location.    CT abd/pelvis 2/10/25 Multilocular intramuscular abscesses within the right pelvis/hip musculature with adjacent myositis, slightly increased in size since MRI 2/4/2025  Blood culture negative after 5 days  8 French drain placed into medial upper right thigh, 7 cc of pus aspirated by IR    Plan:  Gram stain and culture pending.   ID recommends patient remains inpatient until culture has finalized to make sure no adjustment to antibiotic is needed   Continue Rocephin 2g IV for 2 weeks  Transition to PO amoxicillin 500mg q8 after 2/19/25  PICC line placed by IR on 2/11/2025  Weekly CBC and CMP while on IV antibiotic  Repeat CT A/P w contrast to re-evaluate collections in approximately 1 month  Tylenol 975 mg BID scheduled, Ibuprofen 600 mg as needed every 6 hours, warm compresses as needed.   Irrigate every 8 hours with 10 cc normal saline while inpatient, once daily drain irrigation as outpatient   Outpatient follow up with IR in 2 weeks      Transaminitis  Assessment & Plan  Could be in the setting of acetaminophen v/s current infection process  Asymptomatic    Plan:  Consider stop  tylenol if liver enzymes continue trending up.       Gram-positive bacteremia  Assessment & Plan  On admission meeting criteria due to fever and leukocytosis in the setting of abscess of muscle.   Clinically patient is improving   ID consulted and on board   TTE on 02/06/2025 unremarkable for vegetation   Blood cultures (set 1) positive for Strep intermedius and staph epidermis  Blood culture (set 2) no growth after 5 days    Plan:  Rocephin 2g IV for 2 weeks, will need to transition to PO amoxicillin 500mg q8 after 2/19/25   Awaiting gram stain and fluid cultures        Dementia (HCC)  Assessment & Plan  Home medication - Memantine 5 mg bid    Plan  Continue home medications      Personal history of transient ischemic attack (TIA), and cerebral infarction without residual deficits  Assessment & Plan  Stable  Compliant with home medications  Home medications - aspirin (Aspirin Low Dose) 81 mg EC tablet , atorvastatin (LIPITOR) 40 mg tablet , omega-3-acid ethyl esters (LOVAZA) 1 g capsule , amlodipine 5 mg tablet     Plan:  Aspirin restarted  Keep BP within normal limits      Leukocytosis  Assessment & Plan  Resolved  WBC 7.7 (2/12/25)  In the setting of infection, improving  See a/p for bacteremia       Essential hypertension  Assessment & Plan  Home medications - amLODIPine (NORVASC) 5 mg tablet  ,     Plan:   Continue home medications      Dyslipidemia  Assessment & Plan  Home medication - atorvastatin (LIPITOR) 40 mg tablet     Plan:  Continue home medications      Influenza A-resolved as of 2/13/2025  Assessment & Plan  Influenza A PCR positive   Likely viral etiology given the seasonal prevalence and symptomatology     Plan:  Completed 5 day course of Oseltamivir         VTE Pharmacologic Prophylaxis: VTE Score: 6 High Risk (Score >/= 5) - Pharmacological DVT Prophylaxis Ordered: enoxaparin (Lovenox). Sequential Compression Devices Ordered.     Mobility:   Basic Mobility Inpatient Raw Score: 22  -[facility] Goal: 7:  Walk 25 feet or more  JH-HLM Achieved: 6: Walk 10 steps or more  JH-HLM Goal achieved. Continue to encourage appropriate mobility.     Patient Centered Rounds: I performed bedside rounds with nursing staff today.   Discussions with Specialists or Other Care Team Provider: none      Education and Discussions with Family / Patient: Updated  (daughter) at bedside.     Current Length of Stay: 5 day(s)  Current Patient Status: Inpatient   Certification Statement: The patient will continue to require additional inpatient hospital stay due to Abscess of muscle and Bacteremia   Discharge Plan: Anticipate discharge in >72 hrs to home.     Code Status: Level 1 - Full Code    Subjective:   Patient was seen and examined at bedside with the attending physician. She is lying comfortably in bed, no acute distress. No pain where the drain is placed.  Denies fever, chills, headache, chest pain, abdominal discomfort, n/v/d/c. Assessment and plan discussed. Voices no other concerns.    Objective:     Vitals:   Temp (24hrs), Av.7 °F (36.5 °C), Min:97 °F (36.1 °C), Max:98.8 °F (37.1 °C)    Temp:  [97 °F (36.1 °C)-98.8 °F (37.1 °C)] 97 °F (36.1 °C)  HR:  [50-80] 63  Resp:  [16-20] 20  BP: ()/(55-71) 98/58  SpO2:  [93 %-99 %] 96 %  Body mass index is 21.73 kg/m².     Input and Output Summary (last 24 hours):       Intake/Output Summary (Last 24 hours) at 2025 1126  Last data filed at 2025 0918  Gross per 24 hour   Intake 2909 ml   Output 20 ml   Net 2889 ml       Physical Exam:     Physical Exam:   Physical Exam      Vitals and nursing note reviewed.   Constitutional:       General: She is not in acute distress.     Appearance: She is well-developed.   HENT:      Head: Normocephalic and atraumatic.      Nose: Nose normal. No congestion or rhinorrhea.      Mouth/Throat:      Mouth: Mucous membranes are moist.      Pharynx: Oropharynx is clear.   Eyes:      Extraocular Movements: Extraocular movements  intact.      Conjunctiva/sclera: Conjunctivae normal.      Pupils: Pupils are equal, round, and reactive to light.   Cardiovascular:      Rate and Rhythm: Normal rate and regular rhythm.      Heart sounds: No murmur heard.  Pulmonary:      Effort: Pulmonary effort is normal. No respiratory distress.      Breath sounds: Normal breath sounds.   Abdominal:      Palpations: Abdomen is soft.      Tenderness: There is no abdominal tenderness.   Musculoskeletal:         General: No swelling.      Cervical back: Neck supple.      Right lower leg: No edema.      Left lower leg: No edema.      Comments: Tristanian drain in place in medial thigh covered with gauze, draining serous fluid.    Skin:     General: Skin is warm and dry.      Capillary Refill: Capillary refill takes less than 2 seconds.   Neurological:      General: No focal deficit present.      Mental Status: She is alert and oriented to person, place, and time.      Cranial Nerves: No cranial nerve deficit.   Psychiatric:         Mood and Affect: Mood normal.    Additional Data:     Labs:    Results from last 7 days   Lab Units 02/12/25  0519   WBC Thousand/uL 7.78   HEMOGLOBIN g/dL 10.4*   HEMATOCRIT % 32.5*   PLATELETS Thousands/uL 368   SEGS PCT % 59   LYMPHO PCT % 32   MONO PCT % 5   EOS PCT % 2     Results from last 7 days   Lab Units 02/11/25  0528 02/10/25  1535   POTASSIUM mmol/L 4.5 3.9   CHLORIDE mmol/L 103 102   CO2 mmol/L 31 31   BUN mg/dL 9 11   CREATININE mg/dL 0.60 0.60   CALCIUM mg/dL 9.5 9.6   ALK PHOS U/L  --  122*   ALT U/L  --  72*   AST U/L  --  50*     Results from last 7 days   Lab Units 02/12/25  0519   INR  0.98                 * I Have Reviewed All Lab Data Listed Above.  * Additional Pertinent Lab Tests Reviewed: Yes      Recent Cultures (last 7 days):     Results from last 7 days   Lab Units 02/12/25  1615   GRAM STAIN RESULT  4+ Polys  No organisms seen   BODY FLUID CULTURE, STERILE  No growth         Last 24 Hours Medication List:    Current Facility-Administered Medications   Medication Dose Route Frequency Provider Last Rate    acetaminophen  975 mg Oral BID Joyce Sims MD      amLODIPine  5 mg Oral Daily Jesús Nunez MD      aspirin  81 mg Oral Daily Jesús Nunez MD      atorvastatin  40 mg Oral QPM Houston Parker MD      cefTRIAXone  2,000 mg Intravenous Q24H Vivian Lamarerd, CRNP 2,000 mg (02/13/25 1106)    enoxaparin  40 mg Subcutaneous Daily Louis Miranda MD      fentaNYL   Intravenous PRN Louis Miranda MD      fish oil  1,000 mg Oral Daily Jesús Nunez MD      ibuprofen  400 mg Oral Q6H PRN Joyce Sims MD      melatonin  3 mg Oral Daily PRN Daksha Villaseñor MD      memantine  5 mg Oral BID Jesús Nunez MD      methocarbamol  500 mg Oral BID Jesús Nunez MD      midazolam    PRN Louis Miranda MD      polyethylene glycol  17 g Oral Daily PRN Yesenia Chambers MD          ** Please Note: Dictation voice to text software may have been used in the creation of this document. **    Carola Kilgore MD  02/13/25  11:26 AM

## 2025-02-13 NOTE — TELEPHONE ENCOUNTER
Called and spoke to Pt regarding appt on 10/8 w/Rodo Interiano to inform her that it will be in the [facility] loc. Pt acknowledged.    left upper arm

## 2025-02-13 NOTE — PLAN OF CARE
Problem: PAIN - ADULT  Goal: Verbalizes/displays adequate comfort level or baseline comfort level  Description: Interventions:  - Encourage patient to monitor pain and request assistance  - Assess pain using appropriate pain scale  - Administer analgesics based on type and severity of pain and evaluate response  - Implement non-pharmacological measures as appropriate and evaluate response  - Consider cultural and social influences on pain and pain management  - Notify physician/advanced practitioner if interventions unsuccessful or patient reports new pain  Outcome: Progressing     Problem: INFECTION - ADULT  Goal: Absence or prevention of progression during hospitalization  Description: INTERVENTIONS:  - Assess and monitor for signs and symptoms of infection  - Monitor lab/diagnostic results  - Monitor all insertion sites, i.e. indwelling lines, tubes, and drains  - [place] appropriate cooling/warming therapies per order  - Administer medications as ordered  - Instruct and encourage patient and family to use good hand hygiene technique  - Identify and instruct in appropriate isolation precautions for identified infection/condition  Outcome: Progressing     Problem: SAFETY ADULT  Goal: Patient will remain free of falls  Description: INTERVENTIONS:  - Educate patient/family on patient safety including physical limitations  - Instruct patient to call for assistance with activity   - Consult OT/PT to assist with strengthening/mobility   - Keep Call bell within reach  - Keep bed low and locked with side rails adjusted as appropriate  - Keep care items and personal belongings within reach  - Initiate and maintain comfort rounds  - Make Fall Risk Sign visible to staff  - O- Apply yellow socks and bracelet for high fall risk patients  - Consider moving patient to room near nurses station  Outcome: Progressing     Problem: DISCHARGE PLANNING  Goal: Discharge to home or other facility with appropriate  resources  Description: INTERVENTIONS:  - Identify barriers to discharge w/patient and caregiver  - Arrange for needed discharge resources and transportation as appropriate  - Identify discharge learning needs (meds, wound care, etc.)  - Arrange for interpretive services to assist at discharge as needed  - Refer to Case Management Department for coordinating discharge planning if the patient needs post-hospital services based on physician/advanced practitioner order or complex needs related to functional status, cognitive ability, or social support system  Outcome: Progressing

## 2025-02-13 NOTE — CASE MANAGEMENT
Case Management Discharge Planning Note    Patient name Hawa Cheek  Location 7T [facility] 701/7T [facility] 701-01 MRN [id number]  : 1950 Date 2025       Current Admission Date: 2025  Current Admission Diagnosis:Abscess of muscle   Patient Active Problem List    Diagnosis Date Noted Date Diagnosed    Transaminitis 2025     Gram-positive bacteremia 2025     Abscess of muscle 2025     Dementia (HCC) 2025     Osteopenia 2024     Pre-diabetes 2024     Gastroesophageal reflux disease without esophagitis 2022     Dizziness 2021     Leukocytosis 2019     Personal history of transient ischemic attack (TIA), 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     Spinal stenosis, lumbar region with neurogenic claudication      Muscle spasms of neck 2017     Arthralgia of left hip 2016     Slow transit constipation 2016     Impaired fasting glucose 2016     Chronic right shoulder pain 03/15/2016     Creatinine elevation 2015     Neck pain 2014     Low back pain 2014     Acquired trigger finger 10/09/2013     Vitamin D deficiency 2013     Mammogram abnormal 2013     Multiple joint pain 2013     Other symptoms involving skin and integumentary tissues 2013     Pemphigus 2013     Bursitis 2012     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     Osteoarthrosis 2012     Dyslipidemia 2012     Essential hypertension 2012     Hyperlipidemia 2008       LOS (days): 10  Geometric Mean LOS (GMLOS) (days): 3.1  Days to GMLOS:-7.3     OBJECTIVE:  Risk of Unplanned Readmission Score: 9.71         Current admission status: Inpatient   Preferred Pharmacy:   [id number] - [place] - [address]   [facility]  [place] [address]  Phone: [phone] Fax: [phone]    [id number] [place] -  [address]  [address] [address]  Phone: [phone] Fax: [phone]    Primary Care Provider: Keeley Mccormick MD    Primary Insurance: [facility]  Secondary Insurance:     DISCHARGE DETAILS:     Medical update provided by provider in care coordination rounds; Per provider, after IR draining yesterday, specimen was found. Gram stain and cultures pending. Provider anticipate Pt to be DC on 2/14. Pt now has 8 Spanish Drain placed which will need flushing daily.     CM sent message to [facility] and [employer] to inform them of above plan and request HHC SOC be 1300 on 2/15.     CM will continue to follow for DC planning needs

## 2025-02-14 PROBLEM — D72.829 LEUKOCYTOSIS: Status: RESOLVED | Noted: 2019-12-08 | Resolved: 2025-02-14

## 2025-02-14 LAB
ANION GAP SERPL CALCULATED.3IONS-SCNC: 7 MMOL/L (ref 4–13)
BASOPHILS # BLD AUTO: 0.05 THOUSANDS/ΜL (ref 0–0.1)
BASOPHILS NFR BLD AUTO: 1 % (ref 0–1)
BUN SERPL-MCNC: 12 MG/DL (ref 5–25)
CALCIUM SERPL-MCNC: 9.2 MG/DL (ref 8.4–10.2)
CHLORIDE SERPL-SCNC: 104 MMOL/L (ref 96–108)
CO2 SERPL-SCNC: 29 MMOL/L (ref 21–32)
CREAT SERPL-MCNC: 0.55 MG/DL (ref 0.6–1.3)
EOSINOPHIL # BLD AUTO: 0.19 THOUSAND/ΜL (ref 0–0.61)
EOSINOPHIL NFR BLD AUTO: 3 % (ref 0–6)
ERYTHROCYTE [DISTWIDTH] IN BLOOD BY AUTOMATED COUNT: 12.8 % (ref 11.6–15.1)
GFR SERPL CREATININE-BSD FRML MDRD: 92 ML/MIN/1.73SQ M
GLUCOSE SERPL-MCNC: 103 MG/DL (ref 65–140)
HCT VFR BLD AUTO: 34.4 % (ref 34.8–46.1)
HGB BLD-MCNC: 11.5 G/DL (ref 11.5–15.4)
IMM GRANULOCYTES # BLD AUTO: 0.08 THOUSAND/UL (ref 0–0.2)
IMM GRANULOCYTES NFR BLD AUTO: 1 % (ref 0–2)
LYMPHOCYTES # BLD AUTO: 2.36 THOUSANDS/ΜL (ref 0.6–4.47)
LYMPHOCYTES NFR BLD AUTO: 32 % (ref 14–44)
MCH RBC QN AUTO: 31.2 PG (ref 26.8–34.3)
MCHC RBC AUTO-ENTMCNC: 33.4 G/DL (ref 31.4–37.4)
MCV RBC AUTO: 93 FL (ref 82–98)
MONOCYTES # BLD AUTO: 0.43 THOUSAND/ΜL (ref 0.17–1.22)
MONOCYTES NFR BLD AUTO: 6 % (ref 4–12)
NEUTROPHILS # BLD AUTO: 4.27 THOUSANDS/ΜL (ref 1.85–7.62)
NEUTS SEG NFR BLD AUTO: 57 % (ref 43–75)
NRBC BLD AUTO-RTO: 0 /100 WBCS
PLATELET # BLD AUTO: 398 THOUSANDS/UL (ref 149–390)
PMV BLD AUTO: 9.1 FL (ref 8.9–12.7)
POTASSIUM SERPL-SCNC: 3.8 MMOL/L (ref 3.5–5.3)
RBC # BLD AUTO: 3.69 MILLION/UL (ref 3.81–5.12)
SODIUM SERPL-SCNC: 140 MMOL/L (ref 135–147)
WBC # BLD AUTO: 7.38 THOUSAND/UL (ref 4.31–10.16)

## 2025-02-14 PROCEDURE — 99232 SBSQ HOSP IP/OBS MODERATE 35: CPT | Performed by: STUDENT IN AN ORGANIZED HEALTH CARE EDUCATION/TRAINING PROGRAM

## 2025-02-14 PROCEDURE — 80048 BASIC METABOLIC PNL TOTAL CA: CPT

## 2025-02-14 PROCEDURE — 85025 COMPLETE CBC W/AUTO DIFF WBC: CPT

## 2025-02-14 RX ADMIN — OMEGA-3 FATTY ACIDS CAP 1000 MG 1000 MG: 1000 CAP at 08:08

## 2025-02-14 RX ADMIN — AMLODIPINE BESYLATE 5 MG: 5 TABLET ORAL at 08:08

## 2025-02-14 RX ADMIN — ASPIRIN 81 MG: 81 TABLET, COATED ORAL at 08:08

## 2025-02-14 RX ADMIN — CEFTRIAXONE 2000 MG: 2 INJECTION, SOLUTION INTRAVENOUS at 11:48

## 2025-02-14 RX ADMIN — MEMANTINE HYDROCHLORIDE 5 MG: 5 TABLET, FILM COATED ORAL at 08:08

## 2025-02-14 RX ADMIN — ENOXAPARIN SODIUM 40 MG: 40 INJECTION SUBCUTANEOUS at 08:14

## 2025-02-14 RX ADMIN — METHOCARBAMOL TABLETS 500 MG: 500 TABLET, COATED ORAL at 08:08

## 2025-02-14 RX ADMIN — ACETAMINOPHEN 975 MG: 325 TABLET, FILM COATED ORAL at 08:08

## 2025-02-14 RX ADMIN — ACETAMINOPHEN 975 MG: 325 TABLET, FILM COATED ORAL at 21:46

## 2025-02-14 RX ADMIN — MEMANTINE HYDROCHLORIDE 5 MG: 5 TABLET, FILM COATED ORAL at 17:20

## 2025-02-14 RX ADMIN — METHOCARBAMOL TABLETS 500 MG: 500 TABLET, COATED ORAL at 17:20

## 2025-02-14 RX ADMIN — ATORVASTATIN CALCIUM 40 MG: 40 TABLET, FILM COATED ORAL at 17:20

## 2025-02-14 NOTE — CASE MANAGEMENT
Case Management Discharge Planning Note    Patient name Hawa Cheek  Location 7T [facility] 701/7T [facility] 701-01 MRN [id number]  : 1950 Date 2025       Current Admission Date: 2025  Current Admission Diagnosis:Abscess of muscle   Patient Active Problem List    Diagnosis Date Noted Date Diagnosed    Transaminitis 2025     Gram-positive bacteremia 2025     Abscess of muscle 2025     Dementia (HCC) 2025     Osteopenia 2024     Pre-diabetes 2024     Gastroesophageal reflux disease without esophagitis 2022     Dizziness 2021     Personal history of transient ischemic attack (TIA), 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     Spinal stenosis, lumbar region with neurogenic claudication      Muscle spasms of neck 2017     Arthralgia of left hip 2016     Slow transit constipation 2016     Impaired fasting glucose 2016     Chronic right shoulder pain 03/15/2016     Creatinine elevation 2015     Neck pain 2014     Low back pain 2014     Acquired trigger finger 10/09/2013     Vitamin D deficiency 2013     Mammogram abnormal 2013     Multiple joint pain 2013     Other symptoms involving skin and integumentary tissues 2013     Pemphigus 2013     Bursitis 2012     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     Osteoarthrosis 2012     Dyslipidemia 2012     Essential hypertension 2012     Hyperlipidemia 2008       LOS (days): 11  Geometric Mean LOS (GMLOS) (days): 3.1  Days to GMLOS:-8.4     OBJECTIVE:  Risk of Unplanned Readmission Score: 8.96         Current admission status: Inpatient   Preferred Pharmacy:   [facility] #[id number] - [place] - [address]   [facility]  [place] [address]  Phone: [phone] Fax: [phone]    [id number] - [place] - [address]  [address] [facility] [address]  Phone: [phone] Fax: [phone]    Primary Care Provider: Keeley Mccormick MD    Primary Insurance: [facility]  Secondary Insurance:     DISCHARGE DETAILS:                                                    Treatment Team Recommendation: Home with Home Health Care  Discharge Destination Plan:: Home with Home Health Care  Transport at Discharge : Family                             IMM Given (Date):: 02/14/25  IMM Given to:: Family (daughter Warren via phone & e-mail to [email or web address])     Additional Comments: Spoke with patient's daughter Warren via phone, using Hungarian mariah Castillo, #[id number].  Patient expected to be cleared tomorrow.  [facility] will arrange delivery of antibiotics with daughter directly to home, [facility] scheduled for start of care Sunday 2/16 at 1:00 PM.  Reviewed IMM with daughter and e-mailed both Hungarian and English copies to [email or web address].

## 2025-02-14 NOTE — PLAN OF CARE
Problem: Nutrition/Hydration-ADULT  Goal: Nutrient/Hydration intake appropriate for improving, restoring or maintaining nutritional needs  Description: Monitor and assess patient's nutrition/hydration status for malnutrition. Collaborate with interdisciplinary team and initiate plan and interventions as ordered.  Monitor patient's weight and dietary intake as ordered or per policy. Utilize nutrition screening tool and intervene as necessary. Determine patient's food preferences and provide high-protein, high-caloric foods as appropriate.     INTERVENTIONS:  - Monitor oral intake, urinary output, labs, and treatment plans  - Assess nutrition and hydration status and recommend course of action  - Evaluate amount of meals eaten  - Assist patient with eating if necessary   - Allow adequate time for meals  - Recommend/ encourage appropriate diets, oral nutritional supplements, and vitamin/mineral supplements  - Order, calculate, and assess calorie counts as needed  - Recommend, monitor, and adjust tube feedings and TPN/PPN based on assessed needs  - Assess need for intravenous fluids  - Provide specific nutrition/hydration education as appropriate  - Include patient/family/caregiver in decisions related to nutrition  Outcome: Progressing     Problem: INFECTION - ADULT  Goal: Absence or prevention of progression during hospitalization  Description: INTERVENTIONS:  - Assess and monitor for signs and symptoms of infection  - Monitor lab/diagnostic results  - Monitor all insertion sites, i.e. indwelling lines, tubes, and drains  - Monitor endotracheal if appropriate and nasal secretions for changes in amount and color  - [place] appropriate cooling/warming therapies per order  - Administer medications as ordered  - Instruct and encourage patient and family to use good hand hygiene technique  - Identify and instruct in appropriate isolation precautions for identified infection/condition  Outcome: Progressing     Problem:  PAIN - ADULT  Goal: Verbalizes/displays adequate comfort level or baseline comfort level  Description: Interventions:  - Encourage patient to monitor pain and request assistance  - Assess pain using appropriate pain scale  - Administer analgesics based on type and severity of pain and evaluate response  - Implement non-pharmacological measures as appropriate and evaluate response  - Consider cultural and social influences on pain and pain management  - Notify physician/advanced practitioner if interventions unsuccessful or patient reports new pain  Outcome: Progressing     Problem: Knowledge Deficit  Goal: Patient/family/caregiver demonstrates understanding of disease process, treatment plan, medications, and discharge instructions  Description: Complete learning assessment and assess knowledge base.  Interventions:  - Provide teaching at level of understanding  - Provide teaching via preferred learning methods  Outcome: Progressing     Problem: DISCHARGE PLANNING  Goal: Discharge to home or other facility with appropriate resources  Description: INTERVENTIONS:  - Identify barriers to discharge w/patient and caregiver  - Arrange for needed discharge resources and transportation as appropriate  - Identify discharge learning needs (meds, wound care, etc.)  - Arrange for interpretive services to assist at discharge as needed  - Refer to Case Management Department for coordinating discharge planning if the patient needs post-hospital services based on physician/advanced practitioner order or complex needs related to functional status, cognitive ability, or social support system  Outcome: Progressing

## 2025-02-14 NOTE — PROGRESS NOTES
Progress Note    Hawa Cheek 74 y.o. female MRN: [id number]  Unit/Bed#: 7T [facility] 701-01 Encounter: [id number]  Admitting Physician: Carola Kilgore MD  PCP: Keeley Mccormick MD  Date of Admission:  2/2/2025  7:09 PM    Assessment and Plan    * Abscess of muscle  Assessment & Plan    Remains afebrile and WBC trending down  S/p ceFAZolin IVPB  2,000 mg for 2 days and Vancomycin 750 mg every BID  Abscess could have been present during corticosteroid injection on 1/29/2025 vs the corticosteroid steroid shot causing iatrogenic abscess  Orthopedic sign off and defer the case to General Surgery  General Surgery was consulted no surgery planned due to difficult location.    CT abd/pelvis 2/10/25 Multilocular intramuscular abscesses within the right pelvis/hip musculature with adjacent myositis, slightly increased in size since MRI 2/4/2025  Blood culture negative after 5 days  8 French drain placed into medial upper right thigh, 7 cc of pus aspirated by IR    Plan:  Gram stain and culture pending. Prelim results show no growth.    ID recommends patient remains inpatient until culture has finalized to make sure no adjustment to antibiotic is needed   Continue Rocephin 2g IV for 2 weeks  Transition to PO amoxicillin 500mg q8 after 2/19/25  PICC line placed by IR on 2/11/2025  Weekly CBC and CMP while on IV antibiotic  Repeat CT A/P w contrast to re-evaluate collections in approximately 1 month  Tylenol 975 mg BID scheduled, Ibuprofen 600 mg as needed every 6 hours, warm compresses as needed.   Irrigate every 8 hours with 10 cc normal saline while inpatient, once daily drain irrigation as outpatient   Outpatient follow up with IR in 2 weeks      Transaminitis  Assessment & Plan  Could be in the setting of acetaminophen v/s current infection process  Asymptomatic    Plan:  Consider stop tylenol if liver enzymes continue trending up.       Gram-positive bacteremia  Assessment & Plan  On admission meeting  criteria due to fever and leukocytosis in the setting of abscess of muscle.   Clinically patient is improving   ID consulted and on board   TTE on 02/06/2025 unremarkable for vegetation   Blood cultures (set 1) positive for Strep intermedius and staph epidermis  Blood culture (set 2) no growth after 5 days    Plan:  Rocephin 2g IV for 2 weeks, will need to transition to PO amoxicillin 500mg q8 after 2/19/25   Awaiting gram stain and fluid final cultures        Assessment & Plan  On admission meeting sepsis criteria due to fever, elevated wbc   Admission blood cultures showing GPC in both sets, awaiting BCI and formal species identification.  -stop IV cefepime  -continue IV vancomycin  -continue pharmacy consult for guidance on vancomycin dosage  -check CBCD and BMP tomorrow  -follow up blood cultures  -recheck blood cultures tomorrow morning  -check TTE  -monitor vitals     Dementia (HCC)  Assessment & Plan  Home medication - Memantine 5 mg bid    Plan  Continue home medications      Personal history of transient ischemic attack (TIA), and cerebral infarction without residual deficits  Assessment & Plan  Stable  Compliant with home medications  Home medications - aspirin (Aspirin Low Dose) 81 mg EC tablet , atorvastatin (LIPITOR) 40 mg tablet , omega-3-acid ethyl esters (LOVAZA) 1 g capsule , amlodipine 5 mg tablet     Plan:  Aspirin restarted  Keep BP within normal limits      Essential hypertension  Assessment & Plan  Home medications - amLODIPine (NORVASC) 5 mg tablet  ,     Plan:   Continue home medications      Dyslipidemia  Assessment & Plan  Home medication - atorvastatin (LIPITOR) 40 mg tablet     Plan:  Continue home medications      Influenza A-resolved as of 2/13/2025  Assessment & Plan  Influenza A PCR positive   Likely viral etiology given the seasonal prevalence and symptomatology     Plan:  Completed 5 day course of Oseltamivir     Leukocytosis-resolved as of 2/14/2025  Assessment &  Plan  Resolved  WBC 7.38 (25)  In the setting of infection, improving  See a/p for bacteremia           VTE Pharmacologic Prophylaxis: VTE Score: 6 High Risk (Score >/= 5) - Pharmacological DVT Prophylaxis Ordered: enoxaparin (Lovenox). Sequential Compression Devices Ordered.     Mobility:   Basic Mobility Inpatient Raw Score: 22  JH-HLM Goal: 7: Walk 25 feet or more  JH-HLM Achieved: 6: Walk 10 steps or more  JH-HLM Goal achieved. Continue to encourage appropriate mobility.     Patient Centered Rounds: I performed bedside rounds with nursing staff today.   Discussions with Specialists or Other Care Team Provider: none      Education and Discussions with Family / Patient: Updated  (daughter)    Current Length of Stay: 11 day(s)    Current Patient Status: Inpatient   Certification Statement: The patient will continue to require additional inpatient hospital stay due to  Abscess of muscle and Bacteremia    Discharge Plan: Discharge tomorrow pending culture results    Code Status: Level 1 - Full Code      Subjective:   Patient was seen and examined at bedside with attending physician. She was resting comfortably in bed. Denies any pain. No signs of erythema surrounding drain. She has been eating and drinking. Had to get up and use the bathroom after examination. Voices no concerns and would like to go home. Assessment and plan discussed. All questions answered.     Objective:     Vitals:   Temp (24hrs), Av.5 °F (36.4 °C), Min:97 °F (36.1 °C), Max:98.3 °F (36.8 °C)    Temp:  [97 °F (36.1 °C)-98.3 °F (36.8 °C)] 97 °F (36.1 °C)  HR:  [50-71] 71  Resp:  [18-20] 18  BP: (105-136)/(59-75) 136/75  SpO2:  [94 %-99 %] 99 %  Body mass index is 21.73 kg/m².     Input and Output Summary (last 24 hours):       Intake/Output Summary (Last 24 hours) at 2025 1129  Last data filed at 2025 0900  Gross per 24 hour   Intake 480 ml   Output 250 ml   Net 230 ml     Physical Exam:       Vitals and nursing  note reviewed.   Constitutional:       General: She is not in acute distress.     Appearance: She is well-developed.   HENT:      Head: Normocephalic and atraumatic.      Nose: Nose normal. No congestion or rhinorrhea.      Mouth/Throat:      Mouth: Mucous membranes are moist.      Pharynx: Oropharynx is clear.   Eyes:      Extraocular Movements: Extraocular movements intact.      Conjunctiva/sclera: Conjunctivae normal.      Pupils: Pupils are equal, round, and reactive to light.   Cardiovascular:      Rate and Rhythm: Normal rate and regular rhythm.      Heart sounds: No murmur heard.  Pulmonary:      Effort: Pulmonary effort is normal. No respiratory distress.      Breath sounds: Normal breath sounds.   Abdominal:      Palpations: Abdomen is soft.      Tenderness: There is no abdominal tenderness.   Musculoskeletal:         General: No swelling.      Cervical back: Neck supple.      Right lower leg: No edema.      Left lower leg: No edema.      Comments: [place]ine[place] drain in place in medial thigh covered with gauze, draining serous fluid.    Skin:     General: Skin is warm and dry.      Capillary Refill: Capillary refill takes less than 2 seconds.   Neurological:      General: No focal deficit present.      Mental Status: She is alert and oriented to person, place, and time.      Cranial Nerves: No cranial nerve deficit.   Psychiatric:         Mood and Affect: Mood normal.    Additional Data:     Labs:    Results from last 7 days   Lab Units 02/14/25  0518   WBC Thousand/uL 7.38   HEMOGLOBIN g/dL 11.5   HEMATOCRIT % 34.4*   PLATELETS Thousands/uL 398*   SEGS PCT % 57   LYMPHO PCT % 32   MONO PCT % 6   EOS PCT % 3     Results from last 7 days   Lab Units 02/14/25  0518 02/11/25  0528 02/10/25  1535   POTASSIUM mmol/L 3.8   < > 3.9   CHLORIDE mmol/L 104   < > 102   CO2 mmol/L 29   < > 31   BUN mg/dL 12   < > 11   CREATININE mg/dL 0.55*   < > 0.60   CALCIUM mg/dL 9.2   < > 9.6   ALK PHOS U/L  --   --  122*   ALT U/L  --    --  72*   AST U/L  --   --  50*    < > = values in this interval not displayed.     Results from last 7 days   Lab Units 02/12/25  0519   INR  0.98                 * I Have Reviewed All Lab Data Listed Above.  * Additional Pertinent Lab Tests Reviewed: All Labs For Current Hospital Admission Reviewed    Recent Cultures (last 7 days):     Results from last 7 days   Lab Units 02/12/25  1615   GRAM STAIN RESULT  4+ Polys  No organisms seen   BODY FLUID CULTURE, STERILE  No growth       Last 24 Hours Medication List:   Current Facility-Administered Medications   Medication Dose Route Frequency Provider Last Rate    acetaminophen  975 mg Oral BID Joyce Sims MD      amLODIPine  5 mg Oral Daily Jesús Nunez MD      aspirin  81 mg Oral Daily Jesús Nunez MD      atorvastatin  40 mg Oral QPM Houston Parker MD      cefTRIAXone  2,000 mg Intravenous Q24H MAC GalvanNP 2,000 mg (02/13/25 1106)    enoxaparin  40 mg Subcutaneous Daily Louis Miranda MD      fentaNYL   Intravenous PRN Louis Miranda MD      fish oil  1,000 mg Oral Daily Jesús Nunez MD      ibuprofen  400 mg Oral Q6H PRN Joyce Sims MD      melatonin  3 mg Oral Daily PRN Daksha Villaseñor MD      memantine  5 mg Oral BID Jesús Nunez MD      methocarbamol  500 mg Oral BID Jesús Nunez MD      midazolam    PRN Louis Miranda MD      polyethylene glycol  17 g Oral Daily PRN Yesenia Chambers MD          ** Please Note: Dictation voice to text software may have been used in the creation of this document. **    Carola Kilgore MD  02/14/25  11:29 AM

## 2025-02-14 NOTE — PLAN OF CARE
Problem: PAIN - ADULT  Goal: Verbalizes/displays adequate comfort level or baseline comfort level  Description: Interventions:  - Encourage patient to monitor pain and request assistance  - Assess pain using appropriate pain scale  - Administer analgesics based on type and severity of pain and evaluate response  - Implement non-pharmacological measures as appropriate and evaluate response  - Consider cultural and social influences on pain and pain management  - Notify physician/advanced practitioner if interventions unsuccessful or patient reports new pain  Outcome: Progressing     Problem: INFECTION - ADULT  Goal: Absence or prevention of progression during hospitalization  Description: INTERVENTIONS:  - Assess and monitor for signs and symptoms of infection  - Monitor lab/diagnostic results  - Monitor all insertion sites, i.e. indwelling lines, tubes, and drains  - [place] appropriate cooling/warming therapies per order  - Administer medications as ordered  - Instruct and encourage patient and family to use good hand hygiene technique  - Identify and instruct in appropriate isolation precautions for identified infection/condition  Outcome: Progressing     Problem: SAFETY ADULT  Goal: Patient will remain free of falls  Description: INTERVENTIONS:  - Educate patient/family on patient safety including physical limitations  - Instruct patient to call for assistance with activity   - Consult OT/PT to assist with strengthening/mobility   - Keep Call bell within reach  - Keep bed low and locked with side rails adjusted as appropriate  - Keep care items and personal belongings within reach  - Initiate and maintain comfort rounds  - Make Fall Risk Sign visible to staff  - O- Apply yellow socks and bracelet for high fall risk patients  - Consider moving patient to room near nurses station  Outcome: Progressing     Problem: Nutrition/Hydration-ADULT  Goal: Nutrient/Hydration intake appropriate for improving, restoring or  maintaining nutritional needs  Description: Monitor and assess patient's nutrition/hydration status for malnutrition. Collaborate with interdisciplinary team and initiate plan and interventions as ordered.  Monitor patient's weight and dietary intake as ordered or per policy. Utilize nutrition screening tool and intervene as necessary. Determine patient's food preferences and provide high-protein, high-caloric foods as appropriate.     INTERVENTIONS:  - Monitor oral intake, urinary output, labs, and treatment plans  - Assess nutrition and hydration status and recommend course of action  - Evaluate amount of meals eaten  - Assist patient with eating if necessary   - Allow adequate time for meals  - Recommend/ encourage appropriate diets, oral nutritional supplements, and vitamin/mineral supplements  Problem: DISCHARGE PLANNING  Goal: Discharge to home or other facility with appropriate resources  Description: INTERVENTIONS:  - Identify barriers to discharge w/patient and caregiver  - Arrange for needed discharge resources and transportation as appropriate  - Identify discharge learning needs (meds, wound care, etc.)  - Arrange for interpretive services to assist at discharge as needed  - Refer to Case Management Department for coordinating discharge planning if the patient needs post-hospital services based on physician/advanced practitioner order or complex needs related to functional status, cognitive ability, or social support system  Outcome: Progressing     - Assess need for intravenous fluids  - Provide specific nutrition/hydration education as appropriate  - Include patient/family/caregiver in decisions related to nutrition  Outcome: Progressing

## 2025-02-15 VITALS
SYSTOLIC BLOOD PRESSURE: 118 MMHG | HEART RATE: 60 BPM | HEIGHT: 61 IN | DIASTOLIC BLOOD PRESSURE: 70 MMHG | BODY MASS INDEX: 21.71 KG/M2 | RESPIRATION RATE: 20 BRPM | WEIGHT: 115 LBS | OXYGEN SATURATION: 96 % | TEMPERATURE: 97.7 F

## 2025-02-15 PROBLEM — R78.81 GRAM-POSITIVE BACTEREMIA: Status: RESOLVED | Noted: 2025-02-05 | Resolved: 2025-02-15

## 2025-02-15 LAB
BACTERIA SPEC ANAEROBE CULT: NO GROWTH
BACTERIA SPEC BFLD CULT: NO GROWTH
BASOPHILS # BLD AUTO: 0.05 THOUSANDS/ΜL (ref 0–0.1)
BASOPHILS NFR BLD AUTO: 1 % (ref 0–1)
EOSINOPHIL # BLD AUTO: 0.2 THOUSAND/ΜL (ref 0–0.61)
EOSINOPHIL NFR BLD AUTO: 3 % (ref 0–6)
ERYTHROCYTE [DISTWIDTH] IN BLOOD BY AUTOMATED COUNT: 13 % (ref 11.6–15.1)
GRAM STN SPEC: NORMAL
GRAM STN SPEC: NORMAL
HCT VFR BLD AUTO: 34.4 % (ref 34.8–46.1)
HGB BLD-MCNC: 10.9 G/DL (ref 11.5–15.4)
IMM GRANULOCYTES # BLD AUTO: 0.03 THOUSAND/UL (ref 0–0.2)
IMM GRANULOCYTES NFR BLD AUTO: 0 % (ref 0–2)
LYMPHOCYTES # BLD AUTO: 2.6 THOUSANDS/ΜL (ref 0.6–4.47)
LYMPHOCYTES NFR BLD AUTO: 39 % (ref 14–44)
MCH RBC QN AUTO: 29.9 PG (ref 26.8–34.3)
MCHC RBC AUTO-ENTMCNC: 31.7 G/DL (ref 31.4–37.4)
MCV RBC AUTO: 95 FL (ref 82–98)
MONOCYTES # BLD AUTO: 0.54 THOUSAND/ΜL (ref 0.17–1.22)
MONOCYTES NFR BLD AUTO: 8 % (ref 4–12)
NEUTROPHILS # BLD AUTO: 3.28 THOUSANDS/ΜL (ref 1.85–7.62)
NEUTS SEG NFR BLD AUTO: 49 % (ref 43–75)
NRBC BLD AUTO-RTO: 0 /100 WBCS
PLATELET # BLD AUTO: 391 THOUSANDS/UL (ref 149–390)
PMV BLD AUTO: 9.2 FL (ref 8.9–12.7)
RBC # BLD AUTO: 3.64 MILLION/UL (ref 3.81–5.12)
WBC # BLD AUTO: 6.7 THOUSAND/UL (ref 4.31–10.16)

## 2025-02-15 PROCEDURE — 85025 COMPLETE CBC W/AUTO DIFF WBC: CPT

## 2025-02-15 PROCEDURE — 99238 HOSP IP/OBS DSCHRG MGMT 30/<: CPT | Performed by: STUDENT IN AN ORGANIZED HEALTH CARE EDUCATION/TRAINING PROGRAM

## 2025-02-15 RX ORDER — AMOXICILLIN 500 MG/1
500 TABLET, FILM COATED ORAL 3 TIMES DAILY
Qty: 42 TABLET | Refills: 0 | Status: SHIPPED | OUTPATIENT
Start: 2025-02-19 | End: 2025-03-05

## 2025-02-15 RX ADMIN — ENOXAPARIN SODIUM 40 MG: 40 INJECTION SUBCUTANEOUS at 08:05

## 2025-02-15 RX ADMIN — OMEGA-3 FATTY ACIDS CAP 1000 MG 1000 MG: 1000 CAP at 08:06

## 2025-02-15 RX ADMIN — CEFTRIAXONE 2000 MG: 2 INJECTION, SOLUTION INTRAVENOUS at 12:21

## 2025-02-15 RX ADMIN — AMLODIPINE BESYLATE 5 MG: 5 TABLET ORAL at 08:06

## 2025-02-15 RX ADMIN — METHOCARBAMOL TABLETS 500 MG: 500 TABLET, COATED ORAL at 08:06

## 2025-02-15 RX ADMIN — ASPIRIN 81 MG: 81 TABLET, COATED ORAL at 08:05

## 2025-02-15 RX ADMIN — ACETAMINOPHEN 975 MG: 325 TABLET, FILM COATED ORAL at 08:05

## 2025-02-15 RX ADMIN — MEMANTINE HYDROCHLORIDE 5 MG: 5 TABLET, FILM COATED ORAL at 08:05

## 2025-02-15 NOTE — ASSESSMENT & PLAN NOTE
Stable  Compliant with home medications  Home medications - aspirin (Aspirin Low Dose) 81 mg EC tablet , atorvastatin (LIPITOR) 40 mg tablet , omega-3-acid ethyl esters (LOVAZA) 1 g capsule , amlodipine 5 mg tablet     Plan:  Continue home medication

## 2025-02-15 NOTE — ASSESSMENT & PLAN NOTE
Improved and stable for discharge   S/p ceFAZolin IVPB  2,000 mg for 2 days and Vancomycin 750 mg every BID  Abscess could have been present during corticosteroid injection on 1/29/2025 vs the corticosteroid steroid shot causing iatrogenic abscess  General Surgery was consulted no surgery planned due to difficult location.    CT abd/pelvis 2/10/25 Multilocular intramuscular abscesses within the right pelvis/hip musculature with adjacent myositis, slightly increased in size since MRI 2/4/2025  Blood culture negative after 5 days  8 French drain placed into medial upper right thigh, 7 cc of pus aspirated by IR  Gram stain and culture pending  ID was on board.    Plan:  Continue Rocephin 2g IV until 2/19/2025  Transition to PO amoxicillin 500mg q8 after 2/19/25  PICC line placed by IR on 2/11/2025  Weekly CBC and CMP while on IV antibiotic  Repeat CT A/P w contrast to re-evaluate collections in approximately 1 month  Tylenol as needed   daily drain irrigation as outpatient   Outpatient follow up with IR in 2 weeks  Follow-up with PCP

## 2025-02-15 NOTE — PLAN OF CARE
Problem: SKIN/TISSUE INTEGRITY - ADULT  Goal: Incision and drain site healing without S/S of infection  Description: INTERVENTIONS  - Assess and document dressing, incision, wound bed, drain sites and surrounding tissue  - Provide patient and family education  - Perform skin care/dressing changes every day  Outcome: Progressing     Problem: PAIN - ADULT  Goal: Verbalizes/displays adequate comfort level or baseline comfort level  Description: Interventions:  - Encourage patient to monitor pain and request assistance  - Assess pain using appropriate pain scale  - Administer analgesics based on type and severity of pain and evaluate response  - Implement non-pharmacological measures as appropriate and evaluate response  - Consider cultural and social influences on pain and pain management  - Notify physician/advanced practitioner if interventions unsuccessful or patient reports new pain  Outcome: Progressing     Problem: INFECTION - ADULT  Goal: Absence or prevention of progression during hospitalization  Description: INTERVENTIONS:  - Assess and monitor for signs and symptoms of infection  - Monitor lab/diagnostic results  - Monitor all insertion sites, i.e. indwelling lines, tubes, and drains  - Monitor endotracheal if appropriate and nasal secretions for changes in amount and color  - [place] appropriate cooling/warming therapies per order  - Administer medications as ordered  - Instruct and encourage patient and family to use good hand hygiene technique  - Identify and instruct in appropriate isolation precautions for identified infection/condition  Outcome: Progressing     Problem: SAFETY ADULT  Goal: Maintain or return to baseline ADL function  Description: INTERVENTIONS:  -  Assess patient's ability to carry out ADLs; assess patient's baseline for ADL function and identify physical deficits which impact ability to perform ADLs (bathing, care of mouth/teeth, toileting, grooming, dressing, etc.)  -  Assess/evaluate cause of self-care deficits   - Assess range of motion  - Assess patient's mobility; develop plan if impaired  - Assess patient's need for assistive devices and provide as appropriate  - Encourage maximum independence but intervene and supervise when necessary  - Involve family in performance of ADLs  - Assess for home care needs following discharge   - Consider OT consult to assist with ADL evaluation and planning for discharge  - Provide patient education as appropriate  Outcome: Progressing     Problem: Knowledge Deficit  Goal: Patient/family/caregiver demonstrates understanding of disease process, treatment plan, medications, and discharge instructions  Description: Complete learning assessment and assess knowledge base.  Interventions:  - Provide teaching at level of understanding  - Provide teaching via preferred learning methods  Outcome: Progressing     Problem: DISCHARGE PLANNING  Goal: Discharge to home or other facility with appropriate resources  Description: INTERVENTIONS:  - Identify barriers to discharge w/patient and caregiver  - Arrange for needed discharge resources and transportation as appropriate  - Identify discharge learning needs (meds, wound care, etc.)  - Arrange for interpretive services to assist at discharge as needed  - Refer to Case Management Department for coordinating discharge planning if the patient needs post-hospital services based on physician/advanced practitioner order or complex needs related to functional status, cognitive ability, or social support system  Outcome: Progressing

## 2025-02-15 NOTE — ASSESSMENT & PLAN NOTE
Home medications - amLODIPine (NORVASC) 5 mg tablet  ,     Plan:   Continue home medications  Follow-up with PCP

## 2025-02-15 NOTE — ASSESSMENT & PLAN NOTE
Could be in the setting of acetaminophen v/s current infection process  Asymptomatic    Plan:  Follow-up PCP

## 2025-02-15 NOTE — ASSESSMENT & PLAN NOTE
On admission meeting criteria due to fever and leukocytosis in the setting of abscess of muscle.   Clinically patient is improving   ID consulted and was on board   TTE on 02/06/2025 unremarkable for vegetation   Blood cultures (set 1) positive for Strep intermedius and staph epidermis  Blood culture (set 2) no growth after 5 days  gram stain and fluid final cultures negative    Plan:  Rocephin 2g IV for 2 weeks, will need to transition to PO amoxicillin 500mg q8 after 2/19/25

## 2025-02-15 NOTE — CASE MANAGEMENT
Case Management Discharge Planning Note    Patient name Hawa Cheek  Location [facility] 701-01 MRN [id number]  : 1950 Date 2/15/2025       Current Admission Date: 2025  Current Admission Diagnosis:Abscess of muscle   Patient Active Problem List    Diagnosis Date Noted Date Diagnosed    Transaminitis 2025     Gram-positive bacteremia 2025     Abscess of muscle 2025     Dementia (HCC) 2025     Osteopenia 2024     Pre-diabetes 2024     Gastroesophageal reflux disease without esophagitis 2022     Dizziness 2021     Personal history of transient ischemic attack (TIA), 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     Spinal stenosis, lumbar region with neurogenic claudication      Muscle spasms of neck 2017     Arthralgia of left hip 2016     Slow transit constipation 2016     Impaired fasting glucose 2016     Chronic right shoulder pain 03/15/2016     Creatinine elevation 2015     Neck pain 2014     Low back pain 2014     Acquired trigger finger 10/09/2013     Vitamin D deficiency 2013     Mammogram abnormal 2013     Multiple joint pain 2013     Other symptoms involving skin and integumentary tissues 2013     Pemphigus 2013     Bursitis 2012     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  [address] [facility] [address]  Phone: [phone] Fax: [phone]    Primary Care Provider: Keeley Mccormick MD    Primary Insurance: [facility]  Secondary Insurance:     DISCHARGE DETAILS:    Discharge planning discussed with:: Daughter Lexis  Freedom of Choice: Yes  Comments - Freedom of Choice: [facility] reserved  CM contacted family/caregiver?: Yes  Were Treatment Team discharge recommendations reviewed with patient/caregiver?: Yes  Did patient/caregiver verbalize understanding of patient care needs?: Yes  Were patient/caregiver advised of the risks associated with not following Treatment Team discharge recommendations?: Yes    Contacts  Patient Contacts: Lexis  Relationship to Patient:: Family  Contact Method: Phone  Phone Number: [phone]  Reason/Outcome: Emergency Contact, Discharge Planning    Requested Home Health Care         Is the patient interested in HHC at discharge?: Yes  Home Health Discipline requested:: Nursing  Home Health Agency Name:: [facility]  HHA External Referral Reason (only applicable if external HHA name selected): Patient has established relationship with provider  Home Health Follow-Up Provider:: PCP  Home Health Services Needed:: Central Line Care, Administration of IV, IM or SC Medications  Homebound Criteria Met:: Requires the Assistance of Another Person for Safe Ambulation or to Leave the Home, Uses an Assist Device (i.e. cane, walker, etc)  Supporting Clincal Findings:: Limited Endurance, Fatigues Easliy in Short Distances         Other Referral/Resources/Interventions Provided:  Interventions: HHC, DME    Would you like to participate in our [facility] service program?  : No - Declined    Treatment Team Recommendation: Home with Home Health Care  Discharge Destination Plan:: Home with Home Health Care  Transport at Discharge : Family       CM confirmed with [facility], patient will be medically stable for DC after dose of IV Abx. Next dose  will be due tomorrow.         TACO confirmed with Tracee from [facility] [phone], antibiotics will be delivered to patient's home today 2.15.25 late this afternoon. Family does not have to sign for package, it will be left at door if they are not home.     CM spoke with patient's daughter, she understands antibiotics/ supplies will be delivered to her home late this afternoon.  Package does not need a signature, if she is not home company will leave it at her door.  Daughter plans to transport patient home at time of DC.  Daughter aware that [facility] is scheduled for SOC 2.16.25 at 1:00pm and they will provide education/ training on administration of IV antibiotics.

## 2025-02-15 NOTE — ASSESSMENT & PLAN NOTE
Home medication - atorvastatin (LIPITOR) 40 mg tablet     Plan:  Continue home medications  Follow-up with PCP

## 2025-02-15 NOTE — ASSESSMENT & PLAN NOTE
Home medication - Memantine 5 mg bid    Plan  Continue home medications  Follow-up with neurology outpatient

## 2025-02-16 ENCOUNTER — HOME CARE VISIT (OUTPATIENT)
Dept: HOME HEALTH SERVICES | Facility: HOME HEALTHCARE | Age: 75
End: 2025-02-16
Payer: COMMERCIAL

## 2025-02-16 VITALS
RESPIRATION RATE: 18 BRPM | OXYGEN SATURATION: 96 % | TEMPERATURE: 97.5 F | SYSTOLIC BLOOD PRESSURE: 118 MMHG | DIASTOLIC BLOOD PRESSURE: 65 MMHG | HEART RATE: 67 BPM

## 2025-02-16 PROCEDURE — G0299 HHS/HOSPICE OF RN EA 15 MIN: HCPCS

## 2025-02-16 PROCEDURE — 400013 VN SOC

## 2025-02-17 ENCOUNTER — TELEPHONE (OUTPATIENT)
Dept: INFECTIOUS DISEASES | Facility: CLINIC | Age: 75
End: 2025-02-17

## 2025-02-17 ENCOUNTER — HOME CARE VISIT (OUTPATIENT)
Dept: HOME HEALTH SERVICES | Facility: HOME HEALTHCARE | Age: 75
End: 2025-02-17
Payer: COMMERCIAL

## 2025-02-17 ENCOUNTER — TRANSITIONAL CARE MANAGEMENT (OUTPATIENT)
Dept: FAMILY MEDICINE CLINIC | Facility: CLINIC | Age: 75
End: 2025-02-17

## 2025-02-17 DIAGNOSIS — R78.81 BACTEREMIA: Primary | ICD-10-CM

## 2025-02-17 DIAGNOSIS — R78.81 BACTEREMIA: ICD-10-CM

## 2025-02-17 DIAGNOSIS — M60.009 ABSCESS OF MUSCLE: Primary | ICD-10-CM

## 2025-02-17 DIAGNOSIS — M60.009 ABSCESS OF MUSCLE: ICD-10-CM

## 2025-02-17 RX ORDER — SODIUM CHLORIDE 9 MG/ML
10 INJECTION, SOLUTION INTRAMUSCULAR; INTRAVENOUS; SUBCUTANEOUS DAILY
Qty: 300 ML | Refills: 2 | Status: SHIPPED | OUTPATIENT
Start: 2025-02-17 | End: 2025-05-18

## 2025-02-17 RX ORDER — AMOXICILLIN 500 MG/1
500 CAPSULE ORAL EVERY 8 HOURS SCHEDULED
Qty: 42 CAPSULE | Refills: 0 | Status: SHIPPED | OUTPATIENT
Start: 2025-03-04 | End: 2025-03-18

## 2025-02-17 NOTE — TELEPHONE ENCOUNTER
Called and left message for patients daughter Lexis today.   Informed Lexis to call the office back.   Was calling to go over antibiotic plan, Labs, CT that needs to be scheduled and follow up appointment.

## 2025-02-17 NOTE — PROGRESS NOTES
OPAT NOTE  Supervising/Discharge provider: Nicole     Diagnosis: gram positive bacteremia / muscle abscess     Drug: Ceftriaxone     Dose/Route/Frequency: [id number]    Labs/Frequency: CBCD CMP weekly    End Date: 2/19     Infusion/[facility]/SNF contact: Shoaib/IRAJ    ON 2/20 BEGIN:    Drug: Amoxicillin     Dose/Route/Frequency: [id number]    Labs/Frequency: CBCD Cre every other week    Imaging: CT A/P w/contrast due 3/10    End Date: CT resolution     Next appointment: 3/13

## 2025-02-17 NOTE — TELEPHONE ENCOUNTER
Patient daughter Lexis calls the office back.   Began to go over antibiotic plan with Lexis at this time. Lexis had questions regarding flushes she received from pharmacy. Lexis requesting to speak with someone that speaks [place] at this time. Informed Lexis will call back with .     Called Lexis back using  services.   Phone went to voicemail and message was left to all the office back.

## 2025-02-17 NOTE — TELEPHONE ENCOUNTER
Patient daughter returns call to office today.   Able to use interpretor  [id number].   Went over IV antibiotic plan and labs while on IV antibiotics. Lexis states visiting nurse will be coming tomorrow for labs. Went over transition to Amoxicillin, every other week labs that needs to be done. Informed Lexis patient has a CT ordered to be done on 3/10/25 that needs to be scheduled. Lexis states she will call to schedule CT scan and number to central scheduling provided at this time. Went over follow up appointment with Lexis and informed her patient is to continue Amoxicillin until that appointment. Informed Lexis refills will be sent to pharmacy to get to that appointment and pharmacy confirmed at this time.   Lexis has questions regarding flushes for EDUARDO drain. Major Olivo RN able to speak with Lexis and answer all questions.  Informed Lexis if there are any further questions or concerns to call the office   Lexis verbalizes understanding at this time.     Called [facility] and spoke with Alicia.   Reynaldo Vargas I was calling as visiting nurse is scheduled for visit tomorrow and wanted to make them aware patients daughter Lexis had questions regarding flushes they received for EDUARDO drain. Informed Alicia PITTMAN office nurse was able to answer questions over the phone but want nurse to check on this during visit.

## 2025-02-17 NOTE — UTILIZATION REVIEW
NOTIFICATION OF ADMISSION DISCHARGE   This is a Notification of Discharge from [facility]. Please be advised that this patient has been discharge from our facility. Below you will find the admission and discharge date and time including the patient’s disposition.   UTILIZATION REVIEW CONTACT:  Gera Egan  Utilization   Network Utilization Review Department  Phone: [phone] x carefully listen to the prompts. All voicemails are confidential.  Email: [email or web address]     ADMISSION INFORMATION  PRESENTATION DATE: 2/2/2025  7:09 PM  OBERVATION ADMISSION DATE: N/A  INPATIENT ADMISSION DATE: 2/3/25  1:00 AM   DISCHARGE DATE: 2/15/2025  3:11 PM   DISPOSITION:Home with Home Health Care    Network Utilization Review Department  ATTENTION: Please call with any questions or concerns to [phone] and carefully listen to the prompts so that you are directed to the right person. All voicemails are confidential.   For Discharge needs, contact Care Management DC Support Team at [phone] opt. 2  Send all requests for admission clinical reviews, approved or denied determinations and any other requests to dedicated fax number below belonging to the campus where the patient is receiving treatment. List of dedicated fax numbers for the Facilities:  FACILITY NAME UR FAX NUMBER   ADMISSION DENIALS (Administrative/Medical Necessity) [phone]   DISCHARGE SUPPORT TEAM ([facility]) [phone]   PARENT CHILD HEALTH (Maternity/NICU/Pediatrics) [phone]   [facility] [phone]   [facility] [phone]   [facility] [phone]   [facility] [phone]   [facility] [phone]   [facility] [phone]   [facility] [phone]   [facility]  [phone]   [facility] [phone]   [facility] [phone]   General acute hospital [phone]   [facility] [phone]

## 2025-02-18 ENCOUNTER — APPOINTMENT (OUTPATIENT)
Dept: LAB | Age: 75
End: 2025-02-18
Payer: COMMERCIAL

## 2025-02-18 ENCOUNTER — HOME CARE VISIT (OUTPATIENT)
Dept: HOME HEALTH SERVICES | Facility: HOME HEALTHCARE | Age: 75
End: 2025-02-18
Payer: COMMERCIAL

## 2025-02-18 VITALS
DIASTOLIC BLOOD PRESSURE: 70 MMHG | RESPIRATION RATE: 16 BRPM | TEMPERATURE: 99.1 F | SYSTOLIC BLOOD PRESSURE: 118 MMHG | HEART RATE: 63 BPM | OXYGEN SATURATION: 95 %

## 2025-02-18 DIAGNOSIS — R78.81 BACTEREMIA: ICD-10-CM

## 2025-02-18 DIAGNOSIS — M60.009 ABSCESS OF MUSCLE: ICD-10-CM

## 2025-02-18 LAB
ALBUMIN SERPL BCG-MCNC: 3.3 G/DL (ref 3.5–5)
ALP SERPL-CCNC: 97 U/L (ref 34–104)
ALT SERPL W P-5'-P-CCNC: 35 U/L (ref 7–52)
ANION GAP SERPL CALCULATED.3IONS-SCNC: 8 MMOL/L (ref 4–13)
AST SERPL W P-5'-P-CCNC: 29 U/L (ref 13–39)
BASOPHILS # BLD AUTO: 0.08 THOUSANDS/ΜL (ref 0–0.1)
BASOPHILS NFR BLD AUTO: 1 % (ref 0–1)
BILIRUB SERPL-MCNC: 0.32 MG/DL (ref 0.2–1)
BUN SERPL-MCNC: 15 MG/DL (ref 5–25)
CALCIUM ALBUM COR SERPL-MCNC: 9.8 MG/DL (ref 8.3–10.1)
CALCIUM SERPL-MCNC: 9.2 MG/DL (ref 8.4–10.2)
CHLORIDE SERPL-SCNC: 101 MMOL/L (ref 96–108)
CO2 SERPL-SCNC: 29 MMOL/L (ref 21–32)
CREAT SERPL-MCNC: 0.51 MG/DL (ref 0.6–1.3)
EOSINOPHIL # BLD AUTO: 0.37 THOUSAND/ΜL (ref 0–0.61)
EOSINOPHIL NFR BLD AUTO: 4 % (ref 0–6)
ERYTHROCYTE [DISTWIDTH] IN BLOOD BY AUTOMATED COUNT: 13.4 % (ref 11.6–15.1)
GFR SERPL CREATININE-BSD FRML MDRD: 94 ML/MIN/1.73SQ M
GLUCOSE SERPL-MCNC: 101 MG/DL (ref 65–140)
HCT VFR BLD AUTO: 34.1 % (ref 34.8–46.1)
HGB BLD-MCNC: 11.4 G/DL (ref 11.5–15.4)
IMM GRANULOCYTES # BLD AUTO: 0.05 THOUSAND/UL (ref 0–0.2)
IMM GRANULOCYTES NFR BLD AUTO: 1 % (ref 0–2)
LYMPHOCYTES # BLD AUTO: 3.6 THOUSANDS/ΜL (ref 0.6–4.47)
LYMPHOCYTES NFR BLD AUTO: 40 % (ref 14–44)
MCH RBC QN AUTO: 30.8 PG (ref 26.8–34.3)
MCHC RBC AUTO-ENTMCNC: 33.4 G/DL (ref 31.4–37.4)
MCV RBC AUTO: 92 FL (ref 82–98)
MONOCYTES # BLD AUTO: 0.8 THOUSAND/ΜL (ref 0.17–1.22)
MONOCYTES NFR BLD AUTO: 9 % (ref 4–12)
NEUTROPHILS # BLD AUTO: 4.01 THOUSANDS/ΜL (ref 1.85–7.62)
NEUTS SEG NFR BLD AUTO: 45 % (ref 43–75)
NRBC BLD AUTO-RTO: 0 /100 WBCS
PLATELET # BLD AUTO: 395 THOUSANDS/UL (ref 149–390)
PMV BLD AUTO: 9.2 FL (ref 8.9–12.7)
POTASSIUM SERPL-SCNC: 4 MMOL/L (ref 3.5–5.3)
PROT SERPL-MCNC: 7.1 G/DL (ref 6.4–8.4)
RBC # BLD AUTO: 3.7 MILLION/UL (ref 3.81–5.12)
SODIUM SERPL-SCNC: 138 MMOL/L (ref 135–147)
WBC # BLD AUTO: 8.91 THOUSAND/UL (ref 4.31–10.16)

## 2025-02-18 PROCEDURE — G0299 HHS/HOSPICE OF RN EA 15 MIN: HCPCS

## 2025-02-18 PROCEDURE — 85025 COMPLETE CBC W/AUTO DIFF WBC: CPT

## 2025-02-18 PROCEDURE — 36415 COLL VENOUS BLD VENIPUNCTURE: CPT

## 2025-02-18 PROCEDURE — 80053 COMPREHEN METABOLIC PANEL: CPT

## 2025-02-19 ENCOUNTER — HOME CARE VISIT (OUTPATIENT)
Dept: HOME HEALTH SERVICES | Facility: HOME HEALTHCARE | Age: 75
End: 2025-02-19
Payer: COMMERCIAL

## 2025-02-19 PROCEDURE — G0299 HHS/HOSPICE OF RN EA 15 MIN: HCPCS

## 2025-02-20 VITALS
HEART RATE: 64 BPM | SYSTOLIC BLOOD PRESSURE: 106 MMHG | OXYGEN SATURATION: 94 % | RESPIRATION RATE: 18 BRPM | DIASTOLIC BLOOD PRESSURE: 50 MMHG | TEMPERATURE: 98.1 F

## 2025-02-24 ENCOUNTER — HOME CARE VISIT (OUTPATIENT)
Dept: HOME HEALTH SERVICES | Facility: HOME HEALTHCARE | Age: 75
End: 2025-02-24
Payer: COMMERCIAL

## 2025-02-24 ENCOUNTER — TELEPHONE (OUTPATIENT)
Dept: FAMILY MEDICINE CLINIC | Facility: CLINIC | Age: 75
End: 2025-02-24

## 2025-02-24 VITALS
TEMPERATURE: 99 F | OXYGEN SATURATION: 95 % | RESPIRATION RATE: 22 BRPM | DIASTOLIC BLOOD PRESSURE: 50 MMHG | HEART RATE: 68 BPM | SYSTOLIC BLOOD PRESSURE: 110 MMHG

## 2025-02-24 PROCEDURE — G0180 MD CERTIFICATION HHA PATIENT: HCPCS | Performed by: FAMILY MEDICINE

## 2025-02-24 PROCEDURE — G0299 HHS/HOSPICE OF RN EA 15 MIN: HCPCS

## 2025-02-24 NOTE — TELEPHONE ENCOUNTER
PCP SIGNATURE NEEDED FOR [facility] FORM RECEIVED VIA FAX AND PLACED IN PCP FOLDER TO BE DELIVERED AT ASSIGNED TIMES.      [facility]/  Detailed Written order-Wound care  Diagnosis : (IDC10)  Z48.03

## 2025-02-26 ENCOUNTER — HOSPITAL ENCOUNTER (OUTPATIENT)
Dept: RADIOLOGY | Facility: HOSPITAL | Age: 75
Discharge: HOME | End: 2025-02-26
Attending: RADIOLOGY
Payer: COMMERCIAL

## 2025-02-26 DIAGNOSIS — M60.009 ABSCESS OF MUSCLE: ICD-10-CM

## 2025-02-26 PROCEDURE — 10030 IMG GID FLU COLL DRG SFT TIS: CPT

## 2025-02-26 PROCEDURE — 76080 X-RAY EXAM OF FISTULA: CPT | Performed by: RADIOLOGY

## 2025-02-26 PROCEDURE — 49424 ASSESS CYST CONTRAST INJECT: CPT | Performed by: RADIOLOGY

## 2025-02-26 NOTE — TELEPHONE ENCOUNTER
FAXED ON 02/26/25 TO [facility] at [phone]. FAX CONFIRMATION RECEIVED.

## 2025-02-26 NOTE — DISCHARGE INSTRUCTIONS
Drainage Tube Removal    Your drainage tube was removed today.    What you need know at home:   Keep a clean dry dressing at the tube site until the small opening closes. It will take twenty four to forty eight hours. Keep the site dry until it heals. A small amount of drainage on your dressing is normal. Resume your normal diet. Small sips of flat soda will help with any nausea.     Contact Interventional Radiology at [phone] if:    You have pain, fever greater than 101, shaking chills.  If you have increased redness or swelling at the site.   I the drainage from your site does not stop.  If the site drains pus or has a bad odor.

## 2025-02-28 ENCOUNTER — OFFICE VISIT (OUTPATIENT)
Dept: FAMILY MEDICINE CLINIC | Facility: CLINIC | Age: 75
End: 2025-02-28

## 2025-02-28 VITALS
HEART RATE: 68 BPM | DIASTOLIC BLOOD PRESSURE: 68 MMHG | WEIGHT: 114 LBS | RESPIRATION RATE: 18 BRPM | BODY MASS INDEX: 21.52 KG/M2 | HEIGHT: 61 IN | OXYGEN SATURATION: 97 % | TEMPERATURE: 99.2 F | SYSTOLIC BLOOD PRESSURE: 132 MMHG

## 2025-02-28 DIAGNOSIS — E55.9 VITAMIN D DEFICIENCY: ICD-10-CM

## 2025-02-28 DIAGNOSIS — M60.009 ABSCESS OF MUSCLE: Primary | ICD-10-CM

## 2025-02-28 DIAGNOSIS — Z78.9 TRANSITION OF CARE: ICD-10-CM

## 2025-02-28 DIAGNOSIS — M25.551 RIGHT HIP PAIN: ICD-10-CM

## 2025-02-28 DIAGNOSIS — L85.3 DRY SKIN: ICD-10-CM

## 2025-02-28 PROCEDURE — G2211 COMPLEX E/M VISIT ADD ON: HCPCS | Performed by: FAMILY MEDICINE

## 2025-02-28 PROCEDURE — 3077F SYST BP >= 140 MM HG: CPT | Performed by: FAMILY MEDICINE

## 2025-02-28 PROCEDURE — 3078F DIAST BP <80 MM HG: CPT | Performed by: FAMILY MEDICINE

## 2025-02-28 PROCEDURE — 99214 OFFICE O/P EST MOD 30 MIN: CPT | Performed by: FAMILY MEDICINE

## 2025-02-28 RX ORDER — AMMONIUM LACTATE 12 G/100G
1 LOTION TOPICAL 2 TIMES DAILY
Qty: 400 G | Refills: 0 | Status: SHIPPED | OUTPATIENT
Start: 2025-02-28

## 2025-02-28 RX ORDER — CHOLECALCIFEROL (VITAMIN D3) 50 MCG
2000 TABLET ORAL DAILY
Qty: 30 TABLET | Refills: 3 | Status: SHIPPED | OUTPATIENT
Start: 2025-02-28

## 2025-02-28 RX ORDER — METHOCARBAMOL 500 MG/1
500 TABLET, FILM COATED ORAL 2 TIMES DAILY
Qty: 20 TABLET | Refills: 0 | Status: SHIPPED | OUTPATIENT
Start: 2025-02-28

## 2025-02-28 NOTE — ASSESSMENT & PLAN NOTE
Posthospitalization visit  Improving  EDUARDO drain taken out 2 days ago  Complteting Amoxicillin course  Has repeat Ct scan on 3/10/25  ID follow on 3/13/2025

## 2025-02-28 NOTE — PROGRESS NOTES
Drain transition of Care Visit  Name: Hawa Cheek      : 1950      MRN: [id number]  Encounter Provider: Keeley Mccormick MD  Encounter Date: 2025   Encounter department: [facility]    Assessment & Plan  Abscess of muscle  Posthospitalization visit  Improving  EDUARDO drain taken out 2 days ago  Complteting Amoxicillin course  Has repeat Ct scan on 3/10/25  ID follow on 3/13/2025         Transition of care         Dry skin    Orders:  •  ammonium lactate (LAC-HYDRIN) 12 % lotion; Apply 1 Application topically 2 (two) times a day    Vitamin D deficiency    Orders:  •  Cholecalciferol (Vitamin D) 50 MCG (2000 UT) tablet; Take 1 tablet (2,000 Units total) by mouth daily    Right hip pain    Orders:  •  methocarbamol (ROBAXIN) 500 mg tablet; Take 1 tablet (500 mg total) by mouth 2 (two) times a day (Patient not taking: Reported on 3/13/2025)         History of Present Illness     Transitional Care Management Review:   Hawa Cheek is a 74 y.o. female here for TCM follow up.     During the TCM phone call patient stated:  TCM Call (since 3/2/2025)     None      TCM Call (since 3/2/2025)     None        HPI    Hawa Cheek is a 74 y.o. female  has a past medical history of Arthralgia of hip, Arthritis, Chronic pain disorder, Dyslipidemia, Hyperlipidemia, Hypertension, Influenza A, Murmur, cardiac, Neck pain, Pemphigus, Pre-diabetes, Shoulder pain, Stroke (HCC), and Vitamin D deficiency. who presented to the office today for a posthospitalization follow-up    Pt is taking the Amoxicillin and completing a 14 days course  EDUARDO drain was taken out 2 days ago  She still has some burning sensation in her posterior right thigh    Will have a repeat CT scan in 10 days.      The following portions of the patient's history were reviewed and updated as appropriate: allergies, current medications, past family history, past medical history, past social history, past  "surgical history and problem list.    Review of Systems   Constitutional:  Negative for chills and fever.   HENT:  Negative for congestion, rhinorrhea and sore throat.    Respiratory:  Negative for cough and shortness of breath.    Cardiovascular:  Negative for chest pain.   Gastrointestinal:  Negative for diarrhea, nausea and vomiting.   Musculoskeletal:  Positive for back pain and gait problem.   Skin:  Negative for rash.   Neurological:  Negative for dizziness and headaches.     Objective   /68 (BP Location: Left arm, Patient Position: Sitting, Cuff Size: Standard)   Pulse 68   Temp 99.2 °F (37.3 °C) (Temporal)   Resp 18   Ht 5' 1\" (1.549 m)   Wt 51.7 kg (114 lb)   SpO2 97%   BMI 21.54 kg/m²     Physical Exam  Vitals and nursing note reviewed.   Constitutional:       General: She is not in acute distress.     Appearance: Normal appearance. She is well-developed.   HENT:      Head: Normocephalic and atraumatic.   Eyes:      Conjunctiva/sclera: Conjunctivae normal.   Cardiovascular:      Rate and Rhythm: Normal rate.      Heart sounds: No murmur heard.  Pulmonary:      Effort: Pulmonary effort is normal. No respiratory distress.   Abdominal:      Palpations: Abdomen is soft.      Tenderness: There is no abdominal tenderness.   Musculoskeletal:         General: No swelling.      Cervical back: Neck supple.      Right hip: Tenderness present. Decreased range of motion.      Comments: Right thigh: no swelling, sensations intact, skin intact, vertical hyperpigmented healed scar from the drain.   Skin:     General: Skin is warm and dry.      Capillary Refill: Capillary refill takes less than 2 seconds.   Neurological:      Mental Status: She is alert and oriented to person, place, and time.   Psychiatric:         Mood and Affect: Mood normal.         Behavior: Behavior normal.       Medications have been reviewed by provider in current encounter      "

## 2025-03-03 ENCOUNTER — APPOINTMENT (OUTPATIENT)
Dept: LAB | Age: 75
End: 2025-03-03
Payer: COMMERCIAL

## 2025-03-03 DIAGNOSIS — R78.81 BACTEREMIA: ICD-10-CM

## 2025-03-03 DIAGNOSIS — M60.009 ABSCESS OF MUSCLE: ICD-10-CM

## 2025-03-03 LAB
CREAT SERPL-MCNC: 0.61 MG/DL (ref 0.6–1.3)
GFR SERPL CREATININE-BSD FRML MDRD: 88 ML/MIN/1.73SQ M

## 2025-03-03 PROCEDURE — 85007 BL SMEAR W/DIFF WBC COUNT: CPT

## 2025-03-03 PROCEDURE — 36415 COLL VENOUS BLD VENIPUNCTURE: CPT

## 2025-03-03 PROCEDURE — 82565 ASSAY OF CREATININE: CPT

## 2025-03-03 PROCEDURE — 85027 COMPLETE CBC AUTOMATED: CPT

## 2025-03-04 LAB
BASOPHILS # BLD MANUAL: 0.12 THOUSAND/UL (ref 0–0.1)
BASOPHILS NFR MAR MANUAL: 1 % (ref 0–1)
DIFFERENTIAL COMMENT: ABNORMAL
EOSINOPHIL # BLD MANUAL: 0.12 THOUSAND/UL (ref 0–0.4)
EOSINOPHIL NFR BLD MANUAL: 1 % (ref 0–6)
ERYTHROCYTE [DISTWIDTH] IN BLOOD BY AUTOMATED COUNT: 13.9 % (ref 11.6–15.1)
HCT VFR BLD AUTO: 42 % (ref 34.8–46.1)
HGB BLD-MCNC: 13.2 G/DL (ref 11.5–15.4)
LYMPHOCYTES # BLD AUTO: 51 % (ref 14–44)
LYMPHOCYTES # BLD AUTO: 7.27 THOUSAND/UL (ref 0.6–4.47)
MCH RBC QN AUTO: 29.9 PG (ref 26.8–34.3)
MCHC RBC AUTO-ENTMCNC: 31.4 G/DL (ref 31.4–37.4)
MCV RBC AUTO: 95 FL (ref 82–98)
MONOCYTES # BLD AUTO: 0.94 THOUSAND/UL (ref 0–1.22)
MONOCYTES NFR BLD: 8 % (ref 4–12)
NEUTROPHILS # BLD MANUAL: 3.28 THOUSAND/UL (ref 1.85–7.62)
NEUTS BAND NFR BLD MANUAL: 1 % (ref 0–8)
NEUTS SEG NFR BLD AUTO: 27 % (ref 43–75)
PATHOLOGY REVIEW: YES
PLATELET # BLD AUTO: 276 THOUSANDS/UL (ref 149–390)
PLATELET BLD QL SMEAR: ADEQUATE
PMV BLD AUTO: 10.1 FL (ref 8.9–12.7)
RBC # BLD AUTO: 4.42 MILLION/UL (ref 3.81–5.12)
RBC MORPH BLD: NORMAL
VARIANT LYMPHS # BLD AUTO: 11 %
WBC # BLD AUTO: 11.72 THOUSAND/UL (ref 4.31–10.16)

## 2025-03-04 PROCEDURE — 85060 BLOOD SMEAR INTERPRETATION: CPT | Performed by: STUDENT IN AN ORGANIZED HEALTH CARE EDUCATION/TRAINING PROGRAM

## 2025-03-08 DIAGNOSIS — E78.5 DYSLIPIDEMIA: ICD-10-CM

## 2025-03-10 ENCOUNTER — HOSPITAL ENCOUNTER (OUTPATIENT)
Dept: CT IMAGING | Facility: HOSPITAL | Age: 75
Discharge: HOME | End: 2025-03-10
Attending: INTERNAL MEDICINE
Payer: COMMERCIAL

## 2025-03-10 DIAGNOSIS — M60.009 ABSCESS OF MUSCLE: ICD-10-CM

## 2025-03-10 DIAGNOSIS — R78.81 BACTEREMIA: ICD-10-CM

## 2025-03-10 PROCEDURE — 74177 CT ABD & PELVIS W/CONTRAST: CPT

## 2025-03-10 RX ADMIN — IOHEXOL 90 ML: 350 INJECTION, SOLUTION INTRAVENOUS at 09:52

## 2025-03-11 RX ORDER — OMEGA-3-ACID ETHYL ESTERS 1 G/1
2 CAPSULE, LIQUID FILLED ORAL 2 TIMES DAILY
Qty: 180 CAPSULE | Refills: 0 | Status: SHIPPED | OUTPATIENT
Start: 2025-03-11

## 2025-03-12 ENCOUNTER — TELEPHONE (OUTPATIENT)
Dept: INFECTIOUS DISEASES | Facility: CLINIC | Age: 75
End: 2025-03-12

## 2025-03-12 NOTE — TELEPHONE ENCOUNTER
Fernandez, radiology, called and stated immediate findings on patients CT scan 3/10/25. Thank you.

## 2025-03-13 ENCOUNTER — OFFICE VISIT (OUTPATIENT)
Dept: INFECTIOUS DISEASES | Facility: CLINIC | Age: 75
End: 2025-03-13
Payer: COMMERCIAL

## 2025-03-13 VITALS
RESPIRATION RATE: 17 BRPM | DIASTOLIC BLOOD PRESSURE: 70 MMHG | HEIGHT: 61 IN | WEIGHT: 116.2 LBS | OXYGEN SATURATION: 97 % | BODY MASS INDEX: 21.94 KG/M2 | TEMPERATURE: 97.4 F | HEART RATE: 70 BPM | SYSTOLIC BLOOD PRESSURE: 140 MMHG

## 2025-03-13 DIAGNOSIS — M86.20 SUBACUTE OSTEOMYELITIS, UNSPECIFIED SITE (HCC): Primary | ICD-10-CM

## 2025-03-13 DIAGNOSIS — M60.009 ABSCESS OF MUSCLE: ICD-10-CM

## 2025-03-13 PROCEDURE — 99215 OFFICE O/P EST HI 40 MIN: CPT | Performed by: INTERNAL MEDICINE

## 2025-03-13 RX ORDER — AMOXICILLIN 500 MG/1
1000 CAPSULE ORAL EVERY 8 HOURS SCHEDULED
Qty: 252 CAPSULE | Refills: 0 | Status: SHIPPED | OUTPATIENT
Start: 2025-03-13 | End: 2025-04-24

## 2025-03-13 NOTE — PROGRESS NOTES
Name: Hawa Cheek      : 1950      MRN: [id number]  Encounter Provider: Valerie Deluca MD  Encounter Date: 3/13/2025   Encounter department: [facility]  :  Assessment & Plan  Subacute osteomyelitis, unspecified site (HCC)  Patient had previously had steroid injection as an outpatient and clinical course was complicated by development of intramuscular abscesses.  Patient was to continue antibiotic until resolution of abscesses on imaging but unfortunately stopped on 3/5.  Repeat CT now showing changes of osteomyelitis at the right ischial tuberosity.  Patient still with only mild discomfort when she sits in this area.  Otherwise systemically well.  We discussed restarting antibiotics, higher dosing and more definitive course for now changes of osteomyelitis as well as plans for repeat imaging to assure resolution of abscesses entirely prior to stopping.  Restart amoxicillin 1 g every 8 hours now  Plan is for 6 weeks of high-dose therapy, full course ordered today  Will extend course if abscesses persist on imaging  Obtain labs every other week with CBCD and creatinine, starting on 3/27  Obtain repeat CT of the abdomen and pelvis with contrast around   Plan to follow-up in the ID office 1 week after imaging completed  Monitor for tolerance to antibiotic  If patient does not tolerate, will consider transition to Duricef or linezolid  Continue chronic follow-up with PCP  Will forward office visit to ID colleagues    Orders:    amoxicillin (AMOXIL) 500 mg capsule; Take 2 capsules (1,000 mg total) by mouth every 8 (eight) hours    CBC and differential; Standing    Creatinine, serum; Standing    CT abdomen pelvis w contrast; Future    Abscess of muscle  Developed as a complication after recent injections.  Abscesses seem to be resolving on imaging on amoxicillin.  Blood cultures were positive with strep intermedius which I suspect is the primary pathogen.   Coagulase-negative staph likely contaminant.  Plans to restart antibiotics as above.  Plans for repeat imaging  Plan to continue antibiotic at minimum for 6 weeks given the above  Will extend antibiotic course if needed based on CT  Plan for lab monitoring as above    Orders:    amoxicillin (AMOXIL) 500 mg capsule; Take 2 capsules (1,000 mg total) by mouth every 8 (eight) hours    CBC and differential; Standing    Creatinine, serum; Standing    CT abdomen pelvis w contrast; Future      Above plan discussed in detail with the patient and her daughter  Will forward office visit to PCP and referring ID colleagues    RTO 1 week after imaging completed    Antibiotics:  Amoxicillin--patient's daughter reports discontinuation since 3/5    Subjective:  Briefly this patient is a 74-year-old female who was seen by our service after admission.  Patient had been seen by orthopedics on 1/29 and had right ischiogluteal bursitis which was treated with a corticosteroid injection and prednisone taper.  She then presented to the ER on 2/2 with worsening pain involving the right gluteal area and difficulty with ambulation.  She was ultimately found to have multiple intramuscular abscesses.  She had some of the collections aspirated and others were on drainable.  She has since admission had her IR drain removed.  Blood cultures later returned positive for strep intermedius which was felt to be her primary pathogen.  Patient completed short course of IV therapy and then was transitioned to oral antibiotic.  She was plan to continue oral antibiotics until radiographic resolution.  Imaging and labs reviewed below.  Patient presents for follow-up.  Patient's daughter reports that she has not been taking antibiotics since 3/5.  At this time she denies having any nausea, vomiting, chest pain or shortness of breath.  Denies having any fevers or chills.  She reports still having some slight discomfort in the lower right buttock particularly  "when she sits but overall symptoms have improved.  We reviewed in detail her lab work as well as CT imaging and concern for partially treated disease along with ongoing abscesses.  Ultimately discussed reinitiation of antibiotic, higher dosing of antibiotic, lab work and repeat CT imaging and follow-up in the office.  We discussed tentative plan at least for 6 weeks of IV antibiotic therapy at higher dose for treating osteomyelitis seen on imaging.  Case was discussed with clinical ID pharmacist and ID colleagues prior to visit today.    ROS: A complete 12 point ROS is negative other than that noted in the HPI    Followup portions patient history reviewed and updated as:  Allergies, current medications, past medical history, past social history, past surgical history, and the problem list    Objective:  Vitals:  Vitals:    03/13/25 1454   BP: 140/70   Pulse: 70   Resp: 17   Temp: (!) 97.4 °F (36.3 °C)   SpO2: 97%   Weight: 52.7 kg (116 lb 3.2 oz)   Height: 5' 1\" (1.549 m)       Physical Exam:   General Appearance:  Alert, interactive, appearing well, nontoxic, no acute distress.   Throat: Oropharynx moist without lesions.    Lungs:   Clear to auscultation bilaterally; no audible wheezes, rhonchi or rales; respirations unlabored   Heart:  RRR; no murmur, rub or gallop   Abdomen:   Soft, non-tender, non-distended, positive bowel sounds.     Extremities: No clubbing, cyanosis or edema   Skin: No new rashes or lesions. No new draining wounds.  Was able to see the previous drain site on the patient's thigh and the site has healed.  Patient on palpation does have some pain when you track along the lower border of the right glute medially.  Again she notices this more when she sits       Labs, Imaging, & Other studies:   All pertinent labs and imaging studies were personally reviewed as below    Lab Results   Component Value Date     06/29/2015    K 4.0 02/18/2025     02/18/2025    CO2 29 02/18/2025    ANIONGAP " "6 06/29/2015    BUN 15 02/18/2025    CREATININE 0.61 03/03/2025    GLUCOSE 106 06/29/2015    GLUF 88 06/19/2024    CALCIUM 9.2 02/18/2025    CORRECTEDCA 9.8 02/18/2025    AST 29 02/18/2025    ALT 35 02/18/2025    ALKPHOS 97 02/18/2025    PROT 8.0 06/29/2015    BILITOT 0.43 06/29/2015    EGFR 88 03/03/2025     Lab Results   Component Value Date    WBC 11.72 (H) 03/03/2025    HGB 13.2 03/03/2025    HCT 42.0 03/03/2025    MCV 95 03/03/2025     03/03/2025   No results found for: \"SEDRATE\"    Recent Lab interpretation/comments: Recent labs with mild elevation in white count which may be volume related.  Creatinine unremarkable.    Recent Imaging interpretation/comments: CT reviewed with changes of osteomyelitis at the ischial tuberosity    Culture data: Previous culture data reviewed.    External/inpatient provider notes: Inpatient notes reviewed.    Administrative Statements   I have spent a total time of 41 minutes in caring for this patient on the day of the visit/encounter including Diagnostic results, Instructions for management, Impressions, Documenting in the medical record, Reviewing/placing orders in the medical record (including tests, medications, and/or procedures), Obtaining or reviewing history  , and Communicating with other healthcare professionals .  "

## 2025-03-13 NOTE — ASSESSMENT & PLAN NOTE
Developed as a complication after recent injections.  Abscesses seem to be resolving on imaging on amoxicillin.  Blood cultures were positive with strep intermedius which I suspect is the primary pathogen.  Coagulase-negative staph likely contaminant.  Plans to restart antibiotics as above.  Plans for repeat imaging  Plan to continue antibiotic at minimum for 6 weeks given the above  Will extend antibiotic course if needed based on CT  Plan for lab monitoring as above    Orders:    amoxicillin (AMOXIL) 500 mg capsule; Take 2 capsules (1,000 mg total) by mouth every 8 (eight) hours    CBC and differential; Standing    Creatinine, serum; Standing    CT abdomen pelvis w contrast; Future

## 2025-03-13 NOTE — PATIENT INSTRUCTIONS
-start Amoxicillin, 2 tablets, three times a day ASAP  -get regular lab every other week starting 3/27  -Call to get you CT scheduled for 4/9  -Plan for follow up in the office the week after your CT.   -Call if any questions or issues with your medicine.

## 2025-03-14 ENCOUNTER — TELEPHONE (OUTPATIENT)
Dept: INFECTIOUS DISEASES | Facility: CLINIC | Age: 75
End: 2025-03-14

## 2025-03-14 NOTE — TELEPHONE ENCOUNTER
----- Message from Valerie Deluca MD sent at 3/13/2025  3:56 PM EDT -----  For this patient can we please contact her daughter tomorrow to verify that they have started the antibiotics and how they are taking the antibiotics.  It should be two 500 mg tablets, 1000 mg, 3 times a day.  Please also verify that they have scheduled her CT as well.  We basically had to restart everything at visit and so I just want a make sure that there was followed through.  Thanks.

## 2025-03-14 NOTE — TELEPHONE ENCOUNTER
Called and left message for patients daughter Lexis today using interpretor [id number].   Message was left to call office back to go over Dr. Deluca's attached note.

## 2025-03-14 NOTE — TELEPHONE ENCOUNTER
Pt daughter returning call, provided above abx instructions with her. She states her mother started them last night, and she understands she is to be taking 1000mg three times a day. She states she forgot to call to schedule her ct today, but will call on Monday to have it done. No further questions at this time

## 2025-03-26 ENCOUNTER — OFFICE VISIT (OUTPATIENT)
Age: 75
End: 2025-03-26
Payer: COMMERCIAL

## 2025-03-26 VITALS — BODY MASS INDEX: 21.9 KG/M2 | WEIGHT: 116 LBS | HEIGHT: 61 IN

## 2025-03-26 DIAGNOSIS — M86.28 SUBACUTE OSTEOMYELITIS, OTHER SITE (HCC): ICD-10-CM

## 2025-03-26 DIAGNOSIS — M70.71: Primary | ICD-10-CM

## 2025-03-26 PROCEDURE — 99213 OFFICE O/P EST LOW 20 MIN: CPT | Performed by: PHYSICIAN ASSISTANT

## 2025-03-26 NOTE — PROGRESS NOTES
"Patient Name:  Hawa Cheek  MRN:  [id number]    Assessment & Plan     1. Ischiogluteal bursitis, right  2. Subacute osteomyelitis, other site (HCC)      Right hip ischiogluteal bursitis complicated with abscess and osteomyelitis.    Currently no pain in the right hip.  Patient is systemically well.    Complete antibiotics as prescribed by infectious disease.  Activities as tolerated, no restrictions.  Follow-up with orthopedics as needed.      Chief Complaint     Follow up right hip    History of the Present Illness     Hawa Cheek is a 74 y.o. female who returns to the office today for evaluation of her right hip. She was last seen on 1/29/25 and that time she received a corticosteroid injection into the right ischiogluteal bursa.  She was also prescribed a prednisone taper and referred to physical therapy.  She noted immediate improvement after receiving the injection but noted progressively worsening pain over the next few days.  She presented to the emergency department and was found to have fluid collections in the area consistent with likely abscess.  MRI was obtained as well.  She was evaluated by infectious disease who initiated on IV antibiotics.  She was ultimately discharged and has been seeing infectious disease at an outpatient.  She was seen by them on 3/13/2025 and continued antibiotics were recommended.  She reports to the office today without significant pain.  She denies any pain in the posterior hip at this time.  She denies any radiating pain distally or weakness.  No numbness or tingling.  She denies any fevers or chills.  She states she has been more active recently with the warmer weather and has been going for walks without any pain or difficulty.    Physical Exam     Ht 5' 1\" (1.549 m)   Wt 52.6 kg (116 lb)   BMI 21.92 kg/m²     Right hip: No gross deformity.  No tenderness over the ischial tuberosity.  No tenderness greater trochanter and anterior hip.  Hip range of motion " is intact and full in all planes without pain.  Negative FADIR and KEL test.  Negative logroll test.  Negative straight leg raise and resisted straight leg raise test.    Eyes: Anicteric sclerae.  ENT: Trachea midline.  Lungs: Normal respiratory effort.  CV: Capillary refill is less than 2 seconds.  Skin: Intact without erythema.  Lymph: No palpable lymphadenopathy.  Neuro: Sensation is grossly intact to light touch.  Psych: Mood and affect are appropriate.      Past Medical History:   Diagnosis Date    Arthralgia of hip     left    Arthritis     Chronic pain disorder     low back and shoulder    Dyslipidemia     Hyperlipidemia     Hypertension     Influenza A 02/07/2025    Murmur, cardiac 09/2024    Neck pain     Pemphigus     Pre-diabetes     Shoulder pain     Stroke (HCC)     Vitamin D deficiency        Past Surgical History:   Procedure Laterality Date    CATARACT EXTRACTION Bilateral     COLONOSCOPY      IR ASPIRATION ONLY  2/4/2025    IR DRAINAGE TUBE CHECK/CHANGE/REPOSITION/REINSERTION/UPSIZE  2/26/2025    IR DRAINAGE TUBE PLACEMENT  2/12/2025    IR PICC PLACEMENT SINGLE LUMEN  2/11/2025    SC COLONOSCOPY FLX DX W/COLLJ SPEC WHEN PFRMD N/A 11/10/2016    Procedure: COLONOSCOPY;  Surgeon: Tom Carter MD;  Location: [facility];  Service: Gastroenterology    SC INCISION EXTENSOR TENDON SHEATH WRIST Left 7/12/2021    Procedure: RELEASE LEFT DEQUERVAINS;  Surgeon: Jt White MD;  Location:  MAIN OR;  Service: Orthopedics    SC INCISION EXTENSOR TENDON SHEATH WRIST Right 7/26/2021    Procedure: RELEASE RIGHT DEQUERVAINS;  Surgeon: Jt White MD;  Location:  MAIN OR;  Service: Orthopedics    SC XCAPSL CTRC RMVL INSJ IO LENS PROSTH W/O ECP Left 9/27/2018    Procedure: EXTRACAPSULAR CATARACT REMOVAL/INSERTION OF INTRAOCULAR LENS;  Surgeon: Alfred Stephens MD;  Location:  MAIN OR;  Service: Ophthalmology       No Known Allergies    Current Outpatient Medications on File Prior to Visit   Medication Sig  Dispense Refill    acetaminophen (TYLENOL) 500 mg tablet Take 500 mg by mouth every 6 (six) hours as needed for mild pain (Patient not taking: Reported on 2/3/2025)      amLODIPine (NORVASC) 5 mg tablet Take 1 tablet (5 mg total) by mouth daily 90 tablet 3    ammonium lactate (LAC-HYDRIN) 12 % lotion Apply 1 Application topically 2 (two) times a day 400 g 0    amoxicillin (AMOXIL) 500 mg capsule Take 2 capsules (1,000 mg total) by mouth every 8 (eight) hours 252 capsule 0    aspirin (Aspirin Low Dose) 81 mg EC tablet Take 1 tablet (81 mg total) by mouth daily 90 tablet 0    atorvastatin (LIPITOR) 40 mg tablet Take 1 tablet (40 mg total) by mouth every evening 90 tablet 3    chlorhexidine (PERIDEX) 0.12 % solution       Cholecalciferol (Vitamin D) 50 MCG (2000 UT) tablet Take 1 tablet (2,000 Units total) by mouth daily 30 tablet 3    memantine (NAMENDA) 5 mg tablet Take 1 tablet (5 mg total) by mouth 2 (two) times a day 180 tablet 3    methocarbamol (ROBAXIN) 500 mg tablet Take 1 tablet (500 mg total) by mouth 2 (two) times a day (Patient not taking: Reported on 3/13/2025) 20 tablet 0    omega-3-acid ethyl esters (LOVAZA) 1 g capsule take 2 capsules by mouth twice a day 180 capsule 0    sodium chloride, PF, 0.9 % 10 mL by Intracatheter route daily Intracatheter flushing daily. May substitute prefilled syringe with normal saline 10 mL vials, 10 mL syringes, and 18 g blunt needles 300 mL 2     No current facility-administered medications on file prior to visit.       Social History     Tobacco Use    Smoking status: Never     Passive exposure: Never    Smokeless tobacco: Never   Vaping Use    Vaping status: Never Used   Substance Use Topics    Alcohol use: Never    Drug use: No       Family History   Problem Relation Age of Onset    Hyperlipidemia Mother     Hypertension Mother     Heart disease Father     No Known Problems Sister     No Known Problems Sister     No Known Problems Sister     No Known Problems Daughter      No Known Problems Maternal Grandmother     No Known Problems Maternal Grandfather     No Known Problems Paternal Grandmother     No Known Problems Paternal Grandfather     No Known Problems Maternal Aunt     No Known Problems Paternal Aunt     No Known Problems Paternal Aunt     No Known Problems Paternal Aunt        Review of Systems     As stated in the HPI. All other systems reviewed and are negative.

## 2025-03-27 ENCOUNTER — APPOINTMENT (OUTPATIENT)
Dept: LAB | Age: 75
End: 2025-03-27
Payer: COMMERCIAL

## 2025-03-27 DIAGNOSIS — M86.20 SUBACUTE OSTEOMYELITIS, UNSPECIFIED SITE (HCC): ICD-10-CM

## 2025-03-27 DIAGNOSIS — M60.009 ABSCESS OF MUSCLE: ICD-10-CM

## 2025-03-27 LAB
BASOPHILS # BLD MANUAL: 0.13 THOUSAND/UL (ref 0–0.1)
BASOPHILS NFR MAR MANUAL: 1 % (ref 0–1)
CREAT SERPL-MCNC: 0.64 MG/DL (ref 0.6–1.3)
EOSINOPHIL # BLD MANUAL: 0.38 THOUSAND/UL (ref 0–0.4)
EOSINOPHIL NFR BLD MANUAL: 3 % (ref 0–6)
ERYTHROCYTE [DISTWIDTH] IN BLOOD BY AUTOMATED COUNT: 13.4 % (ref 11.6–15.1)
GFR SERPL CREATININE-BSD FRML MDRD: 87 ML/MIN/1.73SQ M
HCT VFR BLD AUTO: 42.7 % (ref 34.8–46.1)
HGB BLD-MCNC: 13.7 G/DL (ref 11.5–15.4)
LYMPHOCYTES # BLD AUTO: 56 % (ref 14–44)
LYMPHOCYTES # BLD AUTO: 7.92 THOUSAND/UL (ref 0.6–4.47)
MCH RBC QN AUTO: 30.6 PG (ref 26.8–34.3)
MCHC RBC AUTO-ENTMCNC: 32.1 G/DL (ref 31.4–37.4)
MCV RBC AUTO: 95 FL (ref 82–98)
MONOCYTES # BLD AUTO: 0.38 THOUSAND/UL (ref 0–1.22)
MONOCYTES NFR BLD: 3 % (ref 4–12)
NEUTROPHILS # BLD MANUAL: 3.96 THOUSAND/UL (ref 1.85–7.62)
NEUTS SEG NFR BLD AUTO: 31 % (ref 43–75)
PLATELET # BLD AUTO: 199 THOUSANDS/UL (ref 149–390)
PLATELET BLD QL SMEAR: ADEQUATE
PMV BLD AUTO: 10.9 FL (ref 8.9–12.7)
RBC # BLD AUTO: 4.48 MILLION/UL (ref 3.81–5.12)
VARIANT LYMPHS # BLD AUTO: 6 %
WBC # BLD AUTO: 12.77 THOUSAND/UL (ref 4.31–10.16)

## 2025-03-27 PROCEDURE — 82565 ASSAY OF CREATININE: CPT

## 2025-03-27 PROCEDURE — 85007 BL SMEAR W/DIFF WBC COUNT: CPT

## 2025-03-27 PROCEDURE — 85027 COMPLETE CBC AUTOMATED: CPT

## 2025-03-27 PROCEDURE — 36415 COLL VENOUS BLD VENIPUNCTURE: CPT

## 2025-04-09 ENCOUNTER — HOSPITAL ENCOUNTER (OUTPATIENT)
Dept: CT IMAGING | Facility: HOSPITAL | Age: 75
Discharge: HOME | End: 2025-04-09
Attending: INTERNAL MEDICINE
Payer: COMMERCIAL

## 2025-04-09 DIAGNOSIS — M60.009 ABSCESS OF MUSCLE: ICD-10-CM

## 2025-04-09 DIAGNOSIS — M86.20 SUBACUTE OSTEOMYELITIS, UNSPECIFIED SITE (HCC): ICD-10-CM

## 2025-04-09 PROCEDURE — 74177 CT ABD & PELVIS W/CONTRAST: CPT

## 2025-04-09 RX ADMIN — IOHEXOL 85 ML: 350 INJECTION, SOLUTION INTRAVENOUS at 13:14

## 2025-04-10 ENCOUNTER — APPOINTMENT (OUTPATIENT)
Dept: LAB | Age: 75
End: 2025-04-10
Payer: COMMERCIAL

## 2025-04-10 DIAGNOSIS — M60.009 ABSCESS OF MUSCLE: ICD-10-CM

## 2025-04-10 DIAGNOSIS — M86.20 SUBACUTE OSTEOMYELITIS, UNSPECIFIED SITE (HCC): ICD-10-CM

## 2025-04-10 LAB
BASOPHILS # BLD MANUAL: 0.12 THOUSAND/UL (ref 0–0.1)
BASOPHILS NFR MAR MANUAL: 1 % (ref 0–1)
CREAT SERPL-MCNC: 0.68 MG/DL (ref 0.6–1.3)
EOSINOPHIL # BLD MANUAL: 0 THOUSAND/UL (ref 0–0.4)
EOSINOPHIL NFR BLD MANUAL: 0 % (ref 0–6)
ERYTHROCYTE [DISTWIDTH] IN BLOOD BY AUTOMATED COUNT: 12.9 % (ref 11.6–15.1)
GFR SERPL CREATININE-BSD FRML MDRD: 85 ML/MIN/1.73SQ M
HCT VFR BLD AUTO: 42.5 % (ref 34.8–46.1)
HGB BLD-MCNC: 13.9 G/DL (ref 11.5–15.4)
LYMPHOCYTES # BLD AUTO: 59 % (ref 14–44)
LYMPHOCYTES # BLD AUTO: 8.44 THOUSAND/UL (ref 0.6–4.47)
MCH RBC QN AUTO: 30.7 PG (ref 26.8–34.3)
MCHC RBC AUTO-ENTMCNC: 32.7 G/DL (ref 31.4–37.4)
MCV RBC AUTO: 94 FL (ref 82–98)
MONOCYTES # BLD AUTO: 0.49 THOUSAND/UL (ref 0–1.22)
MONOCYTES NFR BLD: 4 % (ref 4–12)
NEUTROPHILS # BLD MANUAL: 3.18 THOUSAND/UL (ref 1.85–7.62)
NEUTS BAND NFR BLD MANUAL: 3 % (ref 0–8)
NEUTS SEG NFR BLD AUTO: 23 % (ref 43–75)
PLATELET # BLD AUTO: 235 THOUSANDS/UL (ref 149–390)
PLATELET BLD QL SMEAR: ADEQUATE
PMV BLD AUTO: 10.1 FL (ref 8.9–12.7)
RBC # BLD AUTO: 4.53 MILLION/UL (ref 3.81–5.12)
RBC MORPH BLD: NORMAL
VARIANT LYMPHS # BLD AUTO: 10 %
WBC # BLD AUTO: 12.23 THOUSAND/UL (ref 4.31–10.16)

## 2025-04-10 PROCEDURE — 85027 COMPLETE CBC AUTOMATED: CPT

## 2025-04-10 PROCEDURE — 36415 COLL VENOUS BLD VENIPUNCTURE: CPT

## 2025-04-10 PROCEDURE — 85007 BL SMEAR W/DIFF WBC COUNT: CPT

## 2025-04-10 PROCEDURE — 82565 ASSAY OF CREATININE: CPT

## 2025-04-17 ENCOUNTER — OFFICE VISIT (OUTPATIENT)
Dept: INFECTIOUS DISEASES | Facility: CLINIC | Age: 75
End: 2025-04-17
Payer: COMMERCIAL

## 2025-04-17 VITALS
DIASTOLIC BLOOD PRESSURE: 68 MMHG | WEIGHT: 116 LBS | BODY MASS INDEX: 21.9 KG/M2 | HEIGHT: 61 IN | TEMPERATURE: 97.7 F | HEART RATE: 67 BPM | SYSTOLIC BLOOD PRESSURE: 122 MMHG | RESPIRATION RATE: 18 BRPM | OXYGEN SATURATION: 98 %

## 2025-04-17 DIAGNOSIS — M60.009 ABSCESS OF MUSCLE: ICD-10-CM

## 2025-04-17 DIAGNOSIS — M86.20 SUBACUTE OSTEOMYELITIS, UNSPECIFIED SITE (HCC): ICD-10-CM

## 2025-04-17 PROCEDURE — G2211 COMPLEX E/M VISIT ADD ON: HCPCS | Performed by: STUDENT IN AN ORGANIZED HEALTH CARE EDUCATION/TRAINING PROGRAM

## 2025-04-17 PROCEDURE — 99214 OFFICE O/P EST MOD 30 MIN: CPT | Performed by: STUDENT IN AN ORGANIZED HEALTH CARE EDUCATION/TRAINING PROGRAM

## 2025-04-17 RX ORDER — AMOXICILLIN 500 MG/1
1000 CAPSULE ORAL EVERY 8 HOURS SCHEDULED
Qty: 168 CAPSULE | Refills: 0 | Status: SHIPPED | OUTPATIENT
Start: 2025-04-17 | End: 2025-05-15

## 2025-04-17 NOTE — PATIENT INSTRUCTIONS
-continue the amoxicillin until there is resolution of the abscess  -check labs every 2 weeks  -check CT pelvis in 4 weeks and follow up after (week of May 12 or 19)

## 2025-04-17 NOTE — PROGRESS NOTES
Name: Hawa Cheek      : 1950      MRN: [id number]  Encounter Provider: Desiree Silva MD  Encounter Date: 2025   Encounter department: [facility] [place]  :  Assessment & Plan  Subacute osteomyelitis, unspecified site (HCC)  Patient had previously had steroid injection as an outpatient and clinical course was complicated by development of intramuscular abscesses.  Patient was to continue antibiotic until resolution of abscesses on imaging but unfortunately stopped on 3/5.  Repeat CT 3/10 subsequently showed changes of osteomyelitis at the right ischial tuberosity.  The patient was restarted on high-dose amoxicillin with plan to complete at least 6 weeks through  or until radiographic resolution of the abscesses.  She is tolerating the amoxicillin without side effects.  Recent labs reviewed show slight leukocytosis and elevated lymphocytes but otherwise unremarkable, creatinine within normal limits.  Repeat CT personally reviewed and shows persistent findings of osteomyelitis of the ischial tuberosity as expected, decreased size of adjacent fluid collection and resolution of previous intramuscular collection.   - Continue PO amoxicillin 1 g every 8 hours    - Plan to continue antibiotics until there is radiographic resolution of abscesses on imaging   - Repeat CT pelvis with contrast in 4 weeks around   -Obtain labs every other week with CBCD and creatinine   -ID follow up after repeat CT  Orders:    amoxicillin (AMOXIL) 500 mg capsule; Take 2 capsules (1,000 mg total) by mouth every 8 (eight) hours for 28 days    CT pelvis w contrast; Future    Abscess of muscle  Developed as a complication after recent injections.  Abscesses improving on imaging. Blood cultures were positive with strep intermedius which I suspect is the primary pathogen. Coagulase-negative staph likely contaminant.    -repeat CT pelvis as above in 4 weeks   -continue antibiotics  "until radiographic resolution of abscesses  Orders:    amoxicillin (AMOXIL) 500 mg capsule; Take 2 capsules (1,000 mg total) by mouth every 8 (eight) hours for 28 days    CT pelvis w contrast; Future      Antibiotics: Amoxicillin restart 3/13    History of Present Illness   Nerveda [id number] used for this encounter. The patient is accompanied by her daughter. Since her last visit the patient states that she is feeling well.  She denies any right hip or buttock pain.  She has been adherent with p.o. amoxicillin and denies any side effects such as nausea or diarrhea.  No fever or chills.    ROS:  A complete review of systems is negative other than that noted above in the HPI.       Objective   /68   Pulse 67   Temp 97.7 °F (36.5 °C)   Resp 18   Ht 5' 1\" (1.549 m)   Wt 52.6 kg (116 lb)   SpO2 98%   BMI 21.92 kg/m²      General: Alert, interactive, appearing well, nontoxic, no acute distress.  Lungs: Clear to auscultation bilaterally; no audible wheezes, rhonchi or rales; respirations unlabored  Heart: RRR; no murmur, rub or gallop  Abdomen: Soft, non-tender, non-distended  Extremities: R buttock without tenderness or visible lesions or masses  Skin: No new rashes or lesions    Lab Results: I have personally reviewed pertinent labs.  Lab Results   Component Value Date     06/29/2015    K 4.0 02/18/2025     02/18/2025    CO2 29 02/18/2025    ANIONGAP 6 06/29/2015    BUN 15 02/18/2025    CREATININE 0.68 04/10/2025    GLUCOSE 106 06/29/2015    GLUF 88 06/19/2024    CALCIUM 9.2 02/18/2025    CORRECTEDCA 9.8 02/18/2025    AST 29 02/18/2025    ALT 35 02/18/2025    ALKPHOS 97 02/18/2025    PROT 8.0 06/29/2015    BILITOT 0.43 06/29/2015    EGFR 85 04/10/2025     Lab Results   Component Value Date    WBC 12.23 (H) 04/10/2025    HGB 13.9 04/10/2025    HCT 42.5 04/10/2025    MCV 94 04/10/2025     04/10/2025     Lab Results   Component Value Date    ESR 84 (H) 02/04/2025 "     Imaging:  CT A/P personally reviewed which shows erosive changes involving the right ischial tuberosity, fluid collection adjacent to the ischial tuberosity decreased in size

## 2025-04-17 NOTE — ASSESSMENT & PLAN NOTE
Developed as a complication after recent injections.  Abscesses improving on imaging. Blood cultures were positive with strep intermedius which I suspect is the primary pathogen. Coagulase-negative staph likely contaminant.    -repeat CT pelvis as above in 4 weeks   -continue antibiotics until radiographic resolution of abscesses  Orders:    amoxicillin (AMOXIL) 500 mg capsule; Take 2 capsules (1,000 mg total) by mouth every 8 (eight) hours for 28 days    CT pelvis w contrast; Future

## 2025-04-22 ENCOUNTER — OFFICE VISIT (OUTPATIENT)
Dept: FAMILY MEDICINE CLINIC | Facility: CLINIC | Age: 75
End: 2025-04-22

## 2025-04-22 VITALS
TEMPERATURE: 97.3 F | HEIGHT: 61 IN | BODY MASS INDEX: 22.16 KG/M2 | WEIGHT: 117.4 LBS | DIASTOLIC BLOOD PRESSURE: 70 MMHG | RESPIRATION RATE: 16 BRPM | OXYGEN SATURATION: 97 % | SYSTOLIC BLOOD PRESSURE: 126 MMHG | HEART RATE: 69 BPM

## 2025-04-22 DIAGNOSIS — R73.03 PRE-DIABETES: ICD-10-CM

## 2025-04-22 DIAGNOSIS — Z02.89 ENCOUNTER FOR COMPLETION OF FORM WITH PATIENT: ICD-10-CM

## 2025-04-22 DIAGNOSIS — F03.B0 MODERATE DEMENTIA WITHOUT BEHAVIORAL DISTURBANCE, PSYCHOTIC DISTURBANCE, MOOD DISTURBANCE, OR ANXIETY, UNSPECIFIED DEMENTIA TYPE (HCC): Primary | ICD-10-CM

## 2025-04-22 PROCEDURE — 99214 OFFICE O/P EST MOD 30 MIN: CPT | Performed by: FAMILY MEDICINE

## 2025-04-22 PROCEDURE — 3074F SYST BP LT 130 MM HG: CPT | Performed by: FAMILY MEDICINE

## 2025-04-22 PROCEDURE — G2211 COMPLEX E/M VISIT ADD ON: HCPCS | Performed by: FAMILY MEDICINE

## 2025-04-22 PROCEDURE — 3078F DIAST BP <80 MM HG: CPT | Performed by: FAMILY MEDICINE

## 2025-04-22 NOTE — ASSESSMENT & PLAN NOTE
Dementia diagnosed 2019  Current medication: Memantine 5 mg daily  Mini-Mental state exam today: 13/30

## 2025-04-22 NOTE — PROGRESS NOTES
Name: Hawa Cheek      : 1950      MRN: [id number]  Encounter Provider: Keeley Mccormick MD  Encounter Date: 2025   Encounter department: [facility]  :  Assessment & Plan  Moderate dementia without behavioral disturbance, psychotic disturbance, mood disturbance, or anxiety, unspecified dementia type (HCC)  Dementia diagnosed   Current medication: Memantine 5 mg daily  Mini-Mental state exam today:        Encounter for completion of form with patient  Encounter to complete Medical Certification for Disability Exceptions form USC[employer] N-648       Pre-diabetes  Recheck Hemoglobin A1c with next lab draw  Orders:  •  Hemoglobin A1C; Future           History of Present Illness   HPI  Hawa Cheek is a 74 y.o. female  has a past medical history of Arthralgia of hip, Arthritis, Chronic pain disorder, Dyslipidemia, Hyperlipidemia, Hypertension, Influenza A, Murmur, cardiac, Neck pain, Pemphigus, Pre-diabetes, Shoulder pain, Stroke (HCC), and Vitamin D deficiency. who presented to the office today accompanied by her daughter to complete Medical Certification for Disability Exceptions form [employer] N-648.  Patient has a history of dementia that was first diagnosed in .  Symptoms for started about  with forgetfulness.  Patient was evaluated for mild cognitive decline by PCP, evaluated by neurology .  Patient diagnosed with dementia and started on Namenda 5 mg daily.  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    Daughter states that the patient is unable to read or write in Citizen of [place], she is also unable to learn a new language due to her dementia.  Patient is only able to sign her name when copying a Preate written example of her name.    The following portions of the patient's history were reviewed and updated as appropriate: allergies, current medications, past family history, past medical history,  "past social history, past surgical history and problem list.    Review of Systems   Constitutional:  Negative for chills and fever.   HENT:  Negative for congestion, rhinorrhea and sore throat.    Respiratory:  Negative for cough and shortness of breath.    Cardiovascular:  Negative for chest pain.   Gastrointestinal:  Negative for diarrhea, nausea and vomiting.   Skin:  Negative for rash.   Neurological:  Negative for dizziness and headaches.       Objective   /70 (BP Location: Left arm, Patient Position: Sitting, Cuff Size: Standard)   Pulse 69   Temp (!) 97.3 °F (36.3 °C) (Temporal)   Resp 16   Ht 5' 1\" (1.549 m)   Wt 53.3 kg (117 lb 6.4 oz)   SpO2 97%   BMI 22.18 kg/m²      Physical Exam  Vitals and nursing note reviewed.   Constitutional:       Appearance: Normal appearance.   HENT:      Head: Normocephalic and atraumatic.      Mouth/Throat:      Mouth: Mucous membranes are moist.   Cardiovascular:      Rate and Rhythm: Normal rate.   Pulmonary:      Effort: Pulmonary effort is normal.   Neurological:      General: No focal deficit present.      Mental Status: She is alert. Mental status is at baseline.      Cranial Nerves: No cranial nerve deficit.   Psychiatric:         Mood and Affect: Mood is anxious.         Behavior: Behavior normal. Behavior is cooperative.         Thought Content: Thought content is not paranoid or delusional.         Cognition and Memory: Cognition is impaired. Memory is impaired.       Administrative Statements   I have spent a total time of 45 minutes in caring for this patient on the day of the visit/encounter including Documenting in the medical record, Obtaining or reviewing history  , and completing form .  "

## 2025-04-24 ENCOUNTER — APPOINTMENT (OUTPATIENT)
Dept: LAB | Age: 75
End: 2025-04-24

## 2025-04-24 DIAGNOSIS — R73.03 PRE-DIABETES: ICD-10-CM

## 2025-04-24 DIAGNOSIS — M60.009 ABSCESS OF MUSCLE: ICD-10-CM

## 2025-04-24 DIAGNOSIS — M86.20 SUBACUTE OSTEOMYELITIS, UNSPECIFIED SITE (HCC): ICD-10-CM

## 2025-04-24 DIAGNOSIS — R78.81 GRAM-POSITIVE BACTEREMIA: ICD-10-CM

## 2025-04-24 LAB
ALBUMIN SERPL BCG-MCNC: 4.1 G/DL (ref 3.5–5)
ALP SERPL-CCNC: 73 U/L (ref 34–104)
ALT SERPL W P-5'-P-CCNC: 33 U/L (ref 7–52)
ANION GAP SERPL CALCULATED.3IONS-SCNC: 8 MMOL/L (ref 4–13)
AST SERPL W P-5'-P-CCNC: 28 U/L (ref 13–39)
BASOPHILS # BLD MANUAL: 0.12 THOUSAND/UL (ref 0–0.1)
BASOPHILS NFR MAR MANUAL: 1 % (ref 0–1)
BILIRUB SERPL-MCNC: 0.49 MG/DL (ref 0.2–1)
BUN SERPL-MCNC: 19 MG/DL (ref 5–25)
CALCIUM SERPL-MCNC: 9.7 MG/DL (ref 8.4–10.2)
CHLORIDE SERPL-SCNC: 104 MMOL/L (ref 96–108)
CO2 SERPL-SCNC: 28 MMOL/L (ref 21–32)
CREAT SERPL-MCNC: 0.67 MG/DL (ref 0.6–1.3)
DIFFERENTIAL COMMENT: ABNORMAL
EOSINOPHIL # BLD MANUAL: 0.35 THOUSAND/UL (ref 0–0.4)
EOSINOPHIL NFR BLD MANUAL: 3 % (ref 0–6)
ERYTHROCYTE [DISTWIDTH] IN BLOOD BY AUTOMATED COUNT: 13 % (ref 11.6–15.1)
EST. AVERAGE GLUCOSE BLD GHB EST-MCNC: 117 MG/DL
GFR SERPL CREATININE-BSD FRML MDRD: 86 ML/MIN/1.73SQ M
GLUCOSE P FAST SERPL-MCNC: 81 MG/DL (ref 65–99)
HBA1C MFR BLD: 5.7 %
HCT VFR BLD AUTO: 41.9 % (ref 34.8–46.1)
HGB BLD-MCNC: 13.8 G/DL (ref 11.5–15.4)
LYMPHOCYTES # BLD AUTO: 65 % (ref 14–44)
LYMPHOCYTES # BLD AUTO: 8.75 THOUSAND/UL (ref 0.6–4.47)
MCH RBC QN AUTO: 30.6 PG (ref 26.8–34.3)
MCHC RBC AUTO-ENTMCNC: 32.9 G/DL (ref 31.4–37.4)
MCV RBC AUTO: 93 FL (ref 82–98)
MONOCYTES # BLD AUTO: 0.23 THOUSAND/UL (ref 0–1.22)
MONOCYTES NFR BLD: 2 % (ref 4–12)
NEUTROPHILS # BLD MANUAL: 2.07 THOUSAND/UL (ref 1.85–7.62)
NEUTS SEG NFR BLD AUTO: 18 % (ref 43–75)
PLATELET # BLD AUTO: 223 THOUSANDS/UL (ref 149–390)
PLATELET BLD QL SMEAR: ADEQUATE
PMV BLD AUTO: 10.2 FL (ref 8.9–12.7)
POIKILOCYTOSIS BLD QL SMEAR: PRESENT
POTASSIUM SERPL-SCNC: 4.2 MMOL/L (ref 3.5–5.3)
PROT SERPL-MCNC: 8 G/DL (ref 6.4–8.4)
RBC # BLD AUTO: 4.51 MILLION/UL (ref 3.81–5.12)
RBC MORPH BLD: PRESENT
SMUDGE CELLS BLD QL SMEAR: PRESENT
SODIUM SERPL-SCNC: 140 MMOL/L (ref 135–147)
VARIANT LYMPHS # BLD AUTO: 11 %
WBC # BLD AUTO: 11.51 THOUSAND/UL (ref 4.31–10.16)

## 2025-04-24 PROCEDURE — 83036 HEMOGLOBIN GLYCOSYLATED A1C: CPT

## 2025-04-24 PROCEDURE — 80053 COMPREHEN METABOLIC PANEL: CPT

## 2025-04-24 PROCEDURE — 85007 BL SMEAR W/DIFF WBC COUNT: CPT

## 2025-04-24 PROCEDURE — 36415 COLL VENOUS BLD VENIPUNCTURE: CPT

## 2025-04-24 PROCEDURE — 85027 COMPLETE CBC AUTOMATED: CPT

## 2025-05-08 ENCOUNTER — APPOINTMENT (OUTPATIENT)
Dept: LAB | Age: 75
End: 2025-05-08
Payer: COMMERCIAL

## 2025-05-08 ENCOUNTER — TELEPHONE (OUTPATIENT)
Age: 75
End: 2025-05-08

## 2025-05-08 DIAGNOSIS — M60.009 ABSCESS OF MUSCLE: ICD-10-CM

## 2025-05-08 DIAGNOSIS — M60.009 ABSCESS OF MUSCLE: Primary | ICD-10-CM

## 2025-05-08 LAB
BASOPHILS # BLD AUTO: 0.08 THOUSANDS/ÂΜL (ref 0–0.1)
BASOPHILS NFR BLD AUTO: 1 % (ref 0–1)
CREAT SERPL-MCNC: 0.66 MG/DL (ref 0.6–1.3)
EOSINOPHIL # BLD AUTO: 0.29 THOUSAND/ÂΜL (ref 0–0.61)
EOSINOPHIL NFR BLD AUTO: 3 % (ref 0–6)
ERYTHROCYTE [DISTWIDTH] IN BLOOD BY AUTOMATED COUNT: 12.8 % (ref 11.6–15.1)
GFR SERPL CREATININE-BSD FRML MDRD: 86 ML/MIN/1.73SQ M
HCT VFR BLD AUTO: 43.7 % (ref 34.8–46.1)
HGB BLD-MCNC: 13.9 G/DL (ref 11.5–15.4)
IMM GRANULOCYTES # BLD AUTO: 0.02 THOUSAND/UL (ref 0–0.2)
IMM GRANULOCYTES NFR BLD AUTO: 0 % (ref 0–2)
LYMPHOCYTES # BLD AUTO: 5.83 THOUSANDS/ÂΜL (ref 0.6–4.47)
LYMPHOCYTES NFR BLD AUTO: 56 % (ref 14–44)
MCH RBC QN AUTO: 29.8 PG (ref 26.8–34.3)
MCHC RBC AUTO-ENTMCNC: 31.8 G/DL (ref 31.4–37.4)
MCV RBC AUTO: 94 FL (ref 82–98)
MONOCYTES # BLD AUTO: 0.59 THOUSAND/ÂΜL (ref 0.17–1.22)
MONOCYTES NFR BLD AUTO: 6 % (ref 4–12)
NEUTROPHILS # BLD AUTO: 3.47 THOUSANDS/ÂΜL (ref 1.85–7.62)
NEUTS SEG NFR BLD AUTO: 34 % (ref 43–75)
NRBC BLD AUTO-RTO: 0 /100 WBCS
PLATELET # BLD AUTO: 236 THOUSANDS/UL (ref 149–390)
PMV BLD AUTO: 10.5 FL (ref 8.9–12.7)
RBC # BLD AUTO: 4.66 MILLION/UL (ref 3.81–5.12)
WBC # BLD AUTO: 10.28 THOUSAND/UL (ref 4.31–10.16)

## 2025-05-08 PROCEDURE — 85025 COMPLETE CBC W/AUTO DIFF WBC: CPT

## 2025-05-08 PROCEDURE — 36415 COLL VENOUS BLD VENIPUNCTURE: CPT

## 2025-05-08 PROCEDURE — 82565 ASSAY OF CREATININE: CPT

## 2025-05-08 NOTE — TELEPHONE ENCOUNTER
Patient is calling because she is at the lab getting blood work drawn. Patient states she is to get labs drawn every week. I advised patient that her labs should be drawn every 2 weeks. The orders were not placed in the chart. I notified our clinical team and they will place the active standing lab orders for the cbcd and creatinine. I relayed this information to the patient.    Major placed the orders for the patient.      Thank you.

## 2025-05-08 NOTE — PROGRESS NOTES
Received notification that patient was at lab with out lab orders. Reviewed providers most recent office note indicating the patient was to have CBCd and Cre collected every other week while on PO antibiotics. Orders placed.

## 2025-05-17 ENCOUNTER — HOSPITAL ENCOUNTER (OUTPATIENT)
Dept: CT IMAGING | Facility: HOSPITAL | Age: 75
Discharge: HOME | End: 2025-05-17
Attending: STUDENT IN AN ORGANIZED HEALTH CARE EDUCATION/TRAINING PROGRAM
Payer: COMMERCIAL

## 2025-05-17 DIAGNOSIS — M60.009 ABSCESS OF MUSCLE: ICD-10-CM

## 2025-05-17 DIAGNOSIS — M86.20 SUBACUTE OSTEOMYELITIS, UNSPECIFIED SITE (HCC): ICD-10-CM

## 2025-05-17 PROCEDURE — 72193 CT PELVIS W/DYE: CPT

## 2025-05-17 RX ADMIN — IOHEXOL 100 ML: 350 INJECTION, SOLUTION INTRAVENOUS at 13:25

## 2025-05-22 ENCOUNTER — APPOINTMENT (OUTPATIENT)
Dept: LAB | Age: 75
End: 2025-05-22
Payer: COMMERCIAL

## 2025-05-22 DIAGNOSIS — M60.009 ABSCESS OF MUSCLE: ICD-10-CM

## 2025-05-22 LAB
BASOPHILS # BLD MANUAL: 0.21 THOUSAND/UL (ref 0–0.1)
BASOPHILS NFR MAR MANUAL: 2 % (ref 0–1)
CREAT SERPL-MCNC: 0.57 MG/DL (ref 0.6–1.3)
EOSINOPHIL # BLD MANUAL: 0.11 THOUSAND/UL (ref 0–0.4)
EOSINOPHIL NFR BLD MANUAL: 1 % (ref 0–6)
ERYTHROCYTE [DISTWIDTH] IN BLOOD BY AUTOMATED COUNT: 12.7 % (ref 11.6–15.1)
GFR SERPL CREATININE-BSD FRML MDRD: 90 ML/MIN/1.73SQ M
HCT VFR BLD AUTO: 45.1 % (ref 34.8–46.1)
HGB BLD-MCNC: 14.6 G/DL (ref 11.5–15.4)
LYMPHOCYTES # BLD AUTO: 57 % (ref 14–44)
LYMPHOCYTES # BLD AUTO: 7.07 THOUSAND/UL (ref 0.6–4.47)
MCH RBC QN AUTO: 30.4 PG (ref 26.8–34.3)
MCHC RBC AUTO-ENTMCNC: 32.4 G/DL (ref 31.4–37.4)
MCV RBC AUTO: 94 FL (ref 82–98)
MONOCYTES # BLD AUTO: 0.21 THOUSAND/UL (ref 0–1.22)
MONOCYTES NFR BLD: 2 % (ref 4–12)
NEUTROPHILS # BLD MANUAL: 2.95 THOUSAND/UL (ref 1.85–7.62)
NEUTS SEG NFR BLD AUTO: 28 % (ref 43–75)
PLATELET # BLD AUTO: 181 THOUSANDS/UL (ref 149–390)
PLATELET BLD QL SMEAR: ADEQUATE
PMV BLD AUTO: 10.8 FL (ref 8.9–12.7)
RBC # BLD AUTO: 4.8 MILLION/UL (ref 3.81–5.12)
RBC MORPH BLD: NORMAL
SMUDGE CELLS BLD QL SMEAR: PRESENT
VARIANT LYMPHS # BLD AUTO: 10 %
WBC # BLD AUTO: 10.55 THOUSAND/UL (ref 4.31–10.16)

## 2025-05-22 PROCEDURE — 36415 COLL VENOUS BLD VENIPUNCTURE: CPT

## 2025-05-22 PROCEDURE — 85007 BL SMEAR W/DIFF WBC COUNT: CPT

## 2025-05-22 PROCEDURE — 85027 COMPLETE CBC AUTOMATED: CPT

## 2025-05-22 PROCEDURE — 82565 ASSAY OF CREATININE: CPT

## 2025-05-28 ENCOUNTER — TELEMEDICINE (OUTPATIENT)
Dept: INFECTIOUS DISEASES | Facility: CLINIC | Age: 75
End: 2025-05-28
Payer: COMMERCIAL

## 2025-05-28 DIAGNOSIS — R79.89 ABNORMAL CBC: ICD-10-CM

## 2025-05-28 DIAGNOSIS — M60.009 ABSCESS OF MUSCLE: Primary | ICD-10-CM

## 2025-05-28 DIAGNOSIS — M86.20 SUBACUTE OSTEOMYELITIS, UNSPECIFIED SITE (HCC): ICD-10-CM

## 2025-05-28 PROCEDURE — 99214 OFFICE O/P EST MOD 30 MIN: CPT | Performed by: STUDENT IN AN ORGANIZED HEALTH CARE EDUCATION/TRAINING PROGRAM

## 2025-05-28 NOTE — PATIENT INSTRUCTIONS
Discontinue antibiotics   Recommend a repeat CBC in 1 month--can be managed by primary care provider

## 2025-05-28 NOTE — LETTER
May 28, 2025     Keeley Mccormick MD  [address] [facility] [address]    Patient: Hawa Cheek   YOB: 1950   Date of Visit: 2025       Dear Dr. Keeley Mccormick MD:    Thank you for referring Hawa Cheek to me for evaluation. Below are my notes for this consultation.    If you have questions, please do not hesitate to call me. I look forward to following your patient along with you.         Sincerely,        Desiree Silva MD        CC: No Recipients    Desiree Silva MD  2025 12:51 PM  Incomplete  Administrative Statements  Encounter provider Desiree Silva MD    The Patient is located at Home and in the following state in which I hold an active license [place].    The patient was identified by name and date of birth. Hawa Cheek was informed that this is a telemedicine visit and that the visit is being conducted through the Epic Embedded platform. She agrees to proceed..  My office door was closed. No one else was in the room.  She acknowledged consent and understanding of privacy and security of the video platform. The patient has agreed to participate and understands they can discontinue the visit at any time.    I have spent a total time of 30 minutes in caring for this patient on the day of the visit/encounter including Diagnostic results, Instructions for management, Impressions, Counseling / Coordination of care, Documenting in the medical record, and Obtaining or reviewing history  , not including the time spent for establishing the audio/video connection.     Name: Hawa Cheek      : 1950      MRN: [id number]  Encounter Provider: Desiree Silva MD  Encounter Date: 2025   Encounter department: [facility]  :  Assessment & Plan  Subacute osteomyelitis, unspecified site (HCC)  Patient had previously had steroid injection into the R ischiogluteal bursa as an outpatient and clinical course was complicated  by development of intramuscular abscesses.  Patient was to continue antibiotic until resolution of abscesses on imaging but unfortunately stopped on 3/5.  Repeat CT 3/10 subsequently showed changes of osteomyelitis at the right ischial tuberosity.  The patient was restarted on high-dose amoxicillin with plan to complete at least 6 weeks through 4/23 or until radiographic resolution of the abscesses.  She has now received nearly 10 weeks of amoxicillin.  The patient has not had any side effects from the amoxicillin and right hip and buttock pain has resolved.  Recent labs reviewed show white blood cell count at the upper limit of normal and elevated lymphocytes, creatinine within normal limits.  Repeat CT personally reviewed and shows resolution of the intramuscular abscesses, evolving/improving right ischial osteomyelitis which is expected as bony changes treatment response.  - Discontinue antibiotics given resolution of infection radiographically  - Monitor for recurrent signs of infection such as fever, chills, return of right hip pain  Abscess of muscle  Developed as a complication after steroid injections. Blood cultures were positive with strep intermedius which I suspect is the primary pathogen.  Status post prolonged course of high-dose amoxicillin.  Repeat CT pelvis shows resolution of abscesses.              - Discontinue antibiotics    Abnormal CBC  CBC differential continues to show elevated lymphocytes, atypical lymphocytes which has been present since March.  This may be related to the infection/antibiotic therapy.   - The patient has a follow-up with her PCP on Friday.  Recommend discussing repeat CBC with her PCP in 1 month to evaluate for resolution or persistent changes which would require further hematology workup    Antibiotics: Amoxicillin restart 3/13    History of Present Illness  The patient is accompanied by her daughter. She denies right buttock or hip pain but is having some  discomfort/stiffness in the left hip. No fever, chills. She finished amoxicillin last week since she ran out of the prescription.    ROS:  A complete review of systems is negative other than that noted above in the HPI.       Objective  There were no vitals taken for this visit.     Exam limited by telemedicine encounter  General: Alert, interactive, appearing well, nontoxic, no acute distress.  Lungs: Respirations unlabored  Abdomen: Non-distended  Extremities: no edema  Skin: No new rashes or lesions on visible skin    Lab Results: I have personally reviewed pertinent labs.  Lab Results   Component Value Date     2015    K 4.2 2025     2025    CO2 28 2025    ANIONGAP 6 2015    BUN 19 2025    CREATININE 0.57 (L) 2025    GLUCOSE 106 2015    GLUF 81 2025    CALCIUM 9.7 2025    CORRECTEDCA 9.8 2025    AST 28 2025    ALT 33 2025    ALKPHOS 73 2025    PROT 8.0 2015    BILITOT 0.43 2015    EGFR 90 2025     Lab Results   Component Value Date    WBC 10.55 (H) 2025    HGB 14.6 2025    HCT 45.1 2025    MCV 94 2025     2025     Lab Results   Component Value Date    ESR 84 (H) 2025     Imaging:  CT A/P personally reviewed which shows no residual fluid collections or intramuscular edema, multifocal erosions involving the right ischium appearing more well-defined corticated consistent with improving osteomyelitis    Desiree Silva MD  2025 12:49 PM  Sign when Signing Visit  Name: Hawa Cheek      : 1950      MRN: [id number]  Encounter Provider: Desiree Silva MD  Encounter Date: 2025   Encounter department: [facility]  :  Assessment & Plan  Subacute osteomyelitis, unspecified site (HCC)  Patient had previously had steroid injection into the R ischiogluteal bursa as an outpatient and clinical course was complicated  discomfort/stiffness in the left hip. No fever, chills. She finished amoxicillin last week since she ran out of the prescription.    ROS:  A complete review of systems is negative other than that noted above in the HPI.       Objective  There were no vitals taken for this visit.     Exam limited by telemedicine encounter  General: Alert, interactive, appearing well, nontoxic, no acute distress.  Lungs: Respirations unlabored  Abdomen: Non-distended  Extremities: no edema  Skin: No new rashes or lesions on visible skin    Lab Results: I have personally reviewed pertinent labs.  Lab Results   Component Value Date     06/29/2015    K 4.2 04/24/2025     04/24/2025    CO2 28 04/24/2025    ANIONGAP 6 06/29/2015    BUN 19 04/24/2025    CREATININE 0.57 (L) 05/22/2025    GLUCOSE 106 06/29/2015    GLUF 81 04/24/2025    CALCIUM 9.7 04/24/2025    CORRECTEDCA 9.8 02/18/2025    AST 28 04/24/2025    ALT 33 04/24/2025    ALKPHOS 73 04/24/2025    PROT 8.0 06/29/2015    BILITOT 0.43 06/29/2015    EGFR 90 05/22/2025     Lab Results   Component Value Date    WBC 10.55 (H) 05/22/2025    HGB 14.6 05/22/2025    HCT 45.1 05/22/2025    MCV 94 05/22/2025     05/22/2025     Lab Results   Component Value Date    ESR 84 (H) 02/04/2025     Imaging:  CT A/P personally reviewed which shows no residual fluid collections or intramuscular edema, multifocal erosions involving the right ischium appearing more well-defined corticated consistent with improving osteomyelitis

## 2025-05-28 NOTE — ASSESSMENT & PLAN NOTE
Patient had previously had steroid injection into the R ischiogluteal bursa as an outpatient and clinical course was complicated by development of intramuscular abscesses.  Patient was to continue antibiotic until resolution of abscesses on imaging but unfortunately stopped on 3/5.  Repeat CT 3/10 subsequently showed changes of osteomyelitis at the right ischial tuberosity.  The patient was restarted on high-dose amoxicillin with plan to complete at least 6 weeks through 4/23 or until radiographic resolution of the abscesses.  She has now received nearly 10 weeks of amoxicillin.  The patient has not had any side effects from the amoxicillin and right hip and buttock pain has resolved.  Recent labs reviewed show white blood cell count at the upper limit of normal and elevated lymphocytes, creatinine within normal limits.  Repeat CT personally reviewed and shows resolution of the intramuscular abscesses, evolving/improving right ischial osteomyelitis which is expected as bony changes treatment response.  - Discontinue antibiotics given resolution of infection radiographically  - Monitor for recurrent signs of infection such as fever, chills, return of right hip pain

## 2025-05-28 NOTE — PROGRESS NOTES
Administrative Statements   Encounter provider Desiree Silva MD    The Patient is located at Home and in the following state in which I hold an active license [place].    The patient was identified by name and date of birth. Hawa Cheek was informed that this is a telemedicine visit and that the visit is being conducted through the Epic Embedded platform. She agrees to proceed..  My office door was closed. No one else was in the room.  She acknowledged consent and understanding of privacy and security of the video platform. The patient has agreed to participate and understands they can discontinue the visit at any time.    I have spent a total time of 30 minutes in caring for this patient on the day of the visit/encounter including Diagnostic results, Instructions for management, Impressions, Counseling / Coordination of care, Documenting in the medical record, and Obtaining or reviewing history  , not including the time spent for establishing the audio/video connection.     Name: Hawa Cheek      : 1950      MRN: [id number]  Encounter Provider: Desiree Silva MD  Encounter Date: 2025   Encounter department: [facility]  :  Assessment & Plan  Subacute osteomyelitis, unspecified site (HCC)  Patient had previously had steroid injection into the R ischiogluteal bursa as an outpatient and clinical course was complicated by development of intramuscular abscesses.  Patient was to continue antibiotic until resolution of abscesses on imaging but unfortunately stopped on 3/5.  Repeat CT 3/10 subsequently showed changes of osteomyelitis at the right ischial tuberosity.  The patient was restarted on high-dose amoxicillin with plan to complete at least 6 weeks through  or until radiographic resolution of the abscesses.  She has now received nearly 10 weeks of amoxicillin.  The patient has not had any side effects from the amoxicillin and right hip and buttock pain  has resolved.  Recent labs reviewed show white blood cell count at the upper limit of normal and elevated lymphocytes, creatinine within normal limits.  Repeat CT personally reviewed and shows resolution of the intramuscular abscesses, evolving/improving right ischial osteomyelitis which is expected as bony changes treatment response.  - Discontinue antibiotics given resolution of infection radiographically  - Monitor for recurrent signs of infection such as fever, chills, return of right hip pain  Abscess of muscle  Developed as a complication after steroid injections. Blood cultures were positive with strep intermedius which I suspect is the primary pathogen.  Status post prolonged course of high-dose amoxicillin.  Repeat CT pelvis shows resolution of abscesses.              - Discontinue antibiotics    Abnormal CBC  CBC differential continues to show elevated lymphocytes, atypical lymphocytes which has been present since March.  This may be related to the infection/antibiotic therapy.   - The patient has a follow-up with her PCP on Friday.  Recommend discussing repeat CBC with her PCP in 1 month to evaluate for resolution or persistent changes which would require further hematology workup    Antibiotics: Amoxicillin restart 3/13    History of Present Illness   The patient is accompanied by her daughter. She denies right buttock or hip pain but is having some discomfort/stiffness in the left hip. No fever, chills. She finished amoxicillin last week since she ran out of the prescription.    ROS:  A complete review of systems is negative other than that noted above in the HPI.       Objective   There were no vitals taken for this visit.     Exam limited by telemedicine encounter  General: Alert, interactive, appearing well, nontoxic, no acute distress.  Lungs: Respirations unlabored  Abdomen: Non-distended  Extremities: no edema  Skin: No new rashes or lesions on visible skin    Lab Results: I have personally  reviewed pertinent labs.  Lab Results   Component Value Date     06/29/2015    K 4.2 04/24/2025     04/24/2025    CO2 28 04/24/2025    ANIONGAP 6 06/29/2015    BUN 19 04/24/2025    CREATININE 0.57 (L) 05/22/2025    GLUCOSE 106 06/29/2015    GLUF 81 04/24/2025    CALCIUM 9.7 04/24/2025    CORRECTEDCA 9.8 02/18/2025    AST 28 04/24/2025    ALT 33 04/24/2025    ALKPHOS 73 04/24/2025    PROT 8.0 06/29/2015    BILITOT 0.43 06/29/2015    EGFR 90 05/22/2025     Lab Results   Component Value Date    WBC 10.55 (H) 05/22/2025    HGB 14.6 05/22/2025    HCT 45.1 05/22/2025    MCV 94 05/22/2025     05/22/2025     Lab Results   Component Value Date    ESR 84 (H) 02/04/2025     Imaging:  CT A/P personally reviewed which shows no residual fluid collections or intramuscular edema, multifocal erosions involving the right ischium appearing more well-defined corticated consistent with improving osteomyelitis

## 2025-05-28 NOTE — ASSESSMENT & PLAN NOTE
Developed as a complication after steroid injections. Blood cultures were positive with strep intermedius which I suspect is the primary pathogen.  Status post prolonged course of high-dose amoxicillin.  Repeat CT pelvis shows resolution of abscesses.              - Discontinue antibiotics

## 2025-05-30 ENCOUNTER — OFFICE VISIT (OUTPATIENT)
Dept: FAMILY MEDICINE CLINIC | Facility: CLINIC | Age: 75
End: 2025-05-30

## 2025-05-30 VITALS
DIASTOLIC BLOOD PRESSURE: 70 MMHG | HEIGHT: 61 IN | OXYGEN SATURATION: 100 % | SYSTOLIC BLOOD PRESSURE: 122 MMHG | BODY MASS INDEX: 21.52 KG/M2 | RESPIRATION RATE: 16 BRPM | TEMPERATURE: 98 F | HEART RATE: 67 BPM | WEIGHT: 114 LBS

## 2025-05-30 DIAGNOSIS — E78.5 DYSLIPIDEMIA: ICD-10-CM

## 2025-05-30 DIAGNOSIS — D72.829 LEUKOCYTOSIS, UNSPECIFIED TYPE: ICD-10-CM

## 2025-05-30 DIAGNOSIS — M79.605 LEFT LEG PAIN: Primary | ICD-10-CM

## 2025-05-30 PROCEDURE — 3074F SYST BP LT 130 MM HG: CPT | Performed by: FAMILY MEDICINE

## 2025-05-30 PROCEDURE — 3078F DIAST BP <80 MM HG: CPT | Performed by: FAMILY MEDICINE

## 2025-05-30 PROCEDURE — 99214 OFFICE O/P EST MOD 30 MIN: CPT | Performed by: FAMILY MEDICINE

## 2025-05-30 NOTE — PATIENT INSTRUCTIONS
Patient Education     Ejercicios de amplitud de movimiento activa para espalda y caderas   Acerca de wanda santos   Los ejercicios de amplitud de movimiento activa o AMA se realizan cuando to persona mueve los músculos sin la ayuda de otra persona o dispositivo. Usar pesas o bandas elásticas para estos ejercicios puede hacerlos más dificultosos.  General   Antes de comenzar con un programa, consulte a garcia médico para saber si está lo suficientemente juve ryan para hacer estos ejercicios. Es posible que el médico le pida que trabaje con un entrenador o fisioterapeuta para elaborar un programa seguro de actividad física que cumpla con lidia necesidades.  Ejercicios de fortalecimiento   Los ejercicios de fortalecimiento mantienen lidia músculos firmes y lulu. Asegúrese de adoptar to buena postura. Comience repitiendo cada ejercicio 2 o 3 veces. Progrese hasta hacer cada ejercicio 10 veces. Trate de hacer estos ejercicios 2 o 3 veces al día. Tia todos los ejercicios de forma lenta.  Flexión de la cintura:  Flexión hacia el frente: párese con los pies levemente separados. Flexione el cuerpo por la cintura hasta llegar a los dedos de los pies. Regrese a la posición inicial.  Flexión lateral: párese con las melissa a los costados. Doble la cintura hacia la izquierda mientras estira la mano izquierda hacia el pie nik. Vuelva a quedar parado. Ahora, doble la cintura hacia la derecha mientras estira la mano derecha hacia el pie derecho.  Flexiones de espalda de pie: párese con los pies levemente separados. Ponga las melissa en la cadera. Inclínese hacia atrás y nikhil hacia el techo hasta que sienta un estiramiento. Si tiene un problema de discos, puede hacer wanda ejercicio sin sostenerlo 10 veces seguidas.  Giros de la parte superior del cuerpo: ponga las melissa en la cadera y gire la parte superior del cuerpo hacia la izquierda. Ahora, gire a la derecha. Asegúrese de mantener las caderas alineadas.  Pierna de lado a lado:  recuéstese de espalda y con las piernas estiradas. Estire la pierna hacia un lado lo más que pueda y, luego, llévela de nuevo al centro. Tia esto con la otra pierna.  Giro de piernas: siéntese con las piernas estiradas. Mueva los pies hacia adentro y hacia afuera.  Patada hacia atrás: recuéstese de lado. Lleve la pierna hacia el pecho con la rodilla doblada. Ahora, regrese a la posición inicial, de forma que quede abhi con la otra pierna. Ahora, con la rodilla recta, patee con la pierna hacia la parte posterior de garcia cuerpo. Regrese a la posición inicial, de forma que quede abhi con la otra pierna. Ahora, cambie de lado y tia el ejercicio con la otra pierna.               ¿Cuáles serán los resultados?   Mantener lidia músculos lulu y flexibles  Menos dolor y entumecimiento  Ayudar a curarse más rápido después de to lesión o cirugía  Aumenta el flujo sanguíneo a to parte del cuerpo  Lo ayuda a sentirse mejor y más relajado  Le da más energía  Músculos más tonificados  Resulta más fácil hacer las actividades diarias  Mejorar la función articular y la movilidad  Consejos útiles   Manténgase activa y realice ejercicios para mantener los músculos lulu y flexibles.  No contenga la respiración cuando realice actividad física. [place] aumentará la presión arterial. Si usted suele hacer esto, pruebe contar en voz melly mientras hace ejercicio. Si algún ejercicio le general malestar, deje de hacerlo inmediatamente.  Intente caminar o andar en bicicleta a un ritmo lento minor algunos minutos para calentar los músculos. Tia esto luego de ejercitar.  Ejercitarse antes de cada comida es to buena manera de mantener to rutina.  Es posible que se sienta algo incómodo cuando tia ejercicio, reddy no debería sentir un dolor intenso. Si siente un dolor intenso, deje lo que está haciendo. Si el dolor continúa, llame al médico.  ¿Dónde puedo obtener más información?   Arthritis  Foundation  http://www.arthritistoday.org/fitness/index.php   Exención de responsabilidad y uso de la información del consumidor   Esta información general es un resumen limitado de la información sobre el diagnóstico, el tratamiento y/o la medicación. No pretende ser exhaustivo y debe utilizarse ryan to herramienta para ayudar al usuario a comprender y/o evaluar las posibles opciones de diagnóstico y tratamiento. NO incluye toda la información sobre las enfermedades, los tratamientos, los medicamentos, los efectos secundarios o los riesgos que pueden aplicarse a un paciente específico. No tiene por objeto ser un consejo médico ni un sustituto del consejo médico. [place] pretende reemplazar al diagnóstico o el tratamiento proporcionados por un proveedor de atención médica con base en el examen y la evaluación por parte de wanda proveedor de las circunstancias específicas y únicas de un paciente. Los pacientes deben hablar con un proveedor de atención médica para obtener información completa sobre garcia amtheus, preguntas médicas y opciones de tratamiento, incluidos los riesgos o beneficios relacionados con el uso de medicamentos. Esta información no respalda ningún tratamiento o medicamento ryan seguro, eficaz o aprobado para tratar a un paciente específico. [employer] y lidia afiliados renuncian a cualquier garantía o responsabilidad relacionada con esta información o con el uso que se jennifer de esta. El uso de esta información se rige por las Condiciones de uso, disponibles en https://www.[employer].Mediauwer.com/en/know/clinical-effectiveness-terms   Copyright   Copyright © 2024 [employer] y lidia licenciantes y/o afiliados. Todos los derechos reservados.

## 2025-05-30 NOTE — PROGRESS NOTES
Name: Hawa Cheek      : 1950      MRN: [id number]  Encounter Provider: Keeley Mccormick MD  Encounter Date: 2025   Encounter department: [facility]  :  Assessment & Plan  Left leg pain  Obtain lower extremity ultrasound to rule out a DVT  Unknown etiology of lower extremity pain  Most likely muscular-if ultrasound normal would advise patient conservative management at this time.  Orders:  •  VAS VENOUS DUPLEX -LOWER LIMB UNILATERAL; Future    Leukocytosis, unspecified type  Most likely related to inflammation  Will recheck cbc  Orders:  •  CBC and differential; Future    Dyslipidemia  Lab Results   Component Value Date/Time    CHOLESTEROL 131 2024 09:48 AM    TRIG 57 2024 09:48 AM    TRIG 98 2015 09:17 AM    HDL 70 2024 09:48 AM    HDL 61 2015 09:17 AM    LDLCALC 50 2024 09:48 AM    LDLCALC 75 2015 09:17 AM     Continue Atorvastatin 40 mg daily, Lovaza 1 g twice daily  Low Fat Diet, regular Exercise    Orders:  •  omega-3-acid ethyl esters (LOVAZA) 1 g capsule; Take 2 capsules (2 g total) by mouth 2 (two) times a day           History of Present Illness   HPI  Hawa Cheek is a 74 y.o. female  who presented to the office today accompanied by her daughter.  Patient presents today for persistent pain in her left lower leg.  She has had this pain for the last few weeks, and it is not improving she feels it mostly when she presses on the left lower leg muscle she does not recall any injury or trauma to the area.    Review of Systems   Constitutional:  Negative for chills and fever.   HENT:  Negative for congestion, rhinorrhea and sore throat.    Respiratory:  Negative for cough and shortness of breath.    Cardiovascular:  Negative for chest pain.   Gastrointestinal:  Negative for diarrhea, nausea and vomiting.   Skin:  Negative for rash.   Neurological:  Negative for dizziness and headaches.       Objective   BP  "122/70 (BP Location: Right arm, Patient Position: Sitting, Cuff Size: Standard)   Pulse 67   Temp 98 °F (36.7 °C) (Temporal)   Resp 16   Ht 5' 1\" (1.549 m)   Wt 51.7 kg (114 lb)   SpO2 100%   BMI 21.54 kg/m²      Physical Exam  Vitals and nursing note reviewed.   Constitutional:       Appearance: Normal appearance.   HENT:      Head: Normocephalic and atraumatic.      Mouth/Throat:      Mouth: Mucous membranes are moist.     Cardiovascular:      Rate and Rhythm: Normal rate.   Pulmonary:      Effort: Pulmonary effort is normal.     Musculoskeletal:      Right lower leg: No edema.      Left lower leg: No edema.        Legs:       Comments: Left LE  lateral muscle tenderness, skin intact, no bruising or swelling     Neurological:      Mental Status: She is alert.     Psychiatric:         Mood and Affect: Mood normal.         Behavior: Behavior normal.         "

## 2025-06-12 ENCOUNTER — HOSPITAL ENCOUNTER (OUTPATIENT)
Dept: NON INVASIVE DIAGNOSTICS | Facility: HOSPITAL | Age: 75
Discharge: HOME | End: 2025-06-12
Attending: FAMILY MEDICINE
Payer: COMMERCIAL

## 2025-06-12 DIAGNOSIS — M79.605 LEFT LEG PAIN: ICD-10-CM

## 2025-06-12 PROCEDURE — 93971 EXTREMITY STUDY: CPT | Performed by: SURGERY

## 2025-06-12 PROCEDURE — 93971 EXTREMITY STUDY: CPT

## 2025-06-13 ENCOUNTER — RESULTS FOLLOW-UP (OUTPATIENT)
Dept: FAMILY MEDICINE CLINIC | Facility: CLINIC | Age: 75
End: 2025-06-13

## 2025-06-13 RX ORDER — OMEGA-3-ACID ETHYL ESTERS 1 G/1
2 CAPSULE, LIQUID FILLED ORAL 2 TIMES DAILY
Qty: 180 CAPSULE | Refills: 0 | Status: SHIPPED | OUTPATIENT
Start: 2025-06-13

## 2025-06-13 NOTE — ASSESSMENT & PLAN NOTE
Lab Results   Component Value Date/Time    CHOLESTEROL 131 06/19/2024 09:48 AM    TRIG 57 06/19/2024 09:48 AM    TRIG 98 01/13/2015 09:17 AM    HDL 70 06/19/2024 09:48 AM    HDL 61 01/13/2015 09:17 AM    LDLCALC 50 06/19/2024 09:48 AM    LDLCALC 75 01/13/2015 09:17 AM     Continue Atorvastatin 40 mg daily, Lovaza 1 g twice daily  Low Fat Diet, regular Exercise    Orders:  •  omega-3-acid ethyl esters (LOVAZA) 1 g capsule; Take 2 capsules (2 g total) by mouth 2 (two) times a day

## 2025-06-15 DIAGNOSIS — E55.9 VITAMIN D DEFICIENCY: ICD-10-CM

## 2025-06-15 DIAGNOSIS — M25.551 RIGHT HIP PAIN: ICD-10-CM

## 2025-06-16 RX ORDER — CHOLECALCIFEROL (VITAMIN D3) 50 MCG
2000 TABLET ORAL DAILY
Qty: 90 TABLET | OUTPATIENT
Start: 2025-06-16

## 2025-06-16 RX ORDER — METHOCARBAMOL 500 MG/1
500 TABLET, FILM COATED ORAL 2 TIMES DAILY
Qty: 20 TABLET | Refills: 0 | Status: SHIPPED | OUTPATIENT
Start: 2025-06-16

## 2025-06-16 RX ORDER — CHOLECALCIFEROL (VITAMIN D3) 50 MCG
2000 TABLET ORAL DAILY
Qty: 90 TABLET | Refills: 1 | Status: SHIPPED | OUTPATIENT
Start: 2025-06-16

## 2025-07-01 ENCOUNTER — APPOINTMENT (OUTPATIENT)
Dept: LAB | Age: 75
End: 2025-07-01
Payer: COMMERCIAL

## 2025-07-01 DIAGNOSIS — D72.829 LEUKOCYTOSIS, UNSPECIFIED TYPE: ICD-10-CM

## 2025-07-01 LAB
BASOPHILS # BLD AUTO: 0.08 THOUSANDS/ÂΜL (ref 0–0.1)
BASOPHILS NFR BLD AUTO: 1 % (ref 0–1)
EOSINOPHIL # BLD AUTO: 0.19 THOUSAND/ÂΜL (ref 0–0.61)
EOSINOPHIL NFR BLD AUTO: 2 % (ref 0–6)
ERYTHROCYTE [DISTWIDTH] IN BLOOD BY AUTOMATED COUNT: 12.9 % (ref 11.6–15.1)
HCT VFR BLD AUTO: 40.7 % (ref 34.8–46.1)
HGB BLD-MCNC: 13.5 G/DL (ref 11.5–15.4)
IMM GRANULOCYTES # BLD AUTO: 0.07 THOUSAND/UL (ref 0–0.2)
IMM GRANULOCYTES NFR BLD AUTO: 1 % (ref 0–2)
LYMPHOCYTES # BLD AUTO: 5.15 THOUSANDS/ÂΜL (ref 0.6–4.47)
LYMPHOCYTES NFR BLD AUTO: 42 % (ref 14–44)
MCH RBC QN AUTO: 30.3 PG (ref 26.8–34.3)
MCHC RBC AUTO-ENTMCNC: 33.2 G/DL (ref 31.4–37.4)
MCV RBC AUTO: 92 FL (ref 82–98)
MONOCYTES # BLD AUTO: 0.64 THOUSAND/ÂΜL (ref 0.17–1.22)
MONOCYTES NFR BLD AUTO: 5 % (ref 4–12)
NEUTROPHILS # BLD AUTO: 6.22 THOUSANDS/ÂΜL (ref 1.85–7.62)
NEUTS SEG NFR BLD AUTO: 49 % (ref 43–75)
NRBC BLD AUTO-RTO: 0 /100 WBCS
PLATELET # BLD AUTO: 254 THOUSANDS/UL (ref 149–390)
PMV BLD AUTO: 10.1 FL (ref 8.9–12.7)
RBC # BLD AUTO: 4.45 MILLION/UL (ref 3.81–5.12)
WBC # BLD AUTO: 12.35 THOUSAND/UL (ref 4.31–10.16)

## 2025-07-01 PROCEDURE — 85025 COMPLETE CBC W/AUTO DIFF WBC: CPT

## 2025-07-01 PROCEDURE — 36415 COLL VENOUS BLD VENIPUNCTURE: CPT

## 2025-07-07 ENCOUNTER — OFFICE VISIT (OUTPATIENT)
Dept: FAMILY MEDICINE CLINIC | Facility: CLINIC | Age: 75
End: 2025-07-07

## 2025-07-07 VITALS
WEIGHT: 116 LBS | OXYGEN SATURATION: 96 % | HEIGHT: 61 IN | TEMPERATURE: 97.9 F | BODY MASS INDEX: 21.9 KG/M2 | RESPIRATION RATE: 20 BRPM | HEART RATE: 78 BPM | SYSTOLIC BLOOD PRESSURE: 158 MMHG | DIASTOLIC BLOOD PRESSURE: 84 MMHG

## 2025-07-07 DIAGNOSIS — D72.829 LEUKOCYTOSIS, UNSPECIFIED TYPE: ICD-10-CM

## 2025-07-07 DIAGNOSIS — G89.29 CHRONIC BILATERAL LOW BACK PAIN WITH BILATERAL SCIATICA: Primary | ICD-10-CM

## 2025-07-07 DIAGNOSIS — M48.062 SPINAL STENOSIS, LUMBAR REGION WITH NEUROGENIC CLAUDICATION: ICD-10-CM

## 2025-07-07 DIAGNOSIS — R41.89 COGNITIVE DECLINE: ICD-10-CM

## 2025-07-07 DIAGNOSIS — M54.42 CHRONIC BILATERAL LOW BACK PAIN WITH BILATERAL SCIATICA: Primary | ICD-10-CM

## 2025-07-07 DIAGNOSIS — I10 ESSENTIAL HYPERTENSION: ICD-10-CM

## 2025-07-07 DIAGNOSIS — R73.03 PRE-DIABETES: ICD-10-CM

## 2025-07-07 DIAGNOSIS — E55.9 VITAMIN D DEFICIENCY: ICD-10-CM

## 2025-07-07 DIAGNOSIS — M54.41 CHRONIC BILATERAL LOW BACK PAIN WITH BILATERAL SCIATICA: Primary | ICD-10-CM

## 2025-07-07 DIAGNOSIS — E78.5 DYSLIPIDEMIA: ICD-10-CM

## 2025-07-07 DIAGNOSIS — I63.9 CVA (CEREBRAL VASCULAR ACCIDENT) (HCC): ICD-10-CM

## 2025-07-07 PROCEDURE — 3079F DIAST BP 80-89 MM HG: CPT | Performed by: FAMILY MEDICINE

## 2025-07-07 PROCEDURE — 99214 OFFICE O/P EST MOD 30 MIN: CPT | Performed by: FAMILY MEDICINE

## 2025-07-07 PROCEDURE — G2211 COMPLEX E/M VISIT ADD ON: HCPCS | Performed by: FAMILY MEDICINE

## 2025-07-07 PROCEDURE — 3077F SYST BP >= 140 MM HG: CPT | Performed by: FAMILY MEDICINE

## 2025-07-07 RX ORDER — CELECOXIB 50 MG/1
50 CAPSULE ORAL 2 TIMES DAILY PRN
Qty: 60 CAPSULE | Refills: 3 | Status: SHIPPED | OUTPATIENT
Start: 2025-07-07

## 2025-07-07 RX ORDER — ATORVASTATIN CALCIUM 40 MG/1
40 TABLET, FILM COATED ORAL EVERY EVENING
Qty: 90 TABLET | Refills: 3 | Status: SHIPPED | OUTPATIENT
Start: 2025-07-07

## 2025-07-07 RX ORDER — AMLODIPINE BESYLATE 5 MG/1
5 TABLET ORAL DAILY
Qty: 90 TABLET | Refills: 3 | Status: SHIPPED | OUTPATIENT
Start: 2025-07-07

## 2025-07-07 RX ORDER — MEMANTINE HYDROCHLORIDE 5 MG/1
5 TABLET ORAL 2 TIMES DAILY
Qty: 180 TABLET | Refills: 3 | Status: SHIPPED | OUTPATIENT
Start: 2025-07-07

## 2025-07-07 RX ORDER — MELOXICAM 15 MG/1
15 TABLET ORAL DAILY PRN
Qty: 30 TABLET | Refills: 3 | Status: SHIPPED | OUTPATIENT
Start: 2025-07-07

## 2025-07-07 NOTE — PROGRESS NOTES
Name: Hawa Cheek      : 1950      MRN: [id number]  Encounter Provider: Keeley Mccormick MD  Encounter Date: 2025   Encounter department: [facility]  :  Assessment & Plan  Chronic bilateral low back pain with bilateral sciatica  Discussed in detail with pateint and her daughter the prognosis of lower back pain  Reviewed the MRI results with them, and discussed management options  Pt does not desire PT referral or ICS injection  Meloxicam and Celebrex have helped int he past, and pt knows to alternate them, take with food  Ordered a Rollator walker today through [employer].    Orders:  •  celecoxib (CeleBREX) 50 MG capsule; Take 1 capsule (50 mg total) by mouth 2 (two) times a day as needed for mild pain  •  meloxicam (MOBIC) 15 mg tablet; Take 1 tablet (15 mg total) by mouth daily as needed for moderate pain    Spinal stenosis, lumbar region with neurogenic claudication    Orders:  •  celecoxib (CeleBREX) 50 MG capsule; Take 1 capsule (50 mg total) by mouth 2 (two) times a day as needed for mild pain  •  meloxicam (MOBIC) 15 mg tablet; Take 1 tablet (15 mg total) by mouth daily as needed for moderate pain    Essential hypertension  BP Readings from Last 3 Encounters:   25 158/84   25 122/70   25 126/70     Stable  Current medications: Amlodipine 5 mg   (H/o Cough with ACE inhibitors)     Plan:  Continue Amlodipine 5 mg   Monitor BP at home if lower than 90/60 mmHg contact the office  Orders:  •  amLODIPine (NORVASC) 5 mg tablet; Take 1 tablet (5 mg total) by mouth daily    Pre-diabetes  Improved  Continue low carb diet       Leukocytosis, unspecified type  Persistent leukocytosis on repeated blood work  Will refer for ambulatory consult hematology  Orders:  •  AMB E-CONSULT TO HEMATOLOGY    Vitamin D deficiency    Orders:  •  Cholecalciferol (Vitamin D-3) 125 MCG (5000 UT) TABS; Take 1 tablet by mouth daily    Dyslipidemia    Orders:  •   "atorvastatin (LIPITOR) 40 mg tablet; Take 1 tablet (40 mg total) by mouth every evening    CVA (cerebral vascular accident) (HCC)    Orders:  •  atorvastatin (LIPITOR) 40 mg tablet; Take 1 tablet (40 mg total) by mouth every evening    Cognitive decline    Orders:  •  memantine (NAMENDA) 5 mg tablet; Take 1 tablet (5 mg total) by mouth 2 (two) times a day           History of Present Illness     Hawa Cheek is a 74 y.o. female  has a past medical history of Arthralgia of hip, Arthritis, Chronic pain disorder, Dyslipidemia, Hyperlipidemia, Hypertension, Influenza A, Murmur, cardiac, Neck pain, Pemphigus, Pre-diabetes, Shoulder pain, Stroke (HCC), and Vitamin D deficiency. who presented to the office today accompanied by her daughter and granddaughter.  Daughter states that her mother continues to have lower back pain, and the current medications for pain are not working.  While taking meloxicam 7.5 mg she finds very minimal relief, and daughter doubled the dose which provided more relief to her mother.  Also was prescribed Celebrex by previous PCP which the daughter also alternates for her mother.    Back pain is not associated with any red flag symptoms at this time.      HPI    Review of Systems   Constitutional:  Negative for chills and fever.   HENT:  Negative for congestion, rhinorrhea and sore throat.    Respiratory:  Negative for cough.    Gastrointestinal:  Negative for diarrhea, nausea and vomiting.   Skin:  Negative for rash.   Neurological:  Negative for dizziness.       Objective   /84 (BP Location: Right arm, Patient Position: Sitting, Cuff Size: Standard)   Pulse 78   Temp 97.9 °F (36.6 °C) (Temporal)   Resp 20   Ht 5' 1\" (1.549 m)   Wt 52.6 kg (116 lb)   SpO2 96%   BMI 21.92 kg/m²      Physical Exam    "

## 2025-07-07 NOTE — ASSESSMENT & PLAN NOTE
Orders:  •  celecoxib (CeleBREX) 50 MG capsule; Take 1 capsule (50 mg total) by mouth 2 (two) times a day as needed for mild pain  •  meloxicam (MOBIC) 15 mg tablet; Take 1 tablet (15 mg total) by mouth daily as needed for moderate pain

## 2025-07-07 NOTE — ASSESSMENT & PLAN NOTE
BP Readings from Last 3 Encounters:   07/07/25 158/84   05/30/25 122/70   04/22/25 126/70     Stable  Current medications: Amlodipine 5 mg   (H/o Cough with ACE inhibitors)     Plan:  Continue Amlodipine 5 mg   Monitor BP at home if lower than 90/60 mmHg contact the office  Orders:  •  amLODIPine (NORVASC) 5 mg tablet; Take 1 tablet (5 mg total) by mouth daily

## 2025-07-07 NOTE — ASSESSMENT & PLAN NOTE
Orders:  •  atorvastatin (LIPITOR) 40 mg tablet; Take 1 tablet (40 mg total) by mouth every evening

## 2025-07-07 NOTE — ASSESSMENT & PLAN NOTE
Discussed in detail with pateint and her daughter the prognosis of lower back pain  Reviewed the MRI results with them, and discussed management options  Pt does not desire PT referral or ICS injection  Meloxicam and Celebrex have helped int he past, and pt knows to alternate them, take with food  Ordered a Rollator walker today through [employer].    Orders:  •  celecoxib (CeleBREX) 50 MG capsule; Take 1 capsule (50 mg total) by mouth 2 (two) times a day as needed for mild pain  •  meloxicam (MOBIC) 15 mg tablet; Take 1 tablet (15 mg total) by mouth daily as needed for moderate pain

## 2025-07-08 DIAGNOSIS — D72.829 LEUKOCYTOSIS, UNSPECIFIED TYPE: Primary | ICD-10-CM

## 2025-07-08 NOTE — PROGRESS NOTES
Called patient's daughter, Lexis, and informed of recommendations from ambulatory hematology consult.  Leukemia/lymphoma flow cytometry blood test order placed and inform daughter to schedule an appointment with hematology after completing the test.

## 2025-07-08 NOTE — PROGRESS NOTES
E-Consult unable to be completed.  Persistent elevation of lymphocytes >5,000 is likely consistent with CLL. Please order leukemia/lymphoma flow cytometry blood test.  Please advise patient to complete it prior to office visit.    Staff - Please schedule patient to be seen in the office with MD or JO for CLL.

## 2025-07-09 LAB
DME PARACHUTE DELIVERY DATE ACTUAL: NORMAL
DME PARACHUTE DELIVERY DATE REQUESTED: NORMAL
DME PARACHUTE ITEM DESCRIPTION: NORMAL
DME PARACHUTE ITEM DESCRIPTION: NORMAL
DME PARACHUTE ORDER STATUS: NORMAL
DME PARACHUTE SUPPLIER NAME: NORMAL
DME PARACHUTE SUPPLIER PHONE: NORMAL

## 2025-07-11 ENCOUNTER — TELEPHONE (OUTPATIENT)
Age: 75
End: 2025-07-11

## 2025-07-11 NOTE — TELEPHONE ENCOUNTER
Please schedule patient to be seen in the office with MD or JO for CLL.     Schedule within 7 days with Dr. Messina or Dr. Mendoza. If unable to schedule within 7 days, choose from the remaining appropriate providers. If unable to schedule with any providers within 7 days, send a telephone encounter to the Medical Oncology [facility].     Pt is scheduled for consult 8/11/25 with Dr Parker at [facility]. Please advise if this appt can be made sooner.

## 2025-07-14 ENCOUNTER — APPOINTMENT (OUTPATIENT)
Dept: LAB | Age: 75
End: 2025-07-14
Payer: COMMERCIAL

## 2025-07-14 DIAGNOSIS — M60.009 ABSCESS OF MUSCLE: ICD-10-CM

## 2025-07-14 DIAGNOSIS — R78.81 GRAM-POSITIVE BACTEREMIA: ICD-10-CM

## 2025-07-14 DIAGNOSIS — D72.829 LEUKOCYTOSIS, UNSPECIFIED TYPE: ICD-10-CM

## 2025-07-14 LAB
ALBUMIN SERPL BCG-MCNC: 4 G/DL (ref 3.5–5)
ALP SERPL-CCNC: 63 U/L (ref 34–104)
ALT SERPL W P-5'-P-CCNC: 31 U/L (ref 7–52)
ANION GAP SERPL CALCULATED.3IONS-SCNC: 9 MMOL/L (ref 4–13)
AST SERPL W P-5'-P-CCNC: 36 U/L (ref 13–39)
BILIRUB SERPL-MCNC: 0.36 MG/DL (ref 0.2–1)
BUN SERPL-MCNC: 17 MG/DL (ref 5–25)
CALCIUM SERPL-MCNC: 9.4 MG/DL (ref 8.4–10.2)
CHLORIDE SERPL-SCNC: 105 MMOL/L (ref 96–108)
CO2 SERPL-SCNC: 26 MMOL/L (ref 21–32)
CREAT SERPL-MCNC: 0.69 MG/DL (ref 0.6–1.3)
GFR SERPL CREATININE-BSD FRML MDRD: 85 ML/MIN/1.73SQ M
GLUCOSE SERPL-MCNC: 83 MG/DL (ref 65–140)
POTASSIUM SERPL-SCNC: 4.9 MMOL/L (ref 3.5–5.3)
PROT SERPL-MCNC: 7.3 G/DL (ref 6.4–8.4)
SODIUM SERPL-SCNC: 140 MMOL/L (ref 135–147)

## 2025-07-14 PROCEDURE — 88185 FLOWCYTOMETRY/TC ADD-ON: CPT | Performed by: FAMILY MEDICINE

## 2025-07-14 PROCEDURE — 88184 FLOWCYTOMETRY/ TC 1 MARKER: CPT

## 2025-07-14 PROCEDURE — 80053 COMPREHEN METABOLIC PANEL: CPT

## 2025-07-14 PROCEDURE — 36415 COLL VENOUS BLD VENIPUNCTURE: CPT

## 2025-07-17 ENCOUNTER — OFFICE VISIT (OUTPATIENT)
Dept: HEMATOLOGY ONCOLOGY | Facility: CLINIC | Age: 75
End: 2025-07-17
Payer: COMMERCIAL

## 2025-07-17 VITALS
TEMPERATURE: 98.2 F | BODY MASS INDEX: 22.09 KG/M2 | SYSTOLIC BLOOD PRESSURE: 160 MMHG | HEART RATE: 76 BPM | RESPIRATION RATE: 16 BRPM | HEIGHT: 61 IN | OXYGEN SATURATION: 98 % | WEIGHT: 117 LBS | DIASTOLIC BLOOD PRESSURE: 80 MMHG

## 2025-07-17 DIAGNOSIS — D72.829 LEUKOCYTOSIS, UNSPECIFIED TYPE: Primary | ICD-10-CM

## 2025-07-17 PROCEDURE — 99244 OFF/OP CNSLTJ NEW/EST MOD 40: CPT | Performed by: INTERNAL MEDICINE

## 2025-07-17 NOTE — PROGRESS NOTES
Name: Hawa Cheek      : 1950      MRN: [id number]  Encounter Provider: Thony Parker MD  Encounter Date: 2025   Encounter department: [facility] [place]  :  Assessment & Plan  Leukocytosis, unspecified type  I had a lengthy discussion with the patient and her daughter regarding the potential etiology of the leukocytosis/mild lymphocytosis which could be due to clonal process like CLL versus reactive etiology.  We used the [employer] system during this office visit to educate the patient about the workup.  Flow cytometry from the peripheral smear was done couple days ago and the result is still pending.  We will wait for the result of the flow cytometry and then make a decision further workup would be necessary.  The patient is entirely asymptomatic at this point in time.  Orders:    CBC and differential; Future    Comprehensive metabolic panel; Future    Magnesium; Future    CLL profile, fish; Future        Return in about 10 weeks (around 2025) for Office Visit 20 min, Labs.    History of Present Illness   Chief Complaint   Patient presents with    Consult   This is a 74-year-old  female with history of arthritis, hyperlipidemia, hypertension, etc.  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  The patient was found to have leukocytosis/lymphocytosis on multiple occasions.  Flow cytometry from the peripheral blood was then ordered and done on 2025.  The result is still pending.  The patient denied any constitutional symptoms.    The patient was accompanied by her daughter.  [employer] interpretation system was used during this office visit.     Review of Systems   Constitutional:  Negative for chills and fever.   HENT:  Negative for ear pain and sore throat.   "  Eyes:  Negative for pain and visual disturbance.   Respiratory:  Negative for cough and shortness of breath.    Cardiovascular:  Negative for chest pain and palpitations.   Gastrointestinal:  Negative for abdominal pain and vomiting.   Genitourinary:  Negative for dysuria and hematuria.   Musculoskeletal:  Negative for arthralgias and back pain.   Skin:  Negative for color change and rash.   Neurological:  Positive for numbness. Negative for seizures and syncope.   All other systems reviewed and are negative.    Medical History Reviewed by provider this encounter:  Tobacco  Allergies  Meds  Problems  Med Hx  Surg Hx  Fam Hx     .  Medications Ordered Prior to Encounter[1]   Social History[2]      Objective   /80 (BP Location: Left arm, Patient Position: Sitting, Cuff Size: Adult)   Pulse 76   Temp 98.2 °F (36.8 °C)   Resp 16   Ht 5' 1\" (1.549 m)   Wt 53.1 kg (117 lb)   SpO2 98%   BMI 22.11 kg/m²     Physical Exam  Constitutional:       General: She is not in acute distress.     Appearance: She is well-developed. She is not diaphoretic.   HENT:      Head: Normocephalic and atraumatic.      Nose: Nose normal.     Eyes:      General: No scleral icterus.        Right eye: No discharge.         Left eye: No discharge.      Conjunctiva/sclera: Conjunctivae normal.      Pupils: Pupils are equal, round, and reactive to light.     Neck:      Thyroid: No thyromegaly.      Vascular: No JVD.      Trachea: No tracheal deviation.     Cardiovascular:      Rate and Rhythm: Normal rate and regular rhythm.      Heart sounds: Normal heart sounds. No murmur heard.     No friction rub.   Pulmonary:      Effort: Pulmonary effort is normal. No respiratory distress.      Breath sounds: Normal breath sounds. No stridor. No wheezing or rales.   Chest:      Chest wall: No tenderness.   Abdominal:      General: Bowel sounds are normal. There is no distension.      Palpations: Abdomen is soft. There is no hepatomegaly or " splenomegaly.      Tenderness: There is no abdominal tenderness. There is no guarding or rebound.     Musculoskeletal:         General: No tenderness or deformity. Normal range of motion.      Cervical back: Normal range of motion and neck supple.   Lymphadenopathy:      Cervical: No cervical adenopathy.     Skin:     General: Skin is warm and dry.      Coloration: Skin is not pale.      Findings: No erythema or rash.     Neurological:      Mental Status: She is alert and oriented to person, place, and time.      Cranial Nerves: No cranial nerve deficit.      Coordination: Coordination normal.      Deep Tendon Reflexes: Reflexes are normal and symmetric.     Psychiatric:         Behavior: Behavior normal.         Thought Content: Thought content normal.         Judgment: Judgment normal.         Labs: I have reviewed the following labs:  Results for orders placed or performed in visit on 07/14/25   Comprehensive metabolic panel   Result Value Ref Range    Sodium 140 135 - 147 mmol/L    Potassium 4.9 3.5 - 5.3 mmol/L    Chloride 105 96 - 108 mmol/L    CO2 26 21 - 32 mmol/L    ANION GAP 9 4 - 13 mmol/L    BUN 17 5 - 25 mg/dL    Creatinine 0.69 0.60 - 1.30 mg/dL    Glucose 83 65 - 140 mg/dL    Calcium 9.4 8.4 - 10.2 mg/dL    AST 36 13 - 39 U/L    ALT 31 7 - 52 U/L    Alkaline Phosphatase 63 34 - 104 U/L    Total Protein 7.3 6.4 - 8.4 g/dL    Albumin 4.0 3.5 - 5.0 g/dL    Total Bilirubin 0.36 0.20 - 1.00 mg/dL    eGFR 85 ml/min/1.73sq m     *Note: Due to a large number of results and/or encounters for the requested time period, some results have not been displayed. A complete set of results can be found in Results Review.     Lab Results   Component Value Date/Time    WBC 12.35 (H) 07/01/2025 10:04 AM    RBC 4.45 07/01/2025 10:04 AM    Hemoglobin 13.5 07/01/2025 10:04 AM    Hematocrit 40.7 07/01/2025 10:04 AM    MCV 92 07/01/2025 10:04 AM    MCH 30.3 07/01/2025 10:04 AM    RDW 12.9 07/01/2025 10:04 AM    Platelets 254  07/01/2025 10:04 AM    Segmented % 49 07/01/2025 10:04 AM    Bands % 3 04/10/2025 10:51 AM    Lymphocytes % 42 07/01/2025 10:04 AM    Monocytes % 5 07/01/2025 10:04 AM    Eosinophils Relative 2 07/01/2025 10:04 AM    Basophils Relative 1 07/01/2025 10:04 AM    Immature Grans % 1 07/01/2025 10:04 AM    Absolute Neutrophils 6.22 07/01/2025 10:04 AM      Lab Results   Component Value Date/Time    Sodium 140 07/14/2025 12:10 PM    Potassium 4.9 07/14/2025 12:10 PM    Chloride 105 07/14/2025 12:10 PM    CO2 26 07/14/2025 12:10 PM    ANION GAP 9 07/14/2025 12:10 PM    BUN 17 07/14/2025 12:10 PM    Creatinine 0.69 07/14/2025 12:10 PM    Glucose 83 07/14/2025 12:10 PM    Glucose, Fasting 81 04/24/2025 10:43 AM    Calcium 9.4 07/14/2025 12:10 PM    Corrected Calcium 9.8 02/18/2025 01:10 PM    AST 36 07/14/2025 12:10 PM    ALT 31 07/14/2025 12:10 PM    Alkaline Phosphatase 63 07/14/2025 12:10 PM    Total Protein 7.3 07/14/2025 12:10 PM    Albumin 4.0 07/14/2025 12:10 PM    Total Bilirubin 0.36 07/14/2025 12:10 PM    eGFR 85 07/14/2025 12:10 PM      Lab Results   Component Value Date/Time    Sed Rate 84 (H) 02/04/2025 05:33 AM    .1 (H) 02/04/2025 05:33 AM      Results for orders placed or performed in visit on 07/14/25   Comprehensive metabolic panel   Result Value Ref Range    Sodium 140 135 - 147 mmol/L    Potassium 4.9 3.5 - 5.3 mmol/L    Chloride 105 96 - 108 mmol/L    CO2 26 21 - 32 mmol/L    ANION GAP 9 4 - 13 mmol/L    BUN 17 5 - 25 mg/dL    Creatinine 0.69 0.60 - 1.30 mg/dL    Glucose 83 65 - 140 mg/dL    Calcium 9.4 8.4 - 10.2 mg/dL    AST 36 13 - 39 U/L    ALT 31 7 - 52 U/L    Alkaline Phosphatase 63 34 - 104 U/L    Total Protein 7.3 6.4 - 8.4 g/dL    Albumin 4.0 3.5 - 5.0 g/dL    Total Bilirubin 0.36 0.20 - 1.00 mg/dL    eGFR 85 ml/min/1.73sq m                   [1]   Current Outpatient Medications on File Prior to Visit   Medication Sig Dispense Refill    amLODIPine (NORVASC) 5 mg tablet Take 1 tablet (5 mg  total) by mouth daily 90 tablet 3    ammonium lactate (LAC-HYDRIN) 12 % lotion Apply 1 Application topically 2 (two) times a day 400 g 0    aspirin (Aspirin Low Dose) 81 mg EC tablet Take 1 tablet (81 mg total) by mouth daily 90 tablet 0    atorvastatin (LIPITOR) 40 mg tablet Take 1 tablet (40 mg total) by mouth every evening 90 tablet 3    celecoxib (CeleBREX) 50 MG capsule Take 1 capsule (50 mg total) by mouth 2 (two) times a day as needed for mild pain 60 capsule 3    chlorhexidine (PERIDEX) 0.12 % solution       Cholecalciferol (Vitamin D-3) 125 MCG (5000 UT) TABS Take 1 tablet by mouth daily 90 tablet 3    meloxicam (MOBIC) 15 mg tablet Take 1 tablet (15 mg total) by mouth daily as needed for moderate pain 30 tablet 3    memantine (NAMENDA) 5 mg tablet Take 1 tablet (5 mg total) by mouth 2 (two) times a day 180 tablet 3    methocarbamol (ROBAXIN) 500 mg tablet Take 1 tablet (500 mg total) by mouth 2 (two) times a day 20 tablet 0    omega-3-acid ethyl esters (LOVAZA) 1 g capsule Take 2 capsules (2 g total) by mouth 2 (two) times a day 180 capsule 0    sodium chloride, PF, 0.9 % 10 mL by Intracatheter route daily Intracatheter flushing daily. May substitute prefilled syringe with normal saline 10 mL vials, 10 mL syringes, and 18 g blunt needles 300 mL 2     No current facility-administered medications on file prior to visit.   [2]   Social History  Tobacco Use    Smoking status: Never     Passive exposure: Never    Smokeless tobacco: Never   Vaping Use    Vaping status: Never Used   Substance and Sexual Activity    Alcohol use: Never    Drug use: No    Sexual activity: Not Currently

## 2025-07-18 LAB — SCAN RESULT: NORMAL

## 2025-07-31 DIAGNOSIS — L85.3 DRY SKIN: ICD-10-CM

## 2025-07-31 RX ORDER — AMMONIUM LACTATE 12 G/100G
1 LOTION TOPICAL 2 TIMES DAILY
Qty: 400 ML | Refills: 0 | Status: SHIPPED | OUTPATIENT
Start: 2025-07-31 | End: 2025-08-04 | Stop reason: SDUPTHER

## 2025-08-04 ENCOUNTER — OFFICE VISIT (OUTPATIENT)
Dept: FAMILY MEDICINE CLINIC | Facility: CLINIC | Age: 75
End: 2025-08-04

## 2025-08-04 VITALS
BODY MASS INDEX: 21.75 KG/M2 | DIASTOLIC BLOOD PRESSURE: 70 MMHG | TEMPERATURE: 97.1 F | RESPIRATION RATE: 16 BRPM | HEIGHT: 61 IN | OXYGEN SATURATION: 96 % | WEIGHT: 115.2 LBS | SYSTOLIC BLOOD PRESSURE: 120 MMHG | HEART RATE: 64 BPM

## 2025-08-04 DIAGNOSIS — M54.50 CHRONIC BILATERAL LOW BACK PAIN WITHOUT SCIATICA: Primary | ICD-10-CM

## 2025-08-04 DIAGNOSIS — G89.29 CHRONIC BILATERAL LOW BACK PAIN WITHOUT SCIATICA: Primary | ICD-10-CM

## 2025-08-04 DIAGNOSIS — L85.3 DRY SKIN: ICD-10-CM

## 2025-08-04 PROCEDURE — 99213 OFFICE O/P EST LOW 20 MIN: CPT | Performed by: FAMILY MEDICINE

## 2025-08-04 PROCEDURE — 3078F DIAST BP <80 MM HG: CPT | Performed by: FAMILY MEDICINE

## 2025-08-04 PROCEDURE — 3074F SYST BP LT 130 MM HG: CPT | Performed by: FAMILY MEDICINE

## 2025-08-04 PROCEDURE — G2211 COMPLEX E/M VISIT ADD ON: HCPCS | Performed by: FAMILY MEDICINE

## 2025-08-04 RX ORDER — AMMONIUM LACTATE 12 G/100G
1 LOTION TOPICAL 2 TIMES DAILY
Qty: 400 ML | Refills: 2 | Status: SHIPPED | OUTPATIENT
Start: 2025-08-04

## 2025-08-08 ENCOUNTER — OFFICE VISIT (OUTPATIENT)
Dept: CARDIOLOGY CLINIC | Facility: CLINIC | Age: 75
End: 2025-08-08
Payer: COMMERCIAL

## 2025-08-08 VITALS
WEIGHT: 115 LBS | HEIGHT: 61 IN | SYSTOLIC BLOOD PRESSURE: 146 MMHG | HEART RATE: 60 BPM | BODY MASS INDEX: 21.71 KG/M2 | OXYGEN SATURATION: 92 % | DIASTOLIC BLOOD PRESSURE: 60 MMHG

## 2025-08-08 DIAGNOSIS — I10 ESSENTIAL HYPERTENSION: ICD-10-CM

## 2025-08-08 PROCEDURE — 99214 OFFICE O/P EST MOD 30 MIN: CPT | Performed by: INTERNAL MEDICINE

## 2025-08-08 RX ORDER — AMLODIPINE BESYLATE 5 MG/1
5 TABLET ORAL DAILY
Qty: 90 TABLET | Refills: 3 | Status: SHIPPED | OUTPATIENT
Start: 2025-08-08

## (undated) DEVICE — TAPE TRANSPORE CLEAR 2 IN

## (undated) DEVICE — NEEDLE 25G X 1 1/2

## (undated) DEVICE — CAST PADDING 4 IN UNSTERILE

## (undated) DEVICE — 3M™ TEGADERM™ TRANSPARENT FILM DRESSING FRAME STYLE, 1624W, 2-3/8 IN X 2-3/4 IN (6 CM X 7 CM), 100/CT 4CT/CASE: Brand: 3M™ TEGADERM™

## (undated) DEVICE — SUT ETHILON 4-0 PS-2 18 IN 1667H

## (undated) DEVICE — THE MONARCH® "B" CARTRIDGE IS A SINGLE-USE POLYPROPYLENE CARTRIDGE FOR POSTERIOR CHAMBER IOL DELIVERY: Brand: MONARCH® II

## (undated) DEVICE — GAUZE SPONGES,16 PLY: Brand: CURITY

## (undated) DEVICE — PADDING CAST 4 IN  COTTON STRL

## (undated) DEVICE — PACK CUSTOM EYE BASIC

## (undated) DEVICE — SPLINT 4 X 15 IN FAST SET PLASTER

## (undated) DEVICE — NEEDLE BLUNT 18 G X 1 1/2IN

## (undated) DEVICE — SYRINGE 3ML LL

## (undated) DEVICE — CURITY NON-ADHERENT STRIPS: Brand: CURITY

## (undated) DEVICE — NEEDLE FILTER 5 MICR 19G X 1.5IN

## (undated) DEVICE — DRAPE SHEET THREE QUARTER

## (undated) DEVICE — ASTOUND STANDARD SURGICAL GOWN, XXL: Brand: CONVERTORS

## (undated) DEVICE — BUCKET PLASTER DISPOSABLE

## (undated) DEVICE — CHLORAPREP HI-LITE 26ML ORANGE

## (undated) DEVICE — STANDARD SURGICAL GOWN, L: Brand: CONVERTORS

## (undated) DEVICE — SYRINGE 5ML LL

## (undated) DEVICE — CAST PADDING 4 IN SYNTHETIC NON-STRL

## (undated) DEVICE — BLADE MICRO SHARP 5.0MM X 15 DEG

## (undated) DEVICE — PACK PIC GENERIC

## (undated) DEVICE — INTENDED FOR TISSUE SEPARATION, AND OTHER PROCEDURES THAT REQUIRE A SHARP SURGICAL BLADE TO PUNCTURE OR CUT.: Brand: BARD-PARKER ® SAFETYLOCK CARBON RIB-BACK BLADES

## (undated) DEVICE — BETHLEHEM UNIVERSAL  MIONR EXT: Brand: CARDINAL HEALTH

## (undated) DEVICE — GLOVE SURG DERMASSURE LF 6.5

## (undated) DEVICE — STERILE POLYISOPRENE POWDER-FREE SURGICAL GLOVES WITH EMOLLIENT COATING: Brand: PROTEXIS

## (undated) DEVICE — STERILE POLYISOPRENE POWDER-FREE SURGICAL GLOVES: Brand: PROTEXIS

## (undated) DEVICE — ACE WRAP 4 IN UNSTERILE

## (undated) DEVICE — BULB SYRINGE,IRRIGATION WITH PROTECTIVE CAP: Brand: DOVER

## (undated) DEVICE — SYRINGE 10ML LL

## (undated) DEVICE — GLOVE SRG LF STRL BGL SKNSNS 6.5 PF

## (undated) DEVICE — CUFF TOURNIQUET 18 X 4 IN QUICK CONNECT DISP 1 BLADDER

## (undated) DEVICE — TIBURON HAND DRAPE: Brand: CONVERTORS

## (undated) DEVICE — LAMINECTOMY ARM CRADLE FOAM POSITIONER: Brand: CARDINAL HEALTH

## (undated) DEVICE — MICROSURGICAL INSTRUMENT HYDRODISSECTION CANNULA 25GA, 8MM BEND: Brand: ALCON